# Patient Record
Sex: FEMALE | Race: WHITE | NOT HISPANIC OR LATINO | Employment: OTHER | URBAN - METROPOLITAN AREA
[De-identification: names, ages, dates, MRNs, and addresses within clinical notes are randomized per-mention and may not be internally consistent; named-entity substitution may affect disease eponyms.]

---

## 2021-11-19 LAB
ALBUMIN: 3.8 G/DL (ref 3.4–5)
ALP BLD-CCNC: 46 U/L (ref 33–136)
ALT SERPL-CCNC: 10 U/L (ref 7–45)
ANION GAP SERPL CALCULATED.3IONS-SCNC: 11 MMOL/L (ref 10–20)
AST SERPL-CCNC: 10 U/L (ref 9–39)
BICARBONATE: 30 MMOL/L (ref 21–32)
BILIRUB SERPL-MCNC: 0.5 MG/DL (ref 0–1.2)
CALCIUM SERPL-MCNC: 9 MG/DL (ref 8.6–10.3)
CHLORIDE BLD-SCNC: 103 MMOL/L (ref 98–107)
CREAT SERPL-MCNC: 0.76 MG/DL (ref 0.5–1)
ERYTHROCYTE [DISTWIDTH] IN BLOOD BY AUTOMATED COUNT: 15.5 % (ref 11.5–14)
GFR AFRICAN AMERICAN: >60 ML/MIN/1.73M2
GFR NON-AFRICAN AMERICAN: >60 ML/MIN/1.73M2
GLUCOSE: 113 MG/DL (ref 74–99)
HCT VFR BLD CALC: 40.4 % (ref 36–46)
HEMOGLOBIN: 12.8 G/DL (ref 12–16)
MCHC RBC AUTO-ENTMCNC: 31.7 G/DL (ref 32–36)
MCV RBC AUTO: 91 FL (ref 80–100)
NUCLEATED RBCS: 0 /100 WBC (ref 0–0)
PLATELET # BLD: 281 X10E9/L (ref 150–450)
POTASSIUM SERPL-SCNC: 3.8 MMOL/L (ref 3.5–5.3)
RBC # BLD: 4.45 X10E12/L (ref 4–5.2)
SODIUM BLD-SCNC: 140 MMOL/L (ref 136–145)
TOTAL PROTEIN: 6.1 G/DL (ref 6.4–8.2)
UREA NITROGEN: 9 MG/DL (ref 6–23)
WBC: 12.6 X10E9/L (ref 4.4–11.3)

## 2021-11-20 LAB
ABO: NORMAL
ANTIBODY SCREEN: NORMAL
RH TYPE: NORMAL

## 2021-11-29 LAB
SARS-COV-2, PCR: NOT DETECTED
SPECIMEN SOURCE: NORMAL

## 2021-11-30 LAB
GLUCOSE BLD-MCNC: 108 MG/DL (ref 74–99)
GLUCOSE BLD-MCNC: 120 MG/DL (ref 74–99)

## 2021-12-01 LAB
ANION GAP SERPL CALCULATED.3IONS-SCNC: 12 MMOL/L (ref 10–20)
BASOPHILS # BLD: 0.02 X10E9/L (ref 0–0.1)
BASOPHILS RELATIVE PERCENT: 0.2 % (ref 0–2)
BICARBONATE: 28 MMOL/L (ref 21–32)
CALCIUM SERPL-MCNC: 8.2 MG/DL (ref 8.6–10.3)
CHLORIDE BLD-SCNC: 106 MMOL/L (ref 98–107)
CREAT SERPL-MCNC: 0.7 MG/DL (ref 0.5–1)
EOSINOPHIL # BLD: 0.13 X10E9/L (ref 0–0.4)
EOSINOPHILS RELATIVE PERCENT: 1.4 % (ref 0–6)
ERYTHROCYTE [DISTWIDTH] IN BLOOD BY AUTOMATED COUNT: 14.6 % (ref 11.5–14)
GFR AFRICAN AMERICAN: >60 ML/MIN/1.73M2
GFR NON-AFRICAN AMERICAN: >60 ML/MIN/1.73M2
GLUCOSE: 97 MG/DL (ref 74–99)
HCT VFR BLD CALC: 38.2 % (ref 36–46)
HEMOGLOBIN: 11.8 G/DL (ref 12–16)
IMMATURE GRANULOCYTES %: 0.4 % (ref 0–0.9)
LYMPHOCYTES # BLD: 20 % (ref 13–44)
LYMPHOCYTES RELATIVE PERCENT: 1.91 X10E9/L (ref 0.8–3)
MCHC RBC AUTO-ENTMCNC: 30.9 G/DL (ref 32–36)
MCV RBC AUTO: 92 FL (ref 80–100)
MONOCYTES # BLD: 0.9 X10E9/L (ref 0.05–0.8)
MONOCYTES RELATIVE PERCENT: 9.4 % (ref 2–10)
NEUTROPHILS RELATIVE PERCENT: 68.6 % (ref 40–80)
NEUTROPHILS: 6.54 X10E9/L (ref 1.6–5.5)
NUCLEATED RBCS: 0 /100 WBC (ref 0–0)
PLATELET # BLD: 271 X10E9/L (ref 150–450)
POTASSIUM SERPL-SCNC: 3.8 MMOL/L (ref 3.5–5.3)
RBC # BLD: 4.17 X10E12/L (ref 4–5.2)
SODIUM BLD-SCNC: 142 MMOL/L (ref 136–145)
UREA NITROGEN: 9 MG/DL (ref 6–23)
WBC: 9.5 X10E9/L (ref 4.4–11.3)

## 2023-02-23 LAB — URINE CULTURE: ABNORMAL

## 2023-03-23 LAB
BACTERIA, URINE: ABNORMAL /HPF
RBC, URINE: 27 /HPF (ref 0–5)
SQUAMOUS EPITHELIAL CELLS, URINE: 2 /HPF
WBC, URINE: 277 /HPF (ref 0–5)

## 2023-03-26 LAB
URINE CULTURE: ABNORMAL
URINE CULTURE: ABNORMAL

## 2023-05-08 RX ORDER — DULOXETIN HYDROCHLORIDE 60 MG/1
1 CAPSULE, DELAYED RELEASE ORAL DAILY
COMMUNITY
Start: 2021-03-15

## 2023-05-08 RX ORDER — COLCHICINE 0.6 MG/1
TABLET ORAL
COMMUNITY
Start: 2021-01-04 | End: 2023-05-09 | Stop reason: ALTCHOICE

## 2023-05-08 RX ORDER — CALCIUM CARBONATE 600 MG
TABLET ORAL
COMMUNITY
Start: 2015-08-13 | End: 2023-05-09 | Stop reason: ALTCHOICE

## 2023-05-08 RX ORDER — CYCLOBENZAPRINE HCL 10 MG
TABLET ORAL
COMMUNITY
Start: 2020-12-03 | End: 2023-05-09 | Stop reason: ALTCHOICE

## 2023-05-08 RX ORDER — ASPIRIN 81 MG/1
81 TABLET ORAL DAILY
COMMUNITY
End: 2023-12-28 | Stop reason: ALTCHOICE

## 2023-05-08 RX ORDER — CLOTRIMAZOLE AND BETAMETHASONE DIPROPIONATE 10; .64 MG/G; MG/G
45 CREAM TOPICAL EVERY 12 HOURS
COMMUNITY
Start: 2020-07-30 | End: 2023-05-09 | Stop reason: ALTCHOICE

## 2023-05-08 RX ORDER — METFORMIN HYDROCHLORIDE 500 MG/1
TABLET, EXTENDED RELEASE ORAL
COMMUNITY
Start: 2021-06-07

## 2023-05-08 RX ORDER — BLOOD SUGAR DIAGNOSTIC
STRIP MISCELLANEOUS
COMMUNITY
Start: 2021-09-08 | End: 2023-11-02 | Stop reason: ALTCHOICE

## 2023-05-08 RX ORDER — ESTRADIOL 0.1 MG/G
CREAM VAGINAL
COMMUNITY
Start: 2022-02-21

## 2023-05-08 RX ORDER — CALCIUM CARBONATE/VITAMIN D3 500-10/5ML
LIQUID (ML) ORAL
COMMUNITY
Start: 2015-08-13 | End: 2023-05-09 | Stop reason: ALTCHOICE

## 2023-05-08 RX ORDER — MONTELUKAST SODIUM 10 MG/1
1 TABLET ORAL NIGHTLY
COMMUNITY
Start: 2015-08-13 | End: 2024-01-17

## 2023-05-08 RX ORDER — CARBAMAZEPINE 200 MG/1
TABLET ORAL 2 TIMES DAILY PRN
COMMUNITY
Start: 2021-08-05 | End: 2023-05-09 | Stop reason: ALTCHOICE

## 2023-05-08 RX ORDER — ESCITALOPRAM OXALATE 20 MG/1
TABLET ORAL
COMMUNITY
End: 2023-05-09 | Stop reason: ALTCHOICE

## 2023-05-08 RX ORDER — TERBINAFINE HYDROCHLORIDE 250 MG/1
TABLET ORAL
COMMUNITY
Start: 2021-04-26 | End: 2023-05-09 | Stop reason: ALTCHOICE

## 2023-05-08 RX ORDER — GENTAMICIN 40 MG/ML
INJECTION, SOLUTION INTRAMUSCULAR; INTRAVENOUS EVERY 12 HOURS
COMMUNITY
Start: 2015-10-05 | End: 2023-11-02 | Stop reason: ALTCHOICE

## 2023-05-08 RX ORDER — TRAZODONE HYDROCHLORIDE 50 MG/1
100 TABLET ORAL NIGHTLY
COMMUNITY
Start: 2021-03-15

## 2023-05-08 RX ORDER — FLUTICASONE PROPIONATE 50 MCG
1 SPRAY, SUSPENSION (ML) NASAL 2 TIMES DAILY
COMMUNITY
Start: 2022-12-22 | End: 2023-11-02 | Stop reason: ALTCHOICE

## 2023-05-08 RX ORDER — CODEINE PHOSPHATE AND GUAIFENESIN 10; 100 MG/5ML; MG/5ML
10 SOLUTION ORAL EVERY 6 HOURS
COMMUNITY
Start: 2021-09-17 | End: 2023-05-09 | Stop reason: ALTCHOICE

## 2023-05-08 RX ORDER — PREDNISONE 20 MG/1
60 TABLET ORAL
COMMUNITY
End: 2023-05-09 | Stop reason: ALTCHOICE

## 2023-05-08 RX ORDER — NORTRIPTYLINE HYDROCHLORIDE 25 MG/1
CAPSULE ORAL
COMMUNITY
Start: 2018-11-21 | End: 2023-05-09 | Stop reason: ALTCHOICE

## 2023-05-08 RX ORDER — ATORVASTATIN CALCIUM 10 MG/1
TABLET, FILM COATED ORAL
COMMUNITY
Start: 2015-08-13 | End: 2023-10-23

## 2023-05-08 RX ORDER — TRIMETHOPRIM 100 MG/1
TABLET ORAL
COMMUNITY
Start: 2015-09-17 | End: 2023-05-09 | Stop reason: ALTCHOICE

## 2023-05-08 RX ORDER — METOPROLOL TARTRATE 25 MG/1
TABLET, FILM COATED ORAL
COMMUNITY
Start: 2015-08-13 | End: 2023-05-09 | Stop reason: ALTCHOICE

## 2023-05-08 RX ORDER — NITROGLYCERIN 0.4 MG/1
TABLET SUBLINGUAL
COMMUNITY
End: 2023-11-02 | Stop reason: ALTCHOICE

## 2023-05-08 RX ORDER — BENZONATATE 100 MG/1
1 CAPSULE ORAL 3 TIMES DAILY PRN
COMMUNITY
Start: 2022-12-22 | End: 2023-05-09 | Stop reason: ALTCHOICE

## 2023-05-08 RX ORDER — CLOBETASOL PROPIONATE 0.5 MG/G
OINTMENT TOPICAL
COMMUNITY
Start: 2021-06-08 | End: 2023-11-02 | Stop reason: ALTCHOICE

## 2023-05-08 RX ORDER — LEVOTHYROXINE SODIUM 125 UG/1
125 TABLET ORAL
Qty: 30 TABLET | Refills: 11 | COMMUNITY
Start: 2022-09-21 | End: 2023-11-02 | Stop reason: ALTCHOICE

## 2023-05-08 RX ORDER — DULOXETIN HYDROCHLORIDE 60 MG/1
60 CAPSULE, DELAYED RELEASE ORAL
COMMUNITY
Start: 2020-09-21 | End: 2023-05-09 | Stop reason: SDUPTHER

## 2023-05-08 RX ORDER — SERTRALINE HYDROCHLORIDE 100 MG/1
TABLET, FILM COATED ORAL
COMMUNITY
Start: 2019-01-03 | End: 2023-05-09 | Stop reason: ALTCHOICE

## 2023-05-08 RX ORDER — KETOCONAZOLE 20 MG/G
CREAM TOPICAL
COMMUNITY
Start: 2021-04-26 | End: 2023-11-02 | Stop reason: ALTCHOICE

## 2023-05-08 RX ORDER — OMEPRAZOLE 40 MG/1
CAPSULE, DELAYED RELEASE ORAL
COMMUNITY
Start: 2015-08-13 | End: 2023-06-19

## 2023-05-08 RX ORDER — HYDROCHLOROTHIAZIDE 25 MG/1
TABLET ORAL
COMMUNITY
Start: 2015-08-13 | End: 2023-05-09 | Stop reason: ALTCHOICE

## 2023-05-08 RX ORDER — IBUPROFEN 200 MG
CAPSULE ORAL
COMMUNITY
Start: 2021-03-15 | End: 2023-05-09 | Stop reason: ALTCHOICE

## 2023-05-08 RX ORDER — MOMETASONE FUROATE AND FORMOTEROL FUMARATE DIHYDRATE 100; 5 UG/1; UG/1
2 AEROSOL RESPIRATORY (INHALATION) 2 TIMES DAILY
COMMUNITY
Start: 2023-02-08

## 2023-05-08 RX ORDER — FERROUS SULFATE, DRIED 160(50) MG
1 TABLET, EXTENDED RELEASE ORAL 3 TIMES DAILY
COMMUNITY
Start: 2021-04-14 | End: 2023-05-09 | Stop reason: ALTCHOICE

## 2023-05-08 RX ORDER — MIRABEGRON 50 MG/1
TABLET, EXTENDED RELEASE ORAL
COMMUNITY
Start: 2015-11-12 | End: 2023-05-09 | Stop reason: ALTCHOICE

## 2023-05-08 RX ORDER — DIPHENHYDRAMINE HCL 25 MG
TABLET ORAL
COMMUNITY
Start: 2015-08-21 | End: 2023-05-09 | Stop reason: ALTCHOICE

## 2023-05-08 RX ORDER — CHOLECALCIFEROL (VITAMIN D3) 50 MCG
1000 TABLET ORAL
COMMUNITY

## 2023-05-08 RX ORDER — POTASSIUM CHLORIDE 1500 MG/1
TABLET, EXTENDED RELEASE ORAL
COMMUNITY
Start: 2015-08-13 | End: 2023-05-09 | Stop reason: ALTCHOICE

## 2023-05-08 RX ORDER — TIOTROPIUM BROMIDE INHALATION SPRAY 3.12 UG/1
2 SPRAY, METERED RESPIRATORY (INHALATION) DAILY
COMMUNITY
Start: 2022-04-21

## 2023-05-09 ENCOUNTER — OFFICE VISIT (OUTPATIENT)
Dept: PRIMARY CARE | Facility: CLINIC | Age: 73
End: 2023-05-09
Payer: MEDICARE

## 2023-05-09 VITALS
WEIGHT: 143 LBS | OXYGEN SATURATION: 98 % | SYSTOLIC BLOOD PRESSURE: 124 MMHG | BODY MASS INDEX: 26.31 KG/M2 | DIASTOLIC BLOOD PRESSURE: 76 MMHG | HEART RATE: 62 BPM | HEIGHT: 62 IN | RESPIRATION RATE: 14 BRPM

## 2023-05-09 DIAGNOSIS — J44.9 CHRONIC OBSTRUCTIVE PULMONARY DISEASE, UNSPECIFIED COPD TYPE (MULTI): ICD-10-CM

## 2023-05-09 DIAGNOSIS — Z98.61 CAD S/P PERCUTANEOUS CORONARY ANGIOPLASTY: ICD-10-CM

## 2023-05-09 DIAGNOSIS — R26.2 DIFFICULTY WALKING: Primary | ICD-10-CM

## 2023-05-09 DIAGNOSIS — I25.10 CAD S/P PERCUTANEOUS CORONARY ANGIOPLASTY: ICD-10-CM

## 2023-05-09 DIAGNOSIS — J45.40 MODERATE PERSISTENT ASTHMA WITHOUT COMPLICATION (HHS-HCC): ICD-10-CM

## 2023-05-09 DIAGNOSIS — F33.9 RECURRENT MAJOR DEPRESSIVE DISORDER, REMISSION STATUS UNSPECIFIED (CMS-HCC): ICD-10-CM

## 2023-05-09 PROBLEM — F32.9 MAJOR DEPRESSION: Status: ACTIVE | Noted: 2023-05-09

## 2023-05-09 PROCEDURE — 1036F TOBACCO NON-USER: CPT | Performed by: INTERNAL MEDICINE

## 2023-05-09 PROCEDURE — 1159F MED LIST DOCD IN RCRD: CPT | Performed by: INTERNAL MEDICINE

## 2023-05-09 PROCEDURE — 99213 OFFICE O/P EST LOW 20 MIN: CPT | Performed by: INTERNAL MEDICINE

## 2023-05-09 ASSESSMENT — ENCOUNTER SYMPTOMS
COUGH: 0
DIAPHORESIS: 0
CONSTIPATION: 0
FEVER: 0
MYALGIAS: 0
JOINT SWELLING: 0
CHILLS: 0
NAUSEA: 0
SHORTNESS OF BREATH: 0
VOMITING: 0
DIARRHEA: 0

## 2023-05-09 ASSESSMENT — COLUMBIA-SUICIDE SEVERITY RATING SCALE - C-SSRS
2. HAVE YOU ACTUALLY HAD ANY THOUGHTS OF KILLING YOURSELF?: NO
1. IN THE PAST MONTH, HAVE YOU WISHED YOU WERE DEAD OR WISHED YOU COULD GO TO SLEEP AND NOT WAKE UP?: NO
6. HAVE YOU EVER DONE ANYTHING, STARTED TO DO ANYTHING, OR PREPARED TO DO ANYTHING TO END YOUR LIFE?: NO

## 2023-05-09 ASSESSMENT — PATIENT HEALTH QUESTIONNAIRE - PHQ9
SUM OF ALL RESPONSES TO PHQ9 QUESTIONS 1 AND 2: 2
2. FEELING DOWN, DEPRESSED OR HOPELESS: SEVERAL DAYS
1. LITTLE INTEREST OR PLEASURE IN DOING THINGS: SEVERAL DAYS

## 2023-05-09 NOTE — PATIENT INSTRUCTIONS
HANDICAPPED PLACARD SCRIPT PRINTED    2.  LABS FROM MARCH 2023 LOOK GOOD, NO NEED FOR REPEAT JUST YET    3.  WALK FOR EXERCISE AS ABLE    4.  FOLLOW UP IN 6 MONTHS OR AS NEEDED.

## 2023-05-09 NOTE — PROGRESS NOTES
"Subjective   Myrna Rodrigues is a 72 y.o. female who presents for FOLLOW UP     HPI   BEEN DOING PRETTY WELL    NO NEW ISSUES.    SEE. MARIA ALEJANDRA SALMERON, DR. AWAD, ENDOCRINOLOGIST FROM [facility] BECKIE MARIE, SEE DR. MORALES [facility]    NEED SCRIPT TO GET HANDICAPPED PLACARD    Review of Systems   Constitutional:  Negative for chills, diaphoresis and fever.   Respiratory:  Negative for cough and shortness of breath.    Cardiovascular:  Negative for chest pain and leg swelling.   Gastrointestinal:  Negative for constipation, diarrhea, nausea and vomiting.   Musculoskeletal:  Negative for joint swelling and myalgias.       Objective   /76   Pulse 62   Resp 14   Ht 1.575 m (5' 2\")   Wt 64.9 kg (143 lb)   SpO2 98%   BMI 26.16 kg/m²     Physical Exam  Vitals reviewed.   Constitutional:       General: She is not in acute distress.     Appearance: She is not ill-appearing.   Cardiovascular:      Rate and Rhythm: Normal rate and regular rhythm.      Pulses: Normal pulses.      Heart sounds:      No gallop.   Pulmonary:      Breath sounds: Normal breath sounds. No wheezing, rhonchi or rales.      Comments: PROLONGED EXPIRATORY PHASE  Abdominal:      General: Abdomen is flat. Bowel sounds are normal.      Palpations: Abdomen is soft.      Tenderness: There is no guarding or rebound.   Musculoskeletal:      Right lower leg: No edema.      Left lower leg: No edema.         Assessment/Plan   Problem List Items Addressed This Visit          Respiratory    Chronic obstructive pulmonary disease (CMS/HCC)    Moderate persistent asthma       Circulatory    CAD S/P percutaneous coronary angioplasty    Relevant Medications    nitroglycerin (Nitrostat) 0.4 mg SL tablet       Other    Major depression     Other Visit Diagnoses       Difficulty walking    -  Primary    Relevant Orders    Disability Placard          Patient Instructions    HANDICAPPED PLACARD SCRIPT PRINTED    2.  LABS FROM MARCH 2023 LOOK GOOD, NO NEED FOR REPEAT JUST " YET    3.  WALK FOR EXERCISE AS ABLE    4.  FOLLOW UP IN 6 MONTHS OR AS NEEDED.

## 2023-05-28 LAB
URINE CULTURE: ABNORMAL

## 2023-06-17 DIAGNOSIS — K21.9 GASTROESOPHAGEAL REFLUX DISEASE, UNSPECIFIED WHETHER ESOPHAGITIS PRESENT: ICD-10-CM

## 2023-06-19 RX ORDER — OMEPRAZOLE 40 MG/1
CAPSULE, DELAYED RELEASE ORAL
Qty: 180 CAPSULE | Refills: 3 | Status: SHIPPED | OUTPATIENT
Start: 2023-06-19 | End: 2024-05-06

## 2023-06-25 LAB — URINE CULTURE: ABNORMAL

## 2023-07-11 LAB — URINE CULTURE: NO GROWTH

## 2023-09-05 ENCOUNTER — TELEPHONE (OUTPATIENT)
Dept: PRIMARY CARE | Facility: CLINIC | Age: 73
End: 2023-09-05
Payer: MEDICARE

## 2023-09-05 DIAGNOSIS — I10 HTN (HYPERTENSION), BENIGN: ICD-10-CM

## 2023-09-05 RX ORDER — METOPROLOL TARTRATE 25 MG/1
25 TABLET, FILM COATED ORAL 2 TIMES DAILY
COMMUNITY
Start: 2015-08-13 | End: 2023-09-05 | Stop reason: SDUPTHER

## 2023-09-05 RX ORDER — METOPROLOL TARTRATE 25 MG/1
25 TABLET, FILM COATED ORAL 2 TIMES DAILY
Qty: 180 TABLET | Refills: 3 | Status: SHIPPED | OUTPATIENT
Start: 2023-09-05

## 2023-09-06 PROBLEM — Z78.9 NEVER SMOKED ANY SUBSTANCE: Status: ACTIVE | Noted: 2023-09-06

## 2023-09-06 PROBLEM — L03.90 CELLULITIS: Status: ACTIVE | Noted: 2023-09-06

## 2023-09-06 PROBLEM — Z95.820 S/P ANGIOPLASTY WITH STENT: Status: ACTIVE | Noted: 2021-06-07

## 2023-09-06 PROBLEM — G89.29 CHRONIC PAIN OF TOE OF RIGHT FOOT: Status: ACTIVE | Noted: 2023-09-06

## 2023-09-06 PROBLEM — E66.9 OBESITY: Status: ACTIVE | Noted: 2023-09-06

## 2023-09-06 PROBLEM — F39 MOOD DISORDER (CMS-HCC): Status: ACTIVE | Noted: 2018-02-13

## 2023-09-06 PROBLEM — D72.829 ELEVATED WHITE BLOOD CELL COUNT, UNSPECIFIED: Status: ACTIVE | Noted: 2018-02-13

## 2023-09-06 PROBLEM — Z86.79 H/O UNSTABLE ANGINA: Status: ACTIVE | Noted: 2023-09-06

## 2023-09-06 PROBLEM — K52.9 CHRONIC DIARRHEA: Status: ACTIVE | Noted: 2023-09-06

## 2023-09-06 PROBLEM — E66.811 OBESITY (BMI 30.0-34.9): Status: ACTIVE | Noted: 2021-04-07

## 2023-09-06 PROBLEM — M54.16 LEFT LUMBAR RADICULOPATHY: Status: ACTIVE | Noted: 2023-09-06

## 2023-09-06 PROBLEM — R91.8 ABNORMAL FINDINGS ON DIAGNOSTIC IMAGING OF LUNG: Status: ACTIVE | Noted: 2023-09-06

## 2023-09-06 PROBLEM — R91.8 GROUND GLASS OPACITY PRESENT ON IMAGING OF LUNG: Status: ACTIVE | Noted: 2023-09-06

## 2023-09-06 PROBLEM — I49.9 CARDIAC DYSRHYTHMIA: Status: ACTIVE | Noted: 2018-02-13

## 2023-09-06 PROBLEM — M79.675 CHRONIC PAIN OF TOE OF LEFT FOOT: Status: ACTIVE | Noted: 2023-09-06

## 2023-09-06 PROBLEM — C73 THYROID CANCER (MULTI): Status: ACTIVE | Noted: 2021-04-07

## 2023-09-06 PROBLEM — N39.46 MIXED STRESS AND URGE URINARY INCONTINENCE: Status: ACTIVE | Noted: 2023-09-06

## 2023-09-06 PROBLEM — F32.A DEPRESSION: Status: ACTIVE | Noted: 2021-04-07

## 2023-09-06 PROBLEM — N95.2 VAGINAL ATROPHY: Status: ACTIVE | Noted: 2023-09-06

## 2023-09-06 PROBLEM — K62.3 RECTAL PROLAPSE: Status: ACTIVE | Noted: 2023-09-06

## 2023-09-06 PROBLEM — S82.63XA LATERAL MALLEOLAR FRACTURE: Status: ACTIVE | Noted: 2023-09-06

## 2023-09-06 PROBLEM — T84.9XXA: Status: ACTIVE | Noted: 2017-10-05

## 2023-09-06 PROBLEM — T84.033A MECHANICAL LOOSENING OF INTERNAL LEFT KNEE PROSTHETIC JOINT (CMS-HCC): Status: ACTIVE | Noted: 2018-02-13

## 2023-09-06 PROBLEM — Z87.39 PERSONAL HISTORY OF CALCIUM PYROPHOSPHATE DEPOSITION DISEASE (CPPD): Status: ACTIVE | Noted: 2023-09-06

## 2023-09-06 PROBLEM — I20.9 ANGINA, CLASS III (CMS-HCC): Status: ACTIVE | Noted: 2023-09-06

## 2023-09-06 PROBLEM — Z79.82 LONG TERM (CURRENT) USE OF ASPIRIN: Status: ACTIVE | Noted: 2018-02-13

## 2023-09-06 PROBLEM — N39.41 URGE INCONTINENCE OF URINE: Status: ACTIVE | Noted: 2023-09-06

## 2023-09-06 PROBLEM — E89.0 POSTSURGICAL HYPOTHYROIDISM: Status: ACTIVE | Noted: 2021-06-07

## 2023-09-06 PROBLEM — E11.9 DIABETES MELLITUS (MULTI): Status: ACTIVE | Noted: 2023-09-06

## 2023-09-06 PROBLEM — Z96.652 PRESENCE OF LEFT ARTIFICIAL KNEE JOINT: Status: ACTIVE | Noted: 2018-08-23

## 2023-09-06 PROBLEM — C73 MALIGNANT NEOPLASM OF THYROID GLAND (MULTI): Status: ACTIVE | Noted: 2023-09-06

## 2023-09-06 PROBLEM — E78.2 MIXED HYPERLIPIDEMIA: Status: ACTIVE | Noted: 2017-12-05

## 2023-09-06 PROBLEM — R30.0 DYSURIA: Status: ACTIVE | Noted: 2023-09-06

## 2023-09-06 PROBLEM — I25.10 CORONARY ARTERY DISEASE INVOLVING NATIVE HEART WITHOUT ANGINA PECTORIS: Status: ACTIVE | Noted: 2021-04-07

## 2023-09-06 PROBLEM — K22.9 ESOPHAGEAL DISORDER: Status: ACTIVE | Noted: 2018-02-13

## 2023-09-06 PROBLEM — I25.10 CORONARY ATHEROSCLEROSIS: Status: ACTIVE | Noted: 2017-12-05

## 2023-09-06 PROBLEM — L82.1 OTHER SEBORRHEIC KERATOSIS: Status: ACTIVE | Noted: 2019-02-04

## 2023-09-06 PROBLEM — E55.9 VITAMIN D DEFICIENCY: Status: ACTIVE | Noted: 2022-03-20

## 2023-09-06 PROBLEM — J45.909 ASTHMA (HHS-HCC): Status: ACTIVE | Noted: 2020-12-07

## 2023-09-06 PROBLEM — E66.3 OVERWEIGHT WITH BODY MASS INDEX (BMI) 25.0-29.9: Status: ACTIVE | Noted: 2023-09-06

## 2023-09-06 PROBLEM — S82.899A FRACTURE OF ANKLE: Status: ACTIVE | Noted: 2023-09-06

## 2023-09-06 PROBLEM — G89.29 CHRONIC PAIN OF TOE OF LEFT FOOT: Status: ACTIVE | Noted: 2023-09-06

## 2023-09-06 PROBLEM — M31.6 TEMPORAL ARTERITIS (MULTI): Status: ACTIVE | Noted: 2023-09-06

## 2023-09-06 PROBLEM — K21.9 GASTROESOPHAGEAL REFLUX DISEASE: Status: ACTIVE | Noted: 2021-04-07

## 2023-09-06 PROBLEM — M48.061 SPINAL STENOSIS OF LUMBAR REGION WITHOUT NEUROGENIC CLAUDICATION: Status: ACTIVE | Noted: 2023-09-06

## 2023-09-06 PROBLEM — E66.9 OBESITY (BMI 30.0-34.9): Status: ACTIVE | Noted: 2021-04-07

## 2023-09-06 PROBLEM — N39.0 RECURRENT URINARY TRACT INFECTION: Status: ACTIVE | Noted: 2023-09-06

## 2023-09-06 PROBLEM — R32 URINARY INCONTINENCE: Status: ACTIVE | Noted: 2021-04-07

## 2023-09-06 PROBLEM — H90.3 SENSORINEURAL HEARING LOSS (SNHL) OF BOTH EARS: Status: ACTIVE | Noted: 2023-09-06

## 2023-09-06 PROBLEM — N28.9 RENAL INSUFFICIENCY: Status: ACTIVE | Noted: 2023-09-06

## 2023-09-06 PROBLEM — I25.10 ATHSCL HEART DISEASE OF NATIVE CORONARY ARTERY W/O ANG PCTRS: Status: ACTIVE | Noted: 2018-02-13

## 2023-09-06 PROBLEM — L40.0 PSORIASIS VULGARIS: Status: ACTIVE | Noted: 2021-06-08

## 2023-09-06 PROBLEM — I10 HYPERTENSION, ESSENTIAL, BENIGN: Status: ACTIVE | Noted: 2020-12-07

## 2023-09-06 PROBLEM — E55.9 MILD VITAMIN D DEFICIENCY: Status: ACTIVE | Noted: 2023-09-06

## 2023-09-06 PROBLEM — Z86.79 H/O HEART DISORDER: Status: ACTIVE | Noted: 2023-09-06

## 2023-09-06 PROBLEM — D18.01 HEMANGIOMA OF SKIN AND SUBCUTANEOUS TISSUE: Status: ACTIVE | Noted: 2021-04-26

## 2023-09-06 PROBLEM — E11.9 CONTROLLED TYPE 2 DIABETES MELLITUS WITHOUT COMPLICATION, WITHOUT LONG-TERM CURRENT USE OF INSULIN (MULTI): Status: ACTIVE | Noted: 2021-06-07

## 2023-09-06 PROBLEM — R60.9 EDEMA: Status: ACTIVE | Noted: 2023-09-06

## 2023-09-06 PROBLEM — D64.89 OTHER SPECIFIED ANEMIAS: Status: ACTIVE | Noted: 2018-02-19

## 2023-09-06 PROBLEM — I47.20 VENTRICULAR TACHYCARDIA (MULTI): Status: ACTIVE | Noted: 2018-02-13

## 2023-09-06 PROBLEM — Z86.79 HISTORY OF HYPERTENSION: Status: ACTIVE | Noted: 2023-09-06

## 2023-09-06 PROBLEM — I48.0 PAROXYSMAL ATRIAL FIBRILLATION (MULTI): Status: ACTIVE | Noted: 2023-09-06

## 2023-09-06 PROBLEM — K64.4 EXTERNAL HEMORRHOIDS: Status: ACTIVE | Noted: 2023-09-06

## 2023-09-06 PROBLEM — N18.9 CKD (CHRONIC KIDNEY DISEASE): Status: ACTIVE | Noted: 2018-02-13

## 2023-09-06 PROBLEM — Z86.79 HISTORY OF ATRIAL FIBRILLATION: Status: ACTIVE | Noted: 2021-04-07

## 2023-09-06 PROBLEM — Z86.69 PERSONAL HISTORY OF OTHER DISEASES OF THE NERVOUS SYSTEM AND SENSE ORGANS: Status: ACTIVE | Noted: 2023-09-06

## 2023-09-06 PROBLEM — B35.3 TINEA PEDIS OF BOTH FEET: Status: ACTIVE | Noted: 2021-04-26

## 2023-09-06 PROBLEM — B35.1 NAIL FUNGUS: Status: ACTIVE | Noted: 2023-09-06

## 2023-09-06 PROBLEM — Z78.0 ASYMPTOMATIC MENOPAUSE: Status: ACTIVE | Noted: 2023-09-06

## 2023-09-06 PROBLEM — M79.674 CHRONIC PAIN OF TOE OF RIGHT FOOT: Status: ACTIVE | Noted: 2023-09-06

## 2023-09-06 PROBLEM — R31.9 HEMATURIA: Status: ACTIVE | Noted: 2023-09-06

## 2023-09-06 PROBLEM — T84.039A: Status: ACTIVE | Noted: 2017-12-14

## 2023-09-06 PROBLEM — Z85.9 HISTORY OF MALIGNANT NEOPLASM: Status: ACTIVE | Noted: 2023-09-06

## 2023-09-06 PROBLEM — Z95.5 PRESENCE OF CORONARY ANGIOPLASTY IMPLANT AND GRAFT: Status: ACTIVE | Noted: 2018-02-13

## 2023-09-06 PROBLEM — E04.1 THYROID NODULE: Status: ACTIVE | Noted: 2021-03-03

## 2023-09-06 PROBLEM — F32.9 MAJOR DEPRESSIVE DISORDER: Status: ACTIVE | Noted: 2023-09-06

## 2023-09-06 PROBLEM — Z86.39 HISTORY OF ELEVATED LIPIDS: Status: ACTIVE | Noted: 2023-09-06

## 2023-09-06 PROBLEM — E66.3 OVERWEIGHT: Status: ACTIVE | Noted: 2023-09-06

## 2023-09-06 PROBLEM — N76.0 VAGINITIS: Status: ACTIVE | Noted: 2023-09-06

## 2023-09-06 PROBLEM — L81.4 OTHER MELANIN HYPERPIGMENTATION: Status: ACTIVE | Noted: 2019-02-04

## 2023-09-06 PROBLEM — M17.10 UNILATERAL PRIMARY OSTEOARTHRITIS, UNSPECIFIED KNEE: Status: ACTIVE | Noted: 2017-09-29

## 2023-09-06 PROBLEM — Z86.39 HISTORY OF DIABETES MELLITUS: Status: ACTIVE | Noted: 2023-09-06

## 2023-09-06 RX ORDER — MAGNESIUM HYDROXIDE 400 MG/5ML
SUSPENSION, ORAL (FINAL DOSE FORM) ORAL DAILY
COMMUNITY
Start: 2015-08-13 | End: 2023-11-02 | Stop reason: SDUPTHER

## 2023-09-06 RX ORDER — ACETAMINOPHEN 325 MG/1
650 TABLET ORAL EVERY 6 HOURS PRN
COMMUNITY
Start: 2021-11-30

## 2023-09-06 RX ORDER — DOXYCYCLINE HYCLATE 100 MG
1 TABLET ORAL 2 TIMES DAILY
COMMUNITY
Start: 2023-06-30 | End: 2023-11-02 | Stop reason: ALTCHOICE

## 2023-09-06 RX ORDER — HYDROCORTISONE 25 MG/G
30 CREAM TOPICAL 2 TIMES DAILY PRN
COMMUNITY
Start: 2020-03-06 | End: 2023-11-02 | Stop reason: ALTCHOICE

## 2023-09-06 RX ORDER — METFORMIN HYDROCHLORIDE 500 MG/1
2 TABLET ORAL 2 TIMES DAILY
COMMUNITY
Start: 2021-09-10 | End: 2023-11-02 | Stop reason: ALTCHOICE

## 2023-09-06 RX ORDER — PREDNISONE 20 MG/1
3 TABLET ORAL DAILY
COMMUNITY
Start: 2015-08-21 | End: 2023-11-02 | Stop reason: ALTCHOICE

## 2023-09-06 RX ORDER — TRIMETHOPRIM 100 MG/1
1 TABLET ORAL NIGHTLY
COMMUNITY
Start: 2023-05-22 | End: 2023-11-02 | Stop reason: ALTCHOICE

## 2023-09-06 RX ORDER — BENZONATATE 100 MG/1
1 CAPSULE ORAL DAILY PRN
COMMUNITY
Start: 2022-12-22 | End: 2023-11-02 | Stop reason: ALTCHOICE

## 2023-09-06 RX ORDER — TRIAMTERENE AND HYDROCHLOROTHIAZIDE 37.5; 25 MG/1; MG/1
CAPSULE ORAL
COMMUNITY
Start: 2015-08-13 | End: 2023-11-02 | Stop reason: ALTCHOICE

## 2023-09-06 RX ORDER — POTASSIUM CHLORIDE 20 MEQ/1
TABLET, EXTENDED RELEASE ORAL
COMMUNITY
Start: 2015-08-13 | End: 2023-11-02 | Stop reason: SDUPTHER

## 2023-09-06 RX ORDER — RANOLAZINE 500 MG/1
TABLET, EXTENDED RELEASE ORAL
COMMUNITY
Start: 2015-08-13 | End: 2023-11-02 | Stop reason: ALTCHOICE

## 2023-09-06 RX ORDER — ARIPIPRAZOLE 2 MG/1
1 TABLET ORAL DAILY
COMMUNITY
End: 2023-11-02 | Stop reason: ALTCHOICE

## 2023-09-06 RX ORDER — MULTIVIT-MIN/IRON/FOLIC ACID/K 18-600-40
CAPSULE ORAL DAILY
COMMUNITY
Start: 2021-07-28 | End: 2023-11-02 | Stop reason: ALTCHOICE

## 2023-09-06 RX ORDER — ALBUTEROL SULFATE 90 UG/1
AEROSOL, METERED RESPIRATORY (INHALATION) AS NEEDED
COMMUNITY
Start: 2015-03-23 | End: 2023-10-19 | Stop reason: SDUPTHER

## 2023-09-06 RX ORDER — MIRABEGRON 50 MG/1
1 TABLET, EXTENDED RELEASE ORAL DAILY
COMMUNITY
Start: 2015-11-12 | End: 2023-11-02 | Stop reason: ALTCHOICE

## 2023-09-06 RX ORDER — LEVOTHYROXINE SODIUM 112 UG/1
1 CAPSULE ORAL
COMMUNITY
Start: 2021-04-14

## 2023-09-06 RX ORDER — OXYBUTYNIN CHLORIDE 10 MG/1
1 TABLET, EXTENDED RELEASE ORAL DAILY
COMMUNITY
Start: 2015-08-13 | End: 2024-01-02

## 2023-09-06 RX ORDER — TRAMADOL HYDROCHLORIDE 50 MG/1
1 TABLET ORAL EVERY 6 HOURS PRN
COMMUNITY
Start: 2020-10-12 | End: 2023-11-02 | Stop reason: ALTCHOICE

## 2023-10-19 DIAGNOSIS — J44.9 CHRONIC OBSTRUCTIVE PULMONARY DISEASE, UNSPECIFIED COPD TYPE (MULTI): ICD-10-CM

## 2023-10-19 RX ORDER — ALBUTEROL SULFATE 90 UG/1
2 AEROSOL, METERED RESPIRATORY (INHALATION) EVERY 4 HOURS PRN
Qty: 54 G | Refills: 6 | Status: SHIPPED | OUTPATIENT
Start: 2023-10-19 | End: 2024-05-29

## 2023-10-22 DIAGNOSIS — Z86.39 HISTORY OF ELEVATED LIPIDS: ICD-10-CM

## 2023-10-23 RX ORDER — ATORVASTATIN CALCIUM 10 MG/1
10 TABLET, FILM COATED ORAL DAILY
Qty: 90 TABLET | Refills: 3 | Status: SHIPPED | OUTPATIENT
Start: 2023-10-23

## 2023-11-02 ENCOUNTER — OFFICE VISIT (OUTPATIENT)
Dept: CARDIOLOGY | Facility: CLINIC | Age: 73
End: 2023-11-02
Payer: MEDICARE

## 2023-11-02 VITALS
WEIGHT: 156.5 LBS | HEIGHT: 62 IN | BODY MASS INDEX: 28.8 KG/M2 | DIASTOLIC BLOOD PRESSURE: 66 MMHG | HEART RATE: 67 BPM | SYSTOLIC BLOOD PRESSURE: 134 MMHG

## 2023-11-02 DIAGNOSIS — I10 HYPERTENSION, ESSENTIAL, BENIGN: ICD-10-CM

## 2023-11-02 DIAGNOSIS — Z78.9 NEVER SMOKED ANY SUBSTANCE: ICD-10-CM

## 2023-11-02 DIAGNOSIS — J44.9 CHRONIC OBSTRUCTIVE PULMONARY DISEASE, UNSPECIFIED COPD TYPE (MULTI): ICD-10-CM

## 2023-11-02 DIAGNOSIS — I25.10 CAD S/P PERCUTANEOUS CORONARY ANGIOPLASTY: ICD-10-CM

## 2023-11-02 DIAGNOSIS — Z98.61 CAD S/P PERCUTANEOUS CORONARY ANGIOPLASTY: ICD-10-CM

## 2023-11-02 DIAGNOSIS — E78.2 MIXED HYPERLIPIDEMIA: ICD-10-CM

## 2023-11-02 DIAGNOSIS — J45.909 ASTHMA, UNSPECIFIED ASTHMA SEVERITY, UNSPECIFIED WHETHER COMPLICATED, UNSPECIFIED WHETHER PERSISTENT (HHS-HCC): ICD-10-CM

## 2023-11-02 DIAGNOSIS — I48.0 PAROXYSMAL ATRIAL FIBRILLATION (MULTI): ICD-10-CM

## 2023-11-02 DIAGNOSIS — I20.9 ANGINA, CLASS III (CMS-HCC): ICD-10-CM

## 2023-11-02 DIAGNOSIS — Z95.5 PRESENCE OF CORONARY ANGIOPLASTY IMPLANT AND GRAFT: ICD-10-CM

## 2023-11-02 PROCEDURE — 1036F TOBACCO NON-USER: CPT | Performed by: INTERNAL MEDICINE

## 2023-11-02 PROCEDURE — 99214 OFFICE O/P EST MOD 30 MIN: CPT | Performed by: INTERNAL MEDICINE

## 2023-11-02 PROCEDURE — 1125F AMNT PAIN NOTED PAIN PRSNT: CPT | Performed by: INTERNAL MEDICINE

## 2023-11-02 PROCEDURE — 3078F DIAST BP <80 MM HG: CPT | Performed by: INTERNAL MEDICINE

## 2023-11-02 PROCEDURE — 3008F BODY MASS INDEX DOCD: CPT | Performed by: INTERNAL MEDICINE

## 2023-11-02 PROCEDURE — 3075F SYST BP GE 130 - 139MM HG: CPT | Performed by: INTERNAL MEDICINE

## 2023-11-02 PROCEDURE — 1159F MED LIST DOCD IN RCRD: CPT | Performed by: INTERNAL MEDICINE

## 2023-11-02 RX ORDER — NITROGLYCERIN 0.4 MG/1
0.4 TABLET SUBLINGUAL EVERY 5 MIN PRN
Qty: 90 TABLET | Refills: 3 | Status: SHIPPED | OUTPATIENT
Start: 2023-11-02 | End: 2024-11-01

## 2023-11-02 RX ORDER — NITROGLYCERIN 0.4 MG/1
0.4 TABLET SUBLINGUAL EVERY 5 MIN PRN
COMMUNITY
End: 2023-11-02 | Stop reason: SDUPTHER

## 2023-11-02 NOTE — PROGRESS NOTES
CARDIOLOGY OFFICE VISIT      CHIEF COMPLAINT      HISTORY OF PRESENT ILLNESS  The patient states that basically she has been doing well.  She states that the other day when she was being in the garbage and from the house she felt her heart racing rapidly from bed to 15 minutes.  Her cardio mobile device demonstrated possible atrial fibrillation.  She has not had any other episodes of feeling her heart racing.  She did have 1 episode of uterotubal left anterior chest pain which is clearly not secondary myocardial ischemia.  She denies any symptoms suggest myocardial ischemia.  She denies any Setoprime with dyspnea.  She denies presyncope and syncope.  She denies any problems or medications.  I did go over results of her EKG with her.  This demonstrates normal sinus rhythm within normal limits.  I told her I like to get a 2-week Zio patch to see if there is any evidence of atrial fibrillation.  She understands and is agreeable.  IMPRESSION:   1. Coronary artery disease, no angina.  2. Remote PCI.  3. Mixed hyperlipidemia.  4. Essential hypertension.  5. Bronchial asthma.  6. Chronic obstructive pulmonary disease.  7. Overweight  8. 2 brief episodes of atrial fibrillation while hospitalized with pneumonia in the past        Please excuse any errors in grammar or translation related to this dictation. Voice recognition software was utilized to prepare this document.     Past Medical History  Past Medical History:   Diagnosis Date    Occipital neuralgia 06/10/2022    Occipital neuralgia of right side    Personal history of malignant neoplasm, unspecified 10/04/2021    History of malignant neoplasm    Personal history of other diseases of the circulatory system     History of hypertension    Personal history of other diseases of the circulatory system 10/04/2021    History of essential hypertension    Personal history of other diseases of the circulatory system     History of coronary artery disease    Personal history of  other diseases of the digestive system 03/11/2020    History of chronic constipation    Personal history of other diseases of the musculoskeletal system and connective tissue     History of arthritis    Personal history of other diseases of the nervous system and sense organs 10/04/2021    History of eye problem    Personal history of other diseases of the respiratory system     History of asthma    Personal history of other specified conditions 10/04/2021    History of chest pain       Social History  Social History     Tobacco Use    Smoking status: Never    Smokeless tobacco: Never   Substance Use Topics    Alcohol use: Yes     Comment: RARE    Drug use: Never       Family History     Family History   Problem Relation Name Age of Onset    Lung cancer Mother      Lymphoma Mother      Bone cancer Mother      Heart attack Father      Liver cancer Brother      Lung cancer Brother          Allergies:  Allergies   Allergen Reactions    Penicillins Anaphylaxis, Unknown and Shortness of breath    Sulfa (Sulfonamide Antibiotics) Swelling    Nitrofurantoin Rash    Nitrofurantoin Monohyd/M-Cryst Diarrhea and Rash        Outpatient Medications:  Current Outpatient Medications   Medication Instructions    acetaminophen (TYLENOL) 650 mg, oral, Every 6 hours PRN    aspirin 81 mg EC tablet Take 1 tablet (81 mg) by mouth once daily.    atorvastatin (LIPITOR) 10 mg, oral, Daily    cholecalciferol (VITAMIN D-3) 1,000 Units, oral, Daily RT    DULoxetine (Cymbalta) 60 mg DR capsule 1 capsule, oral, Daily    estradiol (Estrace) 0.01 % (0.1 mg/gram) vaginal cream vaginal    levothyroxine (Tirosint) 112 mcg capsule 1 capsule, oral, Daily before breakfast, On an empty stomach    metFORMIN XR (Glucophage-XR) 500 mg 24 hr tablet 1 tablet (500 mg) 2 times a day.    metoprolol tartrate (LOPRESSOR) 25 mg, oral, 2 times daily    mometasone-formoterol (Dulera) 100-5 mcg/actuation inhaler 2 puffs, inhalation, 2 times daily    montelukast  (Singulair) 10 mg tablet 1 tablet, oral, Nightly    nitroglycerin (NITROSTAT) 0.4 mg, sublingual, Every 5 min PRN    omeprazole (PriLOSEC) 40 mg DR capsule TAKE 1 CAPSULE BY MOUTH TWICE  DAILY    oxybutynin XL (Ditropan-XL) 10 mg 24 hr tablet 1 tablet, oral, Daily    POTASSIUM ORAL Daily, 99mg, one    tiotropium (Spiriva Respimat) 2.5 mcg/actuation inhaler 2 puffs, inhalation, Daily    traZODone (Desyrel) 50 mg tablet Take 1 tablet (50 mg) by mouth once daily at bedtime.    Ventolin HFA 90 mcg/actuation inhaler 2 puffs, inhalation, Every 4 hours PRN          REVIEW OF SYSTEMS  Review of Systems   All other systems reviewed and are negative.        VITALS  There were no vitals filed for this visit.    PHYSICAL EXAM  Constitutional:       Appearance: Healthy appearance. Not in distress.   Neck:      Vascular: No JVR. JVD normal.   Pulmonary:      Effort: Pulmonary effort is normal.      Breath sounds: Normal breath sounds. No wheezing. No rhonchi. No rales.   Chest:      Chest wall: Not tender to palpatation.   Cardiovascular:      PMI at left midclavicular line. Normal rate. Regular rhythm. Normal S1. Normal S2.       Murmurs: There is no murmur.      No gallop.  No click. No rub.   Pulses:     Intact distal pulses.   Edema:     Peripheral edema absent.   Abdominal:      General: Bowel sounds are normal.      Palpations: Abdomen is soft.      Tenderness: There is no abdominal tenderness.   Musculoskeletal: Normal range of motion.         General: No tenderness. Skin:     General: Skin is warm and dry.   Neurological:      General: No focal deficit present.      Mental Status: Alert and oriented to person, place and time.            ASSESSMENT AND PLAN      [unfilled]

## 2023-11-14 ENCOUNTER — OFFICE VISIT (OUTPATIENT)
Dept: PRIMARY CARE | Facility: CLINIC | Age: 73
End: 2023-11-14
Payer: MEDICARE

## 2023-11-14 VITALS
HEART RATE: 65 BPM | DIASTOLIC BLOOD PRESSURE: 80 MMHG | SYSTOLIC BLOOD PRESSURE: 130 MMHG | BODY MASS INDEX: 28.52 KG/M2 | HEIGHT: 62 IN | WEIGHT: 155 LBS | OXYGEN SATURATION: 99 % | RESPIRATION RATE: 14 BRPM

## 2023-11-14 DIAGNOSIS — Z00.00 PREVENTATIVE HEALTH CARE: ICD-10-CM

## 2023-11-14 DIAGNOSIS — Z00.00 MEDICARE ANNUAL WELLNESS VISIT, SUBSEQUENT: Primary | ICD-10-CM

## 2023-11-14 DIAGNOSIS — Z00.00 ROUTINE GENERAL MEDICAL EXAMINATION AT HEALTH CARE FACILITY: ICD-10-CM

## 2023-11-14 DIAGNOSIS — E11.69 TYPE 2 DIABETES MELLITUS WITH OTHER SPECIFIED COMPLICATION, WITHOUT LONG-TERM CURRENT USE OF INSULIN (MULTI): ICD-10-CM

## 2023-11-14 DIAGNOSIS — J44.9 CHRONIC OBSTRUCTIVE PULMONARY DISEASE, UNSPECIFIED COPD TYPE (MULTI): ICD-10-CM

## 2023-11-14 DIAGNOSIS — Z23 NEED FOR VACCINATION: ICD-10-CM

## 2023-11-14 PROCEDURE — G0008 ADMIN INFLUENZA VIRUS VAC: HCPCS | Performed by: INTERNAL MEDICINE

## 2023-11-14 PROCEDURE — 1125F AMNT PAIN NOTED PAIN PRSNT: CPT | Performed by: INTERNAL MEDICINE

## 2023-11-14 PROCEDURE — 3075F SYST BP GE 130 - 139MM HG: CPT | Performed by: INTERNAL MEDICINE

## 2023-11-14 PROCEDURE — 1036F TOBACCO NON-USER: CPT | Performed by: INTERNAL MEDICINE

## 2023-11-14 PROCEDURE — 3008F BODY MASS INDEX DOCD: CPT | Performed by: INTERNAL MEDICINE

## 2023-11-14 PROCEDURE — 1159F MED LIST DOCD IN RCRD: CPT | Performed by: INTERNAL MEDICINE

## 2023-11-14 PROCEDURE — 3079F DIAST BP 80-89 MM HG: CPT | Performed by: INTERNAL MEDICINE

## 2023-11-14 PROCEDURE — G0009 ADMIN PNEUMOCOCCAL VACCINE: HCPCS | Performed by: INTERNAL MEDICINE

## 2023-11-14 PROCEDURE — 90677 PCV20 VACCINE IM: CPT | Performed by: INTERNAL MEDICINE

## 2023-11-14 PROCEDURE — G0439 PPPS, SUBSEQ VISIT: HCPCS | Performed by: INTERNAL MEDICINE

## 2023-11-14 PROCEDURE — 90662 IIV NO PRSV INCREASED AG IM: CPT | Performed by: INTERNAL MEDICINE

## 2023-11-14 PROCEDURE — 1170F FXNL STATUS ASSESSED: CPT | Performed by: INTERNAL MEDICINE

## 2023-11-14 ASSESSMENT — ENCOUNTER SYMPTOMS
VOMITING: 0
CHILLS: 0
NAUSEA: 0
DIARRHEA: 0
MYALGIAS: 0
CONSTIPATION: 0
SHORTNESS OF BREATH: 0
COUGH: 0
DIAPHORESIS: 0
FEVER: 0
JOINT SWELLING: 0

## 2023-11-14 ASSESSMENT — ACTIVITIES OF DAILY LIVING (ADL)
DOING_HOUSEWORK: INDEPENDENT
GROCERY_SHOPPING: INDEPENDENT
DRESSING: INDEPENDENT
MANAGING_FINANCES: INDEPENDENT
TAKING_MEDICATION: INDEPENDENT

## 2023-11-14 ASSESSMENT — COLUMBIA-SUICIDE SEVERITY RATING SCALE - C-SSRS
1. IN THE PAST MONTH, HAVE YOU WISHED YOU WERE DEAD OR WISHED YOU COULD GO TO SLEEP AND NOT WAKE UP?: NO
6. HAVE YOU EVER DONE ANYTHING, STARTED TO DO ANYTHING, OR PREPARED TO DO ANYTHING TO END YOUR LIFE?: NO
2. HAVE YOU ACTUALLY HAD ANY THOUGHTS OF KILLING YOURSELF?: NO

## 2023-11-14 ASSESSMENT — PATIENT HEALTH QUESTIONNAIRE - PHQ9
SUM OF ALL RESPONSES TO PHQ9 QUESTIONS 1 AND 2: 2
1. LITTLE INTEREST OR PLEASURE IN DOING THINGS: SEVERAL DAYS
2. FEELING DOWN, DEPRESSED OR HOPELESS: SEVERAL DAYS

## 2023-11-14 NOTE — PATIENT INSTRUCTIONS
FLU AND PREVNAR-20 IMMUNIZATIONS ARE ADMINISTERED TO YOU TODAY    2.  DR. MARIE IS MONITORING MEDICATIONS  AND LABSAND YOU APPEAR TO BE UP TO DATE    3.  A HEP C SCREEN IS ORDERED FOR YOU, IT CAN BE DONE WITH DR. MARIE'S NEXT LABS    4.  YOU ARE OTHERWISE CAUGHT UP FROM A MEDICARE STANDPOINT ON HEALTH SCREENINGS    5.  FOLLOW UP 6 MONTHS OR AS NEEDED    6.  PLEASE CALL IF ANY REFILLS NEEDED.    7.  REGULAR EYE EXAMS AS YOU ARE DOING    8.  DIET/EXERCISE/WEIGHT MANAGEMENT ARE RECOMMENDED

## 2023-11-14 NOTE — PROGRESS NOTES
"Subjective   Reason for Visit: Myrna Rodrigues is an 72 y.o. female here for a Medicare Wellness visit.   WOULD LIKE FLU SHOT AND PNEUMO IF DUE TODAY     Past Medical, Surgical, and Family History reviewed and updated in chart.    Reviewed all medications by prescribing practitioner or clinical pharmacist (such as prescriptions, OTCs, herbal therapies and supplements) and documented in the medical record.    HPI  HAD A HEART INCIDENT, WAS TOLD POSSIBLE AFIB, GOING TO [facility] FOR TWO WEEKS    GETTING BOTTOM TEETH PULLED,  ABX SCRIPT ALREADY FOR PRE    GOT THE FIRST SHINGRIX ALREADY, GETTING THE SECOND SOON    LABS FROM ENDO ARE UP TO DATE    Patient Care Team:  Calvin Baker MD as PCP - General (Internal Medicine)  Calvin Baker MD as PCP - [employer] PCP     Review of Systems   Constitutional:  Negative for chills, diaphoresis and fever.   Respiratory:  Negative for cough and shortness of breath.    Cardiovascular:  Negative for chest pain and leg swelling.   Gastrointestinal:  Negative for constipation, diarrhea, nausea and vomiting.   Musculoskeletal:  Negative for joint swelling and myalgias.       Objective   Vitals:  /80   Pulse 65   Resp 14   Ht 1.575 m (5' 2\")   Wt 70.3 kg (155 lb)   SpO2 99%   BMI 28.35 kg/m²       Physical Exam  Vitals reviewed.   Constitutional:       General: She is not in acute distress.     Appearance: She is not ill-appearing.   Cardiovascular:      Rate and Rhythm: Normal rate and regular rhythm.      Pulses: Normal pulses.      Heart sounds:      No gallop.   Pulmonary:      Breath sounds: Normal breath sounds. No wheezing, rhonchi or rales.      Comments: PROLONGED EXPIRATORY PHASE  Abdominal:      General: Abdomen is flat. Bowel sounds are normal.      Palpations: Abdomen is soft.      Tenderness: There is no guarding or rebound.   Musculoskeletal:      Right lower leg: No edema.      Left lower leg: No edema.         Assessment/Plan "   Problem List Items Addressed This Visit       Chronic obstructive pulmonary disease (CMS/[facility])    Diabetes mellitus (CMS/[facility])    Medicare annual wellness visit, subsequent - Primary     Other Visit Diagnoses       Preventative health care        Relevant Orders    Flu vaccine, quadrivalent, high-dose, preservative free, age 65y+ (FLUZONE) (Completed)    Hepatitis C Antibody    Need for vaccination        Relevant Orders    Pneumococcal conjugate vaccine 20-valent IM (Completed)    Routine general medical examination at health care facility              Patient Instructions    FLU AND PREVNAR-20 IMMUNIZATIONS ARE ADMINISTERED TO YOU TODAY    2.  DR. MARIE IS MONITORING MEDICATIONS  AND LABSAND YOU APPEAR TO BE UP TO DATE    3.  A HEP C SCREEN IS ORDERED FOR YOU, IT CAN BE DONE WITH DR. MARIE'S NEXT LABS    4.  YOU ARE OTHERWISE CAUGHT UP FROM A MEDICARE STANDPOINT ON HEALTH SCREENINGS    5.  FOLLOW UP 6 MONTHS OR AS NEEDED    6.  PLEASE CALL IF ANY REFILLS NEEDED.    7.  REGULAR EYE EXAMS AS YOU ARE DOING    8.  DIET/EXERCISE/WEIGHT MANAGEMENT ARE RECOMMENDED

## 2023-11-20 ENCOUNTER — LAB (OUTPATIENT)
Dept: LAB | Facility: LAB | Age: 73
End: 2023-11-20
Payer: MEDICARE

## 2023-11-20 DIAGNOSIS — Z00.00 PREVENTATIVE HEALTH CARE: ICD-10-CM

## 2023-11-20 LAB — HCV AB SER QL: NONREACTIVE

## 2023-11-20 PROCEDURE — 36415 COLL VENOUS BLD VENIPUNCTURE: CPT

## 2023-11-20 PROCEDURE — 86803 HEPATITIS C AB TEST: CPT

## 2023-11-24 ENCOUNTER — HOSPITAL ENCOUNTER (EMERGENCY)
Facility: HOSPITAL | Age: 73
Discharge: HOME | End: 2023-11-24
Attending: EMERGENCY MEDICINE
Payer: MEDICARE

## 2023-11-24 ENCOUNTER — APPOINTMENT (OUTPATIENT)
Dept: RADIOLOGY | Facility: HOSPITAL | Age: 73
End: 2023-11-24
Payer: MEDICARE

## 2023-11-24 ENCOUNTER — APPOINTMENT (OUTPATIENT)
Dept: CARDIOLOGY | Facility: HOSPITAL | Age: 73
End: 2023-11-24
Payer: MEDICARE

## 2023-11-24 VITALS
TEMPERATURE: 96.8 F | WEIGHT: 150 LBS | SYSTOLIC BLOOD PRESSURE: 160 MMHG | HEIGHT: 62 IN | RESPIRATION RATE: 18 BRPM | DIASTOLIC BLOOD PRESSURE: 84 MMHG | BODY MASS INDEX: 27.6 KG/M2 | OXYGEN SATURATION: 96 % | HEART RATE: 79 BPM

## 2023-11-24 DIAGNOSIS — I48.91 ATRIAL FIBRILLATION, UNSPECIFIED TYPE (MULTI): ICD-10-CM

## 2023-11-24 DIAGNOSIS — J44.9 CHRONIC OBSTRUCTIVE PULMONARY DISEASE, UNSPECIFIED COPD TYPE (MULTI): ICD-10-CM

## 2023-11-24 DIAGNOSIS — R06.02 SHORTNESS OF BREATH: Primary | ICD-10-CM

## 2023-11-24 LAB
ALBUMIN SERPL BCP-MCNC: 3.8 G/DL (ref 3.4–5)
ALP SERPL-CCNC: 59 U/L (ref 33–136)
ALT SERPL W P-5'-P-CCNC: 8 U/L (ref 7–45)
ANION GAP SERPL CALC-SCNC: 14 MMOL/L (ref 10–20)
AST SERPL W P-5'-P-CCNC: 15 U/L (ref 9–39)
BASOPHILS # BLD AUTO: 0.03 X10*3/UL (ref 0–0.1)
BASOPHILS NFR BLD AUTO: 0.2 %
BILIRUB SERPL-MCNC: 0.5 MG/DL (ref 0–1.2)
BNP SERPL-MCNC: 387 PG/ML (ref 0–99)
BUN SERPL-MCNC: 14 MG/DL (ref 6–23)
CALCIUM SERPL-MCNC: 9 MG/DL (ref 8.6–10.3)
CARDIAC TROPONIN I PNL SERPL HS: 13 NG/L (ref 0–13)
CARDIAC TROPONIN I PNL SERPL HS: 14 NG/L (ref 0–13)
CHLORIDE SERPL-SCNC: 102 MMOL/L (ref 98–107)
CO2 SERPL-SCNC: 30 MMOL/L (ref 21–32)
CREAT SERPL-MCNC: 0.92 MG/DL (ref 0.5–1.05)
EOSINOPHIL # BLD AUTO: 0.1 X10*3/UL (ref 0–0.4)
EOSINOPHIL NFR BLD AUTO: 0.8 %
ERYTHROCYTE [DISTWIDTH] IN BLOOD BY AUTOMATED COUNT: 16.6 % (ref 11.5–14.5)
FLUAV RNA RESP QL NAA+PROBE: NOT DETECTED
FLUBV RNA RESP QL NAA+PROBE: NOT DETECTED
GFR SERPL CREATININE-BSD FRML MDRD: 65 ML/MIN/1.73M*2
GLUCOSE SERPL-MCNC: 112 MG/DL (ref 74–99)
HCT VFR BLD AUTO: 35.7 % (ref 36–46)
HGB BLD-MCNC: 11 G/DL (ref 12–16)
IMM GRANULOCYTES # BLD AUTO: 0.08 X10*3/UL (ref 0–0.5)
IMM GRANULOCYTES NFR BLD AUTO: 0.6 % (ref 0–0.9)
LACTATE SERPL-SCNC: 1.7 MMOL/L (ref 0.4–2)
LYMPHOCYTES # BLD AUTO: 3.09 X10*3/UL (ref 0.8–3)
LYMPHOCYTES NFR BLD AUTO: 24 %
MAGNESIUM SERPL-MCNC: 1.59 MG/DL (ref 1.6–2.4)
MCH RBC QN AUTO: 24 PG (ref 26–34)
MCHC RBC AUTO-ENTMCNC: 30.8 G/DL (ref 32–36)
MCV RBC AUTO: 78 FL (ref 80–100)
MONOCYTES # BLD AUTO: 1.03 X10*3/UL (ref 0.05–0.8)
MONOCYTES NFR BLD AUTO: 8 %
NEUTROPHILS # BLD AUTO: 8.54 X10*3/UL (ref 1.6–5.5)
NEUTROPHILS NFR BLD AUTO: 66.4 %
NRBC BLD-RTO: 0 /100 WBCS (ref 0–0)
PLATELET # BLD AUTO: 350 X10*3/UL (ref 150–450)
POTASSIUM SERPL-SCNC: 3.6 MMOL/L (ref 3.5–5.3)
PROT SERPL-MCNC: 7.1 G/DL (ref 6.4–8.2)
RBC # BLD AUTO: 4.59 X10*6/UL (ref 4–5.2)
SARS-COV-2 RNA RESP QL NAA+PROBE: NOT DETECTED
SODIUM SERPL-SCNC: 142 MMOL/L (ref 136–145)
WBC # BLD AUTO: 12.9 X10*3/UL (ref 4.4–11.3)

## 2023-11-24 PROCEDURE — 83880 ASSAY OF NATRIURETIC PEPTIDE: CPT | Performed by: EMERGENCY MEDICINE

## 2023-11-24 PROCEDURE — 83735 ASSAY OF MAGNESIUM: CPT | Performed by: EMERGENCY MEDICINE

## 2023-11-24 PROCEDURE — 93005 ELECTROCARDIOGRAM TRACING: CPT

## 2023-11-24 PROCEDURE — 99285 EMERGENCY DEPT VISIT HI MDM: CPT | Mod: 25 | Performed by: EMERGENCY MEDICINE

## 2023-11-24 PROCEDURE — 36415 COLL VENOUS BLD VENIPUNCTURE: CPT | Performed by: EMERGENCY MEDICINE

## 2023-11-24 PROCEDURE — 85025 COMPLETE CBC W/AUTO DIFF WBC: CPT | Performed by: EMERGENCY MEDICINE

## 2023-11-24 PROCEDURE — 84484 ASSAY OF TROPONIN QUANT: CPT | Performed by: EMERGENCY MEDICINE

## 2023-11-24 PROCEDURE — 94760 N-INVAS EAR/PLS OXIMETRY 1: CPT

## 2023-11-24 PROCEDURE — 71045 X-RAY EXAM CHEST 1 VIEW: CPT | Mod: FY

## 2023-11-24 PROCEDURE — 71045 X-RAY EXAM CHEST 1 VIEW: CPT | Performed by: RADIOLOGY

## 2023-11-24 PROCEDURE — 2500000002 HC RX 250 W HCPCS SELF ADMINISTERED DRUGS (ALT 637 FOR MEDICARE OP, ALT 636 FOR OP/ED): Performed by: EMERGENCY MEDICINE

## 2023-11-24 PROCEDURE — 2500000001 HC RX 250 WO HCPCS SELF ADMINISTERED DRUGS (ALT 637 FOR MEDICARE OP): Performed by: EMERGENCY MEDICINE

## 2023-11-24 PROCEDURE — 83605 ASSAY OF LACTIC ACID: CPT | Performed by: EMERGENCY MEDICINE

## 2023-11-24 PROCEDURE — 80053 COMPREHEN METABOLIC PANEL: CPT | Performed by: EMERGENCY MEDICINE

## 2023-11-24 PROCEDURE — 99283 EMERGENCY DEPT VISIT LOW MDM: CPT | Mod: 25

## 2023-11-24 PROCEDURE — 94640 AIRWAY INHALATION TREATMENT: CPT

## 2023-11-24 PROCEDURE — 87636 SARSCOV2 & INF A&B AMP PRB: CPT | Performed by: EMERGENCY MEDICINE

## 2023-11-24 RX ORDER — ASPIRIN 81 MG/1
81 TABLET ORAL DAILY
Status: DISCONTINUED | OUTPATIENT
Start: 2023-11-24 | End: 2023-11-24 | Stop reason: HOSPADM

## 2023-11-24 RX ORDER — ATORVASTATIN CALCIUM 20 MG/1
10 TABLET, FILM COATED ORAL DAILY
Status: DISCONTINUED | OUTPATIENT
Start: 2023-11-24 | End: 2023-11-24 | Stop reason: HOSPADM

## 2023-11-24 RX ORDER — METOPROLOL TARTRATE 1 MG/ML
5 INJECTION, SOLUTION INTRAVENOUS ONCE
Status: DISCONTINUED | OUTPATIENT
Start: 2023-11-24 | End: 2023-11-24 | Stop reason: HOSPADM

## 2023-11-24 RX ORDER — MONTELUKAST SODIUM 10 MG/1
10 TABLET ORAL NIGHTLY
Status: DISCONTINUED | OUTPATIENT
Start: 2023-11-24 | End: 2023-11-24 | Stop reason: HOSPADM

## 2023-11-24 RX ORDER — ALBUTEROL SULFATE 90 UG/1
2 AEROSOL, METERED RESPIRATORY (INHALATION) ONCE
Status: COMPLETED | OUTPATIENT
Start: 2023-11-24 | End: 2023-11-24

## 2023-11-24 RX ORDER — METOPROLOL TARTRATE 25 MG/1
25 TABLET, FILM COATED ORAL 2 TIMES DAILY
Status: DISCONTINUED | OUTPATIENT
Start: 2023-11-24 | End: 2023-11-24 | Stop reason: HOSPADM

## 2023-11-24 RX ADMIN — ATORVASTATIN CALCIUM 10 MG: 20 TABLET, FILM COATED ORAL at 10:54

## 2023-11-24 RX ADMIN — ASPIRIN 81 MG: 81 TABLET, COATED ORAL at 10:54

## 2023-11-24 RX ADMIN — METOPROLOL TARTRATE 25 MG: 25 TABLET, FILM COATED ORAL at 10:54

## 2023-11-24 RX ADMIN — ALBUTEROL SULFATE 2 PUFF: 90 AEROSOL, METERED RESPIRATORY (INHALATION) at 10:54

## 2023-11-24 ASSESSMENT — LIFESTYLE VARIABLES
HAVE YOU EVER FELT YOU SHOULD CUT DOWN ON YOUR DRINKING: NO
EVER HAD A DRINK FIRST THING IN THE MORNING TO STEADY YOUR NERVES TO GET RID OF A HANGOVER: NO
REASON UNABLE TO ASSESS: NO
EVER FELT BAD OR GUILTY ABOUT YOUR DRINKING: NO
HAVE PEOPLE ANNOYED YOU BY CRITICIZING YOUR DRINKING: NO

## 2023-11-24 ASSESSMENT — COLUMBIA-SUICIDE SEVERITY RATING SCALE - C-SSRS
2. HAVE YOU ACTUALLY HAD ANY THOUGHTS OF KILLING YOURSELF?: NO
6. HAVE YOU EVER DONE ANYTHING, STARTED TO DO ANYTHING, OR PREPARED TO DO ANYTHING TO END YOUR LIFE?: NO
1. IN THE PAST MONTH, HAVE YOU WISHED YOU WERE DEAD OR WISHED YOU COULD GO TO SLEEP AND NOT WAKE UP?: NO

## 2023-11-24 ASSESSMENT — PAIN - FUNCTIONAL ASSESSMENT: PAIN_FUNCTIONAL_ASSESSMENT: 0-10

## 2023-11-24 ASSESSMENT — PAIN SCALES - GENERAL: PAINLEVEL_OUTOF10: 0 - NO PAIN

## 2023-11-24 NOTE — ED PROVIDER NOTES
HPI   Chief Complaint   Patient presents with    Shortness of Breath     Patient states she went into afib this morning and is short of breath         History provided by:  Patient and relative  History of Present Illness:  73-year-old female presents with shortness of breath for the past 1 to 2 days.  Patient states she has a known history of asthma and COPD.  She thinks that these symptoms may be related to the fact that she forgot to take her regular medications due to recently having her teeth pulled and she was focused on taking her antibiotics for that.  No fever, chills or cough.  No chest pain.  No back or flank pain.  No abdominal pain.  No nausea or vomiting.  No increased leg swelling.  No sick contacts.  No trauma or travel.  Nothing seems to make her symptoms worse or better.      PMFSH:   As per HPI, otherwise nurses notes reviewed in EMR.    Past Medical History:   Active Ambulatory Problems     Diagnosis Date Noted    CAD S/P percutaneous coronary angioplasty 05/09/2023    Chronic obstructive pulmonary disease (CMS/[facility]) 02/13/2018    Major depression 05/09/2023    Moderate persistent asthma 05/09/2023    Abnormal findings on diagnostic imaging of lung 09/06/2023    Angina, class III (CMS/[facility]) 09/06/2023    Asthma 12/07/2020    Cellulitis 09/06/2023    Chronic diarrhea 09/06/2023    Chronic pain of toe of left foot 09/06/2023    Chronic pain of toe of right foot 09/06/2023    Controlled type 2 diabetes mellitus without complication, without long-term current use of insulin (CMS/[facility]) 06/07/2021    Coronary artery disease involving native heart without angina pectoris 04/07/2021    Diabetes mellitus (CMS/[facility]) 09/06/2023    Edema 09/06/2023    External hemorrhoids 09/06/2023    Fracture of ankle 09/06/2023    Gastroesophageal reflux disease 04/07/2021    Ground glass opacity present on imaging of lung 09/06/2023    H/O unstable angina 09/06/2023    Hallux valgus, acquired 09/27/2011    Hemangioma of skin  and subcutaneous tissue 04/26/2021    Hematuria 09/06/2023    History of atrial fibrillation 04/07/2021    History of diabetes mellitus 09/06/2023    History of elevated lipids 09/06/2023    History of hypertension 09/06/2023    History of malignant neoplasm 09/06/2023    Hypertension, essential, benign 12/07/2020    Hypertensive retinopathy 01/09/2015    Lateral malleolar fracture 09/06/2023    Mixed hyperlipidemia 12/05/2017    Nail fungus 09/06/2023    Tinea pedis of both feet 04/26/2021    Obesity (BMI 30.0-34.9) 04/07/2021    Obesity 09/06/2023    Other melanin hyperpigmentation 02/04/2019    Other seborrheic keratosis 02/04/2019    Overweight with body mass index (BMI) 25.0-29.9 09/06/2023    Overweight 09/06/2023    Paroxysmal atrial fibrillation (CMS/[facility]) 09/06/2023    Postsurgical hypothyroidism 06/07/2021    Psoriasis vulgaris 06/08/2021    PVD (posterior vitreous detachment) 01/29/2016    Rectal prolapse 09/06/2023    Renal insufficiency 09/06/2023    S/P angioplasty with stent 06/07/2021    Senile nuclear sclerosis 01/09/2015    Sensorineural hearing loss (SNHL) of both ears 09/06/2023    Sensorineural hearing loss, bilateral 11/19/2014    Left lumbar radiculopathy 09/06/2023    Spinal stenosis of lumbar region without neurogenic claudication 09/06/2023    Temporal arteritis (CMS/[facility]) 09/06/2023    Malignant neoplasm of thyroid gland (CMS/HCC) 09/06/2023    Thyroid cancer (CMS/[facility]) 04/07/2021    Thyroid nodule 03/03/2021    Mixed stress and urge urinary incontinence 09/06/2023    Urge incontinence of urine 09/06/2023    Dysuria 09/06/2023    Urinary incontinence 04/07/2021    Vaginal atrophy 09/06/2023    Vaginitis 09/06/2023    Vitamin D deficiency 03/20/2022    Vitreous degeneration 01/09/2015    Recurrent urinary tract infection 09/06/2023    Depression 04/07/2021    Major depressive disorder 09/06/2023    Athscl heart disease of native coronary artery w/o ang pctrs 02/13/2018    Ventricular  tachycardia (CMS/HCC) 02/13/2018    Cardiac dysrhythmia 02/13/2018    Mood disorder (CMS/HCC) 02/13/2018    Unspecified complication of internal orthopedic prosthetic device, implant and graft, initial encounter (CMS/HCC) 10/05/2017    Unilateral primary osteoarthritis, unspecified knee 09/29/2017    Presence of left artificial knee joint 08/23/2018    Presence of coronary angioplasty implant and graft 02/13/2018    Personal history of other diseases of the nervous system and sense organs 09/06/2023    Mechanical loosening of unspecified internal prosthetic joint, initial encounter (CMS/HCC) 12/14/2017    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    Long term (current) use of aspirin 02/13/2018    H/O heart disorder 09/06/2023    Other specified anemias 02/19/2018    Esophageal disorder 02/13/2018    Elevated white blood cell count, unspecified 02/13/2018    CKD (chronic kidney disease) 02/13/2018    Coronary atherosclerosis 12/05/2017    Asymptomatic menopause 09/06/2023    Adult BMI 28.0-28.9 kg/sq m 09/06/2023    Mild vitamin D deficiency 09/06/2023    Never smoked any substance 09/06/2023    Personal history of calcium pyrophosphate deposition disease (CPPD) 09/06/2023    Medicare annual wellness visit, subsequent 11/14/2023     Resolved Ambulatory Problems     Diagnosis Date Noted    No Resolved Ambulatory Problems     Past Medical History:   Diagnosis Date    Occipital neuralgia 06/10/2022    Personal history of malignant neoplasm, unspecified 10/04/2021    Personal history of other diseases of the circulatory system     Personal history of other diseases of the circulatory system 10/04/2021    Personal history of other diseases of the circulatory system     Personal history of other diseases of the digestive system 03/11/2020    Personal history of other diseases of the musculoskeletal system and connective tissue     Personal history of other diseases of the respiratory  system     Personal history of other specified conditions 10/04/2021      Past Surgical History:   Past Surgical History:   Procedure Laterality Date    APPENDECTOMY  08/13/2015    Appendectomy    BREAST LUMPECTOMY  08/13/2015    Breast Surgery Lumpectomy    CARPAL TUNNEL RELEASE  08/13/2015    Neuroplasty Decompression Median Nerve At Carpal Tunnel    GALLBLADDER SURGERY  08/13/2015    Gallbladder Surgery    HYSTERECTOMY  08/13/2015    Hysterectomy    KNEE ARTHROSCOPY W/ MENISCAL REPAIR  08/13/2015    Knee Arthroscopy With Medial Meniscus Repair    KNEE SURGERY  08/13/2015    Knee Surgery    MR HEAD ANGIO WO IV CONTRAST  9/5/2021    MR HEAD ANGIO WO IV CONTRAST 9/5/2021 [facility]    OTHER SURGICAL HISTORY  08/13/2015    Shoulder Surgery Left    OTHER SURGICAL HISTORY  08/13/2015    Repair Of Bladder Reconstruction    OTHER SURGICAL HISTORY  09/20/2021    Breast surgery    OTHER SURGICAL HISTORY  09/20/2021    Bladder surgery    OTHER SURGICAL HISTORY  09/20/2021    Complete colonoscopy    OTHER SURGICAL HISTORY  09/20/2021    Shoulder surgery    OTHER SURGICAL HISTORY  09/20/2021    Foot surgery    OTHER SURGICAL HISTORY  09/20/2021    Percutaneous transluminal coronary angioplasty    OTHER SURGICAL HISTORY  10/11/2021    Thyroidectomy total    TOTAL KNEE ARTHROPLASTY  08/13/2015    Knee Replacement      Family History:   Family History   Problem Relation Name Age of Onset    Lung cancer Mother      Lymphoma Mother      Bone cancer Mother      Heart attack Father      Liver cancer Brother      Lung cancer Brother        Social History:    Social History     Socioeconomic History    Marital status:      Spouse name: Not on file    Number of children: Not on file    Years of education: Not on file    Highest education level: Not on file   Occupational History    Not on file   Tobacco Use    Smoking status: Never    Smokeless tobacco: Never   Substance and Sexual Activity    Alcohol use: Yes      Comment: RARE    Drug use: Never    Sexual activity: Not on file   Other Topics Concern    Not on file   Social History Narrative    Not on file     Social Determinants of Health     Financial Resource Strain: Not on file   Food Insecurity: Not on file   Transportation Needs: Not on file   Physical Activity: Not on file   Stress: Not on file   Social Connections: Not on file   Intimate Partner Violence: Not on file   Housing Stability: Not on file                          No data recorded                Patient History   Past Medical History:   Diagnosis Date    Occipital neuralgia 06/10/2022    Occipital neuralgia of right side    Personal history of malignant neoplasm, unspecified 10/04/2021    History of malignant neoplasm    Personal history of other diseases of the circulatory system     History of hypertension    Personal history of other diseases of the circulatory system 10/04/2021    History of essential hypertension    Personal history of other diseases of the circulatory system     History of coronary artery disease    Personal history of other diseases of the digestive system 03/11/2020    History of chronic constipation    Personal history of other diseases of the musculoskeletal system and connective tissue     History of arthritis    Personal history of other diseases of the nervous system and sense organs 10/04/2021    History of eye problem    Personal history of other diseases of the respiratory system     History of asthma    Personal history of other specified conditions 10/04/2021    History of chest pain     Past Surgical History:   Procedure Laterality Date    APPENDECTOMY  08/13/2015    Appendectomy    BREAST LUMPECTOMY  08/13/2015    Breast Surgery Lumpectomy    CARPAL TUNNEL RELEASE  08/13/2015    Neuroplasty Decompression Median Nerve At Carpal Tunnel    GALLBLADDER SURGERY  08/13/2015    Gallbladder Surgery    HYSTERECTOMY  08/13/2015    Hysterectomy    KNEE ARTHROSCOPY W/ MENISCAL REPAIR   08/13/2015    Knee Arthroscopy With Medial Meniscus Repair    KNEE SURGERY  08/13/2015    Knee Surgery    MR HEAD ANGIO WO IV CONTRAST  9/5/2021    MR HEAD ANGIO WO IV CONTRAST 9/5/2021 [facility]    OTHER SURGICAL HISTORY  08/13/2015    Shoulder Surgery Left    OTHER SURGICAL HISTORY  08/13/2015    Repair Of Bladder Reconstruction    OTHER SURGICAL HISTORY  09/20/2021    Breast surgery    OTHER SURGICAL HISTORY  09/20/2021    Bladder surgery    OTHER SURGICAL HISTORY  09/20/2021    Complete colonoscopy    OTHER SURGICAL HISTORY  09/20/2021    Shoulder surgery    OTHER SURGICAL HISTORY  09/20/2021    Foot surgery    OTHER SURGICAL HISTORY  09/20/2021    Percutaneous transluminal coronary angioplasty    OTHER SURGICAL HISTORY  10/11/2021    Thyroidectomy total    TOTAL KNEE ARTHROPLASTY  08/13/2015    Knee Replacement     Family History   Problem Relation Name Age of Onset    Lung cancer Mother      Lymphoma Mother      Bone cancer Mother      Heart attack Father      Liver cancer Brother      Lung cancer Brother       Social History     Tobacco Use    Smoking status: Never    Smokeless tobacco: Never   Substance Use Topics    Alcohol use: Yes     Comment: RARE    Drug use: Never       Physical Exam   ED Triage Vitals   Temp Heart Rate Resp BP   11/24/23 0909 11/24/23 0909 11/24/23 0909 11/24/23 0912   36 °C (96.8 °F) (!) 118 20 (!) 188/105      SpO2 Temp Source Heart Rate Source Patient Position   11/24/23 0909 11/24/23 0909 11/24/23 0909 --   96 % Temporal Monitor       BP Location FiO2 (%)     -- --             Physical Exam  Physical Exam:    ED Triage Vitals   Temp Heart Rate Resp BP   11/24/23 0909 11/24/23 0909 11/24/23 0909 11/24/23 0912   36 °C (96.8 °F) (!) 118 20 (!) 188/105      SpO2 Temp Source Heart Rate Source Patient Position   11/24/23 0909 11/24/23 0909 11/24/23 0909 --   96 % Temporal Monitor       BP Location FiO2 (%)     -- --               Constitutional: Vital signs per nursing  notes.  Well developed, well nourished.  No acute distress.    Psychiatric: alert and oriented to person, place, and time; no abnormalities of mood or affect; memory intact  Eyes: PERRL; conjunctivae and lids normal; EOMI  ENT: otoscopic exam of external canal and TM´s normal; nasal mucosa, turbinates, and septum normal; mouth, tongue, and pharynx normal; pharynx without edema, exudate, or injection  Neck: neck supple, no meningismus; trachea midline without deviation; no lymphadenopathy; no thyromegaly; carotid pulses even bilaterally  Chest: no masses or tenderness, no discharge  Respiratory: normal respiratory effort and excursion; no rales, rhonchi, or wheezes; equal but diminished air entry  Cardiovascular: Tachycardic, rate and rhythm; no murmurs, rubs or gallops; symmetric pulses; no edema; normal capillary refill; distal pulses present  Neurological: normal speech; CN II-XII grossly intact; normal motor and sensory function; no nystagmus  GI: no masses, tenderness, rebound or guarding; no palpable, pulsatile mass; no organomegaly; no hernia; normal bowel sounds; (-) Lan´s sign; (-) McBurney´s sign; (-) CVA tenderness  Lymphatic: no adenopathy of neck  Musculoskeletal: normal gait and station; normal digits and nails; no gross tendon or ligament injury; normal to palpation; normal strength/tone; neurovascular status intact; (-) Leola´s sign  Skin: normal to inspection; normal to palpation; no rash  GCS: 15    ED Course & MDM   Diagnoses as of 11/24/23 1210   Shortness of breath   Atrial fibrillation, unspecified type (CMS/[facility])   Chronic obstructive pulmonary disease, unspecified COPD type (CMS/[facility])       Medical Decision Making  Medical Decision Making:    Differential Diagnoses Considered: COPD exacerbation, pneumonia, influenza, COVID, ACS, arrhythmia, electrolyte abnormality     EKG: My interpretation of EKG is atrial fibrillation at 99 bpm with nonspecific ST T changes, left anterior fascicular block  and occasional PVC            My interpretation of second EKG is atrial fibrillation at 90 bpm with left axis deviation and no acute ST-T changes.  There are no significant changes from the initial EKG.    Labs Reviewed   CBC WITH AUTO DIFFERENTIAL - Abnormal       Result Value    WBC 12.9 (*)     nRBC 0.0      RBC 4.59      Hemoglobin 11.0 (*)     Hematocrit 35.7 (*)     MCV 78 (*)     MCH 24.0 (*)     MCHC 30.8 (*)     RDW 16.6 (*)     Platelets 350      Neutrophils % 66.4      Immature Granulocytes %, Automated 0.6      Lymphocytes % 24.0      Monocytes % 8.0      Eosinophils % 0.8      Basophils % 0.2      Neutrophils Absolute 8.54 (*)     Immature Granulocytes Absolute, Automated 0.08      Lymphocytes Absolute 3.09 (*)     Monocytes Absolute 1.03 (*)     Eosinophils Absolute 0.10      Basophils Absolute 0.03     COMPREHENSIVE METABOLIC PANEL - Abnormal    Glucose 112 (*)     Sodium 142      Potassium 3.6      Chloride 102      Bicarbonate 30      Anion Gap 14      Urea Nitrogen 14      Creatinine 0.92      eGFR 65      Calcium 9.0      Albumin 3.8      Alkaline Phosphatase 59      Total Protein 7.1      AST 15      Bilirubin, Total 0.5      ALT 8     MAGNESIUM - Abnormal    Magnesium 1.59 (*)    B-TYPE NATRIURETIC PEPTIDE - Abnormal     (*)     Narrative:        <100 pg/mL - Heart failure unlikely  100-299 pg/mL - Intermediate probability of acute heart                  failure exacerbation. Correlate with clinical                  context and patient history.    >=300 pg/mL - Heart Failure likely. Correlate with clinical                  context and patient history.    BNP testing is performed using different testing methodology at [facility] than at other [facility]. Direct result comparisons should only be made within the same method.      SERIAL TROPONIN-INITIAL - Abnormal    Troponin I, High Sensitivity 14 (*)     Narrative:     Less than 99th percentile of normal range  cutoff-  Female and children under 18 years old <14 ng/L; Male <21 ng/L: Negative  Repeat testing should be performed if clinically indicated.     Female and children under 18 years old 14-50 ng/L; Male 21-50 ng/L:  Consistent with possible cardiac damage and possible increased clinical   risk. Serial measurements may help to assess extent of myocardial damage.     >50 ng/L: Consistent with cardiac damage, increased clinical risk and  myocardial infarction. Serial measurements may help assess extent of   myocardial damage.      NOTE: Children less than 1 year old may have higher baseline troponin   levels and results should be interpreted in conjunction with the overall   clinical context.     NOTE: Troponin I testing is performed using a different   testing methodology at [facility] than at other   [facility]. Direct result comparisons should only   be made within the same method.   SARS-COV-2 AND INFLUENZA A/B PCR - Normal    Flu A Result Not Detected      Flu B Result Not Detected      Coronavirus 2019, PCR Not Detected      Narrative:     This assay has received FDA Emergency Use Authorization (EUA) and  is only authorized for the duration of time that circumstances exist to justify the authorization of the emergency use of in vitro diagnostic tests for the detection of SARS-CoV-2 virus and/or diagnosis of COVID-19 infection under section 564(b)(1) of the Act, 21 U.S.C. 360bbb-3(b)(1). Testing for SARS-CoV-2 is only recommended for patients who meet current clinical and/or epidemiological criteria as defined by federal, state, or local public health directives. This assay is an in vitro diagnostic nucleic acid amplification test for the qualitative detection of SARS-CoV-2, Influenza A, and Influenza B from nasopharyngeal specimens and has been validated for use at [facility]. Negative results do not preclude COVID-19 infections or Influenza A/B infections, and should  not be used as the sole basis for diagnosis, treatment, or other management decisions. If Influenza A/B and RSV PCR results are negative, testing for Parainfluenza virus, Adenovirus and Metapneumovirus is routinely performed for [facility] [place] and intensive care inpatients, and is available on other patients by placing an add-on request.    SERIAL TROPONIN, 1 HOUR - Normal    Troponin I, High Sensitivity 13      Narrative:     Less than 99th percentile of normal range cutoff-  Female and children under 18 years old <14 ng/L; Male <21 ng/L: Negative  Repeat testing should be performed if clinically indicated.     Female and children under 18 years old 14-50 ng/L; Male 21-50 ng/L:  Consistent with possible cardiac damage and possible increased clinical   risk. Serial measurements may help to assess extent of myocardial damage.     >50 ng/L: Consistent with cardiac damage, increased clinical risk and  myocardial infarction. Serial measurements may help assess extent of   myocardial damage.      NOTE: Children less than 1 year old may have higher baseline troponin   levels and results should be interpreted in conjunction with the overall   clinical context.     NOTE: Troponin I testing is performed using a different   testing methodology at [facility] than at other   [facility]. Direct result comparisons should only   be made within the same method.   LACTATE - Normal    Lactate 1.7      Narrative:     Venipuncture immediately after or during the administration of Metamizole may lead to falsely low results. Testing should be performed immediately  prior to Metamizole dosing.   TROPONIN SERIES- (INITIAL, 1 HR)    Narrative:     The following orders were created for panel order Troponin I Series, High Sensitivity (0, 1 HR).  Procedure                               Abnormality         Status                     ---------                               -----------         ------                      Troponin I, High Sensiti...[[id number]]  Abnormal            Final result               Troponin, High Sensitivi...[[id number]]  Normal              Final result                 Please view results for these tests on the individual orders.       XR chest 1 view   Final Result   1.  No active cardiopulmonary disease.  There has not been   significant interval change from the prior exam.        Signed by: Mejia Rawls 11/24/2023 10:05 AM   Dictation workstation:   [id number]          Diagnostic testing considered:         Review of recent and relevant records:    ED Medication Administration:   ED Medication Administration from 11/24/2023 0900 to 11/24/2023 1210         Date/Time Order Dose Route Action Action by     11/24/2023 1054 EST albuterol 90 mcg/actuation inhaler 2 puff 2 puff inhalation Given [facility], T     11/24/2023 1054 EST aspirin EC tablet 81 mg 81 mg oral Given [facility], T     11/24/2023 1054 EST atorvastatin (Lipitor) tablet 10 mg 10 mg oral Given [facility], T     11/24/2023 1054 EST metoprolol tartrate (Lopressor) tablet 25 mg 25 mg oral Given [facility], T            Prescription Medication Consideration/Given:     Social Determinants of Health Significantly Affecting Care:      Summary:    /84 (11/24/23 1159)    Temp      Pulse 79 (11/24/23 1159)   Resp 18 (11/24/23 1159)    SpO2 96 % (11/24/23 1159)      Abnormal Labs Reviewed   CBC WITH AUTO DIFFERENTIAL - Abnormal; Notable for the following components:       Result Value    WBC 12.9 (*)     Hemoglobin 11.0 (*)     Hematocrit 35.7 (*)     MCV 78 (*)     MCH 24.0 (*)     MCHC 30.8 (*)     RDW 16.6 (*)     Neutrophils Absolute 8.54 (*)     Lymphocytes Absolute 3.09 (*)     Monocytes Absolute 1.03 (*)     All other components within normal limits   COMPREHENSIVE METABOLIC PANEL - Abnormal; Notable for the following components:    Glucose 112 (*)     All other components within normal limits   MAGNESIUM - Abnormal; Notable for the following  components:    Magnesium 1.59 (*)     All other components within normal limits   B-TYPE NATRIURETIC PEPTIDE - Abnormal; Notable for the following components:     (*)     All other components within normal limits    Narrative:        <100 pg/mL - Heart failure unlikely  100-299 pg/mL - Intermediate probability of acute heart                  failure exacerbation. Correlate with clinical                  context and patient history.    >=300 pg/mL - Heart Failure likely. Correlate with clinical                  context and patient history.    BNP testing is performed using different testing methodology at [facility] than at other [facility] hospitals. Direct result comparisons should only be made within the same method.      SERIAL TROPONIN-INITIAL - Abnormal; Notable for the following components:    Troponin I, High Sensitivity 14 (*)     All other components within normal limits    Narrative:     Less than 99th percentile of normal range cutoff-  Female and children under 18 years old <14 ng/L; Male <21 ng/L: Negative  Repeat testing should be performed if clinically indicated.     Female and children under 18 years old 14-50 ng/L; Male 21-50 ng/L:  Consistent with possible cardiac damage and possible increased clinical   risk. Serial measurements may help to assess extent of myocardial damage.     >50 ng/L: Consistent with cardiac damage, increased clinical risk and  myocardial infarction. Serial measurements may help assess extent of   myocardial damage.      NOTE: Children less than 1 year old may have higher baseline troponin   levels and results should be interpreted in conjunction with the overall   clinical context.     NOTE: Troponin I testing is performed using a different   testing methodology at [facility] than at other   [facility]. Direct result comparisons should only   be made within the same method.       Diagnoses as of 11/24/23 1210   Shortness of breath    Atrial fibrillation, unspecified type (CMS/HCC)   Chronic obstructive pulmonary disease, unspecified COPD type (CMS/[facility] [facility]     Diagnostic evaluation was completed.  Initial high-sensitivity troponin was slightly elevated at 14 but the 1 hour was 13.  Therefore I do not suspect acute coronary syndrome.  Lactate was in the normal range so I do not suspect ischemia or sepsis.  Influenza and COVID are negative.  BNP was elevated at 387.  I suspect this is more likely chronic in nature and does not represent acute congestive heart failure at this time.  Metabolic panel was unremarkable except slightly elevated glucose at 112.  Sodium potassium in the normal range.  Renal and liver function tests were in the normal range.  Magnesium is slightly low at 1.59.  CBC shows a slightly elevated white blood cell count of 12.9.  The exact etiology is unclear but I do not suspect acute infectious etiology.  There is mild anemia with a hemoglobin of 11.0 but I suspect this to be chronic in nature and does not represent acute blood loss.  Chest x-ray shows no active cardiopulmonary disease.  There is not been significant interval change from the prior exam.    The patient was given her home medications including her beta-blocker.  Her condition has improved.  Vital signs are stable.  The patient was able to ambulate to the bathroom without issue.    I did consider hospitalization but after discussion with the patient she would feel more comfortable going home.  Therefore she will be discharged home with short-term follow-up with her primary care provider.  I have recommended that she continue to take her home medications as prescribed.    I discussed the results and discharge plan with the patient and/or family/friend if present.  I emphasized the importance of follow up with the physician I referred them to in the timeframe recommended.  I explained reasons for the patient to return to the Emergency Department.  Questions were  addressed.  The patient and/or family/friend expressed understanding.        Procedure  Procedures     Cesario HARRISON MD  11/24/23 1217

## 2023-11-30 ENCOUNTER — ANCILLARY PROCEDURE (OUTPATIENT)
Dept: CARDIOLOGY | Facility: CLINIC | Age: 73
End: 2023-11-30
Payer: MEDICARE

## 2023-11-30 ENCOUNTER — APPOINTMENT (OUTPATIENT)
Dept: CARDIOLOGY | Facility: HOSPITAL | Age: 73
End: 2023-11-30
Payer: MEDICARE

## 2023-11-30 DIAGNOSIS — I25.10 CAD S/P PERCUTANEOUS CORONARY ANGIOPLASTY: ICD-10-CM

## 2023-11-30 DIAGNOSIS — Z98.61 CAD S/P PERCUTANEOUS CORONARY ANGIOPLASTY: ICD-10-CM

## 2023-11-30 DIAGNOSIS — I48.0 PAROXYSMAL ATRIAL FIBRILLATION (MULTI): Primary | ICD-10-CM

## 2023-11-30 DIAGNOSIS — I20.9 ANGINA, CLASS III (CMS-HCC): ICD-10-CM

## 2023-11-30 PROCEDURE — 93248 EXT ECG>7D<15D REV&INTERPJ: CPT | Performed by: INTERNAL MEDICINE

## 2023-12-27 ENCOUNTER — APPOINTMENT (OUTPATIENT)
Dept: CARDIOLOGY | Facility: CLINIC | Age: 73
End: 2023-12-27
Payer: MEDICARE

## 2023-12-28 RX ORDER — LANOLIN ALCOHOL/MO/W.PET/CERES
400 CREAM (GRAM) TOPICAL DAILY
Qty: 90 TABLET | Refills: 3
Start: 2023-12-28 | End: 2024-12-27

## 2023-12-28 NOTE — PROGRESS NOTES
12/28  Call placed to patient and advised of results of Ziopatch and recommendations.  Patient verbalized understanding, however, she prefers Dr. Degroot for the cardioversion.  Rx for Eliquis sent to [facility].  Orders placed.

## 2023-12-29 DIAGNOSIS — N39.41 URGE INCONTINENCE OF URINE: Primary | ICD-10-CM

## 2024-01-02 DIAGNOSIS — I48.92 ATRIAL FLUTTER (MULTI): Primary | ICD-10-CM

## 2024-01-02 DIAGNOSIS — I48.3 TYPICAL ATRIAL FLUTTER (MULTI): ICD-10-CM

## 2024-01-02 RX ORDER — OXYBUTYNIN CHLORIDE 10 MG/1
10 TABLET, EXTENDED RELEASE ORAL DAILY
Qty: 90 TABLET | Refills: 3 | Status: SHIPPED | OUTPATIENT
Start: 2024-01-02

## 2024-01-02 RX ORDER — SODIUM CHLORIDE 9 MG/ML
10 INJECTION, SOLUTION INTRAVENOUS CONTINUOUS
Status: CANCELLED | OUTPATIENT
Start: 2024-01-02

## 2024-01-03 ENCOUNTER — HOSPITAL ENCOUNTER (OUTPATIENT)
Dept: CARDIOLOGY | Facility: HOSPITAL | Age: 74
Discharge: HOME | End: 2024-01-03
Payer: MEDICARE

## 2024-01-03 ENCOUNTER — ANESTHESIA EVENT (OUTPATIENT)
Dept: CARDIOLOGY | Facility: HOSPITAL | Age: 74
End: 2024-01-03
Payer: MEDICARE

## 2024-01-03 ENCOUNTER — ANESTHESIA (OUTPATIENT)
Dept: CARDIOLOGY | Facility: HOSPITAL | Age: 74
End: 2024-01-03
Payer: MEDICARE

## 2024-01-03 ENCOUNTER — APPOINTMENT (OUTPATIENT)
Dept: DERMATOLOGY | Facility: CLINIC | Age: 74
End: 2024-01-03
Payer: MEDICARE

## 2024-01-03 VITALS
RESPIRATION RATE: 16 BRPM | HEART RATE: 84 BPM | BODY MASS INDEX: 27.83 KG/M2 | OXYGEN SATURATION: 99 % | DIASTOLIC BLOOD PRESSURE: 65 MMHG | HEIGHT: 62 IN | SYSTOLIC BLOOD PRESSURE: 123 MMHG | WEIGHT: 151.24 LBS | TEMPERATURE: 98.4 F

## 2024-01-03 VITALS
HEART RATE: 83 BPM | SYSTOLIC BLOOD PRESSURE: 174 MMHG | DIASTOLIC BLOOD PRESSURE: 99 MMHG | OXYGEN SATURATION: 97 % | RESPIRATION RATE: 20 BRPM

## 2024-01-03 DIAGNOSIS — I48.0 PAROXYSMAL ATRIAL FIBRILLATION (MULTI): ICD-10-CM

## 2024-01-03 DIAGNOSIS — I48.92 ATRIAL FLUTTER (MULTI): ICD-10-CM

## 2024-01-03 DIAGNOSIS — I48.91 ATRIAL FIBRILLATION (MULTI): Primary | ICD-10-CM

## 2024-01-03 DIAGNOSIS — I48.3 TYPICAL ATRIAL FLUTTER (MULTI): ICD-10-CM

## 2024-01-03 LAB
ANION GAP SERPL CALC-SCNC: 12 MMOL/L (ref 10–20)
APTT PPP: 36 SECONDS (ref 27–38)
BUN SERPL-MCNC: 9 MG/DL (ref 6–23)
CALCIUM SERPL-MCNC: 8.6 MG/DL (ref 8.6–10.3)
CHLORIDE SERPL-SCNC: 103 MMOL/L (ref 98–107)
CO2 SERPL-SCNC: 30 MMOL/L (ref 21–32)
CREAT SERPL-MCNC: 0.96 MG/DL (ref 0.5–1.05)
GFR SERPL CREATININE-BSD FRML MDRD: 62 ML/MIN/1.73M*2
GLUCOSE SERPL-MCNC: 99 MG/DL (ref 74–99)
INR PPP: 1.9 (ref 0.9–1.1)
POTASSIUM SERPL-SCNC: 4.1 MMOL/L (ref 3.5–5.3)
PROTHROMBIN TIME: 21.6 SECONDS (ref 9.8–12.8)
SODIUM SERPL-SCNC: 141 MMOL/L (ref 136–145)

## 2024-01-03 PROCEDURE — 93320 DOPPLER ECHO COMPLETE: CPT

## 2024-01-03 PROCEDURE — 93005 ELECTROCARDIOGRAM TRACING: CPT

## 2024-01-03 PROCEDURE — 93312 ECHO TRANSESOPHAGEAL: CPT | Performed by: INTERNAL MEDICINE

## 2024-01-03 PROCEDURE — 92960 CARDIOVERSION ELECTRIC EXT: CPT | Performed by: INTERNAL MEDICINE

## 2024-01-03 PROCEDURE — 99152 MOD SED SAME PHYS/QHP 5/>YRS: CPT

## 2024-01-03 PROCEDURE — 85610 PROTHROMBIN TIME: CPT | Performed by: NURSE PRACTITIONER

## 2024-01-03 PROCEDURE — 92960 CARDIOVERSION ELECTRIC EXT: CPT

## 2024-01-03 PROCEDURE — 99214 OFFICE O/P EST MOD 30 MIN: CPT | Performed by: INTERNAL MEDICINE

## 2024-01-03 PROCEDURE — 99152 MOD SED SAME PHYS/QHP 5/>YRS: CPT | Performed by: INTERNAL MEDICINE

## 2024-01-03 PROCEDURE — 2500000001 HC RX 250 WO HCPCS SELF ADMINISTERED DRUGS (ALT 637 FOR MEDICARE OP): Performed by: INTERNAL MEDICINE

## 2024-01-03 PROCEDURE — 99222 1ST HOSP IP/OBS MODERATE 55: CPT | Performed by: NURSE PRACTITIONER

## 2024-01-03 PROCEDURE — 82374 ASSAY BLOOD CARBON DIOXIDE: CPT | Performed by: NURSE PRACTITIONER

## 2024-01-03 PROCEDURE — 2500000004 HC RX 250 GENERAL PHARMACY W/ HCPCS (ALT 636 FOR OP/ED): Performed by: INTERNAL MEDICINE

## 2024-01-03 PROCEDURE — 36415 COLL VENOUS BLD VENIPUNCTURE: CPT | Performed by: NURSE PRACTITIONER

## 2024-01-03 RX ORDER — LIDOCAINE HYDROCHLORIDE 20 MG/ML
JELLY TOPICAL AS NEEDED
Status: DISCONTINUED | OUTPATIENT
Start: 2024-01-03 | End: 2024-01-04 | Stop reason: HOSPADM

## 2024-01-03 RX ORDER — FENTANYL CITRATE 50 UG/ML
INJECTION, SOLUTION INTRAMUSCULAR; INTRAVENOUS AS NEEDED
Status: DISCONTINUED | OUTPATIENT
Start: 2024-01-03 | End: 2024-01-04 | Stop reason: HOSPADM

## 2024-01-03 RX ORDER — SODIUM CHLORIDE 9 MG/ML
10 INJECTION, SOLUTION INTRAVENOUS CONTINUOUS
Status: DISCONTINUED | OUTPATIENT
Start: 2024-01-03 | End: 2024-01-04 | Stop reason: HOSPADM

## 2024-01-03 RX ORDER — MIDAZOLAM HYDROCHLORIDE 1 MG/ML
INJECTION, SOLUTION INTRAMUSCULAR; INTRAVENOUS AS NEEDED
Status: DISCONTINUED | OUTPATIENT
Start: 2024-01-03 | End: 2024-01-04 | Stop reason: HOSPADM

## 2024-01-03 RX ADMIN — BENZOCAINE, BUTAMBEN, AND TETRACAINE HYDROCHLORIDE 1 SPRAY: .028; .004; .004 AEROSOL, SPRAY TOPICAL at 12:31

## 2024-01-03 RX ADMIN — BENZOCAINE, BUTAMBEN, AND TETRACAINE HYDROCHLORIDE 1 SPRAY: .028; .004; .004 AEROSOL, SPRAY TOPICAL at 12:30

## 2024-01-03 RX ADMIN — FENTANYL CITRATE 50 MCG: 50 INJECTION, SOLUTION INTRAMUSCULAR; INTRAVENOUS at 12:55

## 2024-01-03 RX ADMIN — LIDOCAINE HYDROCHLORIDE 1 APPLICATION: 20 JELLY TOPICAL at 12:33

## 2024-01-03 RX ADMIN — MIDAZOLAM 2 MG: 1 INJECTION INTRAMUSCULAR; INTRAVENOUS at 12:55

## 2024-01-03 ASSESSMENT — PAIN SCALES - GENERAL
PAINLEVEL_OUTOF10: 0 - NO PAIN

## 2024-01-03 ASSESSMENT — ENCOUNTER SYMPTOMS
CHEST TIGHTNESS: 0
PSYCHIATRIC NEGATIVE: 1
LIGHT-HEADEDNESS: 0
NEUROLOGICAL NEGATIVE: 1
ALLERGIC/IMMUNOLOGIC NEGATIVE: 1
SHORTNESS OF BREATH: 1
DIZZINESS: 0
MUSCULOSKELETAL NEGATIVE: 1
PALPITATIONS: 1
EYES NEGATIVE: 1
HEMATOLOGIC/LYMPHATIC NEGATIVE: 1
GASTROINTESTINAL NEGATIVE: 1
WEAKNESS: 0
ENDOCRINE NEGATIVE: 1
FATIGUE: 1

## 2024-01-03 ASSESSMENT — PAIN - FUNCTIONAL ASSESSMENT: PAIN_FUNCTIONAL_ASSESSMENT: 0-10

## 2024-01-03 NOTE — POST-PROCEDURE NOTE
Physician Transition of Care Summary  Invasive Cardiovascular Lab    Procedure Date: 01/03/24     Pre Procedure Indications:   Persistent atrial fibrillation    Post Procedure Diagnosis:   Bilobed left atrial appendage with no obvious thrombus visualized.  Normal left ventricular systolic function    Procedure(s):   Transesophageal echocardiogram    Procedure Findings:   Bilobed left atrial appendage with no obvious thrombus visualized.  Normal left ventricular systolic function  Negative bubble study with no PFO/ASD.  Moderate mitral regurgitation  Please see SYNGO for full report    Description of the Procedure:   Transesophageal echocardiogram    Complications:   None    Estimated Blood Loss:   None    Anesthesia: Conscious sedation     Any Specimen(s) Removed: None      Electronically signed by: Jabire Duggan MD, 1/3/2024

## 2024-01-03 NOTE — H&P (VIEW-ONLY)
History Of Present Illness  Myrna Rodrigues is a 73 y.o. female with past medical history significant for CAD, remote PCI, HLD, HTN, COPD/asthma, overweight, previous history of brief episodes of atrial fibrillation who complains of her heart racing when she is doing minimal activity recently.  Patient wore a 2 week Zio patch which indicated she was in atrial fibrillation the entire duration of the Zio patch recording with average heart rate at 91 bpm, maximum heart rate of 177 bpm and minimum heart rate at 58 bpm, with one 4 beat episode of NSVT.  Patient was subsequently initiated on Eliquis 5 mg twice daily and scheduled for outpatient GAVIN/cardioversion.  She is at [facility] today for these procedures under the care of Dr. Duggan and Dr. Degroot.    Patient denies recent c/o illness, fevers, chills, sweats, or exposure to Covid-19.  Denies c/o light headedness, dizziness, headache, chest pain, shortness of breath or palpitations.  Denies complaints of nausea, vomiting, diarrhea or constipation.  Denies complaints of lower extremity weakness or edema.     Past Medical History  Past Medical History:   Diagnosis Date    Arrhythmia     Asthma     Cancer (CMS/HCC)     Coronary artery disease     Diabetes mellitus (CMS/HCC)     Disease of thyroid gland     Hyperlipidemia     Hypertension     Occipital neuralgia 06/10/2022    Occipital neuralgia of right side    Personal history of malignant neoplasm, unspecified 10/04/2021    History of malignant neoplasm    Personal history of other diseases of the circulatory system     History of hypertension    Personal history of other diseases of the circulatory system 10/04/2021    History of essential hypertension    Personal history of other diseases of the circulatory system     History of coronary artery disease    Personal history of other diseases of the digestive system 03/11/2020    History of chronic constipation    Personal history of other diseases of the  musculoskeletal system and connective tissue     History of arthritis    Personal history of other diseases of the nervous system and sense organs 10/04/2021    History of eye problem    Personal history of other diseases of the respiratory system     History of asthma    Personal history of other specified conditions 10/04/2021    History of chest pain     Surgical History  Past Surgical History:   Procedure Laterality Date    APPENDECTOMY  08/13/2015    Appendectomy    BREAST LUMPECTOMY  08/13/2015    Breast Surgery Lumpectomy    CARPAL TUNNEL RELEASE  08/13/2015    Neuroplasty Decompression Median Nerve At Carpal Tunnel    GALLBLADDER SURGERY  08/13/2015    Gallbladder Surgery    HYSTERECTOMY  08/13/2015    Hysterectomy    KNEE ARTHROSCOPY W/ MENISCAL REPAIR  08/13/2015    Knee Arthroscopy With Medial Meniscus Repair    KNEE SURGERY  08/13/2015    Knee Surgery    MR HEAD ANGIO WO IV CONTRAST  9/5/2021    MR HEAD ANGIO WO IV CONTRAST 9/5/2021 [facility]    OTHER SURGICAL HISTORY  08/13/2015    Shoulder Surgery Left    OTHER SURGICAL HISTORY  08/13/2015    Repair Of Bladder Reconstruction    OTHER SURGICAL HISTORY  09/20/2021    Breast surgery    OTHER SURGICAL HISTORY  09/20/2021    Bladder surgery    OTHER SURGICAL HISTORY  09/20/2021    Complete colonoscopy    OTHER SURGICAL HISTORY  09/20/2021    Shoulder surgery    OTHER SURGICAL HISTORY  09/20/2021    Foot surgery    OTHER SURGICAL HISTORY  09/20/2021    Percutaneous transluminal coronary angioplasty    OTHER SURGICAL HISTORY  10/11/2021    Thyroidectomy total    TOTAL KNEE ARTHROPLASTY  08/13/2015    Knee Replacement        Social History  Never smoker  Rare alcohol use  No illicit drug use    Family History  Family History   Problem Relation Name Age of Onset    Lung cancer Mother      Lymphoma Mother      Bone cancer Mother      Heart attack Father      Liver cancer Brother      Lung cancer Brother       Allergies  Penicillins, Sulfa (sulfonamide  antibiotics), Nitrofurantoin, and Nitrofurantoin monohyd/m-cryst    Review of Systems   Constitutional:  Positive for fatigue.   HENT: Negative.     Eyes: Negative.    Respiratory:  Positive for shortness of breath. Negative for chest tightness.    Cardiovascular:  Positive for palpitations. Negative for chest pain.   Gastrointestinal: Negative.    Endocrine: Negative.    Genitourinary: Negative.    Musculoskeletal: Negative.    Skin: Negative.    Allergic/Immunologic: Negative.    Neurological: Negative.  Negative for dizziness, weakness and light-headedness.   Hematological: Negative.    Psychiatric/Behavioral: Negative.       Physical Exam  Vitals reviewed.   Constitutional:       Appearance: Normal appearance.   HENT:      Head: Normocephalic and atraumatic.      Nose: Nose normal.      Mouth/Throat:      Mouth: Mucous membranes are moist.      Pharynx: Oropharynx is clear.   Eyes:      Pupils: Pupils are equal, round, and reactive to light.   Cardiovascular:      Rate and Rhythm: Normal rate. Rhythm irregular.      Pulses: Normal pulses.      Heart sounds: Normal heart sounds.   Pulmonary:      Effort: Pulmonary effort is normal.      Breath sounds: Normal breath sounds.   Abdominal:      General: Bowel sounds are normal.      Palpations: Abdomen is soft.   Musculoskeletal:         General: Normal range of motion.      Cervical back: Normal range of motion and neck supple.   Skin:     General: Skin is warm and dry.   Neurological:      General: No focal deficit present.      Mental Status: She is alert and oriented to person, place, and time.   Psychiatric:         Mood and Affect: Mood normal.         Behavior: Behavior normal.       Last Recorded Vitals  Vital signs reviewed and are stable.    Relevant Results  Results for orders placed or performed during the hospital encounter of 01/03/24 (from the past 96 hour(s))   ECG 12 Lead   Result Value Ref Range    Ventricular Rate 93 BPM    Atrial Rate 330 BPM     QRS Duration 82 ms    QT Interval 386 ms    QTC Calculation(Bazett) 479 ms    P Axis 114 degrees    R Axis 2 degrees    T Axis 75 degrees    QRS Count 15 beats    Q Onset 215 ms    T Offset 408 ms    QTC Fredericia 447 ms       Assessment/Plan   Persistent atrial fibrillation   -Maintained on metoprolol tartrate 25 mg twice daily and anticoagulated with   Eliquis 5 mg twice daily.  -GAVIN guided DCC today under the care of Dr. Duggan and Dr. Degroot.  2.    Coronary artery disease   -S/p remote PCI  3.    Benign essential hypertension   -Stable.  4.    Hyperlipidemia   -On statin therapy  5.    COPD/bronchial asthma  6.    Overweight   -BMI 27.44    I spent 35 minutes in the professional and overall care of this patient.      Clarice Stone, APRN-CNP

## 2024-01-03 NOTE — INTERVAL H&P NOTE
H&P reviewed. The patient was examined and there are no changes to the H&P.    She has AF of unknown duration.    Procedure, risks, benefits, and imponderables reviewed.  All questions answered before GAVIN.  Consented for cardioversion.    GAVIN reviewed with Dr. Duggan.  No thrombus present.  Normal LVEF.  Moderate MR    OK to proceed with cardioversion.

## 2024-01-03 NOTE — NURSING NOTE
Reviewed discharge instructions with patient. Verbalizes understanding of activity restrictions, medications and follow-up appointment.

## 2024-01-03 NOTE — H&P
History Of Present Illness  Myrna Rodrigues is a 73 y.o. female with past medical history significant for CAD, remote PCI, HLD, HTN, COPD/asthma, overweight, previous history of brief episodes of atrial fibrillation who complains of her heart racing when she is doing minimal activity recently.  Patient wore a 2 week Zio patch which indicated she was in atrial fibrillation the entire duration of the Zio patch recording with average heart rate at 91 bpm, maximum heart rate of 177 bpm and minimum heart rate at 58 bpm, with one 4 beat episode of NSVT.  Patient was subsequently initiated on Eliquis 5 mg twice daily and scheduled for outpatient GAVIN/cardioversion.  She is at [facility] today for these procedures under the care of Dr. Duggan and Dr. Degroot.    Patient denies recent c/o illness, fevers, chills, sweats, or exposure to Covid-19.  Denies c/o light headedness, dizziness, headache, chest pain, shortness of breath or palpitations.  Denies complaints of nausea, vomiting, diarrhea or constipation.  Denies complaints of lower extremity weakness or edema.     Past Medical History  Past Medical History:   Diagnosis Date    Arrhythmia     Asthma     Cancer (CMS/HCC)     Coronary artery disease     Diabetes mellitus (CMS/HCC)     Disease of thyroid gland     Hyperlipidemia     Hypertension     Occipital neuralgia 06/10/2022    Occipital neuralgia of right side    Personal history of malignant neoplasm, unspecified 10/04/2021    History of malignant neoplasm    Personal history of other diseases of the circulatory system     History of hypertension    Personal history of other diseases of the circulatory system 10/04/2021    History of essential hypertension    Personal history of other diseases of the circulatory system     History of coronary artery disease    Personal history of other diseases of the digestive system 03/11/2020    History of chronic constipation    Personal history of other diseases of the  musculoskeletal system and connective tissue     History of arthritis    Personal history of other diseases of the nervous system and sense organs 10/04/2021    History of eye problem    Personal history of other diseases of the respiratory system     History of asthma    Personal history of other specified conditions 10/04/2021    History of chest pain     Surgical History  Past Surgical History:   Procedure Laterality Date    APPENDECTOMY  08/13/2015    Appendectomy    BREAST LUMPECTOMY  08/13/2015    Breast Surgery Lumpectomy    CARPAL TUNNEL RELEASE  08/13/2015    Neuroplasty Decompression Median Nerve At Carpal Tunnel    GALLBLADDER SURGERY  08/13/2015    Gallbladder Surgery    HYSTERECTOMY  08/13/2015    Hysterectomy    KNEE ARTHROSCOPY W/ MENISCAL REPAIR  08/13/2015    Knee Arthroscopy With Medial Meniscus Repair    KNEE SURGERY  08/13/2015    Knee Surgery    MR HEAD ANGIO WO IV CONTRAST  9/5/2021    MR HEAD ANGIO WO IV CONTRAST 9/5/2021 [facility]    OTHER SURGICAL HISTORY  08/13/2015    Shoulder Surgery Left    OTHER SURGICAL HISTORY  08/13/2015    Repair Of Bladder Reconstruction    OTHER SURGICAL HISTORY  09/20/2021    Breast surgery    OTHER SURGICAL HISTORY  09/20/2021    Bladder surgery    OTHER SURGICAL HISTORY  09/20/2021    Complete colonoscopy    OTHER SURGICAL HISTORY  09/20/2021    Shoulder surgery    OTHER SURGICAL HISTORY  09/20/2021    Foot surgery    OTHER SURGICAL HISTORY  09/20/2021    Percutaneous transluminal coronary angioplasty    OTHER SURGICAL HISTORY  10/11/2021    Thyroidectomy total    TOTAL KNEE ARTHROPLASTY  08/13/2015    Knee Replacement        Social History  Never smoker  Rare alcohol use  No illicit drug use    Family History  Family History   Problem Relation Name Age of Onset    Lung cancer Mother      Lymphoma Mother      Bone cancer Mother      Heart attack Father      Liver cancer Brother      Lung cancer Brother       Allergies  Penicillins, Sulfa (sulfonamide  antibiotics), Nitrofurantoin, and Nitrofurantoin monohyd/m-cryst    Review of Systems   Constitutional:  Positive for fatigue.   HENT: Negative.     Eyes: Negative.    Respiratory:  Positive for shortness of breath. Negative for chest tightness.    Cardiovascular:  Positive for palpitations. Negative for chest pain.   Gastrointestinal: Negative.    Endocrine: Negative.    Genitourinary: Negative.    Musculoskeletal: Negative.    Skin: Negative.    Allergic/Immunologic: Negative.    Neurological: Negative.  Negative for dizziness, weakness and light-headedness.   Hematological: Negative.    Psychiatric/Behavioral: Negative.       Physical Exam  Vitals reviewed.   Constitutional:       Appearance: Normal appearance.   HENT:      Head: Normocephalic and atraumatic.      Nose: Nose normal.      Mouth/Throat:      Mouth: Mucous membranes are moist.      Pharynx: Oropharynx is clear.   Eyes:      Pupils: Pupils are equal, round, and reactive to light.   Cardiovascular:      Rate and Rhythm: Normal rate. Rhythm irregular.      Pulses: Normal pulses.      Heart sounds: Normal heart sounds.   Pulmonary:      Effort: Pulmonary effort is normal.      Breath sounds: Normal breath sounds.   Abdominal:      General: Bowel sounds are normal.      Palpations: Abdomen is soft.   Musculoskeletal:         General: Normal range of motion.      Cervical back: Normal range of motion and neck supple.   Skin:     General: Skin is warm and dry.   Neurological:      General: No focal deficit present.      Mental Status: She is alert and oriented to person, place, and time.   Psychiatric:         Mood and Affect: Mood normal.         Behavior: Behavior normal.       Last Recorded Vitals  Vital signs reviewed and are stable.    Relevant Results  Results for orders placed or performed during the hospital encounter of 01/03/24 (from the past 96 hour(s))   ECG 12 Lead   Result Value Ref Range    Ventricular Rate 93 BPM    Atrial Rate 330 BPM     QRS Duration 82 ms    QT Interval 386 ms    QTC Calculation(Bazett) 479 ms    P Axis 114 degrees    R Axis 2 degrees    T Axis 75 degrees    QRS Count 15 beats    Q Onset 215 ms    T Offset 408 ms    QTC Fredericia 447 ms       Assessment/Plan   Persistent atrial fibrillation   -Maintained on metoprolol tartrate 25 mg twice daily and anticoagulated with   Eliquis 5 mg twice daily.  -GAVIN guided DCC today under the care of Dr. Duggan and Dr. Degroot.  2.    Coronary artery disease   -S/p remote PCI  3.    Benign essential hypertension   -Stable.  4.    Hyperlipidemia   -On statin therapy  5.    COPD/bronchial asthma  6.    Overweight   -BMI 27.44    I spent 35 minutes in the professional and overall care of this patient.      Clarice Stone, APRN-CNP

## 2024-01-04 LAB — RIGHT VENTRICLE PEAK SYSTOLIC PRESSURE: 23.8

## 2024-01-05 LAB
ATRIAL RATE: 330 BPM
ATRIAL RATE: 75 BPM
P AXIS: 114 DEGREES
P AXIS: 86 DEGREES
P OFFSET: 162 MS
P ONSET: 114 MS
PR INTERVAL: 198 MS
Q ONSET: 213 MS
Q ONSET: 215 MS
QRS COUNT: 13 BEATS
QRS COUNT: 15 BEATS
QRS DURATION: 82 MS
QRS DURATION: 82 MS
QT INTERVAL: 386 MS
QT INTERVAL: 396 MS
QTC CALCULATION(BAZETT): 442 MS
QTC CALCULATION(BAZETT): 479 MS
QTC FREDERICIA: 426 MS
QTC FREDERICIA: 447 MS
R AXIS: 117 DEGREES
R AXIS: 2 DEGREES
T AXIS: 74 DEGREES
T AXIS: 75 DEGREES
T OFFSET: 408 MS
T OFFSET: 411 MS
VENTRICULAR RATE: 75 BPM
VENTRICULAR RATE: 93 BPM

## 2024-01-05 NOTE — ADDENDUM NOTE
Encounter addended by: Beatriz Sanchez RN on: 1/5/2024 8:13 AM   Actions taken: Charge Capture section accepted

## 2024-01-10 ENCOUNTER — OFFICE VISIT (OUTPATIENT)
Dept: CARDIOLOGY | Facility: CLINIC | Age: 74
End: 2024-01-10
Payer: MEDICARE

## 2024-01-10 VITALS
WEIGHT: 149.1 LBS | BODY MASS INDEX: 26.42 KG/M2 | HEART RATE: 76 BPM | DIASTOLIC BLOOD PRESSURE: 82 MMHG | SYSTOLIC BLOOD PRESSURE: 118 MMHG | HEIGHT: 63 IN

## 2024-01-10 DIAGNOSIS — Z78.9 NEVER SMOKED ANY SUBSTANCE: ICD-10-CM

## 2024-01-10 DIAGNOSIS — I10 HYPERTENSION, ESSENTIAL, BENIGN: ICD-10-CM

## 2024-01-10 DIAGNOSIS — J44.9 CHRONIC OBSTRUCTIVE PULMONARY DISEASE, UNSPECIFIED COPD TYPE (MULTI): ICD-10-CM

## 2024-01-10 DIAGNOSIS — I48.0 PAROXYSMAL ATRIAL FIBRILLATION (MULTI): ICD-10-CM

## 2024-01-10 DIAGNOSIS — Z98.61 CAD S/P PERCUTANEOUS CORONARY ANGIOPLASTY: ICD-10-CM

## 2024-01-10 DIAGNOSIS — J45.909 ASTHMA, UNSPECIFIED ASTHMA SEVERITY, UNSPECIFIED WHETHER COMPLICATED, UNSPECIFIED WHETHER PERSISTENT (HHS-HCC): ICD-10-CM

## 2024-01-10 DIAGNOSIS — I25.10 CAD S/P PERCUTANEOUS CORONARY ANGIOPLASTY: ICD-10-CM

## 2024-01-10 DIAGNOSIS — E78.2 MIXED HYPERLIPIDEMIA: ICD-10-CM

## 2024-01-10 PROCEDURE — 1125F AMNT PAIN NOTED PAIN PRSNT: CPT | Performed by: INTERNAL MEDICINE

## 2024-01-10 PROCEDURE — 3074F SYST BP LT 130 MM HG: CPT | Performed by: INTERNAL MEDICINE

## 2024-01-10 PROCEDURE — 3079F DIAST BP 80-89 MM HG: CPT | Performed by: INTERNAL MEDICINE

## 2024-01-10 PROCEDURE — 1159F MED LIST DOCD IN RCRD: CPT | Performed by: INTERNAL MEDICINE

## 2024-01-10 PROCEDURE — 1036F TOBACCO NON-USER: CPT | Performed by: INTERNAL MEDICINE

## 2024-01-10 PROCEDURE — 99214 OFFICE O/P EST MOD 30 MIN: CPT | Performed by: INTERNAL MEDICINE

## 2024-01-10 NOTE — PROGRESS NOTES
CARDIOLOGY OFFICE VISIT      CHIEF COMPLAINT    Follow-up after cardioversion for atrial fibrillation    HISTORY OF PRESENT ILLNESS  The patient states she is doing well.  The patient did undergo direct-current cardioversion on 1/3/2024.  This was performed by Dr. García.  She did not have any problems with the cardioversion.  She has done well since the cardioversion.  She has not had any bleeding problems from the apixaban.  She denies chest discomfort.  She denies dyspnea.  She denies palpitations at syncope.      IMPRESSION:   1. Coronary artery disease, no angina.  2. Remote PCI.  3. Mixed hyperlipidemia.  4. Essential hypertension.  5. Bronchial asthma.  6. Chronic obstructive pulmonary disease.  7. Overweight  8. 2 brief episodes of atrial fibrillation while hospitalized with pneumonia in the past        Please excuse any errors in grammar or translation related to this dictation. Voice recognition software was utilized to prepare this document.        Past Medical History  Past Medical History:   Diagnosis Date    Arrhythmia     Asthma     Cancer (CMS/HCC)     Coronary artery disease     Diabetes mellitus (CMS/[facility])     Disease of thyroid gland     Hyperlipidemia     Hypertension     Occipital neuralgia 06/10/2022    Occipital neuralgia of right side    Personal history of malignant neoplasm, unspecified 10/04/2021    History of malignant neoplasm    Personal history of other diseases of the circulatory system     History of hypertension    Personal history of other diseases of the circulatory system 10/04/2021    History of essential hypertension    Personal history of other diseases of the circulatory system     History of coronary artery disease    Personal history of other diseases of the digestive system 03/11/2020    History of chronic constipation    Personal history of other diseases of the musculoskeletal system and connective tissue     History of arthritis    Personal history of other diseases of the  nervous system and sense organs 10/04/2021    History of eye problem    Personal history of other diseases of the respiratory system     History of asthma    Personal history of other specified conditions 10/04/2021    History of chest pain       Social History  Social History     Tobacco Use    Smoking status: Never    Smokeless tobacco: Never   Vaping Use    Vaping Use: Never used   Substance Use Topics    Alcohol use: Not Currently    Drug use: Never       Family History     Family History   Problem Relation Name Age of Onset    Lung cancer Mother      Lymphoma Mother      Bone cancer Mother      Heart attack Father      Liver cancer Brother      Lung cancer Brother          Allergies:  Allergies   Allergen Reactions    Penicillins Anaphylaxis, Unknown and Shortness of breath    Sulfa (Sulfonamide Antibiotics) Swelling    Nitrofurantoin Rash    Nitrofurantoin Monohyd/M-Cryst Diarrhea and Rash        Outpatient Medications:  Current Outpatient Medications   Medication Instructions    acetaminophen (TYLENOL) 650 mg, oral, Every 6 hours PRN    apixaban (ELIQUIS) 5 mg, oral, 2 times daily    atorvastatin (LIPITOR) 10 mg, oral, Daily    cholecalciferol (VITAMIN D-3) 1,000 Units, oral, Daily RT    DULoxetine (Cymbalta) 60 mg DR capsule 1 capsule, oral, Daily    estradiol (Estrace) 0.01 % (0.1 mg/gram) vaginal cream vaginal    levothyroxine (Tirosint) 112 mcg capsule 1 capsule, oral, Daily before breakfast, On an empty stomach    magnesium oxide (MAG-OX) 400 mg, oral, Daily    metFORMIN XR (Glucophage-XR) 500 mg 24 hr tablet 1 tablet (500 mg) 2 times a day.    metoprolol tartrate (LOPRESSOR) 25 mg, oral, 2 times daily    mometasone-formoterol (Dulera) 100-5 mcg/actuation inhaler 2 puffs, inhalation, 2 times daily    montelukast (Singulair) 10 mg tablet 1 tablet, oral, Nightly    nitroglycerin (NITROSTAT) 0.4 mg, sublingual, Every 5 min PRN    omeprazole (PriLOSEC) 40 mg DR capsule TAKE 1 CAPSULE BY MOUTH TWICE  DAILY     oxybutynin XL (DITROPAN-XL) 10 mg, oral, Daily    POTASSIUM ORAL Daily, 99mg, one    tiotropium (Spiriva Respimat) 2.5 mcg/actuation inhaler 2 puffs, inhalation, Daily    traZODone (Desyrel) 50 mg tablet Take 1 tablet (50 mg) by mouth once daily at bedtime.    Ventolin HFA 90 mcg/actuation inhaler 2 puffs, inhalation, Every 4 hours PRN          REVIEW OF SYSTEMS  Review of Systems   All other systems reviewed and are negative.        VITALS  Vitals:    01/10/24 1307   BP: 118/82   Pulse: 76       PHYSICAL EXAM  Vitals reviewed.   Constitutional:       Appearance: Normal and healthy appearance. Well-developed and not in distress.   Eyes:      Conjunctiva/sclera: Conjunctivae normal.      Pupils: Pupils are equal, round, and reactive to light.   Neck:      Vascular: No JVR. JVD normal.   Pulmonary:      Effort: Pulmonary effort is normal.      Breath sounds: Normal breath sounds. No wheezing. No rhonchi. No rales.   Chest:      Chest wall: Not tender to palpatation.   Cardiovascular:      PMI at left midclavicular line. Normal rate. Regular rhythm. Normal S1. Normal S2.       Murmurs: There is a grade 1/6 systolic murmur. At the base      No gallop.  No click. No rub.   Pulses:     Intact distal pulses.   Edema:     Peripheral edema absent.   Abdominal:      Tenderness: There is no abdominal tenderness.   Musculoskeletal: Normal range of motion.         General: No tenderness.      Cervical back: Normal range of motion. Skin:     General: Skin is warm and dry.   Neurological:      General: No focal deficit present.      Mental Status: Alert and oriented to person, place and time.   Psychiatric:         Behavior: Behavior is cooperative.           ASSESSMENT AND PLAN  Diagnoses and all orders for this visit:  CAD S/P percutaneous coronary angioplasty  Mixed hyperlipidemia  Hypertension, essential, benign  Asthma, unspecified asthma severity, unspecified whether complicated, unspecified whether persistent  Chronic  obstructive pulmonary disease, unspecified COPD type (CMS/[facility])  BMI 26.0-26.9,adult  Never smoked any substance      [unfilled]

## 2024-01-10 NOTE — PATIENT INSTRUCTIONS
Follow up in 6 months  Continue same medications and treatments.   Patient educated on proper medication use.   Patient educated on risk factor modification.   Please bring any lab results from other providers / physicians to your next appointment.     Please bring all medicines, vitamins, and herbal supplements with you when you come to the office.     Prescriptions will not be filled unless you are compliant with your follow up appointments or have a follow up appointment scheduled as per instruction of your physician. Refills should be requested at the time of your visit.   Sheryl KENDRICK LPN, am scribing for and in the presence of Dr. Shashank Proctor

## 2024-01-15 ENCOUNTER — OFFICE VISIT (OUTPATIENT)
Dept: DERMATOLOGY | Facility: CLINIC | Age: 74
End: 2024-01-15
Payer: MEDICARE

## 2024-01-15 DIAGNOSIS — J45.909 MODERATE ASTHMA WITHOUT COMPLICATION, UNSPECIFIED WHETHER PERSISTENT (HHS-HCC): Primary | ICD-10-CM

## 2024-01-15 DIAGNOSIS — L82.1 SEBORRHEIC KERATOSIS: ICD-10-CM

## 2024-01-15 DIAGNOSIS — Z12.83 ENCOUNTER FOR SCREENING FOR MALIGNANT NEOPLASM OF SKIN: ICD-10-CM

## 2024-01-15 DIAGNOSIS — D18.01 HEMANGIOMA OF SKIN: ICD-10-CM

## 2024-01-15 DIAGNOSIS — L40.9 PSORIASIS: Primary | ICD-10-CM

## 2024-01-15 DIAGNOSIS — L81.4 LENTIGO: ICD-10-CM

## 2024-01-15 PROCEDURE — 1036F TOBACCO NON-USER: CPT | Performed by: DERMATOLOGY

## 2024-01-15 PROCEDURE — 1125F AMNT PAIN NOTED PAIN PRSNT: CPT | Performed by: DERMATOLOGY

## 2024-01-15 PROCEDURE — 99213 OFFICE O/P EST LOW 20 MIN: CPT | Performed by: DERMATOLOGY

## 2024-01-15 PROCEDURE — 1159F MED LIST DOCD IN RCRD: CPT | Performed by: DERMATOLOGY

## 2024-01-15 RX ORDER — CLOBETASOL PROPIONATE 0.5 MG/G
OINTMENT TOPICAL 2 TIMES DAILY
Qty: 60 G | Refills: 11 | Status: SHIPPED | OUTPATIENT
Start: 2024-01-15 | End: 2025-01-14

## 2024-01-15 NOTE — PROGRESS NOTES
Subjective   Myrna Rodrigues is a 73 y.o. female who presents for the following: Skin Check and Psoriasis.  Skin Cancer Screening  She has a history of heavy sun exposure. She is in the sun rarely. She uses sunscreen never. She reports no skin symptoms. Her moles are darkening.    Spots that concern her: none  Patient states that her Psoriasis of the feet are at their worse since she ran out of the medicine (Terbinafine, Ketoconazole, Clobetasol).        Objective   Well appearing patient in no apparent distress; mood and affect are within normal limits.      Assessment/Plan   Psoriasis  Left Foot - Posterior; Right Foot - Posterior    - Previously treated for tinea pedis with antifungals without improvement  - At last visit in 2021, suspected possible psoriasis but biopsy declined at that time. However, she has had improvement with clobetasol ointment but has been without medication for about a year.  - Prescription renewed for clobetasol 0.05% ointment BID prn    Related Medications  clobetasol (Temovate) 0.05 % ointment  Apply topically 2 times a day.    Hemangioma of skin    Benign, reassurance provided    Lentigo    The ABCDEs of melanoma and warning signs of non-melanoma skin cancer were discussed with patient and patient expressed understanding. Sun protection and use of at least SPF 30 discussed with patient. Pt instructed to reapply every 2 hours.     Seborrheic keratosis    Benign, reassurance provided      Follow up 2 years for FBSE or sooner if needed.    Mehreen Kam MD    I saw and evaluated the patient. I personally obtained the key and critical portions of the history and physical exam or was physically present for key and critical portions performed by the student/resident. I reviewed the student/resident's documentation and discussed the patient with the student/resident. I was present for the entirety of any procedure(s). I agree with the student/resident's medical decision making as documented in the  note.

## 2024-01-17 ENCOUNTER — OFFICE VISIT (OUTPATIENT)
Dept: CARDIOLOGY | Facility: CLINIC | Age: 74
End: 2024-01-17
Payer: MEDICARE

## 2024-01-17 VITALS
HEIGHT: 63 IN | DIASTOLIC BLOOD PRESSURE: 70 MMHG | WEIGHT: 149 LBS | HEART RATE: 72 BPM | BODY MASS INDEX: 26.4 KG/M2 | SYSTOLIC BLOOD PRESSURE: 126 MMHG

## 2024-01-17 DIAGNOSIS — I25.10 CAD S/P PERCUTANEOUS CORONARY ANGIOPLASTY: ICD-10-CM

## 2024-01-17 DIAGNOSIS — E78.2 MIXED HYPERLIPIDEMIA: ICD-10-CM

## 2024-01-17 DIAGNOSIS — I25.10 ATHEROSCLEROSIS OF NATIVE CORONARY ARTERY OF NATIVE HEART WITHOUT ANGINA PECTORIS: ICD-10-CM

## 2024-01-17 DIAGNOSIS — I10 HYPERTENSION, ESSENTIAL, BENIGN: ICD-10-CM

## 2024-01-17 DIAGNOSIS — I25.10 ATHSCL HEART DISEASE OF NATIVE CORONARY ARTERY W/O ANG PCTRS: ICD-10-CM

## 2024-01-17 DIAGNOSIS — Z95.5 PRESENCE OF CORONARY ANGIOPLASTY IMPLANT AND GRAFT: ICD-10-CM

## 2024-01-17 DIAGNOSIS — E11.69 TYPE 2 DIABETES MELLITUS WITH OTHER SPECIFIED COMPLICATION, WITHOUT LONG-TERM CURRENT USE OF INSULIN (MULTI): ICD-10-CM

## 2024-01-17 DIAGNOSIS — I47.20 VENTRICULAR TACHYCARDIA (MULTI): ICD-10-CM

## 2024-01-17 DIAGNOSIS — E66.3 OVERWEIGHT WITH BODY MASS INDEX (BMI) 25.0-29.9: ICD-10-CM

## 2024-01-17 DIAGNOSIS — I48.0 PAROXYSMAL ATRIAL FIBRILLATION (MULTI): Primary | ICD-10-CM

## 2024-01-17 DIAGNOSIS — Z95.820 S/P ANGIOPLASTY WITH STENT: ICD-10-CM

## 2024-01-17 DIAGNOSIS — I25.10 CORONARY ARTERY DISEASE INVOLVING NATIVE CORONARY ARTERY OF NATIVE HEART WITHOUT ANGINA PECTORIS: ICD-10-CM

## 2024-01-17 DIAGNOSIS — Z98.61 CAD S/P PERCUTANEOUS CORONARY ANGIOPLASTY: ICD-10-CM

## 2024-01-17 PROBLEM — N32.81 OVERACTIVE BLADDER: Status: ACTIVE | Noted: 2024-01-17

## 2024-01-17 PROBLEM — R26.2 DIFFICULTY WALKING: Status: ACTIVE | Noted: 2024-01-17

## 2024-01-17 PROBLEM — E66.811 OBESITY (BMI 30.0-34.9): Status: RESOLVED | Noted: 2021-04-07 | Resolved: 2024-01-17

## 2024-01-17 PROBLEM — R06.02 SHORTNESS OF BREATH: Status: ACTIVE | Noted: 2024-01-17

## 2024-01-17 PROBLEM — E66.9 OBESITY (BMI 30.0-34.9): Status: RESOLVED | Noted: 2021-04-07 | Resolved: 2024-01-17

## 2024-01-17 PROCEDURE — 1036F TOBACCO NON-USER: CPT | Performed by: NURSE PRACTITIONER

## 2024-01-17 PROCEDURE — 1159F MED LIST DOCD IN RCRD: CPT | Performed by: NURSE PRACTITIONER

## 2024-01-17 PROCEDURE — 1125F AMNT PAIN NOTED PAIN PRSNT: CPT | Performed by: NURSE PRACTITIONER

## 2024-01-17 PROCEDURE — 3078F DIAST BP <80 MM HG: CPT | Performed by: NURSE PRACTITIONER

## 2024-01-17 PROCEDURE — 99213 OFFICE O/P EST LOW 20 MIN: CPT | Performed by: NURSE PRACTITIONER

## 2024-01-17 PROCEDURE — 3074F SYST BP LT 130 MM HG: CPT | Performed by: NURSE PRACTITIONER

## 2024-01-17 RX ORDER — MONTELUKAST SODIUM 10 MG/1
10 TABLET ORAL NIGHTLY
Qty: 90 TABLET | Refills: 3 | Status: SHIPPED | OUTPATIENT
Start: 2024-01-17

## 2024-01-17 NOTE — PROGRESS NOTES
"CARDIOLOGY OFFICE VISIT      CHIEF COMPLAINT  Chief Complaint   Patient presents with    Atrial Fibrillation     Chief complaint: \"I am doing fine.\"  HISTORY OF PRESENT ILLNESS  HPI  History: The patient is a 73-year-old  female who is followed for coronary artery disease status post remote PTCA with drug-eluting stent to the LAD with left heart catheterization dated March 25, 2013 revealing patent LAD stents, 10 to 30% mid LAD and 10% proximal circumflex stenosis managed medically.  Additionally the patient is followed for mixed dyslipidemia, hypertension, and paroxysmal atrial fibrillation in the setting of pneumonia.  Review the medical records reveals patient underwent a Zio patch monitor on November 2, 2023 which revealed atrial fibrillation throughout the recording with a minimum heart rate of 58 bpm, average heart rate 91 bpm, maximum heart rate 177 bpm and 1 4 beat run of nonsustained VT at a rate of 124 bpm.  The patient was subsequently admitted to [facility] on November 24, 2023 with shortness of breath.  She had reported that she reverted to atrial fibrillation that morning.  Heart rates were 90 to 99 bpm.  She underwent an outpatient GAVIN guided cardioversion on January 3, 2024 and presents the office today for follow-up evaluation.  The GAVIN revealed a bilobed left atrial appendage with no evidence of thrombus and normal ejection fraction.  Bubble study was negative.  Moderate mitral regurgitation was also noted.  Patient states that she is breathing better since undergoing the cardioversion.  She denies shortness of breath, chest pain, palpitations, dizziness, lightheadedness, abdominal distention, or lower extremity edema.  Past Medical History  Past Medical History:   Diagnosis Date    Arrhythmia     Asthma     Cancer (CMS/Formerly [facility])     Coronary artery disease     Diabetes mellitus (CMS/[facility]ly [facility])     Disease of thyroid gland     Hyperlipidemia     Hypertension     Occipital neuralgia " 06/10/2022    Occipital neuralgia of right side    Personal history of malignant neoplasm, unspecified 10/04/2021    History of malignant neoplasm    Personal history of other diseases of the circulatory system     History of hypertension    Personal history of other diseases of the circulatory system 10/04/2021    History of essential hypertension    Personal history of other diseases of the circulatory system     History of coronary artery disease    Personal history of other diseases of the digestive system 03/11/2020    History of chronic constipation    Personal history of other diseases of the musculoskeletal system and connective tissue     History of arthritis    Personal history of other diseases of the nervous system and sense organs 10/04/2021    History of eye problem    Personal history of other diseases of the respiratory system     History of asthma    Personal history of other specified conditions 10/04/2021    History of chest pain       Social History  Social History     Tobacco Use    Smoking status: Never    Smokeless tobacco: Never   Vaping Use    Vaping Use: Never used   Substance Use Topics    Alcohol use: Not Currently    Drug use: Never       Family History     Family History   Problem Relation Name Age of Onset    Lung cancer Mother      Lymphoma Mother      Bone cancer Mother      Heart attack Father      Liver cancer Brother      Lung cancer Brother          Allergies:  Allergies   Allergen Reactions    Penicillins Anaphylaxis, Unknown and Shortness of breath    Sulfa (Sulfonamide Antibiotics) Swelling    Nitrofurantoin Rash    Nitrofurantoin Monohyd/M-Cryst Diarrhea and Rash        Outpatient Medications:  Current Outpatient Medications   Medication Instructions    acetaminophen (TYLENOL) 650 mg, oral, Every 6 hours PRN    apixaban (ELIQUIS) 5 mg, oral, 2 times daily    atorvastatin (LIPITOR) 10 mg, oral, Daily    cholecalciferol (VITAMIN D-3) 1,000 Units, oral, Daily RT    clobetasol  (Temovate) 0.05 % ointment Topical, 2 times daily    DULoxetine (Cymbalta) 60 mg DR capsule 1 capsule, oral, Daily    estradiol (Estrace) 0.01 % (0.1 mg/gram) vaginal cream vaginal    levothyroxine (Tirosint) 112 mcg capsule 1 capsule, oral, Daily before breakfast, On an empty stomach    magnesium oxide (MAG-OX) 400 mg, oral, Daily    metFORMIN XR (Glucophage-XR) 500 mg 24 hr tablet Take 2 tablets in the morning and 1 tablet in the evening    metoprolol tartrate (LOPRESSOR) 25 mg, oral, 2 times daily    mometasone-formoterol (Dulera) 100-5 mcg/actuation inhaler 2 puffs, inhalation, 2 times daily    montelukast (SINGULAIR) 10 mg, oral, Nightly    nitroglycerin (NITROSTAT) 0.4 mg, sublingual, Every 5 min PRN    omeprazole (PriLOSEC) 40 mg DR capsule TAKE 1 CAPSULE BY MOUTH TWICE  DAILY    oxybutynin XL (DITROPAN-XL) 10 mg, oral, Daily    POTASSIUM ORAL Daily, 99mg, one    tiotropium (Spiriva Respimat) 2.5 mcg/actuation inhaler 2 puffs, inhalation, Daily    traZODone (Desyrel) 50 mg tablet Take 1 tablet (50 mg) by mouth once daily at bedtime.    Ventolin HFA 90 mcg/actuation inhaler 2 puffs, inhalation, Every 4 hours PRN          REVIEW OF SYSTEMS  Review of Systems   All other systems reviewed and are negative.        VITALS  Vitals:    01/17/24 1158   BP: 126/70   Pulse: 72       PHYSICAL EXAM  Vitals and nursing note reviewed.   Constitutional:       Appearance: Normal appearance.   HENT:      Head: Normocephalic.   Neck:      Vascular: No JVD.   Cardiovascular:      Rate and Rhythm: Normal rate and regular rhythm.      Pulses: Normal pulses.      Heart sounds: Normal heart sounds.   Pulmonary:      Effort: Pulmonary effort is normal.      Breath sounds: Normal breath sounds.   Abdominal:      General: Bowel sounds are normal.      Palpations: Abdomen is soft.   Musculoskeletal:         General: Normal range of motion.      Cervical back: Normal range of motion.   Skin:     General: Skin is warm and dry.    Neurological:      General: No focal deficit present.      Mental Status: She is alert and oriented to person, place, and time.      Motor: Motor function is intact.   Psychiatric:         Attention and Perception: Attention and perception normal.         Mood and Affect: Mood and affect normal.         Speech: Speech normal.         Behavior: Behavior normal. Behavior is cooperative.         Thought Content: Thought content normal.         Cognition and Memory: Cognition and memory normal.        Labs and testing: Twelve-lead EKG reveals sinus rhythm without ectopics no acute ischemic changes or infarct patterns.  QRS durations 82 ms,  ms, QTc 435 ms.      ASSESSMENT AND PLAN    Clinical impressions:  1.  Coronary artery disease status post remote PTCA with drug-eluting stent to the LAD with left heart catheterization dated March 25, 2013 revealing patent LAD stents, 10 to 30% mid LAD and 10% proximal circumflex stenosis with recommendation for medical management.  2.  Paroxysmal atrial fibrillation controlled on beta-blockade and magnesium oxide and anticoagulated with Eliquis.  3.  Dyslipidemia on statin.  4.  Hypertension, controlled with a blood pressure today 126/70.  5.  Chronic obstructive pulmonary disease/bronchial asthma.  6.  Overweight with a BMI of 26.39.  7.  Normal left ventricular function per GAVIN dated January 3, 2024.  Bilobed left atrial appendage without evidence of thrombus and negative bubble study for PFO or ASD.  8.  Moderate mitral regurgitation per transesophageal echo dated January 3, 2024.    Recommendations:  1.  Continue current medications as prescribed.  2.  Discussed anatomy and physiology of the documented arrhythmia.  Treatment options reviewed in detail. Instructed to take all current medications as prescribed.  Limit caffeine and alcohol consumption to two beverages per day.  Increase water intake to 64 ounces per day.  Refrain from over the counter cold medications in  tablet form.  Treat symptoms:  nasal spray for nasal congestion; zinc lozenges for sore throat; plain Robitussin or Delsym for cough; increase vitamin C intake.  Exercise five or more days per week for 30 minutes per session per American Heart Association guidelines.  3.  Follow-up in office with Dr. Degroot in 6 months or sooner if needed.  4.  Obtain the 2D echocardiogram on January 18, 2024 at 1 PM as scheduled.  5.  Follow-up in office with Dr. Proctor on July 17, 2024 at 2:15 PM schedule or sooner if needed.    Evaluation and note by Michelle Breen, CNP  **Please excuse any errors in grammar or translation related to this dictation.  Voice recognition software was utilized to prepare this document.**

## 2024-01-18 ENCOUNTER — HOSPITAL ENCOUNTER (OUTPATIENT)
Dept: CARDIOLOGY | Facility: CLINIC | Age: 74
Discharge: HOME | End: 2024-01-18
Payer: MEDICARE

## 2024-01-18 VITALS
SYSTOLIC BLOOD PRESSURE: 140 MMHG | HEIGHT: 63 IN | WEIGHT: 149 LBS | BODY MASS INDEX: 26.4 KG/M2 | DIASTOLIC BLOOD PRESSURE: 70 MMHG

## 2024-01-18 DIAGNOSIS — I48.0 PAROXYSMAL ATRIAL FIBRILLATION (MULTI): ICD-10-CM

## 2024-01-18 DIAGNOSIS — I25.10 CAD S/P PERCUTANEOUS CORONARY ANGIOPLASTY: ICD-10-CM

## 2024-01-18 DIAGNOSIS — Z98.61 CAD S/P PERCUTANEOUS CORONARY ANGIOPLASTY: ICD-10-CM

## 2024-01-18 PROCEDURE — 93306 TTE W/DOPPLER COMPLETE: CPT

## 2024-01-18 PROCEDURE — 93306 TTE W/DOPPLER COMPLETE: CPT | Performed by: INTERNAL MEDICINE

## 2024-01-19 LAB
AORTIC VALVE MEAN GRADIENT: 6 MMHG
AORTIC VALVE PEAK VELOCITY: 1.6 M/S
AV PEAK GRADIENT: 10.2 MMHG
AVA (PEAK VEL): 1.81 CM2
AVA (VTI): 2 CM2
EJECTION FRACTION APICAL 4 CHAMBER: 65.6
EJECTION FRACTION: 68 %
LEFT VENTRICLE INTERNAL DIMENSION DIASTOLE: 4.6 CM (ref 3.5–6)
LEFT VENTRICULAR OUTFLOW TRACT DIAMETER: 1.9 CM
MITRAL VALVE E/A RATIO: 1.15
MITRAL VALVE E/E' RATIO: 14.4
RIGHT VENTRICLE PEAK SYSTOLIC PRESSURE: 34.8 MMHG

## 2024-01-28 ENCOUNTER — APPOINTMENT (OUTPATIENT)
Dept: CARDIOLOGY | Facility: HOSPITAL | Age: 74
End: 2024-01-28
Payer: MEDICARE

## 2024-01-28 ENCOUNTER — APPOINTMENT (OUTPATIENT)
Dept: RADIOLOGY | Facility: HOSPITAL | Age: 74
End: 2024-01-28
Payer: MEDICARE

## 2024-01-28 ENCOUNTER — HOSPITAL ENCOUNTER (OUTPATIENT)
Facility: HOSPITAL | Age: 74
Setting detail: OBSERVATION
Discharge: NURSING, REHAB OR LONG-TERM CARE | End: 2024-02-02
Attending: EMERGENCY MEDICINE | Admitting: INTERNAL MEDICINE
Payer: MEDICARE

## 2024-01-28 DIAGNOSIS — R55 SYNCOPE AND COLLAPSE: Primary | ICD-10-CM

## 2024-01-28 LAB
ABO GROUP (TYPE) IN BLOOD: NORMAL
ALBUMIN SERPL BCP-MCNC: 4 G/DL (ref 3.4–5)
ALP SERPL-CCNC: 69 U/L (ref 33–136)
ALT SERPL W P-5'-P-CCNC: 8 U/L (ref 7–45)
ANION GAP SERPL CALC-SCNC: 11 MMOL/L (ref 10–20)
ANTIBODY SCREEN: NORMAL
AST SERPL W P-5'-P-CCNC: 12 U/L (ref 9–39)
BASOPHILS # BLD AUTO: 0.03 X10*3/UL (ref 0–0.1)
BASOPHILS NFR BLD AUTO: 0.2 %
BILIRUB SERPL-MCNC: 0.5 MG/DL (ref 0–1.2)
BUN SERPL-MCNC: 12 MG/DL (ref 6–23)
CALCIUM SERPL-MCNC: 9.2 MG/DL (ref 8.6–10.3)
CARDIAC TROPONIN I PNL SERPL HS: 5 NG/L (ref 0–13)
CHLORIDE SERPL-SCNC: 100 MMOL/L (ref 98–107)
CO2 SERPL-SCNC: 30 MMOL/L (ref 21–32)
CREAT SERPL-MCNC: 0.94 MG/DL (ref 0.5–1.05)
EGFRCR SERPLBLD CKD-EPI 2021: 63 ML/MIN/1.73M*2
EOSINOPHIL # BLD AUTO: 0.27 X10*3/UL (ref 0–0.4)
EOSINOPHIL NFR BLD AUTO: 2 %
ERYTHROCYTE [DISTWIDTH] IN BLOOD BY AUTOMATED COUNT: 17.9 % (ref 11.5–14.5)
ETHANOL SERPL-MCNC: <10 MG/DL
FLUAV RNA RESP QL NAA+PROBE: NOT DETECTED
FLUBV RNA RESP QL NAA+PROBE: NOT DETECTED
GLUCOSE BLD MANUAL STRIP-MCNC: 101 MG/DL (ref 74–99)
GLUCOSE BLD MANUAL STRIP-MCNC: 137 MG/DL (ref 74–99)
GLUCOSE SERPL-MCNC: 134 MG/DL (ref 74–99)
HCT VFR BLD AUTO: 38.6 % (ref 36–46)
HGB BLD-MCNC: 11.5 G/DL (ref 12–16)
IMM GRANULOCYTES # BLD AUTO: 0.07 X10*3/UL (ref 0–0.5)
IMM GRANULOCYTES NFR BLD AUTO: 0.5 % (ref 0–0.9)
INR PPP: 1.3 (ref 0.9–1.1)
LACTATE SERPL-SCNC: 2.6 MMOL/L (ref 0.4–2)
LACTATE SERPL-SCNC: 2.9 MMOL/L (ref 0.4–2)
LYMPHOCYTES # BLD AUTO: 1.82 X10*3/UL (ref 0.8–3)
LYMPHOCYTES NFR BLD AUTO: 13.7 %
MCH RBC QN AUTO: 23.1 PG (ref 26–34)
MCHC RBC AUTO-ENTMCNC: 29.8 G/DL (ref 32–36)
MCV RBC AUTO: 78 FL (ref 80–100)
MONOCYTES # BLD AUTO: 0.89 X10*3/UL (ref 0.05–0.8)
MONOCYTES NFR BLD AUTO: 6.7 %
NEUTROPHILS # BLD AUTO: 10.25 X10*3/UL (ref 1.6–5.5)
NEUTROPHILS NFR BLD AUTO: 76.9 %
NRBC BLD-RTO: 0 /100 WBCS (ref 0–0)
PLATELET # BLD AUTO: 377 X10*3/UL (ref 150–450)
POTASSIUM SERPL-SCNC: 3.8 MMOL/L (ref 3.5–5.3)
PROT SERPL-MCNC: 7.3 G/DL (ref 6.4–8.2)
PROTHROMBIN TIME: 15.2 SECONDS (ref 9.8–12.8)
RBC # BLD AUTO: 4.98 X10*6/UL (ref 4–5.2)
RH FACTOR (ANTIGEN D): NORMAL
SARS-COV-2 RNA RESP QL NAA+PROBE: NOT DETECTED
SODIUM SERPL-SCNC: 137 MMOL/L (ref 136–145)
WBC # BLD AUTO: 13.3 X10*3/UL (ref 4.4–11.3)

## 2024-01-28 PROCEDURE — 2500000004 HC RX 250 GENERAL PHARMACY W/ HCPCS (ALT 636 FOR OP/ED): Performed by: INTERNAL MEDICINE

## 2024-01-28 PROCEDURE — G0378 HOSPITAL OBSERVATION PER HR: HCPCS

## 2024-01-28 PROCEDURE — 72131 CT LUMBAR SPINE W/O DYE: CPT

## 2024-01-28 PROCEDURE — 87636 SARSCOV2 & INF A&B AMP PRB: CPT | Performed by: EMERGENCY MEDICINE

## 2024-01-28 PROCEDURE — 74176 CT ABD & PELVIS W/O CONTRAST: CPT | Mod: FOREIGN READ | Performed by: RADIOLOGY

## 2024-01-28 PROCEDURE — 84439 ASSAY OF FREE THYROXINE: CPT | Performed by: INTERNAL MEDICINE

## 2024-01-28 PROCEDURE — 96361 HYDRATE IV INFUSION ADD-ON: CPT | Performed by: EMERGENCY MEDICINE

## 2024-01-28 PROCEDURE — 84484 ASSAY OF TROPONIN QUANT: CPT | Performed by: EMERGENCY MEDICINE

## 2024-01-28 PROCEDURE — 2500000002 HC RX 250 W HCPCS SELF ADMINISTERED DRUGS (ALT 637 FOR MEDICARE OP, ALT 636 FOR OP/ED): Performed by: INTERNAL MEDICINE

## 2024-01-28 PROCEDURE — 85610 PROTHROMBIN TIME: CPT | Performed by: EMERGENCY MEDICINE

## 2024-01-28 PROCEDURE — 71250 CT THORAX DX C-: CPT

## 2024-01-28 PROCEDURE — 96374 THER/PROPH/DIAG INJ IV PUSH: CPT | Performed by: EMERGENCY MEDICINE

## 2024-01-28 PROCEDURE — 73630 X-RAY EXAM OF FOOT: CPT | Mod: LT

## 2024-01-28 PROCEDURE — 73564 X-RAY EXAM KNEE 4 OR MORE: CPT | Mod: LT

## 2024-01-28 PROCEDURE — 86901 BLOOD TYPING SEROLOGIC RH(D): CPT | Performed by: EMERGENCY MEDICINE

## 2024-01-28 PROCEDURE — 82947 ASSAY GLUCOSE BLOOD QUANT: CPT | Mod: 59

## 2024-01-28 PROCEDURE — 72125 CT NECK SPINE W/O DYE: CPT

## 2024-01-28 PROCEDURE — 94640 AIRWAY INHALATION TREATMENT: CPT | Mod: MUE

## 2024-01-28 PROCEDURE — 73564 X-RAY EXAM KNEE 4 OR MORE: CPT | Mod: LEFT SIDE | Performed by: RADIOLOGY

## 2024-01-28 PROCEDURE — 99285 EMERGENCY DEPT VISIT HI MDM: CPT | Performed by: EMERGENCY MEDICINE

## 2024-01-28 PROCEDURE — 70450 CT HEAD/BRAIN W/O DYE: CPT | Performed by: RADIOLOGY

## 2024-01-28 PROCEDURE — 93005 ELECTROCARDIOGRAM TRACING: CPT

## 2024-01-28 PROCEDURE — 83605 ASSAY OF LACTIC ACID: CPT | Performed by: EMERGENCY MEDICINE

## 2024-01-28 PROCEDURE — 85025 COMPLETE CBC W/AUTO DIFF WBC: CPT | Performed by: EMERGENCY MEDICINE

## 2024-01-28 PROCEDURE — 82077 ASSAY SPEC XCP UR&BREATH IA: CPT | Performed by: EMERGENCY MEDICINE

## 2024-01-28 PROCEDURE — 72125 CT NECK SPINE W/O DYE: CPT | Performed by: RADIOLOGY

## 2024-01-28 PROCEDURE — 71250 CT THORAX DX C-: CPT | Mod: FOREIGN READ | Performed by: RADIOLOGY

## 2024-01-28 PROCEDURE — 70450 CT HEAD/BRAIN W/O DYE: CPT

## 2024-01-28 PROCEDURE — 93010 ELECTROCARDIOGRAM REPORT: CPT | Performed by: INTERNAL MEDICINE

## 2024-01-28 PROCEDURE — 2500000004 HC RX 250 GENERAL PHARMACY W/ HCPCS (ALT 636 FOR OP/ED): Performed by: EMERGENCY MEDICINE

## 2024-01-28 PROCEDURE — 80053 COMPREHEN METABOLIC PANEL: CPT | Performed by: EMERGENCY MEDICINE

## 2024-01-28 PROCEDURE — 2500000001 HC RX 250 WO HCPCS SELF ADMINISTERED DRUGS (ALT 637 FOR MEDICARE OP): Performed by: INTERNAL MEDICINE

## 2024-01-28 PROCEDURE — 99285 EMERGENCY DEPT VISIT HI MDM: CPT

## 2024-01-28 PROCEDURE — 73630 X-RAY EXAM OF FOOT: CPT | Mod: LEFT SIDE | Performed by: RADIOLOGY

## 2024-01-28 PROCEDURE — 96375 TX/PRO/DX INJ NEW DRUG ADDON: CPT | Performed by: EMERGENCY MEDICINE

## 2024-01-28 PROCEDURE — 99223 1ST HOSP IP/OBS HIGH 75: CPT | Performed by: INTERNAL MEDICINE

## 2024-01-28 PROCEDURE — 36415 COLL VENOUS BLD VENIPUNCTURE: CPT | Performed by: EMERGENCY MEDICINE

## 2024-01-28 PROCEDURE — 72131 CT LUMBAR SPINE W/O DYE: CPT | Mod: FOREIGN READ | Performed by: RADIOLOGY

## 2024-01-28 PROCEDURE — 84443 ASSAY THYROID STIM HORMONE: CPT | Performed by: INTERNAL MEDICINE

## 2024-01-28 PROCEDURE — 72128 CT CHEST SPINE W/O DYE: CPT | Mod: FR

## 2024-01-28 PROCEDURE — 72128 CT CHEST SPINE W/O DYE: CPT | Mod: FOREIGN READ | Performed by: RADIOLOGY

## 2024-01-28 RX ORDER — METOPROLOL TARTRATE 25 MG/1
25 TABLET, FILM COATED ORAL 2 TIMES DAILY
Status: DISCONTINUED | OUTPATIENT
Start: 2024-01-28 | End: 2024-02-02 | Stop reason: HOSPADM

## 2024-01-28 RX ORDER — MORPHINE SULFATE 4 MG/ML
4 INJECTION, SOLUTION INTRAMUSCULAR; INTRAVENOUS ONCE
Status: COMPLETED | OUTPATIENT
Start: 2024-01-28 | End: 2024-01-28

## 2024-01-28 RX ORDER — INSULIN LISPRO 100 [IU]/ML
0-10 INJECTION, SOLUTION INTRAVENOUS; SUBCUTANEOUS
Status: DISCONTINUED | OUTPATIENT
Start: 2024-01-28 | End: 2024-02-02 | Stop reason: HOSPADM

## 2024-01-28 RX ORDER — LEVOTHYROXINE SODIUM 112 UG/1
112 TABLET ORAL DAILY
Status: DISCONTINUED | OUTPATIENT
Start: 2024-01-28 | End: 2024-02-02 | Stop reason: HOSPADM

## 2024-01-28 RX ORDER — ACETAMINOPHEN 325 MG/1
650 TABLET ORAL EVERY 4 HOURS PRN
Status: DISCONTINUED | OUTPATIENT
Start: 2024-01-28 | End: 2024-02-02 | Stop reason: HOSPADM

## 2024-01-28 RX ORDER — SENNOSIDES 8.6 MG/1
2 TABLET ORAL 2 TIMES DAILY
Status: DISCONTINUED | OUTPATIENT
Start: 2024-01-28 | End: 2024-02-02 | Stop reason: HOSPADM

## 2024-01-28 RX ORDER — DEXTROSE 50 % IN WATER (D50W) INTRAVENOUS SYRINGE
25
Status: DISCONTINUED | OUTPATIENT
Start: 2024-01-28 | End: 2024-02-02 | Stop reason: HOSPADM

## 2024-01-28 RX ORDER — ONDANSETRON HYDROCHLORIDE 2 MG/ML
4 INJECTION, SOLUTION INTRAVENOUS ONCE
Status: COMPLETED | OUTPATIENT
Start: 2024-01-28 | End: 2024-01-28

## 2024-01-28 RX ORDER — ACETAMINOPHEN 160 MG/5ML
650 SOLUTION ORAL EVERY 4 HOURS PRN
Status: DISCONTINUED | OUTPATIENT
Start: 2024-01-28 | End: 2024-02-02 | Stop reason: HOSPADM

## 2024-01-28 RX ORDER — FLUTICASONE FUROATE AND VILANTEROL 100; 25 UG/1; UG/1
1 POWDER RESPIRATORY (INHALATION)
Status: DISCONTINUED | OUTPATIENT
Start: 2024-01-28 | End: 2024-02-02 | Stop reason: HOSPADM

## 2024-01-28 RX ORDER — ATORVASTATIN CALCIUM 10 MG/1
10 TABLET, FILM COATED ORAL DAILY
Status: DISCONTINUED | OUTPATIENT
Start: 2024-01-28 | End: 2024-02-02 | Stop reason: HOSPADM

## 2024-01-28 RX ORDER — SODIUM CHLORIDE, SODIUM LACTATE, POTASSIUM CHLORIDE, CALCIUM CHLORIDE 600; 310; 30; 20 MG/100ML; MG/100ML; MG/100ML; MG/100ML
50 INJECTION, SOLUTION INTRAVENOUS CONTINUOUS
Status: DISCONTINUED | OUTPATIENT
Start: 2024-01-28 | End: 2024-02-02 | Stop reason: HOSPADM

## 2024-01-28 RX ORDER — DEXTROSE MONOHYDRATE 100 MG/ML
0.3 INJECTION, SOLUTION INTRAVENOUS ONCE AS NEEDED
Status: DISCONTINUED | OUTPATIENT
Start: 2024-01-28 | End: 2024-02-02 | Stop reason: HOSPADM

## 2024-01-28 RX ORDER — ACETAMINOPHEN 650 MG/1
650 SUPPOSITORY RECTAL EVERY 4 HOURS PRN
Status: DISCONTINUED | OUTPATIENT
Start: 2024-01-28 | End: 2024-02-02 | Stop reason: HOSPADM

## 2024-01-28 RX ORDER — IPRATROPIUM BROMIDE AND ALBUTEROL SULFATE 2.5; .5 MG/3ML; MG/3ML
3 SOLUTION RESPIRATORY (INHALATION) EVERY 4 HOURS PRN
Status: DISCONTINUED | OUTPATIENT
Start: 2024-01-28 | End: 2024-02-02 | Stop reason: HOSPADM

## 2024-01-28 RX ADMIN — SODIUM CHLORIDE 500 ML: 9 INJECTION, SOLUTION INTRAVENOUS at 09:23

## 2024-01-28 RX ADMIN — APIXABAN 5 MG: 5 TABLET, FILM COATED ORAL at 13:02

## 2024-01-28 RX ADMIN — TIOTROPIUM BROMIDE INHALATION SPRAY 2 PUFF: 3.12 SPRAY, METERED RESPIRATORY (INHALATION) at 13:45

## 2024-01-28 RX ADMIN — SODIUM CHLORIDE, POTASSIUM CHLORIDE, SODIUM LACTATE AND CALCIUM CHLORIDE 50 ML/HR: 600; 310; 30; 20 INJECTION, SOLUTION INTRAVENOUS at 13:03

## 2024-01-28 RX ADMIN — METOPROLOL TARTRATE 25 MG: 25 TABLET, FILM COATED ORAL at 13:02

## 2024-01-28 RX ADMIN — APIXABAN 5 MG: 5 TABLET, FILM COATED ORAL at 21:06

## 2024-01-28 RX ADMIN — FLUTICASONE FUROATE AND VILANTEROL TRIFENATATE 1 PUFF: 100; 25 POWDER RESPIRATORY (INHALATION) at 13:45

## 2024-01-28 RX ADMIN — METOPROLOL TARTRATE 25 MG: 25 TABLET, FILM COATED ORAL at 21:05

## 2024-01-28 RX ADMIN — ONDANSETRON 4 MG: 2 INJECTION INTRAMUSCULAR; INTRAVENOUS at 09:17

## 2024-01-28 RX ADMIN — ACETAMINOPHEN 650 MG: 325 TABLET ORAL at 13:02

## 2024-01-28 RX ADMIN — MORPHINE SULFATE 4 MG: 4 INJECTION, SOLUTION INTRAMUSCULAR; INTRAVENOUS at 09:16

## 2024-01-28 RX ADMIN — ATORVASTATIN CALCIUM 10 MG: 10 TABLET, FILM COATED ORAL at 21:05

## 2024-01-28 SDOH — SOCIAL STABILITY: SOCIAL INSECURITY: DO YOU FEEL UNSAFE GOING BACK TO THE PLACE WHERE YOU ARE LIVING?: NO

## 2024-01-28 SDOH — SOCIAL STABILITY: SOCIAL INSECURITY: ARE THERE ANY APPARENT SIGNS OF INJURIES/BEHAVIORS THAT COULD BE RELATED TO ABUSE/NEGLECT?: NO

## 2024-01-28 SDOH — SOCIAL STABILITY: SOCIAL INSECURITY: DOES ANYONE TRY TO KEEP YOU FROM HAVING/CONTACTING OTHER FRIENDS OR DOING THINGS OUTSIDE YOUR HOME?: NO

## 2024-01-28 SDOH — SOCIAL STABILITY: SOCIAL INSECURITY: ABUSE: ADULT

## 2024-01-28 SDOH — SOCIAL STABILITY: SOCIAL INSECURITY: HAVE YOU HAD THOUGHTS OF HARMING ANYONE ELSE?: NO

## 2024-01-28 SDOH — SOCIAL STABILITY: SOCIAL INSECURITY: DO YOU FEEL ANYONE HAS EXPLOITED OR TAKEN ADVANTAGE OF YOU FINANCIALLY OR OF YOUR PERSONAL PROPERTY?: NO

## 2024-01-28 SDOH — SOCIAL STABILITY: SOCIAL INSECURITY: ARE YOU OR HAVE YOU BEEN THREATENED OR ABUSED PHYSICALLY, EMOTIONALLY, OR SEXUALLY BY ANYONE?: NO

## 2024-01-28 SDOH — SOCIAL STABILITY: SOCIAL INSECURITY: HAS ANYONE EVER THREATENED TO HURT YOUR FAMILY OR YOUR PETS?: NO

## 2024-01-28 SDOH — SOCIAL STABILITY: SOCIAL INSECURITY: WERE YOU ABLE TO COMPLETE ALL THE BEHAVIORAL HEALTH SCREENINGS?: YES

## 2024-01-28 ASSESSMENT — ACTIVITIES OF DAILY LIVING (ADL)
HEARING - RIGHT EAR: FUNCTIONAL
TOILETING: INDEPENDENT
PATIENT'S MEMORY ADEQUATE TO SAFELY COMPLETE DAILY ACTIVITIES?: YES
BATHING: INDEPENDENT
LACK_OF_TRANSPORTATION: NO
FEEDING YOURSELF: INDEPENDENT
JUDGMENT_ADEQUATE_SAFELY_COMPLETE_DAILY_ACTIVITIES: YES
ADEQUATE_TO_COMPLETE_ADL: YES
GROOMING: INDEPENDENT
WALKS IN HOME: INDEPENDENT
HEARING - LEFT EAR: FUNCTIONAL
DRESSING YOURSELF: INDEPENDENT

## 2024-01-28 ASSESSMENT — LIFESTYLE VARIABLES
HAVE YOU EVER FELT YOU SHOULD CUT DOWN ON YOUR DRINKING: NO
EVER FELT BAD OR GUILTY ABOUT YOUR DRINKING: NO
HOW OFTEN DO YOU HAVE A DRINK CONTAINING ALCOHOL: NEVER
HOW OFTEN DO YOU HAVE 6 OR MORE DRINKS ON ONE OCCASION: NEVER
EVER HAD A DRINK FIRST THING IN THE MORNING TO STEADY YOUR NERVES TO GET RID OF A HANGOVER: NO
REASON UNABLE TO ASSESS: NO
SKIP TO QUESTIONS 9-10: 1
HOW MANY STANDARD DRINKS CONTAINING ALCOHOL DO YOU HAVE ON A TYPICAL DAY: PATIENT DOES NOT DRINK
AUDIT-C TOTAL SCORE: 0
HAVE PEOPLE ANNOYED YOU BY CRITICIZING YOUR DRINKING: NO
AUDIT-C TOTAL SCORE: 0

## 2024-01-28 ASSESSMENT — COGNITIVE AND FUNCTIONAL STATUS - GENERAL
DAILY ACTIVITIY SCORE: 24
PATIENT BASELINE BEDBOUND: NO
MOBILITY SCORE: 24

## 2024-01-28 ASSESSMENT — PAIN - FUNCTIONAL ASSESSMENT
PAIN_FUNCTIONAL_ASSESSMENT: 0-10

## 2024-01-28 ASSESSMENT — PAIN DESCRIPTION - FREQUENCY: FREQUENCY: CONSTANT/CONTINUOUS

## 2024-01-28 ASSESSMENT — PAIN SCALES - GENERAL
PAINLEVEL_OUTOF10: 3
PAINLEVEL_OUTOF10: 0 - NO PAIN
PAINLEVEL_OUTOF10: 10 - WORST POSSIBLE PAIN
PAINLEVEL_OUTOF10: 0 - NO PAIN
PAINLEVEL_OUTOF10: 10 - WORST POSSIBLE PAIN

## 2024-01-28 ASSESSMENT — PATIENT HEALTH QUESTIONNAIRE - PHQ9
2. FEELING DOWN, DEPRESSED OR HOPELESS: NOT AT ALL
SUM OF ALL RESPONSES TO PHQ9 QUESTIONS 1 & 2: 0
1. LITTLE INTEREST OR PLEASURE IN DOING THINGS: NOT AT ALL

## 2024-01-28 ASSESSMENT — COLUMBIA-SUICIDE SEVERITY RATING SCALE - C-SSRS
6. HAVE YOU EVER DONE ANYTHING, STARTED TO DO ANYTHING, OR PREPARED TO DO ANYTHING TO END YOUR LIFE?: NO
1. IN THE PAST MONTH, HAVE YOU WISHED YOU WERE DEAD OR WISHED YOU COULD GO TO SLEEP AND NOT WAKE UP?: NO
2. HAVE YOU ACTUALLY HAD ANY THOUGHTS OF KILLING YOURSELF?: NO
1. IN THE PAST MONTH, HAVE YOU WISHED YOU WERE DEAD OR WISHED YOU COULD GO TO SLEEP AND NOT WAKE UP?: NO

## 2024-01-28 ASSESSMENT — PAIN DESCRIPTION - DESCRIPTORS: DESCRIPTORS: THROBBING

## 2024-01-28 ASSESSMENT — PAIN DESCRIPTION - LOCATION: LOCATION: KNEE

## 2024-01-28 ASSESSMENT — PAIN DESCRIPTION - PAIN TYPE: TYPE: ACUTE PAIN

## 2024-01-28 ASSESSMENT — PAIN DESCRIPTION - ORIENTATION: ORIENTATION: LEFT

## 2024-01-28 NOTE — H&P
Medical Group History and Physical    ASSESSMENT/PLAN:     Syncope  -unclear etiology; does report history of diarrhea and son reports poor PO intake; orthostasis a possibility  -also recent DCCV and afib; previous zio patch showed a short run of non-sustained VT  -last echo 1/18/24 with preserved EF, normal relaxation pattern, mild MR/TR/AR, no need to repeat now  -cont tele  -check orthostatics  -IVF    Paroxysmal atrial fibrillation  -s/p DCCV 1/3/24  -cont eliquis, metoprolol    Diarrhea  -chronic for past 5-6 months  -unclear etiology, doubt C. Diff at this point given chronicity  -will check some stool studies; consider imaging at some point    CAD s/p PCI  -cont home meds  -no ongoing chest pain    COPD, not in exacerbation  -cont home inhalers  -PRN bronchodilators    DM2  -correctional insulin scale    HTN  HLD  -resume home medications      VTE Prophylaxis: eliquis      HISTORY OF PRESENT ILLNESS:   Chief Complaint: syncope    History Of Present Illness:    Myrna Rodrigues is a 73 y.o. female with a significant past medical history of CAD status post PCI, paroxysmal atrial fibrillation on Eliquis, COPD, type 2 diabetes, hypertension, hyperlipidemia, who is presenting to the emergency department after passing out earlier this morning.  Says she was up around 3 AM to let her dog outside and just after she let him out she began to feel fuzzy and subsequently woke up on the floor.  She was not confused when she woke up.  She denies any incontinence.  Says she has passed out once before several years ago.  She had left knee pain after she fell and subsequently had another fall after her left leg gave out but on this occasion she did not pass out.  She denies any recent chest pain or dyspnea.  She is not had any palpitations.  Denies any dizziness or lightheadedness.  She reports that she has had chronic diarrhea for the past 5 to 6 months going between 1-4 times daily.  Denies any nausea or vomiting.  Thinks that  her intake has been normal.  Denies any new medications.  No bleeding.  No abdominal pain.  Denies any fevers or chills.  No chest pain or dyspnea.       Review of systems: 10 point review of systems is otherwise negative except as mentioned above.    PAST HISTORIES:       Past Medical History:  She has a past medical history of Arrhythmia, Asthma, Cancer (CMS/HCC), Coronary artery disease, Diabetes mellitus (CMS/HCC), Disease of thyroid gland, Hyperlipidemia, Hypertension, Occipital neuralgia (06/10/2022), Personal history of malignant neoplasm, unspecified (10/04/2021), Personal history of other diseases of the circulatory system, Personal history of other diseases of the circulatory system (10/04/2021), Personal history of other diseases of the circulatory system, Personal history of other diseases of the digestive system (03/11/2020), Personal history of other diseases of the musculoskeletal system and connective tissue, Personal history of other diseases of the nervous system and sense organs (10/04/2021), Personal history of other diseases of the respiratory system, and Personal history of other specified conditions (10/04/2021).    Past Surgical History:  She has a past surgical history that includes Hysterectomy (08/13/2015); Knee arthroscopy w/ meniscal repair (08/13/2015); Total knee arthroplasty (08/13/2015); Other surgical history (08/13/2015); Gallbladder surgery (08/13/2015); Knee surgery (08/13/2015); Appendectomy (08/13/2015); Other surgical history (08/13/2015); Carpal tunnel release (08/13/2015); Breast lumpectomy (08/13/2015); Other surgical history (09/20/2021); Other surgical history (09/20/2021); Other surgical history (09/20/2021); Other surgical history (09/20/2021); Other surgical history (09/20/2021); Other surgical history (09/20/2021); Other surgical history (10/11/2021); and MR angio head wo IV contrast (9/5/2021).      Social History:  She reports that she has never smoked. She has  never used smokeless tobacco. She reports that she does not currently use alcohol. She reports that she does not use drugs.    Family History:  Family History   Problem Relation Name Age of Onset    Lung cancer Mother      Lymphoma Mother      Bone cancer Mother      Heart attack Father      Liver cancer Brother      Lung cancer Brother          Allergies:  Penicillins, Sulfa (sulfonamide antibiotics), Nitrofurantoin, and Nitrofurantoin monohyd/m-cryst      OBJECTIVE:       Last Recorded Vitals:  Vitals:    01/28/24 1030 01/28/24 1100 01/28/24 1130 01/28/24 1225   BP: 127/55 131/54 132/54 130/63   BP Location:    Right arm   Patient Position:    Lying   Pulse: 78 74 72 71   Resp: 16 16 16 16   Temp:    37.3 °C (99.1 °F)   TempSrc:    Temporal   SpO2: 94% 96% 95% 96%   Weight:       Height:           Last I/O:  No intake/output data recorded.    Physical Exam  Constitutional:       Appearance: Normal appearance.   HENT:      Head: Normocephalic and atraumatic.      Mouth/Throat:      Mouth: Mucous membranes are moist.      Pharynx: Oropharynx is clear.   Eyes:      Extraocular Movements: Extraocular movements intact.      Pupils: Pupils are equal, round, and reactive to light.   Cardiovascular:      Rate and Rhythm: Normal rate and regular rhythm.      Heart sounds: Murmur heard.   Pulmonary:      Effort: Pulmonary effort is normal. No respiratory distress.      Breath sounds: Normal breath sounds. No wheezing.   Abdominal:      General: Abdomen is flat. Bowel sounds are normal.      Palpations: Abdomen is soft.   Musculoskeletal:         General: No swelling. Normal range of motion.      Cervical back: No rigidity or tenderness.   Skin:     General: Skin is warm and dry.   Neurological:      General: No focal deficit present.      Mental Status: She is alert and oriented to person, place, and time.      Cranial Nerves: No cranial nerve deficit.      Motor: No weakness.   Psychiatric:         Mood and Affect: Mood  normal.         Behavior: Behavior normal.           Inpatient Medications:  apixaban, 5 mg, oral, BID  atorvastatin, 10 mg, oral, Daily  fluticasone furoate-vilanteroL, 1 puff, inhalation, Daily  levothyroxine, 112 mcg, oral, Daily  metoprolol tartrate, 25 mg, oral, BID  sennosides, 2 tablet, oral, BID  tiotropium, 2 Inhalation, inhalation, Daily      PRN medications: acetaminophen **OR** acetaminophen **OR** acetaminophen    Outpatient Medications:  Prior to Admission medications    Medication Sig Start Date End Date Taking? Authorizing Provider   acetaminophen (Tylenol) 325 mg tablet Take 2 tablets (650 mg) by mouth every 6 hours if needed. 11/30/21   Historical Provider, MD   apixaban (Eliquis) 5 mg tablet Take 1 tablet (5 mg) by mouth 2 times a day. 1/10/24 1/9/25  Shashank Proctor MD   atorvastatin (Lipitor) 10 mg tablet TAKE 1 TABLET DAILY 10/23/23   Shashank Proctor MD   cholecalciferol (Vitamin D-3) 50 MCG (2000 UT) tablet Take 0.5 tablets (1,000 Units) by mouth once daily.    Historical Provider, MD   clobetasol (Temovate) 0.05 % ointment Apply topically 2 times a day. 1/15/24 1/14/25  Mehreen Kam MD   DULoxetine (Cymbalta) 60 mg DR capsule Take 1 capsule (60 mg) by mouth once daily. 3/15/21   Historical Provider, MD   estradiol (Estrace) 0.01 % (0.1 mg/gram) vaginal cream Insert into the vagina. 2/21/22   Historical Provider, MD   levothyroxine (Tirosint) 112 mcg capsule Take 1 capsule (112 mcg) by mouth once daily in the morning. Take before meals. On an empty stomach 4/14/21   Historical Provider, MD   magnesium oxide (Mag-Ox) 400 mg (241.3 mg magnesium) tablet Take 1 tablet (400 mg) by mouth once daily. 12/28/23 12/27/24  Shashank Proctor MD   metFORMIN XR (Glucophage-XR) 500 mg 24 hr tablet Take 2 tablets in the morning and 1 tablet in the evening 6/7/21   Historical Provider, MD   metoprolol tartrate (Lopressor) 25 mg tablet Take 1 tablet (25 mg) by mouth 2 times a day.  9/5/23   Calvin Baker MD   mometasone-formoterol (Dulera) 100-5 mcg/actuation inhaler Inhale 2 puffs 2 times a day. 2/8/23   Historical Provider, MD   montelukast (Singulair) 10 mg tablet TAKE 1 TABLET BY MOUTH AT  BEDTIME 1/17/24   Major Jurado MD   nitroglycerin (Nitrostat) 0.4 mg SL tablet Place 1 tablet (0.4 mg) under the tongue every 5 minutes if needed for chest pain. 11/2/23 11/1/24  Shashank Proctor MD   omeprazole (PriLOSEC) 40 mg DR capsule TAKE 1 CAPSULE BY MOUTH TWICE  DAILY 6/19/23   Calvin Baker MD   oxybutynin XL (Ditropan-XL) 10 mg 24 hr tablet TAKE 1 TABLET DAILY 1/2/24   Calvin Baker MD   POTASSIUM ORAL once daily. 99mg, one    Historical Provider, MD   tiotropium (Spiriva Respimat) 2.5 mcg/actuation inhaler Inhale 2 puffs once daily. 4/21/22   Historical Provider, MD   traZODone (Desyrel) 50 mg tablet Take 1 tablet (50 mg) by mouth once daily at bedtime. 3/15/21   Historical Provider, MD   Ventolin HFA 90 mcg/actuation inhaler USE 2 INHALATIONS BY MOUTH EVERY 4 HOURS AS NEEDED 10/19/23   Major Jurado MD       LABS AND IMAGING:     Last Labs:  CBC - 1/28/2024:  9:05 AM  13.3 11.5 377    38.6      CMP - 1/28/2024:  8:33 AM  9.2 7.3 12 --- 0.5   _ 4.0 8 69      PTT - 1/3/2024: 10:46 AM  1.3   15.2 36     Troponin I, High Sensitivity   Date/Time Value Ref Range Status   01/28/2024 08:33 AM 5 0 - 13 ng/L Final   11/24/2023 10:33 AM 13 0 - 13 ng/L Final   11/24/2023 09:29 AM 14 (H) 0 - 13 ng/L Final     BNP   Date/Time Value Ref Range Status   11/24/2023 09:29  (H) 0 - 99 pg/mL Final   05/16/2021 10:57 PM 68 0 - 99 pg/mL Final     Comment:     .  <100 pg/mL - Heart failure unlikely  100-299 pg/mL - Intermediate probability of acute heart  .               failure exacerbation. Correlate with clinical  .               context and patient history.    >=300 pg/mL - Heart Failure likely. Correlate with clinical  .               context and patient  history.  BNP testing is performed using different testing   methodology at [facility] than at other   [facility]. Direct result comparisons should   only be made within the same method.       Hemoglobin A1C   Date/Time Value Ref Range Status   01/17/2024 10:24 AM 6.2 (H) 4.3 - 5.6 % Final     Comment:     American Diabetes Association guidelines indicate that patients with HgbA1c in the range 5.7-6.4% are at increased risk for development of diabetes, and intervention by lifestyle modification may be beneficial. HgbA1c greater or equal to 6.5% is considered diagnostic of diabetes.   03/22/2023 02:41 PM 5.8 (H) 4.3 - 5.6 % Final     Comment:     American Diabetes Association guidelines indicate that patients with HgbA1c in the range 5.7-6.4% are at increased risk for development of diabetes, and intervention by lifestyle modification may be beneficial. HgbA1c greater or equal to 6.5% is considered diagnostic of diabetes.     VLDL   Date/Time Value Ref Range Status   01/03/2022 12:10 PM 27 0 - 40 mg/dL Final

## 2024-01-28 NOTE — ED PROVIDER NOTES
HPI   Chief Complaint   Patient presents with    Knee Injury     Patinet fell twice last night. First time patient believes hse may have passed out. 2nd time left knee gave out. Left knee replacement times 2. Patient is on blood thinners and denies hitting her head.       HPI: 73-year-old female presents with left knee pain.  She states that she fell twice last night.  She states that the first time she passed out.  She states that she went to let her dog out in the next thing she knew she was on the floor.  She states this was about 3:00 this morning.  She states that she was able to get up but then later her left leg gave out.  She states that she has had both of her knees replaced.  She states that they were done by Dr. Mccarthy.  She states that the level was actually done twice because there was some loosening of the hardware.  She states that that was done over 12 years ago.  She states that she does take Eliquis for A-fib and states that she has had a ablation by Dr. Degroot.  She states that she is not sure if she hit her head.  She denies any current headache.  She is denying any neck or back pain.  Her main complaint is left knee pain and left fourth toe pain.    Family HX: Denies any significant/pertinent family history.  Social Hx: Denies ETOH or drug use.  Review of systems:  Gen.: No weight loss, fatigue, anorexia, insomnia, fever.   Eyes: No vision loss, double vision, drainage, eye pain.   ENT: No pharyngitis, dry mouth.   Cardiac: No chest pain, palpitations, syncope, near syncope.   Pulmonary: No shortness of breath, cough, hemoptysis.   Heme/lymph: No swollen glands, fever, bleeding.   GI: No abdominal pain, change in bowel habits, melena, hematemesis, hematochezia, nausea, vomiting, diarrhea.   : No discharge, dysuria, frequency, urgency, hematuria.   Musculoskeletal: No limb pain, joint pain, joint swelling.   Skin: No rashes.   Psych: No depression, anxiety, suicidality, homicidality.   Review of  systems is otherwise negative unless stated above or in history of present illness.    Physical Exam:    Appearance: Alert, oriented , cooperative,  in no acute distress. Well nourished & well hydrated.    Skin: Intact,  dry skin, no lesions, rash, petechiae or purpura.     Eyes: PERRLA, EOMs intact,  Conjunctiva pink with no redness or exudates. Eyelids without lesions. No scleral icterus.     ENT: Hearing grossly intact. External auditory canals patent, tympanic membranes intact with visible landmarks. Nares patent, mucus membranes moist. Dentition without lesions. Pharynx clear, uvula midline.     Neck: Supple, without meningismus. Thyroid not palpable. Trachea at midline. No lymphadenopathy.    Pulmonary: Clear bilaterally with good chest wall excursion. No rales, rhonchi or wheezing. No accessory muscle use or stridor.    Cardiac: Normal S1, S2 without murmur, rub, gallop or extrasystole. No JVD, Carotids without bruits.    Abdomen: Soft, nontender, active bowel sounds.  No palpable organomegaly.  No rebound or guarding.  No CVA tenderness.    Genitourinary: Exam deferred.    Musculoskeletal: Full range of motion. no pain, edema, or deformity. Pulses full and equal. No cyanosis, clubbing, or edema.  Left knee without obvious deformity.  She has pain with trying to flex the knee.  Well-healed anterior surgical scars.  2+ distal pulses.  Normal capillary refill.  No obvious ecchymosis or deformity noted to the foot.    Neurological:  Cranial nerves II through XII are grossly intact, finger-nose touch is normal, normal sensation, no weakness, no focal findings identified.    Psychiatric: Appropriate mood and affect.     Medical Decision-Making:  Testing: EKG was obtained which is interpreted shows a sinus rhythm rate of 72 without evidence of obvious ST elevations or T wave inversions indicate acute ischemia or infarct.  Patient underwent a CT of the head, C-spine, chest abdomen pelvis.  Did not show any acute  traumatic injuries.  X-rays were obtained and show hardware without any obvious lucency.  X-ray of the foot did not show any obvious fractures.  I was concerned however about the patient's syncopal episode as she stated that she was standing in the next and she knew she was on the floor.  Given her history of paroxysmal A-fib and ablation I was concerned about this being a cardiac event.  At this time I did admit the patient to Dr. Bryan Blanco with medicine.  Treatment:   Reevaluation:   Plan: Homegoing. Discussed differential. Will follow-up with the primary physician in the next 2-3 days. Return if worse. They understand return precautions and discharge instructions. Patient and family/friend/caregiver are in agreement with this plan.   Impression:   1.  Syncope  2.  Knee injury    Labs Reviewed  CBC WITH AUTO DIFFERENTIAL - Abnormal     WBC                           13.3 (*)               nRBC                          0.0                    RBC                           4.98                   Hemoglobin                    11.5 (*)               Hematocrit                    38.6                   MCV                           78 (*)                 MCH                           23.1 (*)               MCHC                          29.8 (*)               RDW                           17.9 (*)               Platelets                     377                    Neutrophils %                 76.9                   Immature Granulocytes %, Automated   0.5                    Lymphocytes %                 13.7                   Monocytes %                   6.7                    Eosinophils %                 2.0                    Basophils %                   0.2                    Neutrophils Absolute          10.25 (*)               Immature Granulocytes Absolute, Au*   0.07                   Lymphocytes Absolute          1.82                   Monocytes Absolute            0.89 (*)               Eosinophils Absolute           0.27                   Basophils Absolute            0.03                COMPREHENSIVE METABOLIC PANEL - Abnormal     Glucose                       134 (*)                Sodium                        137                    Potassium                     3.8                    Chloride                      100                    Bicarbonate                   30                     Anion Gap                     11                     Urea Nitrogen                 12                     Creatinine                    0.94                   eGFR                          63                     Calcium                       9.2                    Albumin                       4.0                    Alkaline Phosphatase          69                     Total Protein                 7.3                    AST                           12                     Bilirubin, Total              0.5                    ALT                           8                   LACTATE - Abnormal     Lactate                       2.6 (*)                  Narrative: Venipuncture immediately after or during the administration of Metamizole may lead to falsely low results. Testing should be performed immediately                prior to Metamizole dosing.  PROTIME-INR - Abnormal     Protime                       15.2 (*)               INR                           1.3 (*)             LACTATE - Abnormal     Lactate                       2.9 (*)                  Narrative: Venipuncture immediately after or during the administration of Metamizole may lead to falsely low results. Testing should be performed immediately                prior to Metamizole dosing.  ALCOHOL - Normal     Alcohol                       <10                 TROPONIN I, HIGH SENSITIVITY - Normal     Troponin I, High Sensitivity   5                        Narrative: Less than 99th percentile of normal range cutoff-                Female and children under 18 years old <14  ng/L; Male <21 ng/L: Negative                Repeat testing should be performed if clinically indicated.                                 Female and children under 18 years old 14-50 ng/L; Male 21-50 ng/L:                Consistent with possible cardiac damage and possible increased clinical                 risk. Serial measurements may help to assess extent of myocardial damage.                                 >50 ng/L: Consistent with cardiac damage, increased clinical risk and                myocardial infarction. Serial measurements may help assess extent of                 myocardial damage.                                  NOTE: Children less than 1 year old may have higher baseline troponin                 levels and results should be interpreted in conjunction with the overall                 clinical context.                                 NOTE: Troponin I testing is performed using a different                 testing methodology at [facility] than at [facility]. Direct result comparisons should only                 be made within the same method.  INFLUENZA A AND B PCR - Normal     Flu A Result                                         Flu B Result                                           Narrative: This assay is an in vitro diagnostic multiplex nucleic acid amplification test for the detection and discrimination of Influenza A & B from nasopharyngeal specimens, and has been validated for use at [facility]. Negative results do not preclude Influenza A/B infections, and should not be used as the sole basis for diagnosis, treatment, or other management decisions. If Influenza A/B and RSV PCR results are negative, testing for Parainfluenza virus, Adenovirus and Metapneumovirus is routinely performed for [facility] pediatric oncology and intensive care inpatients, and is available on other patients by placing an add-on request.  SARS-COV-2 PCR,  SYMPTOMATIC - Normal     Coronavirus 2019, PCR                                  Narrative: This assay has received FDA Emergency Use Authorization (EUA) and is only authorized for the duration of time that circumstances exist to justify the authorization of the emergency use of in vitro diagnostic tests for the detection of SARS-CoV-2 virus and/or diagnosis of COVID-19 infection under section 564(b)(1) of the Act, 21 U.S.C. 360bbb-3(b)(1). This assay is an in vitro diagnostic nucleic acid amplification test for the qualitative detection of SARS-CoV-2 from nasopharyngeal specimens and has been validated for use at [facility]. Negative results do not preclude COVID-19 infections and should not be used as the sole basis for diagnosis, treatment, or other management decisions.                  STOOL PATHOGEN PANEL, PCR  TYPE AND SCREEN     ABO TYPE                      O                      Rh TYPE                       POS                    ANTIBODY SCREEN               NEG                 LACTOFERRIN, FECAL, QUANTITATIVE     XR knee left 4+ views   Final Result    Stable and intact  left knee arthroplasty.          MACRO:    None          Signed by: Gerard Temple 1/28/2024 9:09 AM    Dictation workstation:   [id number]     XR foot left 3+ views   Final Result    1. No acute osseous abnormality.    2. Underlying osteopenia.    3. Severe degenerative changes of the talonavicular joint.    Signed by Hussein Agudelo MD     CT head W O contrast trauma protocol   Final Result          No depressed skull fracture. No acute intracranial bleed or focal    mass effect.          No CT evidence of cervical spine fracture in this exam. DJD in the    cervical spine as described.          Retention cyst along the floor of the left maxillary sinus.          Mild chronic white matter ischemic disease in the deep    periventricular regions.          MACRO:    None          Signed by: Gerard Temple 1/28/2024  9:02 AM    Dictation workstation:   [id number]     CT cervical spine wo IV contrast   Final Result          No depressed skull fracture. No acute intracranial bleed or focal    mass effect.          No CT evidence of cervical spine fracture in this exam. DJD in the    cervical spine as described.          Retention cyst along the floor of the left maxillary sinus.          Mild chronic white matter ischemic disease in the deep    periventricular regions.          MACRO:    None          Signed by: Gerard Temple 1/28/2024 9:02 AM    Dictation workstation:   [id number]     CT thoracic spine wo IV contrast   Final Result    1. No CT evidence for traumatic injury.    2. Moderate to severe coronary artery calcifications.    3. Small hiatal hernia.    4. 1 to 2 mm nonobstructing right renal calculus without ureteral    calculus or hydronephrosis.    5. Colonic diverticulosis without findings of diverticulitis.    6. Multilevel degenerative change of the lumbar spine.    Signed by Hussein Agudelo MD     CT lumbar spine wo IV contrast   Final Result    1. No CT evidence for traumatic injury.    2. Moderate to severe coronary artery calcifications.    3. Small hiatal hernia.    4. 1 to 2 mm nonobstructing right renal calculus without ureteral    calculus or hydronephrosis.    5. Colonic diverticulosis without findings of diverticulitis.    6. Multilevel degenerative change of the lumbar spine.    Signed by Hussein Agudelo MD     CT chest abdomen pelvis wo IV contrast   Final Result    1. No CT evidence for traumatic injury.    2. Moderate to severe coronary artery calcifications.    3. Small hiatal hernia.    4. 1 to 2 mm nonobstructing right renal calculus without ureteral    calculus or hydronephrosis.    5. Colonic diverticulosis without findings of diverticulitis.    6. Multilevel degenerative change of the lumbar spine.    Signed by Hussein Agudelo MD                                Belinda Coma Scale Score: 15                   Patient History   Past Medical History:   Diagnosis Date    Arrhythmia     Asthma     Cancer (CMS/HCC)     Coronary artery disease     Diabetes mellitus (CMS/HCC)     Disease of thyroid gland     Hyperlipidemia     Hypertension     Occipital neuralgia 06/10/2022    Occipital neuralgia of right side    Personal history of malignant neoplasm, unspecified 10/04/2021    History of malignant neoplasm    Personal history of other diseases of the circulatory system     History of hypertension    Personal history of other diseases of the circulatory system 10/04/2021    History of essential hypertension    Personal history of other diseases of the circulatory system     History of coronary artery disease    Personal history of other diseases of the digestive system 03/11/2020    History of chronic constipation    Personal history of other diseases of the musculoskeletal system and connective tissue     History of arthritis    Personal history of other diseases of the nervous system and sense organs 10/04/2021    History of eye problem    Personal history of other diseases of the respiratory system     History of asthma    Personal history of other specified conditions 10/04/2021    History of chest pain     Past Surgical History:   Procedure Laterality Date    APPENDECTOMY  08/13/2015    Appendectomy    BREAST LUMPECTOMY  08/13/2015    Breast Surgery Lumpectomy    CARPAL TUNNEL RELEASE  08/13/2015    Neuroplasty Decompression Median Nerve At Carpal Tunnel    GALLBLADDER SURGERY  08/13/2015    Gallbladder Surgery    HYSTERECTOMY  08/13/2015    Hysterectomy    KNEE ARTHROSCOPY W/ MENISCAL REPAIR  08/13/2015    Knee Arthroscopy With Medial Meniscus Repair    KNEE SURGERY  08/13/2015    Knee Surgery    MR HEAD ANGIO WO IV CONTRAST  9/5/2021    MR HEAD ANGIO WO IV CONTRAST 9/5/2021 [facility]    OTHER SURGICAL HISTORY  08/13/2015    Shoulder Surgery Left    OTHER SURGICAL HISTORY  08/13/2015    Repair Of Bladder  Reconstruction    OTHER SURGICAL HISTORY  09/20/2021    Breast surgery    OTHER SURGICAL HISTORY  09/20/2021    Bladder surgery    OTHER SURGICAL HISTORY  09/20/2021    Complete colonoscopy    OTHER SURGICAL HISTORY  09/20/2021    Shoulder surgery    OTHER SURGICAL HISTORY  09/20/2021    Foot surgery    OTHER SURGICAL HISTORY  09/20/2021    Percutaneous transluminal coronary angioplasty    OTHER SURGICAL HISTORY  10/11/2021    Thyroidectomy total    TOTAL KNEE ARTHROPLASTY  08/13/2015    Knee Replacement     Family History   Problem Relation Name Age of Onset    Lung cancer Mother      Lymphoma Mother      Bone cancer Mother      Heart attack Father      Liver cancer Brother      Lung cancer Brother       Social History     Tobacco Use    Smoking status: Never    Smokeless tobacco: Never   Vaping Use    Vaping Use: Never used   Substance Use Topics    Alcohol use: Not Currently    Drug use: Never       Physical Exam   ED Triage Vitals [01/28/24 0758]   Temperature Heart Rate Respirations BP   37 °C (98.6 °F) 78 16 121/61      Pulse Ox Temp Source Heart Rate Source Patient Position   96 % Temporal Monitor Sitting      BP Location FiO2 (%)     Right arm --       Physical Exam    ED Course & MDM   Diagnoses as of 01/28/24 0910   Syncope and collapse       Medical Decision Making      Procedure  Procedures     Emmanuelle Spann MD  01/28/24 1721

## 2024-01-29 ENCOUNTER — APPOINTMENT (OUTPATIENT)
Dept: CARDIOLOGY | Facility: HOSPITAL | Age: 74
End: 2024-01-29
Payer: MEDICARE

## 2024-01-29 ENCOUNTER — APPOINTMENT (OUTPATIENT)
Dept: RADIOLOGY | Facility: HOSPITAL | Age: 74
End: 2024-01-29
Payer: MEDICARE

## 2024-01-29 LAB
ATRIAL RATE: 72 BPM
GLUCOSE BLD MANUAL STRIP-MCNC: 103 MG/DL (ref 74–99)
GLUCOSE BLD MANUAL STRIP-MCNC: 103 MG/DL (ref 74–99)
GLUCOSE BLD MANUAL STRIP-MCNC: 158 MG/DL (ref 74–99)
GLUCOSE BLD MANUAL STRIP-MCNC: 90 MG/DL (ref 74–99)
P AXIS: 84 DEGREES
P OFFSET: 158 MS
P ONSET: 121 MS
PR INTERVAL: 186 MS
Q ONSET: 214 MS
QRS COUNT: 11 BEATS
QRS DURATION: 80 MS
QT INTERVAL: 408 MS
QTC CALCULATION(BAZETT): 446 MS
QTC FREDERICIA: 433 MS
R AXIS: -31 DEGREES
T AXIS: 65 DEGREES
T OFFSET: 418 MS
T4 FREE SERPL-MCNC: 1.54 NG/DL (ref 0.61–1.12)
TSH SERPL-ACNC: 0.04 MIU/L (ref 0.44–3.98)
VENTRICULAR RATE: 72 BPM

## 2024-01-29 PROCEDURE — G0378 HOSPITAL OBSERVATION PER HR: HCPCS

## 2024-01-29 PROCEDURE — 2500000004 HC RX 250 GENERAL PHARMACY W/ HCPCS (ALT 636 FOR OP/ED): Performed by: INTERNAL MEDICINE

## 2024-01-29 PROCEDURE — 2500000001 HC RX 250 WO HCPCS SELF ADMINISTERED DRUGS (ALT 637 FOR MEDICARE OP): Performed by: INTERNAL MEDICINE

## 2024-01-29 PROCEDURE — 97165 OT EVAL LOW COMPLEX 30 MIN: CPT | Mod: GO

## 2024-01-29 PROCEDURE — 99232 SBSQ HOSP IP/OBS MODERATE 35: CPT | Performed by: INTERNAL MEDICINE

## 2024-01-29 PROCEDURE — 82947 ASSAY GLUCOSE BLOOD QUANT: CPT | Mod: 91

## 2024-01-29 PROCEDURE — 93005 ELECTROCARDIOGRAM TRACING: CPT

## 2024-01-29 RX ORDER — DULOXETIN HYDROCHLORIDE 30 MG/1
60 CAPSULE, DELAYED RELEASE ORAL DAILY
Status: DISCONTINUED | OUTPATIENT
Start: 2024-01-29 | End: 2024-02-02 | Stop reason: HOSPADM

## 2024-01-29 RX ADMIN — LEVOTHYROXINE SODIUM 112 MCG: 112 TABLET ORAL at 06:44

## 2024-01-29 RX ADMIN — APIXABAN 5 MG: 5 TABLET, FILM COATED ORAL at 08:17

## 2024-01-29 RX ADMIN — METOPROLOL TARTRATE 25 MG: 25 TABLET, FILM COATED ORAL at 22:03

## 2024-01-29 RX ADMIN — TIOTROPIUM BROMIDE INHALATION SPRAY 2 PUFF: 3.12 SPRAY, METERED RESPIRATORY (INHALATION) at 11:18

## 2024-01-29 RX ADMIN — APIXABAN 5 MG: 5 TABLET, FILM COATED ORAL at 22:03

## 2024-01-29 RX ADMIN — SODIUM CHLORIDE, POTASSIUM CHLORIDE, SODIUM LACTATE AND CALCIUM CHLORIDE 50 ML/HR: 600; 310; 30; 20 INJECTION, SOLUTION INTRAVENOUS at 08:24

## 2024-01-29 RX ADMIN — FLUTICASONE FUROATE AND VILANTEROL TRIFENATATE 1 PUFF: 100; 25 POWDER RESPIRATORY (INHALATION) at 21:00

## 2024-01-29 RX ADMIN — METOPROLOL TARTRATE 25 MG: 25 TABLET, FILM COATED ORAL at 08:17

## 2024-01-29 RX ADMIN — DULOXETINE HYDROCHLORIDE 60 MG: 30 CAPSULE, DELAYED RELEASE ORAL at 12:49

## 2024-01-29 RX ADMIN — SENNOSIDES 17.2 MG: 8.6 TABLET, FILM COATED ORAL at 22:03

## 2024-01-29 ASSESSMENT — COGNITIVE AND FUNCTIONAL STATUS - GENERAL
DAILY ACTIVITIY SCORE: 18
CLIMB 3 TO 5 STEPS WITH RAILING: A LOT
HELP NEEDED FOR BATHING: A LOT
HELP NEEDED FOR BATHING: A LOT
DRESSING REGULAR UPPER BODY CLOTHING: A LITTLE
DRESSING REGULAR LOWER BODY CLOTHING: A LOT
TOILETING: A LOT
MOBILITY SCORE: 18
STANDING UP FROM CHAIR USING ARMS: A LITTLE
DRESSING REGULAR LOWER BODY CLOTHING: A LOT
MOBILITY SCORE: 24
WALKING IN HOSPITAL ROOM: A LOT
PERSONAL GROOMING: A LOT
MOVING TO AND FROM BED TO CHAIR: A LITTLE
DRESSING REGULAR UPPER BODY CLOTHING: A LITTLE
DAILY ACTIVITIY SCORE: 24
TOILETING: A LITTLE
DAILY ACTIVITIY SCORE: 15

## 2024-01-29 ASSESSMENT — ENCOUNTER SYMPTOMS
FEVER: 0
SHORTNESS OF BREATH: 0
ARTHRALGIAS: 1
ALLERGIC/IMMUNOLOGIC NEGATIVE: 1
CONSTITUTIONAL NEGATIVE: 1
WEAKNESS: 1
ENDOCRINE NEGATIVE: 1
ACTIVITY CHANGE: 0
CHEST TIGHTNESS: 0
ABDOMINAL PAIN: 0
EYES NEGATIVE: 1
CARDIOVASCULAR NEGATIVE: 1
HEMATOLOGIC/LYMPHATIC NEGATIVE: 1
JOINT SWELLING: 1
GASTROINTESTINAL NEGATIVE: 1
PSYCHIATRIC NEGATIVE: 1

## 2024-01-29 ASSESSMENT — PAIN - FUNCTIONAL ASSESSMENT
PAIN_FUNCTIONAL_ASSESSMENT: 0-10

## 2024-01-29 ASSESSMENT — PAIN SCALES - GENERAL
PAINLEVEL_OUTOF10: 0 - NO PAIN
PAINLEVEL_OUTOF10: 10 - WORST POSSIBLE PAIN
PAINLEVEL_OUTOF10: 0 - NO PAIN

## 2024-01-29 ASSESSMENT — ACTIVITIES OF DAILY LIVING (ADL): BATHING_ASSISTANCE: MODERATE

## 2024-01-29 NOTE — PROGRESS NOTES
Myrna Rodrigues is a 73 y.o. female on day 0 of admission presenting with Syncope and collapse.      Subjective   No overnight events. C/o L knee and toe pain today. Made it difficult for her to stand yesterday. No dizziness/lightheadedness. No chest pain, dyspnea, palpitations.       Objective     Last Recorded Vitals  /68 (BP Location: Right arm, Patient Position: Lying)   Pulse 66   Temp 36.5 °C (97.7 °F) (Temporal)   Resp 16   Wt 68 kg (150 lb)   SpO2 91%   Intake/Output last 3 Shifts:    Intake/Output Summary (Last 24 hours) at 1/29/2024 1003  Last data filed at 1/29/2024 0824  Gross per 24 hour   Intake 911 ml   Output --   Net 911 ml       Admission Weight  Weight: 68 kg (150 lb) (01/28/24 0758)    Daily Weight  01/28/24 : 68 kg (150 lb)    Image Results  ECG 12 Lead  Normal sinus rhythm  Left axis deviation  Abnormal ECG  When compared with ECG of 28-JAN-2024 09:47, (unconfirmed)  QT has shortened      Constitutional:       Appearance: Normal appearance.   HENT:      Head: Normocephalic and atraumatic.      Mouth/Throat:      Mouth: Mucous membranes are moist.      Pharynx: Oropharynx is clear.   Eyes:      Extraocular Movements: Extraocular movements intact.      Pupils: Pupils are equal, round, and reactive to light.   Cardiovascular:      Rate and Rhythm: Normal rate and regular rhythm.      Heart sounds: Murmur heard.   Pulmonary:      Effort: Pulmonary effort is normal. No respiratory distress.      Breath sounds: Normal breath sounds. No wheezing.   Abdominal:      General: Abdomen is flat. Bowel sounds are normal.      Palpations: Abdomen is soft.   Musculoskeletal:         General: No swelling. Normal range of motion.      Cervical back: No rigidity or tenderness.   Skin:     General: Skin is warm and dry.   Neurological:      General: No focal deficit present.      Mental Status: She is alert and oriented to person, place, and time.      Cranial Nerves: No cranial nerve deficit.      Motor: No  weakness.   Psychiatric:         Mood and Affect: Mood normal.         Behavior: Behavior normal.     Relevant Results               Assessment/Plan      Principal Problem:    Syncope and collapse    Syncope  -unclear etiology; does report history of diarrhea and son reports poor PO intake; orthostasis a possibility  -also recent DCCV and afib; previous zio patch showed a short run of non-sustained VT  -last echo 1/18/24 with preserved EF, normal relaxation pattern, mild MR/TR/AR, no need to repeat now  -cont tele  -check orthostatics  -IVF     Paroxysmal atrial fibrillation  -s/p DCCV 1/3/24  -cont eliquis, metoprolol     Diarrhea  -chronic for past 5-6 months  -unclear etiology, doubt C. Diff at this point given chronicity  -will check some stool studies; consider imaging at some point     CAD s/p PCI  -cont home meds  -no ongoing chest pain     COPD, not in exacerbation  -cont home inhalers  -PRN bronchodilators     DM2  -correctional insulin scale     HTN  HLD  -resume home medications    1/29/24:  Stable today  Unable to get orthostatics as she could not stand due to knee pain  No fracture or other acute issue on knee XR; if pain not improving will consider CT  PT/OT - may need SNF  As for syncope, no recurrent symptoms. No events on telemetry. At this point suspect dehydration/orthostasis but will see how she does when getting up more        VTE Prophylaxis: rico Blackwood MD

## 2024-01-29 NOTE — PROGRESS NOTES
Physical Therapy                 Therapy Communication Note    Patient Name: Myrna Rodrigues  MRN: [id number]  Today's Date: 1/29/2024 1312    Discipline: Physical Therapy    Missed Visit Reason: Missed Visit Reason:  (Initiated PT evaluation. Patient having quite a bit of pain with ROM. (-) SLR, note questionable quad tendon deficit. Spoke with Dr. Blackwood with recommendation for ortho consult)

## 2024-01-29 NOTE — PROGRESS NOTES
Met w/ pt. Confirmed demo's isn and pcp. Pt lives alone. Is unable ot bear wt on left LE d/t knee pain. Pt to have an MRI today. Pt had 2 falls pta. Previously was independent in adls and iadls. Drove. Has hx of R&L TKA.

## 2024-01-29 NOTE — PROGRESS NOTES
Occupational Therapy    Evaluation    Patient Name: Myrna Rodrigues  MRN: [id number]  Today's Date: 1/29/2024  Time Calculation  Start Time: 1011  Stop Time: 1025  Time Calculation (min): 14 min        Assessment:  OT Assessment: Decreased strength, endurance, balance ; extremly limited by pain in LLE  Barriers to Discharge: Decreased caregiver support  End of Session Communication: Bedside nurse  End of Session Patient Position: Bed, 3 rail up, Alarm on  OT Assessment Results: Decreased ADL status, Decreased endurance, Decreased functional mobility, Decreased IADLs  Barriers to Discharge: Decreased caregiver support  Strengths: Ability to acquire knowledge, Attitude of self  Plan:  Treatment Interventions: ADL retraining, Endurance training, Functional transfer training  OT Frequency: 2 times per week  OT Discharge Recommendations: Moderate intensity level of continued care  OT Recommended Transfer Status: Maximum assist  OT - OK to Discharge: Yes (when medically stable.)    Subjective   Current Problem:  1. Syncope and collapse          General:  General  Reason for Referral: ADL impairment  Referred By: Jaison OT 1/29  Past Medical History Relevant to Rehab: B TKA, DM, A-fib, CAD, COPD, HTN, HLD  Family/Caregiver Present: No  Patient Position Received: Bed, 3 rail up, Alarm on  General Comment: Pt. is 72 y/o female to ED with c/o of L knee pain. Had two falls day prior to admit, pt. believes she may have passed out. Additional c/o of chornic diarrhea x5-6 months. XR L knee: stable hardware, (-) acute findings, XR L foot: severe degenerative changes, CT head/Cspine: white matter ischemic disease, CT chest: colonic dierticulosis  Precautions:  Medical Precautions: Fall precautions    Pain:  Pain Assessment  Pain Assessment: 0-10  Pain Score: 10 - Worst possible pain  Pain Type: Acute pain  Pain Location: Knee  Pain Orientation: Left  Pain Descriptors:  (when bending or moving L knee)    Objective    Cognition:  Overall Cognitive Status: Within Functional Limits     Home Living:  Home Living Comments: Pt. lives alone with her dog. Has 1 story house, 1 small step to enter. Tub shower, seat available, but doesn't use. No grab bars.  Prior Function:  Prior Function Comments: Independent with all ADLs/IADLs, No AD use at baseline, but owns cane and WW from previous knee replacements. No other falls in last three months other than 2 PTA. Drives.  IADL History:  IADL Comments: Independent  ADL:  Eating Assistance: Independent  Grooming Assistance: Maximal  Bathing Assistance: Moderate  UE Dressing Assistance:  (set up ; seated)  LE Dressing Assistance: Maximal  Toileting Assistance with Device: Maximal  ADL Comments: Extremely limited by pain  Activity Tolerance:  Endurance: Decreased tolerance for upright activites  Activity Tolerance Comments: 10/10 pain when bending or moving LLE  Bed Mobility/Transfers: Bed Mobility  Bed Mobility: Yes  Bed Mobility 1  Bed Mobility 1: Supine to sitting  Level of Assistance 1: Moderate assistance  Bed Mobility Comments 1: Assist to bring LLE to edge of bed and bring trunk to upright sitting  Bed Mobility 2  Bed Mobility  2: Sitting to supine  Level of Assistance 2: Moderate assistance  Bed Mobility Comments 2: x1 to bring LEs into bed. x2 to boost pt. to head of bed.    Transfers  Transfer: Yes  Transfer 1  Technique 1: Sit to stand, Stand to sit  Transfer Device 1:  (No AD; HHA)  Transfer Level of Assistance 1: Maximum assistance  Trials/Comments 1: x1 ; VCs for safe hand placement. Assist to lift, steady, balance; pt. unable to demonstrate upright posture and weight shifting over LEs due to pain in LLE. Pt. keeps LLE extended out in front      Ambulation/Gait Training:  Ambulation/Gait Training  Ambulation/Gait Training Performed: No (Pt. unable to take steps due to pain in LLE)  Sitting Balance:  Static Sitting Balance  Static Sitting-Level of Assistance: Close  supervision  Standing Balance:  Static Standing Balance  Static Standing-Level of Assistance: Maximum assistance  Dynamic Standing Balance  Dynamic Standing-Comments: unable     Vision:Vision - Basic Assessment  Current Vision: Wears glasses all the time    Strength:  Strength Comments: BUEs 5/5 MMT. RLE WNL MMT. DNT LLE    Coordination:  Coordination Comment: Functional   Hand Function:  Gross Grasp: Functional  Extremities:   RUE : Within Functional Limits  LUE: Within Functional Limits  RLE : Within Functional Limits  LLE :  (NT due to pain)    Outcome Measures:[facility] Daily Activity  Putting on and taking off regular lower body clothing: A lot  Bathing (including washing, rinsing, drying): A lot  Putting on and taking off regular upper body clothing: A little  Toileting, which includes using toilet, bedpan or urinal: A lot  Taking care of personal grooming such as brushing teeth: A lot  Eating Meals: None  Daily Activity - Total Score: 15      Education Documentation  ADL Training, taught by Lauren Isaacs, OT at 1/29/2024 10:45 AM.  Learner: Patient  Readiness: Eager  Method: Explanation  Response: Verbalizes Understanding, Needs Reinforcement      IP EDUCATION:  Education  Individual(s) Educated: Patient  Education Provided: POC discussed and agreed upon  Patient Response to Education: Patient/Caregiver Verbalized Understanding of Information    Goals:  Encounter Problems       Encounter Problems (Active)       OT Goals       CGA for all functional transfers  (Progressing)       Start:  01/29/24    Expected End:  02/12/24            CGA for functional mobillity household distances for ADL/IADL participation  (Progressing)       Start:  01/29/24    Expected End:  02/12/24            Fair + dyn standing balance during ADLs/transfers  (Progressing)       Start:  01/29/24    Expected End:  02/12/24            CGA for LB dressing; AE as needed  (Progressing)       Start:  01/29/24    Expected End:   02/12/24

## 2024-01-29 NOTE — CARE PLAN
The patient's goals for the shift include      The clinical goals for the shift include remain free from injury by end of shift.

## 2024-01-29 NOTE — CONSULTS
Inpatient consult to Orthopaedic Surgery  Consult performed by: Real Flynn, APRN-CNP  Consult ordered by: Eriberto Blackwood MD  Reason for consult: Left knee pain          Consult Note  Patient: Myrna Rodrigues  Unit/Bed: 907/907-A  YOB: 1950  MRN: [id number]  Acct: [id number]   Admitting Diagnosis: Syncope and collapse [R55]  Date:  1/28/2024  Hospital Day: 0    Complaint:  Left knee pain     History of Present Illness:  Myrna Rodrigues is a 73 year old female patient per chart review with history of arrhythmia, asthma, cancer, CAD, HLD, HTN, who presented to [facility] emergency room after she had syncopal episode at home causing her to fall. She reports falling onto her left knee which immediately caused significant pain. She attempted to get up however the left leg gave out and she fell as second time. Plain films of the left knee do not show any fracture however patient is unable to bear any weight secondary to pain in which orthopedics was consulted for further evaluation and treatment recommendations.         PMHx:  Past Medical History:   Diagnosis Date    Arrhythmia     Asthma     Cancer (CMS/HCC)     Coronary artery disease     Diabetes mellitus (CMS/HCC)     Disease of thyroid gland     Hyperlipidemia     Hypertension     Occipital neuralgia 06/10/2022    Occipital neuralgia of right side    Personal history of malignant neoplasm, unspecified 10/04/2021    History of malignant neoplasm    Personal history of other diseases of the circulatory system     History of hypertension    Personal history of other diseases of the circulatory system 10/04/2021    History of essential hypertension    Personal history of other diseases of the circulatory system     History of coronary artery disease    Personal history of other diseases of the digestive system 03/11/2020    History of chronic constipation    Personal history of other diseases of the musculoskeletal system and connective tissue     History of  arthritis    Personal history of other diseases of the nervous system and sense organs 10/04/2021    History of eye problem    Personal history of other diseases of the respiratory system     History of asthma    Personal history of other specified conditions 10/04/2021    History of chest pain       PSHx:  Past Surgical History:   Procedure Laterality Date    APPENDECTOMY  08/13/2015    Appendectomy    BREAST LUMPECTOMY  08/13/2015    Breast Surgery Lumpectomy    CARPAL TUNNEL RELEASE  08/13/2015    Neuroplasty Decompression Median Nerve At Carpal Tunnel    GALLBLADDER SURGERY  08/13/2015    Gallbladder Surgery    HYSTERECTOMY  08/13/2015    Hysterectomy    KNEE ARTHROSCOPY W/ MENISCAL REPAIR  08/13/2015    Knee Arthroscopy With Medial Meniscus Repair    KNEE SURGERY  08/13/2015    Knee Surgery    MR HEAD ANGIO WO IV CONTRAST  9/5/2021    MR HEAD ANGIO WO IV CONTRAST 9/5/2021 [facility]    OTHER SURGICAL HISTORY  08/13/2015    Shoulder Surgery Left    OTHER SURGICAL HISTORY  08/13/2015    Repair Of Bladder Reconstruction    OTHER SURGICAL HISTORY  09/20/2021    Breast surgery    OTHER SURGICAL HISTORY  09/20/2021    Bladder surgery    OTHER SURGICAL HISTORY  09/20/2021    Complete colonoscopy    OTHER SURGICAL HISTORY  09/20/2021    Shoulder surgery    OTHER SURGICAL HISTORY  09/20/2021    Foot surgery    OTHER SURGICAL HISTORY  09/20/2021    Percutaneous transluminal coronary angioplasty    OTHER SURGICAL HISTORY  10/11/2021    Thyroidectomy total    TOTAL KNEE ARTHROPLASTY  08/13/2015    Knee Replacement       Social Hx:  Social History     Socioeconomic History    Marital status:      Spouse name: None    Number of children: None    Years of education: None    Highest education level: None   Occupational History    None   Tobacco Use    Smoking status: Never    Smokeless tobacco: Never   Vaping Use    Vaping Use: Never used   Substance and Sexual Activity    Alcohol use: Not Currently    Drug  use: Never    Sexual activity: Defer   Other Topics Concern    None   Social History Narrative    None     Social Determinants of Health     Financial Resource Strain: Low Risk  (1/28/2024)    Overall Financial Resource Strain (CARDIA)     Difficulty of Paying Living Expenses: Not hard at all   Food Insecurity: Not on file   Transportation Needs: No Transportation Needs (1/28/2024)    PRAPARE - Transportation     Lack of Transportation (Medical): No     Lack of Transportation (Non-Medical): No   Physical Activity: Not on file   Stress: Not on file   Social Connections: Not on file   Intimate Partner Violence: Not on file   Housing Stability: Low Risk  (1/28/2024)    Housing Stability Vital Sign     Unable to Pay for Housing in the Last Year: No     Number of Places Lived in the Last Year: 1     Unstable Housing in the Last Year: No       Family Hx:  Family History   Problem Relation Name Age of Onset    Lung cancer Mother      Lymphoma Mother      Bone cancer Mother      Heart attack Father      Liver cancer Brother      Lung cancer Brother         Review of Systems:   Review of Systems   Constitutional: Negative.  Negative for activity change and fever.   HENT: Negative.     Eyes: Negative.    Respiratory:  Negative for chest tightness and shortness of breath.    Cardiovascular: Negative.  Negative for chest pain.   Gastrointestinal: Negative.  Negative for abdominal pain.   Endocrine: Negative.    Genitourinary: Negative.    Musculoskeletal:  Positive for arthralgias, gait problem and joint swelling.   Skin: Negative.    Allergic/Immunologic: Negative.    Neurological:  Positive for syncope and weakness.   Hematological: Negative.    Psychiatric/Behavioral: Negative.     All other systems reviewed and are negative.        Physical Examination:    Visit Vitals  /68 (BP Location: Right arm, Patient Position: Lying)   Pulse 66   Temp 36.5 °C (97.7 °F) (Temporal)   Resp 16      Physical Exam  Vitals and nursing  note reviewed.   Constitutional:       General: She is not in acute distress.     Appearance: Normal appearance.   HENT:      Head: Normocephalic.      Nose: Nose normal.      Mouth/Throat:      Mouth: Mucous membranes are moist.   Eyes:      Extraocular Movements: Extraocular movements intact.      Pupils: Pupils are equal, round, and reactive to light.   Cardiovascular:      Rate and Rhythm: Normal rate and regular rhythm.      Pulses: Normal pulses.      Heart sounds: Normal heart sounds.   Pulmonary:      Effort: Pulmonary effort is normal.      Breath sounds: Normal breath sounds.   Abdominal:      General: Bowel sounds are normal.      Palpations: Abdomen is soft.   Musculoskeletal:         General: Swelling and tenderness present.      Cervical back: Normal range of motion. No rigidity or tenderness.   Skin:     General: Skin is warm.      Capillary Refill: Capillary refill takes less than 2 seconds.   Neurological:      General: No focal deficit present.      Mental Status: She is alert and oriented to person, place, and time.   Psychiatric:         Mood and Affect: Mood normal.         LABS:  CBC:   Lab Results   Component Value Date    WBC 13.3 (H) 01/28/2024    RBC 4.98 01/28/2024    HGB 11.5 (L) 01/28/2024    HCT 38.6 01/28/2024    MCV 78 (L) 01/28/2024    MCH 23.1 (L) 01/28/2024    MCHC 29.8 (L) 01/28/2024    RDW 17.9 (H) 01/28/2024     01/28/2024     CBC with Differential:    Lab Results   Component Value Date    WBC 13.3 (H) 01/28/2024    RBC 4.98 01/28/2024    HGB 11.5 (L) 01/28/2024    HCT 38.6 01/28/2024     01/28/2024    MCV 78 (L) 01/28/2024    MCH 23.1 (L) 01/28/2024    MCHC 29.8 (L) 01/28/2024    RDW 17.9 (H) 01/28/2024    NRBC 0.0 01/28/2024    LYMPHOPCT 13.7 01/28/2024    MONOPCT 6.7 01/28/2024    EOSPCT 2.0 01/28/2024    BASOPCT 0.2 01/28/2024    MONOSABS 0.89 (H) 01/28/2024    LYMPHSABS 1.82 01/28/2024    EOSABS 0.27 01/28/2024    BASOSABS 0.03 01/28/2024     CMP:    Lab  "Results   Component Value Date     01/28/2024    K 3.8 01/28/2024     01/28/2024    CO2 30 01/28/2024    BUN 12 01/28/2024    CREATININE 0.94 01/28/2024    GLUCOSE 134 (H) 01/28/2024    PROT 7.3 01/28/2024    CALCIUM 9.2 01/28/2024    BILITOT 0.5 01/28/2024    ALKPHOS 69 01/28/2024    AST 12 01/28/2024    ALT 8 01/28/2024     BMP:    Lab Results   Component Value Date     01/28/2024    K 3.8 01/28/2024     01/28/2024    CO2 30 01/28/2024    BUN 12 01/28/2024    CREATININE 0.94 01/28/2024    CALCIUM 9.2 01/28/2024    GLUCOSE 134 (H) 01/28/2024     Magnesium:No results found for: \"MG\"  Troponin:  No results found for: \"TROPONINI\"        Assessment:    [unfilled]  73 year old female patient admitted to hospital for syncope and collapse with left knee pain from fall. She reports to have fallen onto her left knee when she had her syncopal episode upon attempting to get back up she fell again noting significant pain to left knee and buckling.     She endorses pain about the left knee any movement. She does not have much pain with palpation. There is no effusion appreciated. She is unable to perform SLR and log rolling of the left lower extremity causes pain in her knee. She does not have any hip or groin.     Plain films reviewed, she has a previous total knee arthroplasty done by Dr. Alfredo Mccarthy ten plus years ago. There is no acute fracture noted on imaging. Her exam is concerning for possible quad tendon tear with her inability to perform SLR. Will obtain MRI left knee to rule out occult fracture and possible quad tendon tear.     She should remain NWB to left lower extremity until imaging is completed and reviewed and be placed into a knee immobilizer.       Thank you for this consultation and allowing us to be a part of this patient care.       Plan:  MRI left knee for evaluation of possible quad tendon tear  NWB  and no ROM to left lower extremity until imaging completed and " reviewed  Knee immobilizer to left knee at all times with the exception of skin care and hygiene.   Pain control per primary team             Electronically signed by FRED Hong on 1/29/2024 at 1:37 PM

## 2024-01-30 ENCOUNTER — APPOINTMENT (OUTPATIENT)
Dept: RADIOLOGY | Facility: HOSPITAL | Age: 74
End: 2024-01-30
Payer: MEDICARE

## 2024-01-30 ENCOUNTER — DOCUMENTATION (OUTPATIENT)
Dept: PRIMARY CARE | Facility: CLINIC | Age: 74
End: 2024-01-30
Payer: MEDICARE

## 2024-01-30 LAB
ANION GAP SERPL CALC-SCNC: 8 MMOL/L (ref 10–20)
ATRIAL RATE: 72 BPM
BASOPHILS # BLD AUTO: 0.02 X10*3/UL (ref 0–0.1)
BASOPHILS NFR BLD AUTO: 0.2 %
BUN SERPL-MCNC: 10 MG/DL (ref 6–23)
CALCIUM SERPL-MCNC: 8.2 MG/DL (ref 8.6–10.3)
CHLORIDE SERPL-SCNC: 103 MMOL/L (ref 98–107)
CO2 SERPL-SCNC: 30 MMOL/L (ref 21–32)
CREAT SERPL-MCNC: 0.83 MG/DL (ref 0.5–1.05)
EGFRCR SERPLBLD CKD-EPI 2021: 74 ML/MIN/1.73M*2
EOSINOPHIL # BLD AUTO: 0.46 X10*3/UL (ref 0–0.4)
EOSINOPHIL NFR BLD AUTO: 5 %
ERYTHROCYTE [DISTWIDTH] IN BLOOD BY AUTOMATED COUNT: 17.4 % (ref 11.5–14.5)
GLUCOSE BLD MANUAL STRIP-MCNC: 109 MG/DL (ref 74–99)
GLUCOSE BLD MANUAL STRIP-MCNC: 112 MG/DL (ref 74–99)
GLUCOSE BLD MANUAL STRIP-MCNC: 115 MG/DL (ref 74–99)
GLUCOSE BLD MANUAL STRIP-MCNC: 97 MG/DL (ref 74–99)
GLUCOSE SERPL-MCNC: 109 MG/DL (ref 74–99)
HCT VFR BLD AUTO: 30.7 % (ref 36–46)
HGB BLD-MCNC: 9.4 G/DL (ref 12–16)
HOLD SPECIMEN: NORMAL
IMM GRANULOCYTES # BLD AUTO: 0.04 X10*3/UL (ref 0–0.5)
IMM GRANULOCYTES NFR BLD AUTO: 0.4 % (ref 0–0.9)
LYMPHOCYTES # BLD AUTO: 2.14 X10*3/UL (ref 0.8–3)
LYMPHOCYTES NFR BLD AUTO: 23.1 %
MAGNESIUM SERPL-MCNC: 1.74 MG/DL (ref 1.6–2.4)
MCH RBC QN AUTO: 23.3 PG (ref 26–34)
MCHC RBC AUTO-ENTMCNC: 30.6 G/DL (ref 32–36)
MCV RBC AUTO: 76 FL (ref 80–100)
MONOCYTES # BLD AUTO: 0.94 X10*3/UL (ref 0.05–0.8)
MONOCYTES NFR BLD AUTO: 10.1 %
NEUTROPHILS # BLD AUTO: 5.67 X10*3/UL (ref 1.6–5.5)
NEUTROPHILS NFR BLD AUTO: 61.2 %
NRBC BLD-RTO: 0 /100 WBCS (ref 0–0)
P OFFSET: 186 MS
P ONSET: 134 MS
PLATELET # BLD AUTO: 264 X10*3/UL (ref 150–450)
POTASSIUM SERPL-SCNC: 4 MMOL/L (ref 3.5–5.3)
PR INTERVAL: 148 MS
Q ONSET: 208 MS
QRS COUNT: 12 BEATS
QRS DURATION: 94 MS
QT INTERVAL: 472 MS
QTC CALCULATION(BAZETT): 516 MS
QTC FREDERICIA: 501 MS
R AXIS: 22 DEGREES
RBC # BLD AUTO: 4.03 X10*6/UL (ref 4–5.2)
SODIUM SERPL-SCNC: 137 MMOL/L (ref 136–145)
T AXIS: 38 DEGREES
T OFFSET: 444 MS
VENTRICULAR RATE: 72 BPM
WBC # BLD AUTO: 9.3 X10*3/UL (ref 4.4–11.3)

## 2024-01-30 PROCEDURE — 83735 ASSAY OF MAGNESIUM: CPT | Performed by: INTERNAL MEDICINE

## 2024-01-30 PROCEDURE — 82947 ASSAY GLUCOSE BLOOD QUANT: CPT | Mod: 91

## 2024-01-30 PROCEDURE — G0378 HOSPITAL OBSERVATION PER HR: HCPCS

## 2024-01-30 PROCEDURE — 36415 COLL VENOUS BLD VENIPUNCTURE: CPT | Performed by: INTERNAL MEDICINE

## 2024-01-30 PROCEDURE — 73721 MRI JNT OF LWR EXTRE W/O DYE: CPT | Mod: LEFT SIDE | Performed by: RADIOLOGY

## 2024-01-30 PROCEDURE — 82947 ASSAY GLUCOSE BLOOD QUANT: CPT | Mod: 59

## 2024-01-30 PROCEDURE — 73721 MRI JNT OF LWR EXTRE W/O DYE: CPT | Mod: LT

## 2024-01-30 PROCEDURE — 99232 SBSQ HOSP IP/OBS MODERATE 35: CPT | Performed by: STUDENT IN AN ORGANIZED HEALTH CARE EDUCATION/TRAINING PROGRAM

## 2024-01-30 PROCEDURE — 73700 CT LOWER EXTREMITY W/O DYE: CPT | Mod: LT

## 2024-01-30 PROCEDURE — 80048 BASIC METABOLIC PNL TOTAL CA: CPT | Performed by: INTERNAL MEDICINE

## 2024-01-30 PROCEDURE — 73700 CT LOWER EXTREMITY W/O DYE: CPT | Mod: LEFT SIDE | Performed by: RADIOLOGY

## 2024-01-30 PROCEDURE — 2500000001 HC RX 250 WO HCPCS SELF ADMINISTERED DRUGS (ALT 637 FOR MEDICARE OP): Performed by: NURSE PRACTITIONER

## 2024-01-30 PROCEDURE — 2500000001 HC RX 250 WO HCPCS SELF ADMINISTERED DRUGS (ALT 637 FOR MEDICARE OP): Performed by: INTERNAL MEDICINE

## 2024-01-30 PROCEDURE — 85025 COMPLETE CBC W/AUTO DIFF WBC: CPT | Performed by: INTERNAL MEDICINE

## 2024-01-30 RX ORDER — TRAZODONE HYDROCHLORIDE 50 MG/1
100 TABLET ORAL NIGHTLY PRN
Status: DISCONTINUED | OUTPATIENT
Start: 2024-01-30 | End: 2024-02-02 | Stop reason: HOSPADM

## 2024-01-30 RX ADMIN — SENNOSIDES 17.2 MG: 8.6 TABLET, FILM COATED ORAL at 20:30

## 2024-01-30 RX ADMIN — SENNOSIDES 17.2 MG: 8.6 TABLET, FILM COATED ORAL at 09:56

## 2024-01-30 RX ADMIN — DULOXETINE HYDROCHLORIDE 60 MG: 30 CAPSULE, DELAYED RELEASE ORAL at 09:56

## 2024-01-30 RX ADMIN — APIXABAN 5 MG: 5 TABLET, FILM COATED ORAL at 20:30

## 2024-01-30 RX ADMIN — APIXABAN 5 MG: 5 TABLET, FILM COATED ORAL at 09:56

## 2024-01-30 RX ADMIN — ATORVASTATIN CALCIUM 10 MG: 10 TABLET, FILM COATED ORAL at 09:56

## 2024-01-30 RX ADMIN — METOPROLOL TARTRATE 25 MG: 25 TABLET, FILM COATED ORAL at 09:56

## 2024-01-30 RX ADMIN — FLUTICASONE FUROATE AND VILANTEROL TRIFENATATE 1 PUFF: 100; 25 POWDER RESPIRATORY (INHALATION) at 07:00

## 2024-01-30 RX ADMIN — TIOTROPIUM BROMIDE INHALATION SPRAY 2 PUFF: 3.12 SPRAY, METERED RESPIRATORY (INHALATION) at 07:00

## 2024-01-30 RX ADMIN — TRAZODONE HYDROCHLORIDE 100 MG: 50 TABLET ORAL at 20:30

## 2024-01-30 RX ADMIN — METOPROLOL TARTRATE 25 MG: 25 TABLET, FILM COATED ORAL at 20:30

## 2024-01-30 RX ADMIN — LEVOTHYROXINE SODIUM 112 MCG: 112 TABLET ORAL at 05:51

## 2024-01-30 NOTE — PROGRESS NOTES
Physical Therapy                 Therapy Communication Note    Patient Name: Myrna Rodrigues  MRN: [id number]  Today's Date: 1/30/2024 1340    Discipline: Physical Therapy    Missed Visit Reason: Missed Visit Reason:  (Note order for CT LE. No results available at this time.)

## 2024-01-30 NOTE — PROGRESS NOTES
Patient pending stat ct sand of left knee to rule out fracture. If ct scan is negative MRI is not warranted.     Until results of imaging are complete she should remain NWB in knee immobilizer to left lower extremity.     Further recommendations and full note to follow results of imaging.       1419 Ct scan resulted showing no periprosthetic fracture. MRI no longer needed and has been discontinued.     She may weight bear as tolerated to left lower extremity. Knee immobilizer does not have to be worn but can wear PRN for comfort.

## 2024-01-30 NOTE — PROGRESS NOTES
Medical Group Progress Note  ASSESSMENT & PLAN:     Expand All Collapse All    Myrna Rodrigues is a 73 y.o. female on day 0 of admission presenting with Syncope and collapse.           Subjective   No overnight events. C/o L knee and toe pain today. Made it difficult for her to stand yesterday. No dizziness/lightheadedness. No chest pain, dyspnea, palpitations.              Objective   Last Recorded Vitals  /68 (BP Location: Right arm, Patient Position: Lying)   Pulse 66   Temp 36.5 °C (97.7 °F) (Temporal)   Resp 16   Wt 68 kg (150 lb)   SpO2 91%   Intake/Output last 3 Shifts:     Intake/Output Summary (Last 24 hours) at 1/29/2024 1003  Last data filed at 1/29/2024 0824      Gross per 24 hour   Intake 911 ml   Output --   Net 911 ml         Admission Weight  Weight: 68 kg (150 lb) (01/28/24 0758)     Daily Weight  01/28/24 : 68 kg (150 lb)     Image Results  ECG 12 Lead  Normal sinus rhythm  Left axis deviation  Abnormal ECG  When compared with ECG of 28-JAN-2024 09:47, (unconfirmed)  QT has shortened        Constitutional:       Appearance: Normal appearance.   HENT:      Head: Normocephalic and atraumatic.      Mouth/Throat:      Mouth: Mucous membranes are moist.      Pharynx: Oropharynx is clear.   Eyes:      Extraocular Movements: Extraocular movements intact.      Pupils: Pupils are equal, round, and reactive to light.   Cardiovascular:      Rate and Rhythm: Normal rate and regular rhythm.      Heart sounds: Murmur heard.   Pulmonary:      Effort: Pulmonary effort is normal. No respiratory distress.      Breath sounds: Normal breath sounds. No wheezing.   Abdominal:      General: Abdomen is flat. Bowel sounds are normal.      Palpations: Abdomen is soft.   Musculoskeletal:         General: No swelling. Normal range of motion.      Cervical back: No rigidity or tenderness.   Skin:     General: Skin is warm and dry.   Neurological:      General: No focal deficit present.      Mental Status: She is  alert and oriented to person, place, and time.      Cranial Nerves: No cranial nerve deficit.      Motor: No weakness.   Psychiatric:         Mood and Affect: Mood normal.         Behavior: Behavior normal.      Relevant Results                       Assessment/Plan   Principal Problem:    Syncope and collapse     Syncope  -unclear etiology; does report history of diarrhea and son reports poor PO intake; orthostasis a possibility  -also recent DCCV and afib; previous zio patch showed a short run of non-sustained VT  -last echo 1/18/24 with preserved EF, normal relaxation pattern, mild MR/TR/AR, no need to repeat now  -cont tele  -check orthostatics  -IVF     Paroxysmal atrial fibrillation  -s/p DCCV 1/3/24  -cont eliquis, metoprolol     Diarrhea  -chronic for past 5-6 months  -unclear etiology, doubt C. Diff at this point given chronicity  -will check some stool studies; consider imaging at some point     CAD s/p PCI  -cont home meds  -no ongoing chest pain     COPD, not in exacerbation  -cont home inhalers  -PRN bronchodilators     DM2  -correctional insulin scale     HTN  HLD  -resume home medications     1/29/24:  Stable today  Unable to get orthostatics as she could not stand due to knee pain  No fracture or other acute issue on knee XR; if pain not improving will consider CT  PT/OT - may need SNF  As for syncope, no recurrent symptoms. No events on telemetry. At this point suspect dehydration/orthostasis but will see how she does when getting up more     01/30/24  - orthopedics saw and evaluated patient and recommended that she CT scan of the left knee to rule out any fractures.  -  CT scan was negative.  Patient still unable to weight-bear on the leg.  Therefore knee immobilizer was recommended as needed to assist with comfort especially when trying to bear weight.  -  Patient with microcytic anemia.  Will plan on iron studies and possible iron infusion.  -  Awaiting PT OT evaluation  Outpatient for placement.   Rest of care as above       VTE Prophylaxis: eliquis          Total time >/= 35 minutes; > 50% spent counseling/coordinating care      Deng Cooper MD    SUBJECTIVE      patient seen and evaluated.  Patient in no acute distress.  Patient denies any fevers or chills nausea or vomiting.  Does endorse some pain when please put any weight on his leg    OBJECTIVE:     Last Recorded Vitals:  Vitals:    01/29/24 1954 01/30/24 0045 01/30/24 0841 01/30/24 1455   BP: 162/72 159/72 166/72 (!) 188/88   BP Location:       Patient Position:       Pulse: 72 63 72 69   Resp: 16 16     Temp: 36.9 °C (98.4 °F) 37.1 °C (98.8 °F) 36.5 °C (97.7 °F) 36.8 °C (98.2 °F)   TempSrc:       SpO2: 95% 93% 94% 93%   Weight:       Height:         Last I/O:  I/O last 3 completed shifts:  In: 1061 (15.6 mL/kg) [I.V.:1061 (15.6 mL/kg)]  Out: - (0 mL/kg)   Weight: 68 kg     Physical Exam  HENT:      Head: Normocephalic.   Eyes:      Extraocular Movements: Extraocular movements intact.      Pupils: Pupils are equal, round, and reactive to light.   Cardiovascular:      Rate and Rhythm: Normal rate. Rhythm irregular.      Pulses: Normal pulses.      Heart sounds: Murmur heard.   Pulmonary:      Effort: Pulmonary effort is normal.      Breath sounds: Normal breath sounds.   Abdominal:      General: Bowel sounds are normal.      Palpations: Abdomen is soft.   Skin:     General: Skin is warm and dry.      Capillary Refill: Capillary refill takes less than 2 seconds.   Neurological:      Mental Status: She is alert and oriented to person, place, and time.   Psychiatric:         Mood and Affect: Mood normal.         Inpatient Medications:  apixaban, 5 mg, oral, BID  atorvastatin, 10 mg, oral, Daily  DULoxetine, 60 mg, oral, Daily  fluticasone furoate-vilanteroL, 1 puff, inhalation, Daily  insulin lispro, 0-10 Units, subcutaneous, TID with meals  levothyroxine, 112 mcg, oral, Daily  metoprolol tartrate, 25 mg, oral, BID  sennosides, 2 tablet, oral,  BID  tiotropium, 2 Inhalation, inhalation, Daily    PRN Medications  PRN medications: acetaminophen **OR** acetaminophen **OR** acetaminophen, dextrose 10 % in water (D10W), dextrose, glucagon, ipratropium-albuteroL  Continuous Medications:  lactated Ringer's, 50 mL/hr, Last Rate: 50 mL/hr (01/29/24 1124)      LABS AND IMAGING:     Labs:  Results from last 7 days   Lab Units 01/30/24  0556 01/28/24  0905   WBC AUTO x10*3/uL 9.3 13.3*   RBC AUTO x10*6/uL 4.03 4.98   HEMOGLOBIN g/dL 9.4* 11.5*   HEMATOCRIT % 30.7* 38.6   MCV fL 76* 78*   MCH pg 23.3* 23.1*   MCHC g/dL 30.6* 29.8*   RDW % 17.4* 17.9*   PLATELETS AUTO x10*3/uL 264 377     Results from last 7 days   Lab Units 01/30/24  0555 01/28/24  0833   SODIUM mmol/L 137 137   POTASSIUM mmol/L 4.0 3.8   CHLORIDE mmol/L 103 100   CO2 mmol/L 30 30   BUN mg/dL 10 12   CREATININE mg/dL 0.83 0.94   GLUCOSE mg/dL 109* 134*   PROTEIN TOTAL g/dL  --  7.3   CALCIUM mg/dL 8.2* 9.2   BILIRUBIN TOTAL mg/dL  --  0.5   ALK PHOS U/L  --  69   AST U/L  --  12   ALT U/L  --  8     Results from last 7 days   Lab Units 01/30/24  0555   MAGNESIUM mg/dL 1.74     Results from last 7 days   Lab Units 01/28/24  0833   TROPHS ng/L 5     Imaging:  CT knee left wo IV contrast  Narrative: Interpreted By:  Suman Garcia,   STUDY:  CT KNEE LEFT WO IV CONTRAST; ;  1/30/2024 9:41 am      INDICATION:  Signs/Symptoms:left knee pain s/p fall inability to bear weight.      COMPARISON:  Plain film radiographs of the left knee performed on January 28, 2024      Previous plain films dating back to February 26, 2018      ACCESSION NUMBER(S):  [id number]      ORDERING CLINICIAN:  GRETCHEN CONNOR      TECHNIQUE:  Multiple thin-section axial images performed through the left knee  and reconstructed in the sagittal and coronal plane without contrast.      Artifact reduction technique utilized.      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      There is no  evidence of periprosthetic fracture.      The alignment is normal.      There is no significant osteolysis.      No soft tissue mass or pseudotumor noted.      Innumerable punctate foci of metallic density are scattered  throughout the joint but these are unchanged dating to previous  postoperative images from 2018 and are likely related to the prior  initial hardware.              No CT findings suggestive of tendinous or ligamentous abnormality.      There is a moderate-sized joint effusion, nonspecific.      No evidence of a soft tissue mass.      Impression: Moderate-sized left knee joint effusion, nonspecific      Revision left knee arthroplasty unchanged from numerous prior studies  without evidence of other complication or acute finding.          MACRO:  None      Signed by: Suman Garcia 1/30/2024 2:15 PM  Dictation workstation:   [id number]

## 2024-01-31 LAB
ANION GAP SERPL CALC-SCNC: 10 MMOL/L (ref 10–20)
BASOPHILS # BLD AUTO: 0.02 X10*3/UL (ref 0–0.1)
BASOPHILS NFR BLD AUTO: 0.3 %
BUN SERPL-MCNC: 7 MG/DL (ref 6–23)
CALCIUM SERPL-MCNC: 8.1 MG/DL (ref 8.6–10.3)
CHLORIDE SERPL-SCNC: 104 MMOL/L (ref 98–107)
CO2 SERPL-SCNC: 30 MMOL/L (ref 21–32)
CREAT SERPL-MCNC: 0.7 MG/DL (ref 0.5–1.05)
EGFRCR SERPLBLD CKD-EPI 2021: 90 ML/MIN/1.73M*2
EOSINOPHIL # BLD AUTO: 0.44 X10*3/UL (ref 0–0.4)
EOSINOPHIL NFR BLD AUTO: 5.5 %
ERYTHROCYTE [DISTWIDTH] IN BLOOD BY AUTOMATED COUNT: 17.8 % (ref 11.5–14.5)
GLUCOSE BLD MANUAL STRIP-MCNC: 101 MG/DL (ref 74–99)
GLUCOSE BLD MANUAL STRIP-MCNC: 107 MG/DL (ref 74–99)
GLUCOSE BLD MANUAL STRIP-MCNC: 113 MG/DL (ref 74–99)
GLUCOSE BLD MANUAL STRIP-MCNC: 170 MG/DL (ref 74–99)
GLUCOSE SERPL-MCNC: 102 MG/DL (ref 74–99)
HCT VFR BLD AUTO: 31.1 % (ref 36–46)
HGB BLD-MCNC: 9.6 G/DL (ref 12–16)
HOLD SPECIMEN: NORMAL
IMM GRANULOCYTES # BLD AUTO: 0.03 X10*3/UL (ref 0–0.5)
IMM GRANULOCYTES NFR BLD AUTO: 0.4 % (ref 0–0.9)
LYMPHOCYTES # BLD AUTO: 1.6 X10*3/UL (ref 0.8–3)
LYMPHOCYTES NFR BLD AUTO: 20.1 %
MAGNESIUM SERPL-MCNC: 1.77 MG/DL (ref 1.6–2.4)
MCH RBC QN AUTO: 23.2 PG (ref 26–34)
MCHC RBC AUTO-ENTMCNC: 30.9 G/DL (ref 32–36)
MCV RBC AUTO: 75 FL (ref 80–100)
MONOCYTES # BLD AUTO: 0.76 X10*3/UL (ref 0.05–0.8)
MONOCYTES NFR BLD AUTO: 9.6 %
NEUTROPHILS # BLD AUTO: 5.1 X10*3/UL (ref 1.6–5.5)
NEUTROPHILS NFR BLD AUTO: 64.1 %
NRBC BLD-RTO: 0 /100 WBCS (ref 0–0)
PLATELET # BLD AUTO: 211 X10*3/UL (ref 150–450)
POTASSIUM SERPL-SCNC: 3.5 MMOL/L (ref 3.5–5.3)
RBC # BLD AUTO: 4.13 X10*6/UL (ref 4–5.2)
SODIUM SERPL-SCNC: 140 MMOL/L (ref 136–145)
WBC # BLD AUTO: 8 X10*3/UL (ref 4.4–11.3)

## 2024-01-31 PROCEDURE — 2500000001 HC RX 250 WO HCPCS SELF ADMINISTERED DRUGS (ALT 637 FOR MEDICARE OP): Performed by: NURSE PRACTITIONER

## 2024-01-31 PROCEDURE — 2500000001 HC RX 250 WO HCPCS SELF ADMINISTERED DRUGS (ALT 637 FOR MEDICARE OP): Performed by: INTERNAL MEDICINE

## 2024-01-31 PROCEDURE — 97530 THERAPEUTIC ACTIVITIES: CPT | Mod: GO,CO

## 2024-01-31 PROCEDURE — 97535 SELF CARE MNGMENT TRAINING: CPT | Mod: GO,CO

## 2024-01-31 PROCEDURE — 82947 ASSAY GLUCOSE BLOOD QUANT: CPT | Mod: 59

## 2024-01-31 PROCEDURE — 97161 PT EVAL LOW COMPLEX 20 MIN: CPT | Mod: GP | Performed by: PHYSICAL THERAPIST

## 2024-01-31 PROCEDURE — 85025 COMPLETE CBC W/AUTO DIFF WBC: CPT | Performed by: INTERNAL MEDICINE

## 2024-01-31 PROCEDURE — 97116 GAIT TRAINING THERAPY: CPT | Mod: GP | Performed by: PHYSICAL THERAPIST

## 2024-01-31 PROCEDURE — 99232 SBSQ HOSP IP/OBS MODERATE 35: CPT | Performed by: STUDENT IN AN ORGANIZED HEALTH CARE EDUCATION/TRAINING PROGRAM

## 2024-01-31 PROCEDURE — 80048 BASIC METABOLIC PNL TOTAL CA: CPT | Performed by: INTERNAL MEDICINE

## 2024-01-31 PROCEDURE — 36415 COLL VENOUS BLD VENIPUNCTURE: CPT | Performed by: INTERNAL MEDICINE

## 2024-01-31 PROCEDURE — 83735 ASSAY OF MAGNESIUM: CPT | Performed by: INTERNAL MEDICINE

## 2024-01-31 PROCEDURE — G0378 HOSPITAL OBSERVATION PER HR: HCPCS

## 2024-01-31 PROCEDURE — 2500000004 HC RX 250 GENERAL PHARMACY W/ HCPCS (ALT 636 FOR OP/ED): Performed by: INTERNAL MEDICINE

## 2024-01-31 RX ORDER — CYCLOBENZAPRINE HCL 10 MG
5 TABLET ORAL 3 TIMES DAILY PRN
Status: DISCONTINUED | OUTPATIENT
Start: 2024-01-31 | End: 2024-02-02 | Stop reason: HOSPADM

## 2024-01-31 RX ADMIN — TRAZODONE HYDROCHLORIDE 100 MG: 50 TABLET ORAL at 20:30

## 2024-01-31 RX ADMIN — TIOTROPIUM BROMIDE INHALATION SPRAY 2 PUFF: 3.12 SPRAY, METERED RESPIRATORY (INHALATION) at 05:32

## 2024-01-31 RX ADMIN — ATORVASTATIN CALCIUM 10 MG: 10 TABLET, FILM COATED ORAL at 09:11

## 2024-01-31 RX ADMIN — CYCLOBENZAPRINE HYDROCHLORIDE 5 MG: 10 TABLET, FILM COATED ORAL at 20:29

## 2024-01-31 RX ADMIN — APIXABAN 5 MG: 5 TABLET, FILM COATED ORAL at 20:30

## 2024-01-31 RX ADMIN — SODIUM CHLORIDE, POTASSIUM CHLORIDE, SODIUM LACTATE AND CALCIUM CHLORIDE 50 ML/HR: 600; 310; 30; 20 INJECTION, SOLUTION INTRAVENOUS at 05:31

## 2024-01-31 RX ADMIN — APIXABAN 5 MG: 5 TABLET, FILM COATED ORAL at 09:11

## 2024-01-31 RX ADMIN — FLUTICASONE FUROATE AND VILANTEROL TRIFENATATE 1 PUFF: 100; 25 POWDER RESPIRATORY (INHALATION) at 05:32

## 2024-01-31 RX ADMIN — DULOXETINE HYDROCHLORIDE 60 MG: 30 CAPSULE, DELAYED RELEASE ORAL at 09:11

## 2024-01-31 RX ADMIN — LEVOTHYROXINE SODIUM 112 MCG: 112 TABLET ORAL at 05:31

## 2024-01-31 RX ADMIN — METOPROLOL TARTRATE 25 MG: 25 TABLET, FILM COATED ORAL at 09:11

## 2024-01-31 RX ADMIN — METOPROLOL TARTRATE 25 MG: 25 TABLET, FILM COATED ORAL at 20:30

## 2024-01-31 ASSESSMENT — COGNITIVE AND FUNCTIONAL STATUS - GENERAL
TURNING FROM BACK TO SIDE WHILE IN FLAT BAD: A LITTLE
PERSONAL GROOMING: A LITTLE
MOVING TO AND FROM BED TO CHAIR: A LITTLE
WALKING IN HOSPITAL ROOM: A LITTLE
EATING MEALS: A LITTLE
MOVING FROM LYING ON BACK TO SITTING ON SIDE OF FLAT BED WITH BEDRAILS: A LITTLE
TURNING FROM BACK TO SIDE WHILE IN FLAT BAD: A LITTLE
DRESSING REGULAR UPPER BODY CLOTHING: A LITTLE
MOVING TO AND FROM BED TO CHAIR: A LITTLE
DRESSING REGULAR LOWER BODY CLOTHING: A LOT
TURNING FROM BACK TO SIDE WHILE IN FLAT BAD: A LITTLE
WALKING IN HOSPITAL ROOM: A LOT
HELP NEEDED FOR BATHING: A LOT
DAILY ACTIVITIY SCORE: 16
TOILETING: A LITTLE
WALKING IN HOSPITAL ROOM: A LITTLE
PERSONAL GROOMING: A LITTLE
STANDING UP FROM CHAIR USING ARMS: A LOT
HELP NEEDED FOR BATHING: A LOT
DRESSING REGULAR UPPER BODY CLOTHING: A LITTLE
TOILETING: A LITTLE
CLIMB 3 TO 5 STEPS WITH RAILING: TOTAL
STANDING UP FROM CHAIR USING ARMS: A LOT
STANDING UP FROM CHAIR USING ARMS: A LITTLE
DRESSING REGULAR LOWER BODY CLOTHING: A LOT
MOVING TO AND FROM BED TO CHAIR: A LITTLE
DAILY ACTIVITIY SCORE: 17
HELP NEEDED FOR BATHING: A LOT
DRESSING REGULAR UPPER BODY CLOTHING: A LITTLE
MOBILITY SCORE: 16
PERSONAL GROOMING: A LITTLE
DRESSING REGULAR LOWER BODY CLOTHING: A LOT
MOBILITY SCORE: 15
DAILY ACTIVITIY SCORE: 17
TOILETING: A LITTLE
CLIMB 3 TO 5 STEPS WITH RAILING: TOTAL

## 2024-01-31 ASSESSMENT — PAIN SCALES - GENERAL
PAINLEVEL_OUTOF10: 0 - NO PAIN
PAINLEVEL_OUTOF10: 2

## 2024-01-31 ASSESSMENT — PAIN - FUNCTIONAL ASSESSMENT
PAIN_FUNCTIONAL_ASSESSMENT: 0-10

## 2024-01-31 ASSESSMENT — ACTIVITIES OF DAILY LIVING (ADL): HOME_MANAGEMENT_TIME_ENTRY: 15

## 2024-01-31 NOTE — PROGRESS NOTES
Occupational Therapy    OT Treatment    Patient Name: Myrna Rodrigues  MRN: [id number]  Today's Date: 1/31/2024  Time Calculation  Start Time: 1030  Stop Time: 1055  Time Calculation (min): 25 min       Assessment:  End of Session Communication: Bedside nurse, Care Coordinator  End of Session Patient Position: Up in chair, Alarm on     Plan:  Treatment Interventions: ADL retraining, Endurance training, Functional transfer training  OT Frequency: 2 times per week  Treatment Interventions: ADL retraining, Endurance training, Functional transfer training    Subjective   Previous Visit Info:  OT Last Visit  OT Received On: 01/31/24  General:  General  Patient Position Received: Bed, 3 rail up, Alarm on  Precautions:  LE Weight Bearing Status: Weight Bearing as Tolerated  Braces Applied: KI as needed for pain     Pain:  Pain Assessment  Pain Assessment: 0-10  Pain Score: 2 (with activity)  Pain Location: Knee  Pain Orientation: Left    Objective    Cognition:  Cognition  Overall Cognitive Status: Impaired  Orientation Level: Oriented X4  Following Commands: Follows multistep commands without difficulty  Insight: Within function limits  Impulsive: Within functional limits     Activities of Daily Living: LE Dressing  LE Dressing: Yes  Adult Briefs Level of Assistance: Minimum assistance (donned underwear with min a.  pants mgmt while in stance with min a)  LE Dressing Where Assessed: Bedsied commode    Toileting  Toileting Level of Assistance: Minimum assistance  Where Assessed: Bedside commode  Toileting Comments: with weight shifting  Functional Standing Tolerance:  Functional Standing Tolerance Comments: patient stood for a total of 5 mins this date with fair/fair+ balance with 2-0 ue support as needed during functional ambulation/transfers and ADL tasks  Bed Mobility/Transfers: Bed Mobility 1  Bed Mobility 1: Supine to sitting  Level of Assistance 1: Minimum assistance  Bed Mobility 2  Bed Mobility  2: Sitting to  supine  Level of Assistance 2: Minimum assistance    Transfers  Transfer: Yes  Transfer 1  Technique 1: Sit to stand  Transfer Device 1: Walker  Transfer Level of Assistance 1: Minimum assistance, +2  Trials/Comments 1: from EOB.  verbal cues for hand placement and safety  Transfers 2  Transfer Device 2: Walker  Transfer Level of Assistance 2: Minimum assistance  Trials/Comments 2: functional ambulation    Toilet Transfers  Toilet Transfer Type: To and from  Toilet Transfer to: Extra wide bedside commode  Toilet Transfer Technique: Ambulating  Toilet Transfers: Minimal assistance  Sitting Balance:  Dynamic Sitting Balance  Dynamic Sitting-Balance:  (fair+)  Standing Balance:  Dynamic Standing Balance  Dynamic Standing-Balance:  (fair/fair+ balance)    Outcome Measures:[facility] Daily Activity  Putting on and taking off regular lower body clothing: A lot  Bathing (including washing, rinsing, drying): A lot  Putting on and taking off regular upper body clothing: A little  Toileting, which includes using toilet, bedpan or urinal: A little  Taking care of personal grooming such as brushing teeth: A little  Eating Meals: A little  Daily Activity - Total Score: 16    Education Documentation  ADL Training, taught by LEROY Zepeda at 1/31/2024 12:18 PM.  Learner: Patient  Readiness: Acceptance  Method: Explanation  Response: Needs Reinforcement    Education Comments  No comments found.      OP EDUCATION:  Education  Individual(s) Educated: Patient  Education Provided: Symptom management, Ergonomics and postural realignment, Joint protection and energy conservation, Fall precautons, Risk and benefits of OT discussed with patient or other, POC discussed and agreed upon  Equipment:  (AE, EC tech, home DME, general safety)  Risk and Benefits Discussed with Patient/Caregiver/Other: yes  Patient/Caregiver Demonstrated Understanding: yes  Plan of Care Discussed and Agreed Upon: yes  Patient Response to Education:  Patient/Caregiver Verbalized Understanding of Information    Goals:  Encounter Problems       Encounter Problems (Active)       OT Goals       CGA for all functional transfers  (Progressing)       Start:  01/29/24    Expected End:  02/12/24            CGA for functional mobillity household distances for ADL/IADL participation  (Progressing)       Start:  01/29/24    Expected End:  02/12/24            Fair + dyn standing balance during ADLs/transfers  (Progressing)       Start:  01/29/24    Expected End:  02/12/24            CGA for LB dressing; AE as needed  (Progressing)       Start:  01/29/24    Expected End:  02/12/24

## 2024-01-31 NOTE — PROGRESS NOTES
"Pt is up in chair and states she is feeling much better. Reports she was able to work with therapy today and able to bare some weight to her left leg. Pending PT/OT notes for recommendations. Pt given SNF choice list to look over. Also discussed HHC as an option to which pt stated she has \"a dog from hell\" so she does not want people in her home. TCC did explain outpt therapy as well. Pt asked to wait for her son to come this afternoon and discuss with him before making a decision.  "

## 2024-01-31 NOTE — PROGRESS NOTES
Physical Therapy    Physical Therapy Evaluation & Treatment    Patient Name: Myrna Rodrigues  MRN: [id number]  Today's Date: 1/31/2024   Time Calculation  Start Time: 1028  Stop Time: 1110  Time Calculation (min): 42 min    Assessment/Plan   PT Assessment  PT Assessment Results: Decreased strength, Decreased range of motion, Impaired balance, Decreased mobility, Pain  Rehab Prognosis: Good  Barriers to Discharge: Pt. lives alone; limited assistance available  End of Session Communication: Bedside nurse  Assessment Comment: Pt. requires minAx1, ww and L knee immobilizer to mobilize at this time. Recommend continued PT.  End of Session Patient Position: Up in chair, Alarm on  IP OR SWING BED PT PLAN  Inpatient or Swing Bed: Inpatient  PT Plan  Treatment/Interventions: Bed mobility, Transfer training, Gait training, Stair training, Balance training, Strengthening, Range of motion, Therapeutic exercise, Therapeutic activity, Home exercise program  PT Plan: Skilled PT  PT Frequency: 4 times per week  PT Discharge Recommendations: Moderate intensity level of continued care  Equipment Recommended upon Discharge: Wheeled walker  PT Recommended Transfer Status: Assist x1, Assistive device (L knee immobilizer recommended for wt bearing at this time)  PT - OK to Discharge: Yes (to next level of care, when medically stable)    Current Problem:      General Visit Information:  General  Reason for Referral: Impaired mobility  Referred By: Jaison  Past Medical History Relevant to Rehab: B TKA, DM, A-fib, CAD, COPD, HTN, HLD  Patient Position Received: Bed, 3 rail up, Alarm on  General Comment: Pt. is 72 y/o female to ED with c/o of L knee pain. Had two falls day prior to admit, pt. believes she may have passed out. Additional c/o of chornic diarrhea x5-6 months. XR L knee: stable hardware, (-) acute findings, XR L foot: severe degenerative changes, CT head/Cspine: white matter ischemic disease, CT chest: colonic diverticulosis;  ORTHO consulted 1/29/24 and CT of L knee ordered: suggestive of tendinous or ligamentous abnormality; moderate sized joint effusion; Negative periprosthetic fx. MRI initially cancelled by ortho, however, pt. requested MRI of L knee- no sign of grecia-implant fracture or focal osteolysis.      Deformity of the quadriceps tendon with some longitudinal splitting is likely postsurgical. There is no full-thickness transverse tearing of  the tendon. Strain of the distal vastus medialis and lateralis muscle bellies. Small to moderate-sized joint effusion. Ill-defined fluid and edema is identified in the lateral subcutaneous  fat surrounding the lateral patellar retinaculum and iliotibial band.  There is some thinning and irregularity of the middle 3rd of the  lateral patellar retinaculum. There is no full-thickness tearing of  the structures.    Home Living:  Home Living  Home Living Comments: Pt. lives alone with her dog. Has 1 story house, 1 small step to enter. Tub shower, seat available, but doesn't use. No grab bars.    Prior Level of Function:  Prior Function Per Pt/Caregiver Report  Prior Function Comments: Independent with all ADLs/IADLs, No AD use at baseline, but owns cane and WW from previous knee replacements. No other falls in last three months other than 2 PTA. Drives.    Precautions:  Precautions  LE Weight Bearing Status: Weight Bearing as Tolerated (L knee immobilizer PRN, for comfort)  Medical Precautions: Fall precautions  Precautions Comment: Therapist recommending L knee immobilizer during ambulation at this time due to L quad weakness (-) SLR. Pt. also prefers using knee immobilizer for pain control.    Vital Signs:  Vital Signs  Heart Rate: 89 (with ambulation)  Objective     Pain:  Pain Assessment  Pain Assessment: 0-10  Pain Score:  (0/10 at rest; 2-3/10 with wt bearing (KI intact); 5-6/10 with attempted L knee flexion)  Pain Type: Acute pain  Pain Location: Knee  Pain Orientation: Left  Pain  Frequency: Intermittent  Pain Interventions: Cold pack (activity modification; RN notified)    Cognition:  Cognition  Overall Cognitive Status: Within Functional Limits    General Assessments:           Strength  Strength Comments: BUE and RLE WNL (4+ to 5/5); L hip flex and ankle DF/PF 3/5, L quad 3-/5 (increased pain with quad activation)                 Functional Assessments:     Bed Mobility  Bed Mobility:  (supine to/from sit performed with minAx1 for LLE lifting assistance; cues for technique)  Transfers  Transfer:  (Sit to/from stand with ww and minAx2 initially, progressing to minAx1 with cues for hand placement and technique; L knee immobilizer intact)  Ambulation/Gait Training  Ambulation/Gait Training Performed:  (65 ft. and 3 ftx2 (bed to/from [facility]) with ww, L knee immobilizer and minAx1; slow, antalgic, non-reciprocal gait pattern. Denies any dizziness/lightheadedness)          Extremity/Trunk Assessments:  RUE   RUE : Within Functional Limits  LUE   LUE: Within Functional Limits  RLE   RLE :  (ROM WFL)  LLE   LLE :  (L hip ROM WFL, L knee 5-75 degrees, L ankle DF/PF ~10 degrees each direction)    Treatments:  Education initiated for LLE HEP: APs, QS, heel slides (sitting EOB with pillow case under foot); attempted LLE LAQ, however, limited by increased pain and visible muscle spasm in L proximal thigh           Outcome Measures:  [facility] Basic Mobility  Turning from your back to your side while in a flat bed without using bedrails: A little  Moving from lying on your back to sitting on the side of a flat bed without using bedrails: A little  Moving to and from bed to chair (including a wheelchair): A little  Standing up from a chair using your arms (e.g. wheelchair or bedside chair): A lot  To walk in hospital room: A little  Climbing 3-5 steps with railing: Total  Basic Mobility - Total Score: 15                         Goals:  Encounter Problems       Encounter Problems (Active)       Impaired mobility         Perform all bed mobility with indep.  (Progressing)       Start:  01/31/24    Expected End:  02/14/24            Perform all transfers with ww and mod. indep.  (Progressing)       Start:  01/31/24    Expected End:  02/14/24            Patient will ambulate >/= 100 ft with ww and mod. indep.  (Progressing)       Start:  01/31/24    Expected End:  02/14/24            Patient will ascend/descend curb step with ww and mod. indep       Start:  01/31/24    Expected End:  02/14/24            Patient will perform LE HEP with indep.  (Progressing)       Start:  01/31/24    Expected End:  02/14/24            Patient will achieve 0-100 degrees L knee AROM        Start:  01/31/24    Expected End:  02/14/24               Pain - Adult            Education Documentation  Home Exercise Program, taught by Linda Hernandez PT at 1/31/2024 12:52 PM.  Learner: Patient  Readiness: Acceptance  Method: Explanation, Demonstration  Response: Verbalizes Understanding, Demonstrated Understanding, Needs Reinforcement  Comment: see note for additional gait training and ther-ex    Mobility Training, taught by Linda Hernandez PT at 1/31/2024 12:52 PM.  Learner: Patient  Readiness: Acceptance  Method: Explanation, Demonstration  Response: Verbalizes Understanding, Demonstrated Understanding, Needs Reinforcement  Comment: see note for additional gait training and ther-ex    Education Comments  No comments found.

## 2024-01-31 NOTE — DISCHARGE INSTRUCTIONS
Weightbearing as tolerated to left lower extremity with no range of motion restrictions.  Can wear knee immobilizer for comfort as needed.  Follow-up with Dr. Mccarthy in 1 to 2 weeks.

## 2024-01-31 NOTE — PROGRESS NOTES
CT scan left knee does not show any acute fracture.  MRI of the left knee is negative for acute fracture and does not show any quadricep tendon tear.  Patient may participate in physical therapy and be weightbearing as tolerated to left lower extremity with no range of motion restrictions.       Orthopedics will sign off.  Please call for any questions or concerns.  Patient should follow-up with Dr. Alfredo Mccarthy in 1 to 2 weeks.

## 2024-02-01 LAB
ANION GAP SERPL CALC-SCNC: 11 MMOL/L (ref 10–20)
BASOPHILS # BLD AUTO: 0.03 X10*3/UL (ref 0–0.1)
BASOPHILS NFR BLD AUTO: 0.4 %
BUN SERPL-MCNC: 9 MG/DL (ref 6–23)
CALCIUM SERPL-MCNC: 8.3 MG/DL (ref 8.6–10.3)
CHLORIDE SERPL-SCNC: 104 MMOL/L (ref 98–107)
CO2 SERPL-SCNC: 30 MMOL/L (ref 21–32)
CREAT SERPL-MCNC: 0.83 MG/DL (ref 0.5–1.05)
EGFRCR SERPLBLD CKD-EPI 2021: 74 ML/MIN/1.73M*2
EOSINOPHIL # BLD AUTO: 0.49 X10*3/UL (ref 0–0.4)
EOSINOPHIL NFR BLD AUTO: 6.3 %
ERYTHROCYTE [DISTWIDTH] IN BLOOD BY AUTOMATED COUNT: 17.9 % (ref 11.5–14.5)
GLUCOSE BLD MANUAL STRIP-MCNC: 122 MG/DL (ref 74–99)
GLUCOSE BLD MANUAL STRIP-MCNC: 126 MG/DL (ref 74–99)
GLUCOSE BLD MANUAL STRIP-MCNC: 145 MG/DL (ref 74–99)
GLUCOSE BLD MANUAL STRIP-MCNC: 99 MG/DL (ref 74–99)
GLUCOSE SERPL-MCNC: 101 MG/DL (ref 74–99)
HCT VFR BLD AUTO: 30.9 % (ref 36–46)
HGB BLD-MCNC: 9.4 G/DL (ref 12–16)
HOLD SPECIMEN: NORMAL
IMM GRANULOCYTES # BLD AUTO: 0.02 X10*3/UL (ref 0–0.5)
IMM GRANULOCYTES NFR BLD AUTO: 0.3 % (ref 0–0.9)
LYMPHOCYTES # BLD AUTO: 1.95 X10*3/UL (ref 0.8–3)
LYMPHOCYTES NFR BLD AUTO: 25.2 %
MCH RBC QN AUTO: 22.9 PG (ref 26–34)
MCHC RBC AUTO-ENTMCNC: 30.4 G/DL (ref 32–36)
MCV RBC AUTO: 75 FL (ref 80–100)
MONOCYTES # BLD AUTO: 0.78 X10*3/UL (ref 0.05–0.8)
MONOCYTES NFR BLD AUTO: 10.1 %
NEUTROPHILS # BLD AUTO: 4.47 X10*3/UL (ref 1.6–5.5)
NEUTROPHILS NFR BLD AUTO: 57.7 %
NRBC BLD-RTO: 0 /100 WBCS (ref 0–0)
PLATELET # BLD AUTO: 275 X10*3/UL (ref 150–450)
POTASSIUM SERPL-SCNC: 3.6 MMOL/L (ref 3.5–5.3)
RBC # BLD AUTO: 4.1 X10*6/UL (ref 4–5.2)
SODIUM SERPL-SCNC: 141 MMOL/L (ref 136–145)
WBC # BLD AUTO: 7.7 X10*3/UL (ref 4.4–11.3)

## 2024-02-01 PROCEDURE — 2500000001 HC RX 250 WO HCPCS SELF ADMINISTERED DRUGS (ALT 637 FOR MEDICARE OP): Performed by: NURSE PRACTITIONER

## 2024-02-01 PROCEDURE — 99232 SBSQ HOSP IP/OBS MODERATE 35: CPT | Performed by: STUDENT IN AN ORGANIZED HEALTH CARE EDUCATION/TRAINING PROGRAM

## 2024-02-01 PROCEDURE — 82947 ASSAY GLUCOSE BLOOD QUANT: CPT | Mod: 59

## 2024-02-01 PROCEDURE — 85025 COMPLETE CBC W/AUTO DIFF WBC: CPT | Performed by: INTERNAL MEDICINE

## 2024-02-01 PROCEDURE — 36415 COLL VENOUS BLD VENIPUNCTURE: CPT | Performed by: INTERNAL MEDICINE

## 2024-02-01 PROCEDURE — G0378 HOSPITAL OBSERVATION PER HR: HCPCS

## 2024-02-01 PROCEDURE — 80048 BASIC METABOLIC PNL TOTAL CA: CPT | Performed by: INTERNAL MEDICINE

## 2024-02-01 PROCEDURE — 2500000001 HC RX 250 WO HCPCS SELF ADMINISTERED DRUGS (ALT 637 FOR MEDICARE OP): Performed by: INTERNAL MEDICINE

## 2024-02-01 PROCEDURE — 82947 ASSAY GLUCOSE BLOOD QUANT: CPT | Mod: 91

## 2024-02-01 PROCEDURE — 97530 THERAPEUTIC ACTIVITIES: CPT | Mod: GP,CQ

## 2024-02-01 PROCEDURE — 2500000001 HC RX 250 WO HCPCS SELF ADMINISTERED DRUGS (ALT 637 FOR MEDICARE OP): Performed by: STUDENT IN AN ORGANIZED HEALTH CARE EDUCATION/TRAINING PROGRAM

## 2024-02-01 RX ORDER — ALUMINUM HYDROXIDE, MAGNESIUM HYDROXIDE, AND SIMETHICONE 1200; 120; 1200 MG/30ML; MG/30ML; MG/30ML
20 SUSPENSION ORAL 4 TIMES DAILY PRN
Status: DISCONTINUED | OUTPATIENT
Start: 2024-02-01 | End: 2024-02-02 | Stop reason: HOSPADM

## 2024-02-01 RX ORDER — FAMOTIDINE 10 MG/ML
20 INJECTION INTRAVENOUS DAILY
Status: DISCONTINUED | OUTPATIENT
Start: 2024-02-01 | End: 2024-02-01

## 2024-02-01 RX ORDER — FAMOTIDINE 20 MG/1
20 TABLET, FILM COATED ORAL 2 TIMES DAILY
Status: DISCONTINUED | OUTPATIENT
Start: 2024-02-01 | End: 2024-02-01

## 2024-02-01 RX ORDER — PANTOPRAZOLE SODIUM 40 MG/1
40 TABLET, DELAYED RELEASE ORAL
Status: DISCONTINUED | OUTPATIENT
Start: 2024-02-02 | End: 2024-02-02 | Stop reason: HOSPADM

## 2024-02-01 RX ORDER — FAMOTIDINE 20 MG/1
20 TABLET, FILM COATED ORAL DAILY
Status: DISCONTINUED | OUTPATIENT
Start: 2024-02-01 | End: 2024-02-01

## 2024-02-01 RX ORDER — FAMOTIDINE 10 MG/ML
20 INJECTION INTRAVENOUS 2 TIMES DAILY
Status: DISCONTINUED | OUTPATIENT
Start: 2024-02-01 | End: 2024-02-01

## 2024-02-01 RX ADMIN — METOPROLOL TARTRATE 25 MG: 25 TABLET, FILM COATED ORAL at 09:17

## 2024-02-01 RX ADMIN — APIXABAN 5 MG: 5 TABLET, FILM COATED ORAL at 09:17

## 2024-02-01 RX ADMIN — TRAZODONE HYDROCHLORIDE 100 MG: 50 TABLET ORAL at 21:09

## 2024-02-01 RX ADMIN — LEVOTHYROXINE SODIUM 112 MCG: 112 TABLET ORAL at 05:11

## 2024-02-01 RX ADMIN — FLUTICASONE FUROATE AND VILANTEROL TRIFENATATE 1 PUFF: 100; 25 POWDER RESPIRATORY (INHALATION) at 05:12

## 2024-02-01 RX ADMIN — TIOTROPIUM BROMIDE INHALATION SPRAY 2 PUFF: 3.12 SPRAY, METERED RESPIRATORY (INHALATION) at 05:12

## 2024-02-01 RX ADMIN — DULOXETINE HYDROCHLORIDE 60 MG: 30 CAPSULE, DELAYED RELEASE ORAL at 09:17

## 2024-02-01 RX ADMIN — APIXABAN 5 MG: 5 TABLET, FILM COATED ORAL at 21:09

## 2024-02-01 RX ADMIN — ATORVASTATIN CALCIUM 10 MG: 10 TABLET, FILM COATED ORAL at 09:17

## 2024-02-01 RX ADMIN — ALUMINUM HYDROXIDE, MAGNESIUM HYDROXIDE, AND SIMETHICONE 20 ML: 200; 200; 20 SUSPENSION ORAL at 15:37

## 2024-02-01 RX ADMIN — METOPROLOL TARTRATE 25 MG: 25 TABLET, FILM COATED ORAL at 21:09

## 2024-02-01 ASSESSMENT — COGNITIVE AND FUNCTIONAL STATUS - GENERAL
CLIMB 3 TO 5 STEPS WITH RAILING: A LOT
MOBILITY SCORE: 17
MOVING TO AND FROM BED TO CHAIR: A LITTLE
TURNING FROM BACK TO SIDE WHILE IN FLAT BAD: A LOT
TOILETING: A LITTLE
CLIMB 3 TO 5 STEPS WITH RAILING: A LOT
PERSONAL GROOMING: A LITTLE
WALKING IN HOSPITAL ROOM: A LITTLE
PERSONAL GROOMING: A LITTLE
DRESSING REGULAR LOWER BODY CLOTHING: A LITTLE
WALKING IN HOSPITAL ROOM: A LITTLE
TOILETING: A LITTLE
STANDING UP FROM CHAIR USING ARMS: A LITTLE
MOVING TO AND FROM BED TO CHAIR: A LITTLE
TURNING FROM BACK TO SIDE WHILE IN FLAT BAD: A LITTLE
DRESSING REGULAR UPPER BODY CLOTHING: A LITTLE
STANDING UP FROM CHAIR USING ARMS: A LITTLE
MOBILITY SCORE: 18
DAILY ACTIVITIY SCORE: 17
TURNING FROM BACK TO SIDE WHILE IN FLAT BAD: A LITTLE
MOVING TO AND FROM BED TO CHAIR: A LITTLE
STANDING UP FROM CHAIR USING ARMS: A LOT
DAILY ACTIVITIY SCORE: 18
WALKING IN HOSPITAL ROOM: A LITTLE
HELP NEEDED FOR BATHING: A LOT
DRESSING REGULAR UPPER BODY CLOTHING: A LITTLE
HELP NEEDED FOR BATHING: A LOT
MOBILITY SCORE: 16
CLIMB 3 TO 5 STEPS WITH RAILING: A LOT
MOVING FROM LYING ON BACK TO SITTING ON SIDE OF FLAT BED WITH BEDRAILS: A LITTLE
DRESSING REGULAR LOWER BODY CLOTHING: A LOT

## 2024-02-01 ASSESSMENT — PAIN - FUNCTIONAL ASSESSMENT
PAIN_FUNCTIONAL_ASSESSMENT: 0-10
PAIN_FUNCTIONAL_ASSESSMENT: 0-10

## 2024-02-01 ASSESSMENT — PAIN SCALES - GENERAL
PAINLEVEL_OUTOF10: 3
PAINLEVEL_OUTOF10: 0 - NO PAIN

## 2024-02-01 NOTE — PROGRESS NOTES
Medical Group Progress Note  ASSESSMENT & PLAN:     Principal Problem:    Syncope and collapse     Syncope  -unclear etiology; does report history of diarrhea and son reports poor PO intake; orthostasis a possibility  -also recent DCCV and afib; previous zio patch showed a short run of non-sustained VT  -last echo 1/18/24 with preserved EF, normal relaxation pattern, mild MR/TR/AR, no need to repeat now  -cont tele  -check orthostatics  -IVF     Paroxysmal atrial fibrillation  -s/p DCCV 1/3/24  -cont eliquis, metoprolol     Diarrhea  -chronic for past 5-6 months  -unclear etiology, doubt C. Diff at this point given chronicity  -will check some stool studies; consider imaging at some point     CAD s/p PCI  -cont home meds  -no ongoing chest pain     COPD, not in exacerbation  -cont home inhalers  -PRN bronchodilators     DM2  -correctional insulin scale     HTN  HLD  -resume home medications     1/29/24:  Stable today  Unable to get orthostatics as she could not stand due to knee pain  No fracture or other acute issue on knee XR; if pain not improving will consider CT  PT/OT - may need SNF  As for syncope, no recurrent symptoms. No events on telemetry. At this point suspect dehydration/orthostasis but will see how she does when getting up more     01/30/24  - orthopedics saw and evaluated patient and recommended that she CT scan of the left knee to rule out any fractures.  -  CT scan was negative.  Patient still unable to weight-bear on the leg.  Therefore knee immobilizer was recommended as needed to assist with comfort especially when trying to bear weight.  -  Patient with microcytic anemia.  Will plan on iron studies and possible iron infusion.  -  Awaiting PT OT evaluation  Outpatient for placement.  Rest of care as above     VTE Prophylaxis: eliquis         01/31/24  - Patient had MRI of LLE done which show changes in read below.   - evaluated by orthopedics and scussed patien.  They have decided on no  interventions at this point in time.    -PT OT working with patient after given weight bearing instructions by ortho.  Her mobility score is borderline  - will need to discuss whether SNF or home care is more appropriate at this point in time   -possible dc in the next 24 - 48hrs     Total time >/= 35 minutes; > 50% spent counseling/coordinating care      Deng Cooper MD    SUBJECTIVE     IVANON.  Still having significant pain in LLE but improved     OBJECTIVE:     Last Recorded Vitals:  Vitals:    01/31/24 0751 01/31/24 1028 01/31/24 1456 01/31/24 1916   BP: 150/67  124/58 120/58   Patient Position: Sitting  Sitting    Pulse: 76 89 68 77   Resp: 16  16 16   Temp: 37.5 °C (99.5 °F)  36.7 °C (98.1 °F) 36.6 °C (97.9 °F)   TempSrc:       SpO2: 92%  94% 94%   Weight:       Height:         Last I/O:  I/O last 3 completed shifts:  In: - (0 mL/kg)   Out: 801 (11.8 mL/kg) [Urine:800 (0.3 mL/kg/hr); Stool:1]  Weight: 68 kg     Physical Exam    HENT:      Head: Normocephalic.   Eyes:      Extraocular Movements: Extraocular movements intact.      Pupils: Pupils are equal, round, and reactive to light.   Cardiovascular:      Rate and Rhythm: Normal rate. Rhythm irregular.      Pulses: Normal pulses.      Heart sounds: Murmur heard.   Pulmonary:      Effort: Pulmonary effort is normal.      Breath sounds: Normal breath sounds.   Abdominal:      General: Bowel sounds are normal.      Palpations: Abdomen is soft.   Skin:     General: Skin is warm and dry.      Capillary Refill: Capillary refill takes less than 2 seconds.   Neurological:      Mental Status: She is alert and oriented to person, place, and time.   Psychiatric:         Mood and Affect: Mood normal.    Inpatient Medications:  apixaban, 5 mg, oral, BID  atorvastatin, 10 mg, oral, Daily  DULoxetine, 60 mg, oral, Daily  fluticasone furoate-vilanteroL, 1 puff, inhalation, Daily  insulin lispro, 0-10 Units, subcutaneous, TID with meals  levothyroxine, 112 mcg, oral,  Daily  metoprolol tartrate, 25 mg, oral, BID  sennosides, 2 tablet, oral, BID  tiotropium, 2 Inhalation, inhalation, Daily    PRN Medications  PRN medications: acetaminophen **OR** acetaminophen **OR** acetaminophen, cyclobenzaprine, dextrose 10 % in water (D10W), dextrose, glucagon, ipratropium-albuteroL, traZODone  Continuous Medications:  lactated Ringer's, 50 mL/hr, Last Rate: 50 mL/hr (01/31/24 0531)      LABS AND IMAGING:     Labs:  Results from last 7 days   Lab Units 01/31/24  0631 01/30/24  0556 01/28/24  0905   WBC AUTO x10*3/uL 8.0 9.3 13.3*   RBC AUTO x10*6/uL 4.13 4.03 4.98   HEMOGLOBIN g/dL 9.6* 9.4* 11.5*   HEMATOCRIT % 31.1* 30.7* 38.6   MCV fL 75* 76* 78*   MCH pg 23.2* 23.3* 23.1*   MCHC g/dL 30.9* 30.6* 29.8*   RDW % 17.8* 17.4* 17.9*   PLATELETS AUTO x10*3/uL 211 264 377     Results from last 7 days   Lab Units 01/31/24  0631 01/30/24  0555 01/28/24  0833   SODIUM mmol/L 140 137 137   POTASSIUM mmol/L 3.5 4.0 3.8   CHLORIDE mmol/L 104 103 100   CO2 mmol/L 30 30 30   BUN mg/dL 7 10 12   CREATININE mg/dL 0.70 0.83 0.94   GLUCOSE mg/dL 102* 109* 134*   PROTEIN TOTAL g/dL  --   --  7.3   CALCIUM mg/dL 8.1* 8.2* 9.2   BILIRUBIN TOTAL mg/dL  --   --  0.5   ALK PHOS U/L  --   --  69   AST U/L  --   --  12   ALT U/L  --   --  8     Results from last 7 days   Lab Units 01/31/24  0631 01/30/24  0555   MAGNESIUM mg/dL 1.77 1.74     Results from last 7 days   Lab Units 01/28/24  0833   TROPHS ng/L 5     Imaging:  MR knee left wo IV contrast  Narrative: Interpreted By:  Lalit Stephens,   STUDY:  MR KNEE LEFT WO IV CONTRAST;  1/30/2024 9:37 pm      INDICATION:  Signs/Symptoms:Knee pain inability to bear weight possible quad  tendon tear.      COMPARISON:  Plain film examination of 01/28/2024 and CT examination of 01/30/2024      ACCESSION NUMBER(S):  [id number]      ORDERING CLINICIAN:  GRETCHEN CONNOR      TECHNIQUE:  Multiplanar and multisequential MR images of the left knee are  performed. Metal  artifact reduction sequences are utilized.      FINDINGS:  The patient's is post total left knee arthroplasty with resurfacing  of the patella. There is extensive artifact emanating from the  implant which obscures surrounding bone soft tissues. There is also  some limitation secondary to motion degradation which is more  pronounced on T1 weighted coronal and T2 weighted axial images.      There is a small to moderate-sized joint effusion. There is  ill-defined fluid and edema in the lateral subcutaneous fat though no  organized subcutaneous fluid collection.      There is ill-defined fluid and edema in the distal vastus medialis  and lateralis muscles. Fluid and edema surround the lateral patellar  retinaculum. There is some thinning and irregularity of the middle  3rd of the retinaculum. There is some attenuation of the quadriceps  tendon at the patellar attachment at the right lateral margin.  Proximally 2 cm above the patellar attachment there is some  longitudinal splitting of the tendon which is presumably  postsurgical. There is no full-thickness transverse tearing of the  tendon.      The infrapatellar tendon is intact. There is no elevation of the  patella.      The iliotibial band is intact. There is ill-defined fluid and edema  surrounding the iliotibial band. The common attachment of the biceps  femoris and fibular collateral ligament remains intact. The medial  collateral ligament is partially visualized and grossly intact.      There is no MR evidence of grecia-implant fracture or focal osteolysis.      Impression: Status post total left knee arthroplasty with resurfacing of the  patella. There is extensive artifact which partially obscures  surrounding bone and soft tissues as well as some motion degradation.  There is no sign of grecia-implant fracture or focal osteolysis.      Deformity the quadriceps tendon with some longitudinal splitting is  likely postsurgical. There is no full-thickness transverse  tearing of  the tendon.      The infrapatellar tendon is intact.      Strain of the distal vastus medialis and lateralis muscle bellies.      Small to moderate-sized joint effusion.      Ill-defined fluid and edema is identified in the lateral subcutaneous  fat surrounding the lateral patellar retinaculum and iliotibial band.  There is some thinning and irregularity of the middle 3rd of the  lateral patellar retinaculum. There is no full-thickness tearing of  the structures.                  Signed by: Lalit Stephens 1/31/2024 10:02 AM  Dictation workstation:   [id number]

## 2024-02-01 NOTE — PROGRESS NOTES
"Daily progress note    Myrna Rodrigues is 73 y.o. female history of CAD status post PCI, paroxysmal A-fib on Eliquis, COPD, type 2 diabetes, hypertension, hyperlipidemia presenting with a syncopal episode.  Patient was admitted for syncope, echocardiogram on 1/18/2024 showed preserved EF, telemetry monitoring did not show significant arrhythmia    Patient had cardioversion for paroxysmal A-fib on 1/3/2024  Subjective  Patient reports left knee pain but no swelling  Able to move right knee if it has limited range of motion over the left knee  Denies any dizziness lightheadedness chest pain      Vital signs in last 24 hours:  Temp:  [36.6 °C (97.9 °F)-37.5 °C (99.5 °F)] 37.5 °C (99.5 °F)  Heart Rate:  [68-77] 74  Resp:  [16] 16  BP: (120-143)/(58-63) 133/63  /63   Pulse 74   Temp 37.5 °C (99.5 °F)   Resp 16   Ht 1.575 m (5' 2\")   Wt 68 kg (150 lb)   SpO2 92%   BMI 27.44 kg/m²    Intake/Output last 3 shifts:  I/O last 3 completed shifts:  In: - (0 mL/kg)   Out: 801 (11.8 mL/kg) [Urine:800 (0.3 mL/kg/hr); Stool:1]  Weight: 68 kg   Intake/Output this shift:  No intake/output data recorded.    Physical Exam  Constitutional:       General: She is not in acute distress.     Appearance: Normal appearance. She is not ill-appearing, toxic-appearing or diaphoretic.   HENT:      Head: Normocephalic and atraumatic.      Mouth/Throat:      Mouth: Mucous membranes are moist.      Pharynx: No oropharyngeal exudate.   Eyes:      Extraocular Movements: Extraocular movements intact.      Pupils: Pupils are equal, round, and reactive to light.   Cardiovascular:      Rate and Rhythm: Normal rate and regular rhythm.      Heart sounds: No murmur heard.  Pulmonary:      Effort: Pulmonary effort is normal. No respiratory distress.      Breath sounds: Normal breath sounds. No wheezing.   Abdominal:      General: Abdomen is flat. Bowel sounds are normal. There is no distension.      Tenderness: There is no abdominal tenderness. There " is no guarding or rebound.   Musculoskeletal:         General: No swelling.      Right lower leg: No edema.      Left lower leg: No edema.      Comments: Decreased range of motion over the left knee, bilateral old surgical scar over the knee from old surgeries   Skin:     Findings: No lesion or rash.   Neurological:      General: No focal deficit present.      Mental Status: She is alert and oriented to person, place, and time.      Cranial Nerves: No cranial nerve deficit.      Motor: No weakness.   Psychiatric:         Mood and Affect: Mood normal.         Behavior: Behavior normal.         Current medication   Current Facility-Administered Medications   Medication Dose Route Frequency Provider Last Rate Last Admin    acetaminophen (Tylenol) tablet 650 mg  650 mg oral q4h PRN Eriberto Blackwood MD   650 mg at 01/28/24 1302    Or    acetaminophen (Tylenol) oral liquid 650 mg  650 mg oral q4h PRN Eriberto Blackwood MD        Or    acetaminophen (Tylenol) suppository 650 mg  650 mg rectal q4h PRN Eriberto Blackwood MD        apixaban (Eliquis) tablet 5 mg  5 mg oral BID Eriberto Blackwood MD   5 mg at 02/01/24 0917    atorvastatin (Lipitor) tablet 10 mg  10 mg oral Daily Eriberto Blackwood MD   10 mg at 02/01/24 0917    cyclobenzaprine (Flexeril) tablet 5 mg  5 mg oral TID PRN ANA Hong-CNP   5 mg at 01/31/24 2029    dextrose 10 % in water (D10W) infusion  0.3 g/kg/hr intravenous Once PRN Eriberto Blackwood MD        dextrose 50 % injection 25 g  25 g intravenous q15 min PRN Eriberto Blackwood MD        DULoxetine (Cymbalta) DR capsule 60 mg  60 mg oral Daily Eriberto Blackwood MD   60 mg at 02/01/24 0917    fluticasone furoate-vilanteroL (Breo Ellipta) 100-25 mcg/dose inhaler 1 puff  1 puff inhalation Daily Eriberto Blackwood MD   1 puff at 02/01/24 0512    glucagon (Glucagen) injection 1 mg  1 mg intramuscular q15 min PRN Eriberto Blackwood MD        insulin lispro (HumaLOG) injection 0-10 Units  0-10 Units  subcutaneous TID with meals Eriberto Blackwood MD        ipratropium-albuteroL (Duo-Neb) 0.5-2.5 mg/3 mL nebulizer solution 3 mL  3 mL nebulization q4h PRN Eriberto Blackwood MD        lactated Ringer's infusion  50 mL/hr intravenous Continuous Eriberto Blackwood MD 50 mL/hr at 01/31/24 0531 50 mL/hr at 01/31/24 0531    levothyroxine (Synthroid, Levoxyl) tablet 112 mcg  112 mcg oral Daily Eriberto Blackwood MD   112 mcg at 02/01/24 0511    metoprolol tartrate (Lopressor) tablet 25 mg  25 mg oral BID Eriberto Blackwood MD   25 mg at 02/01/24 0917    sennosides (Senokot) tablet 17.2 mg  2 tablet oral BID Eriberto Blackwood MD   17.2 mg at 01/30/24 2030    tiotropium (Spiriva Respimat) 2.5 mcg/actuation inhaler 2 puff  2 Inhalation inhalation Daily Eriberto Blackwood MD   2 puff at 02/01/24 0512    traZODone (Desyrel) tablet 100 mg  100 mg oral Nightly PRN ANA Lea-CNP   100 mg at 01/31/24 2030        Labs  Lab Results   Component Value Date    WBC 7.7 02/01/2024    HGB 9.4 (L) 02/01/2024    HCT 30.9 (L) 02/01/2024    MCV 75 (L) 02/01/2024     02/01/2024     Lab Results   Component Value Date    HGBA1C 6.2 (H) 01/17/2024     Lab Results   Component Value Date    GLUCOSE 101 (H) 02/01/2024    CALCIUM 8.3 (L) 02/01/2024     02/01/2024    K 3.6 02/01/2024    CO2 30 02/01/2024     02/01/2024    BUN 9 02/01/2024    CREATININE 0.83 02/01/2024           Principal Problem:    Syncope and collapse      Assessment and Plan   #Episode of syncope, unclear etiology possible orthostasis    Echocardiogram was done on 1/18/2024 showing normal ejection fraction  Check orthostatic vital signs  DC IV hydration  Patient has A-fib and recently had cardioversion  Continue telemetry monitor  Patient does not have episode of dizziness or syncope after admission    #Left knee pain decreased range of motion  #Bilateral knee replacement  Orthopedics was consulted  CT scan was done did not show acute finding, MRI of the  left knee was negative for acute fracture or tendon tear  Orthopedics recommended weightbearing as tolerated  Outpatient follow-up with orthopedics  Patient needs moderate level of therapy on discharge  Pending pre-CERT     Paroxysmal atrial fibrillation  -s/p DCCV 1/3/24  -cont eliquis, metoprolol        CAD s/p PCI  -cont home meds  Stable     COPD, not in exacerbation  -cont home inhalers  -PRN bronchodilators     DM2  -correctional insulin scale  Diabetic diet    HTN  HLD  Continue home medications    VTE prophylaxis on Eliquis  Disposition SNF once pre-CERT is available   LOS: 0 days

## 2024-02-01 NOTE — PROGRESS NOTES
02/01/24 0847   Discharge Planning   Home or Post Acute Services Post acute facilities (Rehab/SNF/etc)   Type of Post Acute Facility Services Skilled nursing   Patient expects to be discharged to: [facility]   Does the patient need discharge transport arranged? Yes   RoundTrip coordination needed? Yes   Has discharge transport been arranged? No   Patient Choice   Provider Choice list and CMS website (https://medicare.gov/care-compare#search) for post-acute Quality and Resource Measure Data were provided and reviewed with: Patient   Patient / Family choosing to utilize agency / facility established prior to hospitalization No     Pt FOC SNF is [facility]. Referral sent in [facility], pending acceptance. Will need precert.    UPDATE 1424: [facility] has no open beds. Pt gave 2 more choices: [facility] and [facility]. Referrals sent in [facility], pending acceptance. Updated Sari KENNEDY.    UPDATE 1553: [facility] is able to accept and has a private room available. [facility] requested precert team to submit for auth today. Sari KENNEDY and pt updated.

## 2024-02-01 NOTE — PROGRESS NOTES
Physical Therapy    Physical Therapy Treatment    Patient Name: Myrna Rodrigues  MRN: [id number]  Today's Date: 2/1/2024  Time Calculation  Start Time: 1045  Stop Time: 1112  Time Calculation (min): 27 min       Assessment/Plan   PT Assessment  PT Assessment Results: Decreased strength, Decreased range of motion, Impaired balance, Decreased mobility, Pain  Rehab Prognosis: Good  Barriers to Discharge: Pt. lives alone; limited assistance available  Treatment Tolerance: Patient tolerated treatment well  Medical Staff Made Aware: Yes  End of Session Communication: Bedside nurse, PCT/NA/ALLEN  Assessment Comment: Patient showing progress with current goals. Patient will benefit from additional PT to address deficits and improve mobility/(L)knee ROM.  End of Session Patient Position: Up in chair, Alarm on (Reclined in chair with LEs elevated; Call light, phone, and tray table within reach.)  PT Plan  Inpatient/Swing Bed or Outpatient: Inpatient  Treatment/Interventions: Bed mobility, Transfer training, Gait training, Stair training, Balance training, Strengthening, Range of motion, Therapeutic exercise, Therapeutic activity, Home exercise program  PT Plan: Skilled PT  PT Frequency: 4 times per week  PT Discharge Recommendations: Moderate intensity level of continued care  Equipment Recommended upon Discharge: Wheeled walker   PT Recommended Transfer Status: Assist x1, Assistive device (L knee immobilizer recommended for wt bearing at this time)    General Visit Information:   PT  Visit  PT Received On: 02/01/24  General  Family/Caregiver Present: No  Prior to Session Communication: Bedside nurse, PCT/NA/ALLEN  Patient Position Received: Bed, 3 rail up, Alarm on  General Comment: Pleasant and cooperative.    General Observations:   General Observation: Instruction and (A) to don (L)knee immobilizer.    Subjective     Precautions:  Precautions  LE Weight Bearing Status: Weight Bearing as Tolerated ((L)LE)  Medical Precautions: Fall  "precautions  Precautions Comment: Therapist recommending L knee immobilizer during ambulation at this time due to L quad weakness (-) SLR. Pt. also prefers using knee immobilizer for pain control.    Objective     Pain:  Pain Assessment  Pain Assessment: 0-10  Pain Score: 3 ((L)LE with activities. Nursing aware.)    Cognition:  Cognition  Orientation Level: Oriented X4    Balance:   Static Sitting: F+  Dynamic Sitting: F  Static Standing: F with ww  Dynamic Standing: F/F- with ww    Treatments:           Bed Mobility  Bed Mobility: Yes  Bed Mobility 1  Bed Mobility 1: Supine to sitting  Level of Assistance 1: Minimum assistance  Bed Mobility Comments 1: HOB ~35°. Hand over hand (A) to reach across body to use the bed rail for support. (A) to lift and move (L)LE over the EOB. Slow with shifting hips to the EOB(increased time provided for patient to move).  Ambulation/Gait Training  Ambulation/Gait Training Performed: Yes  Ambulation/Gait Training 1  Surface 1: Level tile  Device 1: Rolling walker  Assistance 1: Minimum assistance  Comments/Distance (ft) 1: ~50ft x2. NBOS. Slow juan pablo. Slightly forward flexed posture. Step to gait pattern. Patient reports WB \"a little over 50%\" with (L)LE stance phase. Decreased step height/length B. v/c and (A) for safer maneuvering of ww with 90/180° turns. No LOB.  Transfers  Transfer: Yes  Transfer 1  Technique 1: Sit to stand, Stand to sit  Transfer Device 1:  (ww)  Transfer Level of Assistance 1: Minimum assistance, Contact guard  Trials/Comments 1: (2x). Min(A) sit to stand and CGA stand to sit. v/c for safe hand placement and technique. Slow transition of hands to/from ww. Benefits from elevated surfaces.          Outcome Measures:  [facility] Basic Mobility  Turning from your back to your side while in a flat bed without using bedrails: A little  Moving from lying on your back to sitting on the side of a flat bed without using bedrails: A lot  Moving to and from bed to chair " (including a wheelchair): A little  Standing up from a chair using your arms (e.g. wheelchair or bedside chair): A little  To walk in hospital room: A little  Climbing 3-5 steps with railing: A lot  Basic Mobility - Total Score: 16    Education Documentation  Mobility Training, taught by Brianna Collins PTA at 2/1/2024  3:12 PM.  Learner: Patient  Readiness: Acceptance  Method: Explanation  Response: Verbalizes Understanding, Needs Reinforcement  Comment: See therapy note.    EDUCATION:  Individual(s) Educated: Patient  Patient Response to Education: Patient/Caregiver Verbalized Understanding of Information, Patient/Caregiver Performed Return Demonstration of Exercises/Activities, Patient/Caregiver Asked Appropriate Questions    Encounter Problems       Encounter Problems (Active)       Impaired mobility        Perform all bed mobility with indep.  (Progressing)       Start:  01/31/24    Expected End:  02/14/24            Perform all transfers with ww and mod. indep.  (Progressing)       Start:  01/31/24    Expected End:  02/14/24            Patient will ambulate >/= 100 ft with ww and mod. indep.  (Progressing)       Start:  01/31/24    Expected End:  02/14/24            Patient will ascend/descend curb step with ww and mod. indep (Progressing)       Start:  01/31/24    Expected End:  02/14/24            Patient will perform LE HEP with indep.  (Progressing)       Start:  01/31/24    Expected End:  02/14/24            Patient will achieve 0-100 degrees L knee AROM  (Progressing)       Start:  01/31/24    Expected End:  02/14/24

## 2024-02-02 VITALS
DIASTOLIC BLOOD PRESSURE: 57 MMHG | HEART RATE: 82 BPM | WEIGHT: 150 LBS | RESPIRATION RATE: 16 BRPM | OXYGEN SATURATION: 94 % | TEMPERATURE: 97.9 F | SYSTOLIC BLOOD PRESSURE: 115 MMHG | BODY MASS INDEX: 27.6 KG/M2 | HEIGHT: 62 IN

## 2024-02-02 LAB
ANION GAP SERPL CALC-SCNC: 11 MMOL/L (ref 10–20)
BASOPHILS # BLD AUTO: 0.02 X10*3/UL (ref 0–0.1)
BASOPHILS NFR BLD AUTO: 0.3 %
BUN SERPL-MCNC: 13 MG/DL (ref 6–23)
CALCIUM SERPL-MCNC: 8.4 MG/DL (ref 8.6–10.3)
CHLORIDE SERPL-SCNC: 105 MMOL/L (ref 98–107)
CO2 SERPL-SCNC: 29 MMOL/L (ref 21–32)
CREAT SERPL-MCNC: 0.89 MG/DL (ref 0.5–1.05)
EGFRCR SERPLBLD CKD-EPI 2021: 68 ML/MIN/1.73M*2
EOSINOPHIL # BLD AUTO: 0.45 X10*3/UL (ref 0–0.4)
EOSINOPHIL NFR BLD AUTO: 5.7 %
ERYTHROCYTE [DISTWIDTH] IN BLOOD BY AUTOMATED COUNT: 18 % (ref 11.5–14.5)
GLUCOSE BLD MANUAL STRIP-MCNC: 105 MG/DL (ref 74–99)
GLUCOSE BLD MANUAL STRIP-MCNC: 95 MG/DL (ref 74–99)
GLUCOSE SERPL-MCNC: 98 MG/DL (ref 74–99)
HCT VFR BLD AUTO: 30.3 % (ref 36–46)
HGB BLD-MCNC: 9.3 G/DL (ref 12–16)
HOLD SPECIMEN: NORMAL
IMM GRANULOCYTES # BLD AUTO: 0.01 X10*3/UL (ref 0–0.5)
IMM GRANULOCYTES NFR BLD AUTO: 0.1 % (ref 0–0.9)
LYMPHOCYTES # BLD AUTO: 2.32 X10*3/UL (ref 0.8–3)
LYMPHOCYTES NFR BLD AUTO: 29.4 %
MCH RBC QN AUTO: 23.3 PG (ref 26–34)
MCHC RBC AUTO-ENTMCNC: 30.7 G/DL (ref 32–36)
MCV RBC AUTO: 76 FL (ref 80–100)
MONOCYTES # BLD AUTO: 0.82 X10*3/UL (ref 0.05–0.8)
MONOCYTES NFR BLD AUTO: 10.4 %
NEUTROPHILS # BLD AUTO: 4.26 X10*3/UL (ref 1.6–5.5)
NEUTROPHILS NFR BLD AUTO: 54.1 %
NRBC BLD-RTO: 0 /100 WBCS (ref 0–0)
PLATELET # BLD AUTO: 285 X10*3/UL (ref 150–450)
POTASSIUM SERPL-SCNC: 3.6 MMOL/L (ref 3.5–5.3)
RBC # BLD AUTO: 3.99 X10*6/UL (ref 4–5.2)
SODIUM SERPL-SCNC: 141 MMOL/L (ref 136–145)
WBC # BLD AUTO: 7.9 X10*3/UL (ref 4.4–11.3)

## 2024-02-02 PROCEDURE — 82947 ASSAY GLUCOSE BLOOD QUANT: CPT | Mod: 59

## 2024-02-02 PROCEDURE — 97530 THERAPEUTIC ACTIVITIES: CPT | Mod: GO,CO

## 2024-02-02 PROCEDURE — 2500000001 HC RX 250 WO HCPCS SELF ADMINISTERED DRUGS (ALT 637 FOR MEDICARE OP): Performed by: INTERNAL MEDICINE

## 2024-02-02 PROCEDURE — 99238 HOSP IP/OBS DSCHRG MGMT 30/<: CPT | Performed by: STUDENT IN AN ORGANIZED HEALTH CARE EDUCATION/TRAINING PROGRAM

## 2024-02-02 PROCEDURE — G0378 HOSPITAL OBSERVATION PER HR: HCPCS

## 2024-02-02 PROCEDURE — 85025 COMPLETE CBC W/AUTO DIFF WBC: CPT | Performed by: INTERNAL MEDICINE

## 2024-02-02 PROCEDURE — 36415 COLL VENOUS BLD VENIPUNCTURE: CPT | Performed by: INTERNAL MEDICINE

## 2024-02-02 PROCEDURE — 2500000004 HC RX 250 GENERAL PHARMACY W/ HCPCS (ALT 636 FOR OP/ED): Performed by: STUDENT IN AN ORGANIZED HEALTH CARE EDUCATION/TRAINING PROGRAM

## 2024-02-02 PROCEDURE — 80048 BASIC METABOLIC PNL TOTAL CA: CPT | Performed by: INTERNAL MEDICINE

## 2024-02-02 RX ADMIN — DULOXETINE HYDROCHLORIDE 60 MG: 30 CAPSULE, DELAYED RELEASE ORAL at 08:53

## 2024-02-02 RX ADMIN — LEVOTHYROXINE SODIUM 112 MCG: 112 TABLET ORAL at 06:26

## 2024-02-02 RX ADMIN — ATORVASTATIN CALCIUM 10 MG: 10 TABLET, FILM COATED ORAL at 08:53

## 2024-02-02 RX ADMIN — PANTOPRAZOLE SODIUM 40 MG: 40 TABLET, DELAYED RELEASE ORAL at 06:26

## 2024-02-02 RX ADMIN — METOPROLOL TARTRATE 25 MG: 25 TABLET, FILM COATED ORAL at 08:53

## 2024-02-02 RX ADMIN — FLUTICASONE FUROATE AND VILANTEROL TRIFENATATE 1 PUFF: 100; 25 POWDER RESPIRATORY (INHALATION) at 06:27

## 2024-02-02 RX ADMIN — APIXABAN 5 MG: 5 TABLET, FILM COATED ORAL at 08:54

## 2024-02-02 RX ADMIN — SENNOSIDES 17.2 MG: 8.6 TABLET, FILM COATED ORAL at 08:54

## 2024-02-02 RX ADMIN — TIOTROPIUM BROMIDE INHALATION SPRAY 2 PUFF: 3.12 SPRAY, METERED RESPIRATORY (INHALATION) at 06:27

## 2024-02-02 ASSESSMENT — COGNITIVE AND FUNCTIONAL STATUS - GENERAL
STANDING UP FROM CHAIR USING ARMS: A LITTLE
DAILY ACTIVITIY SCORE: 15
DRESSING REGULAR LOWER BODY CLOTHING: A LOT
TOILETING: A LITTLE
TURNING FROM BACK TO SIDE WHILE IN FLAT BAD: A LITTLE
TOILETING: A LITTLE
EATING MEALS: A LITTLE
CLIMB 3 TO 5 STEPS WITH RAILING: A LOT
DRESSING REGULAR UPPER BODY CLOTHING: A LITTLE
DRESSING REGULAR UPPER BODY CLOTHING: A LITTLE
DAILY ACTIVITIY SCORE: 17
MOBILITY SCORE: 18
PERSONAL GROOMING: A LOT
HELP NEEDED FOR BATHING: A LOT
PERSONAL GROOMING: A LITTLE
MOVING TO AND FROM BED TO CHAIR: A LITTLE
DRESSING REGULAR LOWER BODY CLOTHING: A LOT
HELP NEEDED FOR BATHING: A LOT
WALKING IN HOSPITAL ROOM: A LITTLE

## 2024-02-02 ASSESSMENT — PAIN - FUNCTIONAL ASSESSMENT: PAIN_FUNCTIONAL_ASSESSMENT: 0-10

## 2024-02-02 ASSESSMENT — PAIN SCALES - GENERAL: PAINLEVEL_OUTOF10: 3

## 2024-02-02 NOTE — PROGRESS NOTES
Occupational Therapy    OT Treatment    Patient Name: Myrna Rodrigues  MRN: [id number]  Today's Date: 2/2/2024  Time Calculation  Start Time: 1103  Stop Time: 1117  Time Calculation (min): 14 min       Assessment:  End of Session Communication: Bedside nurse  End of Session Patient Position: Up in chair, Alarm off, not on at start of session     Plan:  Treatment Interventions: ADL retraining, Endurance training, Functional transfer training  OT Frequency: 2 times per week  Treatment Interventions: ADL retraining, Endurance training, Functional transfer training    Subjective   Previous Visit Info:  OT Last Visit  OT Received On: 02/02/24  General:  General  Prior to Session Communication: Bedside nurse  Patient Position Received: Bed, 3 rail up, Alarm off, not on at start of session  Precautions:  LE Weight Bearing Status: Weight Bearing as Tolerated  Medical Precautions: Fall precautions  Braces Applied: KI as needed for pain     Pain:  Pain Assessment  Pain Score:  (3-4/10)  Pain Location: Knee  Pain Orientation: Left    Objective    Cognition:  Cognition  Orientation Level: Oriented X4     Activities of Daily Living: LE Dressing  Adult Briefs Level of Assistance:  (pants mgmt while in stance- min a)  Functional Standing Tolerance:  Functional Standing Tolerance Comments: patient stood for a total of 1 mins this date with fair/fair+ balance with 2-0 ue support as needed during functional ambulation/transfers and ADL tasks  Bed Mobility/Transfers: Bed Mobility 1  Bed Mobility 1: Supine to sitting  Level of Assistance 1:  (SBA)    Transfer 1  Technique 1: Sit to stand, Stand to sit  Transfer Device 1: Walker  Transfer Level of Assistance 1: Minimum assistance  Transfers 2  Transfer From 2: Bed to  Transfer to 2: Chair with arms  Transfer Device 2: Walker  Transfer Level of Assistance 2: Contact guard     Standing Balance:  Dynamic Standing Balance  Dynamic Standing-Balance:  (fair/fair+)  come Measures:[facility] Daily  Activity  Putting on and taking off regular lower body clothing: A lot  Bathing (including washing, rinsing, drying): A lot  Putting on and taking off regular upper body clothing: A little  Toileting, which includes using toilet, bedpan or urinal: A little  Taking care of personal grooming such as brushing teeth: A lot  Eating Meals: A little  Daily Activity - Total Score: 15    Education Documentation  ADL Training, taught by LEROY Zepeda at 2/2/2024 12:20 PM.  Learner: Patient  Readiness: Acceptance  Method: Explanation  Response: Needs Reinforcement    Education Comments  No comments found.      OP EDUCATION:  Education  Individual(s) Educated: Patient  Education Provided: Symptom management, Ergonomics and postural realignment, Joint protection and energy conservation, Fall precautons, Risk and benefits of OT discussed with patient or other, POC discussed and agreed upon  Patient/Caregiver Demonstrated Understanding: yes  Plan of Care Discussed and Agreed Upon: yes  Patient Response to Education: Patient/Caregiver Verbalized Understanding of Information    Goals:  Encounter Problems       Encounter Problems (Active)       OT Goals       CGA for all functional transfers  (Progressing)       Start:  01/29/24    Expected End:  02/12/24            CGA for functional mobillity household distances for ADL/IADL participation  (Progressing)       Start:  01/29/24    Expected End:  02/12/24            Fair + dyn standing balance during ADLs/transfers  (Progressing)       Start:  01/29/24    Expected End:  02/12/24            CGA for LB dressing; AE as needed  (Progressing)       Start:  01/29/24    Expected End:  02/12/24

## 2024-02-02 NOTE — PROGRESS NOTES
02/02/24 1414   Current Planned Discharge Disposition   Current Planned Discharge Disposition SNF  ([facility])     SW notified that insurance auth has been received and facility can accommodate pt today after 12pm. Care team notified. Pt and family updated, family planning to transport to facility via private vehicle. Facility and care team aware.

## 2024-02-02 NOTE — DISCHARGE SUMMARY
Discharge Diagnosis  Syncope and collapse  1. Syncope and collapse     2.  Left knee pain without fracture    Issues Requiring Follow-Up  Follow up with orthopedics on discharge     Discharge Meds     Your medication list        CONTINUE taking these medications        Instructions Last Dose Given Next Dose Due   acetaminophen 325 mg tablet  Commonly known as: Tylenol           apixaban 5 mg tablet  Commonly known as: Eliquis      Take 1 tablet (5 mg) by mouth 2 times a day.       atorvastatin 10 mg tablet  Commonly known as: Lipitor      TAKE 1 TABLET DAILY       cholecalciferol 50 MCG (2000 UT) tablet  Commonly known as: Vitamin D-3           clobetasol 0.05 % ointment  Commonly known as: Temovate      Apply topically 2 times a day.       Dulera 100-5 mcg/actuation inhaler  Generic drug: mometasone-formoterol           DULoxetine 60 mg DR capsule  Commonly known as: Cymbalta           estradiol 0.01 % (0.1 mg/gram) vaginal cream  Commonly known as: Estrace           levothyroxine 112 mcg capsule  Commonly known as: Tirosint           magnesium oxide 400 mg (241.3 mg magnesium) tablet  Commonly known as: Mag-Ox      Take 1 tablet (400 mg) by mouth once daily.       metFORMIN  mg 24 hr tablet  Commonly known as: Glucophage-XR           metoprolol tartrate 25 mg tablet  Commonly known as: Lopressor      Take 1 tablet (25 mg) by mouth 2 times a day.       montelukast 10 mg tablet  Commonly known as: Singulair      TAKE 1 TABLET BY MOUTH AT  BEDTIME       nitroglycerin 0.4 mg SL tablet  Commonly known as: Nitrostat      Place 1 tablet (0.4 mg) under the tongue every 5 minutes if needed for chest pain.       omeprazole 40 mg DR capsule  Commonly known as: PriLOSEC      TAKE 1 CAPSULE BY MOUTH TWICE  DAILY       oxybutynin XL 10 mg 24 hr tablet  Commonly known as: Ditropan-XL      TAKE 1 TABLET DAILY       POTASSIUM ORAL           Spiriva Respimat 2.5 mcg/actuation inhaler  Generic drug: tiotropium            traZODone 50 mg tablet  Commonly known as: Desyrel           Ventolin HFA 90 mcg/actuation inhaler  Generic drug: albuterol      USE 2 INHALATIONS BY MOUTH EVERY 4 HOURS AS NEEDED                Test Results Pending At Discharge  Pending Labs       No current pending labs.            Hospital Course  Myrna Rodrigues is 73 y.o. female history of CAD status post PCI, paroxysmal A-fib on Eliquis, COPD, type 2 diabetes, hypertension, hyperlipidemia presenting with a syncopal episode.  Patient was admitted for syncope, echocardiogram on 1/18/2024 showed preserved EF, telemetry monitoring did not show significant arrhythmia, possible orthostatics, received IV hydration, currently asymptomatic, patient reported left knee pain and decreased range of motion, has history of knee surgery bilaterally.  Orthopedics was consulted, CT scan of the left knee was done did not show acute finding, MRI of the left knee was also negative for fracture or tendon abnormalities.  Orthopedics recommended weightbearing as tolerated, patient was recommended for moderate intensity therapy and currently being discharged to SNF.  Patient is stable for discharge today with outpatient follow-up with orthopedics.  Discharge plan was discussed with the patient and patient in agreement.  Total discharge time spent 30 minutes.    Pertinent Physical Exam At Time of Discharge  Physical Exam  Constitutional:       General: She is not in acute distress.     Appearance: Normal appearance. She is not ill-appearing, toxic-appearing or diaphoretic.   HENT:      Head: Normocephalic and atraumatic.      Mouth/Throat:      Mouth: Mucous membranes are moist.      Pharynx: No oropharyngeal exudate.   Eyes:      Extraocular Movements: Extraocular movements intact.      Pupils: Pupils are equal, round, and reactive to light.   Cardiovascular:      Rate and Rhythm: Normal rate and regular rhythm.      Heart sounds: No murmur heard.  Pulmonary:      Effort: Pulmonary  effort is normal. No respiratory distress.      Breath sounds: Normal breath sounds. No wheezing.   Abdominal:      General: Abdomen is flat. Bowel sounds are normal. There is no distension.      Tenderness: There is no abdominal tenderness. There is no guarding or rebound.   Musculoskeletal:         General: No swelling.      Right lower leg: No edema.      Left lower leg: No edema.      Comments: Left knee immobilizer in place   Skin:     Findings: No lesion or rash.   Neurological:      General: No focal deficit present.      Mental Status: She is alert and oriented to person, place, and time.      Cranial Nerves: No cranial nerve deficit.      Motor: No weakness.   Psychiatric:         Mood and Affect: Mood normal.         Behavior: Behavior normal.         Outpatient Follow-Up  Future Appointments   Date Time Provider Department Center   2/20/2024 11:30 AM Major Jurado MD [id number] [place]   4/5/2024  7:00 AM Fabienne Duff MD [id number] [place]   5/14/2024  1:30 PM Calvin Baker MD [id number] [place]   7/17/2024  2:15 PM Shashank Proctor MD [id number] [place]   7/19/2024  1:40 PM Lesia Degroot MD [id number] [place]         Otilia Mcmahon MD

## 2024-02-15 ENCOUNTER — DOCUMENTATION (OUTPATIENT)
Dept: PRIMARY CARE | Facility: CLINIC | Age: 74
End: 2024-02-15
Payer: MEDICARE

## 2024-02-15 ENCOUNTER — APPOINTMENT (OUTPATIENT)
Dept: ORTHOPEDIC SURGERY | Facility: CLINIC | Age: 74
End: 2024-02-15
Payer: MEDICARE

## 2024-02-16 ENCOUNTER — PATIENT OUTREACH (OUTPATIENT)
Dept: CARE COORDINATION | Facility: CLINIC | Age: 74
End: 2024-02-16
Payer: MEDICARE

## 2024-02-16 NOTE — PROGRESS NOTES
Discharge Facility: [facility]  Discharge Diagnosis: Syncope and Collapse  Admission Date: 2/2/2024  Discharge Date: 2/15/2024    Pt was admitted to [facility] 1/29/2024 for observation.    PCP Appointment Date:  -2/19/2024 0950    Specialist Appointment Date:   -2/19/2024 1330 Ortho Dr. Mccarthy  -2/20/2024 1130 Pulmonology Dr. Jurado    [facility] Encounter and Summary: Linked     See discharge assessment below for further details    Engagement  Call Start Time: 0948 (2/16/2024 10:04 AM)    Medications  Does the patient have all medications ordered at discharge?: Not applicable (2/16/2024 10:04 AM)  Care Management Interventions: No intervention needed (2/16/2024 10:04 AM)  Is the patient taking all medications as directed (includes completed medication regime)?: Yes (2/16/2024 10:04 AM)    Appointments  Does the patient have a primary care provider?: Yes (2/16/2024 10:04 AM)  Care Management Interventions: Verified appointment date/time/provider (2/16/2024 10:04 AM)  Has the patient kept scheduled appointments due by today?: Yes (2/16/2024 10:04 AM)    Self Management  Has home health visited the patient within 72 hours of discharge?: Not applicable (2/16/2024 10:04 AM)    Patient Teaching  Does the patient have access to their discharge instructions?: Yes (2/16/2024 10:04 AM)  Care Management Interventions: Reviewed instructions with patient (2/16/2024 10:04 AM)  What is the patient's perception of their health status since discharge?: Improving (2/16/2024 10:04 AM)  Is the patient/caregiver able to teach back the hierarchy of who to call/visit for symptoms/problems? PCP, Specialist, Home Health nurse, Urgent Care, ED, 911: Yes (2/16/2024 10:04 AM)    Wrap Up  Wrap Up Additional Comments: Pt was admitted to [facility] 1/29/2024 for observation for syncope and collapse. Pt was discharged to [facility] 2/2-2/15/2024. Pt reports she is happy to be home and is  feeling better. Pt states she is able to get around the house well. Pt reports she was discharged with no new medications. Pt reports understanding of discharge instructions. Pt scheduled for PCP follow up 2/19/2024 0950. Pt has appt with ortho Dr. Mccarthy 2/19/2024 1330 and 2/20/2024 1130 with pulmonologist Dr. Jurado. Pt denies any questions, needs, or concerns at this time. She is encouraged to call if quesitons or needs arise. (2/16/2024 10:04 AM)  Call End Time: 0954 (2/16/2024 10:04 AM)

## 2024-02-19 ENCOUNTER — OFFICE VISIT (OUTPATIENT)
Dept: PRIMARY CARE | Facility: CLINIC | Age: 74
End: 2024-02-19
Payer: MEDICARE

## 2024-02-19 ENCOUNTER — LAB (OUTPATIENT)
Dept: LAB | Facility: LAB | Age: 74
End: 2024-02-19
Payer: MEDICARE

## 2024-02-19 ENCOUNTER — OFFICE VISIT (OUTPATIENT)
Dept: ORTHOPEDIC SURGERY | Facility: CLINIC | Age: 74
End: 2024-02-19
Payer: MEDICARE

## 2024-02-19 VITALS
OXYGEN SATURATION: 100 % | RESPIRATION RATE: 14 BRPM | HEART RATE: 78 BPM | DIASTOLIC BLOOD PRESSURE: 80 MMHG | WEIGHT: 147 LBS | BODY MASS INDEX: 27.05 KG/M2 | SYSTOLIC BLOOD PRESSURE: 126 MMHG | HEIGHT: 62 IN

## 2024-02-19 DIAGNOSIS — D50.9 MICROCYTIC ANEMIA: Primary | ICD-10-CM

## 2024-02-19 DIAGNOSIS — D64.9 ANEMIA, UNSPECIFIED TYPE: ICD-10-CM

## 2024-02-19 DIAGNOSIS — B37.0 THRUSH: ICD-10-CM

## 2024-02-19 DIAGNOSIS — D50.9 MICROCYTIC ANEMIA: ICD-10-CM

## 2024-02-19 DIAGNOSIS — I48.0 PAROXYSMAL ATRIAL FIBRILLATION (MULTI): ICD-10-CM

## 2024-02-19 DIAGNOSIS — Z96.652 HISTORY OF TOTAL KNEE REPLACEMENT, LEFT: Primary | ICD-10-CM

## 2024-02-19 LAB
ANION GAP SERPL CALC-SCNC: 10 MMOL/L (ref 10–20)
BASOPHILS # BLD AUTO: 0.03 X10*3/UL (ref 0–0.1)
BASOPHILS NFR BLD AUTO: 0.3 %
BUN SERPL-MCNC: 14 MG/DL (ref 6–23)
CALCIUM SERPL-MCNC: 8.9 MG/DL (ref 8.6–10.3)
CHLORIDE SERPL-SCNC: 102 MMOL/L (ref 98–107)
CO2 SERPL-SCNC: 31 MMOL/L (ref 21–32)
CREAT SERPL-MCNC: 0.96 MG/DL (ref 0.5–1.05)
EGFRCR SERPLBLD CKD-EPI 2021: 62 ML/MIN/1.73M*2
EOSINOPHIL # BLD AUTO: 0.33 X10*3/UL (ref 0–0.4)
EOSINOPHIL NFR BLD AUTO: 3.3 %
ERYTHROCYTE [DISTWIDTH] IN BLOOD BY AUTOMATED COUNT: 18.1 % (ref 11.5–14.5)
FERRITIN SERPL-MCNC: 15 NG/ML (ref 8–150)
FOLATE SERPL-MCNC: 5 NG/ML
GLUCOSE SERPL-MCNC: 81 MG/DL (ref 74–99)
HCT VFR BLD AUTO: 33.1 % (ref 36–46)
HGB BLD-MCNC: 9.7 G/DL (ref 12–16)
IMM GRANULOCYTES # BLD AUTO: 0.02 X10*3/UL (ref 0–0.5)
IMM GRANULOCYTES NFR BLD AUTO: 0.2 % (ref 0–0.9)
IRON SATN MFR SERPL: 6 % (ref 25–45)
IRON SERPL-MCNC: 22 UG/DL (ref 35–150)
LYMPHOCYTES # BLD AUTO: 2.61 X10*3/UL (ref 0.8–3)
LYMPHOCYTES NFR BLD AUTO: 26.4 %
MCH RBC QN AUTO: 22.7 PG (ref 26–34)
MCHC RBC AUTO-ENTMCNC: 29.3 G/DL (ref 32–36)
MCV RBC AUTO: 78 FL (ref 80–100)
MONOCYTES # BLD AUTO: 0.73 X10*3/UL (ref 0.05–0.8)
MONOCYTES NFR BLD AUTO: 7.4 %
NEUTROPHILS # BLD AUTO: 6.17 X10*3/UL (ref 1.6–5.5)
NEUTROPHILS NFR BLD AUTO: 62.4 %
NRBC BLD-RTO: 0 /100 WBCS (ref 0–0)
PLATELET # BLD AUTO: 370 X10*3/UL (ref 150–450)
POTASSIUM SERPL-SCNC: 4.8 MMOL/L (ref 3.5–5.3)
RBC # BLD AUTO: 4.27 X10*6/UL (ref 4–5.2)
SODIUM SERPL-SCNC: 138 MMOL/L (ref 136–145)
T4 FREE SERPL-MCNC: 1.55 NG/DL (ref 0.61–1.12)
TIBC SERPL-MCNC: 344 UG/DL (ref 240–445)
TSH SERPL-ACNC: 0.23 MIU/L (ref 0.44–3.98)
UIBC SERPL-MCNC: 322 UG/DL (ref 110–370)
VIT B12 SERPL-MCNC: 429 PG/ML (ref 211–911)
WBC # BLD AUTO: 9.9 X10*3/UL (ref 4.4–11.3)

## 2024-02-19 PROCEDURE — 84443 ASSAY THYROID STIM HORMONE: CPT

## 2024-02-19 PROCEDURE — 83540 ASSAY OF IRON: CPT

## 2024-02-19 PROCEDURE — 85025 COMPLETE CBC W/AUTO DIFF WBC: CPT

## 2024-02-19 PROCEDURE — 82728 ASSAY OF FERRITIN: CPT

## 2024-02-19 PROCEDURE — 36415 COLL VENOUS BLD VENIPUNCTURE: CPT

## 2024-02-19 PROCEDURE — 80048 BASIC METABOLIC PNL TOTAL CA: CPT

## 2024-02-19 PROCEDURE — 3074F SYST BP LT 130 MM HG: CPT | Performed by: INTERNAL MEDICINE

## 2024-02-19 PROCEDURE — 84439 ASSAY OF FREE THYROXINE: CPT

## 2024-02-19 PROCEDURE — 1159F MED LIST DOCD IN RCRD: CPT | Performed by: INTERNAL MEDICINE

## 2024-02-19 PROCEDURE — 1036F TOBACCO NON-USER: CPT | Performed by: ORTHOPAEDIC SURGERY

## 2024-02-19 PROCEDURE — 99214 OFFICE O/P EST MOD 30 MIN: CPT | Performed by: INTERNAL MEDICINE

## 2024-02-19 PROCEDURE — 99213 OFFICE O/P EST LOW 20 MIN: CPT | Performed by: ORTHOPAEDIC SURGERY

## 2024-02-19 PROCEDURE — 1160F RVW MEDS BY RX/DR IN RCRD: CPT | Performed by: ORTHOPAEDIC SURGERY

## 2024-02-19 PROCEDURE — 83550 IRON BINDING TEST: CPT

## 2024-02-19 PROCEDURE — 3079F DIAST BP 80-89 MM HG: CPT | Performed by: INTERNAL MEDICINE

## 2024-02-19 PROCEDURE — 1036F TOBACCO NON-USER: CPT | Performed by: INTERNAL MEDICINE

## 2024-02-19 PROCEDURE — 1125F AMNT PAIN NOTED PAIN PRSNT: CPT | Performed by: INTERNAL MEDICINE

## 2024-02-19 PROCEDURE — 1159F MED LIST DOCD IN RCRD: CPT | Performed by: ORTHOPAEDIC SURGERY

## 2024-02-19 PROCEDURE — 1160F RVW MEDS BY RX/DR IN RCRD: CPT | Performed by: INTERNAL MEDICINE

## 2024-02-19 PROCEDURE — 82607 VITAMIN B-12: CPT

## 2024-02-19 PROCEDURE — 1125F AMNT PAIN NOTED PAIN PRSNT: CPT | Performed by: ORTHOPAEDIC SURGERY

## 2024-02-19 PROCEDURE — 82746 ASSAY OF FOLIC ACID SERUM: CPT

## 2024-02-19 RX ORDER — NYSTATIN 100000 [USP'U]/ML
500000 SUSPENSION ORAL 4 TIMES DAILY
Qty: 280 ML | Refills: 1 | Status: SHIPPED | OUTPATIENT
Start: 2024-02-19 | End: 2024-03-18

## 2024-02-19 ASSESSMENT — ENCOUNTER SYMPTOMS
MYALGIAS: 0
NAUSEA: 0
DIAPHORESIS: 0
COUGH: 0
VOMITING: 0
JOINT SWELLING: 0
DIARRHEA: 0
FATIGUE: 1
CHILLS: 0
FEVER: 0
CONSTIPATION: 0
SHORTNESS OF BREATH: 0

## 2024-02-19 ASSESSMENT — PATIENT HEALTH QUESTIONNAIRE - PHQ9
2. FEELING DOWN, DEPRESSED OR HOPELESS: NOT AT ALL
1. LITTLE INTEREST OR PLEASURE IN DOING THINGS: NOT AT ALL
SUM OF ALL RESPONSES TO PHQ9 QUESTIONS 1 AND 2: 0

## 2024-02-19 NOTE — PROGRESS NOTES
"Subjective   Myrna Rodrigues is a 73 y.o. female who presents for follow up from SNF from fall pt thinks she passed out and fell when she can to she managed to get to counter and fell again.    HPI   Passed out at home, FELL TRYING TO GET UP, FELT DUE TO DEHYDRATION    KEPT IN HOSPITAL AND HAD ALL KIND OF TESTS, AFTER GIVEN FLUIDS, PRESUMPTIVELY GOT BETTER    WENT TO REHAB AND GOT MOVING, NO FALLS OR PASSING OUT AT REHAB, GOT HOME 4-5 DAYS AGO    STILL PAIN IN KNEE AND CAN'T BEND IT AS FAR AS BEFORE, SEEING ORTHO LATER TODAY    NO SURGERY IN THE PAST FEM MONTHS, LAST WAS FEW YEARS AGO.    HAS BEEN DEALING WITH AN AFIB SITUATION RECENTLY, HAD A CARDIOVERSION THE WEEK BEFORE CAME TO HOSPITAL.    NEVER HAD AFIB BEFORE RECENTLY.    JUST STARTED ELIQUIS PAST MONTH OR SO, STARTED BEFORE HAD CARDIOVERSION.    TAKE OMEPRAZOLE ROUTINELY FOR HEARTBURN AND HAS HAD UPPER AND LOWER ENDOSCOPY IN THE PAST    HAS HAD PARTIAL HYSTERECTOMY IN THE PAST    NO MELENA, BUT HAS HAD DROPS OF BLOOD ON TOILET PAPER, UNAWARE OF HEMORRHOIDS    Review of Systems   Constitutional:  Positive for fatigue. Negative for chills, diaphoresis and fever.   Respiratory:  Negative for cough and shortness of breath.    Cardiovascular:  Negative for chest pain and leg swelling.   Gastrointestinal:  Negative for constipation, diarrhea, nausea and vomiting.   Musculoskeletal:  Negative for joint swelling and myalgias.       Objective   /80   Pulse 78   Resp 14   Ht 1.575 m (5' 2\")   Wt 66.7 kg (147 lb)   SpO2 100%   BMI 26.89 kg/m²     Physical Exam  Vitals reviewed.   Constitutional:       General: She is not in acute distress.     Appearance: She is not ill-appearing.   Cardiovascular:      Rate and Rhythm: Normal rate and regular rhythm.      Pulses: Normal pulses.      Heart sounds:      No gallop.   Pulmonary:      Breath sounds: Normal breath sounds. No wheezing, rhonchi or rales.      Comments: PROLONGED EXPIRATORY PHASE  Abdominal:      " General: Abdomen is flat. Bowel sounds are normal.      Palpations: Abdomen is soft.      Tenderness: There is no guarding or rebound.   Musculoskeletal:      Right lower leg: No edema.      Left lower leg: No edema.   Skin:     Coloration: Skin is pale.         Assessment/Plan   Problem List Items Addressed This Visit    None  Visit Diagnoses       Microcytic anemia    -  Primary    Relevant Orders    Iron and TIBC (Completed)    Ferritin (Completed)    CBC and Auto Differential (Completed)    Vitamin B12 (Completed)    TSH with reflex to Free T4 if abnormal (Completed)    Folate (Completed)    Occult Blood, Stool    Occult Blood, Stool    Occult Blood, Stool    Thrush        Relevant Medications    nystatin (Mycostatin) 100,000 unit/mL suspension    Anemia, unspecified type        Relevant Orders    TSH with reflex to Free T4 if abnormal (Completed)          Patient Instructions    LOOKING AT LABS OVER PAST MONTHS, IT APPEARS THAT YOU MIGHT HAVE DEVELOPED AN IRON DEFICIENCY TYPE OF ANEMIA, WORSENED SINCE STARTING THE BLOOD THINNER ELIQUIS FOR ATRIAL FIBRILLATION    2. THE USUAL CAUSE OF THIS IS SLOW BLOOD LOSS OVER TIME, USUALLY FROM SOMEWHERE IN THE GASTROINTESTINAL TRACT    3.  WILL CHECK LABS AND A FEW STOOL SAMPLES TO VERIFY THIS SUSPICION, AND IF CONFIRMED, WILL START YOU ON AN IRON SUPPLEMENT, AND ASK YOU TO SEE DR. GUPTA AGAIN.  CONTINUE TAKING OMEPRAZOLE FOR NOW IN THE MEANTIME    4.  I SUSPECT THAT THE ANEMIA CONTRIBUTED TO YOUR PASSING OUT, AND CERTAINLY IS CONTRIBUTING TO YOUR FATIGUE AND SHORTNESS OF BREATH WHILE EXERCISING    5.  PLEASE LET YOUR ATRIAL FIBRILLATION DOCTOR KNOW THAT WE ARE TRYING TO COME TO A DETERMINATION AS TO THE CAUSE OF THE ANEMIA, BUT THAT IT IS SUSPECTED THAT IT HAS OCCURRED SINCE ELIQUIS    6.  FOLLOW UP 2 MONTHS OR AS NEEDED.    7.  [facility] SWISH AND SPIT SENT IN FOR YOU FOR THRUSH

## 2024-02-19 NOTE — PATIENT INSTRUCTIONS
LOOKING AT LABS OVER PAST MONTHS, IT APPEARS THAT YOU MIGHT HAVE DEVELOPED AN IRON DEFICIENCY TYPE OF ANEMIA, WORSENED SINCE STARTING THE BLOOD THINNER ELIQUIS FOR ATRIAL FIBRILLATION    2. THE USUAL CAUSE OF THIS IS SLOW BLOOD LOSS OVER TIME, USUALLY FROM SOMEWHERE IN THE GASTROINTESTINAL TRACT    3.  WILL CHECK LABS AND A FEW STOOL SAMPLES TO VERIFY THIS SUSPICION, AND IF CONFIRMED, WILL START YOU ON AN IRON SUPPLEMENT, AND ASK YOU TO SEE DR. GUPTA AGAIN.  CONTINUE TAKING OMEPRAZOLE FOR NOW IN THE MEANTIME    4.  I SUSPECT THAT THE ANEMIA CONTRIBUTED TO YOUR PASSING OUT, AND CERTAINLY IS CONTRIBUTING TO YOUR FATIGUE AND SHORTNESS OF BREATH WHILE EXERCISING    5.  PLEASE LET YOUR ATRIAL FIBRILLATION DOCTOR KNOW THAT WE ARE TRYING TO COME TO A DETERMINATION AS TO THE CAUSE OF THE ANEMIA, BUT THAT IT IS SUSPECTED THAT IT HAS OCCURRED SINCE ELIQUIS    6.  FOLLOW UP 2 MONTHS OR AS NEEDED.    7.  [facility] SWISH AND SP[facility] SENT IN FOR YOU FOR THRUSH

## 2024-02-19 NOTE — PROGRESS NOTES
History of present illness: Here follow-up left knee patient had a syncopal episode aggravated the left knee replacement could not raise it had swelling we were concerned about a quad tear she had x-rays CT scan and MRI she went to therapy and now is significantly improved    Physical exam:    General: No acute distress or breathing difficulty or discomfort, pleasant and cooperative with the examination.    Extremities: Extensor mechanism is intact she has a positive straight leg raise she can bend 0 to 135 degrees the knee is stable to stress testing in the collateral plane in the anterior posterior plane ecchymosis bruising swelling almost is completely resolved incisions healed she can plantarflex dorsiflex toes foot and ankle    Diagnostic studies: Imaging studies including CAT scan and MRI and x-ray are all negative for fracture dislocation or injury to the knee implant    Impression: Left knee syncopal episode with knee sprain strain she had an effusion and [place] straight leg raise but imaging studies ruled out any fracture or pathology    Plan: Continue to strengthen her leg quad strength and low impact gentle range of motion program walker or cane assist fall prevention I will see her back in 6 months to a year for repeat eval the left knee is improved significantly and she is comfortable and stable

## 2024-02-20 ENCOUNTER — OFFICE VISIT (OUTPATIENT)
Dept: PULMONOLOGY | Facility: CLINIC | Age: 74
End: 2024-02-20
Payer: MEDICARE

## 2024-02-20 VITALS
BODY MASS INDEX: 26.94 KG/M2 | SYSTOLIC BLOOD PRESSURE: 122 MMHG | OXYGEN SATURATION: 97 % | TEMPERATURE: 97.7 F | HEART RATE: 67 BPM | DIASTOLIC BLOOD PRESSURE: 78 MMHG | HEIGHT: 62 IN | WEIGHT: 146.4 LBS | RESPIRATION RATE: 18 BRPM

## 2024-02-20 DIAGNOSIS — D50.9 IRON DEFICIENCY ANEMIA, UNSPECIFIED IRON DEFICIENCY ANEMIA TYPE: Primary | ICD-10-CM

## 2024-02-20 DIAGNOSIS — J45.40 MODERATE PERSISTENT ASTHMA WITHOUT COMPLICATION (HHS-HCC): Primary | ICD-10-CM

## 2024-02-20 PROCEDURE — 1125F AMNT PAIN NOTED PAIN PRSNT: CPT | Performed by: INTERNAL MEDICINE

## 2024-02-20 PROCEDURE — 1036F TOBACCO NON-USER: CPT | Performed by: INTERNAL MEDICINE

## 2024-02-20 PROCEDURE — 99213 OFFICE O/P EST LOW 20 MIN: CPT | Performed by: INTERNAL MEDICINE

## 2024-02-20 PROCEDURE — 3074F SYST BP LT 130 MM HG: CPT | Performed by: INTERNAL MEDICINE

## 2024-02-20 PROCEDURE — 1159F MED LIST DOCD IN RCRD: CPT | Performed by: INTERNAL MEDICINE

## 2024-02-20 PROCEDURE — 3078F DIAST BP <80 MM HG: CPT | Performed by: INTERNAL MEDICINE

## 2024-02-20 PROCEDURE — 1160F RVW MEDS BY RX/DR IN RCRD: CPT | Performed by: INTERNAL MEDICINE

## 2024-02-20 ASSESSMENT — ENCOUNTER SYMPTOMS
DEPRESSION: 1
LOSS OF SENSATION IN FEET: 0
OCCASIONAL FEELINGS OF UNSTEADINESS: 1

## 2024-02-20 ASSESSMENT — PATIENT HEALTH QUESTIONNAIRE - PHQ9
1. LITTLE INTEREST OR PLEASURE IN DOING THINGS: NOT AT ALL
SUM OF ALL RESPONSES TO PHQ9 QUESTIONS 1 AND 2: 0
2. FEELING DOWN, DEPRESSED OR HOPELESS: NOT AT ALL

## 2024-02-21 ENCOUNTER — PATIENT OUTREACH (OUTPATIENT)
Dept: CARE COORDINATION | Facility: CLINIC | Age: 74
End: 2024-02-21
Payer: MEDICARE

## 2024-02-21 NOTE — PROGRESS NOTES
Call regarding appt. with PCP on 2/19/2024 after hospitalization.  At time of outreach call the patient feels as if their condition has improved since last visit. Pt reports that she is using her cane because she is not yet steady on her feet without it.  Reviewed the PCP appointment with the pt and addressed any questions or concerns.  Pt had follow up appts with ortho and pulmonology. Pt reports the only new medication she was prescribed was the nystatin. Pt denies any questions, needs, or concerns at this time. She states her doctors were very thorough.  Verified next PCP appt 4/18/2024 1120.  Pt encouraged to call if questions or needs arise.

## 2024-02-21 NOTE — PROGRESS NOTES
"  Department of Medicine  Division of Pulmonary, Critical Care, and Sleep Medicine  Follow-Up Visit  [facility] - [address], [address]    Physician HPI:  Mrs. Rodrigues is a pleasant 73-year-old female with past medical history of moderate persistent asthma, HTN, CAD and Afib who presented to the office today to establish care for asthma.     Myrna is a lifetime nonsmoker. She was diagnosed with asthma several years ago. Her current regimen include Dulera 200mcg and Montelukast. SHe denies acute respiratory symptoms today or nocturnal symptoms. She has not needed to use Albuterol frequently lately. She reports hospitalization in November 2019 for acute cough and fever. CT scan of chest at that time revealed patchy areas of ground glass opacities mainly in lower lobes. Her hospital admission was complicated with afib w/ RVR for which she was started on Cardizem gtt. Since then, she has been doing fairly well from the respiratory standpoint. she is independent in her daily activities.      Follow up 04/23/2020:  This visit was completed via telephone due to the restrictions of the COVID-19 pandemic. All issues as below were discussed and addressed but no physical exam was performed. If it was felt that the patient should be evaluated in clinic then they were directed there. The patient verbally consented to visit.\"  I spent total of 11 minutes with patient on the phone and more than 50% of the time was spent counseling and/or coordinating care.  Myrna has been doing well from the respiratory standpoint. She denies persistent asthma lately. She did not need to use albuterol frequently.  She has not reviewed her CT scan yet which is scheduled shortly. No recent fevers or chills. No acute chest pain.     Follow up 9/25/2020:  No acute respiratory symptoms today. Using Dulera and Montelukast.   Repeated CT of chest revealed resolution of previous seen GGO.      Follow up 2/21/2022:  Myrna reports more frequent wheezing and " productive cough over the past few weeks which required more frequent use of Ventolin. She has been using high-dose of Dulera twice per day. No asthma exacerbation within the past year that required hospitalization. She denies acute fevers or chills. No acute chest pain or hemoptysis.     Follow-up 4/21/2022:  Myrna reports having increased frequency of chest tightness, wheezing and cough lately that she needed to use albuterol about 3 times per day. Her symptoms would improve temporarily after albuterol use. She received samples of Breztri which provided some improvement in her symptoms though the inhaler was not covered by insurance thus she could not get it. She has been only using short acting bronchodilator lately. She denies acute fevers or purulent sputum production.     Follow-up 8/8/2022:  Myrna has a stable respiratory status since last visit.     2/8/2023:  Respiratory status has been stable since last visit. No acute respiratory symptoms today. No interval history of acute asthma exacerbation.     Follow up 02/20/2024:  Myrna reports stable asthma over the past year. She had mild exacerbations at home that were managed without systemic corticosteroids. Currently, she doesn't have persistent or nocturnal symptoms. Using Dulera and Spiriva every day. No GERD.        Immunization History   Administered Date(s) Administered    Flu vaccine (IIV4), preservative free *Check age/dose* 10/14/2013    Flu vaccine, quadrivalent, high-dose, preservative free, age 65y+ (FLUZONE) 01/01/2014, 09/10/2021, 09/21/2022, 11/14/2023    Influenza, High Dose Seasonal, Preservative Free 10/01/2013, 09/05/2017, 10/12/2018, 09/24/2019, 09/14/2020, 09/22/2022    Influenza, Unspecified 10/01/2014, 10/01/2015, 10/01/2016, 10/01/2017, 12/02/2019    Influenza, seasonal, injectable 10/04/2011, 11/14/2013, 11/20/2015, 09/05/2020, 09/22/2021, 10/03/2022    Influenza, seasonal, injectable, preservative free 02/05/2008, 12/20/2012, 11/03/2014     Novel influenza-H1N1-09, preservative-free 11/05/2009    Pfizer Gray Cap SARS-CoV-2 08/03/2022    Pfizer Purple Cap SARS-CoV-2 02/11/2021, 03/05/2021, 09/12/2021, 10/01/2021    Pneumococcal conjugate vaccine, 13-valent (PREVNAR 13) 02/21/2017, 10/01/2017    Pneumococcal conjugate vaccine, 20-valent (PREVNAR 20) 11/14/2023    Pneumococcal polysaccharide vaccine, 23-valent, age 2 years and older (PNEUMOVAX 23) 01/01/2012, 12/20/2012, 01/01/2014, 03/16/2017, 09/20/2017, 10/01/2018, 10/26/2018    Tdap vaccine, age 7 year and older (BOOSTRIX, ADACEL) 12/07/2015    Zoster, live 01/01/2013, 10/14/2013       Current Outpatient Medications   Medication Instructions    acetaminophen (TYLENOL) 650 mg, oral, Every 6 hours PRN    apixaban (ELIQUIS) 5 mg, oral, 2 times daily    atorvastatin (LIPITOR) 10 mg, oral, Daily    cholecalciferol (VITAMIN D-3) 1,000 Units, oral, Daily RT    clobetasol (Temovate) 0.05 % ointment Topical, 2 times daily    DULoxetine (Cymbalta) 60 mg DR capsule 1 capsule, oral, Daily    estradiol (Estrace) 0.01 % (0.1 mg/gram) vaginal cream vaginal    levothyroxine (Tirosint) 112 mcg capsule 1 capsule, oral, Daily before breakfast, On an empty stomach    magnesium oxide (MAG-OX) 400 mg, oral, Daily    metFORMIN XR (Glucophage-XR) 500 mg 24 hr tablet Take 2 tablets in the morning and 1 tablet in the evening    metoprolol tartrate (LOPRESSOR) 25 mg, oral, 2 times daily    mometasone-formoterol (Dulera) 100-5 mcg/actuation inhaler 2 puffs, inhalation, 2 times daily    montelukast (SINGULAIR) 10 mg, oral, Nightly    nitroglycerin (NITROSTAT) 0.4 mg, sublingual, Every 5 min PRN    nystatin (MYCOSTATIN) 500,000 Units, oral, 4 times daily, Swish in mouth and swallow.    omeprazole (PriLOSEC) 40 mg DR capsule TAKE 1 CAPSULE BY MOUTH TWICE  DAILY    oxybutynin XL (DITROPAN-XL) 10 mg, oral, Daily    POTASSIUM ORAL Daily, 99mg, one    tiotropium (Spiriva Respimat) 2.5 mcg/actuation inhaler 2 puffs, inhalation, Daily  "   traZODone (Desyrel) 50 mg tablet Take 2 tablets (100 mg) by mouth once daily at bedtime.    Ventolin HFA 90 mcg/actuation inhaler 2 puffs, inhalation, Every 4 hours PRN        Allergies:  Allergies   Allergen Reactions    Penicillins Anaphylaxis, Unknown and Shortness of breath    Sulfa (Sulfonamide Antibiotics) Swelling    Nitrofurantoin Rash    Nitrofurantoin Monohyd/M-Cryst Diarrhea and Rash          Visit Vitals  /78   Pulse 67   Temp 36.5 °C (97.7 °F) (Temporal)   Resp 18   Ht 1.575 m (5' 2\")   Wt 66.4 kg (146 lb 6.4 oz)   SpO2 97%   BMI 26.78 kg/m²   OB Status Hysterectomy   Smoking Status Never   BSA 1.7 m²        Physical Exam     Constitutional: Alert and in no acute distress. Well developed, well nourished.   Ears, Nose, Mouth, and Throat: External inspection of ears and nose: Normal.  Oropharynx: Normal.    Pulmonary: Chest: Normal to inspection.  Respiratory effort: No increased work of breathing or signs of respiratory distress.  Auscultation of lungs: Clear to auscultation bilaterally.    Cardiovascular: Heart rate and rhythm were normal, normal S1 and S2, no gallops, no murmurs and no pericardial rub. No peripheral edema.   Skin: Normal skin color and pigmentation, normal skin turgor, and no rash.   Psychiatric: Judgment and insight: Intact. Alert and oriented to person, place and time. Mood and affect: Normal.        Chest Radiograph     XR chest 1 view 11/24/2023    Narrative  Interpreted By:  Mejia Rawls,  STUDY:  XR CHEST 1 VIEW;  11/24/2023 9:50 am    INDICATION:  Signs/Symptoms:Chest Pain.    COMPARISON:  02/21/2022    ACCESSION NUMBER(S):  [id number]    ORDERING CLINICIAN:  CHASE LANDERS    FINDINGS:  CARDIOMEDIASTINAL SILHOUETTE AND VASCULATURE:    Cardiac size:  Within normal limits.    Aortic shadow:  Within normal limits.    Mediastinal contours: Within normal limits.    Pulmonary vasculature:  The central vasculature is unremarkable    LUNGS:  Lungs are clear.    ABDOMEN AND " OTHER FINDINGS:  No remarkable upper abdominal findings.    BONES:  No acute osseous changes.    Impression  1.  No active cardiopulmonary disease.  There has not been  significant interval change from the prior exam.    Signed by: Mejia Rawls 11/24/2023 10:05 AM  Dictation workstation:   [id number]      CHEST 2 VIEW 02/21/2022    Narrative  MRN: [id number]  Patient Name: LISSETTE ZAVALA    STUDY:   CHEST 2 VIEW PA AND LAT;  2/21/2022 11:18 am    INDICATION:  cough  R05.9: Cough.    COMPARISON:  08/05/2021    ACCESSION NUMBER(S):  [id number]    ORDERING CLINICIAN:  GRIS MARAVILLA    TECHNIQUE:    FINDINGS:  The heart is normal in size.    The lungs are hyperinflated. There is no consolidation or pleural  fluid.    There appear to be faint aortic calcifications.    There are mild degenerative changes of the spine. There are surgical  clips in the upper abdomen.    COMPARISON OF FINDING:  The chest is similar    Impression  No acute cardiopulmonary disease.      Echocardiogram     Transesophageal Echo (GAVIN) Complete 01/03/2024    Narrative  [place] [id number]  Tel [phone] Fax [phone]    TRANSESOPHAGEAL ECHOCARDIOGRAM REPORT      Patient Name:      LISSETTE ZAVALA           Reading Physician:    [id number] Felicity Duggan MD  Study Date:        1/3/2024             Ordering Provider:    [id number] GINNY CARVER  MRN/PID:           [id number]             Fellow:  Accession#:        [id number]         Nurse:                Eriberto Taylor RN  Date of Birth/Age: 1950 / 73      Sonographer:          Linda MCCALL  Gender:            F                    Additional Staff:  Height:            157.48 cm            Admit Date:           1/3/2024  Weight:            68.49 kg             Admission Status:     Outpatient  BSA:               1.70 m2              Department Location:   CathLab  Blood Pressure: 146 /80 mmHg    Study Type:     TRANSESOPHAGEAL ECHO (GAVIN)  Diagnosis/ICD: Unspecified atrial flutter-I48.92  Indication:    A-FLUTTER  CPT Codes:     GAVIN Complete-64882; Moderate Sedation Services initial 15 minutes  patient >5 years-[id number]  Study Detail: The following Echo studies were performed: 2D, M-Mode, Doppler and  color flow. Agitated saline used as a contrast agent for  intraseptal flow evaluation. The patient was sedated.      PHYSICIAN INTERPRETATION:  GAVIN Details: The GAVIN probe used was [id number]. Technically adequate omniplane transesophageal echocardiogram performed. Agitated saline contrast and color flow Doppler echo was performed to assess for the presence of a patent foramen ovale.  GAVIN Medication: The pharynx was anesthetized with Cetacaine spray and viscous lidocaine. The patient was sedated using moderate sedation. Total intraservice time for moderate sedation was 22 minutes. Midazolam and Fentanyl was used to sedate the patient for this exam.  GAVIN Procedure: The probe was passed without difficulty. The patient tolerated the procedure well without any apparent complications.  Left Ventricle: Left ventricular systolic function is normal, with an estimated ejection fraction of 60-65%. There are no regional wall motion abnormalities. The left ventricular cavity size is normal. Left ventricular diastolic filling was indeterminate.  Left Atrium: The left atrium is moderately dilated. There is no definite left atrial thrombus present. A bubble study using agitated saline was performed. Bubble study is negative. The left atrial appendage is enlarged and there is no thrombus visualized in the left atrial appendage. Bilobed left atrial appendage with prominent Coumadin ridge, but no obvious thrombus visualized.  Right Ventricle: The right ventricle is normal in size. There is normal right ventricular global systolic function.  Right Atrium: The right atrium is mildly dilated.  Aortic Valve: The aortic valve is trileaflet. There is mild  aortic valve cusp calcification. There is no evidence of aortic valve regurgitation.  Mitral Valve: The mitral valve is mildly thickened. There is mild mitral valve regurgitation.  Tricuspid Valve: The tricuspid valve is structurally normal. There is mild tricuspid regurgitation.  Pulmonic Valve: The pulmonic valve is not well visualized. There is no indication of pulmonic valve regurgitation.  Pericardium: There is no pericardial effusion noted.  Aorta: The aortic root is normal. There is plaque visualized in the descending aorta which is classified as a Grade 2 [mild (focal or diffuse) intimal thickening of 2-3 mm] atherosclerosis.  Pulmonary Veins: The pulmonary veins appear normal and return normally to the left atrium.      CONCLUSIONS:  1. Left ventricular systolic function is normal with a 60-65% estimated ejection fraction.  2. The left atrium is moderately dilated.  3. Bilobed left atrial appendage with prominent Coumadin ridge, but no obvious thrombus visualized.  4. No left atrial thrombus.  5. There is plaque visualized in the descending aorta.    QUANTITATIVE DATA SUMMARY:  TRICUSPID VALVE/RVSP:  Normal Ranges:  Peak TR Velocity: 2.28 m/s  RV Syst Pressure: 23.8 mmHg (< 30mmHg)      [id number] Felicity Duggan MD  Electronically signed on 1/4/2024 at 11:29:47 AM        ** Final **       Chest CT Scan     No results found for this or any previous visit from the past 1000 days.       Laboratory Studies     Lab Results   Component Value Date    WBC 9.9 02/19/2024    HGB 9.7 (L) 02/19/2024    HCT 33.1 (L) 02/19/2024    MCV 78 (L) 02/19/2024     02/19/2024      Lab Results   Component Value Date    GLUCOSE 81 02/19/2024    CALCIUM 8.9 02/19/2024     02/19/2024    K 4.8 02/19/2024    CO2 31 02/19/2024     02/19/2024    BUN 14 02/19/2024    CREATININE 0.96 02/19/2024      Lab Results   Component Value Date    ALT 8 01/28/2024    AST 12 01/28/2024    ALKPHOS 69 01/28/2024    BILITOT 0.5 01/28/2024         Protime   Date/Time Value Ref Range Status   01/28/2024 09:05 AM 15.2 (H) 9.8 - 12.8 seconds Final     INR   Date/Time Value Ref Range Status   01/28/2024 09:05 AM 1.3 (H) 0.9 - 1.1 Final       Immunocap IgE   Date/Time Value Ref Range Status   05/03/2022 01:33 PM 69.0 0.0 - 214.0 KU/L Final     Comment:      Note:  Omalizumab (Xolair, [employer] humanized    IgG1 antihuman IgE Fc) treatment does not    significantly interfere with the accuracy of    total IgE on the ImmunoCAP ([employer]) platform.    J Allergy Clin Immunol 2006;117:759-66).   Allergens, parasitic diseases, smoking, and   alcohol consumption have been reported to   increase levels of total IgE in serum.       Aspergillus Fumigatus IgE   Date/Time Value Ref Range Status   05/03/2022 01:33 PM <0.10 <0.35 KU/L Final     Comment:       SEE IMMUNOCAP [email or web address]        Assessment and Plan / Recommendations     1. Asthma:    Moderately persistent. Lungs are clear on auscultation today.  -- Continue on current inhaler regimen with ICS/LABA/LAMA  -- Continue on montelukast  -- PPI for GERD  -- She is up to date with immunizations     F/U in 12 months        Please excuse any misspellings or unintended errors related to the Dragon speech recognition software used to dictate this note.         Major Jurado MD  02/20/2024

## 2024-03-05 PROBLEM — T36.0X5A PENICILLIN ADVERSE REACTION: Status: ACTIVE | Noted: 2024-03-05

## 2024-03-05 RX ORDER — AMOXICILLIN 400 MG/5ML
POWDER, FOR SUSPENSION ORAL 2 TIMES DAILY
COMMUNITY
End: 2024-03-05 | Stop reason: SDUPTHER

## 2024-03-05 RX ORDER — AMOXICILLIN 400 MG/5ML
50 POWDER, FOR SUSPENSION ORAL 2 TIMES DAILY
Qty: 50 ML | Refills: 0 | Status: CANCELLED | OUTPATIENT
Start: 2024-03-05

## 2024-03-05 NOTE — PROGRESS NOTES
Subjective   Patient ID:   [id number]   Myrna Rodrigues is a 73 y.o. female who presents for Drug Challenge (Amoxicillin).    Chief Complaint   Patient presents with    Drug Challenge     Amoxicillin        Patient has a history of multiple allergies.  In her early 30s, she had an asthma flare after a penicillin injection.  She cannot recall why she used it.    Since last visit, 09-, patient has been well overall.  She had negative skin prick test to penicillin at last visit.     Patient presents for oral challenge to amoxicillin today.    Objective     /75   Pulse 69   SpO2 97%      Physical Exam  Constitutional:       Appearance: Normal appearance.   HENT:      Head: Normocephalic and atraumatic.      Right Ear: External ear normal. There is no impacted cerumen.      Left Ear: External ear normal. There is no impacted cerumen.      Nose: No congestion or rhinorrhea.   Eyes:      Extraocular Movements: Extraocular movements intact.      Conjunctiva/sclera: Conjunctivae normal.      Pupils: Pupils are equal, round, and reactive to light.   Cardiovascular:      Rate and Rhythm: Normal rate and regular rhythm.      Heart sounds: No murmur heard.     No friction rub. No gallop.   Pulmonary:      Effort: No respiratory distress.      Breath sounds: No wheezing, rhonchi or rales.   Skin:     General: Skin is warm and dry.   Neurological:      Mental Status: She is alert.   Psychiatric:         Mood and Affect: Mood normal.         Behavior: Behavior normal.         Assessment/Plan     Penicillin adverse reaction  Oral challenge to penicillin is positive. Her oxygen level decreased from 98 to 92%. She did not have any symptoms. Nonetheless, she is allergic to amoxicillin    Allergy to sulfa drugs  Skin prick test today is negative. She will require an oral challenge to sulfa.    Nitrofurantoin adverse reaction  SPT to nitrofurantoin at next visit. If negative, she will need an oral challenge

## 2024-03-05 NOTE — ASSESSMENT & PLAN NOTE
Oral challenge to penicillin is positive. Her oxygen level decreased from 98 to 92%. She did not have any symptoms. Nonetheless, she is allergic to amoxicillin

## 2024-03-06 ENCOUNTER — OFFICE VISIT (OUTPATIENT)
Dept: ALLERGY | Facility: CLINIC | Age: 74
End: 2024-03-06
Payer: MEDICARE

## 2024-03-06 VITALS — OXYGEN SATURATION: 97 % | SYSTOLIC BLOOD PRESSURE: 142 MMHG | HEART RATE: 69 BPM | DIASTOLIC BLOOD PRESSURE: 75 MMHG

## 2024-03-06 DIAGNOSIS — Z88.2 ALLERGY TO SULFA DRUGS: ICD-10-CM

## 2024-03-06 DIAGNOSIS — T37.8X5D: ICD-10-CM

## 2024-03-06 DIAGNOSIS — T36.0X5D ADVERSE EFFECT OF PENICILLIN, SUBSEQUENT ENCOUNTER: Primary | ICD-10-CM

## 2024-03-06 PROCEDURE — 1036F TOBACCO NON-USER: CPT | Performed by: ALLERGY & IMMUNOLOGY

## 2024-03-06 PROCEDURE — 3078F DIAST BP <80 MM HG: CPT | Performed by: ALLERGY & IMMUNOLOGY

## 2024-03-06 PROCEDURE — 1125F AMNT PAIN NOTED PAIN PRSNT: CPT | Performed by: ALLERGY & IMMUNOLOGY

## 2024-03-06 PROCEDURE — 95076 INGEST CHALLENGE INI 120 MIN: CPT | Performed by: ALLERGY & IMMUNOLOGY

## 2024-03-06 PROCEDURE — 3077F SYST BP >= 140 MM HG: CPT | Performed by: ALLERGY & IMMUNOLOGY

## 2024-03-06 PROCEDURE — 1159F MED LIST DOCD IN RCRD: CPT | Performed by: ALLERGY & IMMUNOLOGY

## 2024-03-06 PROCEDURE — 95018 ALL TSTG PERQ&IQ DRUGS/BIOL: CPT | Performed by: ALLERGY & IMMUNOLOGY

## 2024-03-06 PROCEDURE — 95079 INGEST CHALLENGE ADDL 60 MIN: CPT | Performed by: ALLERGY & IMMUNOLOGY

## 2024-03-06 PROCEDURE — 1160F RVW MEDS BY RX/DR IN RCRD: CPT | Performed by: ALLERGY & IMMUNOLOGY

## 2024-03-06 PROCEDURE — 95004 PERQ TESTS W/ALRGNC XTRCS: CPT | Performed by: ALLERGY & IMMUNOLOGY

## 2024-03-06 RX ORDER — SENNOSIDES 8.6 MG/1
2 TABLET ORAL EVERY 12 HOURS
COMMUNITY
Start: 2024-01-28

## 2024-03-06 RX ORDER — INSULIN LISPRO 100 [IU]/ML
INJECTION, SOLUTION INTRAVENOUS; SUBCUTANEOUS
COMMUNITY
Start: 2024-01-28

## 2024-03-06 RX ORDER — SODIUM CHLORIDE, SODIUM LACTATE, POTASSIUM CHLORIDE, CALCIUM CHLORIDE 600; 310; 30; 20 MG/100ML; MG/100ML; MG/100ML; MG/100ML
50 INJECTION, SOLUTION INTRAVENOUS
COMMUNITY
Start: 2024-01-28

## 2024-03-06 RX ORDER — IPRATROPIUM BROMIDE AND ALBUTEROL SULFATE 2.5; .5 MG/3ML; MG/3ML
3 SOLUTION RESPIRATORY (INHALATION) EVERY 4 HOURS PRN
COMMUNITY
Start: 2024-01-28

## 2024-03-06 RX ORDER — FLUTICASONE FUROATE AND VILANTEROL 100; 25 UG/1; UG/1
1 POWDER RESPIRATORY (INHALATION)
COMMUNITY
Start: 2024-01-28

## 2024-03-06 RX ORDER — DEXTROSE MONOHYDRATE 100 MG/ML
0.3 INJECTION, SOLUTION INTRAVENOUS
COMMUNITY
Start: 2024-01-28

## 2024-03-06 RX ORDER — ALUMINUM HYDROXIDE, MAGNESIUM HYDROXIDE, AND SIMETHICONE 1200; 120; 1200 MG/30ML; MG/30ML; MG/30ML
20 SUSPENSION ORAL 4 TIMES DAILY PRN
COMMUNITY
Start: 2024-02-01

## 2024-03-06 RX ORDER — PANTOPRAZOLE SODIUM 40 MG/1
40 TABLET, DELAYED RELEASE ORAL
COMMUNITY
Start: 2024-02-02

## 2024-03-06 NOTE — PATIENT INSTRUCTIONS
Unfortunately you are allergic to penicillin. Please continue to avoid all penicillin and amoxicillin products. I will update your chart.     I would like you to return for an oral challenge to sulfa medications and a skin test to macrobid

## 2024-03-12 PROBLEM — T37.8X5A NITROFURANTOIN ADVERSE REACTION: Status: ACTIVE | Noted: 2024-03-12

## 2024-03-12 PROBLEM — Z88.2 ALLERGY TO SULFA DRUGS: Status: ACTIVE | Noted: 2024-03-12

## 2024-03-19 ENCOUNTER — DOCUMENTATION (OUTPATIENT)
Dept: CARDIOLOGY | Facility: CLINIC | Age: 74
End: 2024-03-19
Payer: MEDICARE

## 2024-03-19 NOTE — PROGRESS NOTES
Received form from [facility] requesting patient holds Eliquis for 2 days prior to colonoscopy/EGD on 04/02/2024. Per Dr. Degroot, this is OK and patient is cleared from an electrophysiology standpoint. Form was faxed back to [facility] and will be scanned to chart.

## 2024-03-21 ENCOUNTER — PATIENT OUTREACH (OUTPATIENT)
Dept: CARE COORDINATION | Facility: CLINIC | Age: 74
End: 2024-03-21
Payer: MEDICARE

## 2024-03-21 NOTE — PROGRESS NOTES
Call placed regarding one month post discharge follow up call.  Pt reports that she has been dealing with diarrhea and weight loss. Pt has been following up with GI doctor as well as allergist. Pt states she is going for a CT scan this afternoon.  Questions or concerns regarding recovery period addressed at this time.   Reviewed any PCP or specialists progress notes/labs/radiology reports if applicable and addressed any questions or concerns.    -Verified next PCP appt 4/18/2024 1120.    Pt denies any current needs from PCP. Pt denies any questions, needs, or concerns at this time. Pt encouraged to call if questions or needs arise.

## 2024-04-05 ENCOUNTER — APPOINTMENT (OUTPATIENT)
Dept: ALLERGY | Facility: CLINIC | Age: 74
End: 2024-04-05
Payer: MEDICARE

## 2024-04-18 ENCOUNTER — OFFICE VISIT (OUTPATIENT)
Dept: PRIMARY CARE | Facility: CLINIC | Age: 74
End: 2024-04-18
Payer: MEDICARE

## 2024-04-18 VITALS
SYSTOLIC BLOOD PRESSURE: 130 MMHG | WEIGHT: 142 LBS | BODY MASS INDEX: 26.13 KG/M2 | HEART RATE: 75 BPM | HEIGHT: 62 IN | OXYGEN SATURATION: 97 % | DIASTOLIC BLOOD PRESSURE: 72 MMHG | RESPIRATION RATE: 14 BRPM

## 2024-04-18 DIAGNOSIS — E78.2 MIXED HYPERLIPIDEMIA: ICD-10-CM

## 2024-04-18 DIAGNOSIS — I10 HYPERTENSION, ESSENTIAL, BENIGN: ICD-10-CM

## 2024-04-18 DIAGNOSIS — I20.9 ANGINA, CLASS III (CMS-HCC): ICD-10-CM

## 2024-04-18 DIAGNOSIS — M31.6 TEMPORAL ARTERITIS (MULTI): ICD-10-CM

## 2024-04-18 DIAGNOSIS — D50.9 IRON DEFICIENCY ANEMIA, UNSPECIFIED IRON DEFICIENCY ANEMIA TYPE: Primary | ICD-10-CM

## 2024-04-18 DIAGNOSIS — I48.0 PAROXYSMAL ATRIAL FIBRILLATION (MULTI): ICD-10-CM

## 2024-04-18 DIAGNOSIS — F33.9 RECURRENT MAJOR DEPRESSIVE DISORDER, REMISSION STATUS UNSPECIFIED (CMS-HCC): ICD-10-CM

## 2024-04-18 DIAGNOSIS — E11.9 CONTROLLED TYPE 2 DIABETES MELLITUS WITHOUT COMPLICATION, WITHOUT LONG-TERM CURRENT USE OF INSULIN (MULTI): ICD-10-CM

## 2024-04-18 PROCEDURE — 3075F SYST BP GE 130 - 139MM HG: CPT | Performed by: INTERNAL MEDICINE

## 2024-04-18 PROCEDURE — 1159F MED LIST DOCD IN RCRD: CPT | Performed by: INTERNAL MEDICINE

## 2024-04-18 PROCEDURE — 1160F RVW MEDS BY RX/DR IN RCRD: CPT | Performed by: INTERNAL MEDICINE

## 2024-04-18 PROCEDURE — 1036F TOBACCO NON-USER: CPT | Performed by: INTERNAL MEDICINE

## 2024-04-18 PROCEDURE — 3078F DIAST BP <80 MM HG: CPT | Performed by: INTERNAL MEDICINE

## 2024-04-18 PROCEDURE — 99214 OFFICE O/P EST MOD 30 MIN: CPT | Performed by: INTERNAL MEDICINE

## 2024-04-18 ASSESSMENT — ENCOUNTER SYMPTOMS
JOINT SWELLING: 0
COUGH: 0
DIAPHORESIS: 0
VOMITING: 0
MYALGIAS: 0
DIARRHEA: 1
SHORTNESS OF BREATH: 0
NAUSEA: 0
FEVER: 0
CHILLS: 0
CONSTIPATION: 0

## 2024-04-18 NOTE — PATIENT INSTRUCTIONS
THE OVARIAN ISSUE SEEN ON CT SCAN IS ACTUALLY A SMALL CYST, ULTRASOUND WILL HELP BETTER CHARACTERIZE THE CYST SO THAT DR. KANG CAN DECIDE IF IT NEEDS TO BE REMOVED OR IF WE CAN OBSERVE IT.  OVARIAN CYSTS ARE COMMON    2.  FOR THE BOWEL IRREGULARITY, IT IS ACCEPTABLE - NOW THAT DR. MALIN HAS CHECKED YOUR GI TRACT - TO USE OVER THE COUNTER IMMODIUM AS NEEDED WHEN YOU LEAVE THE HOUSE TO GIVE SOME SLOWNESS OF INTESTINE TO LIMIT CHANCES OF EXPLOSIVE DIARRHEA, OR AT LEAST GIVE YOU MORE TIME TO GET TO A BATHROOM.  IF YOU TRY IMMODIUM OVER THE COUNTER FOR THIS AND IT DOESN'T HELP, THEN I CAN ORDER A SMALL SCRIPT OF LOMOTIL THAT CAN BE USED FOR THE SAME PURPOSE    3.  FOR NOW WILL START IRON SULFATE 325 MG ONCE DAILY TO TRY TO BUILD UP THE BLOOD COUNT.  IT'LL TAKE A FEW MONTHS BEFORE WE SHOULD REPEAT THE BLOOD TESTS    4.  IF ORAL IRON REPLACEMENT DOESN'T DO THE TRICK.  THEN WE CAN USE INTRAVENOUS IRON IF NEEDED.  IF THE IRON ISSUE CONTINUES TO BE A PROBLEM, THEN IN THE DISTANT FUTURE, WE MAY NEED TO INVESTIGATE THE UTILITY OF WATCHMAN DEVICE FOR ATRIAL FIBRILLATION TO ALLOW YOU TO COME OFF OF BLOOD THINNERS    5.  PLEASE CALL IF ANY REFILLS ARE NEEDED, I CAN FILL THEM FOR OTHER DOCTORS IF NEEDED.    6.  FOLLOW UP 4 MONTHS OR AS NEEDED.  I PUT IN A LAB DRAW FOR IN 2 MONTHS TO CHECK ON THE PROGRESS OF IRON AND BLOOD COUNTS.

## 2024-04-18 NOTE — PROGRESS NOTES
"Subjective   Myrna Rodrigues is a 73 y.o. female who presents for  FOLLOW UP    STARTED ELIQUIS FOR AFIB     HPI   HAVING SOME ISSUES.  DR. GUPTA DIDN'T FIND ANYTHING IN COLONOSCOPY/ENDOSCOPY    DID FIND SOMETHING ON AN OVARY, GOING TO SEE DR. KANG GYN NEXT WEEK ABOUT THAT.    HAS UNCONTROLLABLE AND UNPREDICTABLE EXPLOSIVE DIARRHEA BM'S.    DIFFICULT FOR HER TO GET AWAY FROM THE HOUSE WITHOUT WORRY     Review of Systems   Constitutional:  Negative for chills, diaphoresis and fever.   Respiratory:  Negative for cough and shortness of breath.    Cardiovascular:  Negative for chest pain and leg swelling.   Gastrointestinal:  Positive for diarrhea. Negative for constipation, nausea and vomiting.   Musculoskeletal:  Negative for joint swelling and myalgias.       Objective   /72   Pulse 75   Resp 14   Ht 1.575 m (5' 2\")   Wt 64.4 kg (142 lb)   SpO2 97%   BMI 25.97 kg/m²     Physical Exam  Vitals reviewed.   Constitutional:       General: She is not in acute distress.     Appearance: She is not ill-appearing.   Cardiovascular:      Rate and Rhythm: Normal rate and regular rhythm.      Pulses: Normal pulses.      Heart sounds:      No gallop.   Pulmonary:      Breath sounds: Normal breath sounds. No wheezing, rhonchi or rales.   Abdominal:      General: Abdomen is flat. Bowel sounds are normal.      Palpations: Abdomen is soft.      Tenderness: There is no guarding or rebound.   Musculoskeletal:      Right lower leg: No edema.      Left lower leg: No edema.         Assessment/Plan   Problem List Items Addressed This Visit       Major depression     FOLLOW WITH PSYCHIATRY         Angina, class III (CMS-HCC)     NOT CURRENTLY SYMPTOMATIC         Controlled type 2 diabetes mellitus without complication, without long-term current use of insulin (Multi)    Hypertension, essential, benign    Mixed hyperlipidemia    Paroxysmal atrial fibrillation (Multi)    Temporal arteritis (Multi)     CURRENTLY QUIESCENT AND " RESOLVED          Other Visit Diagnoses       Iron deficiency anemia, unspecified iron deficiency anemia type    -  Primary    Relevant Orders    CBC    Ferritin          Patient Instructions    THE OVARIAN ISSUE SEEN ON CT SCAN IS ACTUALLY A SMALL CYST, ULTRASOUND WILL HELP BETTER CHARACTERIZE THE CYST SO THAT DR. KANG CAN DECIDE IF IT NEEDS TO BE REMOVED OR IF WE CAN OBSERVE IT.  OVARIAN CYSTS ARE COMMON    2.  FOR THE BOWEL IRREGULARITY, IT IS ACCEPTABLE - NOW THAT DR. MALIN HAS CHECKED YOUR GI TRACT - TO USE OVER THE COUNTER IMMODIUM AS NEEDED WHEN YOU LEAVE THE HOUSE TO GIVE SOME SLOWNESS OF INTESTINE TO LIMIT CHANCES OF EXPLOSIVE DIARRHEA, OR AT LEAST GIVE YOU MORE TIME TO GET TO A BATHROOM.  IF YOU TRY IMMODIUM OVER THE COUNTER FOR THIS AND IT DOESN'T HELP, THEN I CAN ORDER A SMALL SCRIPT OF LOMOTIL THAT CAN BE USED FOR THE SAME PURPOSE    3.  FOR NOW WILL START IRON SULFATE 325 MG ONCE DAILY TO TRY TO BUILD UP THE BLOOD COUNT.  IT'LL TAKE A FEW MONTHS BEFORE WE SHOULD REPEAT THE BLOOD TESTS    4.  IF ORAL IRON REPLACEMENT DOESN'T DO THE TRICK.  THEN WE CAN USE INTRAVENOUS IRON IF NEEDED.  IF THE IRON ISSUE CONTINUES TO BE A PROBLEM, THEN IN THE DISTANT FUTURE, WE MAY NEED TO INVESTIGATE THE UTILITY OF WATCHMAN DEVICE FOR ATRIAL FIBRILLATION TO ALLOW YOU TO COME OFF OF BLOOD THINNERS    5.  PLEASE CALL IF ANY REFILLS ARE NEEDED, I CAN FILL THEM FOR OTHER DOCTORS IF NEEDED.    6.  FOLLOW UP 4 MONTHS OR AS NEEDED.  I PUT IN A LAB DRAW FOR IN 2 MONTHS TO CHECK ON THE PROGRESS OF IRON AND BLOOD COUNTS.

## 2024-04-19 ENCOUNTER — HOSPITAL ENCOUNTER (OUTPATIENT)
Dept: RADIOLOGY | Facility: CLINIC | Age: 74
Discharge: HOME | End: 2024-04-19
Payer: MEDICARE

## 2024-04-19 DIAGNOSIS — N83.209 CYST OF OVARY, UNSPECIFIED LATERALITY: ICD-10-CM

## 2024-04-19 DIAGNOSIS — N83.202 UNSPECIFIED OVARIAN CYST, LEFT SIDE: ICD-10-CM

## 2024-04-19 PROCEDURE — 76857 US EXAM PELVIC LIMITED: CPT | Performed by: RADIOLOGY

## 2024-04-19 PROCEDURE — 76856 US EXAM PELVIC COMPLETE: CPT

## 2024-04-19 PROCEDURE — 76830 TRANSVAGINAL US NON-OB: CPT | Performed by: RADIOLOGY

## 2024-04-22 NOTE — PROGRESS NOTES
Division of Minimally Invasive Gynecologic Surgery  [facility]    24 Gynecology Visit    CC:   Chief Complaint   Patient presents with    New Patient Visit     NPV- Ovarian Cyst      Chaperone Declined: MASOUD Junior         Myrna Rodrigues is a 73 y.o. referred to our practice by Dr. Thompson    Follows with Dr. Thompson with GI for diarrhea, mucousy and occasional bloody stool. As part of her workup a CT was ordered which showed sigmoid diverticulosis, as well as a 3.5 cm ovarian cyst and she was referred here. TVUS showed a 3.7x3.1x3.1 cm slightly complex septated left ovarian cyst.     Patient had a AGUILAR and RSO for AUB in her 40s. On review a simple 3.1 cm left ovarian cyst was noted on US in .  Patient reports no bothersome pelvic pain, unintentional weight loss, early satiety. Follows with GI as above.     GI symptoms: denies  Urinary symptoms: has interstim for urinary incontinence  Sexual activity: None    GYN Hx  Gynecologic history:  Last pap: normal   Her last mammogram was BI-RADS 1 in .   Colonoscopy: normal   Denies family history of breast, ovarian, uterine, colon or endometrial cancer.       OBHx:  x 2  Pertinent PMH: Asthma, T2DM A1C 6.2, HTN, CAD, Hypothyroid, afib on eliquis  Pertient PSH: hysterectomy, appendectomy, cholecystectomy    PMHx, PSHx, SHx, Allergies, and Medications updated in Epic.  Past Medical History:   Diagnosis Date    Arrhythmia     Asthma (HHS-HCC)     Cancer (Multi)     Coronary artery disease     Diabetes mellitus (Multi)     Disease of thyroid gland     Hyperlipidemia     Hypertension     Occipital neuralgia 06/10/2022    Occipital neuralgia of right side    Personal history of malignant neoplasm, unspecified 10/04/2021    History of malignant neoplasm    Personal history of other diseases of the circulatory system     History of hypertension    Personal history of other diseases of the circulatory system  10/04/2021    History of essential hypertension    Personal history of other diseases of the circulatory system     History of coronary artery disease    Personal history of other diseases of the digestive system 03/11/2020    History of chronic constipation    Personal history of other diseases of the musculoskeletal system and connective tissue     History of arthritis    Personal history of other diseases of the nervous system and sense organs 10/04/2021    History of eye problem    Personal history of other diseases of the respiratory system     History of asthma    Personal history of other specified conditions 10/04/2021    History of chest pain     Past Surgical History:   Procedure Laterality Date    APPENDECTOMY  08/13/2015    Appendectomy    BREAST LUMPECTOMY  08/13/2015    Breast Surgery Lumpectomy    CARPAL TUNNEL RELEASE  08/13/2015    Neuroplasty Decompression Median Nerve At Carpal Tunnel    GALLBLADDER SURGERY  08/13/2015    Gallbladder Surgery    HYSTERECTOMY  08/13/2015    Hysterectomy    KNEE ARTHROSCOPY W/ MENISCAL REPAIR  08/13/2015    Knee Arthroscopy With Medial Meniscus Repair    KNEE SURGERY  08/13/2015    Knee Surgery    MR HEAD ANGIO WO IV CONTRAST  9/5/2021    MR HEAD ANGIO WO IV CONTRAST 9/5/2021 [facility]    OTHER SURGICAL HISTORY  08/13/2015    Shoulder Surgery Left    OTHER SURGICAL HISTORY  08/13/2015    Repair Of Bladder Reconstruction    OTHER SURGICAL HISTORY  09/20/2021    Breast surgery    OTHER SURGICAL HISTORY  09/20/2021    Bladder surgery    OTHER SURGICAL HISTORY  09/20/2021    Complete colonoscopy    OTHER SURGICAL HISTORY  09/20/2021    Shoulder surgery    OTHER SURGICAL HISTORY  09/20/2021    Foot surgery    OTHER SURGICAL HISTORY  09/20/2021    Percutaneous transluminal coronary angioplasty    OTHER SURGICAL HISTORY  10/11/2021    Thyroidectomy total    TOTAL KNEE ARTHROPLASTY  08/13/2015    Knee Replacement     Allergies   Allergen Reactions    Penicillins  Anaphylaxis, Shortness of breath and Unknown     Positive oral challenge. Pulse OX dropped > 5% with complaints of pruritis    Sulfa (Sulfonamide Antibiotics) Swelling    Nitrofurantoin Rash    Nitrofurantoin Monohyd/M-Cryst Diarrhea and Rash       Current Outpatient Medications:     acetaminophen (Tylenol) 325 mg tablet, Take 2 tablets (650 mg) by mouth every 6 hours if needed., Disp: , Rfl:     alum-mag hydroxide-simeth (Mylanta) 200-200-20 mg/5 mL oral suspension, Take 20 mL by mouth 4 times a day as needed., Disp: , Rfl:     apixaban (Eliquis) 5 mg tablet, Take 1 tablet (5 mg) by mouth 2 times a day., Disp: 180 tablet, Rfl: 3    atorvastatin (Lipitor) 10 mg tablet, TAKE 1 TABLET DAILY, Disp: 90 tablet, Rfl: 3    cholecalciferol (Vitamin D-3) 50 MCG (2000 UT) tablet, Take 0.5 tablets (1,000 Units) by mouth once daily., Disp: , Rfl:     clobetasol (Temovate) 0.05 % ointment, Apply topically 2 times a day., Disp: 60 g, Rfl: 11    dextrose 10 % in water, D10W, 10 % infusion, Infuse 0.3 g/kg/hr into a venous catheter., Disp: , Rfl:     DULoxetine (Cymbalta) 60 mg DR capsule, Take 1 capsule (60 mg) by mouth once daily., Disp: , Rfl:     estradiol (Estrace) 0.01 % (0.1 mg/gram) vaginal cream, Insert into the vagina., Disp: , Rfl:     fluticasone furoate-vilanteroL (Breo Ellipta) 100-25 mcg/dose inhaler, Inhale 1 puff once daily., Disp: , Rfl:     glucagon 1 mg injection, Inject 1 mg into the muscle., Disp: , Rfl:     insulin lispro (HumaLOG) 100 unit/mL injection, Inject under the skin., Disp: , Rfl:     ipratropium-albuteroL (Duo-Neb) 0.5-2.5 mg/3 mL nebulizer solution, Inhale 3 mL every 4 hours if needed., Disp: , Rfl:     levothyroxine (Tirosint) 112 mcg capsule, Take 1 capsule (112 mcg) by mouth once daily in the morning. Take before meals. On an empty stomach, Disp: , Rfl:     magnesium oxide (Mag-Ox) 400 mg (241.3 mg magnesium) tablet, Take 1 tablet (400 mg) by mouth once daily., Disp: 90 tablet, Rfl: 3     metFORMIN XR (Glucophage-XR) 500 mg 24 hr tablet, Take 2 tablets in the morning and 1 tablet in the evening, Disp: , Rfl:     metoprolol tartrate (Lopressor) 25 mg tablet, Take 1 tablet (25 mg) by mouth 2 times a day., Disp: 180 tablet, Rfl: 3    mometasone-formoterol (Dulera) 100-5 mcg/actuation inhaler, Inhale 2 puffs 2 times a day., Disp: , Rfl:     montelukast (Singulair) 10 mg tablet, TAKE 1 TABLET BY MOUTH AT  BEDTIME, Disp: 90 tablet, Rfl: 3    nitroglycerin (Nitrostat) 0.4 mg SL tablet, Place 1 tablet (0.4 mg) under the tongue every 5 minutes if needed for chest pain., Disp: 90 tablet, Rfl: 3    omeprazole (PriLOSEC) 40 mg DR capsule, TAKE 1 CAPSULE BY MOUTH TWICE  DAILY, Disp: 180 capsule, Rfl: 3    oxybutynin XL (Ditropan-XL) 10 mg 24 hr tablet, TAKE 1 TABLET DAILY, Disp: 90 tablet, Rfl: 3    pantoprazole (ProtoNix) 40 mg EC tablet, Take 1 tablet (40 mg) by mouth., Disp: , Rfl:     POTASSIUM ORAL, once daily. 99mg, one, Disp: , Rfl:     Ringer's solution,lactated (lactated Ringer's) infusion, Infuse 50 mL into a venous catheter., Disp: , Rfl:     sennosides (Senokot) 8.6 mg tablet, Take 2 tablets (17.2 mg) by mouth every 12 hours., Disp: , Rfl:     tiotropium (Spiriva Respimat) 2.5 mcg/actuation inhaler, Inhale 2 puffs once daily., Disp: , Rfl:     traZODone (Desyrel) 50 mg tablet, Take 2 tablets (100 mg) by mouth once daily at bedtime., Disp: , Rfl:     Ventolin HFA 90 mcg/actuation inhaler, USE 2 INHALATIONS BY MOUTH EVERY 4 HOURS AS NEEDED, Disp: 54 g, Rfl: 6  Social History     Socioeconomic History    Marital status:      Spouse name: Not on file    Number of children: Not on file    Years of education: Not on file    Highest education level: Not on file   Occupational History    Not on file   Tobacco Use    Smoking status: Never    Smokeless tobacco: Never   Vaping Use    Vaping status: Never Used   Substance and Sexual Activity    Alcohol use: Not Currently    Drug use: Never    Sexual  "activity: Defer   Other Topics Concern    Not on file   Social History Narrative    Not on file     Social Determinants of Health     Financial Resource Strain: Low Risk  (1/28/2024)    Overall Financial Resource Strain (CARDIA)     Difficulty of Paying Living Expenses: Not hard at all   Food Insecurity: Not on file   Transportation Needs: No Transportation Needs (1/28/2024)    PRAPARE - Transportation     Lack of Transportation (Medical): No     Lack of Transportation (Non-Medical): No   Physical Activity: Not on file   Stress: Not on file   Social Connections: Not on file   Intimate Partner Violence: Not on file   Housing Stability: Low Risk  (1/28/2024)    Housing Stability Vital Sign     Unable to Pay for Housing in the Last Year: No     Number of Places Lived in the Last Year: 1     Unstable Housing in the Last Year: No     Family History   Problem Relation Name Age of Onset    Lung cancer Mother      Lymphoma Mother      Bone cancer Mother      Heart attack Father      Liver cancer Brother      Lung cancer Brother       OB History    No obstetric history on file.            ROS: reviewed and negative    PE:/60   Ht 1.575 m (5' 2\")   Wt 64 kg (141 lb)   BMI 25.79 kg/m²   Gen: NAD  Psych: AAOx3  Skin: no lesions  Pulm: nonlabored breathing, normal respiratory effort  Cards: normal peripheral perfusion  Lymph: no LAD in axilla or groin  GI: soft, non-tender, non-distended, no rebound/guarding, no masses  : deferred      A/P: Myrna Rodrigues is a 73 y.o. with a incidental 3 cm left ovarian cyst stable in size since 2022.     -TVUS and CT images reviewed. Largely simple appearing cyst with 1 internal septation. Overall reassuring appearance.  - Discussed with patient the natural course of functional ovarian cysts.  Reviewed that even in postmenopausal women, the majority of these cysts are benign. The majority of these cysts will resolve over time and do not require surgical intervention unless there are " concerning features, persistent pain or concern for rupture/torsion. Small cysts 3-5 cm, typically do not require follow up, while cysts>5 cm are managed with surveillance, and surgical intervention based on size alone is not required until the cyst is >10 cm  -Discussed options for management including expectant management with serial US vs surgical removal.  - Prior US reviewed: likely the same cyst that is stable in size since 2022. Will repeat additional US in 6 months. If cyst is stable in size, can discontinue serial imaging as the cyst is unchanged for 2 years    Will call with US results    Azalea Fish MD  Division of Minimally Invasive Gynecologic Surgery  [facility]

## 2024-04-23 ENCOUNTER — OFFICE VISIT (OUTPATIENT)
Dept: OBSTETRICS AND GYNECOLOGY | Facility: CLINIC | Age: 74
End: 2024-04-23
Payer: MEDICARE

## 2024-04-23 VITALS
DIASTOLIC BLOOD PRESSURE: 60 MMHG | HEIGHT: 62 IN | BODY MASS INDEX: 25.95 KG/M2 | SYSTOLIC BLOOD PRESSURE: 110 MMHG | WEIGHT: 141 LBS

## 2024-04-23 DIAGNOSIS — N83.202 CYST OF LEFT OVARY: Primary | ICD-10-CM

## 2024-04-23 PROCEDURE — 3074F SYST BP LT 130 MM HG: CPT | Performed by: STUDENT IN AN ORGANIZED HEALTH CARE EDUCATION/TRAINING PROGRAM

## 2024-04-23 PROCEDURE — 3078F DIAST BP <80 MM HG: CPT | Performed by: STUDENT IN AN ORGANIZED HEALTH CARE EDUCATION/TRAINING PROGRAM

## 2024-04-23 PROCEDURE — 99204 OFFICE O/P NEW MOD 45 MIN: CPT | Performed by: STUDENT IN AN ORGANIZED HEALTH CARE EDUCATION/TRAINING PROGRAM

## 2024-04-23 PROCEDURE — 1160F RVW MEDS BY RX/DR IN RCRD: CPT | Performed by: STUDENT IN AN ORGANIZED HEALTH CARE EDUCATION/TRAINING PROGRAM

## 2024-04-23 PROCEDURE — 1159F MED LIST DOCD IN RCRD: CPT | Performed by: STUDENT IN AN ORGANIZED HEALTH CARE EDUCATION/TRAINING PROGRAM

## 2024-04-23 PROCEDURE — 1126F AMNT PAIN NOTED NONE PRSNT: CPT | Performed by: STUDENT IN AN ORGANIZED HEALTH CARE EDUCATION/TRAINING PROGRAM

## 2024-04-23 PROCEDURE — 1036F TOBACCO NON-USER: CPT | Performed by: STUDENT IN AN ORGANIZED HEALTH CARE EDUCATION/TRAINING PROGRAM

## 2024-04-23 ASSESSMENT — PAIN SCALES - GENERAL: PAINLEVEL: 0-NO PAIN

## 2024-04-25 DIAGNOSIS — T37.8X5D: ICD-10-CM

## 2024-04-26 DIAGNOSIS — T37.8X5D: ICD-10-CM

## 2024-04-26 DIAGNOSIS — Z88.2 ALLERGY TO SULFA DRUGS: Primary | ICD-10-CM

## 2024-04-26 RX ORDER — NITROFURANTOIN (MACROCRYSTALS) 100 MG/1
CAPSULE ORAL
Qty: 3 CAPSULE | Refills: 0 | Status: SHIPPED | OUTPATIENT
Start: 2024-04-26

## 2024-04-26 RX ORDER — SULFAMETHOXAZOLE AND TRIMETHOPRIM 80; 16 MG/ML; MG/ML
INJECTION INTRAVENOUS
Qty: 5 ML | Refills: 0 | Status: SHIPPED | OUTPATIENT
Start: 2024-04-26

## 2024-04-26 RX ORDER — SULFAMETHOXAZOLE AND TRIMETHOPRIM 80; 16 MG/ML; MG/ML
INJECTION INTRAVENOUS
Qty: 5 ML | Refills: 0 | OUTPATIENT
Start: 2024-04-26

## 2024-04-26 RX ORDER — SULFAMETHOXAZOLE AND TRIMETHOPRIM 200; 40 MG/5ML; MG/5ML
80 SUSPENSION ORAL 2 TIMES DAILY
COMMUNITY
End: 2024-04-26

## 2024-04-26 RX ORDER — SULFAMETHOXAZOLE AND TRIMETHOPRIM 200; 40 MG/5ML; MG/5ML
80 SUSPENSION ORAL 2 TIMES DAILY
Qty: 50 ML | Refills: 0 | Status: SHIPPED | OUTPATIENT
Start: 2024-04-26

## 2024-04-30 ENCOUNTER — TELEPHONE (OUTPATIENT)
Dept: PRIMARY CARE | Facility: CLINIC | Age: 74
End: 2024-04-30
Payer: MEDICARE

## 2024-04-30 NOTE — PROGRESS NOTES
"Subjective   Patient ID:   [id number]   Myrna Rodrigues is a 73 y.o. female who presents for Skin Testing Drugs (Oral challenge to sulfa and skin test to Macrobid/).    Chief Complaint   Patient presents with    Skin Testing Drugs     Oral challenge to sulfa and skin test to Macrobid        Patient has H/O multiple drug reactions includin. Penicillin - Early 30s. She had an asthma flare after an injection. Unsure of why she used it.  2. Sulfa and Macrobid - She had all over \"petechiae\", aching bones and flu symptoms that lasted a few days, but did not itch. Unsure of dates but both were after the penicillin - maybe 50s or 60s. Both were early in the course. She was told to avoid them and did not seek Tx for either reaction.     On 3-6-2024, she had a negative SPT to sulfa and presents for oral challenge today.  She will also undergo SPT to nitrofurantoin and if negative, she will require an oral challenge.      Objective     /71   Pulse 62   SpO2 96%      Physical Exam  Constitutional:       Appearance: Normal appearance.   HENT:      Head: Normocephalic and atraumatic.      Right Ear: External ear normal. There is no impacted cerumen.      Left Ear: External ear normal. There is no impacted cerumen.      Nose: No congestion or rhinorrhea.   Eyes:      Extraocular Movements: Extraocular movements intact.      Conjunctiva/sclera: Conjunctivae normal.      Pupils: Pupils are equal, round, and reactive to light.   Cardiovascular:      Rate and Rhythm: Normal rate and regular rhythm.      Heart sounds: No murmur heard.     No friction rub. No gallop.   Pulmonary:      Effort: No respiratory distress.      Breath sounds: No wheezing, rhonchi or rales.   Skin:     General: Skin is warm and dry.   Neurological:      Mental Status: She is alert.   Psychiatric:         Mood and Affect: Mood normal.         Behavior: Behavior normal.     Drug/Vaccine allergy testing was performed on Myrna Rodrigues using standard " technique. There were no immediate complications.    Test Administration Information  Test Information  Consent: Yes  Testing Nurse:   Reviewing Physician: JOSLYN    Test Results  Controls  Negative Saline: 0  Positive Histamine: 5X5 (intradermal histamine 10X10)  Intradermal Saline: 0  Comments  Comments: Negative Skin test to macrobid. Return for oral challenge  ANTIGEN 1  Test Type: SPT  Drug/Vaccine Name: Macrobid  Concentration/Solution: 1:1000  Result: Negative  ANTIGEN 2  Test Type: SPT  Drug/Vaccine Name: Macrobid  Result: Negative  Dilution: 1:100  ANTIGEN 3  Drug/Vaccine Name: Macrobid  Result: Negative  Dilution: 1:10  ANTIGEN 4  Test Type: ID  Drug/Vaccine Name: Macrobid  Concentration/Solution: 1:1000  Result: Negative  ANTIGEN 5  Test Type: ID  Drug/Vaccine Name: Macrobid  Concentration/Solution: 1:100  Result: Negative  ANTIGEN 6  Test Type: ID  Drug/Vaccine Name: Macrobid  Concentration/Solution: 1:10  Result: Negative    Interpretation: Negative skin test to Macrobid    Challenge testing was performed on Myrna Rodrigues using standard technique. There were no immediate complications.    Test Results    During this challenge the patient complained of some mild itching on her top lip after her first dose.  There was no rash and I proceeded with a challenge.  Her symptoms did not change nor did they worsen upon completion of the challenge.    Challenge  Type of Challenge: Sulfamethazole 400/5ml  Goal Dose: 5 ml  Vital Signs Reviewed Prior to Challenge: Yes  1)  Time: 1330  Dose: 1.25 ml  Reaction / Comment: /84 O2 98% P 65  2)  Time: 1349  Dose: 5 ml  Reaction / Comment: bP147/80 O2 100% P 100  3)  Time: 1450  Reaction / Comment: none discharge to home  Result  Pass: Yes  Reacted: No  Other Challenge  Observation: Negative challenge. OK to take sulfa antibiotics.    Assessment/Plan     Allergy to sulfa drugs  Oral challenge to sulfa is negative.  It is okay for her to take sulfa based  antibiotics    Nitrofurantoin adverse reaction  Skin prick test to Macrobid is negative.  She can proceed with an oral challenge.    Return for oral challenge to Macrobid.   Be sure to have nothing to eat or drink 3 hours prior to testing, other than water or black coffee with no cream.  Bring in the item being tested.  Testing will take approximately 2-3 hours.       By signing my name below, I, Sonya Riley, attest that this documentation has been prepared under the direction and in the presence of Fabienne Duff MD.  All medical record entries made by the Nba[facility] were at my direction and personally dictated by me. I have reviewed the chart and agree that the record accurately reflects my personal performance of the history, physical exam, discussion and plan.

## 2024-04-30 NOTE — PATIENT INSTRUCTIONS
Oral challenge to sulfa is negative.  You can safely take sulfa based antibiotics.    Skin testing to Macrobid is negative.  I would like you to return for an oral challenge to Macrobid.

## 2024-04-30 NOTE — TELEPHONE ENCOUNTER
Patient had [facility] visit on 11/8/2022 and is receiving collection notices.  Would like a call back to figure out why

## 2024-04-30 NOTE — ASSESSMENT & PLAN NOTE
Skin prick test to Macrobid is negative.  She can proceed with an oral challenge.    Return for oral challenge to Macrobid.   Be sure to have nothing to eat or drink 3 hours prior to testing, other than water or black coffee with no cream.  Bring in the item being tested.  Testing will take approximately 2-3 hours.

## 2024-05-01 ENCOUNTER — OFFICE VISIT (OUTPATIENT)
Dept: ALLERGY | Facility: CLINIC | Age: 74
End: 2024-05-01
Payer: MEDICARE

## 2024-05-01 VITALS — DIASTOLIC BLOOD PRESSURE: 71 MMHG | SYSTOLIC BLOOD PRESSURE: 144 MMHG | OXYGEN SATURATION: 96 % | HEART RATE: 62 BPM

## 2024-05-01 DIAGNOSIS — T37.8X5D: ICD-10-CM

## 2024-05-01 DIAGNOSIS — T37.8X5A: ICD-10-CM

## 2024-05-01 DIAGNOSIS — Z88.2 ALLERGY TO SULFA DRUGS: Primary | ICD-10-CM

## 2024-05-01 PROCEDURE — 95076 INGEST CHALLENGE INI 120 MIN: CPT | Performed by: ALLERGY & IMMUNOLOGY

## 2024-05-01 PROCEDURE — 95004 PERQ TESTS W/ALRGNC XTRCS: CPT | Performed by: ALLERGY & IMMUNOLOGY

## 2024-05-01 PROCEDURE — 3078F DIAST BP <80 MM HG: CPT | Performed by: ALLERGY & IMMUNOLOGY

## 2024-05-01 PROCEDURE — 3077F SYST BP >= 140 MM HG: CPT | Performed by: ALLERGY & IMMUNOLOGY

## 2024-05-01 PROCEDURE — 1159F MED LIST DOCD IN RCRD: CPT | Performed by: ALLERGY & IMMUNOLOGY

## 2024-05-01 PROCEDURE — 1160F RVW MEDS BY RX/DR IN RCRD: CPT | Performed by: ALLERGY & IMMUNOLOGY

## 2024-05-01 PROCEDURE — 95018 ALL TSTG PERQ&IQ DRUGS/BIOL: CPT | Performed by: ALLERGY & IMMUNOLOGY

## 2024-05-03 ENCOUNTER — PATIENT OUTREACH (OUTPATIENT)
Dept: CARE COORDINATION | Facility: CLINIC | Age: 74
End: 2024-05-03
Payer: MEDICARE

## 2024-05-03 NOTE — PROGRESS NOTES
90 day post SNF discharge outreach call completed. Pt reports doing well since discharge. She states she has all of her medication and everything she needs at home.    -Verified next PCP appt 8/27/2024     Pt denies any current needs. Pt encouraged to call if questions or needs arise.

## 2024-05-05 DIAGNOSIS — K21.9 GASTROESOPHAGEAL REFLUX DISEASE, UNSPECIFIED WHETHER ESOPHAGITIS PRESENT: ICD-10-CM

## 2024-05-06 RX ORDER — OMEPRAZOLE 40 MG/1
CAPSULE, DELAYED RELEASE ORAL
Qty: 180 CAPSULE | Refills: 3 | Status: SHIPPED | OUTPATIENT
Start: 2024-05-06

## 2024-05-14 ENCOUNTER — APPOINTMENT (OUTPATIENT)
Dept: PRIMARY CARE | Facility: CLINIC | Age: 74
End: 2024-05-14
Payer: MEDICARE

## 2024-05-28 DIAGNOSIS — J44.9 CHRONIC OBSTRUCTIVE PULMONARY DISEASE, UNSPECIFIED COPD TYPE (MULTI): ICD-10-CM

## 2024-05-29 RX ORDER — ALBUTEROL SULFATE 90 UG/1
2 AEROSOL, METERED RESPIRATORY (INHALATION) EVERY 4 HOURS PRN
Qty: 108 G | Refills: 3 | Status: SHIPPED | OUTPATIENT
Start: 2024-05-29

## 2024-06-12 DIAGNOSIS — I10 HTN (HYPERTENSION), BENIGN: ICD-10-CM

## 2024-06-12 RX ORDER — METOPROLOL TARTRATE 25 MG/1
25 TABLET, FILM COATED ORAL 2 TIMES DAILY
Qty: 180 TABLET | Refills: 3 | Status: SHIPPED | OUTPATIENT
Start: 2024-06-12

## 2024-06-24 PROBLEM — T36.0X5A PENICILLIN ADVERSE REACTION: Status: RESOLVED | Noted: 2024-03-05 | Resolved: 2024-06-24

## 2024-06-25 ENCOUNTER — APPOINTMENT (OUTPATIENT)
Dept: ALLERGY | Facility: CLINIC | Age: 74
End: 2024-06-25
Payer: MEDICARE

## 2024-07-17 ENCOUNTER — LAB (OUTPATIENT)
Dept: LAB | Facility: LAB | Age: 74
End: 2024-07-17
Payer: MEDICARE

## 2024-07-17 ENCOUNTER — APPOINTMENT (OUTPATIENT)
Dept: CARDIOLOGY | Facility: CLINIC | Age: 74
End: 2024-07-17
Payer: MEDICARE

## 2024-07-17 VITALS
BODY MASS INDEX: 27.6 KG/M2 | HEIGHT: 62 IN | WEIGHT: 150 LBS | SYSTOLIC BLOOD PRESSURE: 126 MMHG | DIASTOLIC BLOOD PRESSURE: 80 MMHG | HEART RATE: 64 BPM

## 2024-07-17 DIAGNOSIS — E11.9 CONTROLLED TYPE 2 DIABETES MELLITUS WITHOUT COMPLICATION, WITHOUT LONG-TERM CURRENT USE OF INSULIN (MULTI): ICD-10-CM

## 2024-07-17 DIAGNOSIS — E78.2 MIXED HYPERLIPIDEMIA: ICD-10-CM

## 2024-07-17 DIAGNOSIS — I10 HYPERTENSION, ESSENTIAL, BENIGN: ICD-10-CM

## 2024-07-17 DIAGNOSIS — Z98.61 CAD S/P PERCUTANEOUS CORONARY ANGIOPLASTY: ICD-10-CM

## 2024-07-17 DIAGNOSIS — I25.10 CAD S/P PERCUTANEOUS CORONARY ANGIOPLASTY: ICD-10-CM

## 2024-07-17 DIAGNOSIS — Z78.9 NEVER SMOKED ANY SUBSTANCE: ICD-10-CM

## 2024-07-17 DIAGNOSIS — I48.0 PAROXYSMAL ATRIAL FIBRILLATION (MULTI): ICD-10-CM

## 2024-07-17 PROBLEM — Z95.820 S/P ANGIOPLASTY WITH STENT: Status: RESOLVED | Noted: 2021-06-07 | Resolved: 2024-07-17

## 2024-07-17 PROBLEM — Z86.79 HISTORY OF ATRIAL FIBRILLATION: Status: RESOLVED | Noted: 2021-04-07 | Resolved: 2024-07-17

## 2024-07-17 PROBLEM — Z86.79 HISTORY OF HYPERTENSION: Status: RESOLVED | Noted: 2023-09-06 | Resolved: 2024-07-17

## 2024-07-17 PROBLEM — Z86.79 H/O HEART DISORDER: Status: RESOLVED | Noted: 2023-09-06 | Resolved: 2024-07-17

## 2024-07-17 PROBLEM — Z95.5 PRESENCE OF CORONARY ANGIOPLASTY IMPLANT AND GRAFT: Status: RESOLVED | Noted: 2018-02-13 | Resolved: 2024-07-17

## 2024-07-17 PROBLEM — Z86.39 HISTORY OF ELEVATED LIPIDS: Status: RESOLVED | Noted: 2023-09-06 | Resolved: 2024-07-17

## 2024-07-17 LAB
CHOLEST SERPL-MCNC: 126 MG/DL (ref 0–199)
CHOLESTEROL/HDL RATIO: 1.7
HDLC SERPL-MCNC: 75.1 MG/DL
LDLC SERPL CALC-MCNC: 32 MG/DL
NON HDL CHOLESTEROL: 51 MG/DL (ref 0–149)
TRIGL SERPL-MCNC: 97 MG/DL (ref 0–149)
VLDL: 19 MG/DL (ref 0–40)

## 2024-07-17 PROCEDURE — 3079F DIAST BP 80-89 MM HG: CPT | Performed by: INTERNAL MEDICINE

## 2024-07-17 PROCEDURE — 80061 LIPID PANEL: CPT

## 2024-07-17 PROCEDURE — 36415 COLL VENOUS BLD VENIPUNCTURE: CPT

## 2024-07-17 PROCEDURE — 1036F TOBACCO NON-USER: CPT | Performed by: INTERNAL MEDICINE

## 2024-07-17 PROCEDURE — 3074F SYST BP LT 130 MM HG: CPT | Performed by: INTERNAL MEDICINE

## 2024-07-17 PROCEDURE — 1159F MED LIST DOCD IN RCRD: CPT | Performed by: INTERNAL MEDICINE

## 2024-07-17 PROCEDURE — 99214 OFFICE O/P EST MOD 30 MIN: CPT | Performed by: INTERNAL MEDICINE

## 2024-07-17 NOTE — PATIENT INSTRUCTIONS
Patient to follow up in 1 year with Dr. Shashank Zavala MD      Please repeat lab work in near future- will call with results.     No changes today.  Continue same medications and treatments.   Patient educated on proper medication use.   Patient educated on risk factor modification.   Please bring any lab results from other providers / physicians to your next appointment.     Please bring all medicines, vitamins, and herbal supplements with you when you come to the office.     Prescriptions will not be filled unless you are compliant with your follow up appointments or have a follow up appointment scheduled as per instruction of your physician. Refills should be requested at the time of your visit.    IKhai RN am scribing for and in the presence of Dr. Shashank Proctor MD

## 2024-07-17 NOTE — PROGRESS NOTES
CARDIOLOGY OFFICE VISIT      CHIEF COMPLAINT      HISTORY OF PRESENT ILLNESS    The patient states that she is doing well from a cardiac standpoint.  She denies chest discomfort or symptoms of myocardial ischemia.  She has not used nitroglycerin.  She does have some mild dyspnea secondary to COPD.  She denies any worsening of this.  She denies any prolonged palpitations but she feels like she has had some brief episodes of atrial fibrillation.  She denies any recent syncopal or near syncopal episodes.  She did have 1 single episode and January which was thought to be related to dehydration.  She has not had any further episodes.  She denies any problem with her medications.  She denies any problem with bleeding.    IMPRESSION:   1. Coronary artery disease, no angina.  2. Remote PCI.  3. Mixed hyperlipidemia.  4. Essential hypertension.  5. Bronchial asthma.  6. Chronic obstructive pulmonary disease.  7. Overweight  8. Paroxysmal Atrial Fibrillation      Please excuse any errors in grammar or translation related to this dictation. Voice recognition software was utilized to prepare this document.     Past Medical History  Past Medical History:   Diagnosis Date    Arrhythmia     Asthma ([facility])     Cancer (Multi)     Coronary artery disease     Diabetes mellitus (Multi)     Disease of thyroid gland     Hyperlipidemia     Hypertension     Occipital neuralgia 06/10/2022    Occipital neuralgia of right side    Personal history of malignant neoplasm, unspecified 10/04/2021    History of malignant neoplasm    Personal history of other diseases of the circulatory system     History of hypertension    Personal history of other diseases of the circulatory system 10/04/2021    History of essential hypertension    Personal history of other diseases of the circulatory system     History of coronary artery disease    Personal history of other diseases of the digestive system 03/11/2020    History of chronic constipation     Personal history of other diseases of the musculoskeletal system and connective tissue     History of arthritis    Personal history of other diseases of the nervous system and sense organs 10/04/2021    History of eye problem    Personal history of other diseases of the respiratory system     History of asthma    Personal history of other specified conditions 10/04/2021    History of chest pain       Social History  Social History     Tobacco Use    Smoking status: Never    Smokeless tobacco: Never   Vaping Use    Vaping status: Never Used   Substance Use Topics    Alcohol use: Not Currently    Drug use: Never       Family History     Family History   Problem Relation Name Age of Onset    Lung cancer Mother      Lymphoma Mother      Bone cancer Mother      Heart attack Father      Liver cancer Brother      Lung cancer Brother          Allergies:  Allergies   Allergen Reactions    Nitrofurantoin Rash        Outpatient Medications:  Current Outpatient Medications   Medication Instructions    acetaminophen (TYLENOL) 650 mg, oral, Every 6 hours PRN    apixaban (ELIQUIS) 5 mg, oral, 2 times daily    atorvastatin (LIPITOR) 10 mg, oral, Daily    cholecalciferol (VITAMIN D-3) 1,000 Units, oral, Daily, Full tab     clobetasol (Temovate) 0.05 % ointment Topical, 2 times daily    estradiol (Estrace) 0.01 % (0.1 mg/gram) vaginal cream vaginal    ipratropium-albuteroL (Duo-Neb) 0.5-2.5 mg/3 mL nebulizer solution 3 mL, inhalation, Every 4 hours PRN    levothyroxine (Tirosint) 112 mcg capsule 1 capsule, oral, Daily before breakfast, On an empty stomach    magnesium oxide (MAG-OX) 400 mg, oral, Daily    metFORMIN XR (Glucophage-XR) 500 mg 24 hr tablet Take 2 tablets in the morning and 1 tablet in the evening    metoprolol tartrate (LOPRESSOR) 25 mg, oral, 2 times daily    mometasone-formoterol (Dulera) 100-5 mcg/actuation inhaler 2 puffs, inhalation, 2 times daily    montelukast (SINGULAIR) 10 mg, oral, Nightly    nitrofurantoin  (Macrodantin) 100 mg capsule To be used under direct supervision of physician for oral challenge    nitroglycerin (NITROSTAT) 0.4 mg, sublingual, Every 5 min PRN    omeprazole (PriLOSEC) 40 mg DR capsule TAKE 1 CAPSULE BY MOUTH TWICE  DAILY    oxybutynin XL (DITROPAN-XL) 10 mg, oral, Daily    POTASSIUM ORAL Daily, 99mg, one    Ringer's solution,lactated (lactated Ringer's) infusion 50 mL, intravenous    sennosides (Senokot) 8.6 mg tablet 2 tablets, oral, Every 12 hours    sulfamethoxazole-trimethoprim (Bactrim) 200-40 mg/5 mL suspension 80 mg of trimethoprim, oral, 2 times daily, To be taken under providers supervision for an oral challenge.    sulfamethoxazole-trimethoprim (Bactrim) 400-80 mg/5 mL injection To be used for skin testing, under direct supervision of provider    tiotropium (Spiriva Respimat) 2.5 mcg/actuation inhaler 2 puffs, inhalation, Daily    traZODone (Desyrel) 50 mg tablet Take 2 tablets (100 mg) by mouth once daily at bedtime.    Ventolin HFA 90 mcg/actuation inhaler 2 puffs, inhalation, Every 4 hours PRN          REVIEW OF SYSTEMS  Review of Systems   All other systems reviewed and are negative.        VITALS  Vitals:    07/17/24 1419   BP: 126/80   Pulse: 64       PHYSICAL EXAM  Vitals reviewed.   Constitutional:       Appearance: Normal and healthy appearance. Well-developed and not in distress.   Eyes:      Conjunctiva/sclera: Conjunctivae normal.      Pupils: Pupils are equal, round, and reactive to light.   Neck:      Vascular: No JVR. JVD normal.   Pulmonary:      Effort: Pulmonary effort is normal.      Breath sounds: Normal breath sounds. No wheezing. No rhonchi. No rales.   Chest:      Chest wall: Not tender to palpatation.   Cardiovascular:      PMI at left midclavicular line. Normal rate. Regular rhythm. Normal S1. Normal S2.       Murmurs: There is no murmur.      No gallop.  No click. No rub.   Pulses:     Intact distal pulses.   Edema:     Peripheral edema absent.   Abdominal:       Tenderness: There is no abdominal tenderness.   Musculoskeletal: Normal range of motion.         General: No tenderness.      Cervical back: Normal range of motion. Skin:     General: Skin is warm and dry.   Neurological:      General: No focal deficit present.      Mental Status: Alert and oriented to person, place and time.   Psychiatric:         Behavior: Behavior is cooperative.           ASSESSMENT AND PLAN  Diagnoses and all orders for this visit:  CAD S/P percutaneous coronary angioplasty  Hypertension, essential, benign  Mixed hyperlipidemia  Paroxysmal atrial fibrillation (Multi)  BMI 27.0-27.9,adult  Controlled type 2 diabetes mellitus without complication, without long-term current use of insulin (Multi)  Never smoked any substance      [unfilled]

## 2024-07-18 ENCOUNTER — TELEPHONE (OUTPATIENT)
Dept: CARDIOLOGY | Facility: CLINIC | Age: 74
End: 2024-07-18
Payer: MEDICARE

## 2024-07-18 NOTE — TELEPHONE ENCOUNTER
----- Message from Nurse Bridget SHERIFF sent at 7/18/2024  9:11 AM EDT -----    ----- Message -----  From: Shashank Proctor MD  Sent: 7/18/2024   8:01 AM EDT  To: Bridget Whitney LPN    Lipid profile very good  ----- Message -----  From: Lab, Background User  Sent: 7/17/2024   5:56 PM EDT  To: Shashank Proctor MD

## 2024-07-19 ENCOUNTER — APPOINTMENT (OUTPATIENT)
Dept: CARDIOLOGY | Facility: CLINIC | Age: 74
End: 2024-07-19
Payer: MEDICARE

## 2024-07-19 VITALS
DIASTOLIC BLOOD PRESSURE: 70 MMHG | HEIGHT: 62 IN | HEART RATE: 63 BPM | WEIGHT: 150 LBS | SYSTOLIC BLOOD PRESSURE: 120 MMHG | BODY MASS INDEX: 27.6 KG/M2

## 2024-07-19 DIAGNOSIS — I10 HYPERTENSION, ESSENTIAL, BENIGN: ICD-10-CM

## 2024-07-19 DIAGNOSIS — I10 HTN (HYPERTENSION), BENIGN: ICD-10-CM

## 2024-07-19 DIAGNOSIS — E78.2 MIXED HYPERLIPIDEMIA: ICD-10-CM

## 2024-07-19 DIAGNOSIS — Z98.61 CAD S/P PERCUTANEOUS CORONARY ANGIOPLASTY: ICD-10-CM

## 2024-07-19 DIAGNOSIS — I25.10 CAD S/P PERCUTANEOUS CORONARY ANGIOPLASTY: ICD-10-CM

## 2024-07-19 DIAGNOSIS — I48.0 PAROXYSMAL ATRIAL FIBRILLATION (MULTI): Primary | ICD-10-CM

## 2024-07-19 DIAGNOSIS — Z78.9 NEVER SMOKED ANY SUBSTANCE: ICD-10-CM

## 2024-07-19 DIAGNOSIS — Z71.89 ENCOUNTER TO DISCUSS TREATMENT OPTIONS: ICD-10-CM

## 2024-07-19 DIAGNOSIS — I47.20 VENTRICULAR TACHYCARDIA (MULTI): ICD-10-CM

## 2024-07-19 DIAGNOSIS — E11.9 CONTROLLED TYPE 2 DIABETES MELLITUS WITHOUT COMPLICATION, WITHOUT LONG-TERM CURRENT USE OF INSULIN (MULTI): ICD-10-CM

## 2024-07-19 DIAGNOSIS — Z71.89 ENCOUNTER FOR MEDICATION REVIEW AND COUNSELING: ICD-10-CM

## 2024-07-19 PROCEDURE — 3078F DIAST BP <80 MM HG: CPT | Performed by: INTERNAL MEDICINE

## 2024-07-19 PROCEDURE — 1159F MED LIST DOCD IN RCRD: CPT | Performed by: INTERNAL MEDICINE

## 2024-07-19 PROCEDURE — 3074F SYST BP LT 130 MM HG: CPT | Performed by: INTERNAL MEDICINE

## 2024-07-19 PROCEDURE — 3048F LDL-C <100 MG/DL: CPT | Performed by: INTERNAL MEDICINE

## 2024-07-19 PROCEDURE — 99214 OFFICE O/P EST MOD 30 MIN: CPT | Performed by: INTERNAL MEDICINE

## 2024-07-19 PROCEDURE — 93000 ELECTROCARDIOGRAM COMPLETE: CPT | Performed by: INTERNAL MEDICINE

## 2024-07-19 PROCEDURE — 1036F TOBACCO NON-USER: CPT | Performed by: INTERNAL MEDICINE

## 2024-07-19 RX ORDER — METOPROLOL TARTRATE 50 MG/1
50 TABLET ORAL 2 TIMES DAILY
Qty: 180 TABLET | Refills: 3 | Status: SHIPPED | OUTPATIENT
Start: 2024-07-19

## 2024-07-19 ASSESSMENT — ENCOUNTER SYMPTOMS
DYSPNEA ON EXERTION: 0
PALPITATIONS: 1

## 2024-07-19 NOTE — PROGRESS NOTES
"Chief Complaint:   Follow-up (Pt is here today following up after 6 months )     History Of Present Illness:    Myrna Rodrigues is a 73 y.o. female presenting with followup after prior cardioversion.    She has episodes of palpitation.  She denies any lightheadedness, near syncope, or syncope.       Last Recorded Vitals:  Vitals:    07/19/24 1341   BP: 120/70   BP Location: Left arm   Patient Position: Sitting   Pulse: 63   Weight: 68 kg (150 lb)   Height: 1.575 m (5' 2\")       Past Medical History:  See List     Past Surgical History:  See List      Social History:  She reports that she has never smoked. She has never used smokeless tobacco. She reports that she does not currently use alcohol. She reports that she does not use drugs.    Family History:  Family History   Problem Relation Name Age of Onset    Lung cancer Mother      Lymphoma Mother      Bone cancer Mother      Heart attack Father      Liver cancer Brother      Lung cancer Brother          Allergies:  Nitrofurantoin    Outpatient Medications:  Current Outpatient Medications   Medication Instructions    acetaminophen (TYLENOL) 650 mg, oral, Every 6 hours PRN    apixaban (ELIQUIS) 5 mg, oral, 2 times daily    atorvastatin (LIPITOR) 10 mg, oral, Daily    cholecalciferol (VITAMIN D-3) 1,000 Units, oral, Daily, Full tab     clobetasol (Temovate) 0.05 % ointment Topical, 2 times daily    estradiol (Estrace) 0.01 % (0.1 mg/gram) vaginal cream vaginal    ipratropium-albuteroL (Duo-Neb) 0.5-2.5 mg/3 mL nebulizer solution 3 mL, inhalation, Every 4 hours PRN    levothyroxine (Tirosint) 112 mcg capsule 1 capsule, oral, Daily before breakfast, On an empty stomach    magnesium oxide (MAG-OX) 400 mg, oral, Daily    metFORMIN XR (Glucophage-XR) 500 mg 24 hr tablet Take 2 tablets in the morning and 1 tablet in the evening    metoprolol tartrate (LOPRESSOR) 25 mg, oral, 2 times daily    mometasone-formoterol (Dulera) 100-5 mcg/actuation inhaler 2 puffs, inhalation, 2 times " daily    montelukast (SINGULAIR) 10 mg, oral, Nightly    nitrofurantoin (Macrodantin) 100 mg capsule To be used under direct supervision of physician for oral challenge    nitroglycerin (NITROSTAT) 0.4 mg, sublingual, Every 5 min PRN    omeprazole (PriLOSEC) 40 mg DR capsule TAKE 1 CAPSULE BY MOUTH TWICE  DAILY    oxybutynin XL (DITROPAN-XL) 10 mg, oral, Daily    POTASSIUM ORAL Daily, 99mg, one    Ringer's solution,lactated (lactated Ringer's) infusion 50 mL, intravenous    sennosides (Senokot) 8.6 mg tablet 2 tablets, oral, Every 12 hours    sulfamethoxazole-trimethoprim (Bactrim) 200-40 mg/5 mL suspension 80 mg of trimethoprim, oral, 2 times daily, To be taken under providers supervision for an oral challenge.    sulfamethoxazole-trimethoprim (Bactrim) 400-80 mg/5 mL injection To be used for skin testing, under direct supervision of provider    tiotropium (Spiriva Respimat) 2.5 mcg/actuation inhaler 2 puffs, inhalation, Daily    traZODone (Desyrel) 50 mg tablet Take 2 tablets (100 mg) by mouth once daily at bedtime.    Ventolin HFA 90 mcg/actuation inhaler 2 puffs, inhalation, Every 4 hours PRN   Review of Systems   Cardiovascular:  Positive for palpitations. Negative for chest pain and dyspnea on exertion.   All other systems reviewed and are negative.        Physical Exam:  Constitutional:       General: Awake.      Appearance: Normal and healthy appearance. Well-developed and not in distress.   Neck:      Vascular: No JVR. JVD normal.   Pulmonary:      Effort: Pulmonary effort is normal.      Breath sounds: Normal breath sounds. No wheezing. No rhonchi. No rales.   Chest:      Chest wall: Not tender to palpatation.   Cardiovascular:      PMI at left midclavicular line. Normal rate. Regular rhythm. Normal S1. Normal S2.       Murmurs: There is no murmur.      No gallop.  No click. No rub.   Pulses:     Intact distal pulses.   Edema:     Peripheral edema absent.   Abdominal:      Tenderness: There is no abdominal  tenderness.   Musculoskeletal: Normal range of motion.         General: No tenderness. Skin:     General: Skin is warm and dry.   Neurological:      General: No focal deficit present.      Mental Status: Alert and oriented to person, place and time.            Last Labs:  CBC -  Lab Results   Component Value Date    WBC 9.9 02/19/2024    HGB 9.7 (L) 02/19/2024    HCT 33.1 (L) 02/19/2024    MCV 78 (L) 02/19/2024     02/19/2024       CMP -  Lab Results   Component Value Date    CALCIUM 8.9 02/19/2024    PROT 7.3 01/28/2024    ALBUMIN 4.0 01/28/2024    AST 12 01/28/2024    ALT 8 01/28/2024    ALKPHOS 69 01/28/2024    BILITOT 0.5 01/28/2024       LIPID PANEL -   Lab Results   Component Value Date    CHOL 126 07/17/2024    TRIG 97 07/17/2024    HDL 75.1 07/17/2024    CHHDL 1.7 07/17/2024    LDLF 45 01/03/2022    VLDL 19 07/17/2024    NHDL 51 07/17/2024       RENAL FUNCTION PANEL -   Lab Results   Component Value Date    GLUCOSE 81 02/19/2024     02/19/2024    K 4.8 02/19/2024     02/19/2024    CO2 31 02/19/2024    ANIONGAP 10 02/19/2024    BUN 14 02/19/2024    CREATININE 0.96 02/19/2024    CALCIUM 8.9 02/19/2024    ALBUMIN 4.0 01/28/2024        Lab Results   Component Value Date     (H) 11/24/2023    HGBA1C 6.4 (H) 07/15/2024       Last Cardiology Tests:  ECG:  ECG 12 Lead 01/29/2024    Today. NSR. LAD. Qtc 450 ms  Echo:  Transthoracic Echo (TTE) Complete 01/18/2024      Ejection Fractions:  EF   Date/Time Value Ref Range Status   01/18/2024 01:48 PM 68 %      Jan 2024. GAVIN  GAVIN reviewed with Dr. Duggan.  No thrombus present.  Normal LVEF.  Moderate MR    Lab review: I have personally reviewed the laboratory result(s) see above    Assessment/Plan   Diagnoses and all orders for this visit:  Paroxysmal atrial fibrillation (Multi)  CAD S/P percutaneous coronary angioplasty  Hypertension, essential, benign  Mixed hyperlipidemia  Ventricular tachycardia (Multi)  Controlled type 2 diabetes mellitus  without complication, without long-term current use of insulin (Multi)  Never smoked any substance  BMI 27.0-27.9,adult  Encounter for medication review and counseling  Encounter to discuss treatment options        Rhea Ramon RN    Paroxysmal atrial fibrillation. S/p GAVIN and CVN Jan 2024. Currently maintaining NSR. Clinical symptoms of palpitation. Anticoagulated. Reviwed meds. Increase metoprolol. Refill.  Son with PAF. With his HIPAA consent, reviewed data with pt. S/p cryoPVI and epicardial AF ablation and atriclip  Valvular heart disease with moderate MR by GAVIN Jan 2024  CAD, s/p remote PCI stent to LAD. Patent stent 2013 cath. Reviewed meds. Continue meds. Reills.  Other medical issues of hyperlipidemia, hypertension, and COPD. Stable.  Overwight  American Heart Association recommendations reviewed.    Counseling over 50% visit performed. The patient and I discussed normal conduction, atrial fibrillation, ECG, FMH of atrial fibrillation, how medications work, refills as noted, treatment options, risks, benefits and imponderables. All questions answered. Pt appreciative of care.

## 2024-07-19 NOTE — PATIENT INSTRUCTIONS
folloContinue same medications/treatment.  Patient educated on proper medication use.  Patient educated on risk factor modification.  Please bring any lab results from other providers/physicians to your next appointment.    Please bring all medicines, vitamins, and herbal supplements with you when you come to the office.    Prescriptions will not be filled unless you are compliant with your follow up appointments or have a follow up appointment scheduled as per instruction of your physician. Refills should be requested at the time of your visit.    INCREASE metoprolol tartrate to 50mg twice daily.  Follow up with Michelle in 6 months    RODGER KENDRICK RN, AM SCRIBING FOR AND IN THE PRESENCE OF DR. RENATE CORTÉS MD, FACC, FACP, RS

## 2024-08-27 ENCOUNTER — APPOINTMENT (OUTPATIENT)
Dept: PRIMARY CARE | Facility: CLINIC | Age: 74
End: 2024-08-27
Payer: MEDICARE

## 2024-08-27 VITALS
HEART RATE: 72 BPM | HEIGHT: 62 IN | DIASTOLIC BLOOD PRESSURE: 70 MMHG | RESPIRATION RATE: 14 BRPM | OXYGEN SATURATION: 98 % | SYSTOLIC BLOOD PRESSURE: 122 MMHG | WEIGHT: 149 LBS | BODY MASS INDEX: 27.42 KG/M2

## 2024-08-27 DIAGNOSIS — Z00.00 MEDICARE ANNUAL WELLNESS VISIT, SUBSEQUENT: Primary | ICD-10-CM

## 2024-08-27 DIAGNOSIS — Z12.31 ENCOUNTER FOR SCREENING MAMMOGRAM FOR BREAST CANCER: ICD-10-CM

## 2024-08-27 DIAGNOSIS — E55.9 MILD VITAMIN D DEFICIENCY: ICD-10-CM

## 2024-08-27 DIAGNOSIS — K52.9 CHRONIC DIARRHEA: ICD-10-CM

## 2024-08-27 DIAGNOSIS — D50.9 IRON DEFICIENCY ANEMIA, UNSPECIFIED IRON DEFICIENCY ANEMIA TYPE: ICD-10-CM

## 2024-08-27 DIAGNOSIS — E78.5 HYPERLIPIDEMIA, UNSPECIFIED HYPERLIPIDEMIA TYPE: ICD-10-CM

## 2024-08-27 DIAGNOSIS — R19.7 DIARRHEA OF PRESUMED INFECTIOUS ORIGIN: ICD-10-CM

## 2024-08-27 PROCEDURE — 1124F ACP DISCUSS-NO DSCNMKR DOCD: CPT | Performed by: INTERNAL MEDICINE

## 2024-08-27 PROCEDURE — 99212 OFFICE O/P EST SF 10 MIN: CPT | Performed by: INTERNAL MEDICINE

## 2024-08-27 PROCEDURE — 1159F MED LIST DOCD IN RCRD: CPT | Performed by: INTERNAL MEDICINE

## 2024-08-27 PROCEDURE — 3074F SYST BP LT 130 MM HG: CPT | Performed by: INTERNAL MEDICINE

## 2024-08-27 PROCEDURE — 3078F DIAST BP <80 MM HG: CPT | Performed by: INTERNAL MEDICINE

## 2024-08-27 PROCEDURE — 3048F LDL-C <100 MG/DL: CPT | Performed by: INTERNAL MEDICINE

## 2024-08-27 PROCEDURE — G0439 PPPS, SUBSEQ VISIT: HCPCS | Performed by: INTERNAL MEDICINE

## 2024-08-27 PROCEDURE — 1170F FXNL STATUS ASSESSED: CPT | Performed by: INTERNAL MEDICINE

## 2024-08-27 ASSESSMENT — ACTIVITIES OF DAILY LIVING (ADL)
GROCERY_SHOPPING: INDEPENDENT
DOING_HOUSEWORK: INDEPENDENT
BATHING: INDEPENDENT
TAKING_MEDICATION: INDEPENDENT
MANAGING_FINANCES: INDEPENDENT
DRESSING: INDEPENDENT

## 2024-08-27 ASSESSMENT — PATIENT HEALTH QUESTIONNAIRE - PHQ9
SUM OF ALL RESPONSES TO PHQ9 QUESTIONS 1 AND 2: 0
1. LITTLE INTEREST OR PLEASURE IN DOING THINGS: NOT AT ALL
SUM OF ALL RESPONSES TO PHQ9 QUESTIONS 1 AND 2: 0
1. LITTLE INTEREST OR PLEASURE IN DOING THINGS: NOT AT ALL
2. FEELING DOWN, DEPRESSED OR HOPELESS: NOT AT ALL
2. FEELING DOWN, DEPRESSED OR HOPELESS: NOT AT ALL

## 2024-08-27 NOTE — PATIENT INSTRUCTIONS
PLEASE CALL DR. BESS AND ASK FOR HER TO GET ON WAITING LIST TO BE SEEN SOONER THAN IN OCTOBER DUE TO UNCOMFORTABLE NIGHTTIME ENURESIS    2.  RECOMMEND START FIBERCON TABLET, TAKE 2 IN THE MORNING WITH A FULL GLASS OF WATER TO HELP REGULATE WHAT SOUNDS TO BE IRRITABLE BOWEL SYNDROME - DIARRHEA PREDOMINANT    3.  NON-FASTING LABS ARE ORDERED TO COMPLETE YOUR LABS FOR THE YEAR, WILL ALSO DO STOOL COLLECTION TO EXCLUDE UNLIKELY POSSIBILITY OF A BAD BACTERIA CAUSING THE BOWEL ISSUE    4.  MAMMOGRAM IS ORDERED FOR YOU    5.  BONE DENSITY CAN WAIT TO NEXT YEAR    6.  YOU ARE OTHERWISE UP TO DATE WITH MEDICARE FOR HEALTH SCREENING    7.  6 MONTHS OR AS NEEDED.  IF THE FIBER DOESN'T HELP, AND THE STOOL SAMPLES ARE NEGATIVE, THEN PERHAPS YOU SHOULD TALK TO DR. MARTELL AGAIN FOR HIS THOUGHTS ON WHAT TO DO NEXT.    8.  FOLLOW UP 6 MONTHS OR AS NEEDED.

## 2024-08-27 NOTE — PROGRESS NOTES
"Subjective   Reason for Visit: Myrna Rodrigues is an 73 y.o. female here for a Medicare Wellness visit.     Past Medical, Surgical, and Family History reviewed and updated in chart.         HPI  HAVING URINARY PROBLEMS AT NIGHT, CAN'T STAY DRY.  HAS A STIMULATOR FROM DR. BESS IMPLANTED, BUT HASN'T BEEN ABLE TO GET IT TO WORK FOR MORE THAN 6 MONTHS    HAVING DIARRHEA, HAD RECENT COLONOSCOPY, SEE GI DOCTOR FOR THIS.  DOES GET SOME PAINS BUT NOT REALLY CRAMPY, BEEN GOING ON FOR YEARS, PROGRESSIVELY GOTTEN WORSE    HAVE SKIN RASH AS WELL ON BACK    NO RECENT ANTIBIOTICS    GOT [facility] IMMUNIZATIONS AT [facility]      Patient Care Team:  Calvin Baker MD as PCP - General (Internal Medicine)  ANA Zuniga-CNP as Nurse Practitioner (Cardiology)  Shashank Proctor MD as Cardiologist (Cardiology)  Lesia Degroot MD as Cardiologist (Cardiology)     Review of Systems   Constitutional:  Negative for chills, diaphoresis and fever.   Respiratory:  Negative for cough and shortness of breath.    Cardiovascular:  Negative for chest pain and leg swelling.   Gastrointestinal:  Positive for diarrhea. Negative for constipation, nausea and vomiting.   Genitourinary:  Positive for enuresis.   Musculoskeletal:  Negative for joint swelling and myalgias.       Objective   Vitals:  /70   Pulse 72   Resp 14   Ht 1.575 m (5' 2\")   Wt 67.6 kg (149 lb)   SpO2 98%   BMI 27.25 kg/m²       Physical Exam  Vitals reviewed.   Constitutional:       General: She is not in acute distress.     Appearance: She is not ill-appearing.   Cardiovascular:      Rate and Rhythm: Normal rate and regular rhythm.      Pulses: Normal pulses.      Heart sounds:      No gallop.   Pulmonary:      Breath sounds: Normal breath sounds. No wheezing, rhonchi or rales.   Abdominal:      General: Abdomen is flat. Bowel sounds are normal.      Palpations: Abdomen is soft.      Tenderness: There is no guarding or rebound.   Musculoskeletal:      Right " lower leg: No edema.      Left lower leg: No edema.         Assessment/Plan   Problem List Items Addressed This Visit             ICD-10-CM    Chronic diarrhea K52.9    Mild vitamin D deficiency E55.9    Relevant Orders    Vitamin D 25-Hydroxy,Total (for eval of Vitamin D levels)    Medicare annual wellness visit, subsequent - Primary Z00.00     Other Visit Diagnoses         Codes    Hyperlipidemia, unspecified hyperlipidemia type     E78.5    Relevant Orders    Vitamin B12    Iron deficiency anemia, unspecified iron deficiency anemia type     D50.9    Relevant Orders    CBC    Ferritin    Iron and TIBC    Encounter for screening mammogram for breast cancer     Z12.31    Relevant Orders    BI mammo bilateral screening tomosynthesis    Diarrhea of presumed infectious origin     R19.7    Relevant Orders    Stool Pathogen Panel, PCR    C. difficile, PCR          Patient Instructions    PLEASE CALL DR. BESS AND ASK FOR HER TO GET ON WAITING LIST TO BE SEEN SOONER THAN IN OCTOBER DUE TO UNCOMFORTABLE NIGHTTIME ENURESIS    2.  RECOMMEND START FIBERCON TABLET, TAKE 2 IN THE MORNING WITH A FULL GLASS OF WATER TO HELP REGULATE WHAT SOUNDS TO BE IRRITABLE BOWEL SYNDROME - DIARRHEA PREDOMINANT    3.  NON-FASTING LABS ARE ORDERED TO COMPLETE YOUR LABS FOR THE YEAR, WILL ALSO DO STOOL COLLECTION TO EXCLUDE UNLIKELY POSSIBILITY OF A BAD BACTERIA CAUSING THE BOWEL ISSUE    4.  MAMMOGRAM IS ORDERED FOR YOU    5.  BONE DENSITY CAN WAIT TO NEXT YEAR    6.  YOU ARE OTHERWISE UP TO DATE WITH MEDICARE FOR HEALTH SCREENING    7.  6 MONTHS OR AS NEEDED.  IF THE FIBER DOESN'T HELP, AND THE STOOL SAMPLES ARE NEGATIVE, THEN PERHAPS YOU SHOULD TALK TO DR. MARTELL AGAIN FOR HIS THOUGHTS ON WHAT TO DO NEXT.    8.  FOLLOW UP 6 MONTHS OR AS NEEDED.

## 2024-08-29 ENCOUNTER — APPOINTMENT (OUTPATIENT)
Dept: RADIOLOGY | Facility: CLINIC | Age: 74
End: 2024-08-29
Payer: MEDICARE

## 2024-09-03 ASSESSMENT — ENCOUNTER SYMPTOMS
CHILLS: 0
DIARRHEA: 1
NAUSEA: 0
DIAPHORESIS: 0
JOINT SWELLING: 0
SHORTNESS OF BREATH: 0
VOMITING: 0
COUGH: 0
FEVER: 0
MYALGIAS: 0
CONSTIPATION: 0

## 2024-09-04 ENCOUNTER — LAB (OUTPATIENT)
Dept: LAB | Facility: LAB | Age: 74
End: 2024-09-04
Payer: MEDICARE

## 2024-09-04 DIAGNOSIS — E55.9 MILD VITAMIN D DEFICIENCY: ICD-10-CM

## 2024-09-04 DIAGNOSIS — E78.5 HYPERLIPIDEMIA, UNSPECIFIED HYPERLIPIDEMIA TYPE: ICD-10-CM

## 2024-09-04 DIAGNOSIS — D50.9 IRON DEFICIENCY ANEMIA, UNSPECIFIED IRON DEFICIENCY ANEMIA TYPE: ICD-10-CM

## 2024-09-04 LAB
ERYTHROCYTE [DISTWIDTH] IN BLOOD BY AUTOMATED COUNT: 18.9 % (ref 11.5–14.5)
FERRITIN SERPL-MCNC: 9 NG/ML (ref 8–150)
HCT VFR BLD AUTO: 31 % (ref 36–46)
HGB BLD-MCNC: 8.9 G/DL (ref 12–16)
IRON SATN MFR SERPL: 3 % (ref 25–45)
IRON SERPL-MCNC: 13 UG/DL (ref 35–150)
MCH RBC QN AUTO: 20.9 PG (ref 26–34)
MCHC RBC AUTO-ENTMCNC: 28.7 G/DL (ref 32–36)
MCV RBC AUTO: 73 FL (ref 80–100)
NRBC BLD-RTO: 0 /100 WBCS (ref 0–0)
PLATELET # BLD AUTO: 320 X10*3/UL (ref 150–450)
RBC # BLD AUTO: 4.26 X10*6/UL (ref 4–5.2)
TIBC SERPL-MCNC: 454 UG/DL (ref 240–445)
UIBC SERPL-MCNC: 441 UG/DL (ref 110–370)
WBC # BLD AUTO: 8.3 X10*3/UL (ref 4.4–11.3)

## 2024-09-04 PROCEDURE — 83550 IRON BINDING TEST: CPT

## 2024-09-04 PROCEDURE — 82728 ASSAY OF FERRITIN: CPT

## 2024-09-04 PROCEDURE — 83540 ASSAY OF IRON: CPT

## 2024-09-04 PROCEDURE — 85027 COMPLETE CBC AUTOMATED: CPT

## 2024-09-04 PROCEDURE — 82306 VITAMIN D 25 HYDROXY: CPT

## 2024-09-04 PROCEDURE — 36415 COLL VENOUS BLD VENIPUNCTURE: CPT

## 2024-09-04 PROCEDURE — 82607 VITAMIN B-12: CPT

## 2024-09-05 DIAGNOSIS — Z86.39 HISTORY OF ELEVATED LIPIDS: ICD-10-CM

## 2024-09-05 DIAGNOSIS — R19.7 DIARRHEA, UNSPECIFIED TYPE: ICD-10-CM

## 2024-09-05 DIAGNOSIS — K52.9 CHRONIC DIARRHEA: Primary | ICD-10-CM

## 2024-09-05 DIAGNOSIS — R19.7 DIARRHEA OF PRESUMED INFECTIOUS ORIGIN: ICD-10-CM

## 2024-09-05 LAB
25(OH)D3 SERPL-MCNC: 37 NG/ML (ref 30–100)
VIT B12 SERPL-MCNC: 479 PG/ML (ref 211–911)

## 2024-09-05 RX ORDER — ATORVASTATIN CALCIUM 10 MG/1
10 TABLET, FILM COATED ORAL DAILY
Qty: 90 TABLET | Refills: 3 | Status: SHIPPED | OUTPATIENT
Start: 2024-09-05

## 2024-09-05 NOTE — TELEPHONE ENCOUNTER
Received request for prescription refills for patient.   Patient follows with Dr. Shashank Proctor      Request is for Atorvastatin  Is patient currently on medication yes    Last OV 7/17/24  Next OV 7/16/25    Pended for signing and sent to provider

## 2024-09-10 ENCOUNTER — HOSPITAL ENCOUNTER (OUTPATIENT)
Dept: RADIOLOGY | Facility: HOSPITAL | Age: 74
Discharge: HOME | End: 2024-09-10
Payer: MEDICARE

## 2024-09-10 ENCOUNTER — LAB (OUTPATIENT)
Dept: LAB | Facility: LAB | Age: 74
End: 2024-09-10
Payer: MEDICARE

## 2024-09-10 VITALS — BODY MASS INDEX: 27.88 KG/M2 | HEIGHT: 60 IN | WEIGHT: 142 LBS

## 2024-09-10 DIAGNOSIS — R19.7 DIARRHEA OF PRESUMED INFECTIOUS ORIGIN: ICD-10-CM

## 2024-09-10 DIAGNOSIS — Z12.31 ENCOUNTER FOR SCREENING MAMMOGRAM FOR BREAST CANCER: ICD-10-CM

## 2024-09-10 LAB — C DIF TOX TCDA+TCDB STL QL NAA+PROBE: NOT DETECTED

## 2024-09-10 PROCEDURE — 77067 SCR MAMMO BI INCL CAD: CPT

## 2024-09-10 PROCEDURE — 87493 C DIFF AMPLIFIED PROBE: CPT

## 2024-09-10 PROCEDURE — 77067 SCR MAMMO BI INCL CAD: CPT | Performed by: RADIOLOGY

## 2024-09-10 PROCEDURE — 77063 BREAST TOMOSYNTHESIS BI: CPT | Performed by: RADIOLOGY

## 2024-09-10 PROCEDURE — 87506 IADNA-DNA/RNA PROBE TQ 6-11: CPT

## 2024-09-12 LAB

## 2024-09-23 DIAGNOSIS — R92.8 ABNORMALITY OF LEFT BREAST ON SCREENING MAMMOGRAM: Primary | ICD-10-CM

## 2024-09-24 ENCOUNTER — APPOINTMENT (OUTPATIENT)
Dept: PRIMARY CARE | Facility: CLINIC | Age: 74
End: 2024-09-24
Payer: MEDICARE

## 2024-09-24 VITALS
HEIGHT: 60 IN | SYSTOLIC BLOOD PRESSURE: 126 MMHG | OXYGEN SATURATION: 98 % | RESPIRATION RATE: 14 BRPM | BODY MASS INDEX: 29.06 KG/M2 | HEART RATE: 62 BPM | DIASTOLIC BLOOD PRESSURE: 80 MMHG | WEIGHT: 148 LBS

## 2024-09-24 DIAGNOSIS — E11.9 CONTROLLED TYPE 2 DIABETES MELLITUS WITHOUT COMPLICATION, WITHOUT LONG-TERM CURRENT USE OF INSULIN (MULTI): ICD-10-CM

## 2024-09-24 DIAGNOSIS — D50.9 IRON DEFICIENCY ANEMIA, UNSPECIFIED IRON DEFICIENCY ANEMIA TYPE: Primary | ICD-10-CM

## 2024-09-24 DIAGNOSIS — I10 HYPERTENSION, ESSENTIAL, BENIGN: ICD-10-CM

## 2024-09-24 PROCEDURE — 99214 OFFICE O/P EST MOD 30 MIN: CPT | Performed by: INTERNAL MEDICINE

## 2024-09-24 PROCEDURE — 1123F ACP DISCUSS/DSCN MKR DOCD: CPT | Performed by: INTERNAL MEDICINE

## 2024-09-24 PROCEDURE — 1159F MED LIST DOCD IN RCRD: CPT | Performed by: INTERNAL MEDICINE

## 2024-09-24 PROCEDURE — 3074F SYST BP LT 130 MM HG: CPT | Performed by: INTERNAL MEDICINE

## 2024-09-24 PROCEDURE — 3048F LDL-C <100 MG/DL: CPT | Performed by: INTERNAL MEDICINE

## 2024-09-24 PROCEDURE — 3079F DIAST BP 80-89 MM HG: CPT | Performed by: INTERNAL MEDICINE

## 2024-09-24 NOTE — PATIENT INSTRUCTIONS
THE LABS REVEALED AN IRON DEFICIENT ANEMIA, AND THIS MAY BE CONTRIBUTING TO THE BOWEL IRREGULARITY ISSUE.    2.  RECOMMEND START IRON SULFATE 325 MG ONE TABLET DAILY, AND CONTINUE THE FIBER CON TABLETS FOR NOW    3.  YOU MAY RECEIVE SOME BENEFIT FROM A COMMON SIDE EFFECT FROM ORAL IRON THERAPY WHICH IS SLOWING OF THE BOWEL    4.  AFTER 3 MONTHS, WILL ASK YOU TO REPEAT BLOOD TESTS AND FOLLOW UP HERE FOR FURTHER RECOMMENDATIONS    5.  WILL AWAIT RESULTS OF DR. BESS APPOINTMENT    6.  FYI THE IRON WILL MAKE STOOL GREY, BUT SHOULDN'T BE TARRY BLACK IN COLOR.

## 2024-09-24 NOTE — PROGRESS NOTES
Subjective   Myrna Rodrigues is a 73 y.o. female who presents for follow up discuss preventing recurrences of  iron deficient anemia    Has not started the iron sulfate yet   Still having loose stools    Would like flu shot today     HPI   Bowels are still loose.      S/P HYSTERECTOMY    HAD REPEAT UPPER AND LOWER ENDOSCOPY NEG    Review of Systems   Constitutional:  Negative for chills, diaphoresis and fever.   Respiratory:  Negative for cough and shortness of breath.    Cardiovascular:  Negative for chest pain and leg swelling.   Gastrointestinal:  Positive for diarrhea. Negative for constipation, nausea and vomiting.   Genitourinary:  Positive for enuresis.   Musculoskeletal:  Negative for joint swelling and myalgias.       Objective   /80   Pulse 62   Resp 14   Ht 1.524 m (5')   Wt 67.1 kg (148 lb)   SpO2 98%   BMI 28.90 kg/m²     Physical Exam  Vitals reviewed.   Constitutional:       General: She is not in acute distress.     Appearance: She is not ill-appearing.   Cardiovascular:      Rate and Rhythm: Normal rate and regular rhythm.      Pulses: Normal pulses.      Heart sounds:      No gallop.   Pulmonary:      Breath sounds: Normal breath sounds. No wheezing, rhonchi or rales.   Abdominal:      General: Abdomen is flat. Bowel sounds are normal.      Palpations: Abdomen is soft.      Tenderness: There is no guarding or rebound.   Musculoskeletal:      Right lower leg: No edema.      Left lower leg: No edema.         Assessment/Plan   Problem List Items Addressed This Visit       Controlled type 2 diabetes mellitus without complication, without long-term current use of insulin (Multi)    Hypertension, essential, benign     Other Visit Diagnoses       Iron deficiency anemia, unspecified iron deficiency anemia type    -  Primary    Relevant Orders    Ferritin    CBC    Iron and TIBC          Patient Instructions    THE LABS REVEALED AN IRON DEFICIENT ANEMIA, AND THIS MAY BE CONTRIBUTING TO THE BOWEL  IRREGULARITY ISSUE.    2.  RECOMMEND START IRON SULFATE 325 MG ONE TABLET DAILY, AND CONTINUE THE FIBER CON TABLETS FOR NOW    3.  YOU MAY RECEIVE SOME BENEFIT FROM A COMMON SIDE EFFECT FROM ORAL IRON THERAPY WHICH IS SLOWING OF THE BOWEL    4.  AFTER 3 MONTHS, WILL ASK YOU TO REPEAT BLOOD TESTS AND FOLLOW UP HERE FOR FURTHER RECOMMENDATIONS    5.  WILL AWAIT RESULTS OF DR. BESS APPOINTMENT    6.  FYI THE IRON WILL MAKE STOOL GREY, BUT SHOULDN'T BE TARRY BLACK IN COLOR.      Patient Instructions    THE LABS REVEALED AN IRON DEFICIENT ANEMIA, AND THIS MAY BE CONTRIBUTING TO THE BOWEL IRREGULARITY ISSUE.    2.  RECOMMEND START IRON SULFATE 325 MG ONE TABLET DAILY, AND CONTINUE THE FIBER CON TABLETS FOR NOW    3.  YOU MAY RECEIVE SOME BENEFIT FROM A COMMON SIDE EFFECT FROM ORAL IRON THERAPY WHICH IS SLOWING OF THE BOWEL    4.  AFTER 3 MONTHS, WILL ASK YOU TO REPEAT BLOOD TESTS AND FOLLOW UP HERE FOR FURTHER RECOMMENDATIONS    5.  WILL AWAIT RESULTS OF DR. BESS APPOINTMENT    6.  FYI THE IRON WILL MAKE STOOL GREY, BUT SHOULDN'T BE TARRY BLACK IN COLOR.

## 2024-09-25 PROBLEM — E11.51 TYPE 2 DIABETES MELLITUS WITH DIABETIC PERIPHERAL ANGIOPATHY WITHOUT GANGRENE, WITHOUT LONG-TERM CURRENT USE OF INSULIN (MULTI): Status: ACTIVE | Noted: 2024-09-25

## 2024-09-25 PROBLEM — E11.51 TYPE 2 DIABETES MELLITUS WITH DIABETIC PERIPHERAL ANGIOPATHY WITHOUT GANGRENE, WITHOUT LONG-TERM CURRENT USE OF INSULIN (MULTI): Status: RESOLVED | Noted: 2024-09-25 | Resolved: 2024-09-25

## 2024-09-25 ASSESSMENT — ENCOUNTER SYMPTOMS
JOINT SWELLING: 0
DIARRHEA: 1
MYALGIAS: 0
CHILLS: 0
VOMITING: 0
FEVER: 0
DIAPHORESIS: 0
COUGH: 0
NAUSEA: 0
CONSTIPATION: 0
SHORTNESS OF BREATH: 0

## 2024-10-02 NOTE — PROGRESS NOTES
HISTORY OF PRESENT ILLNESS:  This is a 73 y.o. female who presents for follow-up for urination issues.     Controlled DM type 2 without complication or long-term current use of insulin  Hypertension, essential, benign  Iron deficiency anemia            PHYSICAL EXAMINATION:  No LMP recorded. Patient is postmenopausal.  Body mass index is 28.94 kg/m².  Visit Vitals  /67   Pulse 80   Temp 37.1 °C (98.7 °F) (Temporal)   Wt 67.2 kg (148 lb 3.2 oz)   BMI 28.94 kg/m²   OB Status Postmenopausal   Smoking Status Never   BSA 1.69 m²     General Appearance: well appearing  Neuro: Alert and oriented     Pelvic:  Genitourinary: *** normal external genitalia, [place]th[place]'s glands negative, [place]'s glands negative  Urethra:  *** normal meatus, non-tender, no periurethral mass  Vagina: mucosa *** normal  Cervix: surgically absent*** normal  Uterus: surgically absent*** normal size, nontender  Adnexae: *** negative nontender, no masses  Atrophy {POSITIVE/NEGATIVE:63088}    CST {POSITIVE/NEGATIVE:44873}  Pelvic floor muscle contraction  ***/5    POP-Q (in supine position):       Aa: ***     Ba: ***     C: ***              Gh: ***     Pb: ***     tvl: ***              Ap: ***     Bp: ***     D: ***    Rectal: deferred    PVR (by Ultrasound): ***   Urine dip:   Recent Results (from the past 6 hour(s))   POCT UA Automated manually resulted    Collection Time: 10/03/24  3:25 PM   Result Value Ref Range    POC Color, Urine Yellow Straw, Yellow, Light-Yellow    POC Appearance, Urine Cloudy (A) Clear    POC Glucose, Urine NEGATIVE NEGATIVE mg/dl    POC Bilirubin, Urine NEGATIVE NEGATIVE    POC Ketones, Urine TRACE (A) NEGATIVE mg/dl    POC Specific Gravity, Urine 1.025 1.005 - 1.035    POC Blood, Urine TRACE-Intact (A) NEGATIVE    POC PH, Urine 6.0 No Reference Range Established PH    POC Protein, Urine 30 (1+) NEGATIVE, 30 (1+) mg/dl    POC Urobilinogen, Urine 0.2 0.2, 1.0 EU/DL    Poc Nitrite, Urine NEGATIVE NEGATIVE    POC  Leukocytes, Urine LARGE (3+) (A) NEGATIVE       IMPRESSION AND PLAN:  Myrna Rodrigues is a 73 y.o. who presents with urination issues.     Follow up in ***.    Scribe Attestation  By signing my name below, ICaridad Scribe   attest that this documentation has been prepared under the direction and in the presence of Evin Thurman MD.

## 2024-10-03 ENCOUNTER — APPOINTMENT (OUTPATIENT)
Dept: UROLOGY | Facility: CLINIC | Age: 74
End: 2024-10-03
Payer: MEDICARE

## 2024-10-03 ENCOUNTER — PREP FOR PROCEDURE (OUTPATIENT)
Dept: UROLOGY | Facility: HOSPITAL | Age: 74
End: 2024-10-03

## 2024-10-03 VITALS
WEIGHT: 148.2 LBS | SYSTOLIC BLOOD PRESSURE: 119 MMHG | TEMPERATURE: 98.7 F | BODY MASS INDEX: 28.94 KG/M2 | HEART RATE: 80 BPM | DIASTOLIC BLOOD PRESSURE: 67 MMHG

## 2024-10-03 DIAGNOSIS — N39.46 MIXED STRESS AND URGE URINARY INCONTINENCE: ICD-10-CM

## 2024-10-03 DIAGNOSIS — N39.41 URGE INCONTINENCE: Primary | ICD-10-CM

## 2024-10-03 LAB
POC APPEARANCE, URINE: ABNORMAL
POC BILIRUBIN, URINE: NEGATIVE
POC BLOOD, URINE: ABNORMAL
POC COLOR, URINE: YELLOW
POC GLUCOSE, URINE: NEGATIVE MG/DL
POC KETONES, URINE: ABNORMAL MG/DL
POC LEUKOCYTES, URINE: ABNORMAL
POC NITRITE,URINE: NEGATIVE
POC PH, URINE: 6 PH
POC PROTEIN, URINE: ABNORMAL MG/DL
POC SPECIFIC GRAVITY, URINE: 1.02
POC UROBILINOGEN, URINE: 0.2 EU/DL

## 2024-10-03 PROCEDURE — 1123F ACP DISCUSS/DSCN MKR DOCD: CPT | Performed by: UROLOGY

## 2024-10-03 PROCEDURE — 3048F LDL-C <100 MG/DL: CPT | Performed by: UROLOGY

## 2024-10-03 PROCEDURE — 1159F MED LIST DOCD IN RCRD: CPT | Performed by: UROLOGY

## 2024-10-03 PROCEDURE — 87086 URINE CULTURE/COLONY COUNT: CPT

## 2024-10-03 PROCEDURE — 81003 URINALYSIS AUTO W/O SCOPE: CPT | Performed by: UROLOGY

## 2024-10-03 PROCEDURE — G2211 COMPLEX E/M VISIT ADD ON: HCPCS | Performed by: UROLOGY

## 2024-10-03 PROCEDURE — 3074F SYST BP LT 130 MM HG: CPT | Performed by: UROLOGY

## 2024-10-03 PROCEDURE — 99214 OFFICE O/P EST MOD 30 MIN: CPT | Performed by: UROLOGY

## 2024-10-03 PROCEDURE — 3078F DIAST BP <80 MM HG: CPT | Performed by: UROLOGY

## 2024-10-03 RX ORDER — SODIUM CHLORIDE, SODIUM LACTATE, POTASSIUM CHLORIDE, CALCIUM CHLORIDE 600; 310; 30; 20 MG/100ML; MG/100ML; MG/100ML; MG/100ML
20 INJECTION, SOLUTION INTRAVENOUS CONTINUOUS
OUTPATIENT
Start: 2024-10-03

## 2024-10-03 NOTE — PROGRESS NOTES
HISTORY OF PRESENT ILLNESS:  This is a 73 y.o. female who presents for follow-up for urination issues.     Controlled DM type 2 without complication or long-term current use of insulin  Hypertension, essential, benign  Iron deficiency anemia    Ms. Rodrigues is a 73 year old female presenting for follow up on her overactive bladder. She had a continence control stimulator inserted in 2020. It was working fine for 2 years but then her symptoms recurred around 1 year ago. She has been having urinary incontinence and leakage during the day and night. She is changing around 2 pads a day and 3 pads at night. She is not complaining of urgency.   She is also having stress incontinence. She is having fecal incontinence and not being able to hold her stools which has been very bothersome for her.   Trying to connect to the stimulator today was not successful, might be a problem with the battery of the device.           PHYSICAL EXAMINATION:  No LMP recorded. Patient is postmenopausal.  Body mass index is 28.94 kg/m².  Visit Vitals  /67   Pulse 80   Temp 37.1 °C (98.7 °F) (Temporal)   Wt 67.2 kg (148 lb 3.2 oz)   BMI 28.94 kg/m²   OB Status Postmenopausal   Smoking Status Never   BSA 1.69 m²     Urine dip:   Recent Results (from the past 6 hour(s))   POCT UA Automated manually resulted    Collection Time: 10/03/24  3:25 PM   Result Value Ref Range    POC Color, Urine Yellow Straw, Yellow, Light-Yellow    POC Appearance, Urine Cloudy (A) Clear    POC Glucose, Urine NEGATIVE NEGATIVE mg/dl    POC Bilirubin, Urine NEGATIVE NEGATIVE    POC Ketones, Urine TRACE (A) NEGATIVE mg/dl    POC Specific Gravity, Urine 1.025 1.005 - 1.035    POC Blood, Urine TRACE-Intact (A) NEGATIVE    POC PH, Urine 6.0 No Reference Range Established PH    POC Protein, Urine 30 (1+) NEGATIVE, 30 (1+) mg/dl    POC Urobilinogen, Urine 0.2 0.2, 1.0 EU/DL    Poc Nitrite, Urine NEGATIVE NEGATIVE    POC Leukocytes, Urine LARGE (3+) (A) NEGATIVE        IMPRESSION AND PLAN:  Myrna Rodrigues is a 73 y.o. female s/p continence stimulator insertion in 2020 presenting for follow up on her overactive bladder after her symptoms recurred. Her interstim is likely at EOL as we could not connect with it.     Plan:  - Patient was educated about possible options and agreed to do a revision of her stimulator on October 14. We will revise the IPG and do impedance testing.  If impedances normal then we will stop at that.  If impedances is abnormal and the lead needs to be exchanged and we will do that at the same setting  - F/U with NP 3 weeks after revision.     I personally verified?and edited?the documentation of the medical student including the key elements of the history and confirm its accuracy. I personally performed the physical examination and medical decision making as documented in the medical student's note and confirm its accuracy. The medical decision making for this service was based on my clinical judgement.     Evin Thurman MD   [phone]

## 2024-10-04 ENCOUNTER — LAB (OUTPATIENT)
Dept: LAB | Facility: LAB | Age: 74
End: 2024-10-04
Payer: MEDICARE

## 2024-10-04 ENCOUNTER — TELEPHONE (OUTPATIENT)
Dept: CARDIOLOGY | Facility: CLINIC | Age: 74
End: 2024-10-04

## 2024-10-04 DIAGNOSIS — N39.41 URGE INCONTINENCE: ICD-10-CM

## 2024-10-04 LAB
ANION GAP SERPL CALC-SCNC: 11 MMOL/L (ref 10–20)
BACTERIA UR CULT: ABNORMAL
BUN SERPL-MCNC: 12 MG/DL (ref 6–23)
CALCIUM SERPL-MCNC: 8.9 MG/DL (ref 8.6–10.3)
CHLORIDE SERPL-SCNC: 102 MMOL/L (ref 98–107)
CO2 SERPL-SCNC: 30 MMOL/L (ref 21–32)
CREAT SERPL-MCNC: 0.99 MG/DL (ref 0.5–1.05)
EGFRCR SERPLBLD CKD-EPI 2021: 60 ML/MIN/1.73M*2
ERYTHROCYTE [DISTWIDTH] IN BLOOD BY AUTOMATED COUNT: 19.9 % (ref 11.5–14.5)
GLUCOSE SERPL-MCNC: 93 MG/DL (ref 74–99)
HCT VFR BLD AUTO: 31.5 % (ref 36–46)
HGB BLD-MCNC: 9 G/DL (ref 12–16)
MCH RBC QN AUTO: 20.5 PG (ref 26–34)
MCHC RBC AUTO-ENTMCNC: 28.6 G/DL (ref 32–36)
MCV RBC AUTO: 72 FL (ref 80–100)
NRBC BLD-RTO: 0 /100 WBCS (ref 0–0)
PLATELET # BLD AUTO: 346 X10*3/UL (ref 150–450)
POTASSIUM SERPL-SCNC: 4.7 MMOL/L (ref 3.5–5.3)
RBC # BLD AUTO: 4.38 X10*6/UL (ref 4–5.2)
SODIUM SERPL-SCNC: 138 MMOL/L (ref 136–145)
WBC # BLD AUTO: 9.5 X10*3/UL (ref 4.4–11.3)

## 2024-10-04 PROCEDURE — 80048 BASIC METABOLIC PNL TOTAL CA: CPT

## 2024-10-04 PROCEDURE — 36415 COLL VENOUS BLD VENIPUNCTURE: CPT

## 2024-10-04 PROCEDURE — 85027 COMPLETE CBC AUTOMATED: CPT

## 2024-10-04 NOTE — TELEPHONE ENCOUNTER
Patient called and stated she is getting a procedure on 10/14 and she wants to know if she needs to stop her blood thinners a certain time before her procedure.

## 2024-10-07 NOTE — TELEPHONE ENCOUNTER
Patient will be having revision of continence stimulator.    Per Dr. Shashank Proctor , patient may hold Eliquis 3 days prior and resume following.  Call returned to patient and advised.  Patient verbalized understanding.

## 2024-10-08 ENCOUNTER — HOSPITAL ENCOUNTER (OUTPATIENT)
Dept: RADIOLOGY | Facility: HOSPITAL | Age: 74
Discharge: HOME | End: 2024-10-08
Payer: MEDICARE

## 2024-10-08 VITALS — BODY MASS INDEX: 25.76 KG/M2 | WEIGHT: 140 LBS | HEIGHT: 62 IN

## 2024-10-08 DIAGNOSIS — R92.8 ABNORMALITY OF LEFT BREAST ON SCREENING MAMMOGRAM: ICD-10-CM

## 2024-10-08 PROCEDURE — 77061 BREAST TOMOSYNTHESIS UNI: CPT | Mod: LT

## 2024-10-08 PROCEDURE — G0279 TOMOSYNTHESIS, MAMMO: HCPCS | Mod: LEFT SIDE | Performed by: RADIOLOGY

## 2024-10-08 PROCEDURE — 77065 DX MAMMO INCL CAD UNI: CPT | Mod: LEFT SIDE | Performed by: RADIOLOGY

## 2024-10-09 ENCOUNTER — LAB (OUTPATIENT)
Dept: LAB | Facility: LAB | Age: 74
End: 2024-10-09
Payer: MEDICARE

## 2024-10-09 ENCOUNTER — PRE-ADMISSION TESTING (OUTPATIENT)
Dept: PREADMISSION TESTING | Facility: HOSPITAL | Age: 74
End: 2024-10-09
Payer: MEDICARE

## 2024-10-09 VITALS
DIASTOLIC BLOOD PRESSURE: 69 MMHG | WEIGHT: 147.49 LBS | TEMPERATURE: 97.9 F | HEART RATE: 60 BPM | RESPIRATION RATE: 16 BRPM | HEIGHT: 62 IN | BODY MASS INDEX: 27.14 KG/M2 | OXYGEN SATURATION: 99 % | SYSTOLIC BLOOD PRESSURE: 156 MMHG

## 2024-10-09 DIAGNOSIS — Z01.818 PREOP TESTING: Primary | ICD-10-CM

## 2024-10-09 DIAGNOSIS — I20.9 ANGINA, CLASS III (CMS-HCC): ICD-10-CM

## 2024-10-09 DIAGNOSIS — N39.41 URGE INCONTINENCE: ICD-10-CM

## 2024-10-09 PROCEDURE — 93005 ELECTROCARDIOGRAM TRACING: CPT

## 2024-10-09 PROCEDURE — 93010 ELECTROCARDIOGRAM REPORT: CPT | Performed by: INTERNAL MEDICINE

## 2024-10-09 PROCEDURE — 87086 URINE CULTURE/COLONY COUNT: CPT

## 2024-10-09 PROCEDURE — 87081 CULTURE SCREEN ONLY: CPT | Mod: STJLAB

## 2024-10-09 RX ORDER — FERROUS SULFATE 325(65) MG
325 TABLET ORAL DAILY
COMMUNITY

## 2024-10-09 RX ORDER — CHLORHEXIDINE GLUCONATE ORAL RINSE 1.2 MG/ML
SOLUTION DENTAL
Qty: 473 ML | Refills: 0 | Status: SHIPPED | OUTPATIENT
Start: 2024-10-09

## 2024-10-09 RX ORDER — LEVOTHYROXINE SODIUM 100 UG/1
50 TABLET ORAL 3 TIMES WEEKLY
COMMUNITY

## 2024-10-09 RX ORDER — FOLIC ACID 0.8 MG
0.8 TABLET ORAL DAILY
COMMUNITY

## 2024-10-09 RX ORDER — MAGNESIUM HYDROXIDE 400 MG/5ML
1 SUSPENSION, ORAL (FINAL DOSE FORM) ORAL DAILY
COMMUNITY

## 2024-10-09 ASSESSMENT — DUKE ACTIVITY SCORE INDEX (DASI)
CAN YOU WALK A BLOCK OR TWO ON LEVEL GROUND: YES
CAN YOU WALK INDOORS, SUCH AS AROUND YOUR HOUSE: YES
CAN YOU CLIMB A FLIGHT OF STAIRS OR WALK UP A HILL: YES
CAN YOU RUN A SHORT DISTANCE: NO
CAN YOU HAVE SEXUAL RELATIONS: NO
CAN YOU TAKE CARE OF YOURSELF (EAT, DRESS, BATHE, OR USE TOILET): YES
CAN YOU DO HEAVY WORK AROUND THE HOUSE LIKE SCRUBBING FLOORS OR LIFTING AND MOVING HEAVY FURNITURE: YES
CAN YOU PARTICIPATE IN STRENOUS SPORTS LIKE SWIMMING, SINGLES TENNIS, FOOTBALL, BASKETBALL, OR SKIING: NO
CAN YOU DO YARD WORK LIKE RAKING LEAVES, WEEDING OR PUSHING A MOWER: YES
CAN YOU PARTICIPATE IN MODERATE RECREATIONAL ACTIVITIES LIKE GOLF, BOWLING, DANCING, DOUBLES TENNIS OR THROWING A BASEBALL OR FOOTBALL: NO
TOTAL_SCORE: 31.45
CAN YOU DO LIGHT WORK AROUND THE HOUSE LIKE DUSTING OR WASHING DISHES: YES
CAN YOU DO MODERATE WORK AROUND THE HOUSE LIKE VACUUMING, SWEEPING FLOORS OR CARRYING GROCERIES: YES
DASI METS SCORE: 6.6

## 2024-10-09 ASSESSMENT — PAIN - FUNCTIONAL ASSESSMENT: PAIN_FUNCTIONAL_ASSESSMENT: 0-10

## 2024-10-09 ASSESSMENT — LIFESTYLE VARIABLES: SMOKING_STATUS: SMOKER

## 2024-10-09 ASSESSMENT — ACTIVITIES OF DAILY LIVING (ADL): ADL_SCORE: 0

## 2024-10-09 ASSESSMENT — PAIN SCALES - GENERAL: PAINLEVEL_OUTOF10: 0 - NO PAIN

## 2024-10-09 NOTE — PREPROCEDURE INSTRUCTIONS
Medication List            Accurate as of October 9, 2024  2:09 PM. Always use your most recent med list.                acetaminophen 325 mg tablet  Commonly known as: Tylenol  Medication Adjustments for Surgery: Take/Use as prescribed     apixaban 5 mg tablet  Commonly known as: Eliquis  Take 1 tablet (5 mg) by mouth 2 times a day.  Additional Medication Adjustments for Surgery: Other (Comment)  Notes to patient: Collaborate with physician     atorvastatin 10 mg tablet  Commonly known as: Lipitor  TAKE 1 TABLET BY MOUTH DAILY  Medication Adjustments for Surgery: Take/Use as prescribed     calcium polycarbophiL 625 mg tablet  Commonly known as: Fibercon  Medication Adjustments for Surgery: Do Not take on the morning of surgery     chlorhexidine 0.12 % solution  Commonly known as: Peridex  15 milliliter(s) orally once a day for 2 doses 15 ml  the night before surgery and 15 ml morning of surgery - swish for 30 seconds -DO NOT SWALLOW, SPIT OUT     cholecalciferol 25 MCG (1000 UT) tablet  Commonly known as: Vitamin D-3  Additional Medication Adjustments for Surgery: Take last dose 7 days before surgery     clobetasol 0.05 % ointment  Commonly known as: Temovate  Apply topically 2 times a day.  Medication Adjustments for Surgery: Take/Use as prescribed     ferrous sulfate (325 mg ferrous sulfate) tablet  Medication Adjustments for Surgery: Do Not take on the morning of surgery     folic acid 800 mcg tablet  Commonly known as: Folvite  Medication Adjustments for Surgery: Do Not take on the morning of surgery     * levothyroxine 100 mcg tablet  Commonly known as: Synthroid, Levoxyl  Medication Adjustments for Surgery: Take/Use as prescribed     * levothyroxine 100 mcg tablet  Commonly known as: Synthroid, Levoxyl  Medication Adjustments for Surgery: Take/Use as prescribed     magnesium oxide 400 mg (241.3 mg magnesium) tablet  Commonly known as: Mag-Ox  Take 1 tablet (400 mg) by mouth once daily.  Medication  Adjustments for Surgery: Do Not take on the morning of surgery     metFORMIN  mg 24 hr tablet  Commonly known as: Glucophage-XR  Medication Adjustments for Surgery: Take last dose 1 day (24 hours) before surgery     metoprolol tartrate 50 mg tablet  Commonly known as: Lopressor  Take 1 tablet (50 mg) by mouth 2 times a day.  Medication Adjustments for Surgery: Take/Use as prescribed     montelukast 10 mg tablet  Commonly known as: Singulair  TAKE 1 TABLET BY MOUTH AT  BEDTIME  Medication Adjustments for Surgery: Take/Use as prescribed     nitroglycerin 0.4 mg SL tablet  Commonly known as: Nitrostat  Place 1 tablet (0.4 mg) under the tongue every 5 minutes if needed for chest pain.  Medication Adjustments for Surgery: Take/Use as prescribed     omeprazole 40 mg DR capsule  Commonly known as: PriLOSEC  TAKE 1 CAPSULE BY MOUTH TWICE  DAILY  Medication Adjustments for Surgery: Take/Use as prescribed     oxybutynin XL 10 mg 24 hr tablet  Commonly known as: Ditropan-XL  TAKE 1 TABLET DAILY  Medication Adjustments for Surgery: Do Not take on the morning of surgery     potassium gluconate 595 mg (99 mg) tablet  Medication Adjustments for Surgery: Take/Use as prescribed     tiotropium 2.5 mcg/actuation inhaler  Commonly known as: Spiriva Respimat  Medication Adjustments for Surgery: Take/Use as prescribed     traZODone 50 mg tablet  Commonly known as: Desyrel  Medication Adjustments for Surgery: Take/Use as prescribed     Ventolin HFA 90 mcg/actuation inhaler  Generic drug: albuterol  USE 2 INHALATIONS BY MOUTH EVERY 4 HOURS AS NEEDED  Medication Adjustments for Surgery: Take/Use as prescribed           * This list has 2 medication(s) that are the same as other medications prescribed for you. Read the directions carefully, and ask your doctor or other care provider to review them with you.                                PRE-OPERATIVE INSTRUCTIONS    You will receive notification one business day prior to your procedure  to confirm your arrival time. It is important that you answer your phone and/or check your messages during this time. If you do not hear from the surgery center by 5 pm. the day before your procedure, please call [phone].     Please enter the building through the Outpatient entrance and take the elevator off the lobby to the 2nd floor then check in at the Outpatient Surgery desk on the 2nd floor.    INSTRUCTIONS:  Talk to your surgeon for instructions if you should stop your aspirin, blood thinner, or diabetes medicines.  DO NOT take any multivitamins or over the counter supplements for 7-10 days before surgery.  If not being admitted, you must have an adult immediately available to drive you home after surgery. We also highly recommend you have someone stay with you for the entire day and night of your surgery.  For children having surgery, a parent or legal guardian must accompany them to the surgery center. If this is not possible, please call [phone] to make additional arrangements.  For adults who are unable to consent or make medical decisions for themselves, a legal guardian or Power of  must accompany them to the surgery center. If this is not possible, please call [phone] to make additional arrangements.  Wear comfortable, loose fitting clothing.  All jewelry and piercings must be removed. If you are unable to remove an item or have a dermal piercing, please be sure to tell the nurse when you arrive for surgery.  Nail polish and make-up must be removed.  Avoid smoking or consuming alcohol for 24 hours before surgery.  To help prevent infection, please take a shower/bath and wash your hair the night before and/or morning of surgery (or follow other specific bathing instructions provided).    Preoperative Fasting Guidelines    Why must I stop eating and drinking near surgery time?  With sedation, food or liquid in your stomach can enter your lungs causing serious  complications  Increases nausea and vomiting    When do I need to stop eating and drinking before my surgery?  Do not eat any solid food after midnight the night before your surgery/procedure unless otherwise instructed by your surgeon.   You may have up to 13.5 ounces of clear liquid until TWO hours before your instructed arrival time to the hospital.  This includes water, black tea/coffee, (no milk or cream) apple juice, and electrolyte drinks (Gatorade).   You may chew gum until TWO hours before your surgery/procedure      If applicable, notify your surgeons office immediately of any new skin changes that occur to the surgical limb.      If you have any questions or concerns, please call Pre-Admission Testing at [phone].

## 2024-10-09 NOTE — CPM/PAT H&P
CPM/PAT Evaluation       Name: Myrna Rodrigues (Myrna Rodrigues)  /Age: 1950/73 y.o.     In-Person       Chief Complaint: Urinary incontinence    HPI73 year old female for insertion continence control stimulator- right 10/14/24. Patient states she has a current continence control stimulator that stopped working approximately 6 months ago. She has been having uncontrolled urinary incontinence. Denies hematuria or dysuria. Some urinary pressure. States has some peachy colored chunks in urine at times and she said Dr. Thurman is aware. Denies abdominal pain. Goes through 2-3 depends at night. Has also been incontinent of stool and is being worked up.     Past Medical History:   Diagnosis Date    Anxiety and depression     Arrhythmia     Asthma     Cancer (Multi)     Coronary artery disease     Diabetes mellitus (Multi)     Disease of thyroid gland     GERD (gastroesophageal reflux disease)     Heart disease     Hyperlipidemia     Hypertension     Irregular heart beat     Occipital neuralgia 06/10/2022    Occipital neuralgia of right side    Personal history of malignant neoplasm, unspecified 10/04/2021    History of malignant neoplasm    Personal history of other diseases of the circulatory system     History of hypertension    Personal history of other diseases of the circulatory system 10/04/2021    History of essential hypertension    Personal history of other diseases of the circulatory system     History of coronary artery disease    Personal history of other diseases of the digestive system 2020    History of chronic constipation    Personal history of other diseases of the musculoskeletal system and connective tissue     History of arthritis    Personal history of other diseases of the nervous system and sense organs 10/04/2021    History of eye problem    Personal history of other diseases of the respiratory system     History of asthma    Personal history of other specified conditions 10/04/2021    History  of chest pain    PONV (postoperative nausea and vomiting)     Stool incontinence     Thyroid cancer (Multi)     Urinary incontinence        Past Surgical History:   Procedure Laterality Date    APPENDECTOMY  08/13/2015    Appendectomy    BREAST LUMPECTOMY  08/13/2015    Breast Surgery Lumpectomy    CARPAL TUNNEL RELEASE  08/13/2015    Neuroplasty Decompression Median Nerve At Carpal Tunnel    GALLBLADDER SURGERY  08/13/2015    Gallbladder Surgery    HYSTERECTOMY  08/13/2015    Hysterectomy    KNEE ARTHROSCOPY W/ MENISCAL REPAIR  08/13/2015    Knee Arthroscopy With Medial Meniscus Repair    KNEE SURGERY  08/13/2015    Knee Surgery    MR HEAD ANGIO WO IV CONTRAST  09/05/2021    MR HEAD ANGIO WO IV CONTRAST 9/5/2021 [facility]    OTHER SURGICAL HISTORY  08/13/2015    Shoulder Surgery Left    OTHER SURGICAL HISTORY  08/13/2015    Repair Of Bladder Reconstruction    OTHER SURGICAL HISTORY  09/20/2021    Breast surgery    OTHER SURGICAL HISTORY  09/20/2021    Bladder surgery    OTHER SURGICAL HISTORY  09/20/2021    Complete colonoscopy    OTHER SURGICAL HISTORY  09/20/2021    Shoulder surgery    OTHER SURGICAL HISTORY  09/20/2021    Foot surgery    OTHER SURGICAL HISTORY  09/20/2021    Percutaneous transluminal coronary angioplasty    OTHER SURGICAL HISTORY  10/11/2021    Thyroidectomy total    THYROIDECTOMY      TOTAL KNEE ARTHROPLASTY  08/13/2015    Knee Replacement       Patient Sexual activity questions deferred to the physician.    Family History   Problem Relation Name Age of Onset    Lung cancer Mother      Lymphoma Mother      Bone cancer Mother      Heart attack Father      Liver cancer Brother      Lung cancer Brother         Allergies   Allergen Reactions    Nitrofurantoin Rash       Prior to Admission medications    Medication Sig Start Date End Date Taking? Authorizing Provider   acetaminophen (Tylenol) 325 mg tablet Take 2 tablets (650 mg) by mouth every 6 hours if needed for mild pain (1 - 3).     Historical Provider, MD   apixaban (Eliquis) 5 mg tablet Take 1 tablet (5 mg) by mouth 2 times a day. 1/10/24 1/9/25  Shashank Proctor MD   atorvastatin (Lipitor) 10 mg tablet TAKE 1 TABLET BY MOUTH DAILY 9/5/24   Shashank Proctor MD   calcium polycarbophiL (Fibercon) 625 mg tablet Take 2 tablets (1,250 mg) by mouth once daily.    Historical Provider, MD   cholecalciferol (Vitamin D-3) 25 MCG (1000 UT) tablet Take 1 tablet (1,000 Units) by mouth once daily.    Historical Provider, MD   clobetasol (Temovate) 0.05 % ointment Apply topically 2 times a day. 1/15/24 1/14/25  Mehreen Kam MD   ferrous sulfate, 325 mg ferrous sulfate, tablet Take 1 tablet (325 mg) by mouth once daily.    Historical Provider, MD   folic acid (Folvite) 800 mcg tablet Take 1 tablet (0.8 mg) by mouth once daily.    Historical Provider, MD   levothyroxine (Synthroid, Levoxyl) 100 mcg tablet Take 1 tablet (100 mcg) by mouth 4 times a week. [id number]-[id number]-Ucy-Sun    Historical Provider, MD   levothyroxine (Synthroid, Levoxyl) 100 mcg tablet Take 0.5 tablets (50 mcg) by mouth 3 (three) times a week. Tues-Thu-Sat    Historical Provider, MD   magnesium oxide (Mag-Ox) 400 mg (241.3 mg magnesium) tablet Take 1 tablet (400 mg) by mouth once daily. 12/28/23 12/27/24  Shashank Proctor MD   metFORMIN XR (Glucophage-XR) 500 mg 24 hr tablet Take 1 tablet (500 mg) by mouth 2 times a day.    Historical Provider, MD   metoprolol tartrate (Lopressor) 50 mg tablet Take 1 tablet (50 mg) by mouth 2 times a day. 7/19/24   Lesia Degroot MD   montelukast (Singulair) 10 mg tablet TAKE 1 TABLET BY MOUTH AT  BEDTIME 1/17/24   Major Jurado MD   nitroglycerin (Nitrostat) 0.4 mg SL tablet Place 1 tablet (0.4 mg) under the tongue every 5 minutes if needed for chest pain. 11/2/23 11/1/24  Shashank Proctor MD   omeprazole (PriLOSEC) 40 mg DR capsule TAKE 1 CAPSULE BY MOUTH TWICE  DAILY 5/6/24   Calvin Baker MD   oxybutynin XL  (Ditropan-XL) 10 mg 24 hr tablet TAKE 1 TABLET DAILY 1/2/24   Calvin Baker MD   potassium gluconate 595 mg (99 mg) tablet Take 1 tablet by mouth once daily.    Historical Provider, MD   tiotropium (Spiriva Respimat) 2.5 mcg/actuation inhaler Inhale 2 puffs once daily.    Historical Provider, MD   traZODone (Desyrel) 50 mg tablet Take 1-3 tablets ( mg) by mouth as needed at bedtime for sleep.    Historical Provider, MD   Ventolin HFA 90 mcg/actuation inhaler USE 2 INHALATIONS BY MOUTH EVERY 4 HOURS AS NEEDED 5/29/24   Major Jurado MD   estradiol (Estrace) 0.01 % (0.1 mg/gram) vaginal cream Insert into the vagina. 2/21/22 10/9/24  Historical Provider, MD   ipratropium-albuteroL (Duo-Neb) 0.5-2.5 mg/3 mL nebulizer solution Inhale 3 mL every 4 hours if needed. 1/28/24 10/9/24  Historical Provider, MD   mometasone-formoterol (Dulera) 100-5 mcg/actuation inhaler Inhale 2 puffs 2 times a day. 2/8/23 10/9/24  Historical Provider, MD   POTASSIUM ORAL once daily. 99mg, one  10/9/24  Historical Provider, MD      Refer to updated medication list on file      Constitutional: Negative for fever, chills, or sweats   ENMT: Negative for nasal discharge, congestion, ear pain, mouth pain, throat pain   Respiratory: Negative for cough, wheezing, Positive for Chronic shortness of breath with exertion   Cardiac: Negative for chest pain, palpitations   Gastrointestinal: Negative for nausea, vomiting, constipation, abdominal pain Positive for loose stools, incontinent of stool   Genitourinary: Negative for dysuria, flank pain, frequency, hematuria Positive for urinary incontinence and urinary pressure   Musculoskeletal: Negative for decreased ROM, pain, swelling, weakness   Neurological: Negative for dizziness, confusion, headache  Psychiatric: Negative for mood changes   Skin: Negative for itching, rash, ulcer    Hematologic/Lymph: Negative for bruising, easy bleeding  Allergic/Immunologic: Negative itching,  sneezing, swelling       Physical Exam  Constitutional:       Appearance: Normal appearance.   HENT:      Head: Normocephalic.   Eyes:      Extraocular Movements: Extraocular movements intact.      Comments: glasses   Cardiovascular:      Rate and Rhythm: Normal rate and regular rhythm.      Heart sounds: Normal heart sounds.   Pulmonary:      Effort: Pulmonary effort is normal.      Breath sounds: Normal breath sounds.   Abdominal:      General: Bowel sounds are normal.      Palpations: Abdomen is soft.   Musculoskeletal:         General: Normal range of motion.      Cervical back: Normal range of motion.   Skin:     General: Skin is warm and dry.   Neurological:      Mental Status: She is alert and oriented to person, place, and time.   Psychiatric:         Mood and Affect: Mood normal.        PAT AIRWAY:   Airway:     Neck ROM::  Full   upper dentures and lower dentures    Anesthesia:  Post op N/V     Visit Vitals  /69   Pulse 60   Temp 36.6 °C (97.9 °F) (Tympanic)   Resp 16       DASI Risk Score      Flowsheet Row Pre-Admission Testing from 10/9/2024 in [facility] [place]   Can you take care of yourself (eat, dress, bathe, or use toilet)?  2.75 filed at 10/09/2024 1352   Can you walk indoors, such as around your house? 1.75 filed at 10/09/2024 1352   Can you walk a block or two on level ground?  2.75 filed at 10/09/2024 1352   Can you climb a flight of stairs or walk up a hill? 5.5 filed at 10/09/2024 1352   Can you run a short distance? 0 filed at 10/09/2024 1352   Can you do light work around the house like dusting or washing dishes? 2.7 filed at 10/09/2024 1352   Can you do moderate work around the house like vacuuming, sweeping floors or carrying groceries? 3.5 filed at 10/09/2024 1352   Can you do heavy work around the house like scrubbing floors or lifting and moving heavy furniture?  8 filed at 10/09/2024 1352   Can you do yard work like raking leaves, weeding or pushing a mower? 4.5 filed  at 10/09/2024 1352   Can you have sexual relations? 0 filed at 10/09/2024 1352   Can you participate in moderate recreational activities like golf, bowling, dancing, doubles tennis or throwing a baseball or football? 0 filed at 10/09/2024 1352   Can you participate in strenous sports like swimming, singles tennis, football, basketball, or skiing? 0 filed at 10/09/2024 1352   DASI SCORE 31.45 filed at 10/09/2024 1352   METS Score (Will be calculated only when all the questions are answered) 6.6 filed at 10/09/2024 1352          Caprini DVT Assessment      Flowsheet Row Pre-Admission Testing from 10/9/2024 in [facility] ED to Hosp-Admission (Discharged) from 1/28/2024 in [facility] with Otilia Mcmahon MD and Emmanuelle Spann MD   DVT Score 1 filed at 10/09/2024 1351 5 filed at 01/28/2024 1106   Medical Factors Swollen legs filed at 10/09/2024 1351 --   BMI -- 30 or less filed at 01/28/2024 1106   RETIRED: History -- Acute myocardial infarction filed at 01/28/2024 1106   RETIRED: Age -- Over 75 years filed at 01/28/2024 1106          Modified Frailty Index      Flowsheet Row Pre-Admission Testing from 10/9/2024 in [facility]   Non-independent functional status (problems with dressing, bathing, personal grooming, or cooking) 0 filed at 10/09/2024 1353   History of diabetes mellitus  0.0909 filed at 10/09/2024 1353   History of COPD 0 filed at 10/09/2024 1353   History of CHF No filed at 10/09/2024 1353   History of MI 0 filed at 10/09/2024 1353   History of Percutaneous Coronary Intervention, Cardiac Surgery, or Angina No filed at 10/09/2024 1353   Hypertension requiring the use of medication  0.0909 filed at 10/09/2024 1353   Peripheral vascular disease 0 filed at 10/09/2024 1353   Impaired sensorium (cognitive impairement or loss, clouding, or delirium) 0 filed at 10/09/2024 1353   TIA or CVA withouy residual deficit 0 filed at 10/09/2024 1353   Cerebrovascular  accident with deficit 0 filed at 10/09/2024 1353   Modified Frailty Index Calculator .1818 filed at 10/09/2024 1353          CHADS2 Stroke Risk  Current as of 13 minutes ago        5.9% 3 to 100%: High Risk   2 to < 3%: Medium Risk   0 to < 2%: Low Risk     No Change          This score determines the patient's risk of having a stroke if the patient has atrial fibrillation.          Points Metrics   0 Has Congestive Heart Failure:  No     Patients with congestive heart failure get 1 point.    Current as of 13 minutes ago   1 Has Hypertension:  Yes     Patients with hypertension get 1 point.    Current as of 13 minutes ago   1 Age:  73     Patients who are 75 years of age or older get 1 point.    Current as of 13 minutes ago   1 Has Diabetes:  Yes     Patients with diabetes get 1 point.    Current as of 13 minutes ago   0 Had Stroke:  No  Had TIA:  No  Had Thromboembolism:  No     Patients who have had a stroke, TIA, or thromboembolism get 2 points.    Current as of 13 minutes ago             Revised Cardiac Risk Index      Flowsheet Row Pre-Admission Testing from 10/9/2024 in [facility] [place]   High-Risk Surgery (Intraperitoneal, Intrathoracic,Suprainguinal vascular) 0 filed at 10/09/2024 1353   History of ischemic heart disease (History of MI, History of positive exercuse test, Current chest paint considered due to myocardial ischemia, Use of nitrate therapy, ECG with pathological Q Waves) 1 filed at 10/09/2024 1353   History of congestive heart failure (pulmonary edemia, bilateral rales or S3 gallop, Paroxysmal nocturnal dyspnea, CXR showing pulmonary vascular redistribution) 0 filed at 10/09/2024 1353   History of cerebrovascular disease (Prior TIA or stroke) 0 filed at 10/09/2024 1353   Pre-operative insulin treatment 0 filed at 10/09/2024 1353   Pre-operative creatinine>2 mg/dl 0 filed at 10/09/2024 1353   Revised Cardiac Risk Calculator 1 filed at 10/09/2024 1353          Apfel Simplified Score       Flowsheet Row Pre-Admission Testing from 10/9/2024 in [facility]   Smoking status 0 filed at 10/09/2024 1353   History of motion sickness or PONV  1 filed at 10/09/2024 1353   Gender - Female 1=Yes filed at 10/09/2024 1353          Risk Analysis Index Results This Encounter         10/9/2024  1354             Do you live in a place other than your own home?: 0    When did you begin living in the place you are currently residing?: Greater than one year ago    Any kidney failure, kidney not working well, or seeing a kidney doctor (nephrologist)? If yes, was this for kidney stones or another problem?: 0 No    Any history of chronic (long-term) congestive heart failure (CHF)?: 0 No    Any shortness of breath when resting?: 0 No    In the past five years, have you been diagnosed with or treated for cancer?: Yes    During the last 3 months has it become difficult for you to remember things or organize your thoughts?: 0 No    Have you lost weight of 10 pounds or more in the past 3 months without trying?: 0 No    Do you have any loss of appetitie?: 4 Yes    Getting Around (Mobility): 0 Can get around without help    Eatin Can plan and prepare own meals    Toiletin Can use toilet without any help    Personal Hygiene (Bathing, Hand Washing, Changing Clothes): 0 Can shower or bathe without any help    CARVER Cancer History: Patient indicates history of cancer    Total Risk Analysis Index Score Without Cancer: 28    Total Risk Analysis Index Score: 39          Stop Bang Score      Flowsheet Row Pre-Admission Testing from 10/9/2024 in [facility]   Do you snore loudly? 0 filed at 10/09/2024 1351   Do you often feel tired or fatigued after your sleep? 1 filed at 10/09/2024 1351   Has anyone ever observed you stop breathing in your sleep? 0 filed at 10/09/2024 1351   Do you have or are you being treated for high blood pressure? 1 filed at 10/09/2024 1351   Recent BMI (Calculated) 27 filed at  10/09/2024 1351   Is BMI greater than 35 kg/m2? 0=No filed at 10/09/2024 1351   Age older than 50 years old? 1=Yes filed at 10/09/2024 1351   Is your neck circumference greater than 17 inches (Male) or 16 inches (Female)? 0 filed at 10/09/2024 1351   Gender - Male 0=No filed at 10/09/2024 1351   STOP-BANG Total Score 3 filed at 10/09/2024 1351          Lab Results   Component Value Date    GLUCOSE 93 10/04/2024    CALCIUM 8.9 10/04/2024     10/04/2024    K 4.7 10/04/2024    CO2 30 10/04/2024     10/04/2024    BUN 12 10/04/2024    CREATININE 0.99 10/04/2024      Lab Results   Component Value Date    WBC 9.5 10/04/2024    HGB 9.0 (L) 10/04/2024    HCT 31.5 (L) 10/04/2024    MCV 72 (L) 10/04/2024     10/04/2024      Lab Results   Component Value Date    HGBA1C 6.4 (H) 07/15/2024        Assessment and Plan:     Preop:   OR with Dr Thurman 10/14/24.   Lab work done 10/4/24. Repeat urine culture done today.  MRSA swab and oral mouth rinse ordered per protocol    EKG obtained and enclosed.      Cardiac:  HTN: on medication  Atrial Fib: on Eliquis (has instructions to stop 4 days prior to OR)  CAD with one stent. Follows with Dr. Proctor    Desai Activity Status Index (DASI)  DASI Score: 31.45   MET Score: 6.6   RCRI 1, 6% risk for postoperative MACE   CHADS 2 score 5.9% high risk    Pulmonary:   STOP-BANG score of 3. Intermediate risk of obstructive sleep apnea.     Asthma: on medication    Endocrine:  Hx thyroid cancer with thyroidectomy (2019), denies hx of chemo or radiation    Diabetes: on oral medication, last hgbAIC 6.4    GI:  Post op N/V  GERD: on medication    Psychiatric:   Anxiety/ Depression     Hematologic:   Caprini score 1, patient at low risk for perioperative DVT. Patient provided with VTE education/handout.     Anesthesia: Post op N/V     *See risk scores as previously documented

## 2024-10-09 NOTE — DISCHARGE INSTRUCTIONS
Call Dr. Thurman for any problems and/or concerns    *Okay to remove dressing in 2 days and shower  *Skin glue will start to come off in 7-10 days  *No soaking or tub baths for 4 weeks  *Manage pain with over the counter Tylenol/Motrin    Call Doctor right away for:    *Fever above 100.4/shaking chills  *Trouble urinating or burning with urination  *Severe pain or bloating in your abdomen  *Persistent nausea/vomiting  *Redness, swelling, or drainage at your incision sites  *Chest pain/shortness of breath-call 911.

## 2024-10-09 NOTE — PREPROCEDURE INSTRUCTIONS
Thank you for visiting [facility] at [facility]. If you have any changes to your health condition, please call the SURGEON's office to alert them and give them details of your symptoms.        Preoperative Brain Exercises    What are brain exercises?  A brain exercise is any activity that engages your thinking (cognitive) skills.    What types of activities are considered brain exercises?  Jigsaw puzzles, crossword puzzles, word jumble, memory games, word search, and many more.  Many can be found free online or on your phone via a mobile karen.    Why should I do brain exercises before my surgery?  More recent research has shown brain exercise before surgery can lower the risk of postoperative delirium (confusion) which can be especially important for older adults.  Patients who did brain exercises for 5 to 10 hours the days before surgery, cut their risk of postoperative delirium in half up to 1 week after surgery.      Preoperative Deep Breathing Exercises    Why it is important to do deep breathing exercises before my surgery?  Deep breathing exercises strengthen your breathing muscles.  This helps you to recover after your surgery and decreases the chance of breathing complications.    How are the deep breathing exercises done?  Sit straight with your back supported.  Breathe in deeply and slowly through your nose. Your lower rib cage should expand and your abdomen may move forward.  Hold that breath for 3 to 5 seconds.  Breathe out through pursed lips, slowly and completely.  Rest and repeat 10 times every hour while awake.  Rest longer if you become dizzy or lightheaded.      Patient and Family Education   Ways You Can Help Prevent Blood Clots     This handout explains some simple things you can do to help prevent blood clots.      Blood clots are blockages that can form in the body's veins. When a blood clot forms in your deep veins, it may be called a deep vein thrombosis, or DVT for  short. Blood clots can happen in any part of the body where blood flows, but they are most common in the arms and legs. If a piece of a blood clot breaks free and travels to the lungs, it is called a pulmonary embolus (PE). A PE can be a very serious problem.      Being in the hospital or having surgery can raise your chances of getting a blood clot because you may not be well enough to move around as much as you normally do.      Ways you can help prevent blood clots in the hospital         Wearing SCDs. SCDs stands for Sequential Compression Devices.   SCDs are special sleeves that wrap around your legs  They attach to a pump that fills them with air to gently squeeze your legs every few minutes.   This helps return the blood in your legs to your heart.   SCDs should only be taken off when walking or bathing.   SCDs may not be comfortable, but they can help save your life.               Wearing compression stockings - if your doctor orders them. These special snug fitting stockings gently squeeze your legs to help blood flow.       Walking. Walking helps move the blood in your legs.   If your doctor says it is ok, try walking the halls at least   5 times a day. Ask us to help you get up, so you don't fall.      Taking any blood thinning medicines your doctor orders.          ©[facility]; 3/23        Ways you can help prevent blood clots at home       Wearing compression stockings - if your doctor orders them. ? Walking - to help move the blood in your legs.       Taking any blood thinning medicines your doctor orders.      Signs of a blood clot or PE      Tell your doctor or nurse know right away if you have of the problems listed below.    If you are at home, seek medical care right away. Call 911 for chest pain or problems breathing.          Signs of a blood clot (DVT) - such as pain,  swelling, redness or warmth in your arm or leg      Signs of a pulmonary embolism (PE) - such as chest     pain or  feeling short of breath

## 2024-10-11 LAB
ATRIAL RATE: 54 BPM
BACTERIA UR CULT: ABNORMAL
P AXIS: 72 DEGREES
P OFFSET: 159 MS
P ONSET: 110 MS
PR INTERVAL: 222 MS
Q ONSET: 221 MS
QRS COUNT: 9 BEATS
QRS DURATION: 88 MS
QT INTERVAL: 470 MS
QTC CALCULATION(BAZETT): 445 MS
QTC FREDERICIA: 453 MS
R AXIS: 10 DEGREES
STAPHYLOCOCCUS SPEC CULT: NORMAL
T AXIS: 47 DEGREES
T OFFSET: 456 MS
VENTRICULAR RATE: 54 BPM

## 2024-10-13 NOTE — H&P
History Of Present Illness  Myrna Rodrigues is a 73 y.o. female presenting for revision of continence control stimulator in the setting of overactive bladder, stress incontinence, and fecal incontinence..    She has a continence control stimulator inserted in 2020      Past Medical History  Past Medical History:   Diagnosis Date    Anxiety and depression     Arrhythmia     Asthma     Cancer (Multi)     Coronary artery disease     Diabetes mellitus (Multi)     Disease of thyroid gland     GERD (gastroesophageal reflux disease)     Heart disease     Hyperlipidemia     Hypertension     Irregular heart beat     Occipital neuralgia 06/10/2022    Occipital neuralgia of right side    Personal history of malignant neoplasm, unspecified 10/04/2021    History of malignant neoplasm    Personal history of other diseases of the circulatory system     History of hypertension    Personal history of other diseases of the circulatory system 10/04/2021    History of essential hypertension    Personal history of other diseases of the circulatory system     History of coronary artery disease    Personal history of other diseases of the digestive system 03/11/2020    History of chronic constipation    Personal history of other diseases of the musculoskeletal system and connective tissue     History of arthritis    Personal history of other diseases of the nervous system and sense organs 10/04/2021    History of eye problem    Personal history of other diseases of the respiratory system     History of asthma    Personal history of other specified conditions 10/04/2021    History of chest pain    PONV (postoperative nausea and vomiting)     Stool incontinence     Thyroid cancer (Multi)     Urinary incontinence        Surgical History  Past Surgical History:   Procedure Laterality Date    APPENDECTOMY  08/13/2015    Appendectomy    BREAST LUMPECTOMY  08/13/2015    Breast Surgery Lumpectomy    CARPAL TUNNEL RELEASE  08/13/2015    Neuroplasty  Decompression Median Nerve At Carpal Tunnel    GALLBLADDER SURGERY  08/13/2015    Gallbladder Surgery    HYSTERECTOMY  08/13/2015    Hysterectomy    KNEE ARTHROSCOPY W/ MENISCAL REPAIR  08/13/2015    Knee Arthroscopy With Medial Meniscus Repair    KNEE SURGERY  08/13/2015    Knee Surgery    MR HEAD ANGIO WO IV CONTRAST  09/05/2021    MR HEAD ANGIO WO IV CONTRAST 9/5/2021 [facility]    OTHER SURGICAL HISTORY  08/13/2015    Shoulder Surgery Left    OTHER SURGICAL HISTORY  08/13/2015    Repair Of Bladder Reconstruction    OTHER SURGICAL HISTORY  09/20/2021    Breast surgery    OTHER SURGICAL HISTORY  09/20/2021    Bladder surgery    OTHER SURGICAL HISTORY  09/20/2021    Complete colonoscopy    OTHER SURGICAL HISTORY  09/20/2021    Shoulder surgery    OTHER SURGICAL HISTORY  09/20/2021    Foot surgery    OTHER SURGICAL HISTORY  09/20/2021    Percutaneous transluminal coronary angioplasty    OTHER SURGICAL HISTORY  10/11/2021    Thyroidectomy total    THYROIDECTOMY      TOTAL KNEE ARTHROPLASTY  08/13/2015    Knee Replacement        Social History  She reports that she has never smoked. She has never used smokeless tobacco. She reports that she does not currently use alcohol. She reports that she does not use drugs.    Family History  Family History   Problem Relation Name Age of Onset    Lung cancer Mother      Lymphoma Mother      Bone cancer Mother      Heart attack Father      Liver cancer Brother      Lung cancer Brother          Allergies  Nitrofurantoin      Review of Systems   All other systems reviewed and are negative.        Physical Exam  Constitutional:       Appearance: Normal appearance.   Cardiovascular:      Rate and Rhythm: Normal rate.   Pulmonary:      Effort: Pulmonary effort is normal.   Abdominal:      General: Abdomen is flat.      Palpations: Abdomen is soft.   Musculoskeletal:         General: Normal range of motion.      Cervical back: Normal range of motion.   Skin:     General: Skin  is warm and dry.   Neurological:      General: No focal deficit present.      Mental Status: She is alert.   Psychiatric:         Mood and Affect: Mood normal.         Thought Content: Thought content normal.         Judgment: Judgment normal.     Last Recorded Vitals  There were no vitals taken for this visit.    Relevant Results           Assessment/Plan   Assessment & Plan  Urge incontinence               Len Olivarez MD

## 2024-10-14 ENCOUNTER — APPOINTMENT (OUTPATIENT)
Dept: RADIOLOGY | Facility: HOSPITAL | Age: 74
End: 2024-10-14
Payer: MEDICARE

## 2024-10-14 ENCOUNTER — ANESTHESIA EVENT (OUTPATIENT)
Dept: OPERATING ROOM | Facility: HOSPITAL | Age: 74
End: 2024-10-14

## 2024-10-14 ENCOUNTER — ANESTHESIA (OUTPATIENT)
Dept: OPERATING ROOM | Facility: HOSPITAL | Age: 74
End: 2024-10-14

## 2024-10-14 ENCOUNTER — HOSPITAL ENCOUNTER (OUTPATIENT)
Facility: HOSPITAL | Age: 74
Setting detail: OUTPATIENT SURGERY
Discharge: HOME | End: 2024-10-14
Attending: UROLOGY | Admitting: UROLOGY

## 2024-10-14 ENCOUNTER — APPOINTMENT (OUTPATIENT)
Dept: RADIOLOGY | Facility: HOSPITAL | Age: 74
End: 2024-10-14

## 2024-10-14 VITALS
OXYGEN SATURATION: 96 % | TEMPERATURE: 97.5 F | SYSTOLIC BLOOD PRESSURE: 171 MMHG | DIASTOLIC BLOOD PRESSURE: 67 MMHG | BODY MASS INDEX: 27.05 KG/M2 | WEIGHT: 147 LBS | HEART RATE: 64 BPM | HEIGHT: 62 IN | RESPIRATION RATE: 16 BRPM

## 2024-10-14 DIAGNOSIS — G89.18 POST-OP PAIN: Primary | ICD-10-CM

## 2024-10-14 DIAGNOSIS — N39.41 URGE INCONTINENCE: ICD-10-CM

## 2024-10-14 LAB — GLUCOSE BLD MANUAL STRIP-MCNC: 107 MG/DL (ref 74–99)

## 2024-10-14 PROCEDURE — 7100000010 HC PHASE TWO TIME - EACH INCREMENTAL 1 MINUTE: Performed by: UROLOGY

## 2024-10-14 PROCEDURE — 64585 REV/RMV PERPH NSTIM ELTRD RA: CPT | Performed by: UROLOGY

## 2024-10-14 PROCEDURE — C1778 LEAD, NEUROSTIMULATOR: HCPCS | Performed by: UROLOGY

## 2024-10-14 PROCEDURE — C1889 IMPLANT/INSERT DEVICE, NOC: HCPCS | Performed by: UROLOGY

## 2024-10-14 PROCEDURE — 3600000009 HC OR TIME - EACH INCREMENTAL 1 MINUTE - PROCEDURE LEVEL FOUR: Performed by: UROLOGY

## 2024-10-14 PROCEDURE — 82947 ASSAY GLUCOSE BLOOD QUANT: CPT

## 2024-10-14 PROCEDURE — 64595 REV/RMV PRPH SAC/GSTR NPG/R: CPT | Performed by: UROLOGY

## 2024-10-14 PROCEDURE — 2500000004 HC RX 250 GENERAL PHARMACY W/ HCPCS (ALT 636 FOR OP/ED): Performed by: ANESTHESIOLOGIST ASSISTANT

## 2024-10-14 PROCEDURE — 52214 CYSTOSCOPY AND TREATMENT: CPT | Performed by: UROLOGY

## 2024-10-14 PROCEDURE — 2780000003 HC OR 278 NO HCPCS: Performed by: UROLOGY

## 2024-10-14 PROCEDURE — 3600000004 HC OR TIME - INITIAL BASE CHARGE - PROCEDURE LEVEL FOUR: Performed by: UROLOGY

## 2024-10-14 PROCEDURE — 7100000001 HC RECOVERY ROOM TIME - INITIAL BASE CHARGE: Performed by: UROLOGY

## 2024-10-14 PROCEDURE — 3700000002 HC GENERAL ANESTHESIA TIME - EACH INCREMENTAL 1 MINUTE: Performed by: UROLOGY

## 2024-10-14 PROCEDURE — 3700000001 HC GENERAL ANESTHESIA TIME - INITIAL BASE CHARGE: Performed by: UROLOGY

## 2024-10-14 PROCEDURE — 2500000001 HC RX 250 WO HCPCS SELF ADMINISTERED DRUGS (ALT 637 FOR MEDICARE OP): Performed by: ANESTHESIOLOGY

## 2024-10-14 PROCEDURE — 2500000004 HC RX 250 GENERAL PHARMACY W/ HCPCS (ALT 636 FOR OP/ED): Performed by: UROLOGY

## 2024-10-14 PROCEDURE — 7100000009 HC PHASE TWO TIME - INITIAL BASE CHARGE: Performed by: UROLOGY

## 2024-10-14 PROCEDURE — 2720000007 HC OR 272 NO HCPCS: Performed by: UROLOGY

## 2024-10-14 PROCEDURE — 7100000002 HC RECOVERY ROOM TIME - EACH INCREMENTAL 1 MINUTE: Performed by: UROLOGY

## 2024-10-14 PROCEDURE — C1787 PATIENT PROGR, NEUROSTIM: HCPCS | Performed by: UROLOGY

## 2024-10-14 DEVICE — LEAD 978B128 ISTM SSMRI 4.32MM EMAN GLBL
Type: IMPLANTABLE DEVICE | Site: BACK | Status: FUNCTIONAL
Brand: INTERSTIM™SURESCAN™

## 2024-10-14 RX ORDER — ALBUTEROL SULFATE 0.83 MG/ML
2.5 SOLUTION RESPIRATORY (INHALATION) ONCE AS NEEDED
Status: DISCONTINUED | OUTPATIENT
Start: 2024-10-14 | End: 2024-10-14 | Stop reason: HOSPADM

## 2024-10-14 RX ORDER — MIDAZOLAM HYDROCHLORIDE 1 MG/ML
INJECTION, SOLUTION INTRAMUSCULAR; INTRAVENOUS AS NEEDED
Status: DISCONTINUED | OUTPATIENT
Start: 2024-10-14 | End: 2024-10-14

## 2024-10-14 RX ORDER — HYDROMORPHONE HYDROCHLORIDE 0.2 MG/ML
0.2 INJECTION INTRAMUSCULAR; INTRAVENOUS; SUBCUTANEOUS EVERY 5 MIN PRN
Status: DISCONTINUED | OUTPATIENT
Start: 2024-10-14 | End: 2024-10-14 | Stop reason: HOSPADM

## 2024-10-14 RX ORDER — BUPIVACAINE HYDROCHLORIDE 5 MG/ML
INJECTION, SOLUTION EPIDURAL; INTRACAUDAL AS NEEDED
Status: DISCONTINUED | OUTPATIENT
Start: 2024-10-14 | End: 2024-10-14 | Stop reason: HOSPADM

## 2024-10-14 RX ORDER — CIPROFLOXACIN 2 MG/ML
INJECTION, SOLUTION INTRAVENOUS AS NEEDED
Status: DISCONTINUED | OUTPATIENT
Start: 2024-10-14 | End: 2024-10-14

## 2024-10-14 RX ORDER — OXYCODONE HYDROCHLORIDE 5 MG/1
5 TABLET ORAL EVERY 4 HOURS PRN
Status: DISCONTINUED | OUTPATIENT
Start: 2024-10-14 | End: 2024-10-14 | Stop reason: HOSPADM

## 2024-10-14 RX ORDER — LABETALOL HYDROCHLORIDE 5 MG/ML
5 INJECTION, SOLUTION INTRAVENOUS ONCE AS NEEDED
Status: DISCONTINUED | OUTPATIENT
Start: 2024-10-14 | End: 2024-10-14 | Stop reason: HOSPADM

## 2024-10-14 RX ORDER — OXYCODONE AND ACETAMINOPHEN 5; 325 MG/1; MG/1
1 TABLET ORAL EVERY 4 HOURS PRN
Status: DISCONTINUED | OUTPATIENT
Start: 2024-10-14 | End: 2024-10-14 | Stop reason: HOSPADM

## 2024-10-14 RX ORDER — ACETAMINOPHEN 500 MG
1000 TABLET ORAL EVERY 6 HOURS
Qty: 30 TABLET | Refills: 0 | Status: SHIPPED | OUTPATIENT
Start: 2024-10-14

## 2024-10-14 RX ORDER — ACETAMINOPHEN 325 MG/1
975 TABLET ORAL ONCE
Status: COMPLETED | OUTPATIENT
Start: 2024-10-14 | End: 2024-10-14

## 2024-10-14 RX ORDER — PHENYLEPHRINE HCL IN 0.9% NACL 1 MG/10 ML
SYRINGE (ML) INTRAVENOUS AS NEEDED
Status: DISCONTINUED | OUTPATIENT
Start: 2024-10-14 | End: 2024-10-14

## 2024-10-14 RX ORDER — ONDANSETRON HYDROCHLORIDE 2 MG/ML
4 INJECTION, SOLUTION INTRAVENOUS ONCE AS NEEDED
Status: DISCONTINUED | OUTPATIENT
Start: 2024-10-14 | End: 2024-10-14 | Stop reason: HOSPADM

## 2024-10-14 RX ORDER — PROPOFOL 10 MG/ML
INJECTION, EMULSION INTRAVENOUS CONTINUOUS PRN
Status: DISCONTINUED | OUTPATIENT
Start: 2024-10-14 | End: 2024-10-14

## 2024-10-14 RX ORDER — FENTANYL CITRATE 50 UG/ML
INJECTION, SOLUTION INTRAMUSCULAR; INTRAVENOUS AS NEEDED
Status: DISCONTINUED | OUTPATIENT
Start: 2024-10-14 | End: 2024-10-14

## 2024-10-14 RX ORDER — HYDRALAZINE HYDROCHLORIDE 20 MG/ML
5 INJECTION INTRAMUSCULAR; INTRAVENOUS EVERY 30 MIN PRN
Status: DISCONTINUED | OUTPATIENT
Start: 2024-10-14 | End: 2024-10-14 | Stop reason: HOSPADM

## 2024-10-14 RX ORDER — DIPHENHYDRAMINE HYDROCHLORIDE 50 MG/ML
12.5 INJECTION INTRAMUSCULAR; INTRAVENOUS ONCE AS NEEDED
Status: DISCONTINUED | OUTPATIENT
Start: 2024-10-14 | End: 2024-10-14 | Stop reason: HOSPADM

## 2024-10-14 RX ORDER — CEFAZOLIN SODIUM 2 G/100ML
2 INJECTION, SOLUTION INTRAVENOUS ONCE
Status: DISCONTINUED | OUTPATIENT
Start: 2024-10-14 | End: 2024-10-14 | Stop reason: HOSPADM

## 2024-10-14 RX ORDER — SODIUM CHLORIDE, SODIUM LACTATE, POTASSIUM CHLORIDE, CALCIUM CHLORIDE 600; 310; 30; 20 MG/100ML; MG/100ML; MG/100ML; MG/100ML
20 INJECTION, SOLUTION INTRAVENOUS CONTINUOUS
Status: DISCONTINUED | OUTPATIENT
Start: 2024-10-14 | End: 2024-10-14

## 2024-10-14 RX ORDER — SODIUM CHLORIDE, SODIUM LACTATE, POTASSIUM CHLORIDE, CALCIUM CHLORIDE 600; 310; 30; 20 MG/100ML; MG/100ML; MG/100ML; MG/100ML
100 INJECTION, SOLUTION INTRAVENOUS CONTINUOUS
Status: DISCONTINUED | OUTPATIENT
Start: 2024-10-14 | End: 2024-10-14 | Stop reason: HOSPADM

## 2024-10-14 RX ORDER — LIDOCAINE HYDROCHLORIDE 10 MG/ML
0.1 INJECTION, SOLUTION INFILTRATION; PERINEURAL ONCE
Status: DISCONTINUED | OUTPATIENT
Start: 2024-10-14 | End: 2024-10-14 | Stop reason: HOSPADM

## 2024-10-14 SDOH — HEALTH STABILITY: MENTAL HEALTH: CURRENT SMOKER: 0

## 2024-10-14 ASSESSMENT — PAIN - FUNCTIONAL ASSESSMENT
PAIN_FUNCTIONAL_ASSESSMENT: 0-10

## 2024-10-14 ASSESSMENT — PAIN SCALES - GENERAL
PAINLEVEL_OUTOF10: 5 - MODERATE PAIN
PAINLEVEL_OUTOF10: 0 - NO PAIN
PAINLEVEL_OUTOF10: 5 - MODERATE PAIN
PAINLEVEL_OUTOF10: 0 - NO PAIN
PAINLEVEL_OUTOF10: 0 - NO PAIN
PAINLEVEL_OUTOF10: 5 - MODERATE PAIN

## 2024-10-14 ASSESSMENT — PAIN DESCRIPTION - DESCRIPTORS
DESCRIPTORS: DISCOMFORT
DESCRIPTORS: DISCOMFORT
DESCRIPTORS: ACHING;SORE

## 2024-10-14 ASSESSMENT — PAIN DESCRIPTION - LOCATION: LOCATION: BACK

## 2024-10-14 NOTE — ANESTHESIA PREPROCEDURE EVALUATION
Patient: Myrna Rodrigues    Procedure Information       Date/Time: 10/14/24 1250    Procedures:       Revision of sacral neuromodulation device, possible full implant of sacral neuromodulation device (Right)      Cystoscopy    Location: [facility] OR 04 / Virtual [facility]    Surgeons: Evin Thurman MD            Relevant Problems   Cardiac   (+) Angina, class III (CMS-HCC)   (+) Cardiac dysrhythmia   (+) Hypertension, essential, benign   (+) Mixed hyperlipidemia   (+) Paroxysmal atrial fibrillation (Multi)   (+) Ventricular tachycardia (Multi)      Pulmonary   (+) Asthma   (+) Chronic obstructive pulmonary disease (Multi)   (+) Moderate persistent asthma      Neuro   (+) Depression   (+) Left lumbar radiculopathy   (+) Major depression   (+) Major depressive disorder      GI   (+) Chronic diarrhea   (+) Gastroesophageal reflux disease      /Renal   (+) Recurrent urinary tract infection      Endocrine   (+) Controlled type 2 diabetes mellitus without complication, without long-term current use of insulin (Multi)   (+) Malignant neoplasm of thyroid gland (Multi)   (+) Obesity   (+) Postsurgical hypothyroidism   (+) Thyroid cancer (Multi)      Hematology   (+) Other specified anemias      Musculoskeletal   (+) Spinal stenosis of lumbar region without neurogenic claudication   (+) Unilateral primary osteoarthritis, unspecified knee      HEENT   (+) Sensorineural hearing loss (SNHL) of both ears   (+) Sensorineural hearing loss, bilateral      ID   (+) Nail fungus   (+) Recurrent urinary tract infection   (+) Tinea pedis of both feet       Clinical information reviewed:   Tobacco  Allergies  Meds   Med Hx  Surg Hx  OB Status  Fam Hx  Soc   Hx        NPO Detail:  NPO/Void Status  Carbohydrate Drink Given Prior to Surgery? : N  Date of Last Liquid: 10/14/24  Time of Last Liquid: 0730 (water with meds)  Date of Last Solid: 10/13/24  Time of Last Solid: 1600  Last Intake Type: Clear fluids  Time of Last Void: 1200          Physical Exam    Airway  Mallampati: I  TM distance: >3 FB     Cardiovascular   Rhythm: regular  Rate: normal     Dental   (+) upper dentures, lower dentures     Pulmonary   Breath sounds clear to auscultation     Abdominal            Anesthesia Plan    History of general anesthesia?: yes  History of complications of general anesthesia?: no    ASA 2     general     The patient is not a current smoker.    intravenous induction   Postoperative administration of opioids is intended.  Anesthetic plan and risks discussed with patient.    Plan discussed with CRNA.

## 2024-10-14 NOTE — OP NOTE
removal of sacral neuromodulation device with full implant of sacral neuromodulation device (R), Cystoscopy with biopsy and fulguration Operative Note     Date: 10/14/2024  OR Location: [facility] OR    Name: Myrna Rodrigues, : 1950, Age: 73 y.o., MRN: [id number], Sex: female    Diagnosis  Pre-op Diagnosis      * Urge incontinence [N39.41] Post-op Diagnosis     * Urge incontinence [N39.41]     Procedures  Removal of sacral neuromodulation device  Full implant of sacral neuromodulation device with fluoroscopy  Cystoscopy with biopsy and fulguration    Surgeons      * Evin Thurman - Primary    Resident/Fellow/Other Assistant:  Surgeons and Role:     * Sole Meadows MD - Fellow    Procedure Summary  Anesthesia: General  ASA: II  Anesthesia Staff: Anesthesiologist: Pedro Simons MD  C-AA: RICHARD Avendaño; RICHARD Alejo  Estimated Blood Loss: 5 mL  Intra-op Medications: Administrations occurring from 1250 to 1410 on 10/14/24:  * No intraprocedure medications in log *           Anesthesia Record               Intraprocedure I/O Totals          Intake    NaCl 0.9 % bolus 250.00 mL    Total Intake 250 mL          Specimen:   ID Type Source Tests Collected by Time   1 : BLADDER BX Tissue BLADDER BIOPSY SURGICAL PATHOLOGY EXAM Evin Thurman MD 10/14/2024 1638        Staff:   Circulator: Renate Thomas Person: Eveline    Implants:  Implants       Type Name Action Serial No.      Implant LEAD KIT, INTERSTIM SURESCAN MRI 4.32MM SPACING, 28CM LENGTH - [id number] Implanted               Findings:   [employer] Micro SNM battery and lead removed intact  New lead placed in right S3 foramen, good placement confirmed with danielle reflex and fluoroscopy  Bladder with extensive silver, fluffy appearing metaplastic tissue and severe trabeculations  Unable to visualize ureteral orifices    Indications: Myrna Rodrigues is an 73 y.o. female who is having surgery for Urge incontinence [N39.41].     The patient was seen in the  preoperative area. The risks, benefits, complications, treatment options, non-operative alternatives, expected recovery and outcomes were discussed with the patient. The possibilities of reaction to medication, pulmonary aspiration, injury to surrounding structures, bleeding, recurrent infection, the need for additional procedures, failure to diagnose a condition, and creating a complication requiring transfusion or operation were discussed with the patient. The patient concurred with the proposed plan, giving informed consent.  The site of surgery was properly noted/marked if necessary per policy. The patient has been actively warmed in preoperative area. Preoperative antibiotics have been ordered and given within 1 hours of incision. Venous thrombosis prophylaxis have been ordered including bilateral sequential compression devices    Procedure Details:   The patient was taken to the operating room and placed under MAC anesthesia.  She was prepared and draped in normal sterile fashion and placed in the prone position.  Preoperative antibiotics were given.     The battery was palpated in the right upper buttock. The skin was infiltrated with lidocaine and incised with the scalpel. The battery was removed from the pocket. The lead was gentle pulled and the sacral site was identified. Local anesthesia was placed and an incision made over the lead site. The lead was grasped and gently removed intact.    We placed a needle into the S3 foramen. We tested for reflexes which were appropriate on the patient's right side with a good danielle reflex. We confirmed S3 placement with fluoroscopy.  At this time, we placed a stylet through the needle and removed the needle. We placed the dilating sheath over the stylet and dilated the tract down to the foramen. We removed the stylet and then placed the curved lead. The lead was placed with 3 electrodes outside of the foramen and one in the foramen. It was tested, and we had good  response on all of the electrodes. The dilating sheath was then retracted to allow the monse of lead to be exposed, and we confirmed good placement. We tunneled the lead to the pocket incision on the right. The IPG was connected to the permanent electrode and placed in the deep subcutaneous space of the right buttock. Impedances were checked and were noted to be within normal limits. Once the IPG was confirmed to be in satisfactory position, the incision site was closed by approximating the Oswaldo's fascia with a 3-0 Vicryl suture and the skin back together with a 4-0 Vicryl suture. Dermabond was then placed over the wound.    At this point, the patient was prepped and draped in a dorsal lithotomy position. Another time-out was performed.     A 30 degree rigid cystoscope was inserted into the bladder, the bladder was instilled with water, and the bladder walls were examined. Findings as above. Ureteral orifices were not identified. The bladder was copiously irrigated and drained. A biopsy of the right bladder wall was taken and that area was subsequently fulgurated. Once good hemostasis was noted, the bladder was drained.    The patient was then awakened from anesthesia, having tolerated procedure well, and was taken to the recovery room in stable condition. All sponge, needle, and instrument counts were correct at the end the case.    Complications:  None; patient tolerated the procedure well.    Disposition: PACU - hemodynamically stable.  Condition: stable       Dr. Thurman was present and scrubbed for the entire procedure.    Sole Meadows MD  Urogynecology and Reconstructive Pelvic Surgery Fellow  10/14/2024 5:10 PM

## 2024-10-14 NOTE — ANESTHESIA POSTPROCEDURE EVALUATION
Patient: Myrna Rodrigues    Procedure Summary       Date: 10/14/24 Room / Location: [facility] OR 04 / Virtual [facility] OR    Anesthesia Start: 1518 Anesthesia Stop: 1655    Procedures:       removal of sacral neuromodulation device with full implant of sacral neuromodulation device (Right)      Cystoscopy with biopsy and fulguration Diagnosis:       Urge incontinence      (Urge incontinence [N39.41])    Surgeons: Evin Thurman MD Responsible Provider: Pedro Simons MD    Anesthesia Type: general ASA Status: 2            Anesthesia Type: general    Vitals Value Taken Time   /72 10/14/24 1745   Temp 36.1 °C (97 °F) 10/14/24 1730   Pulse 58 10/14/24 1747   Resp 14 10/14/24 1747   SpO2 98 % 10/14/24 1747   Vitals shown include unfiled device data.    Anesthesia Post Evaluation    Patient location during evaluation: PACU  Patient participation: complete - patient participated  Level of consciousness: awake and alert  Pain management: satisfactory to patient  Airway patency: patent  Cardiovascular status: acceptable  Respiratory status: acceptable  Hydration status: euvolemic  Postoperative Nausea and Vomiting: none        No notable events documented.

## 2024-10-25 LAB
LABORATORY COMMENT REPORT: NORMAL
PATH REPORT.FINAL DX SPEC: NORMAL
PATH REPORT.GROSS SPEC: NORMAL
PATH REPORT.RELEVANT HX SPEC: NORMAL
PATH REPORT.TOTAL CANCER: NORMAL
RESIDENT REVIEW: NORMAL

## 2024-10-30 ENCOUNTER — APPOINTMENT (OUTPATIENT)
Dept: PRIMARY CARE | Facility: CLINIC | Age: 74
End: 2024-10-30
Payer: MEDICARE

## 2024-11-05 ENCOUNTER — APPOINTMENT (OUTPATIENT)
Dept: UROLOGY | Facility: CLINIC | Age: 74
End: 2024-11-05
Payer: MEDICARE

## 2024-11-05 VITALS
WEIGHT: 145 LBS | TEMPERATURE: 98.1 F | HEIGHT: 62 IN | DIASTOLIC BLOOD PRESSURE: 70 MMHG | BODY MASS INDEX: 26.68 KG/M2 | SYSTOLIC BLOOD PRESSURE: 148 MMHG | HEART RATE: 71 BPM

## 2024-11-05 DIAGNOSIS — N95.8 GENITOURINARY SYNDROME OF MENOPAUSE: ICD-10-CM

## 2024-11-05 DIAGNOSIS — R39.15 URGENCY OF URINATION: ICD-10-CM

## 2024-11-05 DIAGNOSIS — N39.41 URGE INCONTINENCE OF URINE: Primary | ICD-10-CM

## 2024-11-05 LAB
POC APPEARANCE, URINE: ABNORMAL
POC BILIRUBIN, URINE: ABNORMAL
POC BLOOD, URINE: ABNORMAL
POC COLOR, URINE: YELLOW
POC GLUCOSE, URINE: NEGATIVE MG/DL
POC KETONES, URINE: ABNORMAL MG/DL
POC LEUKOCYTES, URINE: ABNORMAL
POC NITRITE,URINE: NEGATIVE
POC PH, URINE: 6 PH
POC PROTEIN, URINE: ABNORMAL MG/DL
POC SPECIFIC GRAVITY, URINE: >=1.03
POC UROBILINOGEN, URINE: 0.2 EU/DL

## 2024-11-05 PROCEDURE — 87086 URINE CULTURE/COLONY COUNT: CPT

## 2024-11-05 PROCEDURE — 87186 SC STD MICRODIL/AGAR DIL: CPT

## 2024-11-05 PROCEDURE — 81003 URINALYSIS AUTO W/O SCOPE: CPT | Performed by: NURSE PRACTITIONER

## 2024-11-05 PROCEDURE — 1036F TOBACCO NON-USER: CPT | Performed by: NURSE PRACTITIONER

## 2024-11-05 PROCEDURE — 3078F DIAST BP <80 MM HG: CPT | Performed by: NURSE PRACTITIONER

## 2024-11-05 PROCEDURE — 3077F SYST BP >= 140 MM HG: CPT | Performed by: NURSE PRACTITIONER

## 2024-11-05 PROCEDURE — 1123F ACP DISCUSS/DSCN MKR DOCD: CPT | Performed by: NURSE PRACTITIONER

## 2024-11-05 PROCEDURE — 51798 US URINE CAPACITY MEASURE: CPT | Performed by: NURSE PRACTITIONER

## 2024-11-05 PROCEDURE — 1159F MED LIST DOCD IN RCRD: CPT | Performed by: NURSE PRACTITIONER

## 2024-11-05 PROCEDURE — 99024 POSTOP FOLLOW-UP VISIT: CPT | Performed by: NURSE PRACTITIONER

## 2024-11-05 PROCEDURE — 3048F LDL-C <100 MG/DL: CPT | Performed by: NURSE PRACTITIONER

## 2024-11-05 PROCEDURE — 87077 CULTURE AEROBIC IDENTIFY: CPT

## 2024-11-05 RX ORDER — ESTRADIOL 0.1 MG/G
CREAM VAGINAL
Qty: 42.5 G | Refills: 5 | Status: SHIPPED | OUTPATIENT
Start: 2024-11-05 | End: 2024-11-05

## 2024-11-05 RX ORDER — ESTRADIOL 0.1 MG/G
CREAM VAGINAL
Qty: 42.5 G | Refills: 5 | Status: SHIPPED | OUTPATIENT
Start: 2024-11-05 | End: 2025-11-05

## 2024-11-05 NOTE — PATIENT INSTRUCTIONS
Very happy w new implant  Will come off oxybutynin concerns long term cognitive issues, dry mouth difficult  Estrogen vaginal cream will resume  Urine culture sent, will talk in few days  Follow up 3 mos, sooner if needs another OAB medicine    Nurse line [phone]

## 2024-11-05 NOTE — PROGRESS NOTES
11/05/24   [id number]    Follow up Interstim removal and reimplant, cystoscopy biopsy bladder, fulguration;      Subjective      HPI Myrna Rodrigues is a 73 y.o. female who presents for post op removal sacral neuromodulation device and full implant of sacral neuromodulation device with fluoroscopy, cystoscopy, biopsy fulguration;     Cystoscopy on 10/14/2024 showed extensive silver fluffy-appearing metaplastic type changes and severe trabeculations. A biopsy from the right bladder wall was taken;     FINAL DIAGNOSIS 10/14/24   A. BLADDER, BIOPSY:  -- FRAGMENTS OF KERATINIZING SQUAMOUS MUCOSA, COMPATIBLE WITH KERATINIZING SQUAMOUS METAPLASIA.       Urine today large heme, large leuk PVR 0 ml  So happy w results, no longer urgency, depends was dry last night for first time; no UTI symptoms; urine looks cloudy; foul odor;     Discussed oxybutynin, will wean off as doing so well and concern for taking in long run with anticholinergics; yesterday was to have appt. [employer] and they didn't call her; hasn't changed settings;       Imported from Dr. Thurman last notes on 10/3/24  This is a 73 y.o. female who presents for follow-up for urination issues.      Controlled DM type 2 without complication or long-term current use of insulin  Hypertension, essential, benign  Iron deficiency anemia     Ms. Rodrigues is a 73 year old female presenting for follow up on her overactive bladder. She had a continence control stimulator inserted in 2020. It was working fine for 2 years but then her symptoms recurred around 1 year ago. She has been having urinary incontinence and leakage during the day and night. She is changing around 2 pads a day and 3 pads at night. She is not complaining of urgency.   She is also having stress incontinence. She is having fecal incontinence and not being able to hold her stools which has been very bothersome for her.   Trying to connect to the stimulator today was not successful, might be a problem with the  "battery of the device.         Objective     /70   Pulse 71   Temp 36.7 °C (98.1 °F)   Ht 1.575 m (5' 2\")   Wt 65.8 kg (145 lb)   BMI 26.52 kg/m²    Physical Exam  General: Appears comfortable and in no apparent distress, well nourished  Head: Normocephalic, atraumatic  Neck: trachea midline  Respiratory: respirations unlabored, no wheezes, and no use of accessory muscles  Cardiovascular: at rest no dyspnea, well perfused  Skin: no visible rashes or lesions  Neurologic: grossly intact, oriented to person, place, and time  Psychiatric: mood and affect appropriate  Musculoskeletal: in chair for appt. no difficulty w upper body movement    Assessment/Plan   Problem List Items Addressed This Visit          Genitourinary and Reproductive    Urge incontinence of urine - Primary    Relevant Orders    POCT UA Automated manually resulted (Completed)    Post-Void Residual (Completed)    Urine culture     Other Visit Diagnoses       Urgency of urination        Genitourinary syndrome of menopause        Relevant Medications    estradiol (Estrace) 0.01 % (0.1 mg/gram) vaginal cream          Orders Placed This Encounter   Procedures    Post-Void Residual    Urine culture     Standing Status:   Future     Number of Occurrences:   1     Standing Expiration Date:   11/5/2025     Order Specific Question:   Release result to [employer]     Answer:   Immediate [1]    POCT UA Automated manually resulted     Order Specific Question:   Release result to [facility]     Answer:   Immediate [1]      Very happy w new implant  Will come off oxybutynin concerns long term cognitive issues, dry mouth difficult  Estrogen vaginal cream will resume  Urine culture sent, will talk in few days  Follow up 3 mos, sooner if needs another OAB medicine    Nurse line [phone]           ANA Lizarraga-CNP  Lab Results   Component Value Date    GLUCOSE 93 10/04/2024    CALCIUM 8.9 10/04/2024     10/04/2024    K 4.7 10/04/2024    CO2 30 " 10/04/2024     10/04/2024    BUN 12 10/04/2024    CREATININE 0.99 10/04/2024

## 2024-11-08 DIAGNOSIS — N30.00 ACUTE CYSTITIS WITHOUT HEMATURIA: Primary | ICD-10-CM

## 2024-11-08 LAB
BACTERIA UR CULT: ABNORMAL
BACTERIA UR CULT: ABNORMAL

## 2024-11-08 RX ORDER — AMOXICILLIN AND CLAVULANATE POTASSIUM 875; 125 MG/1; MG/1
1 TABLET, FILM COATED ORAL 2 TIMES DAILY
Qty: 14 TABLET | Refills: 0 | Status: SHIPPED | OUTPATIENT
Start: 2024-11-08 | End: 2024-11-15

## 2024-11-14 ENCOUNTER — TELEPHONE (OUTPATIENT)
Dept: UROLOGY | Facility: CLINIC | Age: 74
End: 2024-11-14
Payer: MEDICARE

## 2024-11-14 NOTE — TELEPHONE ENCOUNTER
"Spoke with pt just to follow up and make sure she got Lizette's message last week and picked up the Augmentin. Pt states she has one day of the abx left but is still experiencing some burning and she states she is passing a lot of the \"stuff\" that was in her bladder out in her urine and wants to know if she should be concerned. She states Dr. Thurman did notice there was a lot of that \"stuff\" in her bladder. Please advise, thanks.  "

## 2024-11-20 DIAGNOSIS — I48.0 PAROXYSMAL ATRIAL FIBRILLATION (MULTI): ICD-10-CM

## 2024-11-21 RX ORDER — APIXABAN 5 MG/1
5 TABLET, FILM COATED ORAL 2 TIMES DAILY
Qty: 180 TABLET | Refills: 3 | Status: SHIPPED | OUTPATIENT
Start: 2024-11-21

## 2024-11-21 NOTE — TELEPHONE ENCOUNTER
Received request for prescription refills for patient.   Patient follows with Dr. Shashank Proctor      Request is for Eliquis  Is patient currently on medication yes    Last OV 7/17/24  Next OV 7/16/25    Pended for signing and sent to provider

## 2024-11-28 DIAGNOSIS — J45.909 MODERATE ASTHMA WITHOUT COMPLICATION, UNSPECIFIED WHETHER PERSISTENT (HHS-HCC): ICD-10-CM

## 2024-12-02 RX ORDER — MONTELUKAST SODIUM 10 MG/1
10 TABLET ORAL NIGHTLY
Qty: 90 TABLET | Refills: 3 | Status: SHIPPED | OUTPATIENT
Start: 2024-12-02 | End: 2024-12-03

## 2024-12-03 DIAGNOSIS — J45.909 MODERATE ASTHMA WITHOUT COMPLICATION, UNSPECIFIED WHETHER PERSISTENT (HHS-HCC): ICD-10-CM

## 2024-12-03 RX ORDER — MONTELUKAST SODIUM 10 MG/1
10 TABLET ORAL NIGHTLY
Qty: 90 TABLET | Refills: 3 | Status: SHIPPED | OUTPATIENT
Start: 2024-12-03

## 2024-12-04 ENCOUNTER — TELEPHONE (OUTPATIENT)
Dept: CARDIOLOGY | Facility: CLINIC | Age: 74
End: 2024-12-04
Payer: MEDICARE

## 2024-12-04 NOTE — TELEPHONE ENCOUNTER
Called and spoke to patient. I informed the patient I was calling regarding the for 01/07 with Michelle and that we had to cancel it due to her resigning. I informed her we would reschedule the appointment to be with  and would call with a new date and time for the appointment.

## 2024-12-08 RX ORDER — NITROFURANTOIN (MACROCRYSTALS) 100 MG/1
CAPSULE ORAL
Qty: 3 CAPSULE | Refills: 0 | Status: SHIPPED | OUTPATIENT
Start: 2024-12-08 | End: 2024-12-16 | Stop reason: ALTCHOICE

## 2024-12-13 ENCOUNTER — APPOINTMENT (OUTPATIENT)
Dept: ALLERGY | Facility: CLINIC | Age: 74
End: 2024-12-13
Payer: MEDICARE

## 2024-12-13 VITALS
OXYGEN SATURATION: 98 % | WEIGHT: 145 LBS | SYSTOLIC BLOOD PRESSURE: 150 MMHG | DIASTOLIC BLOOD PRESSURE: 70 MMHG | BODY MASS INDEX: 26.52 KG/M2 | HEART RATE: 70 BPM

## 2024-12-13 DIAGNOSIS — Z88.1: ICD-10-CM

## 2024-12-13 DIAGNOSIS — Z88.9 DRUG ALLERGY: Primary | ICD-10-CM

## 2024-12-13 DIAGNOSIS — T37.8X5D: ICD-10-CM

## 2024-12-13 DIAGNOSIS — T50.6X5D: ICD-10-CM

## 2024-12-13 PROBLEM — T36.0X5A PENICILLIN ADVERSE REACTION: Status: RESOLVED | Noted: 2024-03-05 | Resolved: 2024-12-13

## 2024-12-13 PROBLEM — Z88.0 PENICILLIN ALLERGY: Status: ACTIVE | Noted: 2024-03-05

## 2024-12-13 PROCEDURE — 3077F SYST BP >= 140 MM HG: CPT | Performed by: ALLERGY & IMMUNOLOGY

## 2024-12-13 PROCEDURE — 1160F RVW MEDS BY RX/DR IN RCRD: CPT | Performed by: ALLERGY & IMMUNOLOGY

## 2024-12-13 PROCEDURE — 95076 INGEST CHALLENGE INI 120 MIN: CPT | Performed by: ALLERGY & IMMUNOLOGY

## 2024-12-13 PROCEDURE — 1159F MED LIST DOCD IN RCRD: CPT | Performed by: ALLERGY & IMMUNOLOGY

## 2024-12-13 PROCEDURE — 3078F DIAST BP <80 MM HG: CPT | Performed by: ALLERGY & IMMUNOLOGY

## 2024-12-13 PROCEDURE — 1123F ACP DISCUSS/DSCN MKR DOCD: CPT | Performed by: ALLERGY & IMMUNOLOGY

## 2024-12-13 NOTE — PATIENT INSTRUCTIONS
Oral challenge to Macrobid is negative. Let your pharmacist know that you may safely take macrobid (nitrofurantoin)    Follow up as needed.

## 2024-12-13 NOTE — PROGRESS NOTES
"  Subjective   Patient ID:   [id number]   Myrna Rodrigues is a 74 y.o. female who presents for Drug Challenge (OC macrobid).    Chief Complaint   Patient presents with    Drug Challenge     OC macrobid      Patient presents for oral challenge to Macrobid.    Patient has H/O multiple drug reactions includin. Penicillin - Early 30s. She had an asthma flare after an injection. Unsure of why she used it.  2. Sulfa and Macrobid - She had all over \"petechiae\", aching bones and flu symptoms that lasted a few days, but did not itch. Unsure of dates but both were after the penicillin - maybe 50s or 60s. Both were early in the course. She was told to avoid them and did not seek Tx for either reaction.    At last visit, 24, skin testing to Macrobid was negative.  She presents today for oral challenge to Macrobid.        Objective   /70   Pulse 70   Wt 65.8 kg (145 lb)   SpO2 98%   BMI 26.52 kg/m²      Physical Exam  Constitutional:       Appearance: Normal appearance.   HENT:      Head: Normocephalic and atraumatic.      Right Ear: External ear normal. There is no impacted cerumen.      Left Ear: External ear normal. There is no impacted cerumen.      Nose: No congestion or rhinorrhea.   Eyes:      Extraocular Movements: Extraocular movements intact.      Conjunctiva/sclera: Conjunctivae normal.      Pupils: Pupils are equal, round, and reactive to light.   Cardiovascular:      Rate and Rhythm: Normal rate and regular rhythm.      Heart sounds: No murmur heard.     No friction rub. No gallop.   Pulmonary:      Effort: No respiratory distress.      Breath sounds: No wheezing, rhonchi or rales.   Skin:     General: Skin is warm and dry.   Neurological:      Mental Status: She is alert.   Psychiatric:         Mood and Affect: Mood normal.         Behavior: Behavior normal.     Challenge testing was performed on Myrna Rodrigues using standard technique. There were no immediate complications.    Test " Results  Challenge  Type of Challenge: Macrobid  Goal Dose: 100 mg  Vital Signs Reviewed Prior to Challenge: Yes  1)  Time: 0717  Dose: 100 mg  2)  Time: 0827  Reaction / Comment: Discharged stable  Result  Pass: Yes  Reacted: No    Oral challenge to Macrobid is negative.    Assessment/Plan     Nitrofurantoin adverse reaction  Oral challenge to Macrobid is negative. She may safely take macrobid    By signing my name below, I, Sonya Riley, attest that this documentation has been prepared under the direction and in the presence of Fabienne Duff MD.  All medical record entries made by the [facility] were at my direction and personally dictated by me. I have reviewed the chart and agree that the record accurately reflects my personal performance of the history, physical exam, discussion and plan.

## 2025-01-02 ENCOUNTER — LAB (OUTPATIENT)
Dept: LAB | Facility: LAB | Age: 75
End: 2025-01-02
Payer: MEDICARE

## 2025-01-02 DIAGNOSIS — D50.9 IRON DEFICIENCY ANEMIA, UNSPECIFIED IRON DEFICIENCY ANEMIA TYPE: ICD-10-CM

## 2025-01-02 LAB
ERYTHROCYTE [DISTWIDTH] IN BLOOD BY AUTOMATED COUNT: 19.6 % (ref 11.5–14.5)
FERRITIN SERPL-MCNC: 18 NG/ML (ref 8–150)
HCT VFR BLD AUTO: 31.2 % (ref 36–46)
HGB BLD-MCNC: 9.2 G/DL (ref 12–16)
IRON SATN MFR SERPL: 4 % (ref 25–45)
IRON SERPL-MCNC: 16 UG/DL (ref 35–150)
MCH RBC QN AUTO: 21.6 PG (ref 26–34)
MCHC RBC AUTO-ENTMCNC: 29.5 G/DL (ref 32–36)
MCV RBC AUTO: 73 FL (ref 80–100)
NRBC BLD-RTO: 0 /100 WBCS (ref 0–0)
PLATELET # BLD AUTO: 282 X10*3/UL (ref 150–450)
RBC # BLD AUTO: 4.25 X10*6/UL (ref 4–5.2)
TIBC SERPL-MCNC: 414 UG/DL (ref 240–445)
UIBC SERPL-MCNC: 398 UG/DL (ref 110–370)
WBC # BLD AUTO: 9.4 X10*3/UL (ref 4.4–11.3)

## 2025-01-02 PROCEDURE — 83540 ASSAY OF IRON: CPT

## 2025-01-02 PROCEDURE — 85027 COMPLETE CBC AUTOMATED: CPT

## 2025-01-02 PROCEDURE — 83550 IRON BINDING TEST: CPT

## 2025-01-02 PROCEDURE — 82728 ASSAY OF FERRITIN: CPT

## 2025-01-06 ENCOUNTER — TELEPHONE (OUTPATIENT)
Dept: PRIMARY CARE | Facility: CLINIC | Age: 75
End: 2025-01-06

## 2025-01-06 ENCOUNTER — APPOINTMENT (OUTPATIENT)
Dept: PRIMARY CARE | Facility: CLINIC | Age: 75
End: 2025-01-06
Payer: MEDICARE

## 2025-01-06 VITALS
BODY MASS INDEX: 27.07 KG/M2 | RESPIRATION RATE: 14 BRPM | SYSTOLIC BLOOD PRESSURE: 124 MMHG | DIASTOLIC BLOOD PRESSURE: 80 MMHG | OXYGEN SATURATION: 98 % | HEART RATE: 64 BPM | WEIGHT: 148 LBS

## 2025-01-06 DIAGNOSIS — J44.89 OTHER SPECIFIED CHRONIC OBSTRUCTIVE PULMONARY DISEASE: ICD-10-CM

## 2025-01-06 DIAGNOSIS — N83.202 CYST OF LEFT OVARY: ICD-10-CM

## 2025-01-06 DIAGNOSIS — R19.7 DIARRHEA IN ADULT PATIENT: ICD-10-CM

## 2025-01-06 DIAGNOSIS — E11.69 TYPE 2 DIABETES MELLITUS WITH OTHER SPECIFIED COMPLICATION, WITHOUT LONG-TERM CURRENT USE OF INSULIN: ICD-10-CM

## 2025-01-06 DIAGNOSIS — I48.92 ATRIAL FLUTTER, UNSPECIFIED TYPE (MULTI): ICD-10-CM

## 2025-01-06 DIAGNOSIS — E11.9 CONTROLLED TYPE 2 DIABETES MELLITUS WITHOUT COMPLICATION, WITHOUT LONG-TERM CURRENT USE OF INSULIN (MULTI): ICD-10-CM

## 2025-01-06 DIAGNOSIS — K52.9 CHRONIC DIARRHEA: ICD-10-CM

## 2025-01-06 DIAGNOSIS — D50.0 IRON DEFICIENCY ANEMIA DUE TO CHRONIC BLOOD LOSS: Primary | ICD-10-CM

## 2025-01-06 PROBLEM — M31.6 TEMPORAL ARTERITIS (MULTI): Status: RESOLVED | Noted: 2023-09-06 | Resolved: 2025-01-06

## 2025-01-06 PROBLEM — C73 THYROID CANCER (MULTI): Status: RESOLVED | Noted: 2021-04-07 | Resolved: 2025-01-06

## 2025-01-06 PROBLEM — C73 MALIGNANT NEOPLASM OF THYROID GLAND (MULTI): Status: RESOLVED | Noted: 2023-09-06 | Resolved: 2025-01-06

## 2025-01-06 PROCEDURE — 1123F ACP DISCUSS/DSCN MKR DOCD: CPT | Performed by: INTERNAL MEDICINE

## 2025-01-06 PROCEDURE — 3074F SYST BP LT 130 MM HG: CPT | Performed by: INTERNAL MEDICINE

## 2025-01-06 PROCEDURE — 3079F DIAST BP 80-89 MM HG: CPT | Performed by: INTERNAL MEDICINE

## 2025-01-06 PROCEDURE — 1159F MED LIST DOCD IN RCRD: CPT | Performed by: INTERNAL MEDICINE

## 2025-01-06 PROCEDURE — 99215 OFFICE O/P EST HI 40 MIN: CPT | Performed by: INTERNAL MEDICINE

## 2025-01-06 RX ORDER — DIPHENOXYLATE HYDROCHLORIDE AND ATROPINE SULFATE 2.5; .025 MG/1; MG/1
1 TABLET ORAL 4 TIMES DAILY PRN
Qty: 24 TABLET | Refills: 0 | Status: SHIPPED | OUTPATIENT
Start: 2025-01-06 | End: 2025-01-12

## 2025-01-06 ASSESSMENT — ENCOUNTER SYMPTOMS
MYALGIAS: 0
NAUSEA: 0
COUGH: 0
FEVER: 0
VOMITING: 0
SHORTNESS OF BREATH: 0
DIAPHORESIS: 0
JOINT SWELLING: 0
CONSTIPATION: 0
CHILLS: 0
DIARRHEA: 1

## 2025-01-06 ASSESSMENT — PATIENT HEALTH QUESTIONNAIRE - PHQ9
1. LITTLE INTEREST OR PLEASURE IN DOING THINGS: NOT AT ALL
2. FEELING DOWN, DEPRESSED OR HOPELESS: NOT AT ALL
SUM OF ALL RESPONSES TO PHQ9 QUESTIONS 1 AND 2: 0

## 2025-01-06 NOTE — PATIENT INSTRUCTIONS
Recommend proceed with t he pelvic ultrasound follow up ordered by dr. Fish - it should be in the system.  If this check of the ovary cyst is stable, then we can stop doing ultrasounds    2.  THE BLOOD TESTS SHOW ONGOING IRON DEFICIENCY, AND YOU CAN'T TAKE ORAL IRON DUE TO THE SIDE EFFECTS.  I'LL ARRANGE FOR IRON INFUSION THERAPY TO FILL UP THE IRON TANK IN YOUR BONE MARROW, AND THEN AFTERWARD YOU CAN TAKE WOMEN'S ONE A DAY MULTIVIT WITH IRON TO KEEP THE IRON LEVEL UP.    3.  I SUSPECT THE ROOT CAUSE OF THE IRON DEFICIENCY IS SLOW BLOOD LOSS POSSIBLY FROM A SMALL INTESTINE ARTERIAL-VENOUS MALFORMATION THAT OOZES BLOOD BECAUSE OF THE BLOOD THINNER APIXABAN    4.  LOMOTIL IS A MORE POWERFUL ANTI-DIARRHEA MED THAT CAN BE USED ONLY IF NEEDED TO BE ABLE TO GOT OUT OF THE HOUSE.  I CAN ONLY WRITE FOR 24.  I SUSPECT YOU ARE HAVING DIARRHEA-PREDOMINANT IRRITABLE BOWEL SYNDROME    5.  FOLLOW UP DR. LOW TO RE-ADJUST THE THYROID MEDICATION - I DON;T THINK THE THYROID ISSUE IS CAUSING THE BOWEL IRRITABILITY.    6.  FOLLOW UP 3-4 MONTHS OR AS NEEDED.

## 2025-01-06 NOTE — PROGRESS NOTES
Subjective   Myrna Rodrigues is a 74 y.o. female who presents for  FOLLOW UP   HAS BEEN HAVING ISSUES WITH LOOSE STOOLS   PAIN RIGHT LOWER GROIN NOT SURE IF ITS THE SIDE SHE HAS THE OVARIAN CYST.   JUST HAD LABS WITH ENDO [facility]  A1C 5.9    LAST EYE EXAM DR. DAILEY 1/24 WILL BE SCHEDULING THIS YEARS       HPI   STOOL ISSUES ARE ONGOING. FOR A LONG TIME.  GASTRO DOCTOR DR. MALIN    PAIN RIGHT LOWER QUADRANT REGION.  DON'T THINK ITS ASSOCIATED WITH LOOSE STOOLS.    BLADDER STIMULATOR REQUIRED REPLACEMENT BY DR. BESS, NOW WORKING WELL.    STOPPED IRON PILLS, BUT HAD TO STOP DUE TO CONSTIPATION.     HAS TROUBLE LEAVING THE HOME DUE TO LOSE STOOLS     Review of Systems   Constitutional:  Negative for chills, diaphoresis and fever.   Respiratory:  Negative for cough and shortness of breath.    Cardiovascular:  Negative for chest pain and leg swelling.   Gastrointestinal:  Positive for diarrhea. Negative for constipation, nausea and vomiting.   Genitourinary:  Negative for enuresis.   Musculoskeletal:  Negative for joint swelling and myalgias.       Objective   /80   Pulse 64   Resp 14   Wt 67.1 kg (148 lb)   SpO2 98%   BMI 27.07 kg/m²     Physical Exam  Vitals reviewed.   Constitutional:       General: She is not in acute distress.     Appearance: She is not ill-appearing.   Cardiovascular:      Rate and Rhythm: Normal rate and regular rhythm.      Pulses: Normal pulses.      Heart sounds:      No gallop.   Pulmonary:      Breath sounds: Normal breath sounds. No wheezing, rhonchi or rales.   Abdominal:      General: Abdomen is flat. Bowel sounds are normal.      Palpations: Abdomen is soft.      Tenderness: There is no guarding or rebound.   Musculoskeletal:      Right lower leg: No edema.      Left lower leg: No edema.         Assessment/Plan   Problem List Items Addressed This Visit       Chronic obstructive pulmonary disease (Multi)     STABLE ON CURRENT REGIMEN         Chronic diarrhea    Controlled type 2  diabetes mellitus without complication, without long-term current use of insulin (Multi)    Paroxysmal atrial fibrillation (Multi)     FOLLOW WITH CARDIOLOGY         Cyst of left ovary    Relevant Orders    US pelvis    US PELVIS TRANSABDOMINAL WITH TRANSVAGINAL    Iron deficiency anemia due to chronic blood loss - Primary    Type 2 diabetes mellitus with other specified complication, without long-term current use of insulin     MONITOR WITH SERIAL LABS          Other Visit Diagnoses       Diarrhea in adult patient        Relevant Medications    diphenoxylate-atropine (Lomotil) 2.5-0.025 mg tablet          Patient Instructions    Recommend proceed with t he pelvic ultrasound follow up ordered by dr. Fish - it should be in the system.  If this check of the ovary cyst is stable, then we can stop doing ultrasounds    2.  THE BLOOD TESTS SHOW ONGOING IRON DEFICIENCY, AND YOU CAN'T TAKE ORAL IRON DUE TO THE SIDE EFFECTS.  I'LL ARRANGE FOR IRON INFUSION THERAPY TO FILL UP THE IRON TANK IN YOUR BONE MARROW, AND THEN AFTERWARD YOU CAN TAKE WOMEN'S ONE A DAY MULTIVIT WITH IRON TO KEEP THE IRON LEVEL UP.    3.  I SUSPECT THE ROOT CAUSE OF THE IRON DEFICIENCY IS SLOW BLOOD LOSS POSSIBLY FROM A SMALL INTESTINE ARTERIAL-VENOUS MALFORMATION THAT OOZES BLOOD BECAUSE OF THE BLOOD THINNER APIXABAN    4.  LOMOTIL IS A MORE POWERFUL ANTI-DIARRHEA MED THAT CAN BE USED ONLY IF NEEDED TO BE ABLE TO GOT OUT OF THE HOUSE.  I CAN ONLY WRITE FOR 24.  I SUSPECT YOU ARE HAVING DIARRHEA-PREDOMINANT IRRITABLE BOWEL SYNDROME    5.  FOLLOW UP DR. LOW TO RE-ADJUST THE THYROID MEDICATION - I DON;T THINK THE THYROID ISSUE IS CAUSING THE BOWEL IRRITABILITY.    6.  FOLLOW UP 3-4 MONTHS OR AS NEEDED.

## 2025-01-07 ENCOUNTER — APPOINTMENT (OUTPATIENT)
Dept: RADIOLOGY | Facility: CLINIC | Age: 75
End: 2025-01-07
Payer: MEDICARE

## 2025-01-07 ENCOUNTER — APPOINTMENT (OUTPATIENT)
Dept: CARDIOLOGY | Facility: CLINIC | Age: 75
End: 2025-01-07
Payer: MEDICARE

## 2025-01-09 ENCOUNTER — APPOINTMENT (OUTPATIENT)
Dept: RADIOLOGY | Facility: HOSPITAL | Age: 75
End: 2025-01-09
Payer: MEDICARE

## 2025-01-09 ENCOUNTER — HOSPITAL ENCOUNTER (EMERGENCY)
Facility: HOSPITAL | Age: 75
Discharge: HOME | End: 2025-01-09
Attending: EMERGENCY MEDICINE
Payer: MEDICARE

## 2025-01-09 VITALS
SYSTOLIC BLOOD PRESSURE: 157 MMHG | RESPIRATION RATE: 16 BRPM | DIASTOLIC BLOOD PRESSURE: 70 MMHG | HEIGHT: 62 IN | BODY MASS INDEX: 26.68 KG/M2 | HEART RATE: 63 BPM | OXYGEN SATURATION: 97 % | TEMPERATURE: 98.1 F | WEIGHT: 145 LBS

## 2025-01-09 DIAGNOSIS — N30.90 CYSTITIS: Primary | ICD-10-CM

## 2025-01-09 LAB
ALBUMIN SERPL BCP-MCNC: 4.1 G/DL (ref 3.4–5)
ALP SERPL-CCNC: 68 U/L (ref 33–136)
ALT SERPL W P-5'-P-CCNC: 6 U/L (ref 7–45)
ANION GAP SERPL CALC-SCNC: 16 MMOL/L (ref 10–20)
APPEARANCE UR: ABNORMAL
AST SERPL W P-5'-P-CCNC: 11 U/L (ref 9–39)
BACTERIA #/AREA URNS AUTO: ABNORMAL /HPF
BASOPHILS # BLD AUTO: 0.03 X10*3/UL (ref 0–0.1)
BASOPHILS NFR BLD AUTO: 0.2 %
BILIRUB SERPL-MCNC: 0.3 MG/DL (ref 0–1.2)
BILIRUB UR STRIP.AUTO-MCNC: NEGATIVE MG/DL
BUN SERPL-MCNC: 12 MG/DL (ref 6–23)
CALCIUM SERPL-MCNC: 9.2 MG/DL (ref 8.6–10.3)
CHLORIDE SERPL-SCNC: 99 MMOL/L (ref 98–107)
CO2 SERPL-SCNC: 25 MMOL/L (ref 21–32)
COLOR UR: ABNORMAL
CREAT SERPL-MCNC: 0.96 MG/DL (ref 0.5–1.05)
EGFRCR SERPLBLD CKD-EPI 2021: 61 ML/MIN/1.73M*2
EOSINOPHIL # BLD AUTO: 0.33 X10*3/UL (ref 0–0.4)
EOSINOPHIL NFR BLD AUTO: 2.7 %
ERYTHROCYTE [DISTWIDTH] IN BLOOD BY AUTOMATED COUNT: 18.9 % (ref 11.5–14.5)
GLUCOSE SERPL-MCNC: 103 MG/DL (ref 74–99)
GLUCOSE UR STRIP.AUTO-MCNC: NORMAL MG/DL
HCT VFR BLD AUTO: 35.4 % (ref 36–46)
HGB BLD-MCNC: 10.2 G/DL (ref 12–16)
IMM GRANULOCYTES # BLD AUTO: 0.04 X10*3/UL (ref 0–0.5)
IMM GRANULOCYTES NFR BLD AUTO: 0.3 % (ref 0–0.9)
KETONES UR STRIP.AUTO-MCNC: NEGATIVE MG/DL
LEUKOCYTE ESTERASE UR QL STRIP.AUTO: ABNORMAL
LYMPHOCYTES # BLD AUTO: 2.78 X10*3/UL (ref 0.8–3)
LYMPHOCYTES NFR BLD AUTO: 23 %
MCH RBC QN AUTO: 21 PG (ref 26–34)
MCHC RBC AUTO-ENTMCNC: 28.8 G/DL (ref 32–36)
MCV RBC AUTO: 73 FL (ref 80–100)
MONOCYTES # BLD AUTO: 0.79 X10*3/UL (ref 0.05–0.8)
MONOCYTES NFR BLD AUTO: 6.5 %
MUCOUS THREADS #/AREA URNS AUTO: ABNORMAL /LPF
NEUTROPHILS # BLD AUTO: 8.14 X10*3/UL (ref 1.6–5.5)
NEUTROPHILS NFR BLD AUTO: 67.3 %
NITRITE UR QL STRIP.AUTO: ABNORMAL
NRBC BLD-RTO: 0 /100 WBCS (ref 0–0)
PH UR STRIP.AUTO: 5.5 [PH]
PLATELET # BLD AUTO: 276 X10*3/UL (ref 150–450)
POTASSIUM SERPL-SCNC: 4 MMOL/L (ref 3.5–5.3)
PROT SERPL-MCNC: 7.4 G/DL (ref 6.4–8.2)
PROT UR STRIP.AUTO-MCNC: ABNORMAL MG/DL
RBC # BLD AUTO: 4.85 X10*6/UL (ref 4–5.2)
RBC # UR STRIP.AUTO: ABNORMAL /UL
RBC #/AREA URNS AUTO: >20 /HPF
SODIUM SERPL-SCNC: 136 MMOL/L (ref 136–145)
SP GR UR STRIP.AUTO: 1.01
SQUAMOUS #/AREA URNS AUTO: ABNORMAL /HPF
UROBILINOGEN UR STRIP.AUTO-MCNC: NORMAL MG/DL
WBC # BLD AUTO: 12.1 X10*3/UL (ref 4.4–11.3)
WBC #/AREA URNS AUTO: >50 /HPF
WBC CLUMPS #/AREA URNS AUTO: ABNORMAL /HPF

## 2025-01-09 PROCEDURE — 2550000001 HC RX 255 CONTRASTS: Performed by: EMERGENCY MEDICINE

## 2025-01-09 PROCEDURE — 2500000004 HC RX 250 GENERAL PHARMACY W/ HCPCS (ALT 636 FOR OP/ED)

## 2025-01-09 PROCEDURE — 36415 COLL VENOUS BLD VENIPUNCTURE: CPT | Performed by: EMERGENCY MEDICINE

## 2025-01-09 PROCEDURE — 2500000001 HC RX 250 WO HCPCS SELF ADMINISTERED DRUGS (ALT 637 FOR MEDICARE OP)

## 2025-01-09 PROCEDURE — 99285 EMERGENCY DEPT VISIT HI MDM: CPT | Performed by: EMERGENCY MEDICINE

## 2025-01-09 PROCEDURE — 81001 URINALYSIS AUTO W/SCOPE: CPT | Performed by: EMERGENCY MEDICINE

## 2025-01-09 PROCEDURE — 87086 URINE CULTURE/COLONY COUNT: CPT | Mod: STJLAB | Performed by: EMERGENCY MEDICINE

## 2025-01-09 PROCEDURE — 99285 EMERGENCY DEPT VISIT HI MDM: CPT | Mod: 25 | Performed by: EMERGENCY MEDICINE

## 2025-01-09 PROCEDURE — 85025 COMPLETE CBC W/AUTO DIFF WBC: CPT | Performed by: EMERGENCY MEDICINE

## 2025-01-09 PROCEDURE — 96375 TX/PRO/DX INJ NEW DRUG ADDON: CPT

## 2025-01-09 PROCEDURE — 76856 US EXAM PELVIC COMPLETE: CPT

## 2025-01-09 PROCEDURE — 96365 THER/PROPH/DIAG IV INF INIT: CPT

## 2025-01-09 PROCEDURE — 74177 CT ABD & PELVIS W/CONTRAST: CPT

## 2025-01-09 PROCEDURE — 74177 CT ABD & PELVIS W/CONTRAST: CPT | Performed by: RADIOLOGY

## 2025-01-09 PROCEDURE — 84075 ASSAY ALKALINE PHOSPHATASE: CPT | Performed by: EMERGENCY MEDICINE

## 2025-01-09 RX ORDER — CEPHALEXIN 500 MG/1
500 CAPSULE ORAL 2 TIMES DAILY
Qty: 14 CAPSULE | Refills: 0 | Status: SHIPPED | OUTPATIENT
Start: 2025-01-09 | End: 2025-01-16

## 2025-01-09 RX ORDER — PHENAZOPYRIDINE HYDROCHLORIDE 200 MG/1
200 TABLET, FILM COATED ORAL 3 TIMES DAILY
Qty: 6 TABLET | Refills: 0 | Status: SHIPPED | OUTPATIENT
Start: 2025-01-09 | End: 2025-01-11

## 2025-01-09 RX ORDER — CEFTRIAXONE 1 G/50ML
1 INJECTION, SOLUTION INTRAVENOUS ONCE
Status: COMPLETED | OUTPATIENT
Start: 2025-01-09 | End: 2025-01-09

## 2025-01-09 RX ORDER — ONDANSETRON HYDROCHLORIDE 2 MG/ML
4 INJECTION, SOLUTION INTRAVENOUS ONCE
Status: COMPLETED | OUTPATIENT
Start: 2025-01-09 | End: 2025-01-09

## 2025-01-09 RX ORDER — MORPHINE SULFATE 4 MG/ML
4 INJECTION, SOLUTION INTRAMUSCULAR; INTRAVENOUS ONCE
Status: COMPLETED | OUTPATIENT
Start: 2025-01-09 | End: 2025-01-09

## 2025-01-09 RX ORDER — PHENAZOPYRIDINE HYDROCHLORIDE 100 MG/1
95 TABLET, FILM COATED ORAL ONCE
Status: COMPLETED | OUTPATIENT
Start: 2025-01-09 | End: 2025-01-09

## 2025-01-09 RX ORDER — NAPROXEN 500 MG/1
500 TABLET ORAL
Qty: 10 TABLET | Refills: 0 | Status: SHIPPED | OUTPATIENT
Start: 2025-01-09 | End: 2025-01-14

## 2025-01-09 RX ORDER — KETOROLAC TROMETHAMINE 15 MG/ML
15 INJECTION, SOLUTION INTRAMUSCULAR; INTRAVENOUS ONCE
Status: COMPLETED | OUTPATIENT
Start: 2025-01-09 | End: 2025-01-09

## 2025-01-09 RX ADMIN — CEFTRIAXONE SODIUM 1 G: 1 INJECTION, SOLUTION INTRAVENOUS at 19:25

## 2025-01-09 RX ADMIN — KETOROLAC TROMETHAMINE 15 MG: 15 INJECTION, SOLUTION INTRAMUSCULAR; INTRAVENOUS at 22:07

## 2025-01-09 RX ADMIN — ONDANSETRON 4 MG: 2 INJECTION INTRAMUSCULAR; INTRAVENOUS at 18:35

## 2025-01-09 RX ADMIN — IOHEXOL 75 ML: 350 INJECTION, SOLUTION INTRAVENOUS at 18:51

## 2025-01-09 RX ADMIN — MORPHINE SULFATE 4 MG: 4 INJECTION, SOLUTION INTRAMUSCULAR; INTRAVENOUS at 18:35

## 2025-01-09 RX ADMIN — PHENAZOPYRIDINE 100 MG: 100 TABLET ORAL at 22:07

## 2025-01-09 ASSESSMENT — PAIN SCALES - GENERAL
PAINLEVEL_OUTOF10: 4
PAINLEVEL_OUTOF10: 7
PAINLEVEL_OUTOF10: 6
PAINLEVEL_OUTOF10: 2

## 2025-01-09 ASSESSMENT — PAIN DESCRIPTION - PAIN TYPE: TYPE: ACUTE PAIN

## 2025-01-09 ASSESSMENT — LIFESTYLE VARIABLES
HAVE PEOPLE ANNOYED YOU BY CRITICIZING YOUR DRINKING: NO
EVER FELT BAD OR GUILTY ABOUT YOUR DRINKING: NO
TOTAL SCORE: 0
EVER HAD A DRINK FIRST THING IN THE MORNING TO STEADY YOUR NERVES TO GET RID OF A HANGOVER: NO
HAVE YOU EVER FELT YOU SHOULD CUT DOWN ON YOUR DRINKING: NO

## 2025-01-09 ASSESSMENT — PAIN DESCRIPTION - LOCATION: LOCATION: ABDOMEN

## 2025-01-09 ASSESSMENT — PAIN - FUNCTIONAL ASSESSMENT: PAIN_FUNCTIONAL_ASSESSMENT: 0-10

## 2025-01-09 ASSESSMENT — PAIN DESCRIPTION - ORIENTATION: ORIENTATION: RIGHT;LOWER

## 2025-01-09 NOTE — ED PROVIDER NOTES
EMERGENCY DEPARTMENT ENCOUNTER      Pt Name: Myrna Rodrigues  MRN: [id number]  Birthdate 1950  Date of evaluation: 1/9/2025    HISTORY OF PRESENT ILLNESS    Myrna Rodrigues is an 74 y.o. female with history including CAD, type 2 diabetes, GERD, hyperlipidemia, hypertension presenting to the emergency department for right lower quadrant pain. Patient has had right lower quadrant pain intermittently for the last 2 weeks.  She called her PCP and set up an appointment for follow-up.  On Monday this week she had an appointment but the pain had worsened and become more constant.  They had ordered an ultrasound to be completed tomorrow.  Patient describes the pain as sharp stabbing in the right lower quadrant.  She denies any fevers or chills.  Patient denies any dysuria but has noted a slight increase in urination.  She has had diarrhea on and off for the last few months secondary to her medications.  But denies any bowel movements for 2 days.  She denies any nausea or vomiting.  She thought the pain was secondary to an ovarian cyst that is known on her right ovary.  She is concerned it may be larger causing the severe pain.      PAST MEDICAL HISTORY     Past Medical History:   Diagnosis Date    Anxiety and depression     Arrhythmia     Asthma     Cancer (Multi)     Coronary artery disease     Diabetes mellitus (Multi)     Disease of thyroid gland     GERD (gastroesophageal reflux disease)     Heart disease     Hyperlipidemia     Hypertension     Irregular heart beat     Occipital neuralgia 06/10/2022    Occipital neuralgia of right side    Personal history of malignant neoplasm, unspecified 10/04/2021    History of malignant neoplasm    Personal history of other diseases of the circulatory system     History of hypertension    Personal history of other diseases of the circulatory system 10/04/2021    History of essential hypertension    Personal history of other diseases of the circulatory system     History of coronary artery  disease    Personal history of other diseases of the digestive system 03/11/2020    History of chronic constipation    Personal history of other diseases of the musculoskeletal system and connective tissue     History of arthritis    Personal history of other diseases of the nervous system and sense organs 10/04/2021    History of eye problem    Personal history of other diseases of the respiratory system     History of asthma    Personal history of other specified conditions 10/04/2021    History of chest pain    PONV (postoperative nausea and vomiting)     Stool incontinence     Thyroid cancer (Multi)     Urinary incontinence        SURGICAL HISTORY       Past Surgical History:   Procedure Laterality Date    APPENDECTOMY  08/13/2015    Appendectomy    BREAST LUMPECTOMY  08/13/2015    Breast Surgery Lumpectomy    CARPAL TUNNEL RELEASE  08/13/2015    Neuroplasty Decompression Median Nerve At Carpal Tunnel    GALLBLADDER SURGERY  08/13/2015    Gallbladder Surgery    HYSTERECTOMY  08/13/2015    Hysterectomy    KNEE ARTHROSCOPY W/ MENISCAL REPAIR  08/13/2015    Knee Arthroscopy With Medial Meniscus Repair    KNEE SURGERY  08/13/2015    Knee Surgery    MR HEAD ANGIO WO IV CONTRAST  09/05/2021    MR HEAD ANGIO WO IV CONTRAST 9/5/2021 [facility]    OTHER SURGICAL HISTORY  08/13/2015    Shoulder Surgery Left    OTHER SURGICAL HISTORY  08/13/2015    Repair Of Bladder Reconstruction    OTHER SURGICAL HISTORY  09/20/2021    Breast surgery    OTHER SURGICAL HISTORY  09/20/2021    Bladder surgery    OTHER SURGICAL HISTORY  09/20/2021    Complete colonoscopy    OTHER SURGICAL HISTORY  09/20/2021    Shoulder surgery    OTHER SURGICAL HISTORY  09/20/2021    Foot surgery    OTHER SURGICAL HISTORY  09/20/2021    Percutaneous transluminal coronary angioplasty    OTHER SURGICAL HISTORY  10/11/2021    Thyroidectomy total    THYROIDECTOMY      TOTAL KNEE ARTHROPLASTY  08/13/2015    Knee Replacement       CURRENT MEDICATIONS        Previous Medications    ACETAMINOPHEN (TYLENOL) 325 MG TABLET    Take 2 tablets (650 mg) by mouth every 6 hours if needed for mild pain (1 - 3).    ACETAMINOPHEN (TYLENOL) 500 MG TABLET    Take 2 tablets (1,000 mg) by mouth every 6 hours.    ATORVASTATIN (LIPITOR) 10 MG TABLET    TAKE 1 TABLET BY MOUTH DAILY    CALCIUM POLYCARBOPHIL (FIBERCON) 625 MG TABLET    Take 2 tablets (1,250 mg) by mouth once daily.    CHOLECALCIFEROL (VITAMIN D-3) 25 MCG (1000 UT) TABLET    Take 1 tablet (1,000 Units) by mouth once daily.    CLOBETASOL (TEMOVATE) 0.05 % OINTMENT    Apply topically 2 times a day.    DIPHENOXYLATE-ATROPINE (LOMOTIL) 2.5-0.025 MG TABLET    Take 1 tablet by mouth 4 times a day as needed for diarrhea for up to 6 days.    ELIQUIS 5 MG TABLET    TAKE 1 TABLET BY MOUTH TWICE  DAILY    ESTRADIOL (ESTRACE) 0.01 % (0.1 MG/GRAM) VAGINAL CREAM    Apply pea size amount 0.5 gram to vaginal opening with finger daily for 2 weeks, then 2-3 times per week.    FOLIC ACID (FOLVITE) 800 MCG TABLET    Take 1 tablet (0.8 mg) by mouth once daily.    LEVOTHYROXINE (SYNTHROID, LEVOXYL) 100 MCG TABLET    Take 1 tablet (100 mcg) by mouth 4 times a week. Gut-Vcv-Pzz-Sun    LEVOTHYROXINE (SYNTHROID, LEVOXYL) 100 MCG TABLET    Take 0.5 tablets (50 mcg) by mouth 3 (three) times a week. Tues-Thu-Sat    METFORMIN XR (GLUCOPHAGE-XR) 500 MG 24 HR TABLET    Take 1 tablet (500 mg) by mouth 2 times a day.    METOPROLOL TARTRATE (LOPRESSOR) 50 MG TABLET    Take 1 tablet (50 mg) by mouth 2 times a day.    MONTELUKAST (SINGULAIR) 10 MG TABLET    TAKE 1 TABLET BY MOUTH AT  BEDTIME    NITROGLYCERIN (NITROSTAT) 0.4 MG SL TABLET    Place 1 tablet (0.4 mg) under the tongue every 5 minutes if needed for chest pain.    OMEPRAZOLE (PRILOSEC) 40 MG DR CAPSULE    TAKE 1 CAPSULE BY MOUTH TWICE  DAILY    POTASSIUM GLUCONATE 595 MG (99 MG) TABLET    Take 1 tablet by mouth once daily.    TIOTROPIUM (SPIRIVA RESPIMAT) 2.5 MCG/ACTUATION INHALER    Inhale 2 puffs  once daily.    TRAZODONE (DESYREL) 50 MG TABLET    Take 1-3 tablets ( mg) by mouth as needed at bedtime for sleep.    VENTOLIN HFA 90 MCG/ACTUATION INHALER    USE 2 INHALATIONS BY MOUTH EVERY 4 HOURS AS NEEDED       ALLERGIES     Patient has no known allergies.    FAMILY HISTORY       Family History   Problem Relation Name Age of Onset    Lung cancer Mother      Lymphoma Mother      Bone cancer Mother      Heart attack Father      Liver cancer Brother      Lung cancer Brother          SOCIAL HISTORY       Social History     Socioeconomic History    Marital status:    Tobacco Use    Smoking status: Never    Smokeless tobacco: Never   Vaping Use    Vaping status: Never Used   Substance and Sexual Activity    Alcohol use: Not Currently    Drug use: Never    Sexual activity: Defer     Social Drivers of Health     Financial Resource Strain: Low Risk  (1/28/2024)    Overall Financial Resource Strain (CARDIA)     Difficulty of Paying Living Expenses: Not hard at all   Transportation Needs: No Transportation Needs (1/28/2024)    PRAPARE - Transportation     Lack of Transportation (Medical): No     Lack of Transportation (Non-Medical): No   Housing Stability: Low Risk  (1/28/2024)    Housing Stability Vital Sign     Unable to Pay for Housing in the Last Year: No     Number of Places Lived in the Last Year: 1     Unstable Housing in the Last Year: No       PHYSICAL EXAM       ED Triage Vitals [01/09/25 1539]   Temperature Heart Rate Respirations BP   36.7 °C (98.1 °F) 95 18 (!) 183/82      Pulse Ox Temp Source Heart Rate Source Patient Position   97 % Temporal Monitor Sitting      BP Location FiO2 (%)     Right arm --       Physical Exam     DIAGNOSTIC RESULTS     LABS:  Labs Reviewed   CBC WITH AUTO DIFFERENTIAL - Abnormal       Result Value    WBC 12.1 (*)     nRBC 0.0      RBC 4.85      Hemoglobin 10.2 (*)     Hematocrit 35.4 (*)     MCV 73 (*)     MCH 21.0 (*)     MCHC 28.8 (*)     RDW 18.9 (*)     Platelets  276      Neutrophils % 67.3      Immature Granulocytes %, Automated 0.3      Lymphocytes % 23.0      Monocytes % 6.5      Eosinophils % 2.7      Basophils % 0.2      Neutrophils Absolute 8.14 (*)     Immature Granulocytes Absolute, Automated 0.04      Lymphocytes Absolute 2.78      Monocytes Absolute 0.79      Eosinophils Absolute 0.33      Basophils Absolute 0.03     COMPREHENSIVE METABOLIC PANEL - Abnormal    Glucose 103 (*)     Sodium 136      Potassium 4.0      Chloride 99      Bicarbonate 25      Anion Gap 16      Urea Nitrogen 12      Creatinine 0.96      eGFR 61      Calcium 9.2      Albumin 4.1      Alkaline Phosphatase 68      Total Protein 7.4      AST 11      Bilirubin, Total 0.3      ALT 6 (*)    URINALYSIS WITH REFLEX CULTURE AND MICROSCOPIC - Abnormal    Color, Urine Light-Yellow      Appearance, Urine Turbid (*)     Specific Gravity, Urine 1.015      pH, Urine 5.5      Protein, Urine 10 (TRACE)      Glucose, Urine Normal      Blood, Urine 0.5 (2+) (*)     Ketones, Urine NEGATIVE      Bilirubin, Urine NEGATIVE      Urobilinogen, Urine Normal      Nitrite, Urine 2+ (*)     Leukocyte Esterase, Urine 500 Uday/uL (*)    MICROSCOPIC ONLY, URINE - Abnormal    WBC, Urine >50 (*)     WBC Clumps, Urine OCCASIONAL      RBC, Urine >20 (*)     Squamous Epithelial Cells, Urine 1-9 (SPARSE)      Bacteria, Urine 1+ (*)     Mucus, Urine FEW     URINE CULTURE   URINALYSIS WITH REFLEX CULTURE AND MICROSCOPIC    Narrative:     The following orders were created for panel order Urinalysis with Reflex Culture and Microscopic.  Procedure                               Abnormality         Status                     ---------                               -----------         ------                     Urinalysis with Reflex C...[[id number]]  Abnormal            Final result               Extra Urine Gray Tube[[id number]]                            In process                   Please view results for these tests on the individual  orders.   EXTRA URINE GRAY TUBE       All other labs were within normal range or not returned as of this dictation.    Imaging  No orders to display        Procedures  Procedures     EMERGENCY DEPARTMENT COURSE/MDM:   Medical Decision Making  Myrna Rodrigues is an 74 y.o. female with history including CAD, type 2 diabetes, GERD, hyperlipidemia, hypertension presenting to the emergency department for right lower quadrant pain.  On examination patient has right lower quadrant with rebound tenderness.  Patient has a history of an appendectomy hysterectomy and cholecystectomy.      Differential includes appendicitis of remanent appendix, ovarian cyst rupture, ovarian mass, pyelonephritis, kidney stone  CT imaging ordered and patient initially given a dose of morphine and Zofran for pain and nausea.    Lab workup reviewed interpreted independently. Renal function at baseline.  No major electrolyte abnormalities.  She does have a slightly elevated white count of 12.1 which is consistent with her urinalysis which shows 1+ bacteria 2+ nitrate and leukocyte Estrace.  Patient given a dose of Rocephin 1 g for her UTI.    CT imaging and ultrasound reviewed.  Ultrasound shows a cyst to the right ovary with thin septations no major changes from previous imaging.  CT also shows evidence of right ovarian cyst with no changes from previous imaging no other mass effect.  Patient does have thickening to her bladder wall worse on the right compared to left consistent with her urinalysis.  Patient most likely suffering from cystitis.    On reevaluation patient was still in severe pain.  Patient given a dose of Toradol and Cardene.  On reevaluation approximately 40 minutes after the medications were received patient is having good improvement of her pain.  Discharged with antibiotics and care instructions.  Recommend follow-up with PCP.  Given return precautions.        ED Course as of 01/10/25 1202   Thu Jan 09, 2025   8334 Patient signed  out to me pending reassessment at 2230.  Patient reported improvement in symptoms.  Discharged home as per the signout plan. [ME]   Fri Jian 10, 2025   1201 RBC: 4.85 [SK]      ED Course User Index  [ME] Cedrick Bhatt DO  [SK] Cinthia TOBAR DO Jose         Diagnoses as of 01/10/25 1202   Cystitis        External records reviewed: recent inpatient, clinic, and prior ED notes  Labs and Diagnostic imaging independently reviewed/interpreted by me.    Patient plan, care, lab results and imaging were all discussed with attending.    ED Medications administered this visit:  Medications - No data to display  New Prescriptions from this visit:    New Prescriptions    No medications on file       (Please note that portions of this note were completed with a voice recognition program.  Efforts were made to edit the dictations but occasionally words are mis-transcribed.)    =================Attending note===============    The patient was seen by the resident/fellow.  I have personally performed a substantive portion of the encounter.  I have seen and examined the patient; agree with the workup, evaluation, MDM,   management and diagnosis.  The care plan has been discussed with the resident; I have reviewed the resident’s note and agree with the documented findings.      This is a 74 y.o. female who presents to ER with right lower quadrant abdominal pain.  Has been having abdominal pain intermittently for couple weeks and then today became more constant.  This became more intense.  States pain is always there, but is been waxing and waning in intensity.  She having diarrhea for months now she not had diarrhea today or yesterday.  She had a history of an ovarian cyst and she is actually due to get an ultrasound of her tomorrow.  There is no fevers.  No nausea or vomiting.  She very had an appendectomy and cholecystectomy and hysterectomy.  She states she does have a issue with chronic urinary incontinence and has a stimulator in  place.    Heart is regular.  Lungs are clear.  Abdomen is soft.  She is tender in the right lower abdomen just above the pelvic rim on the left.  No palpable hernias.  No palpable inguinal hernia.  No CVA tenderness.  She has a period moderate discomfort.    Chemistry unremarkable with normal renal function.  Urine is concerning for urinary tract infection.  There is some mild leukocytosis.  There is some mild anemia.  She is started on antibiotics.            ==========================================       Cinthia Garcia DO  Resident  01/10/25 9929

## 2025-01-10 ENCOUNTER — APPOINTMENT (OUTPATIENT)
Dept: RADIOLOGY | Facility: CLINIC | Age: 75
End: 2025-01-10
Payer: MEDICARE

## 2025-01-10 LAB — HOLD SPECIMEN: NORMAL

## 2025-01-10 NOTE — DISCHARGE INSTRUCTIONS
Seek immediate medical attention if you develop:  worsening abdominal pain, new or worsening nausea, new or worsening vomiting, new or worsening diarrhea, chest pain, shortness of breath, pain with urination, problems urinating, fever, chills, weakness, or any new or worsening symptoms.    Follow up your lab and CT findings with your doctor.  You have been given a copy of your CT report.

## 2025-01-12 ENCOUNTER — APPOINTMENT (OUTPATIENT)
Dept: RADIOLOGY | Facility: HOSPITAL | Age: 75
DRG: 309 | End: 2025-01-12
Payer: MEDICARE

## 2025-01-12 ENCOUNTER — HOSPITAL ENCOUNTER (OUTPATIENT)
Facility: HOSPITAL | Age: 75
Setting detail: OBSERVATION
DRG: 309 | End: 2025-01-12
Attending: EMERGENCY MEDICINE | Admitting: NURSE PRACTITIONER
Payer: MEDICARE

## 2025-01-12 ENCOUNTER — APPOINTMENT (OUTPATIENT)
Dept: CARDIOLOGY | Facility: HOSPITAL | Age: 75
DRG: 309 | End: 2025-01-12
Payer: MEDICARE

## 2025-01-12 VITALS
BODY MASS INDEX: 25.4 KG/M2 | DIASTOLIC BLOOD PRESSURE: 72 MMHG | HEART RATE: 95 BPM | WEIGHT: 138 LBS | SYSTOLIC BLOOD PRESSURE: 155 MMHG | TEMPERATURE: 99 F | RESPIRATION RATE: 18 BRPM | HEIGHT: 62 IN | OXYGEN SATURATION: 97 %

## 2025-01-12 DIAGNOSIS — E03.9 HYPOTHYROIDISM, UNSPECIFIED TYPE: ICD-10-CM

## 2025-01-12 DIAGNOSIS — R00.0 TACHYCARDIA: Primary | ICD-10-CM

## 2025-01-12 DIAGNOSIS — T50.905A MEDICATION INDUCED COAGULOPATHY (MULTI): ICD-10-CM

## 2025-01-12 DIAGNOSIS — R79.89 ELEVATED TROPONIN: ICD-10-CM

## 2025-01-12 DIAGNOSIS — N30.90 CYSTITIS: ICD-10-CM

## 2025-01-12 DIAGNOSIS — D64.9 ANEMIA, UNSPECIFIED TYPE: ICD-10-CM

## 2025-01-12 DIAGNOSIS — R19.7 DIARRHEA IN ADULT PATIENT: ICD-10-CM

## 2025-01-12 DIAGNOSIS — D68.9 MEDICATION INDUCED COAGULOPATHY (MULTI): ICD-10-CM

## 2025-01-12 PROBLEM — I47.10 SVT (SUPRAVENTRICULAR TACHYCARDIA) (CMS-HCC): Status: ACTIVE | Noted: 2025-01-12

## 2025-01-12 LAB
ALBUMIN SERPL BCP-MCNC: 4.4 G/DL (ref 3.4–5)
ALP SERPL-CCNC: 70 U/L (ref 33–136)
ALT SERPL W P-5'-P-CCNC: 8 U/L (ref 7–45)
ANION GAP SERPL CALC-SCNC: 14 MMOL/L (ref 10–20)
APPEARANCE UR: ABNORMAL
AST SERPL W P-5'-P-CCNC: 15 U/L (ref 9–39)
ATRIAL RATE: 81 BPM
BACTERIA UR CULT: ABNORMAL
BACTERIA UR CULT: ABNORMAL
BASOPHILS # BLD AUTO: 0.03 X10*3/UL (ref 0–0.1)
BASOPHILS NFR BLD AUTO: 0.3 %
BILIRUB SERPL-MCNC: 0.3 MG/DL (ref 0–1.2)
BILIRUB UR STRIP.AUTO-MCNC: NEGATIVE MG/DL
BNP SERPL-MCNC: 95 PG/ML (ref 0–99)
BUN SERPL-MCNC: 12 MG/DL (ref 6–23)
CALCIUM SERPL-MCNC: 9.2 MG/DL (ref 8.6–10.3)
CARDIAC TROPONIN I PNL SERPL HS: 16 NG/L (ref 0–13)
CARDIAC TROPONIN I PNL SERPL HS: 21 NG/L (ref 0–13)
CHLORIDE SERPL-SCNC: 100 MMOL/L (ref 98–107)
CO2 SERPL-SCNC: 26 MMOL/L (ref 21–32)
COLOR UR: ABNORMAL
CREAT SERPL-MCNC: 1.03 MG/DL (ref 0.5–1.05)
EGFRCR SERPLBLD CKD-EPI 2021: 56 ML/MIN/1.73M*2
EOSINOPHIL # BLD AUTO: 0.25 X10*3/UL (ref 0–0.4)
EOSINOPHIL NFR BLD AUTO: 2.7 %
ERYTHROCYTE [DISTWIDTH] IN BLOOD BY AUTOMATED COUNT: 18.6 % (ref 11.5–14.5)
GLUCOSE SERPL-MCNC: 134 MG/DL (ref 74–99)
GLUCOSE UR STRIP.AUTO-MCNC: NORMAL MG/DL
HCT VFR BLD AUTO: 35.1 % (ref 36–46)
HGB BLD-MCNC: 10.5 G/DL (ref 12–16)
IMM GRANULOCYTES # BLD AUTO: 0.03 X10*3/UL (ref 0–0.5)
IMM GRANULOCYTES NFR BLD AUTO: 0.3 % (ref 0–0.9)
INR PPP: 1.5 (ref 0.9–1.1)
KETONES UR STRIP.AUTO-MCNC: NEGATIVE MG/DL
LACTATE SERPL-SCNC: 1.7 MMOL/L (ref 0.4–2)
LACTATE SERPL-SCNC: 2.8 MMOL/L (ref 0.4–2)
LEUKOCYTE ESTERASE UR QL STRIP.AUTO: NEGATIVE
LYMPHOCYTES # BLD AUTO: 2.64 X10*3/UL (ref 0.8–3)
LYMPHOCYTES NFR BLD AUTO: 28.9 %
MAGNESIUM SERPL-MCNC: 1.96 MG/DL (ref 1.6–2.4)
MCH RBC QN AUTO: 21.6 PG (ref 26–34)
MCHC RBC AUTO-ENTMCNC: 29.9 G/DL (ref 32–36)
MCV RBC AUTO: 72 FL (ref 80–100)
MONOCYTES # BLD AUTO: 0.67 X10*3/UL (ref 0.05–0.8)
MONOCYTES NFR BLD AUTO: 7.3 %
NEUTROPHILS # BLD AUTO: 5.53 X10*3/UL (ref 1.6–5.5)
NEUTROPHILS NFR BLD AUTO: 60.5 %
NITRITE UR QL STRIP.AUTO: ABNORMAL
NRBC BLD-RTO: 0 /100 WBCS (ref 0–0)
PH UR STRIP.AUTO: 6 [PH]
PLATELET # BLD AUTO: 280 X10*3/UL (ref 150–450)
POTASSIUM SERPL-SCNC: 3.6 MMOL/L (ref 3.5–5.3)
PROT SERPL-MCNC: 7.7 G/DL (ref 6.4–8.2)
PROT UR STRIP.AUTO-MCNC: NEGATIVE MG/DL
PROTHROMBIN TIME: 17.3 SECONDS (ref 9.8–12.8)
Q ONSET: 215 MS
QRS COUNT: 21 BEATS
QRS DURATION: 82 MS
QT INTERVAL: 342 MS
QTC CALCULATION(BAZETT): 506 MS
QTC FREDERICIA: 445 MS
R AXIS: -84 DEGREES
RBC # BLD AUTO: 4.85 X10*6/UL (ref 4–5.2)
RBC # UR STRIP.AUTO: ABNORMAL /UL
RBC #/AREA URNS AUTO: NORMAL /HPF
SODIUM SERPL-SCNC: 136 MMOL/L (ref 136–145)
SP GR UR STRIP.AUTO: 1.01
SQUAMOUS #/AREA URNS AUTO: NORMAL /HPF
T AXIS: 66 DEGREES
T OFFSET: 386 MS
T4 FREE SERPL-MCNC: 0.96 NG/DL (ref 0.61–1.12)
TSH SERPL-ACNC: 18.84 MIU/L (ref 0.44–3.98)
UROBILINOGEN UR STRIP.AUTO-MCNC: NORMAL MG/DL
VENTRICULAR RATE: 132 BPM
WBC # BLD AUTO: 9.2 X10*3/UL (ref 4.4–11.3)
WBC #/AREA URNS AUTO: NORMAL /HPF

## 2025-01-12 PROCEDURE — 87075 CULTR BACTERIA EXCEPT BLOOD: CPT | Mod: 59 | Performed by: EMERGENCY MEDICINE

## 2025-01-12 PROCEDURE — G0378 HOSPITAL OBSERVATION PER HR: HCPCS

## 2025-01-12 PROCEDURE — 81001 URINALYSIS AUTO W/SCOPE: CPT | Performed by: EMERGENCY MEDICINE

## 2025-01-12 PROCEDURE — 36415 COLL VENOUS BLD VENIPUNCTURE: CPT | Performed by: EMERGENCY MEDICINE

## 2025-01-12 PROCEDURE — 83605 ASSAY OF LACTIC ACID: CPT | Performed by: EMERGENCY MEDICINE

## 2025-01-12 PROCEDURE — 93005 ELECTROCARDIOGRAM TRACING: CPT

## 2025-01-12 PROCEDURE — 71045 X-RAY EXAM CHEST 1 VIEW: CPT | Performed by: RADIOLOGY

## 2025-01-12 PROCEDURE — 2500000001 HC RX 250 WO HCPCS SELF ADMINISTERED DRUGS (ALT 637 FOR MEDICARE OP): Performed by: EMERGENCY MEDICINE

## 2025-01-12 PROCEDURE — 87040 BLOOD CULTURE FOR BACTERIA: CPT | Mod: ELYLAB | Performed by: EMERGENCY MEDICINE

## 2025-01-12 PROCEDURE — 84484 ASSAY OF TROPONIN QUANT: CPT | Performed by: EMERGENCY MEDICINE

## 2025-01-12 PROCEDURE — 85025 COMPLETE CBC W/AUTO DIFF WBC: CPT | Performed by: EMERGENCY MEDICINE

## 2025-01-12 PROCEDURE — 96365 THER/PROPH/DIAG IV INF INIT: CPT

## 2025-01-12 PROCEDURE — 99222 1ST HOSP IP/OBS MODERATE 55: CPT | Performed by: NURSE PRACTITIONER

## 2025-01-12 PROCEDURE — 83880 ASSAY OF NATRIURETIC PEPTIDE: CPT | Performed by: EMERGENCY MEDICINE

## 2025-01-12 PROCEDURE — 96366 THER/PROPH/DIAG IV INF ADDON: CPT

## 2025-01-12 PROCEDURE — 96368 THER/DIAG CONCURRENT INF: CPT

## 2025-01-12 PROCEDURE — 80053 COMPREHEN METABOLIC PANEL: CPT | Performed by: EMERGENCY MEDICINE

## 2025-01-12 PROCEDURE — 84443 ASSAY THYROID STIM HORMONE: CPT | Performed by: EMERGENCY MEDICINE

## 2025-01-12 PROCEDURE — 87086 URINE CULTURE/COLONY COUNT: CPT | Mod: ELYLAB | Performed by: EMERGENCY MEDICINE

## 2025-01-12 PROCEDURE — 71045 X-RAY EXAM CHEST 1 VIEW: CPT

## 2025-01-12 PROCEDURE — 2500000004 HC RX 250 GENERAL PHARMACY W/ HCPCS (ALT 636 FOR OP/ED): Performed by: EMERGENCY MEDICINE

## 2025-01-12 PROCEDURE — 85610 PROTHROMBIN TIME: CPT | Performed by: EMERGENCY MEDICINE

## 2025-01-12 PROCEDURE — 84439 ASSAY OF FREE THYROXINE: CPT | Performed by: EMERGENCY MEDICINE

## 2025-01-12 PROCEDURE — 99285 EMERGENCY DEPT VISIT HI MDM: CPT | Performed by: EMERGENCY MEDICINE

## 2025-01-12 PROCEDURE — 83735 ASSAY OF MAGNESIUM: CPT | Performed by: EMERGENCY MEDICINE

## 2025-01-12 RX ORDER — POLYETHYLENE GLYCOL 3350 17 G/17G
17 POWDER, FOR SOLUTION ORAL DAILY PRN
OUTPATIENT
Start: 2025-01-12

## 2025-01-12 RX ORDER — POTASSIUM CHLORIDE 20 MEQ/1
40 TABLET, EXTENDED RELEASE ORAL ONCE
Status: ACTIVE | OUTPATIENT
Start: 2025-01-12

## 2025-01-12 RX ORDER — ACETAMINOPHEN 325 MG/1
650 TABLET ORAL EVERY 4 HOURS PRN
OUTPATIENT
Start: 2025-01-12

## 2025-01-12 RX ORDER — ACETAMINOPHEN 160 MG/5ML
650 SOLUTION ORAL EVERY 4 HOURS PRN
OUTPATIENT
Start: 2025-01-12

## 2025-01-12 RX ORDER — ACETAMINOPHEN 500 MG
10 TABLET ORAL NIGHTLY PRN
OUTPATIENT
Start: 2025-01-12

## 2025-01-12 RX ORDER — CEFTRIAXONE 1 G/50ML
1 INJECTION, SOLUTION INTRAVENOUS ONCE
Status: COMPLETED | OUTPATIENT
Start: 2025-01-12 | End: 2025-01-12

## 2025-01-12 RX ORDER — PANTOPRAZOLE SODIUM 40 MG/1
40 TABLET, DELAYED RELEASE ORAL
OUTPATIENT
Start: 2025-01-13

## 2025-01-12 RX ORDER — METOPROLOL TARTRATE 25 MG/1
50 TABLET, FILM COATED ORAL 2 TIMES DAILY
OUTPATIENT
Start: 2025-01-13

## 2025-01-12 RX ORDER — MAGNESIUM SULFATE HEPTAHYDRATE 40 MG/ML
2 INJECTION, SOLUTION INTRAVENOUS ONCE
Status: COMPLETED | OUTPATIENT
Start: 2025-01-12 | End: 2025-01-12

## 2025-01-12 RX ORDER — NITROFURANTOIN 25; 75 MG/1; MG/1
100 CAPSULE ORAL 2 TIMES DAILY
Status: ACTIVE | OUTPATIENT
Start: 2025-01-12 | End: 2025-01-17

## 2025-01-12 RX ORDER — METOPROLOL TARTRATE 25 MG/1
50 TABLET, FILM COATED ORAL ONCE
Status: COMPLETED | OUTPATIENT
Start: 2025-01-12 | End: 2025-01-12

## 2025-01-12 RX ORDER — TRAZODONE HYDROCHLORIDE 50 MG/1
100 TABLET ORAL NIGHTLY PRN
OUTPATIENT
Start: 2025-01-12

## 2025-01-12 RX ORDER — LEVOTHYROXINE SODIUM 100 UG/1
100 TABLET ORAL DAILY
OUTPATIENT
Start: 2025-01-13

## 2025-01-12 RX ORDER — ACETAMINOPHEN 325 MG/1
975 TABLET ORAL ONCE
Status: COMPLETED | OUTPATIENT
Start: 2025-01-12 | End: 2025-01-12

## 2025-01-12 RX ORDER — ACETAMINOPHEN 650 MG/1
650 SUPPOSITORY RECTAL EVERY 4 HOURS PRN
OUTPATIENT
Start: 2025-01-12

## 2025-01-12 RX ADMIN — ACETAMINOPHEN 975 MG: 325 TABLET ORAL at 21:18

## 2025-01-12 RX ADMIN — SODIUM CHLORIDE 500 ML: 9 INJECTION, SOLUTION INTRAVENOUS at 20:09

## 2025-01-12 RX ADMIN — METOPROLOL TARTRATE 50 MG: 25 TABLET, FILM COATED ORAL at 21:17

## 2025-01-12 RX ADMIN — MAGNESIUM SULFATE HEPTAHYDRATE 2 G: 40 INJECTION, SOLUTION INTRAVENOUS at 20:09

## 2025-01-12 RX ADMIN — CEFTRIAXONE SODIUM 1 G: 1 INJECTION, SOLUTION INTRAVENOUS at 21:47

## 2025-01-12 RX ADMIN — SODIUM CHLORIDE 1000 ML: 9 INJECTION, SOLUTION INTRAVENOUS at 21:18

## 2025-01-12 ASSESSMENT — LIFESTYLE VARIABLES
HAVE YOU EVER FELT YOU SHOULD CUT DOWN ON YOUR DRINKING: NO
EVER HAD A DRINK FIRST THING IN THE MORNING TO STEADY YOUR NERVES TO GET RID OF A HANGOVER: NO
EVER FELT BAD OR GUILTY ABOUT YOUR DRINKING: NO
HAVE PEOPLE ANNOYED YOU BY CRITICIZING YOUR DRINKING: NO
TOTAL SCORE: 0

## 2025-01-12 ASSESSMENT — ENCOUNTER SYMPTOMS
VOMITING: 0
SHORTNESS OF BREATH: 0
CONSTIPATION: 0
PALPITATIONS: 1
FEVER: 0
ABDOMINAL PAIN: 0
CHILLS: 0
DIARRHEA: 0
DYSURIA: 0
HEMATURIA: 0
NAUSEA: 0
FLANK PAIN: 0
FREQUENCY: 0

## 2025-01-12 ASSESSMENT — PAIN - FUNCTIONAL ASSESSMENT
PAIN_FUNCTIONAL_ASSESSMENT: 0-10
PAIN_FUNCTIONAL_ASSESSMENT: 0-10

## 2025-01-12 ASSESSMENT — PAIN SCALES - GENERAL
PAINLEVEL_OUTOF10: 0 - NO PAIN
PAINLEVEL_OUTOF10: 0 - NO PAIN

## 2025-01-13 ENCOUNTER — APPOINTMENT (OUTPATIENT)
Dept: CARDIOLOGY | Facility: HOSPITAL | Age: 75
DRG: 309 | End: 2025-01-13
Payer: MEDICARE

## 2025-01-13 LAB
ANION GAP SERPL CALC-SCNC: 9 MMOL/L (ref 10–20)
ATRIAL RATE: 67 BPM
BACTERIA UR CULT: ABNORMAL
BACTERIA UR CULT: ABNORMAL
BUN SERPL-MCNC: 9 MG/DL (ref 6–23)
CALCIUM SERPL-MCNC: 8.1 MG/DL (ref 8.6–10.3)
CARDIAC TROPONIN I PNL SERPL HS: 43 NG/L (ref 0–13)
CHLORIDE SERPL-SCNC: 106 MMOL/L (ref 98–107)
CO2 SERPL-SCNC: 28 MMOL/L (ref 21–32)
CREAT SERPL-MCNC: 0.9 MG/DL (ref 0.5–1.05)
EGFRCR SERPLBLD CKD-EPI 2021: 66 ML/MIN/1.73M*2
ERYTHROCYTE [DISTWIDTH] IN BLOOD BY AUTOMATED COUNT: 18.6 % (ref 11.5–14.5)
GLUCOSE BLD MANUAL STRIP-MCNC: 103 MG/DL (ref 74–99)
GLUCOSE BLD MANUAL STRIP-MCNC: 118 MG/DL (ref 74–99)
GLUCOSE SERPL-MCNC: 95 MG/DL (ref 74–99)
HCT VFR BLD AUTO: 29.5 % (ref 36–46)
HGB BLD-MCNC: 8.7 G/DL (ref 12–16)
HOLD SPECIMEN: NORMAL
MCH RBC QN AUTO: 21.6 PG (ref 26–34)
MCHC RBC AUTO-ENTMCNC: 29.5 G/DL (ref 32–36)
MCV RBC AUTO: 73 FL (ref 80–100)
NRBC BLD-RTO: 0 /100 WBCS (ref 0–0)
P AXIS: 84 DEGREES
P OFFSET: 173 MS
P ONSET: 110 MS
PLATELET # BLD AUTO: 260 X10*3/UL (ref 150–450)
POTASSIUM SERPL-SCNC: 3.9 MMOL/L (ref 3.5–5.3)
PR INTERVAL: 204 MS
Q ONSET: 212 MS
QRS COUNT: 11 BEATS
QRS DURATION: 86 MS
QT INTERVAL: 446 MS
QTC CALCULATION(BAZETT): 471 MS
QTC FREDERICIA: 463 MS
R AXIS: -46 DEGREES
RBC # BLD AUTO: 4.03 X10*6/UL (ref 4–5.2)
SODIUM SERPL-SCNC: 139 MMOL/L (ref 136–145)
T AXIS: 44 DEGREES
T OFFSET: 435 MS
VENTRICULAR RATE: 67 BPM
WBC # BLD AUTO: 8.3 X10*3/UL (ref 4.4–11.3)

## 2025-01-13 PROCEDURE — 2500000004 HC RX 250 GENERAL PHARMACY W/ HCPCS (ALT 636 FOR OP/ED)

## 2025-01-13 PROCEDURE — G0378 HOSPITAL OBSERVATION PER HR: HCPCS

## 2025-01-13 PROCEDURE — 2500000001 HC RX 250 WO HCPCS SELF ADMINISTERED DRUGS (ALT 637 FOR MEDICARE OP): Performed by: STUDENT IN AN ORGANIZED HEALTH CARE EDUCATION/TRAINING PROGRAM

## 2025-01-13 PROCEDURE — 80048 BASIC METABOLIC PNL TOTAL CA: CPT | Performed by: NURSE PRACTITIONER

## 2025-01-13 PROCEDURE — 2500000001 HC RX 250 WO HCPCS SELF ADMINISTERED DRUGS (ALT 637 FOR MEDICARE OP): Performed by: NURSE PRACTITIONER

## 2025-01-13 PROCEDURE — 93005 ELECTROCARDIOGRAM TRACING: CPT

## 2025-01-13 PROCEDURE — 96375 TX/PRO/DX INJ NEW DRUG ADDON: CPT

## 2025-01-13 PROCEDURE — 2500000001 HC RX 250 WO HCPCS SELF ADMINISTERED DRUGS (ALT 637 FOR MEDICARE OP)

## 2025-01-13 PROCEDURE — 2500000002 HC RX 250 W HCPCS SELF ADMINISTERED DRUGS (ALT 637 FOR MEDICARE OP, ALT 636 FOR OP/ED)

## 2025-01-13 PROCEDURE — 2500000002 HC RX 250 W HCPCS SELF ADMINISTERED DRUGS (ALT 637 FOR MEDICARE OP, ALT 636 FOR OP/ED): Performed by: NURSE PRACTITIONER

## 2025-01-13 PROCEDURE — 36415 COLL VENOUS BLD VENIPUNCTURE: CPT | Performed by: NURSE PRACTITIONER

## 2025-01-13 PROCEDURE — 99232 SBSQ HOSP IP/OBS MODERATE 35: CPT

## 2025-01-13 PROCEDURE — 82947 ASSAY GLUCOSE BLOOD QUANT: CPT

## 2025-01-13 PROCEDURE — 85027 COMPLETE CBC AUTOMATED: CPT | Performed by: NURSE PRACTITIONER

## 2025-01-13 PROCEDURE — 84484 ASSAY OF TROPONIN QUANT: CPT

## 2025-01-13 RX ORDER — OXYCODONE HYDROCHLORIDE 5 MG/1
5 TABLET ORAL EVERY 6 HOURS PRN
Status: DISCONTINUED | OUTPATIENT
Start: 2025-01-13 | End: 2025-01-14 | Stop reason: HOSPADM

## 2025-01-13 RX ORDER — DIPHENOXYLATE HYDROCHLORIDE AND ATROPINE SULFATE 2.5; .025 MG/1; MG/1
1 TABLET ORAL 4 TIMES DAILY PRN
Status: DISCONTINUED | OUTPATIENT
Start: 2025-01-13 | End: 2025-01-14 | Stop reason: HOSPADM

## 2025-01-13 RX ORDER — HYDRALAZINE HYDROCHLORIDE 10 MG/1
10 TABLET, FILM COATED ORAL 3 TIMES DAILY
Status: DISCONTINUED | OUTPATIENT
Start: 2025-01-13 | End: 2025-01-14 | Stop reason: HOSPADM

## 2025-01-13 RX ORDER — MONTELUKAST SODIUM 10 MG/1
10 TABLET ORAL NIGHTLY
Status: DISCONTINUED | OUTPATIENT
Start: 2025-01-13 | End: 2025-01-14 | Stop reason: HOSPADM

## 2025-01-13 RX ORDER — KETOROLAC TROMETHAMINE 30 MG/ML
15 INJECTION, SOLUTION INTRAMUSCULAR; INTRAVENOUS EVERY 6 HOURS PRN
Status: DISCONTINUED | OUTPATIENT
Start: 2025-01-13 | End: 2025-01-14 | Stop reason: HOSPADM

## 2025-01-13 RX ORDER — DICYCLOMINE HYDROCHLORIDE 10 MG/1
20 CAPSULE ORAL 4 TIMES DAILY PRN
Status: DISCONTINUED | OUTPATIENT
Start: 2025-01-13 | End: 2025-01-14 | Stop reason: HOSPADM

## 2025-01-13 RX ORDER — ALBUTEROL SULFATE 90 UG/1
2 INHALANT RESPIRATORY (INHALATION) EVERY 4 HOURS PRN
Status: DISCONTINUED | OUTPATIENT
Start: 2025-01-13 | End: 2025-01-14 | Stop reason: HOSPADM

## 2025-01-13 RX ORDER — ALUMINUM HYDROXIDE, MAGNESIUM HYDROXIDE, AND SIMETHICONE 1200; 120; 1200 MG/30ML; MG/30ML; MG/30ML
10 SUSPENSION ORAL 4 TIMES DAILY PRN
Status: DISCONTINUED | OUTPATIENT
Start: 2025-01-13 | End: 2025-01-14 | Stop reason: HOSPADM

## 2025-01-13 RX ORDER — LOSARTAN POTASSIUM 50 MG/1
50 TABLET ORAL DAILY
Status: DISCONTINUED | OUTPATIENT
Start: 2025-01-13 | End: 2025-01-14 | Stop reason: HOSPADM

## 2025-01-13 RX ORDER — DOCUSATE SODIUM 100 MG/1
100 CAPSULE, LIQUID FILLED ORAL 2 TIMES DAILY
Status: DISCONTINUED | OUTPATIENT
Start: 2025-01-13 | End: 2025-01-13

## 2025-01-13 RX ORDER — NAPROXEN SODIUM 220 MG/1
81 TABLET, FILM COATED ORAL DAILY
Status: DISCONTINUED | OUTPATIENT
Start: 2025-01-13 | End: 2025-01-14 | Stop reason: HOSPADM

## 2025-01-13 RX ORDER — ATORVASTATIN CALCIUM 10 MG/1
10 TABLET, FILM COATED ORAL DAILY
Status: DISCONTINUED | OUTPATIENT
Start: 2025-01-13 | End: 2025-01-14 | Stop reason: HOSPADM

## 2025-01-13 RX ADMIN — NITROFURANTOIN (MONOHYDRATE/MACROCRYSTALS) 100 MG: 75; 25 CAPSULE ORAL at 08:20

## 2025-01-13 RX ADMIN — Medication 10 MG: at 20:42

## 2025-01-13 RX ADMIN — ASPIRIN 81 MG: 81 TABLET, CHEWABLE ORAL at 08:20

## 2025-01-13 RX ADMIN — POTASSIUM CHLORIDE 40 MEQ: 1500 TABLET, EXTENDED RELEASE ORAL at 00:53

## 2025-01-13 RX ADMIN — HYDRALAZINE HYDROCHLORIDE 10 MG: 10 TABLET, FILM COATED ORAL at 15:44

## 2025-01-13 RX ADMIN — NITROFURANTOIN (MONOHYDRATE/MACROCRYSTALS) 100 MG: 75; 25 CAPSULE ORAL at 21:50

## 2025-01-13 RX ADMIN — HYDRALAZINE HYDROCHLORIDE 10 MG: 10 TABLET, FILM COATED ORAL at 10:14

## 2025-01-13 RX ADMIN — PANTOPRAZOLE SODIUM 40 MG: 40 TABLET, DELAYED RELEASE ORAL at 15:44

## 2025-01-13 RX ADMIN — KETOROLAC TROMETHAMINE 15 MG: 30 INJECTION, SOLUTION INTRAMUSCULAR at 16:06

## 2025-01-13 RX ADMIN — LOSARTAN POTASSIUM 50 MG: 50 TABLET, FILM COATED ORAL at 10:14

## 2025-01-13 RX ADMIN — PANTOPRAZOLE SODIUM 40 MG: 40 TABLET, DELAYED RELEASE ORAL at 05:55

## 2025-01-13 RX ADMIN — ACETAMINOPHEN 650 MG: 325 TABLET ORAL at 14:18

## 2025-01-13 RX ADMIN — METOPROLOL TARTRATE 50 MG: 50 TABLET, FILM COATED ORAL at 20:42

## 2025-01-13 RX ADMIN — TRAZODONE HYDROCHLORIDE 100 MG: 50 TABLET ORAL at 20:42

## 2025-01-13 RX ADMIN — NITROFURANTOIN (MONOHYDRATE/MACROCRYSTALS) 100 MG: 75; 25 CAPSULE ORAL at 01:09

## 2025-01-13 RX ADMIN — APIXABAN 5 MG: 5 TABLET, FILM COATED ORAL at 08:20

## 2025-01-13 RX ADMIN — APIXABAN 5 MG: 5 TABLET, FILM COATED ORAL at 20:42

## 2025-01-13 RX ADMIN — HYDRALAZINE HYDROCHLORIDE 10 MG: 10 TABLET, FILM COATED ORAL at 20:42

## 2025-01-13 RX ADMIN — TIOTROPIUM BROMIDE INHALATION SPRAY 2 PUFF: 3.12 SPRAY, METERED RESPIRATORY (INHALATION) at 08:19

## 2025-01-13 RX ADMIN — MONTELUKAST SODIUM 10 MG: 10 TABLET, FILM COATED ORAL at 20:42

## 2025-01-13 RX ADMIN — METOPROLOL TARTRATE 50 MG: 50 TABLET, FILM COATED ORAL at 08:20

## 2025-01-13 RX ADMIN — DIPHENOXYLATE HYDROCHLORIDE AND ATROPINE SULFATE 1 TABLET: 2.5; .025 TABLET ORAL at 16:08

## 2025-01-13 RX ADMIN — DICYCLOMINE HYDROCHLORIDE 20 MG: 10 CAPSULE ORAL at 20:42

## 2025-01-13 RX ADMIN — LEVOTHYROXINE SODIUM 100 MCG: 0.1 TABLET ORAL at 05:56

## 2025-01-13 RX ADMIN — APIXABAN 5 MG: 5 TABLET, FILM COATED ORAL at 00:53

## 2025-01-13 RX ADMIN — ATORVASTATIN CALCIUM 10 MG: 10 TABLET ORAL at 08:20

## 2025-01-13 SDOH — ECONOMIC STABILITY: HOUSING INSECURITY: IN THE PAST 12 MONTHS, HOW MANY TIMES HAVE YOU MOVED WHERE YOU WERE LIVING?: 0

## 2025-01-13 SDOH — SOCIAL STABILITY: SOCIAL INSECURITY: DOES ANYONE TRY TO KEEP YOU FROM HAVING/CONTACTING OTHER FRIENDS OR DOING THINGS OUTSIDE YOUR HOME?: NO

## 2025-01-13 SDOH — SOCIAL STABILITY: SOCIAL INSECURITY: WITHIN THE LAST YEAR, HAVE YOU BEEN HUMILIATED OR EMOTIONALLY ABUSED IN OTHER WAYS BY YOUR PARTNER OR EX-PARTNER?: NO

## 2025-01-13 SDOH — SOCIAL STABILITY: SOCIAL INSECURITY: DO YOU FEEL ANYONE HAS EXPLOITED OR TAKEN ADVANTAGE OF YOU FINANCIALLY OR OF YOUR PERSONAL PROPERTY?: NO

## 2025-01-13 SDOH — SOCIAL STABILITY: SOCIAL INSECURITY: ARE THERE ANY APPARENT SIGNS OF INJURIES/BEHAVIORS THAT COULD BE RELATED TO ABUSE/NEGLECT?: NO

## 2025-01-13 SDOH — HEALTH STABILITY: MENTAL HEALTH: HOW OFTEN DO YOU HAVE SIX OR MORE DRINKS ON ONE OCCASION?: NEVER

## 2025-01-13 SDOH — ECONOMIC STABILITY: HOUSING INSECURITY: IN THE LAST 12 MONTHS, WAS THERE A TIME WHEN YOU WERE NOT ABLE TO PAY THE MORTGAGE OR RENT ON TIME?: NO

## 2025-01-13 SDOH — SOCIAL STABILITY: SOCIAL INSECURITY
WITHIN THE LAST YEAR, HAVE YOU BEEN KICKED, HIT, SLAPPED, OR OTHERWISE PHYSICALLY HURT BY YOUR PARTNER OR EX-PARTNER?: NO

## 2025-01-13 SDOH — ECONOMIC STABILITY: FOOD INSECURITY: HOW HARD IS IT FOR YOU TO PAY FOR THE VERY BASICS LIKE FOOD, HOUSING, MEDICAL CARE, AND HEATING?: NOT VERY HARD

## 2025-01-13 SDOH — HEALTH STABILITY: MENTAL HEALTH: HOW OFTEN DO YOU HAVE A DRINK CONTAINING ALCOHOL?: NEVER

## 2025-01-13 SDOH — ECONOMIC STABILITY: INCOME INSECURITY: IN THE PAST 12 MONTHS HAS THE ELECTRIC, GAS, OIL, OR WATER COMPANY THREATENED TO SHUT OFF SERVICES IN YOUR HOME?: NO

## 2025-01-13 SDOH — SOCIAL STABILITY: SOCIAL INSECURITY: WITHIN THE LAST YEAR, HAVE YOU BEEN AFRAID OF YOUR PARTNER OR EX-PARTNER?: NO

## 2025-01-13 SDOH — HEALTH STABILITY: MENTAL HEALTH: HOW MANY DRINKS CONTAINING ALCOHOL DO YOU HAVE ON A TYPICAL DAY WHEN YOU ARE DRINKING?: PATIENT DOES NOT DRINK

## 2025-01-13 SDOH — ECONOMIC STABILITY: FOOD INSECURITY: WITHIN THE PAST 12 MONTHS, YOU WORRIED THAT YOUR FOOD WOULD RUN OUT BEFORE YOU GOT THE MONEY TO BUY MORE.: NEVER TRUE

## 2025-01-13 SDOH — ECONOMIC STABILITY: HOUSING INSECURITY: AT ANY TIME IN THE PAST 12 MONTHS, WERE YOU HOMELESS OR LIVING IN A SHELTER (INCLUDING NOW)?: NO

## 2025-01-13 SDOH — SOCIAL STABILITY: SOCIAL INSECURITY
WITHIN THE LAST YEAR, HAVE YOU BEEN RAPED OR FORCED TO HAVE ANY KIND OF SEXUAL ACTIVITY BY YOUR PARTNER OR EX-PARTNER?: NO

## 2025-01-13 SDOH — SOCIAL STABILITY: SOCIAL INSECURITY: HAVE YOU HAD THOUGHTS OF HARMING ANYONE ELSE?: NO

## 2025-01-13 SDOH — SOCIAL STABILITY: SOCIAL INSECURITY: DO YOU FEEL UNSAFE GOING BACK TO THE PLACE WHERE YOU ARE LIVING?: NO

## 2025-01-13 SDOH — ECONOMIC STABILITY: FOOD INSECURITY: WITHIN THE PAST 12 MONTHS, THE FOOD YOU BOUGHT JUST DIDN'T LAST AND YOU DIDN'T HAVE MONEY TO GET MORE.: NEVER TRUE

## 2025-01-13 SDOH — SOCIAL STABILITY: SOCIAL INSECURITY: WERE YOU ABLE TO COMPLETE ALL THE BEHAVIORAL HEALTH SCREENINGS?: YES

## 2025-01-13 SDOH — ECONOMIC STABILITY: TRANSPORTATION INSECURITY: IN THE PAST 12 MONTHS, HAS LACK OF TRANSPORTATION KEPT YOU FROM MEDICAL APPOINTMENTS OR FROM GETTING MEDICATIONS?: NO

## 2025-01-13 SDOH — SOCIAL STABILITY: SOCIAL INSECURITY: ABUSE: ADULT

## 2025-01-13 SDOH — SOCIAL STABILITY: SOCIAL INSECURITY: HAS ANYONE EVER THREATENED TO HURT YOUR FAMILY OR YOUR PETS?: NO

## 2025-01-13 SDOH — SOCIAL STABILITY: SOCIAL INSECURITY: ARE YOU OR HAVE YOU BEEN THREATENED OR ABUSED PHYSICALLY, EMOTIONALLY, OR SEXUALLY BY ANYONE?: NO

## 2025-01-13 ASSESSMENT — COGNITIVE AND FUNCTIONAL STATUS - GENERAL
MOBILITY SCORE: 24
DAILY ACTIVITIY SCORE: 24
DAILY ACTIVITIY SCORE: 24
MOBILITY SCORE: 24
PATIENT BASELINE BEDBOUND: NO

## 2025-01-13 ASSESSMENT — ACTIVITIES OF DAILY LIVING (ADL)
ADEQUATE_TO_COMPLETE_ADL: YES
DRESSING YOURSELF: INDEPENDENT
TOILETING: INDEPENDENT
WALKS IN HOME: INDEPENDENT
GROOMING: INDEPENDENT
ASSISTIVE_DEVICE: EYEGLASSES;DENTURES LOWER;DENTURES UPPER
LACK_OF_TRANSPORTATION: NO
FEEDING YOURSELF: INDEPENDENT
BATHING: INDEPENDENT
LACK_OF_TRANSPORTATION: NO
HEARING - LEFT EAR: FUNCTIONAL
HEARING - RIGHT EAR: FUNCTIONAL
PATIENT'S MEMORY ADEQUATE TO SAFELY COMPLETE DAILY ACTIVITIES?: YES
JUDGMENT_ADEQUATE_SAFELY_COMPLETE_DAILY_ACTIVITIES: YES

## 2025-01-13 ASSESSMENT — PATIENT HEALTH QUESTIONNAIRE - PHQ9
1. LITTLE INTEREST OR PLEASURE IN DOING THINGS: NOT AT ALL
SUM OF ALL RESPONSES TO PHQ9 QUESTIONS 1 & 2: 0
2. FEELING DOWN, DEPRESSED OR HOPELESS: NOT AT ALL

## 2025-01-13 ASSESSMENT — PAIN SCALES - GENERAL
PAINLEVEL_OUTOF10: 0 - NO PAIN
PAINLEVEL_OUTOF10: 5 - MODERATE PAIN
PAINLEVEL_OUTOF10: 0 - NO PAIN
PAINLEVEL_OUTOF10: 4
PAINLEVEL_OUTOF10: 0 - NO PAIN
PAINLEVEL_OUTOF10: 8

## 2025-01-13 ASSESSMENT — PAIN - FUNCTIONAL ASSESSMENT
PAIN_FUNCTIONAL_ASSESSMENT: 0-10

## 2025-01-13 ASSESSMENT — PAIN DESCRIPTION - ORIENTATION: ORIENTATION: RIGHT

## 2025-01-13 ASSESSMENT — LIFESTYLE VARIABLES
AUDIT-C TOTAL SCORE: 0
SKIP TO QUESTIONS 9-10: 1

## 2025-01-13 ASSESSMENT — PAIN DESCRIPTION - LOCATION: LOCATION: ABDOMEN

## 2025-01-13 NOTE — ED PROVIDER NOTES
HPI   Chief Complaint   Patient presents with    Rapid Heart Rate     Pt states she was sitting when she felt her heart start racing. About 40mins prior to arrival. Pt denies pain or shortness of breath.          History provided by:  Patient and relative    Chief Complaint   Patient presents with    Rapid Heart Rate     Pt states she was sitting when she felt her heart start racing. About 40mins prior to arrival. Pt denies pain or shortness of breath.        History of Present Illness:  Myrna Rodrigues is a 74 y.o. female presents with heart racing that started approximately 30 minutes prior to arrival.  The patient is currently being treated for a urinary tract infection over the past few days.  She states today while she was at home she started feeling like her heart was racing.  She checked her heart rate on the heart monitor she has and it said she was in the 120s.  She denies chest pain or shortness of breath.  No fever, chills or cough.  No abdominal pain.  No back or flank pain.  No leg swelling.  Nothing seems to make her symptoms worse or better.  No treatment prior to arrival.      PMFSH:   As per HPI, otherwise nurses notes reviewed in EMR.    Past Medical History:   Past Medical History:   Diagnosis Date    Anxiety and depression     Arrhythmia     Asthma     Cancer (Multi)     Coronary artery disease     Diabetes mellitus (Multi)     Disease of thyroid gland     GERD (gastroesophageal reflux disease)     Heart disease     Hyperlipidemia     Hypertension     Irregular heart beat     Occipital neuralgia 06/10/2022    Occipital neuralgia of right side    Personal history of malignant neoplasm, unspecified 10/04/2021    History of malignant neoplasm    Personal history of other diseases of the circulatory system     History of hypertension    Personal history of other diseases of the circulatory system 10/04/2021    History of essential hypertension    Personal history of other diseases of the circulatory  system     History of coronary artery disease    Personal history of other diseases of the digestive system 03/11/2020    History of chronic constipation    Personal history of other diseases of the musculoskeletal system and connective tissue     History of arthritis    Personal history of other diseases of the nervous system and sense organs 10/04/2021    History of eye problem    Personal history of other diseases of the respiratory system     History of asthma    Personal history of other specified conditions 10/04/2021    History of chest pain    PONV (postoperative nausea and vomiting)     Stool incontinence     Thyroid cancer (Multi)     Urinary incontinence       Past Surgical History:   Past Surgical History:   Procedure Laterality Date    APPENDECTOMY  08/13/2015    Appendectomy    BREAST LUMPECTOMY  08/13/2015    Breast Surgery Lumpectomy    CARPAL TUNNEL RELEASE  08/13/2015    Neuroplasty Decompression Median Nerve At Carpal Tunnel    GALLBLADDER SURGERY  08/13/2015    Gallbladder Surgery    HYSTERECTOMY  08/13/2015    Hysterectomy    KNEE ARTHROSCOPY W/ MENISCAL REPAIR  08/13/2015    Knee Arthroscopy With Medial Meniscus Repair    KNEE SURGERY  08/13/2015    Knee Surgery    MR HEAD ANGIO WO IV CONTRAST  09/05/2021    MR HEAD ANGIO WO IV CONTRAST 9/5/2021 [facility]    OTHER SURGICAL HISTORY  08/13/2015    Shoulder Surgery Left    OTHER SURGICAL HISTORY  08/13/2015    Repair Of Bladder Reconstruction    OTHER SURGICAL HISTORY  09/20/2021    Breast surgery    OTHER SURGICAL HISTORY  09/20/2021    Bladder surgery    OTHER SURGICAL HISTORY  09/20/2021    Complete colonoscopy    OTHER SURGICAL HISTORY  09/20/2021    Shoulder surgery    OTHER SURGICAL HISTORY  09/20/2021    Foot surgery    OTHER SURGICAL HISTORY  09/20/2021    Percutaneous transluminal coronary angioplasty    OTHER SURGICAL HISTORY  10/11/2021    Thyroidectomy total    THYROIDECTOMY      TOTAL KNEE ARTHROPLASTY  08/13/2015    Knee  Replacement      Family History:   Family History   Problem Relation Name Age of Onset    Lung cancer Mother      Lymphoma Mother      Bone cancer Mother      Heart attack Father      Liver cancer Brother      Lung cancer Brother        Social History:    Social History     Tobacco Use    Smoking status: Never    Smokeless tobacco: Never   Vaping Use    Vaping status: Never Used   Substance Use Topics    Alcohol use: Not Currently    Drug use: Never     Allergies: No Known Allergies  Current Outpatient Medications   Medication Instructions    acetaminophen (TYLENOL) 650 mg, Every 6 hours PRN    acetaminophen (TYLENOL) 1,000 mg, oral, Every 6 hours    atorvastatin (LIPITOR) 10 mg, oral, Daily    calcium polycarbophiL (FIBERCON) 1,250 mg, Daily    cephalexin (KEFLEX) 500 mg, oral, 2 times daily    cholecalciferol (VITAMIN D-3) 1,000 Units, Daily    clobetasol (Temovate) 0.05 % ointment Topical, 2 times daily    diphenoxylate-atropine (Lomotil) 2.5-0.025 mg tablet 1 tablet, oral, 4 times daily PRN    Eliquis 5 mg, oral, 2 times daily    estradiol (Estrace) 0.01 % (0.1 mg/gram) vaginal cream Apply pea size amount 0.5 gram to vaginal opening with finger daily for 2 weeks, then 2-3 times per week.    folic acid (FOLVITE) 0.8 mg, Daily    levothyroxine (SYNTHROID, LEVOXYL) 100 mcg, 4 times weekly    levothyroxine (SYNTHROID, LEVOXYL) 50 mcg, 3 times weekly    metFORMIN XR (Glucophage-XR) 500 mg 24 hr tablet Take 1 tablet (500 mg) by mouth 2 times a day.    metoprolol tartrate (LOPRESSOR) 50 mg, oral, 2 times daily    montelukast (SINGULAIR) 10 mg, oral, Nightly    naproxen (EC NAPROSYN) 500 mg, oral, 2 times daily (morning and late afternoon), Do not crush, chew, or split.    nitroglycerin (NITROSTAT) 0.4 mg, sublingual, Every 5 min PRN    omeprazole (PriLOSEC) 40 mg DR capsule TAKE 1 CAPSULE BY MOUTH TWICE  DAILY    potassium gluconate 595 mg (99 mg) tablet 1 tablet, Daily    tiotropium (Spiriva Respimat) 2.5  mcg/actuation inhaler 2 puffs, Daily    traZODone (Desyrel) 50 mg tablet Take 1-3 tablets ( mg) by mouth as needed at bedtime for sleep.    Ventolin HFA 90 mcg/actuation inhaler 2 puffs, inhalation, Every 4 hours PRN              Patient History   Past Medical History:   Diagnosis Date    Anxiety and depression     Arrhythmia     Asthma     Cancer (Multi)     Coronary artery disease     Diabetes mellitus (Multi)     Disease of thyroid gland     GERD (gastroesophageal reflux disease)     Heart disease     Hyperlipidemia     Hypertension     Irregular heart beat     Occipital neuralgia 06/10/2022    Occipital neuralgia of right side    Personal history of malignant neoplasm, unspecified 10/04/2021    History of malignant neoplasm    Personal history of other diseases of the circulatory system     History of hypertension    Personal history of other diseases of the circulatory system 10/04/2021    History of essential hypertension    Personal history of other diseases of the circulatory system     History of coronary artery disease    Personal history of other diseases of the digestive system 03/11/2020    History of chronic constipation    Personal history of other diseases of the musculoskeletal system and connective tissue     History of arthritis    Personal history of other diseases of the nervous system and sense organs 10/04/2021    History of eye problem    Personal history of other diseases of the respiratory system     History of asthma    Personal history of other specified conditions 10/04/2021    History of chest pain    PONV (postoperative nausea and vomiting)     Stool incontinence     Thyroid cancer (Multi)     Urinary incontinence      Past Surgical History:   Procedure Laterality Date    APPENDECTOMY  08/13/2015    Appendectomy    BREAST LUMPECTOMY  08/13/2015    Breast Surgery Lumpectomy    CARPAL TUNNEL RELEASE  08/13/2015    Neuroplasty Decompression Median Nerve At Carpal Tunnel     GALLBLADDER SURGERY  08/13/2015    Gallbladder Surgery    HYSTERECTOMY  08/13/2015    Hysterectomy    KNEE ARTHROSCOPY W/ MENISCAL REPAIR  08/13/2015    Knee Arthroscopy With Medial Meniscus Repair    KNEE SURGERY  08/13/2015    Knee Surgery    MR HEAD ANGIO WO IV CONTRAST  09/05/2021    MR HEAD ANGIO WO IV CONTRAST 9/5/2021 [facility]    OTHER SURGICAL HISTORY  08/13/2015    Shoulder Surgery Left    OTHER SURGICAL HISTORY  08/13/2015    Repair Of Bladder Reconstruction    OTHER SURGICAL HISTORY  09/20/2021    Breast surgery    OTHER SURGICAL HISTORY  09/20/2021    Bladder surgery    OTHER SURGICAL HISTORY  09/20/2021    Complete colonoscopy    OTHER SURGICAL HISTORY  09/20/2021    Shoulder surgery    OTHER SURGICAL HISTORY  09/20/2021    Foot surgery    OTHER SURGICAL HISTORY  09/20/2021    Percutaneous transluminal coronary angioplasty    OTHER SURGICAL HISTORY  10/11/2021    Thyroidectomy total    THYROIDECTOMY      TOTAL KNEE ARTHROPLASTY  08/13/2015    Knee Replacement     Family History   Problem Relation Name Age of Onset    Lung cancer Mother      Lymphoma Mother      Bone cancer Mother      Heart attack Father      Liver cancer Brother      Lung cancer Brother       Social History     Tobacco Use    Smoking status: Never    Smokeless tobacco: Never   Vaping Use    Vaping status: Never Used   Substance Use Topics    Alcohol use: Not Currently    Drug use: Never       Physical Exam   ED Triage Vitals [01/12/25 1945]   Temperature Heart Rate Respirations BP   37.2 °C (99 °F) (!) 129 18 177/90      Pulse Ox Temp Source Heart Rate Source Patient Position   97 % Temporal Monitor Sitting      BP Location FiO2 (%)     Right arm --       Physical Exam  Physical Exam:    ED Triage Vitals [01/12/25 1945]   Temperature Heart Rate Respirations BP   37.2 °C (99 °F) (!) 129 18 177/90      Pulse Ox Temp Source Heart Rate Source Patient Position   97 % Temporal Monitor Sitting      BP Location FiO2 (%)     Right  "arm --         Patient Vitals for the past 24 hrs:   BP Temp Temp src Pulse Resp SpO2 Height Weight   01/12/25 1945 177/90 37.2 °C (99 °F) Temporal (!) 129 18 97 % 1.575 m (5' 2\") 62.6 kg (138 lb)       Constitutional: Vital signs per nursing notes.  Well developed, well nourished.  No acute distress.    Psychiatric: alert and oriented to person, place, and time; no abnormalities of mood or affect; memory intact  Eyes: PERRL; conjunctivae and lids normal; EOMI  ENT: otoscopic exam of external canal and TM´s normal; nasal mucosa, turbinates, and septum normal; mouth, tongue, and pharynx normal; pharynx without edema, exudate, or injection  Respiratory: normal respiratory effort and excursion; no rales, rhonchi, or wheezes; equal air entry  Cardiovascular: Tachycardic, regular rate and rhythm; no murmurs, rubs or gallops; symmetric pulses; no edema; normal capillary refill; distal pulses present  Neurological: normal speech; CN II-XII grossly intact; normal motor and sensory function; no nystagmus; no pronator drift; normal finger to nose and heel to shin tests  GI: no masses, tenderness, rebound or guarding; no palpable, pulsatile mass; no organomegaly; no hernia; normal bowel sounds; (-) Lan´s sign; (-) McBurney´s sign; (-) CVA tenderness  Lymphatic: no adenopathy of neck  Musculoskeletal: normal gait and station; normal digits and nails; no gross tendon or ligament injury; normal to palpation; normal strength/tone; neurovascular status intact; (-) Leola´s sign  Skin: normal to inspection; normal to palpation; no rash  GCS: 15      ED Course & MDM   Diagnoses as of 01/12/25 2106   Tachycardia   Cystitis   Elevated troponin   Hypothyroidism, unspecified type   Medication induced coagulopathy (Multi)   Anemia, unspecified type                 No data recorded     Belinda Coma Scale Score: 15 (01/12/25 1946 : Hadley Mayfield RN)                           Medical Decision Making  Medical Decision Making:    EKG: " My interpretation of EKG is sinus tachycardia 132 bpm with nonspecific ST-T changes and prolonged corrected QT interval of 506 ms    Labs:   Labs Reviewed   CBC WITH AUTO DIFFERENTIAL - Abnormal       Result Value    WBC 9.2      nRBC 0.0      RBC 4.85      Hemoglobin 10.5 (*)     Hematocrit 35.1 (*)     MCV 72 (*)     MCH 21.6 (*)     MCHC 29.9 (*)     RDW 18.6 (*)     Platelets 280      Neutrophils % 60.5      Immature Granulocytes %, Automated 0.3      Lymphocytes % 28.9      Monocytes % 7.3      Eosinophils % 2.7      Basophils % 0.3      Neutrophils Absolute 5.53 (*)     Immature Granulocytes Absolute, Automated 0.03      Lymphocytes Absolute 2.64      Monocytes Absolute 0.67      Eosinophils Absolute 0.25      Basophils Absolute 0.03     COMPREHENSIVE METABOLIC PANEL - Abnormal    Glucose 134 (*)     Sodium 136      Potassium 3.6      Chloride 100      Bicarbonate 26      Anion Gap 14      Urea Nitrogen 12      Creatinine 1.03      eGFR 56 (*)     Calcium 9.2      Albumin 4.4      Alkaline Phosphatase 70      Total Protein 7.7      AST 15      Bilirubin, Total 0.3      ALT 8     SERIAL TROPONIN-INITIAL - Abnormal    Troponin I, High Sensitivity 16 (*)     Narrative:     Less than 99th percentile of normal range cutoff-  Female and children under 18 years old <14 ng/L; Male <21 ng/L: Negative  Repeat testing should be performed if clinically indicated.     Female and children under 18 years old 14-50 ng/L; Male 21-50 ng/L:  Consistent with possible cardiac damage and possible increased clinical   risk. Serial measurements may help to assess extent of myocardial damage.     >50 ng/L: Consistent with cardiac damage, increased clinical risk and  myocardial infarction. Serial measurements may help assess extent of   myocardial damage.      NOTE: Children less than 1 year old may have higher baseline troponin   levels and results should be interpreted in conjunction with the overall   clinical context.     NOTE:  Troponin I testing is performed using a different   testing methodology at [facility] than at other   [facility]. Direct result comparisons should only   be made within the same method.   PROTIME-INR - Abnormal    Protime 17.3 (*)     INR 1.5 (*)    TSH WITH REFLEX TO FREE T4 IF ABNORMAL - Abnormal    Thyroid Stimulating Hormone 18.84 (*)     Narrative:     TSH testing is performed using different testing methodology at [facility] than at other [facility]. Direct result comparisons should only be made within the same method.     LACTATE - Abnormal    Lactate 2.8 (*)     Narrative:     Venipuncture immediately after or during the administration of Metamizole may lead to falsely low results. Testing should be performed immediately prior to Metamizole dosing.   URINALYSIS WITH REFLEX CULTURE AND MICROSCOPIC - Abnormal    Color, Urine Dark-Yellow      Appearance, Urine Turbid (*)     Specific Gravity, Urine 1.006      pH, Urine 6.0      Protein, Urine NEGATIVE      Glucose, Urine Normal      Blood, Urine 0.06 (1+) (*)     Ketones, Urine NEGATIVE      Bilirubin, Urine NEGATIVE      Urobilinogen, Urine Normal      Nitrite, Urine 1+ (*)     Leukocyte Esterase, Urine NEGATIVE     MAGNESIUM - Normal    Magnesium 1.96     B-TYPE NATRIURETIC PEPTIDE - Normal    BNP 95      Narrative:        <100 pg/mL - Heart failure unlikely  100-299 pg/mL - Intermediate probability of acute heart                  failure exacerbation. Correlate with clinical                  context and patient history.    >=300 pg/mL - Heart Failure likely. Correlate with clinical                  context and patient history.    BNP testing is performed using different testing methodology at [facility] than at other [facility]. Direct result comparisons should only be made within the same method.      BLOOD CULTURE   BLOOD CULTURE   URINE CULTURE   MICROSCOPIC ONLY, URINE    WBC, Urine 1-5       RBC, Urine 3-5      Squamous Epithelial Cells, Urine 1-9 (SPARSE)     TROPONIN SERIES- (INITIAL, 1 HR)    Narrative:     The following orders were created for panel order Troponin I Series, High Sensitivity (0, 1 HR).  Procedure                               Abnormality         Status                     ---------                               -----------         ------                     Troponin I, High Sensiti...[[id number]]  Abnormal            Final result               Troponin, High Sensitivi...[[id number]]                                                   Please view results for these tests on the individual orders.   URINALYSIS WITH REFLEX CULTURE AND MICROSCOPIC    Narrative:     The following orders were created for panel order Urinalysis with Reflex Culture and Microscopic.  Procedure                               Abnormality         Status                     ---------                               -----------         ------                     Urinalysis with Reflex C...[[id number]]  Abnormal            Final result               Extra Urine Gray Tube[[id number]]                            In process                   Please view results for these tests on the individual orders.   EXTRA URINE GRAY TUBE   SERIAL TROPONIN, 1 HOUR   LACTATE   THYROXINE, FREE       Diagnostic Imaging:   XR chest 1 view   Final Result   No acute cardiopulmonary process.        MACRO:   None        Signed by: Wendy Lee 1/12/2025 8:20 PM   Dictation workstation:   [id number]          ED Medication Administration:   Medications   magnesium sulfate 2 g in sterile water for injection 50 mL (2 g intravenous New Bag 1/12/25 2009)   cefTRIAXone (Rocephin) 1 g in dextrose (iso) IV 50 mL (has no administration in time range)   sodium chloride 0.9 % bolus 1,000 mL (has no administration in time range)   acetaminophen (Tylenol) tablet 975 mg (has no administration in time range)   metoprolol tartrate (Lopressor) tablet 50 mg (has no  administration in time range)   sodium chloride 0.9 % bolus 500 mL (0 mL intravenous Stopped 1/12/25 2055)       ED Course:     Myrna Rodrigues is a 74 y.o. female presents with palpitations.    Differential Diagnoses Considered:  ACS, arrhythmia, electrolyte abnormality, sepsis      Diagnoses as of 01/12/25 2106   Tachycardia   Cystitis   Elevated troponin   Hypothyroidism, unspecified type   Medication induced coagulopathy (Multi)   Anemia, unspecified type       Abnormal Labs Reviewed   CBC WITH AUTO DIFFERENTIAL - Abnormal; Notable for the following components:       Result Value    Hemoglobin 10.5 (*)     Hematocrit 35.1 (*)     MCV 72 (*)     MCH 21.6 (*)     MCHC 29.9 (*)     RDW 18.6 (*)     Neutrophils Absolute 5.53 (*)     All other components within normal limits   COMPREHENSIVE METABOLIC PANEL - Abnormal; Notable for the following components:    Glucose 134 (*)     eGFR 56 (*)     All other components within normal limits   SERIAL TROPONIN-INITIAL - Abnormal; Notable for the following components:    Troponin I, High Sensitivity 16 (*)     All other components within normal limits    Narrative:     Less than 99th percentile of normal range cutoff-  Female and children under 18 years old <14 ng/L; Male <21 ng/L: Negative  Repeat testing should be performed if clinically indicated.     Female and children under 18 years old 14-50 ng/L; Male 21-50 ng/L:  Consistent with possible cardiac damage and possible increased clinical   risk. Serial measurements may help to assess extent of myocardial damage.     >50 ng/L: Consistent with cardiac damage, increased clinical risk and  myocardial infarction. Serial measurements may help assess extent of   myocardial damage.      NOTE: Children less than 1 year old may have higher baseline troponin   levels and results should be interpreted in conjunction with the overall   clinical context.     NOTE: Troponin I testing is performed using a different   testing methodology at  [facility] than at other   [facility]. Direct result comparisons should only   be made within the same method.   PROTIME-INR - Abnormal; Notable for the following components:    Protime 17.3 (*)     INR 1.5 (*)     All other components within normal limits   TSH WITH REFLEX TO FREE T4 IF ABNORMAL - Abnormal; Notable for the following components:    Thyroid Stimulating Hormone 18.84 (*)     All other components within normal limits    Narrative:     TSH testing is performed using different testing methodology at [facility] than at other [facility]. Direct result comparisons should only be made within the same method.     LACTATE - Abnormal; Notable for the following components:    Lactate 2.8 (*)     All other components within normal limits    Narrative:     Venipuncture immediately after or during the administration of Metamizole may lead to falsely low results. Testing should be performed immediately prior to Metamizole dosing.   URINALYSIS WITH REFLEX CULTURE AND MICROSCOPIC - Abnormal; Notable for the following components:    Appearance, Urine Turbid (*)     Blood, Urine 0.06 (1+) (*)     Nitrite, Urine 1+ (*)     All other components within normal limits       BP      Temp      Pulse     Resp      SpO2        Diagnostic evaluation was completed.  TSH was elevated at 18.84.  Initial troponin is slightly elevated at 16.  BNP is in normal range at 95 so do not suspect CHF.  Metabolic panel shows a slightly elevated glucose at 134.  Sodium potassium in the normal range.  Renal function is in the normal range.  Liver function is in the normal range.  Lactate is slightly elevated at 2.8.  INR is 1.5.  CBC shows normal white blood cell count mild anemia with a hemoglobin of 10.5.  Platelets are in the normal range.  Chest x-ray shows no acute cardiopulmonary process.  Urinalysis shows 1+ blood, 1+ nitrite with 25 white blood cells and 3-5 red blood cells.    Patient was treated  with IV fluids and IV antibiotics in the emergency department.  I suspect the patient's tachycardia is likely secondary to dehydration and UTI.  She will be hospitalized for further workup and evaluation.  The patient will also be treated with a dose of oral Lopressor.    I discussed the results and plan for hospitalization with the patient and/or family/friend if present.  Questions were addressed.  Patient and/or family/friend expressed understanding.    The patient's condition requires ongoing treatment and evaluation necessitating hospital admission.  I have reviewed the patient's history, physical exam, and test information with the admitting physician,   MARISSA Allen/Dr. Hope                , who agrees to hospitalize the patient.       Diagnosis:   1. Tachycardia    2. Cystitis    3. Elevated troponin    4. Hypothyroidism, unspecified type    5. Medication induced coagulopathy (Multi)    6. Anemia, unspecified type                    Procedure  Procedures     Cesario HARRISON MD  01/12/25 2955

## 2025-01-13 NOTE — H&P
History Of Present Illness  Myrna Rodrigues is a 74 y.o. female with a past medical history of afib s/p ablation on Eliquis, HTN, CAD, HLD, GERD, DM, and hypothyroidism who presented to the ED with palpitations. Notably, she was recently started on Keflex for a UTI. She also recently had her Synthroid changed to daily as her TSH was found to be high. On exam, she denies any fever, chills, chest pain, or shortness of breath. She reports not having urinary symptoms, however notes this is typical for her even with UTIs. She does have an implanted stim for incontinence.      Past Medical History  Past Medical History:   Diagnosis Date    Anxiety and depression     Arrhythmia     Asthma     Cancer (Multi)     Coronary artery disease     Diabetes mellitus (Multi)     Disease of thyroid gland     GERD (gastroesophageal reflux disease)     Heart disease     Hyperlipidemia     Hypertension     Irregular heart beat     Occipital neuralgia 06/10/2022    Occipital neuralgia of right side    Personal history of malignant neoplasm, unspecified 10/04/2021    History of malignant neoplasm    Personal history of other diseases of the circulatory system     History of hypertension    Personal history of other diseases of the circulatory system 10/04/2021    History of essential hypertension    Personal history of other diseases of the circulatory system     History of coronary artery disease    Personal history of other diseases of the digestive system 03/11/2020    History of chronic constipation    Personal history of other diseases of the musculoskeletal system and connective tissue     History of arthritis    Personal history of other diseases of the nervous system and sense organs 10/04/2021    History of eye problem    Personal history of other diseases of the respiratory system     History of asthma    Personal history of other specified conditions 10/04/2021    History of chest pain    PONV (postoperative nausea and vomiting)      Stool incontinence     Thyroid cancer (Multi)     Urinary incontinence        Surgical History  Past Surgical History:   Procedure Laterality Date    APPENDECTOMY  08/13/2015    Appendectomy    BREAST LUMPECTOMY  08/13/2015    Breast Surgery Lumpectomy    CARPAL TUNNEL RELEASE  08/13/2015    Neuroplasty Decompression Median Nerve At Carpal Tunnel    GALLBLADDER SURGERY  08/13/2015    Gallbladder Surgery    HYSTERECTOMY  08/13/2015    Hysterectomy    KNEE ARTHROSCOPY W/ MENISCAL REPAIR  08/13/2015    Knee Arthroscopy With Medial Meniscus Repair    KNEE SURGERY  08/13/2015    Knee Surgery    MR HEAD ANGIO WO IV CONTRAST  09/05/2021    MR HEAD ANGIO WO IV CONTRAST 9/5/2021 [facility]    OTHER SURGICAL HISTORY  08/13/2015    Shoulder Surgery Left    OTHER SURGICAL HISTORY  08/13/2015    Repair Of Bladder Reconstruction    OTHER SURGICAL HISTORY  09/20/2021    Breast surgery    OTHER SURGICAL HISTORY  09/20/2021    Bladder surgery    OTHER SURGICAL HISTORY  09/20/2021    Complete colonoscopy    OTHER SURGICAL HISTORY  09/20/2021    Shoulder surgery    OTHER SURGICAL HISTORY  09/20/2021    Foot surgery    OTHER SURGICAL HISTORY  09/20/2021    Percutaneous transluminal coronary angioplasty    OTHER SURGICAL HISTORY  10/11/2021    Thyroidectomy total    THYROIDECTOMY      TOTAL KNEE ARTHROPLASTY  08/13/2015    Knee Replacement        Social History  She reports that she has never smoked. She has never used smokeless tobacco. She reports that she does not currently use alcohol. She reports that she does not use drugs.    Family History  Family History   Problem Relation Name Age of Onset    Lung cancer Mother      Lymphoma Mother      Bone cancer Mother      Heart attack Father      Liver cancer Brother      Lung cancer Brother          Allergies  Patient has no known allergies.    Review of Systems   Constitutional:  Negative for chills and fever.   Respiratory:  Negative for shortness of breath.   "  Cardiovascular:  Positive for palpitations. Negative for chest pain.   Gastrointestinal:  Negative for abdominal pain, constipation, diarrhea, nausea and vomiting.   Genitourinary:  Negative for dysuria, flank pain, frequency, hematuria and urgency.   All other systems reviewed and are negative.       Physical Exam  Vitals reviewed.   HENT:      Head: Normocephalic and atraumatic.   Cardiovascular:      Rate and Rhythm: Regular rhythm. Tachycardia present.      Heart sounds: Normal heart sounds.   Pulmonary:      Effort: Pulmonary effort is normal.      Breath sounds: Normal air entry.   Abdominal:      General: Bowel sounds are normal.      Palpations: Abdomen is soft.      Tenderness: There is no abdominal tenderness.   Musculoskeletal:         General: No deformity.   Skin:     General: Skin is warm and dry.   Neurological:      General: No focal deficit present.      Mental Status: She is alert and oriented to person, place, and time.   Psychiatric:         Mood and Affect: Mood normal.         Behavior: Behavior normal.          Last Recorded Vitals  Blood pressure (!) 191/90, pulse (!) 122, temperature 37.2 °C (99 °F), temperature source Temporal, resp. rate 18, height 1.575 m (5' 2\"), weight 62.6 kg (138 lb), SpO2 98%.    Relevant Results      Lab Results   Component Value Date    WBC 9.2 01/12/2025    HGB 10.5 (L) 01/12/2025    HCT 35.1 (L) 01/12/2025    MCV 72 (L) 01/12/2025     01/12/2025     Lab Results   Component Value Date    GLUCOSE 134 (H) 01/12/2025    CALCIUM 9.2 01/12/2025     01/12/2025    K 3.6 01/12/2025    CO2 26 01/12/2025     01/12/2025    BUN 12 01/12/2025    CREATININE 1.03 01/12/2025     XR chest 1 view  Narrative: Interpreted By:  Wendy Lee,   STUDY:  XR CHEST 1 VIEW;  1/12/2025 8:05 pm      INDICATION:  Signs/Symptoms:Chest Pain.      COMPARISON:  11/24/2023      ACCESSION NUMBER(S):  [id number]      ORDERING CLINICIAN:  CHASE LANDERS      FINDINGS:        "   CARDIOMEDIASTINAL SILHOUETTE:  Cardiomediastinal silhouette is normal in size and configuration.      LUNGS:  No pulmonary consolidation, pleural effusion or pneumothorax.      ABDOMEN:  No remarkable upper abdominal findings.      BONES:  No acute osseous abnormality.      Impression: No acute cardiopulmonary process.      MACRO:  None      Signed by: Wendy Lee 1/12/2025 8:20 PM  Dictation workstation:   [id number]  ECG 12 lead  Supraventricular tachycardia  Left anterior fascicular block  Inferior infarct , age undetermined  Anterolateral infarct , age undetermined  Abnormal ECG  When compared with ECG of 09-OCT-2024 13:41,  Significant changes have occurred    ED Medication Administration from 01/12/2025 1936 to 01/12/2025 2135         Date/Time Order Dose Route Action Action by     01/12/2025 2009 EST magnesium sulfate 2 g in sterile water for injection 50 mL 2 g intravenous New Bag Tran,      01/12/2025 2009 EST sodium chloride 0.9 % bolus 500 mL 500 mL intravenous New Bag Tran,      01/12/2025 2055 EST sodium chloride 0.9 % bolus 500 mL 0 mL intravenous Stopped Tran,      01/12/2025 2117 EST metoprolol tartrate (Lopressor) tablet 50 mg 50 mg oral Given [facility],      01/12/2025 2118 EST acetaminophen (Tylenol) tablet 975 mg 975 mg oral Given [facility],      01/12/2025 2118 EST sodium chloride 0.9 % bolus 1,000 mL 1,000 mL intravenous New Bag Tran,                Assessment/Plan   Assessment & Plan  SVT (supraventricular tachycardia) (CMS-HCC)      #SVT  #HTN urgency  #Afib s/p ablation  -Troponin mildly elevated, suspect demand mismatch  -EKG SVT, rates 120s-130s  -Home lopressor resumed in ED, continue  -HR responded while in ED, okay for obs  -Will give K+ to keep K above 4  -Mag given in ED    #Elevated lactate  -Given NS in ED, repeat pending  -No hypotension  -Follow    #UTI (diagnosed as outpatient)  -Afebrile, no leukocytosis  -Was on Keflex, Rocephin started in  ED  -Will start on macrobid   -Follow cultures           Javier Garcia, APRN-CNP

## 2025-01-13 NOTE — PROGRESS NOTES
"Daily Progress Note    Myrna Rodrigues is a 74 y.o. female on day 0 of admission presenting with SVT (supraventricular tachycardia) (CMS-HCC).    Subjective   Patient seen and examined, labs and vitals reviewed. Patient endorses RLQ and suprapubic abd pain, similar to her episode last week. Admits to having diarrhea for many months. Denies CP, SOB, n/v, headache, dizziness, palpitations. Had finding concerning for mass at UVJ mass, urology consulted.       Objective   Physical Exam  Constitutional:       Appearance: Normal appearance. She is overweight.   HENT:      Head: Normocephalic.      Mouth/Throat:      Mouth: Mucous membranes are moist.   Eyes:      Pupils: Pupils are equal, round, and reactive to light.   Cardiovascular:      Rate and Rhythm: Normal rate and regular rhythm.      Heart sounds: Normal heart sounds, S1 normal and S2 normal.   Pulmonary:      Effort: Pulmonary effort is normal.      Breath sounds: Normal breath sounds.   Abdominal:      General: Bowel sounds are normal.      Palpations: Abdomen is soft.      Tenderness: There is abdominal tenderness in the right lower quadrant and suprapubic area.   Musculoskeletal:         General: Normal range of motion.      Cervical back: Neck supple.      Right lower leg: No edema.      Left lower leg: No edema.   Skin:     General: Skin is warm.   Neurological:      Mental Status: She is alert and oriented to person, place, and time.   Psychiatric:         Mood and Affect: Mood normal.         Behavior: Behavior normal.         Last Recorded Vitals  Blood pressure 177/77, pulse 65, temperature 36.4 °C (97.5 °F), resp. rate 17, height 1.575 m (5' 2\"), weight 62.6 kg (138 lb), SpO2 94%.  Intake/Output last 3 Shifts:  I/O last 3 completed shifts:  In: 1700 (27.2 mL/kg) [P.O.:100; I.V.:50 (0.8 mL/kg); IV Piggyback:1550]  Out: - (0 mL/kg)   Weight: 62.6 kg     Medications  Scheduled medications  apixaban, 5 mg, oral, BID  aspirin, 81 mg, oral, Daily  atorvastatin, " 10 mg, oral, Daily  docusate sodium, 100 mg, oral, BID  hydrALAZINE, 10 mg, oral, TID  levothyroxine, 100 mcg, oral, Daily  losartan, 50 mg, oral, Daily  metoprolol tartrate, 50 mg, oral, BID  montelukast, 10 mg, oral, Nightly  nitrofurantoin (macrocrystal-monohydrate), 100 mg, oral, BID  pantoprazole, 40 mg, oral, BID AC  tiotropium, 2 puff, inhalation, Daily      Continuous medications     PRN medications  PRN medications: acetaminophen **OR** acetaminophen **OR** acetaminophen, albuterol, melatonin, polyethylene glycol, traZODone    Labs  CBC:   Results from last 7 days   Lab Units 01/13/25 0516 01/12/25 2008 01/09/25  1543   WBC AUTO x10*3/uL 8.3 9.2 12.1*   RBC AUTO x10*6/uL 4.03 4.85 4.85   HEMOGLOBIN g/dL 8.7* 10.5* 10.2*   HEMATOCRIT % 29.5* 35.1* 35.4*   MCV fL 73* 72* 73*   MCH pg 21.6* 21.6* 21.0*   MCHC g/dL 29.5* 29.9* 28.8*   RDW % 18.6* 18.6* 18.9*   PLATELETS AUTO x10*3/uL 260 280 276     CMP:    Results from last 7 days   Lab Units 01/13/25 0516 01/12/25 2008 01/09/25  1543   SODIUM mmol/L 139 136 136   POTASSIUM mmol/L 3.9 3.6 4.0   CHLORIDE mmol/L 106 100 99   CO2 mmol/L 28 26 25   BUN mg/dL 9 12 12   CREATININE mg/dL 0.90 1.03 0.96   GLUCOSE mg/dL 95 134* 103*   PROTEIN TOTAL g/dL  --  7.7 7.4   CALCIUM mg/dL 8.1* 9.2 9.2   BILIRUBIN TOTAL mg/dL  --  0.3 0.3   ALK PHOS U/L  --  70 68   AST U/L  --  15 11   ALT U/L  --  8 6*     BMP:    Results from last 7 days   Lab Units 01/13/25 0516 01/12/25 2008 01/09/25  1543   SODIUM mmol/L 139 136 136   POTASSIUM mmol/L 3.9 3.6 4.0   CHLORIDE mmol/L 106 100 99   CO2 mmol/L 28 26 25   BUN mg/dL 9 12 12   CREATININE mg/dL 0.90 1.03 0.96   CALCIUM mg/dL 8.1* 9.2 9.2   GLUCOSE mg/dL 95 134* 103*     Magnesium:  Results from last 7 days   Lab Units 01/12/25 2008   MAGNESIUM mg/dL 1.96     Troponin:    Results from last 7 days   Lab Units 01/13/25  0516 01/12/25  2107 01/12/25 2008   TROPHS ng/L 43* 21* 16*     BNP:   Results from last 7 days   Lab Units  01/12/25 2008   BNP pg/mL 95     Lipid Panel:  Results from last 7 days   Lab Units 01/12/25 2008   INR  1.5*   PROTIME seconds 17.3*        Nutrition             Relevant Results  Results from last 7 days   Lab Units 01/13/25  1208 01/13/25  0516 01/12/25 2008 01/09/25  1543   POCT GLUCOSE mg/dL 118*  --   --   --    GLUCOSE mg/dL  --  95 134* 103*     Lab Results   Component Value Date    HGBA1C 5.9 (H) 01/02/2025        Assessment/Plan    SVT  HTN urgency  Afib s/p ablation  -CXR unremarkable  -troponin 16/21/43  -TSH 18.84  -EKG today in NSR, HR in the 60s  -continue eliquis and lopressor  -follows with Dr. Proctor and Dr. Degroot  -keep K above 4 and Mg above 2  -tele monitoring    UTI  -afebrile, no leukocytosis  -UA with nitrites  -completed course of Keflex  -continue Macrobid  -follow urine culture, previous sensitive to Macrobid  -follow blood cultures    RLQ/suprapubic abd pain  S/p hysterectomy  -patient has similar episode last week, went to [facility], diagnosed with UTI  -CT a/p with asymmetric thickening of the right posterolateral bladder wall extending into the UVJ, mass not excluded  -pelvic/TVUS shows enlarged right pelvic lymph nodes, stable cyst on left ovary, no free fluid  -Consult urology     Elevated lactate: resolved with NS, 2.8/1.7  HTN/HLD: continue home statin, losartan, hydralazine  Hypothyroidism: home levothyroxine, TSH 18.84  Hx asthma: home Spiriva, Singulair, albuterol      DVTp: eliquis  PLAN: anticipate home    Anahi Inman PA-C    Plan of care was discussed extensively with patient.  Patient verbalized understanding through teach back method.  All question and concerns addressed upon examination.    Of note, this documentation is completed using the Dragon Dictation system (voice recognition software). There may be spelling and/or grammatical errors that were not corrected prior to final submission.

## 2025-01-14 VITALS
DIASTOLIC BLOOD PRESSURE: 72 MMHG | TEMPERATURE: 99.7 F | HEIGHT: 62 IN | OXYGEN SATURATION: 97 % | HEART RATE: 63 BPM | RESPIRATION RATE: 18 BRPM | BODY MASS INDEX: 25.4 KG/M2 | WEIGHT: 138 LBS | SYSTOLIC BLOOD PRESSURE: 162 MMHG

## 2025-01-14 LAB
ANION GAP SERPL CALC-SCNC: 9 MMOL/L (ref 10–20)
BACTERIA UR CULT: NO GROWTH
BUN SERPL-MCNC: 8 MG/DL (ref 6–23)
CALCIUM SERPL-MCNC: 8 MG/DL (ref 8.6–10.3)
CHLORIDE SERPL-SCNC: 106 MMOL/L (ref 98–107)
CO2 SERPL-SCNC: 28 MMOL/L (ref 21–32)
CREAT SERPL-MCNC: 1.01 MG/DL (ref 0.5–1.05)
EGFRCR SERPLBLD CKD-EPI 2021: 58 ML/MIN/1.73M*2
ERYTHROCYTE [DISTWIDTH] IN BLOOD BY AUTOMATED COUNT: 18.5 % (ref 11.5–14.5)
GLUCOSE SERPL-MCNC: 86 MG/DL (ref 74–99)
HCT VFR BLD AUTO: 28.6 % (ref 36–46)
HCT VFR BLD AUTO: 30.8 % (ref 36–46)
HGB BLD-MCNC: 8.5 G/DL (ref 12–16)
HGB BLD-MCNC: 9.1 G/DL (ref 12–16)
MAGNESIUM SERPL-MCNC: 2.02 MG/DL (ref 1.6–2.4)
MCH RBC QN AUTO: 21.6 PG (ref 26–34)
MCHC RBC AUTO-ENTMCNC: 29.7 G/DL (ref 32–36)
MCV RBC AUTO: 73 FL (ref 80–100)
NRBC BLD-RTO: 0 /100 WBCS (ref 0–0)
PLATELET # BLD AUTO: 241 X10*3/UL (ref 150–450)
POTASSIUM SERPL-SCNC: 4 MMOL/L (ref 3.5–5.3)
RBC # BLD AUTO: 3.93 X10*6/UL (ref 4–5.2)
SODIUM SERPL-SCNC: 139 MMOL/L (ref 136–145)
WBC # BLD AUTO: 6.1 X10*3/UL (ref 4.4–11.3)

## 2025-01-14 PROCEDURE — 2500000001 HC RX 250 WO HCPCS SELF ADMINISTERED DRUGS (ALT 637 FOR MEDICARE OP): Performed by: NURSE PRACTITIONER

## 2025-01-14 PROCEDURE — 85018 HEMOGLOBIN: CPT | Mod: 59

## 2025-01-14 PROCEDURE — 2500000001 HC RX 250 WO HCPCS SELF ADMINISTERED DRUGS (ALT 637 FOR MEDICARE OP)

## 2025-01-14 PROCEDURE — 99239 HOSP IP/OBS DSCHRG MGMT >30: CPT

## 2025-01-14 PROCEDURE — 80048 BASIC METABOLIC PNL TOTAL CA: CPT

## 2025-01-14 PROCEDURE — 36415 COLL VENOUS BLD VENIPUNCTURE: CPT

## 2025-01-14 PROCEDURE — 85027 COMPLETE CBC AUTOMATED: CPT

## 2025-01-14 PROCEDURE — 83735 ASSAY OF MAGNESIUM: CPT

## 2025-01-14 PROCEDURE — 2500000002 HC RX 250 W HCPCS SELF ADMINISTERED DRUGS (ALT 637 FOR MEDICARE OP, ALT 636 FOR OP/ED): Performed by: NURSE PRACTITIONER

## 2025-01-14 PROCEDURE — 2500000001 HC RX 250 WO HCPCS SELF ADMINISTERED DRUGS (ALT 637 FOR MEDICARE OP): Performed by: STUDENT IN AN ORGANIZED HEALTH CARE EDUCATION/TRAINING PROGRAM

## 2025-01-14 PROCEDURE — G0378 HOSPITAL OBSERVATION PER HR: HCPCS

## 2025-01-14 PROCEDURE — 2500000002 HC RX 250 W HCPCS SELF ADMINISTERED DRUGS (ALT 637 FOR MEDICARE OP, ALT 636 FOR OP/ED)

## 2025-01-14 RX ORDER — DICYCLOMINE HYDROCHLORIDE 10 MG/1
20 CAPSULE ORAL 4 TIMES DAILY PRN
Qty: 20 CAPSULE | Refills: 0 | Status: ON HOLD | OUTPATIENT
Start: 2025-01-14 | End: 2025-01-24

## 2025-01-14 RX ORDER — LOSARTAN POTASSIUM 50 MG/1
50 TABLET ORAL DAILY
Qty: 30 TABLET | Refills: 0 | Status: ON HOLD | OUTPATIENT
Start: 2025-01-15 | End: 2025-02-14

## 2025-01-14 RX ORDER — NITROFURANTOIN 25; 75 MG/1; MG/1
100 CAPSULE ORAL 2 TIMES DAILY
Qty: 6 CAPSULE | Refills: 0 | Status: ON HOLD | OUTPATIENT
Start: 2025-01-14 | End: 2025-01-17

## 2025-01-14 RX ORDER — DIPHENOXYLATE HYDROCHLORIDE AND ATROPINE SULFATE 2.5; .025 MG/1; MG/1
1 TABLET ORAL 4 TIMES DAILY PRN
Qty: 24 TABLET | Refills: 0 | Status: ON HOLD | OUTPATIENT
Start: 2025-01-14

## 2025-01-14 RX ADMIN — LOSARTAN POTASSIUM 50 MG: 50 TABLET, FILM COATED ORAL at 08:23

## 2025-01-14 RX ADMIN — PANTOPRAZOLE SODIUM 40 MG: 40 TABLET, DELAYED RELEASE ORAL at 06:07

## 2025-01-14 RX ADMIN — NITROFURANTOIN (MONOHYDRATE/MACROCRYSTALS) 100 MG: 75; 25 CAPSULE ORAL at 08:23

## 2025-01-14 RX ADMIN — ATORVASTATIN CALCIUM 10 MG: 10 TABLET ORAL at 08:23

## 2025-01-14 RX ADMIN — APIXABAN 5 MG: 5 TABLET, FILM COATED ORAL at 08:23

## 2025-01-14 RX ADMIN — ASPIRIN 81 MG: 81 TABLET, CHEWABLE ORAL at 08:23

## 2025-01-14 RX ADMIN — PANTOPRAZOLE SODIUM 40 MG: 40 TABLET, DELAYED RELEASE ORAL at 14:28

## 2025-01-14 RX ADMIN — LEVOTHYROXINE SODIUM 100 MCG: 0.1 TABLET ORAL at 06:07

## 2025-01-14 RX ADMIN — HYDRALAZINE HYDROCHLORIDE 10 MG: 10 TABLET, FILM COATED ORAL at 14:28

## 2025-01-14 RX ADMIN — HYDRALAZINE HYDROCHLORIDE 10 MG: 10 TABLET, FILM COATED ORAL at 08:23

## 2025-01-14 RX ADMIN — TIOTROPIUM BROMIDE INHALATION SPRAY 2 PUFF: 3.12 SPRAY, METERED RESPIRATORY (INHALATION) at 08:24

## 2025-01-14 RX ADMIN — METOPROLOL TARTRATE 50 MG: 50 TABLET, FILM COATED ORAL at 08:23

## 2025-01-14 ASSESSMENT — COGNITIVE AND FUNCTIONAL STATUS - GENERAL
MOBILITY SCORE: 24
DAILY ACTIVITIY SCORE: 24

## 2025-01-14 ASSESSMENT — PAIN - FUNCTIONAL ASSESSMENT
PAIN_FUNCTIONAL_ASSESSMENT: 0-10
PAIN_FUNCTIONAL_ASSESSMENT: 0-10

## 2025-01-14 ASSESSMENT — PAIN SCALES - GENERAL
PAINLEVEL_OUTOF10: 0 - NO PAIN

## 2025-01-14 NOTE — CARE PLAN
The patient's goals for the shift include      The clinical goals for the shift include Patient will maintain NSR throughout shift.    Barriers to progression include hx afib. Recommendations to address these barriers include eliquis and lopressor per order.

## 2025-01-14 NOTE — PROGRESS NOTES
01/14/25 1254   Discharge Planning   Living Arrangements Alone   Support Systems Friends/neighbors   Assistance Needed PTA - reports none   Type of Residence Private residence   Number of Stairs to Enter Residence 1   Number of Stairs Within Residence 0   Do you have animals or pets at home? Yes   Home or Post Acute Services None   Expected Discharge Disposition Home   Does the patient need discharge transport arranged? No     Prefers HOME, declines any needs.

## 2025-01-14 NOTE — CARE PLAN
The patient's goals for the shift include      The clinical goals for the shift include pt to remain free from falls until end of shift    Over the shift, the patients safety was maintained and she is free from injury . Pain is still an issue but pain came down from an 8 to a 4 . Pt is now dozing.

## 2025-01-14 NOTE — DISCHARGE SUMMARY
Discharge Diagnosis  SVT (supraventricular tachycardia) (CMS-[facility])  Cystitis  Diarrhea    Issues Requiring Follow-Up  Follow up with urology  Follow up with cardiology  Follow up with PCP    Discharge Meds     Medication List      START taking these medications     dicyclomine 10 mg capsule; Commonly known as: Bentyl; Take 2 capsules   (20 mg) by mouth 4 times a day as needed (abdominal discomfort or   cramping) for up to 5 days.   losartan 50 mg tablet; Commonly known as: Cozaar; Take 1 tablet (50 mg)   by mouth once daily.; Start taking on: January 15, 2025   nitrofurantoin (macrocrystal-monohydrate) 100 mg capsule; Commonly known   as: Macrobid; Take 1 capsule (100 mg) by mouth 2 times a day for 6 doses.     CONTINUE taking these medications     * acetaminophen 325 mg tablet; Commonly known as: Tylenol   * acetaminophen 500 mg tablet; Commonly known as: Tylenol; Take 2   tablets (1,000 mg) by mouth every 6 hours.   atorvastatin 10 mg tablet; Commonly known as: Lipitor; TAKE 1 TABLET BY   MOUTH DAILY   cholecalciferol 25 MCG (1000 UT) tablet; Commonly known as: Vitamin D-3   clobetasol 0.05 % ointment; Commonly known as: Temovate; Apply topically   2 times a day.   diphenoxylate-atropine 2.5-0.025 mg tablet; Commonly known as: Lomotil;   Take 1 tablet by mouth 4 times a day as needed for diarrhea.   Eliquis 5 mg tablet; Generic drug: apixaban; TAKE 1 TABLET BY MOUTH   TWICE  DAILY   estradiol 0.01 % (0.1 mg/gram) vaginal cream; Commonly known as:   Estrace; Apply pea size amount 0.5 gram to vaginal opening with finger   daily for 2 weeks, then 2-3 times per week.   levothyroxine 100 mcg tablet; Commonly known as: Synthroid, Levoxyl   metFORMIN  mg 24 hr tablet; Commonly known as: Glucophage-XR   metoprolol tartrate 50 mg tablet; Commonly known as: Lopressor; Take 1   tablet (50 mg) by mouth 2 times a day.   montelukast 10 mg tablet; Commonly known as: Singulair; TAKE 1 TABLET BY   MOUTH AT  BEDTIME   naproxen  500 mg EC tablet; Commonly known as: EC Naprosyn; Take 1 tablet   (500 mg) by mouth 2 times daily (morning and late afternoon) for 5 days.   Do not crush, chew, or split.   omeprazole 40 mg DR capsule; Commonly known as: PriLOSEC; TAKE 1 CAPSULE   BY MOUTH TWICE  DAILY   potassium gluconate 595 mg (99 mg) tablet   tiotropium 2.5 mcg/actuation inhaler; Commonly known as: Spiriva   Respimat   traZODone 50 mg tablet; Commonly known as: Desyrel   Ventolin HFA 90 mcg/actuation inhaler; Generic drug: albuterol; USE 2   INHALATIONS BY MOUTH EVERY 4 HOURS AS NEEDED  * This list has 2 medication(s) that are the same as other medications   prescribed for you. Read the directions carefully, and ask your doctor or   other care provider to review them with you.     STOP taking these medications     cephalexin 500 mg capsule; Commonly known as: Keflex   nitroglycerin 0.4 mg SL tablet; Commonly known as: Nitrostat   phenazopyridine 200 mg tablet; Commonly known as: Pyridium       Test Results Pending At Discharge  Pending Labs       Order Current Status    Blood Culture Preliminary result    Blood Culture Preliminary result            Hospital Course   This is a 74-year-old female with past medical history including afib s/p ablation on Eliquis, HTN, CAD, HLD, GERD, DM, and hypothyroidism who presented on 1/13 for palpitations secondary to SVT/A-fib.  Troponin mildly elevated, suspect due to demand mismatch.  Patient was treated with Lopressor and losartan which relieved her symptoms and brought her heart rate back into the 60s and stabilized her blood pressure.  During admission, patient was treated for UTI that was diagnosed as an outpatient.  Remained afebrile and without leukocytosis.  Was given a dose of Rocephin and started on Macrobid based on previous culture sensitivities.  Current urine culture without significant growth.  Blood cultures pending.  Plan to discharge with Macrobid x 3 days.  During hospitalization, patient  developed diarrhea and abdominal pain.  Does have a history of chronic diarrhea.  Hgb was downtrending, no evidence of bleeding, remained stable on repeats labs today. Will discharge with Lomotil and Bentyl as needed as they helped the patient's symptoms.  On imaging, there was asymmetric thickening of the right posterior lateral bladder wall that extended into the UVJ.  Urology consulted but unable to see the patient.  Patient states that she would prefer to follow-up with her primary urologist.  She will be discharged with a new prescription for losartan.  Patient should resume all other home meds.  The patient will need to follow-up with her PCP, cardiologist, and urologist after discharge.  Patient will be discharged home today with a healthy at home referral.  She is hemodynamically stable at time of discharge.    Plan of care was discussed extensively with patient.  Patient verbalized understanding through teach back method.  All question and concerns addressed upon examination.     Of note, this documentation is completed using the Dragon Dictation system (voice recognition software). There may be spelling and/or grammatical errors that were not corrected prior to final submission.      Pertinent Physical Exam At Time of Discharge  Physical Exam  Constitutional:       Appearance: Normal appearance.   HENT:      Head: Normocephalic.      Mouth/Throat:      Mouth: Mucous membranes are moist.   Eyes:      Extraocular Movements: Extraocular movements intact.      Pupils: Pupils are equal, round, and reactive to light.   Cardiovascular:      Rate and Rhythm: Normal rate and regular rhythm.      Heart sounds: Normal heart sounds, S1 normal and S2 normal.   Pulmonary:      Effort: Pulmonary effort is normal.      Breath sounds: Normal breath sounds.   Abdominal:      General: Bowel sounds are normal.      Palpations: Abdomen is soft.      Tenderness: There is no abdominal tenderness.   Musculoskeletal:          General: Normal range of motion.      Cervical back: Neck supple.      Right lower leg: No edema.      Left lower leg: No edema.   Skin:     General: Skin is warm.   Neurological:      Mental Status: She is alert and oriented to person, place, and time.   Psychiatric:         Mood and Affect: Mood normal.         Behavior: Behavior normal.         Outpatient Follow-Up  Future Appointments   Date Time Provider Department Center   2/5/2025  2:40 PM ANA Lizarraga-CNP [id number] [place]   4/9/2025 11:20 AM Major Jurado MD [id number] None   4/30/2025 11:10 AM Calvin Baker MD [id number] [place]   7/16/2025  2:15 PM Shashank Proctor MD [id number] [place]   8/28/2025 11:10 AM Calvin Baker MD [id number] [place]   10/16/2025 11:00 AM Evin Thurman MD [id number] [place]         Anahi Inman PA-C  I spent 35 minutes on discharge. Time calculated includes bedside evaluation, counseling, and outpatient care coordination.

## 2025-01-14 NOTE — CONSULTS
"Nutrition Initial Assessment:   Nutrition Assessment    Reason for Assessment: Admission nursing screening    Patient is a 74 y.o. female presenting with SVT  past medical history of afib s/p ablation on Eliquis, HTN, CAD, HLD, GERD, DM, and hypothyroidism who presented to the ED with palpitations.       Nutrition History:  Energy Intake: Poor < 50 % (42% average x 3 documented meals)  Food and Nutrient History: RDN consult for MST score of 2. Met with pt who reports 10 lb wt loss in the past week due to severe diarrhea. Pt reports fair appetite during this time. Pt states -140 lbs. Pt states she has not had an episode of diarrhea yet today. Pt denies chewing or swallowing difficulty. Pt denies following diet restrictions at home but reports she watches what she eats due to hx diabetes. Discussed adding Glucerna BID due to wt loss, decreased appetite - pt agreeable. Will continue to monitor.  Vitamin/Herbal Supplement Use: D3, potassium gluconate per home med list  Food Allergies/Intolerances:  None  GI Symptoms: Diarrhea  Oral Problems: None       Anthropometrics:  Height: 157.5 cm (5' 2\")   Weight: 62.6 kg (138 lb)   BMI (Calculated): 25.23  IBW/kg (Dietitian Calculated): 50 kg  Percent of IBW: 125 %       Weight History:   Wt Readings from Last 10 Encounters:   01/12/25 62.6 kg (138 lb)   01/09/25 65.8 kg (145 lb)   01/06/25 67.1 kg (148 lb)   12/13/24 65.8 kg (145 lb)   11/05/24 65.8 kg (145 lb)   10/14/24 66.7 kg (147 lb)   10/09/24 66.9 kg (147 lb 7.8 oz)   10/08/24 63.5 kg (140 lb)   10/03/24 67.2 kg (148 lb 3.2 oz)   09/24/24 67.1 kg (148 lb)      Weight Change %:  Weight History / % Weight Change: 10 lb, 6.8% wt loss in 1 week  Significant Weight Loss: Yes  Interpretation of Weight Loss: >2% in 1 week    Nutrition Focused Physical Exam Findings:    Subcutaneous Fat Loss:   Orbital Fat Pads: Well nourished (slightly bulging fat pads)  Buccal Fat Pads: Well nourished (full, rounded cheeks)  Triceps: " Well nourished (ample fat tissue)  Muscle Wasting:  Temporalis: Mild-Moderate (slight depression)  Pectoralis (Clavicular Region): Well nourished (clavicle not visible)  Deltoid/Trapezius: Well nourished (rounded appearance at arm, shoulder, neck)  Edema:  Edema: none  Physical Findings:  Skin: Negative    Nutrition Significant Labs:  BMP Trend:   Results from last 7 days   Lab Units 01/14/25  0534 01/13/25  0516 01/12/25 2008 01/09/25  1543   GLUCOSE mg/dL 86 95 134* 103*   CALCIUM mg/dL 8.0* 8.1* 9.2 9.2   SODIUM mmol/L 139 139 136 136   POTASSIUM mmol/L 4.0 3.9 3.6 4.0   CO2 mmol/L 28 28 26 25   CHLORIDE mmol/L 106 106 100 99   BUN mg/dL 8 9 12 12   CREATININE mg/dL 1.01 0.90 1.03 0.96    , A1C:  Lab Results   Component Value Date    HGBA1C 5.9 (H) 01/02/2025   , BG POCT trend:   Results from last 7 days   Lab Units 01/13/25  1613 01/13/25  1208   POCT GLUCOSE mg/dL 103* 118*        Nutrition Specific Medications:  Scheduled medications  aspirin, 81 mg, oral, Daily  atorvastatin, 10 mg, oral, Daily  levothyroxine, 100 mcg, oral, Daily  pantoprazole, 40 mg, oral, BID AC    I/O:   Last BM Date: 01/13/25;      Dietary Orders (From admission, onward)       Start     Ordered    01/13/25 0102  May Participate in Room Service  ( ROOM SERVICE MAY PARTICIPATE)  Once        Question:  .  Answer:  Yes    01/13/25 0101    01/13/25 0026  Adult diet Cardiac; 70 gm fat; 2 - 3 grams Sodium  Diet effective now        Question Answer Comment   Diet type Cardiac    Fat restriction: 70 gm fat    Sodium restriction: 2 - 3 grams Sodium        01/13/25 0025                     Estimated Needs:   Total Energy Estimated Needs (kCal): 1565 kCal  Method for Estimating Needs: 25 kcal/kg ABW  Total Protein Estimated Needs (g): 75 g  Method for Estimating Needs: 1.2 g/kg ABW  Total Fluid Estimated Needs (mL): 1565 mL  Method for Estimating Needs: 1 ml/kcal or per MD        Nutrition Diagnosis   Malnutrition Diagnosis  Patient has  Malnutrition Diagnosis: Yes  Diagnosis Status: New  Malnutrition Diagnosis: Moderate malnutrition related to acute disease or injury  As Evidenced by: <75% of estimated energy requirements for > 7 days, >2% wt loss x 1 week            Nutrition Interventions/Recommendations         Nutrition Prescription:  Individualized Nutrition Prescription Provided for : 60 g Consistent Carb diet with ONS        Nutrition Interventions:   Interventions: Meals and snacks, Medical food supplement  Meals and Snacks: General healthful diet, Carbohydrate-modified diet  Goal: Consumes 3 meals per day  Medical Food Supplement: Commercial beverage  Goal: Glucerna BID (provides 220 kcal, 10 g protein per serving).         Nutrition Education:   No needs at this time       Nutrition Monitoring and Evaluation   Food/Nutrient Related History Monitoring  Monitoring and Evaluation Plan: Energy intake, Amount of food, Fluid intake  Energy Intake: Estimated energy intake  Criteria: Pt meets >75% of estimated energy needs  Fluid Intake: Estimated fluid intake  Criteria: Meets >75% of estimated fluid needs  Amount of Food: Estimated amout of food, Medical food intake  Criteria: Pt consumes >75% of meals and supplements    Body Composition/Growth/Weight History  Monitoring and Evaluation Plan: Weight  Weight: Measured weight  Criteria: Maintains stable weight    Biochemical Data, Medical Tests and Procedures  Monitoring and Evaluation Plan: Glucose/endocrine profile, Electrolyte/renal panel  Electrolyte and Renal Panel: Sodium, Potassium, Phosphorus  Criteria: Electrolytes WNL  Glucose/Endocrine Profile: Glucose, casual  Criteria: BG within desirable range              Time Spent (min): 40 minutes

## 2025-01-15 ENCOUNTER — HOSPITAL ENCOUNTER (INPATIENT)
Facility: HOSPITAL | Age: 75
LOS: 2 days | Discharge: HOME | DRG: 309 | End: 2025-01-17
Attending: EMERGENCY MEDICINE | Admitting: STUDENT IN AN ORGANIZED HEALTH CARE EDUCATION/TRAINING PROGRAM
Payer: MEDICARE

## 2025-01-15 ENCOUNTER — APPOINTMENT (OUTPATIENT)
Dept: RADIOLOGY | Facility: HOSPITAL | Age: 75
DRG: 309 | End: 2025-01-15
Payer: MEDICARE

## 2025-01-15 ENCOUNTER — APPOINTMENT (OUTPATIENT)
Dept: CARDIOLOGY | Facility: HOSPITAL | Age: 75
DRG: 309 | End: 2025-01-15
Payer: MEDICARE

## 2025-01-15 ENCOUNTER — TELEPHONE (OUTPATIENT)
Dept: PRIMARY CARE | Facility: CLINIC | Age: 75
End: 2025-01-15
Payer: MEDICARE

## 2025-01-15 DIAGNOSIS — I47.10 SVT (SUPRAVENTRICULAR TACHYCARDIA) (CMS-HCC): ICD-10-CM

## 2025-01-15 DIAGNOSIS — R94.31 ABNORMAL ELECTROCARDIOGRAM (ECG) (EKG): ICD-10-CM

## 2025-01-15 DIAGNOSIS — R79.89 ELEVATED TROPONIN: ICD-10-CM

## 2025-01-15 DIAGNOSIS — R00.2 PALPITATIONS: Primary | ICD-10-CM

## 2025-01-15 LAB
ALBUMIN SERPL BCP-MCNC: 4 G/DL (ref 3.4–5)
ALP SERPL-CCNC: 110 U/L (ref 33–136)
ALT SERPL W P-5'-P-CCNC: 31 U/L (ref 7–45)
ANION GAP SERPL CALC-SCNC: 10 MMOL/L (ref 10–20)
APPEARANCE UR: CLEAR
AST SERPL W P-5'-P-CCNC: 63 U/L (ref 9–39)
ATRIAL RATE: 68 BPM
ATRIAL RATE: 70 BPM
BASOPHILS # BLD AUTO: 0.03 X10*3/UL (ref 0–0.1)
BASOPHILS NFR BLD AUTO: 0.3 %
BILIRUB SERPL-MCNC: 0.4 MG/DL (ref 0–1.2)
BILIRUB UR STRIP.AUTO-MCNC: NEGATIVE MG/DL
BNP SERPL-MCNC: 143 PG/ML (ref 0–99)
BUN SERPL-MCNC: 8 MG/DL (ref 6–23)
CALCIUM SERPL-MCNC: 8.8 MG/DL (ref 8.6–10.3)
CARDIAC TROPONIN I PNL SERPL HS: 113 NG/L (ref 0–13)
CARDIAC TROPONIN I PNL SERPL HS: 120 NG/L (ref 0–13)
CHLORIDE SERPL-SCNC: 103 MMOL/L (ref 98–107)
CHOLEST SERPL-MCNC: 120 MG/DL (ref 0–199)
CHOLESTEROL/HDL RATIO: 1.8
CO2 SERPL-SCNC: 29 MMOL/L (ref 21–32)
COLOR UR: NORMAL
CREAT SERPL-MCNC: 0.96 MG/DL (ref 0.5–1.05)
EGFRCR SERPLBLD CKD-EPI 2021: 61 ML/MIN/1.73M*2
EOSINOPHIL # BLD AUTO: 0.34 X10*3/UL (ref 0–0.4)
EOSINOPHIL NFR BLD AUTO: 3.7 %
ERYTHROCYTE [DISTWIDTH] IN BLOOD BY AUTOMATED COUNT: 19.2 % (ref 11.5–14.5)
FLUAV RNA RESP QL NAA+PROBE: NOT DETECTED
FLUBV RNA RESP QL NAA+PROBE: NOT DETECTED
GLUCOSE BLD MANUAL STRIP-MCNC: 149 MG/DL (ref 74–99)
GLUCOSE BLD MANUAL STRIP-MCNC: 90 MG/DL (ref 74–99)
GLUCOSE SERPL-MCNC: 109 MG/DL (ref 74–99)
GLUCOSE UR STRIP.AUTO-MCNC: NORMAL MG/DL
HCT VFR BLD AUTO: 34 % (ref 36–46)
HDLC SERPL-MCNC: 66.4 MG/DL
HGB BLD-MCNC: 10 G/DL (ref 12–16)
HOLD SPECIMEN: NORMAL
IMM GRANULOCYTES # BLD AUTO: 0.03 X10*3/UL (ref 0–0.5)
IMM GRANULOCYTES NFR BLD AUTO: 0.3 % (ref 0–0.9)
INR PPP: 1.8 (ref 0.9–1.1)
KETONES UR STRIP.AUTO-MCNC: NEGATIVE MG/DL
LACTATE SERPL-SCNC: 1.8 MMOL/L (ref 0.4–2)
LACTATE SERPL-SCNC: 2.1 MMOL/L (ref 0.4–2)
LDLC SERPL CALC-MCNC: 33 MG/DL
LEUKOCYTE ESTERASE UR QL STRIP.AUTO: NEGATIVE
LYMPHOCYTES # BLD AUTO: 1.47 X10*3/UL (ref 0.8–3)
LYMPHOCYTES NFR BLD AUTO: 16.1 %
MAGNESIUM SERPL-MCNC: 2.12 MG/DL (ref 1.6–2.4)
MCH RBC QN AUTO: 21.4 PG (ref 26–34)
MCHC RBC AUTO-ENTMCNC: 29.4 G/DL (ref 32–36)
MCV RBC AUTO: 73 FL (ref 80–100)
MONOCYTES # BLD AUTO: 0.66 X10*3/UL (ref 0.05–0.8)
MONOCYTES NFR BLD AUTO: 7.2 %
NEUTROPHILS # BLD AUTO: 6.6 X10*3/UL (ref 1.6–5.5)
NEUTROPHILS NFR BLD AUTO: 72.4 %
NITRITE UR QL STRIP.AUTO: NEGATIVE
NON HDL CHOLESTEROL: 54 MG/DL (ref 0–149)
NRBC BLD-RTO: 0 /100 WBCS (ref 0–0)
P AXIS: 71 DEGREES
P AXIS: 89 DEGREES
P OFFSET: 172 MS
P OFFSET: 179 MS
P ONSET: 120 MS
P ONSET: 133 MS
PH UR STRIP.AUTO: 7 [PH]
PLATELET # BLD AUTO: 276 X10*3/UL (ref 150–450)
POTASSIUM SERPL-SCNC: 4 MMOL/L (ref 3.5–5.3)
PR INTERVAL: 158 MS
PR INTERVAL: 190 MS
PROT SERPL-MCNC: 7.1 G/DL (ref 6.4–8.2)
PROT UR STRIP.AUTO-MCNC: NEGATIVE MG/DL
PROTHROMBIN TIME: 20 SECONDS (ref 9.8–12.8)
Q ONSET: 212 MS
Q ONSET: 215 MS
QRS COUNT: 11 BEATS
QRS COUNT: 11 BEATS
QRS DURATION: 84 MS
QRS DURATION: 86 MS
QT INTERVAL: 412 MS
QT INTERVAL: 416 MS
QTC CALCULATION(BAZETT): 442 MS
QTC CALCULATION(BAZETT): 444 MS
QTC FREDERICIA: 433 MS
QTC FREDERICIA: 434 MS
R AXIS: -56 DEGREES
R AXIS: -64 DEGREES
RBC # BLD AUTO: 4.68 X10*6/UL (ref 4–5.2)
RBC # UR STRIP.AUTO: NEGATIVE /UL
SARS-COV-2 RNA RESP QL NAA+PROBE: NOT DETECTED
SODIUM SERPL-SCNC: 138 MMOL/L (ref 136–145)
SP GR UR STRIP.AUTO: 1
T AXIS: 42 DEGREES
T AXIS: 43 DEGREES
T OFFSET: 420 MS
T OFFSET: 421 MS
T4 FREE SERPL-MCNC: 1.21 NG/DL (ref 0.61–1.12)
TRIGL SERPL-MCNC: 101 MG/DL (ref 0–149)
TSH SERPL-ACNC: 10.2 MIU/L (ref 0.44–3.98)
UROBILINOGEN UR STRIP.AUTO-MCNC: NORMAL MG/DL
VENTRICULAR RATE: 68 BPM
VENTRICULAR RATE: 70 BPM
VLDL: 20 MG/DL (ref 0–40)
WBC # BLD AUTO: 9.1 X10*3/UL (ref 4.4–11.3)

## 2025-01-15 PROCEDURE — 83880 ASSAY OF NATRIURETIC PEPTIDE: CPT | Performed by: PHYSICIAN ASSISTANT

## 2025-01-15 PROCEDURE — 83735 ASSAY OF MAGNESIUM: CPT | Performed by: PHYSICIAN ASSISTANT

## 2025-01-15 PROCEDURE — 2500000001 HC RX 250 WO HCPCS SELF ADMINISTERED DRUGS (ALT 637 FOR MEDICARE OP): Performed by: NURSE PRACTITIONER

## 2025-01-15 PROCEDURE — 1200000002 HC GENERAL ROOM WITH TELEMETRY DAILY

## 2025-01-15 PROCEDURE — 99285 EMERGENCY DEPT VISIT HI MDM: CPT | Performed by: EMERGENCY MEDICINE

## 2025-01-15 PROCEDURE — 84443 ASSAY THYROID STIM HORMONE: CPT | Performed by: NURSE PRACTITIONER

## 2025-01-15 PROCEDURE — 82947 ASSAY GLUCOSE BLOOD QUANT: CPT

## 2025-01-15 PROCEDURE — 36415 COLL VENOUS BLD VENIPUNCTURE: CPT | Performed by: PHYSICIAN ASSISTANT

## 2025-01-15 PROCEDURE — 2500000004 HC RX 250 GENERAL PHARMACY W/ HCPCS (ALT 636 FOR OP/ED): Performed by: NURSE PRACTITIONER

## 2025-01-15 PROCEDURE — 84439 ASSAY OF FREE THYROXINE: CPT | Performed by: NURSE PRACTITIONER

## 2025-01-15 PROCEDURE — 2500000001 HC RX 250 WO HCPCS SELF ADMINISTERED DRUGS (ALT 637 FOR MEDICARE OP): Performed by: REGISTERED NURSE

## 2025-01-15 PROCEDURE — 84484 ASSAY OF TROPONIN QUANT: CPT | Performed by: PHYSICIAN ASSISTANT

## 2025-01-15 PROCEDURE — 85025 COMPLETE CBC W/AUTO DIFF WBC: CPT | Performed by: PHYSICIAN ASSISTANT

## 2025-01-15 PROCEDURE — 85610 PROTHROMBIN TIME: CPT | Performed by: PHYSICIAN ASSISTANT

## 2025-01-15 PROCEDURE — 80053 COMPREHEN METABOLIC PANEL: CPT | Performed by: PHYSICIAN ASSISTANT

## 2025-01-15 PROCEDURE — 83605 ASSAY OF LACTIC ACID: CPT | Performed by: PHYSICIAN ASSISTANT

## 2025-01-15 PROCEDURE — 93005 ELECTROCARDIOGRAM TRACING: CPT

## 2025-01-15 PROCEDURE — 87636 SARSCOV2 & INF A&B AMP PRB: CPT | Performed by: PHYSICIAN ASSISTANT

## 2025-01-15 PROCEDURE — 71045 X-RAY EXAM CHEST 1 VIEW: CPT | Performed by: RADIOLOGY

## 2025-01-15 PROCEDURE — 99222 1ST HOSP IP/OBS MODERATE 55: CPT | Performed by: REGISTERED NURSE

## 2025-01-15 PROCEDURE — 80061 LIPID PANEL: CPT | Performed by: REGISTERED NURSE

## 2025-01-15 PROCEDURE — 81003 URINALYSIS AUTO W/O SCOPE: CPT | Performed by: PHYSICIAN ASSISTANT

## 2025-01-15 PROCEDURE — 71045 X-RAY EXAM CHEST 1 VIEW: CPT

## 2025-01-15 RX ORDER — ACETAMINOPHEN 325 MG/1
650 TABLET ORAL EVERY 4 HOURS PRN
Status: DISCONTINUED | OUTPATIENT
Start: 2025-01-15 | End: 2025-01-17 | Stop reason: HOSPADM

## 2025-01-15 RX ORDER — PANTOPRAZOLE SODIUM 40 MG/10ML
40 INJECTION, POWDER, LYOPHILIZED, FOR SOLUTION INTRAVENOUS
Status: DISCONTINUED | OUTPATIENT
Start: 2025-01-16 | End: 2025-01-16

## 2025-01-15 RX ORDER — ACETAMINOPHEN 650 MG/1
650 SUPPOSITORY RECTAL EVERY 4 HOURS PRN
Status: DISCONTINUED | OUTPATIENT
Start: 2025-01-15 | End: 2025-01-17 | Stop reason: HOSPADM

## 2025-01-15 RX ORDER — ONDANSETRON HYDROCHLORIDE 2 MG/ML
4 INJECTION, SOLUTION INTRAVENOUS EVERY 8 HOURS PRN
Status: DISCONTINUED | OUTPATIENT
Start: 2025-01-15 | End: 2025-01-17 | Stop reason: HOSPADM

## 2025-01-15 RX ORDER — ONDANSETRON 4 MG/1
4 TABLET, FILM COATED ORAL EVERY 8 HOURS PRN
Status: DISCONTINUED | OUTPATIENT
Start: 2025-01-15 | End: 2025-01-17 | Stop reason: HOSPADM

## 2025-01-15 RX ORDER — ACETAMINOPHEN 160 MG/5ML
650 SOLUTION ORAL EVERY 4 HOURS PRN
Status: DISCONTINUED | OUTPATIENT
Start: 2025-01-15 | End: 2025-01-17 | Stop reason: HOSPADM

## 2025-01-15 RX ORDER — PANTOPRAZOLE SODIUM 40 MG/1
40 TABLET, DELAYED RELEASE ORAL
Status: DISCONTINUED | OUTPATIENT
Start: 2025-01-16 | End: 2025-01-16

## 2025-01-15 RX ORDER — INSULIN LISPRO 100 [IU]/ML
0-5 INJECTION, SOLUTION INTRAVENOUS; SUBCUTANEOUS
Status: DISCONTINUED | OUTPATIENT
Start: 2025-01-15 | End: 2025-01-17 | Stop reason: HOSPADM

## 2025-01-15 RX ORDER — LEVOTHYROXINE SODIUM 100 UG/1
100 TABLET ORAL DAILY
Status: DISCONTINUED | OUTPATIENT
Start: 2025-01-16 | End: 2025-01-17 | Stop reason: HOSPADM

## 2025-01-15 RX ORDER — ATORVASTATIN CALCIUM 20 MG/1
10 TABLET, FILM COATED ORAL DAILY
Status: DISCONTINUED | OUTPATIENT
Start: 2025-01-16 | End: 2025-01-17 | Stop reason: HOSPADM

## 2025-01-15 RX ORDER — ALBUTEROL SULFATE 90 UG/1
2 INHALANT RESPIRATORY (INHALATION) EVERY 4 HOURS PRN
Status: DISCONTINUED | OUTPATIENT
Start: 2025-01-15 | End: 2025-01-17 | Stop reason: HOSPADM

## 2025-01-15 RX ORDER — MAGNESIUM SULFATE HEPTAHYDRATE 40 MG/ML
2 INJECTION, SOLUTION INTRAVENOUS ONCE
Status: COMPLETED | OUTPATIENT
Start: 2025-01-15 | End: 2025-01-15

## 2025-01-15 RX ORDER — PANTOPRAZOLE SODIUM 40 MG/1
40 TABLET, DELAYED RELEASE ORAL DAILY
Status: DISCONTINUED | OUTPATIENT
Start: 2025-01-15 | End: 2025-01-17 | Stop reason: HOSPADM

## 2025-01-15 RX ORDER — TRAZODONE HYDROCHLORIDE 150 MG/1
150 TABLET ORAL NIGHTLY PRN
Status: DISCONTINUED | OUTPATIENT
Start: 2025-01-15 | End: 2025-01-17 | Stop reason: HOSPADM

## 2025-01-15 RX ORDER — DOCUSATE SODIUM 100 MG/1
100 CAPSULE, LIQUID FILLED ORAL 2 TIMES DAILY
Status: DISCONTINUED | OUTPATIENT
Start: 2025-01-15 | End: 2025-01-17 | Stop reason: HOSPADM

## 2025-01-15 RX ORDER — LOSARTAN POTASSIUM 25 MG/1
50 TABLET ORAL DAILY
Status: DISCONTINUED | OUTPATIENT
Start: 2025-01-16 | End: 2025-01-17 | Stop reason: HOSPADM

## 2025-01-15 RX ADMIN — METOPROLOL TARTRATE 75 MG: 50 TABLET, FILM COATED ORAL at 20:28

## 2025-01-15 RX ADMIN — PANTOPRAZOLE SODIUM 40 MG: 40 TABLET, DELAYED RELEASE ORAL at 17:52

## 2025-01-15 RX ADMIN — APIXABAN 5 MG: 5 TABLET, FILM COATED ORAL at 20:28

## 2025-01-15 RX ADMIN — DOCUSATE SODIUM 100 MG: 100 CAPSULE, LIQUID FILLED ORAL at 20:28

## 2025-01-15 RX ADMIN — MAGNESIUM SULFATE HEPTAHYDRATE 2 G: 40 INJECTION, SOLUTION INTRAVENOUS at 15:38

## 2025-01-15 RX ADMIN — METOPROLOL TARTRATE 75 MG: 50 TABLET, FILM COATED ORAL at 15:36

## 2025-01-15 RX ADMIN — TRAZODONE HYDROCHLORIDE 150 MG: 150 TABLET ORAL at 23:36

## 2025-01-15 SDOH — SOCIAL STABILITY: SOCIAL INSECURITY: DO YOU FEEL UNSAFE GOING BACK TO THE PLACE WHERE YOU ARE LIVING?: NO

## 2025-01-15 SDOH — SOCIAL STABILITY: SOCIAL INSECURITY: HAVE YOU HAD ANY THOUGHTS OF HARMING ANYONE ELSE?: NO

## 2025-01-15 SDOH — SOCIAL STABILITY: SOCIAL INSECURITY: DO YOU FEEL ANYONE HAS EXPLOITED OR TAKEN ADVANTAGE OF YOU FINANCIALLY OR OF YOUR PERSONAL PROPERTY?: NO

## 2025-01-15 SDOH — SOCIAL STABILITY: SOCIAL INSECURITY: ARE YOU OR HAVE YOU BEEN THREATENED OR ABUSED PHYSICALLY, EMOTIONALLY, OR SEXUALLY BY ANYONE?: NO

## 2025-01-15 SDOH — SOCIAL STABILITY: SOCIAL INSECURITY: DOES ANYONE TRY TO KEEP YOU FROM HAVING/CONTACTING OTHER FRIENDS OR DOING THINGS OUTSIDE YOUR HOME?: NO

## 2025-01-15 SDOH — SOCIAL STABILITY: SOCIAL INSECURITY: HAS ANYONE EVER THREATENED TO HURT YOUR FAMILY OR YOUR PETS?: NO

## 2025-01-15 SDOH — SOCIAL STABILITY: SOCIAL INSECURITY: ARE THERE ANY APPARENT SIGNS OF INJURIES/BEHAVIORS THAT COULD BE RELATED TO ABUSE/NEGLECT?: NO

## 2025-01-15 ASSESSMENT — COGNITIVE AND FUNCTIONAL STATUS - GENERAL
DAILY ACTIVITIY SCORE: 24
PATIENT BASELINE BEDBOUND: NO
MOBILITY SCORE: 24
MOBILITY SCORE: 24
DAILY ACTIVITIY SCORE: 24

## 2025-01-15 ASSESSMENT — PAIN SCALES - GENERAL
PAINLEVEL_OUTOF10: 0 - NO PAIN
PAINLEVEL_OUTOF10: 0 - NO PAIN

## 2025-01-15 ASSESSMENT — COLUMBIA-SUICIDE SEVERITY RATING SCALE - C-SSRS
6. HAVE YOU EVER DONE ANYTHING, STARTED TO DO ANYTHING, OR PREPARED TO DO ANYTHING TO END YOUR LIFE?: NO
1. IN THE PAST MONTH, HAVE YOU WISHED YOU WERE DEAD OR WISHED YOU COULD GO TO SLEEP AND NOT WAKE UP?: NO
6. HAVE YOU EVER DONE ANYTHING, STARTED TO DO ANYTHING, OR PREPARED TO DO ANYTHING TO END YOUR LIFE?: NO
2. HAVE YOU ACTUALLY HAD ANY THOUGHTS OF KILLING YOURSELF?: NO
1. IN THE PAST MONTH, HAVE YOU WISHED YOU WERE DEAD OR WISHED YOU COULD GO TO SLEEP AND NOT WAKE UP?: NO
2. HAVE YOU ACTUALLY HAD ANY THOUGHTS OF KILLING YOURSELF?: NO

## 2025-01-15 ASSESSMENT — ACTIVITIES OF DAILY LIVING (ADL)
DRESSING YOURSELF: INDEPENDENT
WALKS IN HOME: INDEPENDENT
BATHING: INDEPENDENT
HEARING - RIGHT EAR: FUNCTIONAL
PATIENT'S MEMORY ADEQUATE TO SAFELY COMPLETE DAILY ACTIVITIES?: YES
TOILETING: INDEPENDENT
ADEQUATE_TO_COMPLETE_ADL: YES
HEARING - LEFT EAR: FUNCTIONAL
JUDGMENT_ADEQUATE_SAFELY_COMPLETE_DAILY_ACTIVITIES: YES
GROOMING: INDEPENDENT
FEEDING YOURSELF: INDEPENDENT

## 2025-01-15 ASSESSMENT — LIFESTYLE VARIABLES
HAVE YOU EVER FELT YOU SHOULD CUT DOWN ON YOUR DRINKING: NO
EVER HAD A DRINK FIRST THING IN THE MORNING TO STEADY YOUR NERVES TO GET RID OF A HANGOVER: NO
TOTAL SCORE: 0
EVER FELT BAD OR GUILTY ABOUT YOUR DRINKING: NO
HAVE PEOPLE ANNOYED YOU BY CRITICIZING YOUR DRINKING: NO

## 2025-01-15 ASSESSMENT — PAIN - FUNCTIONAL ASSESSMENT: PAIN_FUNCTIONAL_ASSESSMENT: 0-10

## 2025-01-15 NOTE — H&P
History Of Present Illness  Myrna Rodrigues is a 74 y.o. female presenting with increased heart rate and left axilla pain that started at home while she was resting and watching TV. She was recently discharged last night  for SVT, cystitis and diarrhea. She reports similar symptoms as prior with her heart racing. She is not having any axilla pain at this time. No shortness of breath. No other complaints.     Admitted to hospital medicine.      ED course: Patient admitted for influenza, COVID-19, , urinalysis negative, troponins are elevated at 113 and 128.  No ischemic findings on EKG 8.  Lactate 2.1 with repeat 1.8.  Hemoglobin steady at 10 with hematocrit 34, TSH elevated at 10.2.  Chest x-ray negative for acute processes.    Patient examined and seen. Alert and oriented x3, resting comfortably.  Patient denies chest pain, shortness of breath, palpitations, abdominal pain, fever or chills.  No other complaints at this time.  No left axillary pain.  No chest pain and no shortness of breath this time.  Independent with all ADLs at home.    Review of systems: 10 system were reviewed and were negative except what was mentioned in history of present illness       Past Medical History  Anxiety, depression, arrhythmia, asthma, diabetes, GERD, hyperlipidemia, hypertension, A-fib status post ablation on Eliquis, hypertension, CAD, PE urinary incontinence with bladder stimulator      Surgical History  She has a past surgical history that includes Hysterectomy (08/13/2015); Knee arthroscopy w/ meniscal repair (08/13/2015); Total knee arthroplasty (08/13/2015); Other surgical history (08/13/2015); Gallbladder surgery (08/13/2015); Knee surgery (08/13/2015); Appendectomy (08/13/2015); Other surgical history (08/13/2015); Carpal tunnel release (08/13/2015); Breast lumpectomy (08/13/2015); Other surgical history (09/20/2021); Other surgical history (09/20/2021); Other surgical history (09/20/2021); Other surgical history  (09/20/2021); Other surgical history (09/20/2021); Other surgical history (09/20/2021); Other surgical history (10/11/2021); MR angio head wo IV contrast (09/05/2021); and Thyroidectomy.     Social History  She reports that she has never smoked. She has never used smokeless tobacco. She reports that she does not currently use alcohol. She reports that she does not use drugs.    Family History  Family History   Problem Relation Name Age of Onset    Lung cancer Mother      Lymphoma Mother      Bone cancer Mother      Heart attack Father      Liver cancer Brother      Lung cancer Brother          Allergies  Patient has no known allergies.         Physical Exam   Constitutional: Well developed, awake/alert/oriented x3, , cooperative  Eyes: PERRL, EOMI, clear sclera  ENMT: mucous membranes moist, no apparent injury, no lesions seen  Head/Neck: Neck supple, no apparent injury, thyroid without mass or tenderness  Respiratory/Thorax: Patent airways,  normal breath sounds with good chest expansion, thorax symmetric  Cardiovascular: Regular, rate and rhythm, no murmurs, 2+ equal pulses of the extremities, normal S 1and S 2  Gastrointestinal: Nondistended, soft, non-tender,  +BS x 4 quadrants  Genitourinary: voiding freely without CVA tenderness or suprabupic tenderness   Musculoskeletal: ROM intact, no joint swelling,   Extremities: normal extremities,  no contusions or wounds seen   Skin: warm, dry, intact  Neurological: alert/oriented x 3, speech clear, cranial nerves II through XII  grossly intact  Psychiatric: appropriate mood and behavior    Last Recorded Vitals  BP (!) 198/90   Pulse 67   Temp 37.4 °C (99.3 °F) (Temporal)   Resp 18   Wt 62.6 kg (138 lb)   SpO2 97%     Relevant Results  Scheduled medications  docusate sodium, 100 mg, oral, BID  magnesium sulfate, 2 g, intravenous, Once  metoprolol tartrate, 75 mg, oral, BID  [START ON 1/16/2025] pantoprazole, 40 mg, oral, Daily before breakfast   Or  [START ON  1/16/2025] pantoprazole, 40 mg, intravenous, Daily before breakfast      Continuous medications     PRN medications  PRN medications: acetaminophen **OR** acetaminophen **OR** acetaminophen, acetaminophen **OR** acetaminophen **OR** acetaminophen, ondansetron **OR** ondansetron    Results for orders placed or performed during the hospital encounter of 01/15/25 (from the past 24 hours)   ECG 12 lead   Result Value Ref Range    Ventricular Rate 70 BPM    Atrial Rate 70 BPM    MN Interval 190 ms    QRS Duration 86 ms    QT Interval 412 ms    QTC Calculation(Bazett) 444 ms    P Axis 89 degrees    R Axis -56 degrees    T Axis 42 degrees    QRS Count 11 beats    Q Onset 215 ms    P Onset 120 ms    P Offset 172 ms    T Offset 421 ms    QTC Fredericia 433 ms   Urinalysis with Reflex Culture and Microscopic   Result Value Ref Range    Color, Urine Light-Yellow Light-Yellow, Yellow, Dark-Yellow    Appearance, Urine Clear Clear    Specific Gravity, Urine 1.005 1.005 - 1.035    pH, Urine 7.0 5.0, 5.5, 6.0, 6.5, 7.0, 7.5, 8.0    Protein, Urine NEGATIVE NEGATIVE, 10 (TRACE), 20 (TRACE) mg/dL    Glucose, Urine Normal Normal mg/dL    Blood, Urine NEGATIVE NEGATIVE    Ketones, Urine NEGATIVE NEGATIVE mg/dL    Bilirubin, Urine NEGATIVE NEGATIVE    Urobilinogen, Urine Normal Normal mg/dL    Nitrite, Urine NEGATIVE NEGATIVE    Leukocyte Esterase, Urine NEGATIVE NEGATIVE   CBC and Auto Differential   Result Value Ref Range    WBC 9.1 4.4 - 11.3 x10*3/uL    nRBC 0.0 0.0 - 0.0 /100 WBCs    RBC 4.68 4.00 - 5.20 x10*6/uL    Hemoglobin 10.0 (L) 12.0 - 16.0 g/dL    Hematocrit 34.0 (L) 36.0 - 46.0 %    MCV 73 (L) 80 - 100 fL    MCH 21.4 (L) 26.0 - 34.0 pg    MCHC 29.4 (L) 32.0 - 36.0 g/dL    RDW 19.2 (H) 11.5 - 14.5 %    Platelets 276 150 - 450 x10*3/uL    Neutrophils % 72.4 40.0 - 80.0 %    Immature Granulocytes %, Automated 0.3 0.0 - 0.9 %    Lymphocytes % 16.1 13.0 - 44.0 %    Monocytes % 7.2 2.0 - 10.0 %    Eosinophils % 3.7 0.0 - 6.0 %     Basophils % 0.3 0.0 - 2.0 %    Neutrophils Absolute 6.60 (H) 1.60 - 5.50 x10*3/uL    Immature Granulocytes Absolute, Automated 0.03 0.00 - 0.50 x10*3/uL    Lymphocytes Absolute 1.47 0.80 - 3.00 x10*3/uL    Monocytes Absolute 0.66 0.05 - 0.80 x10*3/uL    Eosinophils Absolute 0.34 0.00 - 0.40 x10*3/uL    Basophils Absolute 0.03 0.00 - 0.10 x10*3/uL   Comprehensive Metabolic Panel   Result Value Ref Range    Glucose 109 (H) 74 - 99 mg/dL    Sodium 138 136 - 145 mmol/L    Potassium 4.0 3.5 - 5.3 mmol/L    Chloride 103 98 - 107 mmol/L    Bicarbonate 29 21 - 32 mmol/L    Anion Gap 10 10 - 20 mmol/L    Urea Nitrogen 8 6 - 23 mg/dL    Creatinine 0.96 0.50 - 1.05 mg/dL    eGFR 61 >60 mL/min/1.73m*2    Calcium 8.8 8.6 - 10.3 mg/dL    Albumin 4.0 3.4 - 5.0 g/dL    Alkaline Phosphatase 110 33 - 136 U/L    Total Protein 7.1 6.4 - 8.2 g/dL    AST 63 (H) 9 - 39 U/L    Bilirubin, Total 0.4 0.0 - 1.2 mg/dL    ALT 31 7 - 45 U/L   Magnesium   Result Value Ref Range    Magnesium 2.12 1.60 - 2.40 mg/dL   Lactate   Result Value Ref Range    Lactate 2.1 (H) 0.4 - 2.0 mmol/L   Protime-INR   Result Value Ref Range    Protime 20.0 (H) 9.8 - 12.8 seconds    INR 1.8 (H) 0.9 - 1.1   B-Type Natriuretic Peptide   Result Value Ref Range     (H) 0 - 99 pg/mL   Troponin I, High Sensitivity, Initial   Result Value Ref Range    Troponin I, High Sensitivity 113 (HH) 0 - 13 ng/L   Sars-CoV-2 and Influenza A/B PCR   Result Value Ref Range    Flu A Result Not Detected Not Detected    Flu B Result Not Detected Not Detected    Coronavirus 2019, PCR Not Detected Not Detected   Troponin, High Sensitivity, 1 Hour   Result Value Ref Range    Troponin I, High Sensitivity 120 (HH) 0 - 13 ng/L   TSH with reflex to Free T4 if abnormal   Result Value Ref Range    Thyroid Stimulating Hormone 10.20 (H) 0.44 - 3.98 mIU/L   ECG 12 lead   Result Value Ref Range    Ventricular Rate 68 BPM    Atrial Rate 68 BPM    OR Interval 158 ms    QRS Duration 84 ms    QT  Interval 416 ms    QTC Calculation(Bazett) 442 ms    P Axis 71 degrees    R Axis -64 degrees    T Axis 43 degrees    QRS Count 11 beats    Q Onset 212 ms    P Onset 133 ms    P Offset 179 ms    T Offset 420 ms    QTC Fredericia 434 ms   Lactate   Result Value Ref Range    Lactate 1.8 0.4 - 2.0 mmol/L     *Note: Due to a large number of results and/or encounters for the requested time period, some results have not been displayed. A complete set of results can be found in Results Review.              Assessment/Plan     # SVT   # Elevated Troponins  # Elevated Lactate   # Abnormal TSH level   # Previous UTI - no growth on last admission / no symptoms   # Diabetes type 2     Admit to Hospitalist team  Hx of s/p ablation for Afib on Eliquis   Repeat Troponins  in AM with ECG   PRN ecg with chest pain   CARDIOLOGY CONSULTED - Lopressor increased, continue Eliquis   Telemetry monitoring   TSH 10 - last note indicates that patient medication was changed to daily   Will continue with home dose daily and follow up with PCP in 4 weeks for redraw  Keep K above 4, Mag above 2   Replace electrolyes as needed  Elevated Lactate resolved  Previous UTI - no growth - stop antibiotics   Diabetic Diet , A1C 5.9 (1/2/25), Low SSI  Hold Metformin   Repeat Lipid Panel     Home meds are not reconciled at this time        FULL CoDE  Dvtp: Continue eliquis ok with cardiology  Diet: Diabetic   Disposition: Home 48 hours    Time spent  56  minutes obtaining labs, imaging, recommendations, interview, assessment, examination, medication review/ordering, and EMR review.    Plan of care was discussed extensively with patient, RN, Dr. lomas. Patient verbalized understanding through teach back method. All questions and concerns addressed upon examination.     Of note, this documentation is completed using the Dragon Dictation system (voice recognition software). There may be spelling and/or grammatical errors that were not corrected prior to final  submission.          Hoa Weber, APRN-CNP

## 2025-01-15 NOTE — ED PROVIDER NOTES
HPI   Chief Complaint   Patient presents with    Palpitations       A 74-year-old female patient comes into the emergency department today with complaints of a racing heart that started about 45 minutes ago.  States she was sitting watching TV when also and she felt her heart start to race.  States had a little pain in her left axillary region but that is gone away.  She otherwise denies any symptoms at this present time.  States she was just released from the hospital last night for something similar.  She otherwise has no chest pain at this time.  No shortness of breath.  No recent illness or fevers.  She otherwise has no other complaints this present time for this purpose comes into the emergency department today further evaluation.              Patient History   Past Medical History:   Diagnosis Date    Anxiety and depression     Arrhythmia     Asthma     Cancer (Multi)     Coronary artery disease     Diabetes mellitus (Multi)     Disease of thyroid gland     GERD (gastroesophageal reflux disease)     Heart disease     Hyperlipidemia     Hypertension     Irregular heart beat     Occipital neuralgia 06/10/2022    Occipital neuralgia of right side    Personal history of malignant neoplasm, unspecified 10/04/2021    History of malignant neoplasm    Personal history of other diseases of the circulatory system     History of hypertension    Personal history of other diseases of the circulatory system 10/04/2021    History of essential hypertension    Personal history of other diseases of the circulatory system     History of coronary artery disease    Personal history of other diseases of the digestive system 03/11/2020    History of chronic constipation    Personal history of other diseases of the musculoskeletal system and connective tissue     History of arthritis    Personal history of other diseases of the nervous system and sense organs 10/04/2021    History of eye problem    Personal history of other diseases  of the respiratory system     History of asthma    Personal history of other specified conditions 10/04/2021    History of chest pain    PONV (postoperative nausea and vomiting)     Stool incontinence     Thyroid cancer (Multi)     Urinary incontinence      Past Surgical History:   Procedure Laterality Date    APPENDECTOMY  08/13/2015    Appendectomy    BREAST LUMPECTOMY  08/13/2015    Breast Surgery Lumpectomy    CARPAL TUNNEL RELEASE  08/13/2015    Neuroplasty Decompression Median Nerve At Carpal Tunnel    GALLBLADDER SURGERY  08/13/2015    Gallbladder Surgery    HYSTERECTOMY  08/13/2015    Hysterectomy    KNEE ARTHROSCOPY W/ MENISCAL REPAIR  08/13/2015    Knee Arthroscopy With Medial Meniscus Repair    KNEE SURGERY  08/13/2015    Knee Surgery    MR HEAD ANGIO WO IV CONTRAST  09/05/2021    MR HEAD ANGIO WO IV CONTRAST 9/5/2021 [facility]    OTHER SURGICAL HISTORY  08/13/2015    Shoulder Surgery Left    OTHER SURGICAL HISTORY  08/13/2015    Repair Of Bladder Reconstruction    OTHER SURGICAL HISTORY  09/20/2021    Breast surgery    OTHER SURGICAL HISTORY  09/20/2021    Bladder surgery    OTHER SURGICAL HISTORY  09/20/2021    Complete colonoscopy    OTHER SURGICAL HISTORY  09/20/2021    Shoulder surgery    OTHER SURGICAL HISTORY  09/20/2021    Foot surgery    OTHER SURGICAL HISTORY  09/20/2021    Percutaneous transluminal coronary angioplasty    OTHER SURGICAL HISTORY  10/11/2021    Thyroidectomy total    THYROIDECTOMY      TOTAL KNEE ARTHROPLASTY  08/13/2015    Knee Replacement     Family History   Problem Relation Name Age of Onset    Lung cancer Mother      Lymphoma Mother      Bone cancer Mother      Heart attack Father      Liver cancer Brother      Lung cancer Brother       Social History     Tobacco Use    Smoking status: Never    Smokeless tobacco: Never   Vaping Use    Vaping status: Never Used   Substance Use Topics    Alcohol use: Not Currently    Drug use: Never       Physical Exam   ED Triage  Vitals [01/15/25 1055]   Temperature Heart Rate Respirations BP   37.4 °C (99.3 °F) (!) 131 17 (!) 183/100      Pulse Ox Temp Source Heart Rate Source Patient Position   97 % Temporal Monitor Lying      BP Location FiO2 (%)     Right arm --       Physical Exam  Constitutional:       General: She is not in acute distress.     Appearance: Normal appearance. She is not ill-appearing or diaphoretic.   HENT:      Head: Normocephalic and atraumatic.      Nose: Nose normal.   Eyes:      Extraocular Movements: Extraocular movements intact.      Conjunctiva/sclera: Conjunctivae normal.      Pupils: Pupils are equal, round, and reactive to light.   Cardiovascular:      Rate and Rhythm: Regular rhythm. Tachycardia present.   Pulmonary:      Effort: Pulmonary effort is normal. No respiratory distress.      Breath sounds: Normal breath sounds. No stridor. No wheezing.   Musculoskeletal:         General: Normal range of motion.      Cervical back: Normal range of motion.   Skin:     General: Skin is warm and dry.   Neurological:      General: No focal deficit present.      Mental Status: She is alert and oriented to person, place, and time. Mental status is at baseline.   Psychiatric:         Mood and Affect: Mood normal.           ED Course & MDM   Diagnoses as of 01/15/25 1426   Palpitations   Elevated troponin                 No data recorded     [place] Coma Scale Score: 15 (01/15/25 1059 : Odalys Carolina RN)                           Medical Decision Making  A 74-year-old female patient comes into the emergency department today with complaints of a racing heart that started about 45 minutes ago.  States she was sitting watching TV when also and she felt her heart start to race.  States had a little pain in her left axillary region but that is gone away.  She otherwise denies any symptoms at this present time.  States she was just released from the hospital last night for something similar.  She otherwise has no chest pain at  this time.  No shortness of breath.  No recent illness or fevers.  She otherwise has no other complaints this present time for this purpose comes into the emergency department today further evaluation.    EKG, chest x-ray, laboratory studies ordered to rule ACS, arrhythmia, electrolyte abnormality, leukocytosis, acute kidney injury, pneumonia, pneumothorax, pulmonary congestion and pleural effusions.     EKG: My EKG interpretation, 60 bpm, sinus rhythm, PVC, no ST elevations, T wave abnormalities, no STEMI    Patient negative for influenza COVID-19, , metabolic panel shows no acute renal injury or electrolyte abnormality, magnesium within normal limits lactate 2.1 no leukocytosis.  Patient's hemoglobin at 10 which baseline for the patient.  Patient's urinalysis is negative.  Patient's troponin are 113 and 120.  No ischemic finding on EKGs.    Will admit patient to the hospital for further evaluation.  Patient is already anticoagulated on Eliquis.  I discussed plan for admission with patient as well as patient's son they both agree with this plan.    Historians patient    Diagnosis: Palpitations, elevated troponin    I discussed the case with Hoa the hospitalist GENNY agrees with the patient under Dr. Pool      Labs Reviewed   CBC WITH AUTO DIFFERENTIAL - Abnormal       Result Value    WBC 9.1      nRBC 0.0      RBC 4.68      Hemoglobin 10.0 (*)     Hematocrit 34.0 (*)     MCV 73 (*)     MCH 21.4 (*)     MCHC 29.4 (*)     RDW 19.2 (*)     Platelets 276      Neutrophils % 72.4      Immature Granulocytes %, Automated 0.3      Lymphocytes % 16.1      Monocytes % 7.2      Eosinophils % 3.7      Basophils % 0.3      Neutrophils Absolute 6.60 (*)     Immature Granulocytes Absolute, Automated 0.03      Lymphocytes Absolute 1.47      Monocytes Absolute 0.66      Eosinophils Absolute 0.34      Basophils Absolute 0.03     COMPREHENSIVE METABOLIC PANEL - Abnormal    Glucose 109 (*)     Sodium 138      Potassium 4.0       Chloride 103      Bicarbonate 29      Anion Gap 10      Urea Nitrogen 8      Creatinine 0.96      eGFR 61      Calcium 8.8      Albumin 4.0      Alkaline Phosphatase 110      Total Protein 7.1      AST 63 (*)     Bilirubin, Total 0.4      ALT 31     LACTATE - Abnormal    Lactate 2.1 (*)     Narrative:     Venipuncture immediately after or during the administration of Metamizole may lead to falsely low results. Testing should be performed immediately prior to Metamizole dosing.   PROTIME-INR - Abnormal    Protime 20.0 (*)     INR 1.8 (*)    B-TYPE NATRIURETIC PEPTIDE - Abnormal     (*)     Narrative:        <100 pg/mL - Heart failure unlikely  100-299 pg/mL - Intermediate probability of acute heart                  failure exacerbation. Correlate with clinical                  context and patient history.    >=300 pg/mL - Heart Failure likely. Correlate with clinical                  context and patient history.    BNP testing is performed using different testing methodology at [facility] than at other [facility]. Direct result comparisons should only be made within the same method.      SERIAL TROPONIN-INITIAL - Abnormal    Troponin I, High Sensitivity 113 (*)     Narrative:     Less than 99th percentile of normal range cutoff-  Female and children under 18 years old <14 ng/L; Male <21 ng/L: Negative  Repeat testing should be performed if clinically indicated.     Female and children under 18 years old 14-50 ng/L; Male 21-50 ng/L:  Consistent with possible cardiac damage and possible increased clinical   risk. Serial measurements may help to assess extent of myocardial damage.     >50 ng/L: Consistent with cardiac damage, increased clinical risk and  myocardial infarction. Serial measurements may help assess extent of   myocardial damage.      NOTE: Children less than 1 year old may have higher baseline troponin   levels and results should be interpreted in conjunction with the overall    clinical context.     NOTE: Troponin I testing is performed using a different   testing methodology at [facility] than at other   [facility]. Direct result comparisons should only   be made within the same method.   SERIAL TROPONIN, 1 HOUR - Abnormal    Troponin I, High Sensitivity 120 (*)     Narrative:     Less than 99th percentile of normal range cutoff-  Female and children under 18 years old <14 ng/L; Male <21 ng/L: Negative  Repeat testing should be performed if clinically indicated.     Female and children under 18 years old 14-50 ng/L; Male 21-50 ng/L:  Consistent with possible cardiac damage and possible increased clinical   risk. Serial measurements may help to assess extent of myocardial damage.     >50 ng/L: Consistent with cardiac damage, increased clinical risk and  myocardial infarction. Serial measurements may help assess extent of   myocardial damage.      NOTE: Children less than 1 year old may have higher baseline troponin   levels and results should be interpreted in conjunction with the overall   clinical context.     NOTE: Troponin I testing is performed using a different   testing methodology at [facility] than at other   [facility]. Direct result comparisons should only   be made within the same method.   MAGNESIUM - Normal    Magnesium 2.12     SARS-COV-2 AND INFLUENZA A/B PCR - Normal    Flu A Result Not Detected      Flu B Result Not Detected      Coronavirus 2019, PCR Not Detected      Narrative:     This assay has received FDA Emergency Use Authorization (EUA) and  is only authorized for the duration of time that circumstances exist to justify the authorization of the emergency use of in vitro diagnostic tests for the detection of SARS-CoV-2 virus and/or diagnosis of COVID-19 infection under section 564(b)(1) of the Act, 21 U.S.C. 360bbb-3(b)(1). Testing for SARS-CoV-2 is only recommended for patients who meet current clinical and/or  epidemiological criteria as defined by federal, state, or local public health directives. This assay is an in vitro diagnostic nucleic acid amplification test for the qualitative detection of SARS-CoV-2, Influenza A, and Influenza B from nasopharyngeal specimens and has been validated for use at [facility]. Negative results do not preclude COVID-19 infections or Influenza A/B infections, and should not be used as the sole basis for diagnosis, treatment, or other management decisions. If Influenza A/B and RSV PCR results are negative, testing for Parainfluenza virus, Adenovirus and Metapneumovirus is routinely performed for [facility] pediatric oncology and intensive care inpatients, and is available on other patients by placing an add-on request.    URINALYSIS WITH REFLEX CULTURE AND MICROSCOPIC - Normal    Color, Urine Light-Yellow      Appearance, Urine Clear      Specific Gravity, Urine 1.005      pH, Urine 7.0      Protein, Urine NEGATIVE      Glucose, Urine Normal      Blood, Urine NEGATIVE      Ketones, Urine NEGATIVE      Bilirubin, Urine NEGATIVE      Urobilinogen, Urine Normal      Nitrite, Urine NEGATIVE      Leukocyte Esterase, Urine NEGATIVE     TROPONIN SERIES- (INITIAL, 1 HR)    Narrative:     The following orders were created for panel order Troponin I Series, High Sensitivity (0, 1 HR).  Procedure                               Abnormality         Status                     ---------                               -----------         ------                     Troponin I, High Sensiti...[[id number]]  Abnormal            Final result               Troponin, High Sensitivi...[[id number]]  Abnormal            Final result                 Please view results for these tests on the individual orders.   URINALYSIS WITH REFLEX CULTURE AND MICROSCOPIC    Narrative:     The following orders were created for panel order Urinalysis with Reflex Culture and Microscopic.  Procedure                                Abnormality         Status                     ---------                               -----------         ------                     Urinalysis with Reflex C...[[id number]]  Normal              Final result               Extra Urine Gray Tube[[id number]]                            In process                   Please view results for these tests on the individual orders.   EXTRA URINE GRAY TUBE   LACTATE        XR chest 1 view   Final Result   No acute cardiopulmonary process.        MACRO:   None        Signed by: Monique Caballero 1/15/2025 11:21 AM   Dictation workstation:   [id number]              Shared GENNY Attestation:    I personally saw the patient and made/approved the management plan and take responsibility for the patient management.     History: 74-year-old female presents with palpitations.    Exam: Tachycardic, regular rhythm cardiac exam with clear breath sounds bilaterally.  Abdomen is soft and nontender.  Neurological exam is grossly intact.  Negative Homans' sign bilaterally.    MDM: ACS, arrhythmia, electrolyte disorder    Labs Reviewed   CBC WITH AUTO DIFFERENTIAL - Abnormal       Result Value    WBC 9.1      nRBC 0.0      RBC 4.68      Hemoglobin 10.0 (*)     Hematocrit 34.0 (*)     MCV 73 (*)     MCH 21.4 (*)     MCHC 29.4 (*)     RDW 19.2 (*)     Platelets 276      Neutrophils % 72.4      Immature Granulocytes %, Automated 0.3      Lymphocytes % 16.1      Monocytes % 7.2      Eosinophils % 3.7      Basophils % 0.3      Neutrophils Absolute 6.60 (*)     Immature Granulocytes Absolute, Automated 0.03      Lymphocytes Absolute 1.47      Monocytes Absolute 0.66      Eosinophils Absolute 0.34      Basophils Absolute 0.03     COMPREHENSIVE METABOLIC PANEL - Abnormal    Glucose 109 (*)     Sodium 138      Potassium 4.0      Chloride 103      Bicarbonate 29      Anion Gap 10      Urea Nitrogen 8      Creatinine 0.96      eGFR 61      Calcium 8.8      Albumin 4.0      Alkaline Phosphatase 110       Total Protein 7.1      AST 63 (*)     Bilirubin, Total 0.4      ALT 31     LACTATE - Abnormal    Lactate 2.1 (*)     Narrative:     Venipuncture immediately after or during the administration of Metamizole may lead to falsely low results. Testing should be performed immediately prior to Metamizole dosing.   PROTIME-INR - Abnormal    Protime 20.0 (*)     INR 1.8 (*)    B-TYPE NATRIURETIC PEPTIDE - Abnormal     (*)     Narrative:        <100 pg/mL - Heart failure unlikely  100-299 pg/mL - Intermediate probability of acute heart                  failure exacerbation. Correlate with clinical                  context and patient history.    >=300 pg/mL - Heart Failure likely. Correlate with clinical                  context and patient history.    BNP testing is performed using different testing methodology at [facility] than at other [facility]. Direct result comparisons should only be made within the same method.      SERIAL TROPONIN-INITIAL - Abnormal    Troponin I, High Sensitivity 113 (*)     Narrative:     Less than 99th percentile of normal range cutoff-  Female and children under 18 years old <14 ng/L; Male <21 ng/L: Negative  Repeat testing should be performed if clinically indicated.     Female and children under 18 years old 14-50 ng/L; Male 21-50 ng/L:  Consistent with possible cardiac damage and possible increased clinical   risk. Serial measurements may help to assess extent of myocardial damage.     >50 ng/L: Consistent with cardiac damage, increased clinical risk and  myocardial infarction. Serial measurements may help assess extent of   myocardial damage.      NOTE: Children less than 1 year old may have higher baseline troponin   levels and results should be interpreted in conjunction with the overall   clinical context.     NOTE: Troponin I testing is performed using a different   testing methodology at [facility] than at other   [facility]. Direct  result comparisons should only   be made within the same method.   SERIAL TROPONIN, 1 HOUR - Abnormal    Troponin I, High Sensitivity 120 (*)     Narrative:     Less than 99th percentile of normal range cutoff-  Female and children under 18 years old <14 ng/L; Male <21 ng/L: Negative  Repeat testing should be performed if clinically indicated.     Female and children under 18 years old 14-50 ng/L; Male 21-50 ng/L:  Consistent with possible cardiac damage and possible increased clinical   risk. Serial measurements may help to assess extent of myocardial damage.     >50 ng/L: Consistent with cardiac damage, increased clinical risk and  myocardial infarction. Serial measurements may help assess extent of   myocardial damage.      NOTE: Children less than 1 year old may have higher baseline troponin   levels and results should be interpreted in conjunction with the overall   clinical context.     NOTE: Troponin I testing is performed using a different   testing methodology at [facility] than at other   [facility]. Direct result comparisons should only   be made within the same method.   MAGNESIUM - Normal    Magnesium 2.12     SARS-COV-2 AND INFLUENZA A/B PCR - Normal    Flu A Result Not Detected      Flu B Result Not Detected      Coronavirus 2019, PCR Not Detected      Narrative:     This assay has received FDA Emergency Use Authorization (EUA) and  is only authorized for the duration of time that circumstances exist to justify the authorization of the emergency use of in vitro diagnostic tests for the detection of SARS-CoV-2 virus and/or diagnosis of COVID-19 infection under section 564(b)(1) of the Act, 21 U.S.C. 360bbb-3(b)(1). Testing for SARS-CoV-2 is only recommended for patients who meet current clinical and/or epidemiological criteria as defined by federal, state, or local public health directives. This assay is an in vitro diagnostic nucleic acid amplification test for the qualitative  detection of SARS-CoV-2, Influenza A, and Influenza B from nasopharyngeal specimens and has been validated for use at [facility]. Negative results do not preclude COVID-19 infections or Influenza A/B infections, and should not be used as the sole basis for diagnosis, treatment, or other management decisions. If Influenza A/B and RSV PCR results are negative, testing for Parainfluenza virus, Adenovirus and Metapneumovirus is routinely performed for [facility] pediatric oncology and intensive care inpatients, and is available on other patients by placing an add-on request.    URINALYSIS WITH REFLEX CULTURE AND MICROSCOPIC - Normal    Color, Urine Light-Yellow      Appearance, Urine Clear      Specific Gravity, Urine 1.005      pH, Urine 7.0      Protein, Urine NEGATIVE      Glucose, Urine Normal      Blood, Urine NEGATIVE      Ketones, Urine NEGATIVE      Bilirubin, Urine NEGATIVE      Urobilinogen, Urine Normal      Nitrite, Urine NEGATIVE      Leukocyte Esterase, Urine NEGATIVE     TROPONIN SERIES- (INITIAL, 1 HR)    Narrative:     The following orders were created for panel order Troponin I Series, High Sensitivity (0, 1 HR).  Procedure                               Abnormality         Status                     ---------                               -----------         ------                     Troponin I, High Sensiti...[[id number]]  Abnormal            Final result               Troponin, High Sensitivi...[[id number]]  Abnormal            Final result                 Please view results for these tests on the individual orders.   URINALYSIS WITH REFLEX CULTURE AND MICROSCOPIC    Narrative:     The following orders were created for panel order Urinalysis with Reflex Culture and Microscopic.  Procedure                               Abnormality         Status                     ---------                               -----------         ------                     Urinalysis with Reflex  C...[[id number]]  Normal              Final result               Extra Urine Gray Tube[[id number]]                            In process                   Please view results for these tests on the individual orders.   EXTRA URINE GRAY TUBE   LACTATE       XR chest 1 view   Final Result   No acute cardiopulmonary process.        MACRO:   None        Signed by: Monique Caballero 1/15/2025 11:21 AM   Dictation workstation:   [id number]          My interpretation of EKG is normal sinus rhythm at 70 bpm with left axis deviation and nonspecific ST-T changes.    Cesario Slater MD        Procedure  ECG 12 lead    Performed by: Wayne Denise PA-C  Authorized by: Wayne Denise PA-C    Interpretation:     Details:  My EKG interpretation  Rate:     ECG rate:  70    ECG rate assessment: normal    Rhythm:     Rhythm: sinus rhythm    QRS:     QRS axis:  Left  ST segments:     ST segments:  Normal  T waves:     T waves: normal         Wayne Denise PA-C  01/15/25 1426

## 2025-01-15 NOTE — PROGRESS NOTES
Reached out to patient for a TCM call. She stated to me that she felt she was back in tachycardia and she was calling her son to take her back to the ED. Will make another call tomorrow 1/16/2025 if not admitted.

## 2025-01-16 ENCOUNTER — APPOINTMENT (OUTPATIENT)
Dept: CARDIOLOGY | Facility: HOSPITAL | Age: 75
DRG: 309 | End: 2025-01-16
Payer: MEDICARE

## 2025-01-16 ENCOUNTER — TELEPHONE (OUTPATIENT)
Dept: CARDIOLOGY | Facility: CLINIC | Age: 75
End: 2025-01-16
Payer: MEDICARE

## 2025-01-16 LAB
ALBUMIN SERPL BCP-MCNC: 3.4 G/DL (ref 3.4–5)
ALP SERPL-CCNC: 111 U/L (ref 33–136)
ALT SERPL W P-5'-P-CCNC: 29 U/L (ref 7–45)
ANION GAP SERPL CALC-SCNC: 12 MMOL/L (ref 10–20)
AST SERPL W P-5'-P-CCNC: 45 U/L (ref 9–39)
BILIRUB SERPL-MCNC: 0.4 MG/DL (ref 0–1.2)
BUN SERPL-MCNC: 8 MG/DL (ref 6–23)
CALCIUM SERPL-MCNC: 8.3 MG/DL (ref 8.6–10.3)
CARDIAC TROPONIN I PNL SERPL HS: 95 NG/L (ref 0–13)
CHLORIDE SERPL-SCNC: 103 MMOL/L (ref 98–107)
CO2 SERPL-SCNC: 25 MMOL/L (ref 21–32)
CREAT SERPL-MCNC: 0.86 MG/DL (ref 0.5–1.05)
EGFRCR SERPLBLD CKD-EPI 2021: 70 ML/MIN/1.73M*2
GLUCOSE BLD MANUAL STRIP-MCNC: 132 MG/DL (ref 74–99)
GLUCOSE BLD MANUAL STRIP-MCNC: 146 MG/DL (ref 74–99)
GLUCOSE BLD MANUAL STRIP-MCNC: 94 MG/DL (ref 74–99)
GLUCOSE BLD MANUAL STRIP-MCNC: 99 MG/DL (ref 74–99)
GLUCOSE SERPL-MCNC: 95 MG/DL (ref 74–99)
HOLD SPECIMEN: NORMAL
HOLD SPECIMEN: NORMAL
MAGNESIUM SERPL-MCNC: 2.18 MG/DL (ref 1.6–2.4)
POTASSIUM SERPL-SCNC: 4.1 MMOL/L (ref 3.5–5.3)
PROT SERPL-MCNC: 6 G/DL (ref 6.4–8.2)
SODIUM SERPL-SCNC: 136 MMOL/L (ref 136–145)

## 2025-01-16 PROCEDURE — RXMED WILLOW AMBULATORY MEDICATION CHARGE

## 2025-01-16 PROCEDURE — 82947 ASSAY GLUCOSE BLOOD QUANT: CPT

## 2025-01-16 PROCEDURE — 36415 COLL VENOUS BLD VENIPUNCTURE: CPT | Performed by: REGISTERED NURSE

## 2025-01-16 PROCEDURE — 80053 COMPREHEN METABOLIC PANEL: CPT | Performed by: REGISTERED NURSE

## 2025-01-16 PROCEDURE — 2500000001 HC RX 250 WO HCPCS SELF ADMINISTERED DRUGS (ALT 637 FOR MEDICARE OP): Performed by: NURSE PRACTITIONER

## 2025-01-16 PROCEDURE — 1200000002 HC GENERAL ROOM WITH TELEMETRY DAILY

## 2025-01-16 PROCEDURE — 99223 1ST HOSP IP/OBS HIGH 75: CPT | Performed by: INTERNAL MEDICINE

## 2025-01-16 PROCEDURE — 2500000001 HC RX 250 WO HCPCS SELF ADMINISTERED DRUGS (ALT 637 FOR MEDICARE OP): Performed by: REGISTERED NURSE

## 2025-01-16 PROCEDURE — 99233 SBSQ HOSP IP/OBS HIGH 50: CPT | Performed by: STUDENT IN AN ORGANIZED HEALTH CARE EDUCATION/TRAINING PROGRAM

## 2025-01-16 PROCEDURE — 84484 ASSAY OF TROPONIN QUANT: CPT | Performed by: NURSE PRACTITIONER

## 2025-01-16 PROCEDURE — 2500000004 HC RX 250 GENERAL PHARMACY W/ HCPCS (ALT 636 FOR OP/ED): Performed by: NURSE PRACTITIONER

## 2025-01-16 PROCEDURE — 2500000002 HC RX 250 W HCPCS SELF ADMINISTERED DRUGS (ALT 637 FOR MEDICARE OP, ALT 636 FOR OP/ED): Performed by: REGISTERED NURSE

## 2025-01-16 PROCEDURE — 83735 ASSAY OF MAGNESIUM: CPT | Performed by: REGISTERED NURSE

## 2025-01-16 PROCEDURE — 93005 ELECTROCARDIOGRAM TRACING: CPT

## 2025-01-16 RX ORDER — REGADENOSON 0.08 MG/ML
0.4 INJECTION, SOLUTION INTRAVENOUS
Status: CANCELLED | OUTPATIENT
Start: 2025-01-16

## 2025-01-16 RX ORDER — AMINOPHYLLINE 25 MG/ML
125 INJECTION, SOLUTION INTRAVENOUS ONCE AS NEEDED
Status: CANCELLED | OUTPATIENT
Start: 2025-01-16

## 2025-01-16 RX ORDER — METOPROLOL TARTRATE 75 MG/1
75 TABLET ORAL 2 TIMES DAILY
Qty: 60 TABLET | Refills: 11 | Status: ON HOLD | OUTPATIENT
Start: 2025-01-16 | End: 2026-01-16

## 2025-01-16 RX ORDER — LANOLIN ALCOHOL/MO/W.PET/CERES
400 CREAM (GRAM) TOPICAL DAILY
Qty: 30 TABLET | Refills: 3 | Status: ON HOLD | OUTPATIENT
Start: 2025-01-17 | End: 2025-05-17

## 2025-01-16 RX ORDER — LANOLIN ALCOHOL/MO/W.PET/CERES
400 CREAM (GRAM) TOPICAL DAILY
Status: DISCONTINUED | OUTPATIENT
Start: 2025-01-16 | End: 2025-01-17 | Stop reason: HOSPADM

## 2025-01-16 RX ADMIN — ATORVASTATIN CALCIUM 10 MG: 20 TABLET, FILM COATED ORAL at 08:57

## 2025-01-16 RX ADMIN — APIXABAN 5 MG: 5 TABLET, FILM COATED ORAL at 20:48

## 2025-01-16 RX ADMIN — LEVOTHYROXINE SODIUM 100 MCG: 0.1 TABLET ORAL at 05:20

## 2025-01-16 RX ADMIN — DOCUSATE SODIUM 100 MG: 100 CAPSULE, LIQUID FILLED ORAL at 20:48

## 2025-01-16 RX ADMIN — TIOTROPIUM BROMIDE INHALATION SPRAY 2 PUFF: 3.12 SPRAY, METERED RESPIRATORY (INHALATION) at 08:56

## 2025-01-16 RX ADMIN — LOSARTAN POTASSIUM 50 MG: 25 TABLET, FILM COATED ORAL at 08:57

## 2025-01-16 RX ADMIN — DOCUSATE SODIUM 100 MG: 100 CAPSULE, LIQUID FILLED ORAL at 08:57

## 2025-01-16 RX ADMIN — PANTOPRAZOLE SODIUM 40 MG: 40 TABLET, DELAYED RELEASE ORAL at 05:39

## 2025-01-16 RX ADMIN — METOPROLOL TARTRATE 75 MG: 50 TABLET, FILM COATED ORAL at 20:47

## 2025-01-16 RX ADMIN — MAGNESIUM OXIDE 400 MG (241.3 MG MAGNESIUM) TABLET 400 MG: TABLET at 08:57

## 2025-01-16 RX ADMIN — METOPROLOL TARTRATE 75 MG: 50 TABLET, FILM COATED ORAL at 08:57

## 2025-01-16 RX ADMIN — APIXABAN 5 MG: 5 TABLET, FILM COATED ORAL at 08:57

## 2025-01-16 RX ADMIN — TRAZODONE HYDROCHLORIDE 150 MG: 150 TABLET ORAL at 20:48

## 2025-01-16 ASSESSMENT — COGNITIVE AND FUNCTIONAL STATUS - GENERAL
MOBILITY SCORE: 24
DAILY ACTIVITIY SCORE: 24
MOBILITY SCORE: 24
DAILY ACTIVITIY SCORE: 24

## 2025-01-16 ASSESSMENT — PAIN SCALES - GENERAL
PAINLEVEL_OUTOF10: 0 - NO PAIN
PAINLEVEL_OUTOF10: 0 - NO PAIN

## 2025-01-16 ASSESSMENT — PAIN - FUNCTIONAL ASSESSMENT: PAIN_FUNCTIONAL_ASSESSMENT: 0-10

## 2025-01-16 NOTE — TELEPHONE ENCOUNTER
14 day Zio patch [id number] no auth required per [facility] Portal Decision Id# [id number].

## 2025-01-16 NOTE — PROGRESS NOTES
Medical Group Progress Note  ASSESSMENT & PLAN:     Paroxysmal atrial fibrillation  History of SVT  Palpitations  - No overnight events on telemetry  - Metoprolol increased again this morning to 75 mg twice daily by cardiology  - Cardiology following  - Continuing apixaban  - Monitor on telemetry overnight with plan for discharge in the morning    Elevated troponin  CAD with previous stents  - Cardiology following as per above  - Elevated troponins likely in setting of tachycardia  - No further workup    Hypothyroidism  - TSH of 10 recently this last week  - Synthroid dose increased to 100 mcg daily per her primary endocrinologist approximately 1 week ago  - Outpatient repeat labs per primary endocrinologist    Type II DM  Hyperlipidemia  Essential hypertension  COPD, not in exacerbation  - Continue home medications as reconciled    Moderate protein calorie malnutrition      Malnutrition Diagnosis Status: New  Malnutrition Diagnosis: Moderate malnutrition related to acute disease or injury  As Evidenced by: <75% of estimated energy requirements for > 7 days, >2% wt loss x 1 week  I agree with the dietitian's malnutrition diagnosis.    VTE Prophylaxis: Apixaban    Disposition/Daily update: Patient states recent changes to her Synthroid dosing now daily approximately 1 week ago per her endocrinologist therefore not making any changes this admission.  Spoke with cardiology, plan to increase metoprolol and observe overnight for discharge tomorrow.      ---Of note, this documentation is completed using the Dragon Dictation system (voice recognition software). There may be spelling and/or grammatical errors that were not corrected prior to final submission.---    Nicolas Hamilton MD    SUBJECTIVE     Patient was seen and examined bedside this morning.  Had no acute events overnight.  This morning was feeling well had no complaints for me.  No further episodes of palpitations.    OBJECTIVE:     Last Recorded  Vitals:  Vitals:    01/15/25 1923 01/15/25 2348 01/16/25 0748 01/16/25 1416   BP: 145/67 149/66 137/63 104/59   BP Location: Left arm Left arm Left arm Left arm   Patient Position: Lying Lying Lying Sitting   Pulse: 62 60 67 67   Resp: 16 16 16 16   Temp: 36.5 °C (97.7 °F) 37 °C (98.6 °F) 36.2 °C (97.2 °F) 37 °C (98.6 °F)   TempSrc: Temporal Temporal Temporal Temporal   SpO2: 97% 96% 97% 93%   Weight:       Height:         Last I/O:  No intake/output data recorded.    Physical Exam  Vitals reviewed.   Constitutional:       General: She is not in acute distress.     Appearance: Normal appearance. She is not toxic-appearing.   HENT:      Head: Normocephalic and atraumatic.   Eyes:      Extraocular Movements: Extraocular movements intact.      Conjunctiva/sclera: Conjunctivae normal.   Cardiovascular:      Rate and Rhythm: Normal rate and regular rhythm.      Pulses: Normal pulses.      Heart sounds: Normal heart sounds.   Pulmonary:      Effort: Pulmonary effort is normal. No respiratory distress.      Breath sounds: Normal breath sounds. No wheezing.   Abdominal:      General: Bowel sounds are normal. There is no distension.      Palpations: Abdomen is soft.      Tenderness: There is no abdominal tenderness. There is no guarding.   Musculoskeletal:         General: Normal range of motion.      Cervical back: Normal range of motion and neck supple.   Neurological:      General: No focal deficit present.      Mental Status: She is alert and oriented to person, place, and time. Mental status is at baseline.   Psychiatric:         Mood and Affect: Mood normal.         Behavior: Behavior normal.         Thought Content: Thought content normal.     Inpatient Medications:  apixaban, 5 mg, oral, BID  atorvastatin, 10 mg, oral, Daily  docusate sodium, 100 mg, oral, BID  insulin lispro, 0-5 Units, subcutaneous, TID AC  levothyroxine, 100 mcg, oral, Daily  losartan, 50 mg, oral, Daily  magnesium oxide, 400 mg, oral,  Daily  metoprolol tartrate, 75 mg, oral, BID  pantoprazole, 40 mg, oral, Daily before breakfast   Or  pantoprazole, 40 mg, intravenous, Daily before breakfast  pantoprazole, 40 mg, oral, Daily  tiotropium, 2 puff, inhalation, Daily    PRN Medications  PRN medications: acetaminophen **OR** acetaminophen **OR** acetaminophen, acetaminophen **OR** acetaminophen **OR** acetaminophen, albuterol, ondansetron **OR** ondansetron, traZODone  Continuous Medications:     LABS AND IMAGING:     Labs:  Results from last 7 days   Lab Units 01/15/25  1158 01/14/25  1403 01/14/25  0534 01/13/25  0516   WBC AUTO x10*3/uL 9.1  --  6.1 8.3   RBC AUTO x10*6/uL 4.68  --  3.93* 4.03   HEMOGLOBIN g/dL 10.0* 9.1* 8.5* 8.7*   HEMATOCRIT % 34.0* 30.8* 28.6* 29.5*   MCV fL 73*  --  73* 73*   MCH pg 21.4*  --  21.6* 21.6*   MCHC g/dL 29.4*  --  29.7* 29.5*   RDW % 19.2*  --  18.5* 18.6*   PLATELETS AUTO x10*3/uL 276  --  241 260     Results from last 7 days   Lab Units 01/16/25  0431 01/15/25  1158 01/14/25  0534 01/13/25  0516 01/12/25 2008   SODIUM mmol/L 136 138 139   < > 136   POTASSIUM mmol/L 4.1 4.0 4.0   < > 3.6   CHLORIDE mmol/L 103 103 106   < > 100   CO2 mmol/L 25 29 28   < > 26   BUN mg/dL 8 8 8   < > 12   CREATININE mg/dL 0.86 0.96 1.01   < > 1.03   GLUCOSE mg/dL 95 109* 86   < > 134*   PROTEIN TOTAL g/dL 6.0* 7.1  --   --  7.7   CALCIUM mg/dL 8.3* 8.8 8.0*   < > 9.2   BILIRUBIN TOTAL mg/dL 0.4 0.4  --   --  0.3   ALK PHOS U/L 111 110  --   --  70   AST U/L 45* 63*  --   --  15   ALT U/L 29 31  --   --  8    < > = values in this interval not displayed.     Results from last 7 days   Lab Units 01/16/25  0431 01/15/25  1158 01/14/25  0534   MAGNESIUM mg/dL 2.18 2.12 2.02     Results from last 7 days   Lab Units 01/16/25  0431 01/15/25  1319 01/15/25  1158   TROPHS ng/L 95* 120* 113*     Imaging:  ECG 12 lead  Normal sinus rhythm  Left axis deviation  Nonspecific T wave abnormality  Abnormal ECG  When compared with ECG of 15-ANTONIO-2025  13:47, (unconfirmed)  Premature ventricular complexes are no longer Present  Nonspecific T wave abnormality, worse in Anterior leads

## 2025-01-16 NOTE — CONSULTS
Inpatient consult to Cardiology  Consult performed by: Lalit Paredes DO  Consult ordered by: ANA Conte-CNP  Reason for consult: elevated heart rate                                                          Date:   1/16/2025  Patient name:  Myrna Rodrigues  Date of admission:  1/15/2025 10:57 AM  MRN:   [id number]  YOB: 1950  Time of Consult:  9:56 AM    Consulting Cardiologist/GENNY: ANA Messer, CNP  Primary Cardiologist:  Dr. Shashank Proctor    Referring Provider: Dr. Hamilton      Admission Diagnosis:     Palpitations      History of Present Illness:      74-year-old female patient with past medical history of paroxysmal atrial fibrillation status post ablation on anticoagulation, hypertension, CAD, hyperlipidemia, GERD, diabetes mellitus type 2 and hypothyroidism who presented to [facility] emergency department with complaints of palpitations along with her heart racing.  She reports that she was sitting at home watching TV and her heart started racing.  She felt overall malaised and had a sharp tingling sensation to her left axilla.  She currently denies any chest pain or chest pressure.  She has had ongoing episodes of tachycardia related symptoms over the past few weeks.  She denies any increased shortness of breath.  She normally follows with Dr. Proctor for her cardiology care.  Significant lab work showed a glucose of 109, troponin 113, second one was 120 and third one was 91.  BNP was 143.  Lactate was 2.1.  TSH is 10.20 with a free T4 of 1.21.  Flu negative.  COVID-19 negative.  CXR unremarkable.  EKG showed sinus rhythm with sinus arrhythmia with occasional PVCs.  Nonspecific ST wave abnormality.  No STEMI criteria.  The patient was admitted to the medicine service and general cardiology was consulted due to elevated heart rates.  Of note she was recently admitted to [facility] on January 12 and found  to have a UTI along with elevated TSH.  Her thyroid medications were changed and she was treated.  She was discharged home in stable condition.    Previous Cardiac Testing:    Echocardiogram:   Recent Labs     01/18/24  1348 01/03/24  1322   EF 68  --    LVIDD 4.60  --    RV 34.8 23.8   Transthoracic Echo (TTE) Complete 01/18/2024    [address], [address], [place] [address]  Tel [phone] Fax [phone]    TRANSTHORACIC ECHOCARDIOGRAM REPORT      Patient Name:      LISSETTE ZAVALA           [place] Physician:    [id number] Lalit Paredes DO  Study Date:        1/18/2024            Ordering Provider:    [id number] SO DOSS  MRN/PID:           [id number]             Fellow:  Accession#:        [id number]         Nurse:  Date of Birth/Age: 1950 / 73      Sonographer:          Lalit sutton  Gender:            F                    Additional Staff:  Height:            160.02 cm            Admit Date:           1/18/2024  Weight:            67.59 kg             Admission Status:     Outpatient  BSA:               1.71 m2              Department Location:  [facility]  Blood Pressure: 140 /70 mmHg    Study Type:    TRANSTHORACIC ECHO (TTE) COMPLETE  Diagnosis/ICD: Paroxysmal atrial fibrillation-I48.0; Atherosclerotic heart  disease of native coronary artery without angina pectoris-I25.10;  Coronary angioplasty status (PTCA)-Z98.61  Indication:    AF, CAD, PTCA  CPT Codes:     Echo Complete w Full Doppler-[id number]    Patient History:  Pertinent History: A-Fib, CAD, COPD, HTN, Hyperlipidemia and Edema, PTCA,  Dysrhythmia.    Study Detail: The following Echo studies were performed: 2D, M-Mode, Doppler and  color flow. The patient was awake.      PHYSICIAN INTERPRETATION:  Left Ventricle: Left ventricular systolic function is normal, with an estimated ejection fraction of 60%. There are no regional wall motion abnormalities. The left  ventricular cavity size is normal. Spectral Doppler shows a normal pattern of left ventricular diastolic filling.  LV Wall Scoring:  All segments are normal.    Left Atrium: The left atrium is mildly dilated.  Right Ventricle: The right ventricle is normal in size. There is normal right ventricular global systolic function.  Right Atrium: The right atrium is normal in size.  Aortic Valve: The aortic valve appears structurally normal. The aortic valve appears tricuspid. There is no evidence of aortic valve stenosis.  There is mild aortic valve regurgitation. The peak instantaneous gradient of the aortic valve is 10.2 mmHg. The mean gradient of the aortic valve is 6.0 mmHg.  Mitral Valve: The mitral valve is normal in structure. There is no evidence of mitral valve stenosis. The doppler estimated mean and peak diastolic pressure gradients are 4.0 mmHg and 7.7 mmHg respectively. There is normal mitral valve leaflet mobility. There is mild mitral valve regurgitation.  Tricuspid Valve: The tricuspid valve is structurally normal. There is normal tricuspid valve leaflet mobility. There is mild tricuspid regurgitation.  Pulmonic Valve: The pulmonic valve is structurally normal. There is mild pulmonic valve regurgitation.  Pericardium: There is no pericardial effusion noted.  Aorta: The aortic root is normal.  Pulmonary Artery: The tricuspid regurgitant velocity is 2.82 m/s, and with an estimated right atrial pressure of 3 mmHg, the estimated pulmonary artery pressure is mild to moderately elevated with the RVSP at 34.8 mmHg.  Systemic Veins: The inferior vena cava appears to be of normal size.  In comparison to the previous echocardiogram(s): The left ventricular function is unchanged and normal.      CONCLUSIONS:  1. Left ventricular systolic function is normal with a 60% estimated ejection fraction.  2. There is no evidence of mitral valve stenosis.  3. Mild mitral valve regurgitation.  4. Mild tricuspid regurgitation is  visualized.  5. Aortic valve stenosis is not present.  6. Mild aortic valve regurgitation.  7. Mild to moderately elevated pulmonary artery pressure.    QUANTITATIVE DATA SUMMARY:  2D MEASUREMENTS:  Normal Ranges:  Ao Root d:     3.10 cm   (2.0-3.7cm)  LAs:           4.10 cm   (2.7-4.0cm)  RVIDd:         2.60 cm   (0.9-3.6cm)  IVSd:          1.00 cm   (0.6-1.1cm)  LVPWd:         1.00 cm   (0.6-1.1cm)  LVIDd:         4.60 cm   (3.9-5.9cm)  LVIDs:         3.00 cm  LV Mass Index: 93.1 g/m2  LV % FS        34.8 %    LA VOLUME:  Normal Ranges:  LA Area A4C:     25.1 cm2  LA Area A2C:     25.5 cm2  LA Volume Index: 45.6 ml/m2  LA Vol A4C:      76.0 ml  LA Vol A2C:      79.0 ml  LA Vol BP:       78.0 ml    AORTA MEASUREMENTS:  Normal Ranges:  Asc Ao, d: 2.90 cm (2.1-3.4cm)    LV SYSTOLIC FUNCTION BY 2D PLANIMETRY (MOD):  Normal Ranges:  EF-A4C View: 65.6 % (>=55%)  EF-A2C View: 68.4 %  EF-Biplane:  68.2 %    LV DIASTOLIC FUNCTION:  Normal Ranges:  MV Peak E:        1.29 m/s    (0.7-1.2 m/s)  MV Peak A:        1.12 m/s    (0.42-0.7 m/s)  E/A Ratio:        1.15        (1.0-2.2)  MV lateral e'     0.09 m/s  MV medial e'      0.07 m/s  MV A Dur:         127.00 msec  E/e' Ratio:       14.40       (<8.0)  PulmV Sys Elieser:    74.50 cm/s  PulmV Flores Elieser:   63.70 cm/s  PulmV S/D Elieser:    1.20  PulmV A Revs Elieser: 34.10 cm/s  PulmV A Revs Dur: 162.00 msec    MITRAL VALVE:  Normal Ranges:  MV Vmax:    1.39 m/s (<=1.3m/s)  MV peak P.7 mmHg (<5mmHg)  MV mean P.0 mmHg (<48mmHg)  MV DT:      296 msec (150-240msec)    AORTIC VALVE:  Normal Ranges:  AoV Vmax:                1.60 m/s  (<=1.7m/s)  AoV Peak PG:             10.2 mmHg (<20mmHg)  AoV Mean P.0 mmHg  (1.7-11.5mmHg)  LVOT Max Elieser:            1.02 m/s  (<=1.1m/s)  AoV VTI:                 38.20 cm  (18-25cm)  LVOT VTI:                27.00 cm  LVOT Diameter:           1.90 cm   (1.8-2.4cm)  AoV Area, VTI:           2.00 cm2  (2.5-5.5cm2)  AoV Area,Vmax:            1.81 cm2  (2.5-4.5cm2)  AoV Dimensionless Index: 0.71    TRICUSPID VALVE/RVSP:  Normal Ranges:  Peak TR Velocity: 2.82 m/s  Est. RA Pressure: 3 mmHg  RV Syst Pressure: 34.8 mmHg (< 30mmHg)    PULMONIC VALVE:  Normal Ranges:  PV Accel Time: 127 msec (>120ms)  PV Max Elieser:    1.0 m/s  (0.6-0.9m/s)  PV Max PG:     3.7 mmHg    Pulmonary Veins:  PulmV A Revs Dur: 162.00 msec  PulmV A Revs Elieser: 34.10 cm/s  PulmV Flores Elieser:   63.70 cm/s  PulmV S/D Elieser:    1.20  PulmV Sys Elieser:    74.50 cm/s      [id number] Lalit Lena MURO  Electronically signed on 1/19/2024 at 6:13:14 PM      Wall Scoring        ** Final **    Coronary Angiography: No results found for this or any previous visit from the past 1800 days.    Nuclear:No results found for this or any previous visit from the past 1800 days.          Allergies:     No Known Allergies      Past Medical History:     Past Medical History:   Diagnosis Date    Anxiety and depression     Arrhythmia     Asthma     Cancer (Multi)     Coronary artery disease     Diabetes mellitus (Multi)     Disease of thyroid gland     GERD (gastroesophageal reflux disease)     Heart disease     Hyperlipidemia     Hypertension     Irregular heart beat     Occipital neuralgia 06/10/2022    Occipital neuralgia of right side    Personal history of malignant neoplasm, unspecified 10/04/2021    History of malignant neoplasm    Personal history of other diseases of the circulatory system     History of hypertension    Personal history of other diseases of the circulatory system 10/04/2021    History of essential hypertension    Personal history of other diseases of the circulatory system     History of coronary artery disease    Personal history of other diseases of the digestive system 03/11/2020    History of chronic constipation    Personal history of other diseases of the musculoskeletal system and connective tissue     History of arthritis    Personal history of other diseases of the nervous system and sense  organs 10/04/2021    History of eye problem    Personal history of other diseases of the respiratory system     History of asthma    Personal history of other specified conditions 10/04/2021    History of chest pain    PONV (postoperative nausea and vomiting)     Stool incontinence     Thyroid cancer (Multi)     Urinary incontinence        Past Surgical History:     Past Surgical History:   Procedure Laterality Date    APPENDECTOMY  08/13/2015    Appendectomy    BREAST LUMPECTOMY  08/13/2015    Breast Surgery Lumpectomy    CARPAL TUNNEL RELEASE  08/13/2015    Neuroplasty Decompression Median Nerve At Carpal Tunnel    GALLBLADDER SURGERY  08/13/2015    Gallbladder Surgery    HYSTERECTOMY  08/13/2015    Hysterectomy    KNEE ARTHROSCOPY W/ MENISCAL REPAIR  08/13/2015    Knee Arthroscopy With Medial Meniscus Repair    KNEE SURGERY  08/13/2015    Knee Surgery    MR HEAD ANGIO WO IV CONTRAST  09/05/2021    MR HEAD ANGIO WO IV CONTRAST 9/5/2021 [facility]    OTHER SURGICAL HISTORY  08/13/2015    Shoulder Surgery Left    OTHER SURGICAL HISTORY  08/13/2015    Repair Of Bladder Reconstruction    OTHER SURGICAL HISTORY  09/20/2021    Breast surgery    OTHER SURGICAL HISTORY  09/20/2021    Bladder surgery    OTHER SURGICAL HISTORY  09/20/2021    Complete colonoscopy    OTHER SURGICAL HISTORY  09/20/2021    Shoulder surgery    OTHER SURGICAL HISTORY  09/20/2021    Foot surgery    OTHER SURGICAL HISTORY  09/20/2021    Percutaneous transluminal coronary angioplasty    OTHER SURGICAL HISTORY  10/11/2021    Thyroidectomy total    THYROIDECTOMY      TOTAL KNEE ARTHROPLASTY  08/13/2015    Knee Replacement       Family History:     Family History   Problem Relation Name Age of Onset    Lung cancer Mother      Lymphoma Mother      Bone cancer Mother      Heart attack Father      Liver cancer Brother      Lung cancer Brother         Social History:     Social History     Tobacco Use    Smoking status: Never    Smokeless  tobacco: Never   Vaping Use    Vaping status: Never Used   Substance Use Topics    Alcohol use: Not Currently    Drug use: Never       CURRENT INPATIENT MEDICATIONS    apixaban, 5 mg, oral, BID  atorvastatin, 10 mg, oral, Daily  docusate sodium, 100 mg, oral, BID  insulin lispro, 0-5 Units, subcutaneous, TID AC  levothyroxine, 100 mcg, oral, Daily  losartan, 50 mg, oral, Daily  magnesium oxide, 400 mg, oral, Daily  metoprolol tartrate, 75 mg, oral, BID  pantoprazole, 40 mg, oral, Daily before breakfast   Or  pantoprazole, 40 mg, intravenous, Daily before breakfast  pantoprazole, 40 mg, oral, Daily  tiotropium, 2 puff, inhalation, Daily         Current Outpatient Medications   Medication Instructions    acetaminophen (TYLENOL) 1,000 mg, oral, Every 6 hours    atorvastatin (LIPITOR) 10 mg, oral, Daily    cholecalciferol (VITAMIN D-3) 1,000 Units, oral, Daily    dicyclomine (BENTYL) 20 mg, oral, 4 times daily PRN    diphenoxylate-atropine (Lomotil) 2.5-0.025 mg tablet 1 tablet, oral, 4 times daily PRN    Eliquis 5 mg, oral, 2 times daily    estradiol (Estrace) 0.01 % (0.1 mg/gram) vaginal cream Apply pea size amount 0.5 gram to vaginal opening with finger daily for 2 weeks, then 2-3 times per week.    levothyroxine (SYNTHROID, LEVOXYL) 100 mcg, oral, Daily    losartan (COZAAR) 50 mg, oral, Daily    metFORMIN XR (Glucophage-XR) 500 mg 24 hr tablet Take 1 tablet (500 mg) by mouth 2 times a day.    metoprolol tartrate (LOPRESSOR) 50 mg, oral, 2 times daily    montelukast (SINGULAIR) 10 mg, oral, Nightly    nitrofurantoin, macrocrystal-monohydrate, (Macrobid) 100 mg capsule 100 mg, oral, 2 times daily    omeprazole (PriLOSEC) 40 mg DR capsule TAKE 1 CAPSULE BY MOUTH TWICE  DAILY    potassium gluconate 595 mg (99 mg) tablet 1 tablet, oral, Daily    tiotropium (Spiriva Respimat) 2.5 mcg/actuation inhaler 2 puffs, inhalation, Daily    traZODone (Desyrel) 50 mg tablet Take 1-3 tablets ( mg) by mouth as needed at bedtime  for sleep.    Ventolin HFA 90 mcg/actuation inhaler 2 puffs, inhalation, Every 4 hours PRN        Review of Systems:      12 point review of systems was obtained in detail and is negative other than that detailed above.    Vital Signs:     Vitals:    01/15/25 1656 01/15/25 1923 01/15/25 2348 01/16/25 0748   BP: 179/78 145/67 149/66 137/63   BP Location: Left arm Left arm Left arm Left arm   Patient Position: Sitting Lying Lying Lying   Pulse: 60 62 60 67   Resp: 20 16 16 16   Temp: 36.6 °C (97.9 °F) 36.5 °C (97.7 °F) 37 °C (98.6 °F) 36.2 °C (97.2 °F)   TempSrc: Temporal Temporal Temporal Temporal   SpO2: 97% 97% 96% 97%   Weight:       Height:           Intake/Output Summary (Last 24 hours) at 1/16/2025 0956  Last data filed at 1/16/2025 0748  Gross per 24 hour   Intake 200 ml   Output --   Net 200 ml       Wt Readings from Last 4 Encounters:   01/15/25 62.6 kg (138 lb)   01/12/25 62.6 kg (138 lb)   01/09/25 65.8 kg (145 lb)   01/06/25 67.1 kg (148 lb)       Physical Examination:     Physical Exam  Vitals and nursing note reviewed.   HENT:      Head: Normocephalic.      Mouth/Throat:      Mouth: Mucous membranes are moist.   Eyes:      Pupils: Pupils are equal, round, and reactive to light.   Cardiovascular:      Rate and Rhythm: Normal rate and regular rhythm.      Pulses: Normal pulses.   Pulmonary:      Effort: Pulmonary effort is normal.   Abdominal:      Palpations: Abdomen is soft.   Musculoskeletal:         General: Normal range of motion.   Skin:     General: Skin is warm and dry.      Capillary Refill: Capillary refill takes less than 2 seconds.   Neurological:      General: No focal deficit present.      Mental Status: She is alert and oriented to person, place, and time. Mental status is at baseline.   Psychiatric:         Mood and Affect: Mood normal.           Lab:     CBC:   Results from last 7 days   Lab Units 01/15/25  1158 01/14/25  1403 01/14/25  0534 01/13/25  0516   WBC AUTO x10*3/uL 9.1  --  6.1  8.3   RBC AUTO x10*6/uL 4.68  --  3.93* 4.03   HEMOGLOBIN g/dL 10.0* 9.1* 8.5* 8.7*   HEMATOCRIT % 34.0* 30.8* 28.6* 29.5*   MCV fL 73*  --  73* 73*   MCH pg 21.4*  --  21.6* 21.6*   MCHC g/dL 29.4*  --  29.7* 29.5*   RDW % 19.2*  --  18.5* 18.6*   PLATELETS AUTO x10*3/uL 276  --  241 260     CMP:    Results from last 7 days   Lab Units 01/16/25  0431 01/15/25  1158 01/14/25  0534 01/13/25  0516 01/12/25 2008   SODIUM mmol/L 136 138 139   < > 136   POTASSIUM mmol/L 4.1 4.0 4.0   < > 3.6   CHLORIDE mmol/L 103 103 106   < > 100   CO2 mmol/L 25 29 28   < > 26   BUN mg/dL 8 8 8   < > 12   CREATININE mg/dL 0.86 0.96 1.01   < > 1.03   GLUCOSE mg/dL 95 109* 86   < > 134*   PROTEIN TOTAL g/dL 6.0* 7.1  --   --  7.7   CALCIUM mg/dL 8.3* 8.8 8.0*   < > 9.2   BILIRUBIN TOTAL mg/dL 0.4 0.4  --   --  0.3   ALK PHOS U/L 111 110  --   --  70   AST U/L 45* 63*  --   --  15   ALT U/L 29 31  --   --  8    < > = values in this interval not displayed.     BMP:    Results from last 7 days   Lab Units 01/16/25  0431 01/15/25  1158 01/14/25  0534   SODIUM mmol/L 136 138 139   POTASSIUM mmol/L 4.1 4.0 4.0   CHLORIDE mmol/L 103 103 106   CO2 mmol/L 25 29 28   BUN mg/dL 8 8 8   CREATININE mg/dL 0.86 0.96 1.01   CALCIUM mg/dL 8.3* 8.8 8.0*   GLUCOSE mg/dL 95 109* 86     Magnesium:  Results from last 7 days   Lab Units 01/16/25  0431 01/15/25  1158 01/14/25  0534   MAGNESIUM mg/dL 2.18 2.12 2.02     Troponin:    Results from last 7 days   Lab Units 01/16/25  0431 01/15/25  1319 01/15/25  1158   TROPHS ng/L 95* 120* 113*     BNP:   Results from last 7 days   Lab Units 01/15/25  1158 01/12/25  2008   BNP pg/mL 143* 95     Lipid Panel:  Results from last 7 days   Lab Units 01/15/25  1319   HDL mg/dL 66.4   CHOLESTEROL/HDL RATIO  1.8   VLDL mg/dL 20   TRIGLYCERIDES mg/dL 101   NON HDL CHOL. mg/dL 54        Diagnostic Studies:   @No results found for this or any previous visit.    Results for orders placed during the hospital encounter of  01/18/24    Transthoracic Echo (TTE) Complete    Narrative  [facility]  [address], [address], [place] [address]  Tel [phone] Fax [phone]    TRANSTHORACIC ECHOCARDIOGRAM REPORT      Patient Name:      LISSETTE ZAVALA           [place] Physician:    [id number] Lalit Paredes DO  Study Date:        1/18/2024            Ordering Provider:    [id number] SO DSOS  MRN/PID:           [id number]             Fellow:  Accession#:        [id number]         Nurse:  Date of Birth/Age: 1950 / 73      Sonographer:          Lalit sutton  Gender:            F                    Additional Staff:  Height:            160.02 cm            Admit Date:           1/18/2024  Weight:            67.59 kg             Admission Status:     Outpatient  BSA:               1.71 m2              Department Location:  [facility]  Blood Pressure: 140 /70 mmHg    Study Type:    TRANSTHORACIC ECHO (TTE) COMPLETE  Diagnosis/ICD: Paroxysmal atrial fibrillation-I48.0; Atherosclerotic heart  disease of native coronary artery without angina pectoris-I25.10;  Coronary angioplasty status (PTCA)-Z98.61  Indication:    AF, CAD, PTCA  CPT Codes:     Echo Complete w Full Doppler-[id number]    Patient History:  Pertinent History: A-Fib, CAD, COPD, HTN, Hyperlipidemia and Edema, PTCA,  Dysrhythmia.    Study Detail: The following Echo studies were performed: 2D, M-Mode, Doppler and  color flow. The patient was awake.      PHYSICIAN INTERPRETATION:  Left Ventricle: Left ventricular systolic function is normal, with an estimated ejection fraction of 60%. There are no regional wall motion abnormalities. The left ventricular cavity size is normal. Spectral Doppler shows a normal pattern of left ventricular diastolic filling.  LV Wall Scoring:  All segments are normal.    Left Atrium: The left atrium is mildly dilated.  Right Ventricle: The right ventricle is normal in size. There is normal  right ventricular global systolic function.  Right Atrium: The right atrium is normal in size.  Aortic Valve: The aortic valve appears structurally normal. The aortic valve appears tricuspid. There is no evidence of aortic valve stenosis.  There is mild aortic valve regurgitation. The peak instantaneous gradient of the aortic valve is 10.2 mmHg. The mean gradient of the aortic valve is 6.0 mmHg.  Mitral Valve: The mitral valve is normal in structure. There is no evidence of mitral valve stenosis. The doppler estimated mean and peak diastolic pressure gradients are 4.0 mmHg and 7.7 mmHg respectively. There is normal mitral valve leaflet mobility. There is mild mitral valve regurgitation.  Tricuspid Valve: The tricuspid valve is structurally normal. There is normal tricuspid valve leaflet mobility. There is mild tricuspid regurgitation.  Pulmonic Valve: The pulmonic valve is structurally normal. There is mild pulmonic valve regurgitation.  Pericardium: There is no pericardial effusion noted.  Aorta: The aortic root is normal.  Pulmonary Artery: The tricuspid regurgitant velocity is 2.82 m/s, and with an estimated right atrial pressure of 3 mmHg, the estimated pulmonary artery pressure is mild to moderately elevated with the RVSP at 34.8 mmHg.  Systemic Veins: The inferior vena cava appears to be of normal size.  In comparison to the previous echocardiogram(s): The left ventricular function is unchanged and normal.      CONCLUSIONS:  1. Left ventricular systolic function is normal with a 60% estimated ejection fraction.  2. There is no evidence of mitral valve stenosis.  3. Mild mitral valve regurgitation.  4. Mild tricuspid regurgitation is visualized.  5. Aortic valve stenosis is not present.  6. Mild aortic valve regurgitation.  7. Mild to moderately elevated pulmonary artery pressure.    QUANTITATIVE DATA SUMMARY:  2D MEASUREMENTS:  Normal Ranges:  Ao Root d:     3.10 cm   (2.0-3.7cm)  LAs:           4.10 cm    (2.7-4.0cm)  RVIDd:         2.60 cm   (0.9-3.6cm)  IVSd:          1.00 cm   (0.6-1.1cm)  LVPWd:         1.00 cm   (0.6-1.1cm)  LVIDd:         4.60 cm   (3.9-5.9cm)  LVIDs:         3.00 cm  LV Mass Index: 93.1 g/m2  LV % FS        34.8 %    LA VOLUME:  Normal Ranges:  LA Area A4C:     25.1 cm2  LA Area A2C:     25.5 cm2  LA Volume Index: 45.6 ml/m2  LA Vol A4C:      76.0 ml  LA Vol A2C:      79.0 ml  LA Vol BP:       78.0 ml    AORTA MEASUREMENTS:  Normal Ranges:  Asc Ao, d: 2.90 cm (2.1-3.4cm)    LV SYSTOLIC FUNCTION BY 2D PLANIMETRY (MOD):  Normal Ranges:  EF-A4C View: 65.6 % (>=55%)  EF-A2C View: 68.4 %  EF-Biplane:  68.2 %    LV DIASTOLIC FUNCTION:  Normal Ranges:  MV Peak E:        1.29 m/s    (0.7-1.2 m/s)  MV Peak A:        1.12 m/s    (0.42-0.7 m/s)  E/A Ratio:        1.15        (1.0-2.2)  MV lateral e'     0.09 m/s  MV medial e'      0.07 m/s  MV A Dur:         127.00 msec  E/e' Ratio:       14.40       (<8.0)  PulmV Sys Elieser:    74.50 cm/s  PulmV Flores Elieser:   63.70 cm/s  PulmV S/D Elieser:    1.20  PulmV A Revs Elieser: 34.10 cm/s  PulmV A Revs Dur: 162.00 msec    MITRAL VALVE:  Normal Ranges:  MV Vmax:    1.39 m/s (<=1.3m/s)  MV peak P.7 mmHg (<5mmHg)  MV mean P.0 mmHg (<48mmHg)  MV DT:      296 msec (150-240msec)    AORTIC VALVE:  Normal Ranges:  AoV Vmax:                1.60 m/s  (<=1.7m/s)  AoV Peak PG:             10.2 mmHg (<20mmHg)  AoV Mean P.0 mmHg  (1.7-11.5mmHg)  LVOT Max Elieser:            1.02 m/s  (<=1.1m/s)  AoV VTI:                 38.20 cm  (18-25cm)  LVOT VTI:                27.00 cm  LVOT Diameter:           1.90 cm   (1.8-2.4cm)  AoV Area, VTI:           2.00 cm2  (2.5-5.5cm2)  AoV Area,Vmax:           1.81 cm2  (2.5-4.5cm2)  AoV Dimensionless Index: 0.71    TRICUSPID VALVE/RVSP:  Normal Ranges:  Peak TR Velocity: 2.82 m/s  Est. RA Pressure: 3 mmHg  RV Syst Pressure: 34.8 mmHg (< 30mmHg)    PULMONIC VALVE:  Normal Ranges:  PV Accel Time: 127 msec (>120ms)  PV Max Elieser:    1.0  m/s  (0.6-0.9m/s)  PV Max PG:     3.7 mmHg    Pulmonary Veins:  PulmV A Revs Dur: 162.00 msec  PulmV A Revs Elieser: 34.10 cm/s  PulmV Flores Elieser:   63.70 cm/s  PulmV S/D Elieser:    1.20  PulmV Sys Elieser:    74.50 cm/s      [id number] Lalit Lena MURO  Electronically signed on 1/19/2024 at 6:13:14 PM      Wall Scoring        ** Final **          Radiology:     XR chest 1 view   Final Result   No acute cardiopulmonary process.        MACRO:   None        Signed by: Monique Caballero 1/15/2025 11:21 AM   Dictation workstation:   [id number]          Problem List:     Patient Active Problem List   Diagnosis    CAD S/P percutaneous coronary angioplasty    Chronic obstructive pulmonary disease (Multi)    Major depression    Moderate persistent asthma    Abnormal findings on diagnostic imaging of lung    Angina, class III (CMS-HCC)    Asthma    Cellulitis    Chronic diarrhea    Chronic pain of toe of left foot    Chronic pain of toe of right foot    Controlled type 2 diabetes mellitus without complication, without long-term current use of insulin (Multi)    Diabetes mellitus (Multi)    Edema    External hemorrhoids    Fracture of ankle    Gastroesophageal reflux disease    Ground glass opacity present on imaging of lung    H/O unstable angina    Hallux valgus, acquired    Hemangioma of skin and subcutaneous tissue    Hematuria    History of diabetes mellitus    History of malignant neoplasm    Hypertension, essential, benign    Hypertensive retinopathy    Lateral malleolar fracture    Mixed hyperlipidemia    Nail fungus    Tinea pedis of both feet    Obesity    Other melanin hyperpigmentation    Other seborrheic keratosis    Overweight with body mass index (BMI) 25.0-29.9    Overweight    Paroxysmal atrial fibrillation (Multi)    Postsurgical hypothyroidism    Psoriasis vulgaris    PVD (posterior vitreous detachment)    Rectal prolapse    Renal insufficiency    Senile nuclear sclerosis    Sensorineural hearing loss (SNHL) of both ears     Sensorineural hearing loss, bilateral    Left lumbar radiculopathy    Spinal stenosis of lumbar region without neurogenic claudication    Thyroid nodule    Mixed stress and urge urinary incontinence    Urge incontinence of urine    Dysuria    Urinary incontinence    Vaginal atrophy    Vaginitis    Vitamin D deficiency    Vitreous degeneration    Recurrent urinary tract infection    Depression    Major depressive disorder    Ventricular tachycardia (Multi)    Cardiac dysrhythmia    Mood disorder (CMS-HCC)    Unspecified complication of internal orthopedic prosthetic device, implant and graft, initial encounter (CMS-HCC)    Unilateral primary osteoarthritis, unspecified knee    Presence of left artificial knee joint    Personal history of other diseases of the nervous system and sense organs    Mechanical loosening of unspecified internal prosthetic joint, initial encounter (CMS-HCC)    Mechanical loosening of internal left knee prosthetic joint    Long term (current) use of aspirin    Other specified anemias    Esophageal disorder    Elevated white blood cell count, unspecified    CKD (chronic kidney disease)    Asymptomatic menopause    BMI 27.0-27.9,adult    Mild vitamin D deficiency    Never smoked any substance    Personal history of calcium pyrophosphate deposition disease (CPPD)    Medicare annual wellness visit, subsequent    Shortness of breath    Overactive bladder    Difficulty walking    Syncope and collapse    Penicillin allergy    Allergy to sulfa drugs    Nitrofurantoin adverse reaction    Cyst of left ovary    Encounter for medication review and counseling    Encounter to discuss treatment options    Urge incontinence    Iron deficiency anemia due to chronic blood loss    Type 2 diabetes mellitus with other specified complication, without long-term current use of insulin    SVT (supraventricular tachycardia) (CMS-HCC)    Palpitations       Assessment:   74-year-old female seen evaluated bedside in  telemetry and in conjunction with Gus Gandhi RN, CNP.    Bedside examination evaluation performed by me.    Chart review details cussed the patient and staff.    Impression:  Palpitations and tachycardia symptomatic minimally  Paroxysmal atrial fibrillation on Eliquis  CAD with history of PCI  Hypertension  Hyperlipidemia  Hypothyroidism  Diabetes mellitus type 2      Plan:   Recommendation:  Admit to medicine.  Remains on telemetry.  Telemetry shows normal sinus rhythm.  Reviewed telemetry over the past 12 hours with no arrhythmias identified.  EKG today shows normal sinus rhythm with nonspecific T wave abnormality.  No STEMI criteria.  Supplemental O2  Monitor electrolytes, keep potassium greater than 4 and magnesium greater than 2  Increase beta-blocker to 75 mg twice daily  Continue with magnesium supplementation  Will need endocrinology follow-up for elevated TSH  Continue Eliquis, monitor for signs of bleeding  Plan for Holter monitor outpatient  Elevated troponins in the setting of tachyarrhythmia.  Now downtrending.  Doubt plaque rupture.  Patient denies any chest pain or chest pressure.  She does report history of CAD with prior PCI and stent in the past.  Due to her symptoms and tachyarrhythmias along with elevated troponin we will pursue Lexiscan nuclear stress test outpatient as patient is agreeable.  She has difficulty ambulating at times and will benefit from Lexiscan versus treadmill test.  Echocardiogram outpatient  Will plan for monitoring for the next 12 to 24 hours with discharge in the morning    Discussion:  This is a 74-year-old female who noticed her heart racing at home.  She had some type of monitoring device which said her heart rates were in the 140s.  She got concerned and came to the emergency room.  She stated that her heart was going fast when she was in the squad but we do not have any recordings and I can obtain to review.  She has had 4 EKGs here all showing sinus rhythm and  sinus bradycardia.  She does not fact have a history of paroxysmal atrial fibrillation and prior ablation.  It is certainly possible she had some breakthrough with arrhythmias or some other type of arrhythmia but they have not been identified yet on monitoring.  She has not had coronary disease in the past with prior stent but has not had typical angina.  She has not had any recent cardiac studies.  Under the circumstances would observe 24 more hours.  If stable without any additional arrhythmias with then discharge and do outpatient testing including echocardiogram nuclear scan and then follow-up with Dr. Richardson and Dr. García.  For now we have increased her beta-blocker and would add magnesium.  She can ambulate freely and observe for any particular arrhythmia issues or other problems.  Will notify Dr. García should we see anything for her evaluation here in the hospital if not she will see her as an outpatient.      Lalit Paredes DO,[facility]          [facility]  Adult Gerontology Acute Care Nurse Practitioner  [facility] [place]   [facility]  [phone]      Of note, this documentation is completed using the Dragon Dictation system (voice recognition software). There may be spelling and/or grammatical errors that were not corrected prior to final submission.      Electronically signed by Lalit Paredes DO, on 1/16/2025 at 9:56 AM

## 2025-01-16 NOTE — CARE PLAN
The patient's goals for the shift include  rest    The clinical goals for the shift include Patient will remain in NSR on telemetry throughout shift

## 2025-01-16 NOTE — CONSULTS
"Nutrition Initial Assessment:   Nutrition Assessment    Reason for Assessment: Admission nursing screening    Patient is a 74 y.o. female presenting with palpitations  past medical history of afib s/p ablation on Eliquis, HTN, CAD, HLD, GERD, DM, and hypothyroidism who presented to the ED with palpitations.     Nutrition History:  Energy Intake:  (no meal intakes recorded at this time)  Food and Nutrient History: RDN consult for MST score of 2. Pt seen by this RDN earlier this week. Pt reports 10 lb wt loss in the past week due to severe diarrhea. Pt reports fair appetite during this time. Pt states -140 lbs.  Pt denies chewing or swallowing difficulty. Pt denies following diet restrictions at home but reports she watches what she eats due to hx diabetes. Discussed adding Glucerna BID due to wt loss, decreased appetite - pt agreeable. Will continue to monitor.  Vitamin/Herbal Supplement Use: D3, potassium gluconate per home med list  Food Allergies/Intolerances:  None  GI Symptoms: Diarrhea  Oral Problems: None       Anthropometrics:  Height: 157.5 cm (5' 2\")   Weight: 62.6 kg (138 lb)   BMI (Calculated): 25.23  IBW/kg (Dietitian Calculated): 50 kg  Percent of IBW: 125 %       Weight History:   Wt Readings from Last 10 Encounters:   01/15/25 62.6 kg (138 lb)   01/12/25 62.6 kg (138 lb)   01/09/25 65.8 kg (145 lb)   01/06/25 67.1 kg (148 lb)   12/13/24 65.8 kg (145 lb)   11/05/24 65.8 kg (145 lb)   10/14/24 66.7 kg (147 lb)   10/09/24 66.9 kg (147 lb 7.8 oz)   10/08/24 63.5 kg (140 lb)   10/03/24 67.2 kg (148 lb 3.2 oz)      Weight Change %:  Weight History / % Weight Change: 10 lb, 6.8% wt loss in 1 week  Significant Weight Loss: Yes  Interpretation of Weight Loss: >2% in 1 week    Nutrition Focused Physical Exam Findings:    Subcutaneous Fat Loss:   Orbital Fat Pads: Well nourished (slightly bulging fat pads)  Buccal Fat Pads: Well nourished (full, rounded cheeks)  Triceps: Well nourished (ample fat " tissue)  Muscle Wasting:  Temporalis: Mild-Moderate (slight depression)  Pectoralis (Clavicular Region): Well nourished (clavicle not visible)  Deltoid/Trapezius: Well nourished (rounded appearance at arm, shoulder, neck)  Edema:  Edema: none  Physical Findings:  Skin: Negative    Nutrition Significant Labs:  BMP Trend:   Results from last 7 days   Lab Units 01/16/25  0431 01/15/25  1158 01/14/25  0534 01/13/25  0516   GLUCOSE mg/dL 95 109* 86 95   CALCIUM mg/dL 8.3* 8.8 8.0* 8.1*   SODIUM mmol/L 136 138 139 139   POTASSIUM mmol/L 4.1 4.0 4.0 3.9   CO2 mmol/L 25 29 28 28   CHLORIDE mmol/L 103 103 106 106   BUN mg/dL 8 8 8 9   CREATININE mg/dL 0.86 0.96 1.01 0.90    , A1C:  Lab Results   Component Value Date    HGBA1C 5.9 (H) 01/02/2025   , BG POCT trend:   Results from last 7 days   Lab Units 01/16/25  1113 01/16/25  0641 01/15/25  1940 01/15/25  1746 01/13/25  1613   POCT GLUCOSE mg/dL 99 94 149* 90 103*        Nutrition Specific Medications:  Scheduled medications  atorvastatin, 10 mg, oral, Daily  docusate sodium, 100 mg, oral, BID  insulin lispro, 0-5 Units, subcutaneous, TID AC  levothyroxine, 100 mcg, oral, Daily  magnesium oxide, 400 mg, oral, Daily  metoprolol tartrate, 75 mg, oral, BID  pantoprazole, 40 mg, oral, Daily before breakfast   Or  pantoprazole, 40 mg, intravenous, Daily before breakfast  pantoprazole, 40 mg, oral, Daily    I/O:   Last BM Date: 01/14/25;      Dietary Orders (From admission, onward)       Start     Ordered    01/16/25 1423  Oral nutritional supplements  Until discontinued        Question Answer Comment   Deliver with Lunch    Deliver with Dinner    Select supplement: Glucerna Shake        01/16/25 1422    01/15/25 1732  May Participate in Room Service  ( ROOM SERVICE MAY PARTICIPATE)  Once        Question:  .  Answer:  Yes    01/15/25 1731    01/15/25 1656  Adult diet Cardiac, Consistent Carb; CCD 60 gm/meal; 70 gm fat; 2 - 3 grams Sodium  Diet effective now        Question  Answer Comment   Diet type Cardiac    Diet type Consistent Carb    Carb diet selection: CCD 60 gm/meal    Fat restriction: 70 gm fat    Sodium restriction: 2 - 3 grams Sodium        01/15/25 1655                     Estimated Needs:   Total Energy Estimated Needs (kCal): 1565 kCal  Method for Estimating Needs: 25 kcal/kg ABW  Total Protein Estimated Needs (g): 75 g  Method for Estimating Needs: 1.2 g/kg ABW  Total Fluid Estimated Needs (mL): 1565 mL  Method for Estimating Needs: 1 ml/kcal or per MD        Nutrition Diagnosis   Malnutrition Diagnosis  Patient has Malnutrition Diagnosis: Yes  Diagnosis Status: New  Malnutrition Diagnosis: Moderate malnutrition related to acute disease or injury  As Evidenced by: <75% of estimated energy requirements for > 7 days, >2% wt loss x 1 week            Nutrition Interventions/Recommendations         Nutrition Prescription:  Individualized Nutrition Prescription Provided for : Cardiac, 60 g Consistent Carb diet with ONS        Nutrition Interventions:   Interventions: Meals and snacks, Medical food supplement  Meals and Snacks: General healthful diet, Carbohydrate-modified diet, Fat-modified diet, Mineral-modified diet  Goal: Consumes 3 meals per day  Medical Food Supplement: Commercial beverage  Goal: Glucerna BID (provides 220 kcal, 10 g protein per serving).         Nutrition Education:   No needs at this time       Nutrition Monitoring and Evaluation   Food/Nutrient Related History Monitoring  Monitoring and Evaluation Plan: Energy intake, Amount of food, Fluid intake  Energy Intake: Estimated energy intake  Criteria: Pt meets >75% of estimated energy needs  Fluid Intake: Estimated fluid intake  Criteria: Meets >75% of estimated fluid needs  Amount of Food: Estimated amout of food, Medical food intake  Criteria: Pt consumes >75% of meals and supplements    Body Composition/Growth/Weight History  Monitoring and Evaluation Plan: Weight  Weight: Measured weight  Criteria:  Maintains stable weight    Biochemical Data, Medical Tests and Procedures  Monitoring and Evaluation Plan: Glucose/endocrine profile, Electrolyte/renal panel  Electrolyte and Renal Panel: Sodium, Potassium, Phosphorus  Criteria: Electrolytes WNL  Glucose/Endocrine Profile: Glucose, casual  Criteria: BG within desirable range              Time Spent (min): 30 minutes

## 2025-01-17 ENCOUNTER — PHARMACY VISIT (OUTPATIENT)
Dept: PHARMACY | Facility: CLINIC | Age: 75
End: 2025-01-17
Payer: COMMERCIAL

## 2025-01-17 ENCOUNTER — APPOINTMENT (OUTPATIENT)
Dept: CARDIOLOGY | Facility: HOSPITAL | Age: 75
DRG: 309 | End: 2025-01-17
Payer: MEDICARE

## 2025-01-17 VITALS
HEIGHT: 62 IN | RESPIRATION RATE: 18 BRPM | WEIGHT: 144.18 LBS | OXYGEN SATURATION: 95 % | TEMPERATURE: 98.4 F | SYSTOLIC BLOOD PRESSURE: 146 MMHG | DIASTOLIC BLOOD PRESSURE: 66 MMHG | BODY MASS INDEX: 26.53 KG/M2 | HEART RATE: 64 BPM

## 2025-01-17 LAB
ATRIAL RATE: 63 BPM
ATRIAL RATE: 63 BPM
BACTERIA BLD CULT: NORMAL
BACTERIA BLD CULT: NORMAL
GLUCOSE BLD MANUAL STRIP-MCNC: 111 MG/DL (ref 74–99)
GLUCOSE BLD MANUAL STRIP-MCNC: 126 MG/DL (ref 74–99)
P AXIS: 52 DEGREES
P AXIS: 71 DEGREES
P OFFSET: 163 MS
P OFFSET: 169 MS
P ONSET: 105 MS
P ONSET: 124 MS
PR INTERVAL: 204 MS
PR INTERVAL: 214 MS
Q ONSET: 212 MS
Q ONSET: 226 MS
QRS COUNT: 10 BEATS
QRS COUNT: 11 BEATS
QRS DURATION: 84 MS
QRS DURATION: 86 MS
QT INTERVAL: 438 MS
QT INTERVAL: 450 MS
QTC CALCULATION(BAZETT): 448 MS
QTC CALCULATION(BAZETT): 460 MS
QTC FREDERICIA: 445 MS
QTC FREDERICIA: 457 MS
R AXIS: -46 DEGREES
R AXIS: -62 DEGREES
T AXIS: 19 DEGREES
T AXIS: 46 DEGREES
T OFFSET: 437 MS
T OFFSET: 445 MS
VENTRICULAR RATE: 63 BPM
VENTRICULAR RATE: 63 BPM

## 2025-01-17 PROCEDURE — 99233 SBSQ HOSP IP/OBS HIGH 50: CPT | Performed by: INTERNAL MEDICINE

## 2025-01-17 PROCEDURE — 93005 ELECTROCARDIOGRAM TRACING: CPT

## 2025-01-17 PROCEDURE — 99239 HOSP IP/OBS DSCHRG MGMT >30: CPT | Performed by: STUDENT IN AN ORGANIZED HEALTH CARE EDUCATION/TRAINING PROGRAM

## 2025-01-17 PROCEDURE — 2500000004 HC RX 250 GENERAL PHARMACY W/ HCPCS (ALT 636 FOR OP/ED): Performed by: NURSE PRACTITIONER

## 2025-01-17 PROCEDURE — 2500000002 HC RX 250 W HCPCS SELF ADMINISTERED DRUGS (ALT 637 FOR MEDICARE OP, ALT 636 FOR OP/ED): Performed by: REGISTERED NURSE

## 2025-01-17 PROCEDURE — 82947 ASSAY GLUCOSE BLOOD QUANT: CPT

## 2025-01-17 PROCEDURE — 2500000001 HC RX 250 WO HCPCS SELF ADMINISTERED DRUGS (ALT 637 FOR MEDICARE OP): Performed by: NURSE PRACTITIONER

## 2025-01-17 PROCEDURE — 2500000001 HC RX 250 WO HCPCS SELF ADMINISTERED DRUGS (ALT 637 FOR MEDICARE OP): Performed by: REGISTERED NURSE

## 2025-01-17 RX ADMIN — LEVOTHYROXINE SODIUM 100 MCG: 0.1 TABLET ORAL at 05:24

## 2025-01-17 RX ADMIN — TIOTROPIUM BROMIDE INHALATION SPRAY 2 PUFF: 3.12 SPRAY, METERED RESPIRATORY (INHALATION) at 08:49

## 2025-01-17 RX ADMIN — PANTOPRAZOLE SODIUM 40 MG: 40 TABLET, DELAYED RELEASE ORAL at 08:48

## 2025-01-17 RX ADMIN — ATORVASTATIN CALCIUM 10 MG: 20 TABLET, FILM COATED ORAL at 08:48

## 2025-01-17 RX ADMIN — APIXABAN 5 MG: 5 TABLET, FILM COATED ORAL at 08:48

## 2025-01-17 RX ADMIN — MAGNESIUM OXIDE 400 MG (241.3 MG MAGNESIUM) TABLET 400 MG: TABLET at 08:48

## 2025-01-17 RX ADMIN — METOPROLOL TARTRATE 75 MG: 50 TABLET, FILM COATED ORAL at 08:48

## 2025-01-17 RX ADMIN — LOSARTAN POTASSIUM 50 MG: 25 TABLET, FILM COATED ORAL at 08:48

## 2025-01-17 ASSESSMENT — COGNITIVE AND FUNCTIONAL STATUS - GENERAL
DAILY ACTIVITIY SCORE: 24
MOBILITY SCORE: 24

## 2025-01-17 ASSESSMENT — PAIN SCALES - GENERAL: PAINLEVEL_OUTOF10: 0 - NO PAIN

## 2025-01-17 ASSESSMENT — PAIN - FUNCTIONAL ASSESSMENT: PAIN_FUNCTIONAL_ASSESSMENT: 0-10

## 2025-01-17 NOTE — CARE PLAN
The patient's goals for the shift include Labs WNL    The clinical goals for the shift include Labs WNL    Problem: Pain - Adult  Goal: Verbalizes/displays adequate comfort level or baseline comfort level  Outcome: Progressing     Problem: Safety - Adult  Goal: Free from fall injury  Outcome: Progressing     Problem: Discharge Planning  Goal: Discharge to home or other facility with appropriate resources  Outcome: Progressing     Problem: Chronic Conditions and Co-morbidities  Goal: Patient's chronic conditions and co-morbidity symptoms are monitored and maintained or improved  Outcome: Progressing     Problem: Fall/Injury  Goal: Not fall by end of shift  Outcome: Progressing  Goal: Be free from injury by end of the shift  Outcome: Progressing  Goal: Verbalize understanding of personal risk factors for fall in the hospital  Outcome: Progressing  Goal: Verbalize understanding of risk factor reduction measures to prevent injury from fall in the home  Outcome: Progressing  Goal: Use assistive devices by end of the shift  Outcome: Progressing  Goal: Pace activities to prevent fatigue by end of the shift  Outcome: Progressing     Problem: Diabetes  Goal: Achieve decreasing blood glucose levels by end of shift  Outcome: Progressing  Goal: Increase stability of blood glucose readings by end of shift  Outcome: Progressing  Goal: Decrease in ketones present in urine by end of shift  Outcome: Progressing  Goal: Maintain electrolyte levels within acceptable range throughout shift  Outcome: Progressing  Goal: Maintain glucose levels >70mg/dl to <250mg/dl throughout shift  Outcome: Progressing  Goal: No changes in neurological exam by end of shift  Outcome: Progressing  Goal: Learn about and adhere to nutrition recommendations by end of shift  Outcome: Progressing  Goal: Vital signs within normal range for age by end of shift  Outcome: Progressing  Goal: Increase self care and/or family involovement by end of shift  Outcome:  Progressing  Goal: Receive DSME education by end of shift  Outcome: Progressing     Problem: Nutrition  Goal: Oral intake greater 75%  Outcome: Progressing  Goal: Consume prescribed supplement  Outcome: Progressing  Goal: Adequate PO fluid intake  Outcome: Progressing  Goal: BG  mg/dL  Outcome: Progressing  Goal: Lab values WNL  Outcome: Progressing  Goal: Electrolytes WNL  Outcome: Progressing  Goal: Maintain stable weight  Outcome: Progressing     Problem: Skin  Goal: Participates in plan/prevention/treatment measures  1/17/2025 0544 by Carmen Reese RN  Outcome: Progressing  1/16/2025 2051 by Carmen Reese RN  Flowsheets (Taken 1/16/2025 2051)  Participates in plan/prevention/treatment measures:   Increase activity/out of bed for meals   Discuss with provider PT/OT consult   Elevate heels  Goal: Prevent/manage excess moisture  1/17/2025 0544 by Carmen Reese RN  Outcome: Progressing  1/16/2025 2051 by Carmen Reese RN  Flowsheets (Taken 1/16/2025 2051)  Prevent/manage excess moisture:   Use wicking fabric (obtain order)   Moisturize dry skin   Cleanse incontinence/protect with barrier cream   Monitor for/manage infection if present   Follow provider orders for dressing changes  Goal: Prevent/minimize sheer/friction injuries  1/17/2025 0544 by Carmen Reese RN  Outcome: Progressing  1/16/2025 2051 by Carmen Reese RN  Flowsheets (Taken 1/16/2025 2051)  Prevent/minimize sheer/friction injuries:   Utilize specialty bed per algorithm   Use pull sheet   Turn/reposition every 2 hours/use positioning/transfer devices   Increase activity/out of bed for meals   HOB 30 degrees or less   Complete micro-shifts as needed if patient unable. Adjust patient position to relieve pressure points, not a full turn  Goal: Promote/optimize nutrition  1/17/2025 0544 by Carmen Reese RN  Outcome: Progressing  1/16/2025 2051 by Carmen Reese RN  Flowsheets (Taken 1/16/2025  2051)  Promote/optimize nutrition:   Offer water/supplements/favorite foods   Discuss with provider if NPO > 2 days   Assist with feeding   Reassess MST if dietician not consulted   Monitor/record intake including meals   Consume > 50% meals/supplements  Goal: Promote skin healing  1/17/2025 0544 by Carmen Reese RN  Outcome: Progressing  1/16/2025 2051 by Carmen Reese RN  Flowsheets (Taken 1/16/2025 2051)  Promote skin healing:   Turn/reposition every 2 hours/use positioning/transfer devices   Rotate device position/do not position patient on device   Protective dressings over bony prominences   Ensure correct size (line/device) and apply per  instructions   Assess skin/pad under line(s)/device(s)     Problem: Pain  Goal: Takes deep breaths with improved pain control throughout the shift  Outcome: Progressing  Goal: Turns in bed with improved pain control throughout the shift  Outcome: Progressing  Goal: Walks with improved pain control throughout the shift  Outcome: Progressing  Goal: Performs ADL's with improved pain control throughout shift  Outcome: Progressing  Goal: Participates in PT with improved pain control throughout the shift  Outcome: Progressing  Goal: Free from opioid side effects throughout the shift  Outcome: Progressing  Goal: Free from acute confusion related to pain meds throughout the shift  Outcome: Progressing

## 2025-01-17 NOTE — PROGRESS NOTES
Rounding GENNY/Cardiologist:  Lalit Paredes DO, Dr. Lalit Paredes  Primary Cardiologist: Dr. Shashank Proctor    Date:  1/17/2025  Patient:  Myrna Rodrigues  YOB: 1950  MRN:  [id number]   Admit Date:  1/15/2025      SUBJECTIVE:    Myrna Rodrigues was seen and examined today at bedside.     She denies any chest pain or shortness of breath.     Had 1 episode of A-fib with RVR lasting only 30 seconds.        VITALS:     Vitals:    01/16/25 1959 01/16/25 2333 01/17/25 0541 01/17/25 0735   BP: 139/67 138/61  146/66   BP Location: Left arm      Patient Position: Lying      Pulse: 95 63  64   Resp: 17 18     Temp: 37 °C (98.6 °F) 37.2 °C (99 °F)  36.9 °C (98.4 °F)   TempSrc: Temporal      SpO2: 95% 95%  95%   Weight:   65.4 kg (144 lb 2.9 oz)    Height:           Intake/Output Summary (Last 24 hours) at 1/17/2025 1043  Last data filed at 1/17/2025 0400  Gross per 24 hour   Intake 290 ml   Output 200 ml   Net 90 ml       Wt Readings from Last 4 Encounters:   01/17/25 65.4 kg (144 lb 2.9 oz)   01/12/25 62.6 kg (138 lb)   01/09/25 65.8 kg (145 lb)   01/06/25 67.1 kg (148 lb)       CURRENT HOSPITAL MEDICATIONS:   apixaban, 5 mg, oral, BID  atorvastatin, 10 mg, oral, Daily  docusate sodium, 100 mg, oral, BID  insulin lispro, 0-5 Units, subcutaneous, TID AC  levothyroxine, 100 mcg, oral, Daily  losartan, 50 mg, oral, Daily  magnesium oxide, 400 mg, oral, Daily  metoprolol tartrate, 75 mg, oral, BID  pantoprazole, 40 mg, oral, Daily  tiotropium, 2 puff, inhalation, Daily         Current Outpatient Medications   Medication Instructions    acetaminophen (TYLENOL) 1,000 mg, oral, Every 6 hours    atorvastatin (LIPITOR) 10 mg, oral, Daily    cholecalciferol (VITAMIN D-3) 1,000 Units, oral, Daily    dicyclomine (BENTYL) 20 mg, oral, 4 times daily PRN    diphenoxylate-atropine (Lomotil) 2.5-0.025 mg tablet 1 tablet, oral, 4 times daily PRN    Eliquis 5 mg, oral, 2 times daily    estradiol  (Estrace) 0.01 % (0.1 mg/gram) vaginal cream Apply pea size amount 0.5 gram to vaginal opening with finger daily for 2 weeks, then 2-3 times per week.    levothyroxine (SYNTHROID, LEVOXYL) 100 mcg, oral, Daily    losartan (COZAAR) 50 mg, oral, Daily    magnesium oxide (MAG-OX) 400 mg, oral, Daily    metFORMIN XR (Glucophage-XR) 500 mg 24 hr tablet Take 1 tablet (500 mg) by mouth 2 times a day.    metoprolol tartrate (LOPRESSOR) 50 mg, oral, 2 times daily    metoprolol tartrate (LOPRESSOR) 75 mg, oral, 2 times daily    montelukast (SINGULAIR) 10 mg, oral, Nightly    nitrofurantoin, macrocrystal-monohydrate, (Macrobid) 100 mg capsule 100 mg, oral, 2 times daily    omeprazole (PriLOSEC) 40 mg DR capsule TAKE 1 CAPSULE BY MOUTH TWICE  DAILY    potassium gluconate 595 mg (99 mg) tablet 1 tablet, oral, Daily    tiotropium (Spiriva Respimat) 2.5 mcg/actuation inhaler 2 puffs, inhalation, Daily    traZODone (Desyrel) 50 mg tablet Take 1-3 tablets ( mg) by mouth as needed at bedtime for sleep.    Ventolin HFA 90 mcg/actuation inhaler 2 puffs, inhalation, Every 4 hours PRN        PHYSICAL EXAMINATION:     Physical Exam  HENT:      Head: Normocephalic.      Mouth/Throat:      Mouth: Mucous membranes are moist.   Eyes:      Pupils: Pupils are equal, round, and reactive to light.   Cardiovascular:      Rate and Rhythm: Normal rate and regular rhythm.   Pulmonary:      Effort: Pulmonary effort is normal.   Abdominal:      General: Bowel sounds are normal.      Palpations: Abdomen is soft.   Musculoskeletal:         General: Normal range of motion.   Skin:     General: Skin is warm and dry.      Capillary Refill: Capillary refill takes less than 2 seconds.   Neurological:      Mental Status: She is alert and oriented to person, place, and time.   Psychiatric:         Mood and Affect: Mood normal.         LAB DATA:     CBC:   Results from last 7 days   Lab Units 01/15/25  1158 01/14/25  1403 01/14/25  0534 01/13/25  0516    WBC AUTO x10*3/uL 9.1  --  6.1 8.3   RBC AUTO x10*6/uL 4.68  --  3.93* 4.03   HEMOGLOBIN g/dL 10.0* 9.1* 8.5* 8.7*   HEMATOCRIT % 34.0* 30.8* 28.6* 29.5*   MCV fL 73*  --  73* 73*   MCH pg 21.4*  --  21.6* 21.6*   MCHC g/dL 29.4*  --  29.7* 29.5*   RDW % 19.2*  --  18.5* 18.6*   PLATELETS AUTO x10*3/uL 276  --  241 260     CMP:    Results from last 7 days   Lab Units 01/16/25  0431 01/15/25  1158 01/14/25  0534 01/13/25  0516 01/12/25  2008   SODIUM mmol/L 136 138 139   < > 136   POTASSIUM mmol/L 4.1 4.0 4.0   < > 3.6   CHLORIDE mmol/L 103 103 106   < > 100   CO2 mmol/L 25 29 28   < > 26   BUN mg/dL 8 8 8   < > 12   CREATININE mg/dL 0.86 0.96 1.01   < > 1.03   GLUCOSE mg/dL 95 109* 86   < > 134*   PROTEIN TOTAL g/dL 6.0* 7.1  --   --  7.7   CALCIUM mg/dL 8.3* 8.8 8.0*   < > 9.2   BILIRUBIN TOTAL mg/dL 0.4 0.4  --   --  0.3   ALK PHOS U/L 111 110  --   --  70   AST U/L 45* 63*  --   --  15   ALT U/L 29 31  --   --  8    < > = values in this interval not displayed.     BMP:    Results from last 7 days   Lab Units 01/16/25  0431 01/15/25  1158 01/14/25  0534   SODIUM mmol/L 136 138 139   POTASSIUM mmol/L 4.1 4.0 4.0   CHLORIDE mmol/L 103 103 106   CO2 mmol/L 25 29 28   BUN mg/dL 8 8 8   CREATININE mg/dL 0.86 0.96 1.01   CALCIUM mg/dL 8.3* 8.8 8.0*   GLUCOSE mg/dL 95 109* 86     Magnesium:  Results from last 7 days   Lab Units 01/16/25 0431 01/15/25  1158 01/14/25  0534   MAGNESIUM mg/dL 2.18 2.12 2.02     Troponin:    Results from last 7 days   Lab Units 01/16/25  0431 01/15/25  1319 01/15/25  1158   TROPHS ng/L 95* 120* 113*     BNP:   Results from last 7 days   Lab Units 01/15/25  1158 01/12/25  2008   BNP pg/mL 143* 95     Lipid Panel:  Results from last 7 days   Lab Units 01/15/25  1319   HDL mg/dL 66.4   CHOLESTEROL/HDL RATIO  1.8   VLDL mg/dL 20   TRIGLYCERIDES mg/dL 101   NON HDL CHOL. mg/dL 54        DIAGNOSTIC TESTING:   @No results found for this or any previous visit.    Echocardiogram: Results for orders  placed during the hospital encounter of 01/18/24    Transthoracic Echo (TTE) Complete    Narrative  [facility]  [address], [address], [place] [address]  Tel [phone] Fax [phone]    TRANSTHORACIC ECHOCARDIOGRAM REPORT      Patient Name:      LISSETTE ZAVALA           [place] Physician:    [id number] Lalit Paredes DO  Study Date:        1/18/2024            Ordering Provider:    [id number] SO DOSS  MRN/PID:           [id number]             Fellow:  Accession#:        [id number]         Nurse:  Date of Birth/Age: 1950 / 73      Sonographer:          Lalit sutton  Gender:            F                    Additional Staff:  Height:            160.02 cm            Admit Date:           1/18/2024  Weight:            67.59 kg             Admission Status:     Outpatient  BSA:               1.71 m2              Department Location:  [facility]  Blood Pressure: 140 /70 mmHg    Study Type:    TRANSTHORACIC ECHO (TTE) COMPLETE  Diagnosis/ICD: Paroxysmal atrial fibrillation-I48.0; Atherosclerotic heart  disease of native coronary artery without angina pectoris-I25.10;  Coronary angioplasty status (PTCA)-Z98.61  Indication:    AF, CAD, PTCA  CPT Codes:     Echo Complete w Full Doppler-[id number]    Patient History:  Pertinent History: A-Fib, CAD, COPD, HTN, Hyperlipidemia and Edema, PTCA,  Dysrhythmia.    Study Detail: The following Echo studies were performed: 2D, M-Mode, Doppler and  color flow. The patient was awake.      PHYSICIAN INTERPRETATION:  Left Ventricle: Left ventricular systolic function is normal, with an estimated ejection fraction of 60%. There are no regional wall motion abnormalities. The left ventricular cavity size is normal. Spectral Doppler shows a normal pattern of left ventricular diastolic filling.  LV Wall Scoring:  All segments are normal.    Left Atrium: The left atrium is mildly dilated.  Right Ventricle: The right ventricle  is normal in size. There is normal right ventricular global systolic function.  Right Atrium: The right atrium is normal in size.  Aortic Valve: The aortic valve appears structurally normal. The aortic valve appears tricuspid. There is no evidence of aortic valve stenosis.  There is mild aortic valve regurgitation. The peak instantaneous gradient of the aortic valve is 10.2 mmHg. The mean gradient of the aortic valve is 6.0 mmHg.  Mitral Valve: The mitral valve is normal in structure. There is no evidence of mitral valve stenosis. The doppler estimated mean and peak diastolic pressure gradients are 4.0 mmHg and 7.7 mmHg respectively. There is normal mitral valve leaflet mobility. There is mild mitral valve regurgitation.  Tricuspid Valve: The tricuspid valve is structurally normal. There is normal tricuspid valve leaflet mobility. There is mild tricuspid regurgitation.  Pulmonic Valve: The pulmonic valve is structurally normal. There is mild pulmonic valve regurgitation.  Pericardium: There is no pericardial effusion noted.  Aorta: The aortic root is normal.  Pulmonary Artery: The tricuspid regurgitant velocity is 2.82 m/s, and with an estimated right atrial pressure of 3 mmHg, the estimated pulmonary artery pressure is mild to moderately elevated with the RVSP at 34.8 mmHg.  Systemic Veins: The inferior vena cava appears to be of normal size.  In comparison to the previous echocardiogram(s): The left ventricular function is unchanged and normal.      CONCLUSIONS:  1. Left ventricular systolic function is normal with a 60% estimated ejection fraction.  2. There is no evidence of mitral valve stenosis.  3. Mild mitral valve regurgitation.  4. Mild tricuspid regurgitation is visualized.  5. Aortic valve stenosis is not present.  6. Mild aortic valve regurgitation.  7. Mild to moderately elevated pulmonary artery pressure.    QUANTITATIVE DATA SUMMARY:  2D MEASUREMENTS:  Normal Ranges:  Ao Root d:     3.10 cm    (2.0-3.7cm)  LAs:           4.10 cm   (2.7-4.0cm)  RVIDd:         2.60 cm   (0.9-3.6cm)  IVSd:          1.00 cm   (0.6-1.1cm)  LVPWd:         1.00 cm   (0.6-1.1cm)  LVIDd:         4.60 cm   (3.9-5.9cm)  LVIDs:         3.00 cm  LV Mass Index: 93.1 g/m2  LV % FS        34.8 %    LA VOLUME:  Normal Ranges:  LA Area A4C:     25.1 cm2  LA Area A2C:     25.5 cm2  LA Volume Index: 45.6 ml/m2  LA Vol A4C:      76.0 ml  LA Vol A2C:      79.0 ml  LA Vol BP:       78.0 ml    AORTA MEASUREMENTS:  Normal Ranges:  Asc Ao, d: 2.90 cm (2.1-3.4cm)    LV SYSTOLIC FUNCTION BY 2D PLANIMETRY (MOD):  Normal Ranges:  EF-A4C View: 65.6 % (>=55%)  EF-A2C View: 68.4 %  EF-Biplane:  68.2 %    LV DIASTOLIC FUNCTION:  Normal Ranges:  MV Peak E:        1.29 m/s    (0.7-1.2 m/s)  MV Peak A:        1.12 m/s    (0.42-0.7 m/s)  E/A Ratio:        1.15        (1.0-2.2)  MV lateral e'     0.09 m/s  MV medial e'      0.07 m/s  MV A Dur:         127.00 msec  E/e' Ratio:       14.40       (<8.0)  PulmV Sys Elieser:    74.50 cm/s  PulmV Flores Elieser:   63.70 cm/s  PulmV S/D Elieser:    1.20  PulmV A Revs Elieser: 34.10 cm/s  PulmV A Revs Dur: 162.00 msec    MITRAL VALVE:  Normal Ranges:  MV Vmax:    1.39 m/s (<=1.3m/s)  MV peak P.7 mmHg (<5mmHg)  MV mean P.0 mmHg (<48mmHg)  MV DT:      296 msec (150-240msec)    AORTIC VALVE:  Normal Ranges:  AoV Vmax:                1.60 m/s  (<=1.7m/s)  AoV Peak PG:             10.2 mmHg (<20mmHg)  AoV Mean P.0 mmHg  (1.7-11.5mmHg)  LVOT Max Elieser:            1.02 m/s  (<=1.1m/s)  AoV VTI:                 38.20 cm  (18-25cm)  LVOT VTI:                27.00 cm  LVOT Diameter:           1.90 cm   (1.8-2.4cm)  AoV Area, VTI:           2.00 cm2  (2.5-5.5cm2)  AoV Area,Vmax:           1.81 cm2  (2.5-4.5cm2)  AoV Dimensionless Index: 0.71    TRICUSPID VALVE/RVSP:  Normal Ranges:  Peak TR Velocity: 2.82 m/s  Est. RA Pressure: 3 mmHg  RV Syst Pressure: 34.8 mmHg (< 30mmHg)    PULMONIC VALVE:  Normal Ranges:  PV Accel Time:  127 msec (>120ms)  PV Max Elieser:    1.0 m/s  (0.6-0.9m/s)  PV Max PG:     3.7 mmHg    Pulmonary Veins:  PulmV A Revs Dur: 162.00 msec  PulmV A Revs Elieser: 34.10 cm/s  PulmV Flores Elieser:   63.70 cm/s  PulmV S/D Elieser:    1.20  PulmV Sys Elieser:    74.50 cm/s      [id number] Lalit Paredes DO  Electronically signed on 1/19/2024 at 6:13:14 PM      Wall Scoring        ** Final **      Coronary Angiography: No results found for this or any previous visit from the past 1800 days.            RADIOLOGY:     XR chest 1 view   Final Result   No acute cardiopulmonary process.        MACRO:   None        Signed by: Monique Caballero 1/15/2025 11:21 AM   Dictation workstation:   [id number]      Holter or Event Cardiac Monitor    (Results Pending)   Nuclear Stress Test    (Results Pending)   Transthoracic echo (TTE) complete    (Results Pending)       PROBLEM LIST     Patient Active Problem List   Diagnosis    CAD S/P percutaneous coronary angioplasty    Chronic obstructive pulmonary disease (Multi)    Major depression    Moderate persistent asthma    Abnormal findings on diagnostic imaging of lung    Angina, class III (CMS-HCC)    Asthma    Cellulitis    Chronic diarrhea    Chronic pain of toe of left foot    Chronic pain of toe of right foot    Controlled type 2 diabetes mellitus without complication, without long-term current use of insulin (Multi)    Diabetes mellitus (Multi)    Edema    External hemorrhoids    Fracture of ankle    Gastroesophageal reflux disease    Ground glass opacity present on imaging of lung    H/O unstable angina    Hallux valgus, acquired    Hemangioma of skin and subcutaneous tissue    Hematuria    History of diabetes mellitus    History of malignant neoplasm    Hypertension, essential, benign    Hypertensive retinopathy    Lateral malleolar fracture    Mixed hyperlipidemia    Nail fungus    Tinea pedis of both feet    Obesity    Other melanin hyperpigmentation    Other seborrheic keratosis    Overweight with body  mass index (BMI) 25.0-29.9    Overweight    Paroxysmal atrial fibrillation (Multi)    Postsurgical hypothyroidism    Psoriasis vulgaris    PVD (posterior vitreous detachment)    Rectal prolapse    Renal insufficiency    Senile nuclear sclerosis    Sensorineural hearing loss (SNHL) of both ears    Sensorineural hearing loss, bilateral    Left lumbar radiculopathy    Spinal stenosis of lumbar region without neurogenic claudication    Thyroid nodule    Mixed stress and urge urinary incontinence    Urge incontinence of urine    Dysuria    Urinary incontinence    Vaginal atrophy    Vaginitis    Vitamin D deficiency    Vitreous degeneration    Recurrent urinary tract infection    Depression    Major depressive disorder    Ventricular tachycardia (Multi)    Cardiac dysrhythmia    Mood disorder (CMS-[facility])    Unspecified complication of internal orthopedic prosthetic device, implant and graft, initial encounter (CMS-HCC)    Unilateral primary osteoarthritis, unspecified knee    Presence of left artificial knee joint    Personal history of other diseases of the nervous system and sense organs    Mechanical loosening of unspecified internal prosthetic joint, initial encounter (CMS-HCC)    Mechanical loosening of internal left knee prosthetic joint    Long term (current) use of aspirin    Other specified anemias    Esophageal disorder    Elevated white blood cell count, unspecified    CKD (chronic kidney disease)    Asymptomatic menopause    BMI 27.0-27.9,adult    Mild vitamin D deficiency    Never smoked any substance    Personal history of calcium pyrophosphate deposition disease (CPPD)    Medicare annual wellness visit, subsequent    Shortness of breath    Overactive bladder    Difficulty walking    Syncope and collapse    Penicillin allergy    Allergy to sulfa drugs    Nitrofurantoin adverse reaction    Cyst of left ovary    Encounter for medication review and counseling    Encounter to discuss treatment options    Urge  incontinence    Iron deficiency anemia due to chronic blood loss    Type 2 diabetes mellitus with other specified complication, without long-term current use of insulin    SVT (supraventricular tachycardia) (CMS-HCC)    Palpitations       ASSESSMENT:   74-year-old female seen evaluate the bedside in telemetry and in conjunction with Gus Gandhi RN, CNP.    Bedside examination evaluation performed by me.    Chart review details cussed the patient at length.    Impression:  Palpitations and tachycardia symptomatic minimally  Paroxysmal atrial fibrillation on Eliquis  CAD with history of PCI  Hypertension  Hyperlipidemia  Hypothyroidism  Diabetes mellitus type 2        PLAN:   Admit to medicine.  Remains on telemetry.  Telemetry shows normal sinus rhythm.  Reviewed telemetry over the past 12 hours with 1 episode of A-fib with RVR that self terminated.    Supplemental O2  Monitor electrolytes, keep potassium greater than 4 and magnesium greater than 2  Continue with beta-blocker  Continue with magnesium supplementation  Will need endocrinology follow-up for elevated TSH  Continue Eliquis, monitor for signs of bleeding  Plan for Holter monitor outpatient  Elevated troponins in the setting of tachyarrhythmia.  Now downtrending.  Doubt plaque rupture.  Patient denies any chest pain or chest pressure.  She does report history of CAD with prior PCI and stent in the past.  Due to her symptoms and tachyarrhythmias along with elevated troponin we will pursue Lexiscan nuclear stress test outpatient as patient is agreeable.  She has difficulty ambulating at times and will benefit from Lexiscan versus treadmill test.  Echocardiogram outpatient  Okay to discharge from general cardiology  Message sent to schedulers to follow-up with Dr. Proctor after testing is completed      Lalit Paredes DO,FACC          [facility]  Adult Gerontology Acute Care Nurse Practitioner  [facility]  [facility]  [phone]          Of note, this documentation is completed using the Dragon Dictation system (voice recognition software). There may be spelling and/or grammatical errors that were not corrected prior to final submission.    Please do not hesitate to call with questions.  Electronically signed by Lalit Paredes DO, on 1/17/2025 at 10:43 AM

## 2025-01-17 NOTE — DISCHARGE SUMMARY
Medical Group Discharge Summary  DISCHARGE DIAGNOSIS     Paroxysmal atrial fibrillation  History of SVT  Long-term anticoagulation use  Hypothyroidism    HOSPITAL COURSE AND DETAILS     This is a 74-year-old female with a significant past medical history of atrial fibrillation, SVT, long-term anticoagulation with apixaban, hypothyroidism presents from home with chief complaints of palpitations while watching TV.  Patient was recently hospitalized and discharged home to complete course of oral antibiotics for acute cystitis.    Seen by cardiology and her metoprolol was increased to 75 mg twice daily.  As an outpatient, patient had recent Synthroid dose changed by her primary cardiologist and is now currently taking 100 mcg daily.    Patient is being discharged home this morning.  She will for follow-up with cardiology in the next 1 to 2 weeks to discuss outpatient Holter monitor, echo and stress test.  Needs to follow-up with her primary endocrinologist as well to discuss TSH retest.    Total time spent on discharge: >30min      ---Of note, this documentation is completed using the Dragon Dictation system (voice recognition software). There may be spelling and/or grammatical errors that were not corrected prior to final submission.---    Nicolas Hamilton MD    DISCHARGE PHYSICAL EXAM     Last Recorded Vitals:  Vitals:    01/16/25 1959 01/16/25 2333 01/17/25 0541 01/17/25 0735   BP: 139/67 138/61  146/66   BP Location: Left arm      Patient Position: Lying      Pulse: 95 63  64   Resp: 17 18     Temp: 37 °C (98.6 °F) 37.2 °C (99 °F)  36.9 °C (98.4 °F)   TempSrc: Temporal      SpO2: 95% 95%  95%   Weight:   65.4 kg (144 lb 2.9 oz)    Height:         Physical Exam  Vitals reviewed.   Constitutional:       General: She is not in acute distress.     Appearance: Normal appearance. She is not toxic-appearing.   HENT:      Head: Normocephalic and atraumatic.   Eyes:      Extraocular Movements: Extraocular movements  intact.      Conjunctiva/sclera: Conjunctivae normal.   Cardiovascular:      Rate and Rhythm: Normal rate and regular rhythm.      Pulses: Normal pulses.      Heart sounds: Normal heart sounds.   Pulmonary:      Effort: Pulmonary effort is normal. No respiratory distress.      Breath sounds: Normal breath sounds. No wheezing.   Abdominal:      General: Bowel sounds are normal. There is no distension.      Palpations: Abdomen is soft.      Tenderness: There is no abdominal tenderness. There is no guarding.   Musculoskeletal:         General: Normal range of motion.      Cervical back: Normal range of motion and neck supple.   Neurological:      General: No focal deficit present.      Mental Status: She is alert and oriented to person, place, and time. Mental status is at baseline.   Psychiatric:         Mood and Affect: Mood normal.         Behavior: Behavior normal.         Thought Content: Thought content normal.       DISCHARGE MEDICATIONS        Your medication list        START taking these medications        Instructions Last Dose Given Next Dose Due   magnesium oxide 400 mg (241.3 mg magnesium) tablet  Commonly known as: Mag-Ox      Take 1 tablet (400 mg) by mouth once daily.              CHANGE how you take these medications        Instructions Last Dose Given Next Dose Due   metoprolol tartrate 75 mg tablet  Commonly known as: Lopressor  What changed:   medication strength  how much to take      Take 1 tablet (75 mg) by mouth 2 times a day.              CONTINUE taking these medications        Instructions Last Dose Given Next Dose Due   acetaminophen 500 mg tablet  Commonly known as: Tylenol      Take 2 tablets (1,000 mg) by mouth every 6 hours.       atorvastatin 10 mg tablet  Commonly known as: Lipitor      TAKE 1 TABLET BY MOUTH DAILY       cholecalciferol 25 MCG (1000 UT) tablet  Commonly known as: Vitamin D-3           dicyclomine 10 mg capsule  Commonly known as: Bentyl      Take 2 capsules (20 mg) by  mouth 4 times a day as needed (abdominal discomfort or cramping) for up to 5 days.       diphenoxylate-atropine 2.5-0.025 mg tablet  Commonly known as: Lomotil      Take 1 tablet by mouth 4 times a day as needed for diarrhea.       Eliquis 5 mg tablet  Generic drug: apixaban      TAKE 1 TABLET BY MOUTH TWICE  DAILY       estradiol 0.01 % (0.1 mg/gram) vaginal cream  Commonly known as: Estrace      Apply pea size amount 0.5 gram to vaginal opening with finger daily for 2 weeks, then 2-3 times per week.       levothyroxine 100 mcg tablet  Commonly known as: Synthroid, Levoxyl           losartan 50 mg tablet  Commonly known as: Cozaar      Take 1 tablet (50 mg) by mouth once daily.       metFORMIN  mg 24 hr tablet  Commonly known as: Glucophage-XR           montelukast 10 mg tablet  Commonly known as: Singulair      TAKE 1 TABLET BY MOUTH AT  BEDTIME       nitrofurantoin (macrocrystal-monohydrate) 100 mg capsule  Commonly known as: Macrobid      Take 1 capsule (100 mg) by mouth 2 times a day for 6 doses.       omeprazole 40 mg DR capsule  Commonly known as: PriLOSEC      TAKE 1 CAPSULE BY MOUTH TWICE  DAILY       potassium gluconate 595 mg (99 mg) tablet           tiotropium 2.5 mcg/actuation inhaler  Commonly known as: Spiriva Respimat           traZODone 50 mg tablet  Commonly known as: Desyrel           Ventolin HFA 90 mcg/actuation inhaler  Generic drug: albuterol      USE 2 INHALATIONS BY MOUTH EVERY 4 HOURS AS NEEDED              STOP taking these medications      clobetasol 0.05 % ointment  Commonly known as: Temovate        naproxen 500 mg EC tablet  Commonly known as: EC Naprosyn                  Where to Get Your Medications        These medications were sent to [facility]  [address] [place] [address] [id number]      Hours: 9 AM to 5:30 PM Mon-Fri, 9 AM to 1 PM Saturday Phone: [phone]   magnesium oxide 400 mg (241.3 mg magnesium) tablet  metoprolol tartrate 75 mg tablet        OUTPATIENT FOLLOW-UP     Future Appointments   Date Time Provider Department Center   1/28/2025  7:30 AM ELY KNOTT NM ADMIN ROOM 1 [id number] [place]   1/28/2025  8:00 AM ELY K[facility]T NM 1 [id number] [place]   1/28/2025  8:30 AM ELY [id number] STRESS ROOM 1 [id number] [place]   1/28/2025  9:00 AM ELY [facility] NM ADMIN ROOM 1 [id number] [place]   1/28/2025  9:15 AM ELY K[facility] NM 1 [id number] [place]   1/28/2025 10:30 AM ELY [id number] ECG/HOLTER [id number] [place]   2/5/2025  2:40 PM Lizette Temple APRN-CNP [id number] [place]   2/27/2025  1:15 PM Shashank Proctor MD [id number] [place]   3/25/2025  2:00 PM Lesia Degroot MD [id number] [place]   4/9/2025 11:20 AM Major Jurado MD [id number] [facility]   4/30/2025 11:10 AM Calvin Baker MD [id number] [place]   7/16/2025  2:15 PM Shashank Proctor MD [id number] [place]   8/28/2025 11:10 AM Calvin Baker MD [id number] [place]   10/16/2025 11:00 AM Evin Thurman MD [id number] [place]

## 2025-01-17 NOTE — CARE PLAN
The patient's goals for the shift include Labs WNL    The clinical goals for the shift include Labs WNL    Problem: Pain - Adult  Goal: Verbalizes/displays adequate comfort level or baseline comfort level  Outcome: Progressing     Problem: Safety - Adult  Goal: Free from fall injury  Outcome: Progressing     Problem: Discharge Planning  Goal: Discharge to home or other facility with appropriate resources  Outcome: Progressing     Problem: Chronic Conditions and Co-morbidities  Goal: Patient's chronic conditions and co-morbidity symptoms are monitored and maintained or improved  Outcome: Progressing     Problem: Fall/Injury  Goal: Not fall by end of shift  Outcome: Progressing  Goal: Be free from injury by end of the shift  Outcome: Progressing  Goal: Verbalize understanding of personal risk factors for fall in the hospital  Outcome: Progressing  Goal: Verbalize understanding of risk factor reduction measures to prevent injury from fall in the home  Outcome: Progressing  Goal: Use assistive devices by end of the shift  Outcome: Progressing  Goal: Pace activities to prevent fatigue by end of the shift  Outcome: Progressing     Problem: Diabetes  Goal: Achieve decreasing blood glucose levels by end of shift  Outcome: Progressing  Goal: Increase stability of blood glucose readings by end of shift  Outcome: Progressing  Goal: Decrease in ketones present in urine by end of shift  Outcome: Progressing  Goal: Maintain electrolyte levels within acceptable range throughout shift  Outcome: Progressing  Goal: Maintain glucose levels >70mg/dl to <250mg/dl throughout shift  Outcome: Progressing  Goal: No changes in neurological exam by end of shift  Outcome: Progressing  Goal: Learn about and adhere to nutrition recommendations by end of shift  Outcome: Progressing  Goal: Vital signs within normal range for age by end of shift  Outcome: Progressing  Goal: Increase self care and/or family involovement by end of shift  Outcome:  Progressing  Goal: Receive DSME education by end of shift  Outcome: Progressing     Problem: Nutrition  Goal: Oral intake greater 75%  Outcome: Progressing  Goal: Consume prescribed supplement  Outcome: Progressing  Goal: Adequate PO fluid intake  Outcome: Progressing  Goal: BG  mg/dL  Outcome: Progressing  Goal: Lab values WNL  Outcome: Progressing  Goal: Electrolytes WNL  Outcome: Progressing  Goal: Maintain stable weight  Outcome: Progressing     Problem: Skin  Goal: Participates in plan/prevention/treatment measures  Outcome: Progressing  Goal: Prevent/manage excess moisture  Outcome: Progressing  Goal: Prevent/minimize sheer/friction injuries  Outcome: Progressing  Goal: Promote/optimize nutrition  Outcome: Progressing  Goal: Promote skin healing  Outcome: Progressing     Problem: Pain  Goal: Takes deep breaths with improved pain control throughout the shift  Outcome: Progressing  Goal: Turns in bed with improved pain control throughout the shift  Outcome: Progressing  Goal: Walks with improved pain control throughout the shift  Outcome: Progressing  Goal: Performs ADL's with improved pain control throughout shift  Outcome: Progressing  Goal: Participates in PT with improved pain control throughout the shift  Outcome: Progressing  Goal: Free from opioid side effects throughout the shift  Outcome: Progressing  Goal: Free from acute confusion related to pain meds throughout the shift  Outcome: Progressing

## 2025-01-17 NOTE — NURSING NOTE
AVS printed and reviewed with patient. All belonging sent with patient. New medication discussed with patient. IV removed with cath intact 0 redness noted. Tele off

## 2025-01-20 ENCOUNTER — PATIENT OUTREACH (OUTPATIENT)
Dept: PRIMARY CARE | Facility: CLINIC | Age: 75
End: 2025-01-20
Payer: MEDICARE

## 2025-01-20 NOTE — PROGRESS NOTES
Discharge Facility: [facility]  Discharge Diagnosis: Palpitations  Admission Date: 1/15/2025  Discharge Date: 1/17/2025    PCP Appointment Date:  -Unable to schedule d/t no available appt; task sent to office clerical pool to assist with scheduling.    Specialist Appointment Date:   -1/28/2025 0730 nuclear medicine injection  -1/28/2025 0800 nuclear medicine myocardial  -1/28/2025 0830  Stress- Nuclear Med  -1/288/2025 0900 Nuclear medicine injection stress 15  -1/28/2025 0915 nuclear medicine myocardial spect 2nd scan day 1  -1/28/2025 1030 holter  -2/5/2025 1440 urology  -2/27/2025 1315 cardiology Dr. Caro  -3/25/2025 1400 cardiology Dr[address] Degroot    Hospital Encounter and Summary Linked: Yes    See discharge assessment below for further details    Engagement  Call Start Time: 0959 (1/20/2025  9:59 AM)    Medications  Medications reviewed with patient/caregiver?: Yes (prescriptions reviewed; magnesium oxide and metoprolol tartrate) (1/20/2025  9:59 AM)  Is the patient having any side effects they believe may be caused by any medication additions or changes?: No (1/20/2025  9:59 AM)  Does the patient have all medications ordered at discharge?: Yes (1/20/2025  9:59 AM)  Care Management Interventions: No intervention needed (1/20/2025  9:59 AM)  Is the patient taking all medications as directed (includes completed medication regime)?: Yes (1/20/2025  9:59 AM)    Appointments  Does the patient have a primary care provider?: Yes (1/20/2025  9:59 AM)  Has the patient kept scheduled appointments due by today?: Yes (1/20/2025  9:59 AM)    Self Management  Has home health visited the patient within 72 hours of discharge?: Not applicable (1/20/2025  9:59 AM)    Patient Teaching  Does the patient have access to their discharge instructions?: Yes (1/20/2025  9:59 AM)  Care Management Interventions: Reviewed instructions with patient (1/20/2025  9:59 AM)  What is the patient's perception of their health  status since discharge?: Improving (1/20/2025  9:59 AM)  Is the patient/caregiver able to teach back the hierarchy of who to call/visit for symptoms/problems? PCP, Specialist, Home Health nurse, Urgent Care, ED, 911: Yes (1/20/2025  9:59 AM)    Wrap Up  Wrap Up Additional Comments: Pt was admitted to [facility] 1/15-1/17/2025 for palpitations. Pt reports feeling slightly better since discharge but states she is weak and having a hard time bouncing back. Pt discharged with prescriptions for magnesium oxide and metoprolol tartrate; pt denies questions or issues with medication. Pt reports understanding of discharge instructions. Verified pt testing and holter monitor appts scheduled for 1/28/2025. Unable to schedule PCP appt d/t no available appt; task sent to office clerical pool to assist with scheduling. Pt denies any questions, needs, or concerns at this time. Pt encouraged to call if questions or needs arise. (1/20/2025  9:59 AM)  Call End Time: 1003 (1/20/2025  9:59 AM)

## 2025-01-21 ENCOUNTER — TELEPHONE (OUTPATIENT)
Dept: PRIMARY CARE | Facility: CLINIC | Age: 75
End: 2025-01-21
Payer: MEDICARE

## 2025-01-21 NOTE — TELEPHONE ENCOUNTER
Patients son, Eriberto called to advisee that there are a lot of health issues going on right now.  He is very concerned and would like to speak to someone.    Eriberto can be reached at [phone]

## 2025-01-22 DIAGNOSIS — J45.909 ASTHMA, UNSPECIFIED ASTHMA SEVERITY, UNSPECIFIED WHETHER COMPLICATED, UNSPECIFIED WHETHER PERSISTENT (HHS-HCC): Primary | ICD-10-CM

## 2025-01-22 DIAGNOSIS — J44.9 CHRONIC OBSTRUCTIVE PULMONARY DISEASE, UNSPECIFIED COPD TYPE (MULTI): ICD-10-CM

## 2025-01-23 ENCOUNTER — APPOINTMENT (OUTPATIENT)
Dept: CARDIOLOGY | Facility: HOSPITAL | Age: 75
DRG: 689 | End: 2025-01-23
Payer: MEDICARE

## 2025-01-23 ENCOUNTER — HOSPITAL ENCOUNTER (INPATIENT)
Facility: HOSPITAL | Age: 75
DRG: 689 | End: 2025-01-23
Attending: EMERGENCY MEDICINE | Admitting: STUDENT IN AN ORGANIZED HEALTH CARE EDUCATION/TRAINING PROGRAM
Payer: MEDICARE

## 2025-01-23 ENCOUNTER — OFFICE VISIT (OUTPATIENT)
Dept: PRIMARY CARE | Facility: CLINIC | Age: 75
End: 2025-01-23
Payer: MEDICARE

## 2025-01-23 VITALS
SYSTOLIC BLOOD PRESSURE: 130 MMHG | BODY MASS INDEX: 25.97 KG/M2 | DIASTOLIC BLOOD PRESSURE: 72 MMHG | WEIGHT: 142 LBS | HEART RATE: 114 BPM | RESPIRATION RATE: 14 BRPM | OXYGEN SATURATION: 98 %

## 2025-01-23 DIAGNOSIS — R39.89 BLADDER PAIN: ICD-10-CM

## 2025-01-23 DIAGNOSIS — R06.02 SHORTNESS OF BREATH: ICD-10-CM

## 2025-01-23 DIAGNOSIS — N30.00 ACUTE CYSTITIS WITHOUT HEMATURIA: ICD-10-CM

## 2025-01-23 DIAGNOSIS — I48.91 ATRIAL FIBRILLATION, UNSPECIFIED TYPE (MULTI): ICD-10-CM

## 2025-01-23 DIAGNOSIS — K58.2 IRRITABLE BOWEL SYNDROME WITH BOTH CONSTIPATION AND DIARRHEA: ICD-10-CM

## 2025-01-23 DIAGNOSIS — N32.89 BLADDER WALL THICKENING: ICD-10-CM

## 2025-01-23 DIAGNOSIS — N30.90 CYSTITIS: Primary | ICD-10-CM

## 2025-01-23 DIAGNOSIS — R00.2 HEART PALPITATIONS: ICD-10-CM

## 2025-01-23 DIAGNOSIS — I21.29: ICD-10-CM

## 2025-01-23 DIAGNOSIS — I48.0 PAROXYSMAL ATRIAL FIBRILLATION (MULTI): ICD-10-CM

## 2025-01-23 DIAGNOSIS — I63.10 CEREBROVASCULAR ACCIDENT (CVA) DUE TO EMBOLISM OF PRECEREBRAL ARTERY (MULTI): ICD-10-CM

## 2025-01-23 DIAGNOSIS — I47.10 SVT (SUPRAVENTRICULAR TACHYCARDIA) (CMS-HCC): Primary | ICD-10-CM

## 2025-01-23 DIAGNOSIS — R06.00 DYSPNEA, UNSPECIFIED: ICD-10-CM

## 2025-01-23 DIAGNOSIS — R93.89 ABNORMAL FINDINGS ON DIAGNOSTIC IMAGING OF OTHER SPECIFIED BODY STRUCTURES: ICD-10-CM

## 2025-01-23 DIAGNOSIS — D50.0 IRON DEFICIENCY ANEMIA DUE TO CHRONIC BLOOD LOSS: ICD-10-CM

## 2025-01-23 DIAGNOSIS — I63.9 ACUTE ISCHEMIC STROKE (MULTI): ICD-10-CM

## 2025-01-23 DIAGNOSIS — R59.0 PELVIC LYMPHADENOPATHY: ICD-10-CM

## 2025-01-23 LAB
ALBUMIN SERPL BCP-MCNC: 3.6 G/DL (ref 3.4–5)
ALP SERPL-CCNC: 66 U/L (ref 33–136)
ALT SERPL W P-5'-P-CCNC: 8 U/L (ref 7–45)
ANION GAP SERPL CALC-SCNC: 15 MMOL/L (ref 10–20)
AST SERPL W P-5'-P-CCNC: 10 U/L (ref 9–39)
BASOPHILS # BLD AUTO: 0.04 X10*3/UL (ref 0–0.1)
BASOPHILS NFR BLD AUTO: 0.3 %
BILIRUB SERPL-MCNC: 0.4 MG/DL (ref 0–1.2)
BNP SERPL-MCNC: 164 PG/ML (ref 0–99)
BUN SERPL-MCNC: 11 MG/DL (ref 6–23)
CALCIUM SERPL-MCNC: 9.2 MG/DL (ref 8.6–10.3)
CARDIAC TROPONIN I PNL SERPL HS: 151 NG/L (ref 0–13)
CARDIAC TROPONIN I PNL SERPL HS: 156 NG/L (ref 0–13)
CHLORIDE SERPL-SCNC: 99 MMOL/L (ref 98–107)
CO2 SERPL-SCNC: 24 MMOL/L (ref 21–32)
CREAT SERPL-MCNC: 0.91 MG/DL (ref 0.5–1.05)
EGFRCR SERPLBLD CKD-EPI 2021: 66 ML/MIN/1.73M*2
EOSINOPHIL # BLD AUTO: 0.29 X10*3/UL (ref 0–0.4)
EOSINOPHIL NFR BLD AUTO: 2.4 %
ERYTHROCYTE [DISTWIDTH] IN BLOOD BY AUTOMATED COUNT: 18.5 % (ref 11.5–14.5)
GLUCOSE SERPL-MCNC: 192 MG/DL (ref 74–99)
HCT VFR BLD AUTO: 31.7 % (ref 36–46)
HGB BLD-MCNC: 9.4 G/DL (ref 12–16)
IMM GRANULOCYTES # BLD AUTO: 0.04 X10*3/UL (ref 0–0.5)
IMM GRANULOCYTES NFR BLD AUTO: 0.3 % (ref 0–0.9)
INR PPP: 1.4 (ref 0.9–1.1)
LIPASE SERPL-CCNC: 10 U/L (ref 9–82)
LYMPHOCYTES # BLD AUTO: 1.73 X10*3/UL (ref 0.8–3)
LYMPHOCYTES NFR BLD AUTO: 14.4 %
MAGNESIUM SERPL-MCNC: 1.74 MG/DL (ref 1.6–2.4)
MCH RBC QN AUTO: 21.4 PG (ref 26–34)
MCHC RBC AUTO-ENTMCNC: 29.7 G/DL (ref 32–36)
MCV RBC AUTO: 72 FL (ref 80–100)
MONOCYTES # BLD AUTO: 0.94 X10*3/UL (ref 0.05–0.8)
MONOCYTES NFR BLD AUTO: 7.8 %
NEUTROPHILS # BLD AUTO: 8.94 X10*3/UL (ref 1.6–5.5)
NEUTROPHILS NFR BLD AUTO: 74.8 %
NRBC BLD-RTO: 0 /100 WBCS (ref 0–0)
PLATELET # BLD AUTO: 262 X10*3/UL (ref 150–450)
POC APPEARANCE, URINE: ABNORMAL
POC BILIRUBIN, URINE: ABNORMAL
POC BLOOD, URINE: ABNORMAL
POC COLOR, URINE: ABNORMAL
POC GLUCOSE, URINE: NEGATIVE MG/DL
POC KETONES, URINE: ABNORMAL MG/DL
POC LEUKOCYTES, URINE: ABNORMAL
POC NITRITE,URINE: POSITIVE
POC PH, URINE: 5.5 PH
POC PROTEIN, URINE: ABNORMAL MG/DL
POC SPECIFIC GRAVITY, URINE: 1.01
POC UROBILINOGEN, URINE: 0.2 EU/DL
POTASSIUM SERPL-SCNC: 3.9 MMOL/L (ref 3.5–5.3)
PROT SERPL-MCNC: 6.9 G/DL (ref 6.4–8.2)
PROTHROMBIN TIME: 15.8 SECONDS (ref 9.8–12.8)
RBC # BLD AUTO: 4.39 X10*6/UL (ref 4–5.2)
SODIUM SERPL-SCNC: 134 MMOL/L (ref 136–145)
WBC # BLD AUTO: 12 X10*3/UL (ref 4.4–11.3)

## 2025-01-23 PROCEDURE — 99285 EMERGENCY DEPT VISIT HI MDM: CPT | Mod: 25 | Performed by: EMERGENCY MEDICINE

## 2025-01-23 PROCEDURE — 83880 ASSAY OF NATRIURETIC PEPTIDE: CPT | Performed by: EMERGENCY MEDICINE

## 2025-01-23 PROCEDURE — 85610 PROTHROMBIN TIME: CPT | Performed by: EMERGENCY MEDICINE

## 2025-01-23 PROCEDURE — 96374 THER/PROPH/DIAG INJ IV PUSH: CPT

## 2025-01-23 PROCEDURE — 80053 COMPREHEN METABOLIC PANEL: CPT | Performed by: EMERGENCY MEDICINE

## 2025-01-23 PROCEDURE — 87086 URINE CULTURE/COLONY COUNT: CPT

## 2025-01-23 PROCEDURE — 3078F DIAST BP <80 MM HG: CPT | Performed by: INTERNAL MEDICINE

## 2025-01-23 PROCEDURE — 83690 ASSAY OF LIPASE: CPT

## 2025-01-23 PROCEDURE — 93005 ELECTROCARDIOGRAM TRACING: CPT

## 2025-01-23 PROCEDURE — 93010 ELECTROCARDIOGRAM REPORT: CPT | Performed by: EMERGENCY MEDICINE

## 2025-01-23 PROCEDURE — 84443 ASSAY THYROID STIM HORMONE: CPT

## 2025-01-23 PROCEDURE — 2500000004 HC RX 250 GENERAL PHARMACY W/ HCPCS (ALT 636 FOR OP/ED)

## 2025-01-23 PROCEDURE — 84484 ASSAY OF TROPONIN QUANT: CPT | Performed by: EMERGENCY MEDICINE

## 2025-01-23 PROCEDURE — 83735 ASSAY OF MAGNESIUM: CPT

## 2025-01-23 PROCEDURE — 36415 COLL VENOUS BLD VENIPUNCTURE: CPT | Performed by: EMERGENCY MEDICINE

## 2025-01-23 PROCEDURE — 85025 COMPLETE CBC W/AUTO DIFF WBC: CPT | Performed by: EMERGENCY MEDICINE

## 2025-01-23 PROCEDURE — 3075F SYST BP GE 130 - 139MM HG: CPT | Performed by: INTERNAL MEDICINE

## 2025-01-23 PROCEDURE — 1123F ACP DISCUSS/DSCN MKR DOCD: CPT | Performed by: INTERNAL MEDICINE

## 2025-01-23 PROCEDURE — 99285 EMERGENCY DEPT VISIT HI MDM: CPT | Performed by: EMERGENCY MEDICINE

## 2025-01-23 PROCEDURE — 87186 SC STD MICRODIL/AGAR DIL: CPT

## 2025-01-23 PROCEDURE — 99291 CRITICAL CARE FIRST HOUR: CPT | Mod: 25 | Performed by: EMERGENCY MEDICINE

## 2025-01-23 PROCEDURE — 1111F DSCHRG MED/CURRENT MED MERGE: CPT | Performed by: INTERNAL MEDICINE

## 2025-01-23 PROCEDURE — 96375 TX/PRO/DX INJ NEW DRUG ADDON: CPT

## 2025-01-23 PROCEDURE — 84439 ASSAY OF FREE THYROXINE: CPT

## 2025-01-23 PROCEDURE — 99215 OFFICE O/P EST HI 40 MIN: CPT | Performed by: INTERNAL MEDICINE

## 2025-01-23 RX ORDER — MORPHINE SULFATE 4 MG/ML
4 INJECTION, SOLUTION INTRAMUSCULAR; INTRAVENOUS ONCE
Status: COMPLETED | OUTPATIENT
Start: 2025-01-23 | End: 2025-01-23

## 2025-01-23 RX ORDER — ADENOSINE 3 MG/ML
6 INJECTION, SOLUTION INTRAVENOUS ONCE
Status: COMPLETED | OUTPATIENT
Start: 2025-01-23 | End: 2025-01-23

## 2025-01-23 RX ORDER — AMOXICILLIN AND CLAVULANATE POTASSIUM 875; 125 MG/1; MG/1
875 TABLET, FILM COATED ORAL 2 TIMES DAILY
Qty: 14 TABLET | Refills: 0 | Status: ON HOLD | OUTPATIENT
Start: 2025-01-23 | End: 2025-01-30

## 2025-01-23 RX ADMIN — MORPHINE SULFATE 4 MG: 4 INJECTION, SOLUTION INTRAMUSCULAR; INTRAVENOUS at 22:29

## 2025-01-23 RX ADMIN — ADENOSINE 6 MG: 3 INJECTION, SOLUTION INTRAVENOUS at 22:00

## 2025-01-23 ASSESSMENT — LIFESTYLE VARIABLES
HAVE PEOPLE ANNOYED YOU BY CRITICIZING YOUR DRINKING: NO
TOTAL SCORE: 0
EVER HAD A DRINK FIRST THING IN THE MORNING TO STEADY YOUR NERVES TO GET RID OF A HANGOVER: NO
EVER FELT BAD OR GUILTY ABOUT YOUR DRINKING: NO
HAVE YOU EVER FELT YOU SHOULD CUT DOWN ON YOUR DRINKING: NO

## 2025-01-23 ASSESSMENT — ENCOUNTER SYMPTOMS
NAUSEA: 0
CONSTIPATION: 0
DIARRHEA: 1
FREQUENCY: 1
VOMITING: 0
FATIGUE: 1
CHILLS: 0
FEVER: 0
DIAPHORESIS: 0
SHORTNESS OF BREATH: 0
JOINT SWELLING: 0
COUGH: 0
MYALGIAS: 0

## 2025-01-23 ASSESSMENT — PAIN DESCRIPTION - LOCATION
LOCATION: ABDOMEN
LOCATION: ABDOMEN

## 2025-01-23 ASSESSMENT — PAIN DESCRIPTION - PAIN TYPE: TYPE: ACUTE PAIN

## 2025-01-23 ASSESSMENT — PAIN SCALES - GENERAL
PAINLEVEL_OUTOF10: 9
PAINLEVEL_OUTOF10: 9

## 2025-01-23 ASSESSMENT — PAIN DESCRIPTION - ORIENTATION
ORIENTATION: RIGHT;LOWER
ORIENTATION: RIGHT;LOWER

## 2025-01-23 ASSESSMENT — PAIN - FUNCTIONAL ASSESSMENT: PAIN_FUNCTIONAL_ASSESSMENT: 0-10

## 2025-01-23 NOTE — PATIENT INSTRUCTIONS
REVIEWING RADIOGRAPHY, LABS, AND URINE CULTURES FROM THE PAST 3 HOSPITAL STAYS, IT APPEARS AS IF YOU HAVE HAD A POLYMICROBIAL URINARY TRACT INFECTION - WHICH IS A COMPLICATED INFECTION.  THE CT SHOWS THICKENING OF THE BLADDER WALL WITH BLADDER STONES AND SEDIMENT WHICH ARE NEW, AS WELL AS THICKENING OF THE RIGHT BLADDER WALL WITH ASSOCIATED LYMPH NODE SWELLING ON THE RIGHT SIDE SUGGESTING A MORE CHRONIC INFLAMMATORY CONDITION INVOLVING THE RIGHT SIDE OF THE BLADDER.  THIS MAY BE THE SOURCE OF YOUR ONGOING RIGHT GROIN PAIN.    2.  IF YOU HAD A CHRONIC INFECTION/INFLAMMATION IN THE BLADDER, THIS COULD BE CAUSATIVE OF INSTABILITY OF ATRIAL FIBRILLATION, INSTABILITY OF IRRITABLE BOWEL, AND DEVELOPMENT OF IRON DEFICIENT ANEMIA.    3.  WILL COLLECT URINE FOR CULTURE TODAY, AND START AUGMENTIN ANTIBIOTIC FOR A WEEK TO HELP ERADICATE ANY REMAINING INFECTION, BUT I FEEL STRONGLY THAT DR. BESS WILL WANT TO KNOW ABOUT ALL OF THIS URGENTLY, IN THE EVENT MORE INVESTIGATION OF YOUR BLADDER IS WARRANTED.      4.  IT IS IMPORTANT FOR YOU TO REMAIN WELL HYDRATED, AND SO DILUTED HALF STRENGTH WITH WATER SPORT DRINKS WILL HELP YOU STAY HYDRATED AND KEEP URINE FLOWING TO HELP FLUSH OUT THE BLADDER.  LIGHT YELLOW URINE IS WHAT WE ARE AIMING FOR.      5.  YOU MAY KEEP APPOINTMENT WITH CARDIOLOGY FOR TESTING, BUT AS ABOVE I SUSPECT THAT THE INSTABILITY IN PREVIOUSLY CONTROLLED ATRIAL FIBRILLATION IS RESULTANT FROM A CHRONIC INFLAMMATORY PROCESS IN THE BLADDER    6.  FOLLOW UP AS SCHEDULED OR AS NEEDED AFTER SEEING DR. BESS

## 2025-01-23 NOTE — PROGRESS NOTES
Subjective   Myrna Rodrigues is a 74 y.o. female who presents for hospital follow up   Pt has been to er 3 times afib fatigued   Pt does not have appetite not eating or taking in fluids  Having accidents losing bowels      HPI   Has been in hospital 3 times in past 10 days    PRIMARILY FOR HEART.  AFIB IS NOT STRAIGHTENED OUT YET.    NO CARDIOVERSION OR ABLATION SCHEDULED.    STILL GETTING REAL BAD PAIN IN THE RIGHT GROIN AREA    METOPRLOL HAS BEEN INCREASED    DR. MARIE HAS BEEN ADJUSTING THYROID MED.      BOWELS ARE STILL AN ISSUE, ALTERNATING CONSTIPATION AND DIARRHEA.  TOOK LOMOTIL DAY BEFORE YESTERDAY      Review of Systems   Constitutional:  Positive for fatigue. Negative for chills, diaphoresis and fever.   Respiratory:  Negative for cough and shortness of breath.    Cardiovascular:  Negative for chest pain and leg swelling.   Gastrointestinal:  Positive for diarrhea. Negative for constipation, nausea and vomiting.   Genitourinary:  Positive for frequency and pelvic pain. Negative for enuresis.   Musculoskeletal:  Negative for joint swelling and myalgias.       Objective   /72   Pulse (!) 114   Resp 14   Wt 64.4 kg (142 lb)   SpO2 98%   BMI 25.97 kg/m²     Physical Exam  Vitals reviewed.   Constitutional:       General: She is not in acute distress.     Appearance: She is ill-appearing.   Cardiovascular:      Rate and Rhythm: Normal rate and regular rhythm.      Pulses: Normal pulses.      Heart sounds:      No gallop.   Pulmonary:      Breath sounds: Normal breath sounds. No wheezing, rhonchi or rales.   Abdominal:      General: Abdomen is flat. Bowel sounds are normal.      Palpations: Abdomen is soft.      Tenderness: There is no guarding or rebound.      Comments: VAGUE PAIN TO DEEP PALPATION RIGHT LOWER PELVIS.   Musculoskeletal:      Right lower leg: No edema.      Left lower leg: No edema.   Skin:     Comments: RIGHT BUTTOCK/POSTERIOR ILIAC CREST IS THE IMPLANTED SMALL 2 CM DISCOID INTERSTIM  DEVICE THAT IS MOBILE AND NOT TENDER WITHOUT OVERLYING WARMTH OR DISCOLORATION         Assessment/Plan   Problem List Items Addressed This Visit       Paroxysmal atrial fibrillation (Multi)    Iron deficiency anemia due to chronic blood loss    Bladder wall thickening    Pelvic lymphadenopathy    Irritable bowel syndrome with both constipation and diarrhea     Other Visit Diagnoses       Cystitis    -  Primary    Relevant Medications    amoxicillin-pot clavulanate (Augmentin) 875-125 mg tablet    Bladder pain        Relevant Orders    Referral to Urology    POCT UA (nonautomated) manually resulted    Urine Culture          Patient Instructions   REVIEWING RADIOGRAPHY, LABS, AND URINE CULTURES FROM THE PAST 3 HOSPITAL STAYS, IT APPEARS AS IF YOU HAVE HAD A POLYMICROBIAL URINARY TRACT INFECTION - WHICH IS A COMPLICATED INFECTION.  THE CT SHOWS THICKENING OF THE BLADDER WALL WITH BLADDER STONES AND SEDIMENT WHICH ARE NEW, AS WELL AS THICKENING OF THE RIGHT BLADDER WALL WITH ASSOCIATED LYMPH NODE SWELLING ON THE RIGHT SIDE SUGGESTING A MORE CHRONIC INFLAMMATORY CONDITION INVOLVING THE RIGHT SIDE OF THE BLADDER.  THIS MAY BE THE SOURCE OF YOUR ONGOING RIGHT GROIN PAIN.    2.  IF YOU HAD A CHRONIC INFECTION/INFLAMMATION IN THE BLADDER, THIS COULD BE CAUSATIVE OF INSTABILITY OF ATRIAL FIBRILLATION, INSTABILITY OF IRRITABLE BOWEL, AND DEVELOPMENT OF IRON DEFICIENT ANEMIA.    3.  WILL COLLECT URINE FOR CULTURE TODAY, AND START AUGMENTIN ANTIBIOTIC FOR A WEEK TO HELP ERADICATE ANY REMAINING INFECTION, BUT I FEEL STRONGLY THAT DR. BESS WILL WANT TO KNOW ABOUT ALL OF THIS URGENTLY, IN THE EVENT MORE INVESTIGATION OF YOUR BLADDER IS WARRANTED.      4.  IT IS IMPORTANT FOR YOU TO REMAIN WELL HYDRATED, AND SO DILUTED HALF STRENGTH WITH WATER SPORT DRINKS WILL HELP YOU STAY HYDRATED AND KEEP URINE FLOWING TO HELP FLUSH OUT THE BLADDER.  LIGHT YELLOW URINE IS WHAT WE ARE AIMING FOR.      5.  YOU MAY KEEP APPOINTMENT WITH CARDIOLOGY  FOR TESTING, BUT AS ABOVE I SUSPECT THAT THE INSTABILITY IN PREVIOUSLY CONTROLLED ATRIAL FIBRILLATION IS RESULTANT FROM A CHRONIC INFLAMMATORY PROCESS IN THE BLADDER    6.  FOLLOW UP AS SCHEDULED OR AS NEEDED AFTER SEEING DR. BESS

## 2025-01-24 ENCOUNTER — APPOINTMENT (OUTPATIENT)
Dept: RADIOLOGY | Facility: HOSPITAL | Age: 75
DRG: 689 | End: 2025-01-24
Payer: MEDICARE

## 2025-01-24 ENCOUNTER — APPOINTMENT (OUTPATIENT)
Dept: CARDIOLOGY | Facility: HOSPITAL | Age: 75
DRG: 689 | End: 2025-01-24
Payer: MEDICARE

## 2025-01-24 LAB
ALBUMIN SERPL BCP-MCNC: 3.4 G/DL (ref 3.4–5)
ANION GAP SERPL CALC-SCNC: 13 MMOL/L (ref 10–20)
AORTIC VALVE PEAK VELOCITY: 1.42 M/S
APPEARANCE UR: CLEAR
AV PEAK GRADIENT: 8 MMHG
AVA (PEAK VEL): 1.88 CM2
BASOPHILS # BLD AUTO: 0.04 X10*3/UL (ref 0–0.1)
BASOPHILS NFR BLD AUTO: 0.3 %
BILIRUB UR STRIP.AUTO-MCNC: NEGATIVE MG/DL
BUN SERPL-MCNC: 10 MG/DL (ref 6–23)
CALCIUM SERPL-MCNC: 8.7 MG/DL (ref 8.6–10.3)
CHLORIDE SERPL-SCNC: 103 MMOL/L (ref 98–107)
CO2 SERPL-SCNC: 25 MMOL/L (ref 21–32)
COLOR UR: ABNORMAL
CREAT SERPL-MCNC: 0.81 MG/DL (ref 0.5–1.05)
CRP SERPL-MCNC: 4.4 MG/DL
EGFRCR SERPLBLD CKD-EPI 2021: 75 ML/MIN/1.73M*2
EJECTION FRACTION APICAL 4 CHAMBER: 69.4
EJECTION FRACTION: 71 %
EOSINOPHIL # BLD AUTO: 0.49 X10*3/UL (ref 0–0.4)
EOSINOPHIL NFR BLD AUTO: 4.3 %
ERYTHROCYTE [DISTWIDTH] IN BLOOD BY AUTOMATED COUNT: 18.6 % (ref 11.5–14.5)
ERYTHROCYTE [SEDIMENTATION RATE] IN BLOOD BY WESTERGREN METHOD: 29 MM/H (ref 0–30)
GLUCOSE BLD MANUAL STRIP-MCNC: 110 MG/DL (ref 74–99)
GLUCOSE BLD MANUAL STRIP-MCNC: 110 MG/DL (ref 74–99)
GLUCOSE BLD MANUAL STRIP-MCNC: 111 MG/DL (ref 74–99)
GLUCOSE BLD MANUAL STRIP-MCNC: 87 MG/DL (ref 74–99)
GLUCOSE BLD MANUAL STRIP-MCNC: 98 MG/DL (ref 74–99)
GLUCOSE SERPL-MCNC: 90 MG/DL (ref 74–99)
GLUCOSE UR STRIP.AUTO-MCNC: NORMAL MG/DL
HCT VFR BLD AUTO: 28.2 % (ref 36–46)
HGB BLD-MCNC: 8.1 G/DL (ref 12–16)
HOLD SPECIMEN: NORMAL
IMM GRANULOCYTES # BLD AUTO: 0.04 X10*3/UL (ref 0–0.5)
IMM GRANULOCYTES NFR BLD AUTO: 0.3 % (ref 0–0.9)
KETONES UR STRIP.AUTO-MCNC: NEGATIVE MG/DL
LEFT ATRIUM VOLUME AREA LENGTH INDEX BSA: 32.8 ML/M2
LEFT VENTRICLE INTERNAL DIMENSION DIASTOLE: 5.63 CM (ref 3.5–6)
LEFT VENTRICULAR OUTFLOW TRACT DIAMETER: 1.8 CM
LEUKOCYTE ESTERASE UR QL STRIP.AUTO: ABNORMAL
LV EJECTION FRACTION BIPLANE: 71 %
LYMPHOCYTES # BLD AUTO: 1.69 X10*3/UL (ref 0.8–3)
LYMPHOCYTES NFR BLD AUTO: 14.7 %
MAGNESIUM SERPL-MCNC: 2.72 MG/DL (ref 1.6–2.4)
MCH RBC QN AUTO: 20.7 PG (ref 26–34)
MCHC RBC AUTO-ENTMCNC: 28.7 G/DL (ref 32–36)
MCV RBC AUTO: 72 FL (ref 80–100)
MITRAL VALVE E/A RATIO: 1
MITRAL VALVE E/E' RATIO: 13
MONOCYTES # BLD AUTO: 1.09 X10*3/UL (ref 0.05–0.8)
MONOCYTES NFR BLD AUTO: 9.5 %
MUCOUS THREADS #/AREA URNS AUTO: ABNORMAL /LPF
NEUTROPHILS # BLD AUTO: 8.12 X10*3/UL (ref 1.6–5.5)
NEUTROPHILS NFR BLD AUTO: 70.9 %
NITRITE UR QL STRIP.AUTO: NEGATIVE
NRBC BLD-RTO: 0 /100 WBCS (ref 0–0)
PH UR STRIP.AUTO: 6.5 [PH]
PHOSPHATE SERPL-MCNC: 3.7 MG/DL (ref 2.5–4.9)
PLATELET # BLD AUTO: 259 X10*3/UL (ref 150–450)
POTASSIUM SERPL-SCNC: 4.5 MMOL/L (ref 3.5–5.3)
PROT UR STRIP.AUTO-MCNC: NEGATIVE MG/DL
RBC # BLD AUTO: 3.92 X10*6/UL (ref 4–5.2)
RBC # UR STRIP.AUTO: NEGATIVE /UL
RBC #/AREA URNS AUTO: ABNORMAL /HPF
RIGHT VENTRICLE FREE WALL PEAK S': 14 CM/S
SODIUM SERPL-SCNC: 136 MMOL/L (ref 136–145)
SP GR UR STRIP.AUTO: 1.01
SQUAMOUS #/AREA URNS AUTO: ABNORMAL /HPF
T4 FREE SERPL-MCNC: 1.38 NG/DL (ref 0.61–1.12)
TRICUSPID ANNULAR PLANE SYSTOLIC EXCURSION: 2.2 CM
TSH SERPL-ACNC: 5.25 MIU/L (ref 0.44–3.98)
UROBILINOGEN UR STRIP.AUTO-MCNC: NORMAL MG/DL
WBC # BLD AUTO: 11.5 X10*3/UL (ref 4.4–11.3)
WBC #/AREA URNS AUTO: >50 /HPF

## 2025-01-24 PROCEDURE — 97535 SELF CARE MNGMENT TRAINING: CPT | Mod: GO

## 2025-01-24 PROCEDURE — 97530 THERAPEUTIC ACTIVITIES: CPT | Mod: GP

## 2025-01-24 PROCEDURE — 87086 URINE CULTURE/COLONY COUNT: CPT | Mod: STJLAB

## 2025-01-24 PROCEDURE — 85025 COMPLETE CBC W/AUTO DIFF WBC: CPT

## 2025-01-24 PROCEDURE — 83735 ASSAY OF MAGNESIUM: CPT

## 2025-01-24 PROCEDURE — 2500000004 HC RX 250 GENERAL PHARMACY W/ HCPCS (ALT 636 FOR OP/ED): Mod: JZ | Performed by: STUDENT IN AN ORGANIZED HEALTH CARE EDUCATION/TRAINING PROGRAM

## 2025-01-24 PROCEDURE — 2500000002 HC RX 250 W HCPCS SELF ADMINISTERED DRUGS (ALT 637 FOR MEDICARE OP, ALT 636 FOR OP/ED)

## 2025-01-24 PROCEDURE — 96375 TX/PRO/DX INJ NEW DRUG ADDON: CPT

## 2025-01-24 PROCEDURE — 36415 COLL VENOUS BLD VENIPUNCTURE: CPT

## 2025-01-24 PROCEDURE — 99223 1ST HOSP IP/OBS HIGH 75: CPT | Performed by: STUDENT IN AN ORGANIZED HEALTH CARE EDUCATION/TRAINING PROGRAM

## 2025-01-24 PROCEDURE — 2060000001 HC INTERMEDIATE ICU ROOM DAILY

## 2025-01-24 PROCEDURE — 94640 AIRWAY INHALATION TREATMENT: CPT

## 2025-01-24 PROCEDURE — 99222 1ST HOSP IP/OBS MODERATE 55: CPT | Performed by: UROLOGY

## 2025-01-24 PROCEDURE — 2500000004 HC RX 250 GENERAL PHARMACY W/ HCPCS (ALT 636 FOR OP/ED)

## 2025-01-24 PROCEDURE — 93306 TTE W/DOPPLER COMPLETE: CPT

## 2025-01-24 PROCEDURE — 81001 URINALYSIS AUTO W/SCOPE: CPT

## 2025-01-24 PROCEDURE — 86140 C-REACTIVE PROTEIN: CPT | Performed by: STUDENT IN AN ORGANIZED HEALTH CARE EDUCATION/TRAINING PROGRAM

## 2025-01-24 PROCEDURE — 80069 RENAL FUNCTION PANEL: CPT

## 2025-01-24 PROCEDURE — 85652 RBC SED RATE AUTOMATED: CPT | Performed by: STUDENT IN AN ORGANIZED HEALTH CARE EDUCATION/TRAINING PROGRAM

## 2025-01-24 PROCEDURE — 93005 ELECTROCARDIOGRAM TRACING: CPT

## 2025-01-24 PROCEDURE — 74176 CT ABD & PELVIS W/O CONTRAST: CPT

## 2025-01-24 PROCEDURE — 97165 OT EVAL LOW COMPLEX 30 MIN: CPT | Mod: GO

## 2025-01-24 PROCEDURE — 97161 PT EVAL LOW COMPLEX 20 MIN: CPT | Mod: GP

## 2025-01-24 PROCEDURE — 2500000001 HC RX 250 WO HCPCS SELF ADMINISTERED DRUGS (ALT 637 FOR MEDICARE OP)

## 2025-01-24 PROCEDURE — 82947 ASSAY GLUCOSE BLOOD QUANT: CPT

## 2025-01-24 PROCEDURE — 96376 TX/PRO/DX INJ SAME DRUG ADON: CPT

## 2025-01-24 PROCEDURE — 74176 CT ABD & PELVIS W/O CONTRAST: CPT | Performed by: STUDENT IN AN ORGANIZED HEALTH CARE EDUCATION/TRAINING PROGRAM

## 2025-01-24 RX ORDER — ATORVASTATIN CALCIUM 10 MG/1
10 TABLET, FILM COATED ORAL DAILY
Status: DISCONTINUED | OUTPATIENT
Start: 2025-01-24 | End: 2025-01-29

## 2025-01-24 RX ORDER — IPRATROPIUM BROMIDE 0.5 MG/2.5ML
0.5 SOLUTION RESPIRATORY (INHALATION)
Status: DISCONTINUED | OUTPATIENT
Start: 2025-01-24 | End: 2025-01-24

## 2025-01-24 RX ORDER — REGADENOSON 0.08 MG/ML
0.4 INJECTION, SOLUTION INTRAVENOUS ONCE
Status: DISCONTINUED | OUTPATIENT
Start: 2025-01-24 | End: 2025-01-24

## 2025-01-24 RX ORDER — MORPHINE SULFATE 2 MG/ML
2 INJECTION, SOLUTION INTRAMUSCULAR; INTRAVENOUS EVERY 4 HOURS PRN
Status: DISCONTINUED | OUTPATIENT
Start: 2025-01-24 | End: 2025-01-24

## 2025-01-24 RX ORDER — HYDROMORPHONE HYDROCHLORIDE 0.2 MG/ML
0.2 INJECTION INTRAMUSCULAR; INTRAVENOUS; SUBCUTANEOUS
Status: DISCONTINUED | OUTPATIENT
Start: 2025-01-24 | End: 2025-01-26

## 2025-01-24 RX ORDER — METOPROLOL TARTRATE 1 MG/ML
5 INJECTION, SOLUTION INTRAVENOUS ONCE
Status: COMPLETED | OUTPATIENT
Start: 2025-01-24 | End: 2025-01-24

## 2025-01-24 RX ORDER — MAGNESIUM SULFATE HEPTAHYDRATE 40 MG/ML
2 INJECTION, SOLUTION INTRAVENOUS ONCE
Status: COMPLETED | OUTPATIENT
Start: 2025-01-24 | End: 2025-01-24

## 2025-01-24 RX ORDER — MORPHINE SULFATE 2 MG/ML
2 INJECTION, SOLUTION INTRAMUSCULAR; INTRAVENOUS ONCE
Status: COMPLETED | OUTPATIENT
Start: 2025-01-24 | End: 2025-01-24

## 2025-01-24 RX ORDER — LOSARTAN POTASSIUM 50 MG/1
50 TABLET ORAL DAILY
Status: DISCONTINUED | OUTPATIENT
Start: 2025-01-24 | End: 2025-02-02 | Stop reason: HOSPADM

## 2025-01-24 RX ORDER — TRAZODONE HYDROCHLORIDE 50 MG/1
50 TABLET ORAL NIGHTLY PRN
Status: DISCONTINUED | OUTPATIENT
Start: 2025-01-24 | End: 2025-02-02 | Stop reason: HOSPADM

## 2025-01-24 RX ORDER — DEXTROSE 50 % IN WATER (D50W) INTRAVENOUS SYRINGE
25
Status: DISCONTINUED | OUTPATIENT
Start: 2025-01-24 | End: 2025-02-02 | Stop reason: HOSPADM

## 2025-01-24 RX ORDER — REGADENOSON 0.08 MG/ML
0.4 INJECTION, SOLUTION INTRAVENOUS
Status: CANCELLED | OUTPATIENT
Start: 2025-01-24

## 2025-01-24 RX ORDER — ACETAMINOPHEN 325 MG/1
650 TABLET ORAL EVERY 6 HOURS PRN
Status: DISCONTINUED | OUTPATIENT
Start: 2025-01-24 | End: 2025-02-02 | Stop reason: HOSPADM

## 2025-01-24 RX ORDER — INSULIN LISPRO 100 [IU]/ML
0-10 INJECTION, SOLUTION INTRAVENOUS; SUBCUTANEOUS
Status: DISCONTINUED | OUTPATIENT
Start: 2025-01-24 | End: 2025-02-02 | Stop reason: HOSPADM

## 2025-01-24 RX ORDER — MORPHINE SULFATE 2 MG/ML
1 INJECTION, SOLUTION INTRAMUSCULAR; INTRAVENOUS EVERY 4 HOURS PRN
Status: DISCONTINUED | OUTPATIENT
Start: 2025-01-24 | End: 2025-01-24

## 2025-01-24 RX ORDER — KETOROLAC TROMETHAMINE 15 MG/ML
15 INJECTION, SOLUTION INTRAMUSCULAR; INTRAVENOUS EVERY 8 HOURS PRN
Status: DISCONTINUED | OUTPATIENT
Start: 2025-01-24 | End: 2025-01-24

## 2025-01-24 RX ORDER — MONTELUKAST SODIUM 10 MG/1
10 TABLET ORAL NIGHTLY
Status: DISCONTINUED | OUTPATIENT
Start: 2025-01-24 | End: 2025-02-02 | Stop reason: HOSPADM

## 2025-01-24 RX ORDER — DEXTROSE 50 % IN WATER (D50W) INTRAVENOUS SYRINGE
12.5
Status: DISCONTINUED | OUTPATIENT
Start: 2025-01-24 | End: 2025-02-02 | Stop reason: HOSPADM

## 2025-01-24 RX ORDER — IPRATROPIUM BROMIDE 0.5 MG/2.5ML
0.5 SOLUTION RESPIRATORY (INHALATION)
Status: DISCONTINUED | OUTPATIENT
Start: 2025-01-25 | End: 2025-02-02 | Stop reason: HOSPADM

## 2025-01-24 RX ORDER — LEVOTHYROXINE SODIUM 100 UG/1
100 TABLET ORAL DAILY
Status: DISCONTINUED | OUTPATIENT
Start: 2025-01-24 | End: 2025-02-02 | Stop reason: HOSPADM

## 2025-01-24 RX ORDER — AMINOPHYLLINE 25 MG/ML
125 INJECTION, SOLUTION INTRAVENOUS AS NEEDED
Status: CANCELLED | OUTPATIENT
Start: 2025-01-24

## 2025-01-24 RX ORDER — ALBUTEROL SULFATE 0.83 MG/ML
2.5 SOLUTION RESPIRATORY (INHALATION) EVERY 4 HOURS PRN
Status: DISCONTINUED | OUTPATIENT
Start: 2025-01-24 | End: 2025-02-02 | Stop reason: HOSPADM

## 2025-01-24 RX ORDER — POLYETHYLENE GLYCOL 3350 17 G/17G
17 POWDER, FOR SOLUTION ORAL DAILY
Status: DISCONTINUED | OUTPATIENT
Start: 2025-01-24 | End: 2025-01-25

## 2025-01-24 RX ORDER — PANTOPRAZOLE SODIUM 40 MG/1
40 TABLET, DELAYED RELEASE ORAL
Status: DISCONTINUED | OUTPATIENT
Start: 2025-01-24 | End: 2025-02-02 | Stop reason: HOSPADM

## 2025-01-24 RX ORDER — ALBUTEROL SULFATE 90 UG/1
2 INHALANT RESPIRATORY (INHALATION) EVERY 4 HOURS PRN
Status: DISCONTINUED | OUTPATIENT
Start: 2025-01-24 | End: 2025-01-24

## 2025-01-24 RX ADMIN — APIXABAN 5 MG: 5 TABLET, FILM COATED ORAL at 15:53

## 2025-01-24 RX ADMIN — MORPHINE SULFATE 2 MG: 2 INJECTION, SOLUTION INTRAMUSCULAR; INTRAVENOUS at 03:01

## 2025-01-24 RX ADMIN — IPRATROPIUM BROMIDE 0.5 MG: 0.5 SOLUTION RESPIRATORY (INHALATION) at 19:53

## 2025-01-24 RX ADMIN — LOSARTAN POTASSIUM 50 MG: 50 TABLET, FILM COATED ORAL at 15:53

## 2025-01-24 RX ADMIN — PANTOPRAZOLE SODIUM 40 MG: 40 TABLET, DELAYED RELEASE ORAL at 15:58

## 2025-01-24 RX ADMIN — METOPROLOL TARTRATE 75 MG: 50 TABLET, FILM COATED ORAL at 15:53

## 2025-01-24 RX ADMIN — IPRATROPIUM BROMIDE 0.5 MG: 0.5 SOLUTION RESPIRATORY (INHALATION) at 14:09

## 2025-01-24 RX ADMIN — LEVOTHYROXINE SODIUM 100 MCG: 0.1 TABLET ORAL at 15:53

## 2025-01-24 RX ADMIN — PIPERACILLIN SODIUM AND TAZOBACTAM SODIUM 3.38 G: 3; .375 INJECTION, SOLUTION INTRAVENOUS at 12:37

## 2025-01-24 RX ADMIN — MAGNESIUM SULFATE HEPTAHYDRATE 2 G: 40 INJECTION, SOLUTION INTRAVENOUS at 03:01

## 2025-01-24 RX ADMIN — ATORVASTATIN CALCIUM 10 MG: 10 TABLET, FILM COATED ORAL at 15:53

## 2025-01-24 RX ADMIN — MORPHINE SULFATE 2 MG: 2 INJECTION, SOLUTION INTRAMUSCULAR; INTRAVENOUS at 12:37

## 2025-01-24 RX ADMIN — HYDROMORPHONE HYDROCHLORIDE 0.2 MG: 0.2 INJECTION, SOLUTION INTRAMUSCULAR; INTRAVENOUS; SUBCUTANEOUS at 18:24

## 2025-01-24 RX ADMIN — METOPROLOL TARTRATE 5 MG: 5 INJECTION INTRAVENOUS at 05:59

## 2025-01-24 RX ADMIN — PIPERACILLIN SODIUM AND TAZOBACTAM SODIUM 3.38 G: 3; .375 INJECTION, SOLUTION INTRAVENOUS at 04:58

## 2025-01-24 RX ADMIN — MORPHINE SULFATE 2 MG: 2 INJECTION, SOLUTION INTRAMUSCULAR; INTRAVENOUS at 07:22

## 2025-01-24 RX ADMIN — MONTELUKAST 10 MG: 10 TABLET, FILM COATED ORAL at 20:15

## 2025-01-24 RX ADMIN — PIPERACILLIN SODIUM AND TAZOBACTAM SODIUM 3.38 G: 3; .375 INJECTION, SOLUTION INTRAVENOUS at 20:16

## 2025-01-24 SDOH — ECONOMIC STABILITY: INCOME INSECURITY: IN THE PAST 12 MONTHS HAS THE ELECTRIC, GAS, OIL, OR WATER COMPANY THREATENED TO SHUT OFF SERVICES IN YOUR HOME?: NO

## 2025-01-24 SDOH — SOCIAL STABILITY: SOCIAL INSECURITY: ARE YOU OR HAVE YOU BEEN THREATENED OR ABUSED PHYSICALLY, EMOTIONALLY, OR SEXUALLY BY ANYONE?: NO

## 2025-01-24 SDOH — SOCIAL STABILITY: SOCIAL INSECURITY: ARE THERE ANY APPARENT SIGNS OF INJURIES/BEHAVIORS THAT COULD BE RELATED TO ABUSE/NEGLECT?: NO

## 2025-01-24 SDOH — SOCIAL STABILITY: SOCIAL INSECURITY: WERE YOU ABLE TO COMPLETE ALL THE BEHAVIORAL HEALTH SCREENINGS?: YES

## 2025-01-24 SDOH — ECONOMIC STABILITY: HOUSING INSECURITY: IN THE LAST 12 MONTHS, WAS THERE A TIME WHEN YOU WERE NOT ABLE TO PAY THE MORTGAGE OR RENT ON TIME?: NO

## 2025-01-24 SDOH — SOCIAL STABILITY: SOCIAL INSECURITY: HAVE YOU HAD THOUGHTS OF HARMING ANYONE ELSE?: NO

## 2025-01-24 SDOH — ECONOMIC STABILITY: TRANSPORTATION INSECURITY: IN THE PAST 12 MONTHS, HAS LACK OF TRANSPORTATION KEPT YOU FROM MEDICAL APPOINTMENTS OR FROM GETTING MEDICATIONS?: NO

## 2025-01-24 SDOH — ECONOMIC STABILITY: FOOD INSECURITY: HOW HARD IS IT FOR YOU TO PAY FOR THE VERY BASICS LIKE FOOD, HOUSING, MEDICAL CARE, AND HEATING?: NOT VERY HARD

## 2025-01-24 SDOH — ECONOMIC STABILITY: FOOD INSECURITY: WITHIN THE PAST 12 MONTHS, THE FOOD YOU BOUGHT JUST DIDN'T LAST AND YOU DIDN'T HAVE MONEY TO GET MORE.: NEVER TRUE

## 2025-01-24 SDOH — SOCIAL STABILITY: SOCIAL INSECURITY: DO YOU FEEL UNSAFE GOING BACK TO THE PLACE WHERE YOU ARE LIVING?: NO

## 2025-01-24 SDOH — ECONOMIC STABILITY: FOOD INSECURITY: WITHIN THE PAST 12 MONTHS, YOU WORRIED THAT YOUR FOOD WOULD RUN OUT BEFORE YOU GOT THE MONEY TO BUY MORE.: NEVER TRUE

## 2025-01-24 SDOH — ECONOMIC STABILITY: HOUSING INSECURITY: AT ANY TIME IN THE PAST 12 MONTHS, WERE YOU HOMELESS OR LIVING IN A SHELTER (INCLUDING NOW)?: NO

## 2025-01-24 SDOH — SOCIAL STABILITY: SOCIAL INSECURITY: HAS ANYONE EVER THREATENED TO HURT YOUR FAMILY OR YOUR PETS?: NO

## 2025-01-24 SDOH — SOCIAL STABILITY: SOCIAL INSECURITY: WITHIN THE LAST YEAR, HAVE YOU BEEN AFRAID OF YOUR PARTNER OR EX-PARTNER?: NO

## 2025-01-24 SDOH — SOCIAL STABILITY: SOCIAL INSECURITY: DO YOU FEEL ANYONE HAS EXPLOITED OR TAKEN ADVANTAGE OF YOU FINANCIALLY OR OF YOUR PERSONAL PROPERTY?: NO

## 2025-01-24 SDOH — SOCIAL STABILITY: SOCIAL INSECURITY: WITHIN THE LAST YEAR, HAVE YOU BEEN HUMILIATED OR EMOTIONALLY ABUSED IN OTHER WAYS BY YOUR PARTNER OR EX-PARTNER?: NO

## 2025-01-24 SDOH — ECONOMIC STABILITY: HOUSING INSECURITY: IN THE PAST 12 MONTHS, HOW MANY TIMES HAVE YOU MOVED WHERE YOU WERE LIVING?: 0

## 2025-01-24 SDOH — SOCIAL STABILITY: SOCIAL INSECURITY: DOES ANYONE TRY TO KEEP YOU FROM HAVING/CONTACTING OTHER FRIENDS OR DOING THINGS OUTSIDE YOUR HOME?: NO

## 2025-01-24 SDOH — SOCIAL STABILITY: SOCIAL INSECURITY: ABUSE: ADULT

## 2025-01-24 ASSESSMENT — ENCOUNTER SYMPTOMS
ALLERGIC/IMMUNOLOGIC NEGATIVE: 1
PSYCHIATRIC NEGATIVE: 1
CHILLS: 0
DYSURIA: 0
DIARRHEA: 1
DIFFICULTY URINATING: 0
VOMITING: 0
ACTIVITY CHANGE: 1
EYES NEGATIVE: 1
NAUSEA: 0
ABDOMINAL PAIN: 1
FEVER: 0
APPETITE CHANGE: 1
FLANK PAIN: 1
ENDOCRINE NEGATIVE: 1
WEAKNESS: 1
CONSTIPATION: 1

## 2025-01-24 ASSESSMENT — COGNITIVE AND FUNCTIONAL STATUS - GENERAL
MOBILITY SCORE: 24
DAILY ACTIVITIY SCORE: 24
PATIENT BASELINE BEDBOUND: NO
MOBILITY SCORE: 20
DAILY ACTIVITIY SCORE: 23
MOVING TO AND FROM BED TO CHAIR: A LITTLE
MOBILITY SCORE: 24
STANDING UP FROM CHAIR USING ARMS: A LITTLE
DAILY ACTIVITIY SCORE: 24
WALKING IN HOSPITAL ROOM: A LITTLE
CLIMB 3 TO 5 STEPS WITH RAILING: A LITTLE
HELP NEEDED FOR BATHING: A LITTLE

## 2025-01-24 ASSESSMENT — ACTIVITIES OF DAILY LIVING (ADL)
DRESSING YOURSELF: INDEPENDENT
BATHING_ASSISTANCE: STAND BY
GROOMING: INDEPENDENT
ASSISTIVE_DEVICE: EYEGLASSES;DENTURES LOWER;DENTURES UPPER
LACK_OF_TRANSPORTATION: NO
LACK_OF_TRANSPORTATION: NO
BATHING: INDEPENDENT
HOME_MANAGEMENT_TIME_ENTRY: 13
ADEQUATE_TO_COMPLETE_ADL: YES
TOILETING: INDEPENDENT
WALKS IN HOME: NEEDS ASSISTANCE
HEARING - LEFT EAR: FUNCTIONAL
LACK_OF_TRANSPORTATION: NO
JUDGMENT_ADEQUATE_SAFELY_COMPLETE_DAILY_ACTIVITIES: YES
FEEDING YOURSELF: INDEPENDENT
HEARING - RIGHT EAR: FUNCTIONAL
PATIENT'S MEMORY ADEQUATE TO SAFELY COMPLETE DAILY ACTIVITIES?: YES

## 2025-01-24 ASSESSMENT — LIFESTYLE VARIABLES
AUDIT-C TOTAL SCORE: 0
AUDIT-C TOTAL SCORE: 0
HOW OFTEN DO YOU HAVE A DRINK CONTAINING ALCOHOL: NEVER
HOW MANY STANDARD DRINKS CONTAINING ALCOHOL DO YOU HAVE ON A TYPICAL DAY: PATIENT DOES NOT DRINK
HOW OFTEN DO YOU HAVE 6 OR MORE DRINKS ON ONE OCCASION: NEVER
SKIP TO QUESTIONS 9-10: 1

## 2025-01-24 ASSESSMENT — PAIN SCALES - GENERAL
PAINLEVEL_OUTOF10: 7
PAINLEVEL_OUTOF10: 6
PAINLEVEL_OUTOF10: 3
PAINLEVEL_OUTOF10: 3
PAINLEVEL_OUTOF10: 8
PAINLEVEL_OUTOF10: 6
PAINLEVEL_OUTOF10: 2
PAINLEVEL_OUTOF10: 0 - NO PAIN
PAINLEVEL_OUTOF10: 4
PAINLEVEL_OUTOF10: 8

## 2025-01-24 ASSESSMENT — PAIN - FUNCTIONAL ASSESSMENT
PAIN_FUNCTIONAL_ASSESSMENT: 0-10

## 2025-01-24 ASSESSMENT — PAIN DESCRIPTION - ORIENTATION: ORIENTATION: RIGHT

## 2025-01-24 NOTE — PROGRESS NOTES
Physical Therapy    Physical Therapy Evaluation    Patient Name: Myrna Rodrigues  MRN: [id number]  Today's Date: 1/24/2025   Time Calculation  Start Time: 1155  Stop Time: 1212  Time Calculation (min): 17 min  2100/2100-A    Assessment/Plan   PT Assessment: Pt demonstrates impairments listed below.  Pt appears below baseline level of function and based on current level of function, pt would benefit from continued skilled therapy while in the hospital to ensure safety, decrease risk of falls, and regain strength/mobility back to baseline.  Once stable enough for discharge, pt would benefit from low intensity therapy.     PT Assessment Results: Decreased strength, Decreased mobility, Impaired balance, Pain  Rehab Prognosis: Good  Evaluation/Treatment Tolerance: Patient tolerated treatment well  Medical Staff Made Aware: Yes  End of Session Communication: Bedside nurse  End of Session Patient Position: Up in chair, Alarm on  IP OR SWING BED PT PLAN  Inpatient or Swing Bed: Inpatient  PT Plan  Treatment/Interventions: Bed mobility, Transfer training, Balance training, Gait training, Neuromuscular re-education, Strengthening, Range of motion, Therapeutic exercise, Therapeutic activity, Home exercise program  PT Plan: Ongoing PT  PT Frequency: 3 times per week  PT Discharge Recommendations: Low intensity level of continued care  Equipment Recommended upon Discharge: Straight cane  PT Recommended Transfer Status: Contact guard  PT - OK to Discharge: Yes - To next level of care when cleared by medical team    Subjective     Current Problem:  1. SVT (supraventricular tachycardia) (CMS-HCC)  Transthoracic Echo (TTE) Complete    Transthoracic Echo (TTE) Complete    CANCELED: Transthoracic Echo (TTE) Limited    CANCELED: Nuclear Stress Test    CANCELED: Transthoracic Echo (TTE) Limited    CANCELED: Nuclear Stress Test    CANCELED: Transthoracic Echo (TTE) Limited    CANCELED: Transthoracic Echo (TTE) Limited    CANCELED: Transthoracic  Echo (TTE) Complete    CANCELED: Transthoracic Echo (TTE) Complete      2. Heart palpitations  Transthoracic Echo (TTE) Complete    Transthoracic Echo (TTE) Complete    CANCELED: Transthoracic Echo (TTE) Limited    CANCELED: Transthoracic Echo (TTE) Limited    CANCELED: Transthoracic Echo (TTE) Limited    CANCELED: Transthoracic Echo (TTE) Limited    CANCELED: Transthoracic Echo (TTE) Complete    CANCELED: Transthoracic Echo (TTE) Complete      3. Myocardial infarction (lateral wall) (Multi)  CANCELED: Nuclear Stress Test    CANCELED: Nuclear Stress Test          Past Medical History:  Patient Active Problem List   Diagnosis    CAD S/P percutaneous coronary angioplasty    Chronic obstructive pulmonary disease (Multi)    Major depression    Moderate persistent asthma    Abnormal findings on diagnostic imaging of lung    Angina, class III (CMS-HCC)    Asthma    Cellulitis    Chronic diarrhea    Chronic pain of toe of left foot    Chronic pain of toe of right foot    Controlled type 2 diabetes mellitus without complication, without long-term current use of insulin (Multi)    Diabetes mellitus (Multi)    Edema    External hemorrhoids    Fracture of ankle    Gastroesophageal reflux disease    Ground glass opacity present on imaging of lung    H/O unstable angina    Hallux valgus, acquired    Hemangioma of skin and subcutaneous tissue    Hematuria    History of diabetes mellitus    History of malignant neoplasm    Hypertension, essential, benign    Hypertensive retinopathy    Lateral malleolar fracture    Mixed hyperlipidemia    Nail fungus    Tinea pedis of both feet    Obesity    Other melanin hyperpigmentation    Other seborrheic keratosis    Overweight with body mass index (BMI) 25.0-29.9    Overweight    Paroxysmal atrial fibrillation (Multi)    Postsurgical hypothyroidism    Psoriasis vulgaris    PVD (posterior vitreous detachment)    Rectal prolapse    Renal insufficiency    Senile nuclear sclerosis     Sensorineural hearing loss (SNHL) of both ears    Sensorineural hearing loss, bilateral    Left lumbar radiculopathy    Spinal stenosis of lumbar region without neurogenic claudication    Thyroid nodule    Mixed stress and urge urinary incontinence    Urge incontinence of urine    Dysuria    Urinary incontinence    Vaginal atrophy    Vaginitis    Vitamin D deficiency    Vitreous degeneration    Recurrent urinary tract infection    Depression    Major depressive disorder    Ventricular tachycardia (Multi)    Cardiac dysrhythmia    Mood disorder (CMS-[facility])    Unspecified complication of internal orthopedic prosthetic device, implant and graft, initial encounter (CMS-HCC)    Unilateral primary osteoarthritis, unspecified knee    Presence of left artificial knee joint    Personal history of other diseases of the nervous system and sense organs    Mechanical loosening of unspecified internal prosthetic joint, initial encounter (CMS-HCC)    Mechanical loosening of internal left knee prosthetic joint    Long term (current) use of aspirin    Other specified anemias    Esophageal disorder    Elevated white blood cell count, unspecified    CKD (chronic kidney disease)    Asymptomatic menopause    BMI 27.0-27.9,adult    Mild vitamin D deficiency    Never smoked any substance    Personal history of calcium pyrophosphate deposition disease (CPPD)    Medicare annual wellness visit, subsequent    Shortness of breath    Overactive bladder    Difficulty walking    Syncope and collapse    Penicillin allergy    Allergy to sulfa drugs    Nitrofurantoin adverse reaction    Cyst of left ovary    Encounter for medication review and counseling    Encounter to discuss treatment options    Urge incontinence    Iron deficiency anemia due to chronic blood loss    Type 2 diabetes mellitus with other specified complication, without long-term current use of insulin    SVT (supraventricular tachycardia) (CMS-HCC)    Palpitations    Bladder wall  thickening    Pelvic lymphadenopathy    Irritable bowel syndrome with both constipation and diarrhea       General Visit Information:  Per EMR: pt presented to the ED with right lower quadrant pain and palpitations.  States that she was at home not exerting herself when she continued to have right lower quadrant pain along with intermittent left lower quadrant pain that she found unbearable.  She reports being diagnosed with stones weeks ago and does not think that she passed them.  Reports that she has been given antibiotics however has had troubles getting them secured from the pharmacy due to the very long lines.  She is aware she was supposed to follow-up with cardiology for continued follow-up however was unable to time an appointment appropriately between her hospital visits.Of note, she was admitted to [facility] [place] [place] on 1/12-1/14 she was admitted to for palpitations secondary to SVT/A-fib. She was treated with lopresser and after that she was managed with her own home medications. She was also treated for a UTI during this admission with rocephin and macrobid. She was discharged with 3 days of macrobid and just picked up the medication yesterday.  On imaging, there was asymmetric thickening of the right posterior lateral bladder wall that extended into the UVJ.  Urology consulted but unable to see the patient.  Patient states that she would prefer to follow-up with her primary urologist. Per chart review, this follow up did not happen. She was admitted to [facility] [place] [place] on 1/15-1/17 again for palpitations.  During this hospital stay she was seen by cardiology and her metoprolol was increased to 75 twice daily.  She was discharged home with a cardiology follow-up for a Holter monitor echo and stress test which were not completed per chart review.  A CT of the abdomen and pelvis was performed for a complaint of pain in the RLQ at [facility] ED on 1/9/2025 shows Several bladder calculi are noted, new from prior exam. There is  moderate bladder wall thickening, mucosal hyperenhancement and perivesical inflammatory stranding concerning for infectious cystitis. Correlate with urinalysis. Enlarged lymph nodes suspected to be secondary to cystitis. Redemonstration of a 3.6 cm hypodense lesion in the left adnexa likely related to left ovary. This is stable when compared to prior CT. She was discharged home after receiving rocephin, Toradol and pyridium at that ED visit.       She reports intermittent diarrhea and constipation.  States she has less of an appetite and feels as though she has lost possibly 10 pounds in the last 2 weeks.  Reports no recent falls or sick exposures.     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  Pt in no apparent distress and agreeable to therapy.    General  Reason for Referral: impaired mobility, gait training, impaired cognition/safety awareness  Referred By: Adrianna Castro  Co-Treatment: OT  Prior to Session Communication: Bedside nurse  Patient Position Received: Bed, 3 rail up, Alarm on    Home Living/PLOF:  Pt lives alone in house with 0 JOSE.  1 floor set up.  IND with ADLs.  Has tub shower with Gbs.  Owns shower chair but was not using.  IND with mobility, but owns FWW and SPC.  Pt cooks, cleans, does laundry, drives, and shops.  Son assists as needed.     Precautions:  Precautions  Medical Precautions: Fall precautions     Objective     Pain:  Pain Assessment  Pain Assessment: 0-10  0-10 (Numeric) Pain Score: 7 (pt also reports 3/10 LBP)  Pain Location: Abdomen  Pain Orientation: Right, Lower  Pain Interventions: Ambulation/increased activity    Cognition:  Cognition  Overall Cognitive Status: Within Functional Limits  Orientation Level: Oriented X4    General Assessments:      Activity Tolerance  Endurance: Endurance does not  limit participation in activity    Static Sitting Balance  Static Sitting-Comment/Number of Minutes: good  Dynamic Sitting Balance  Dynamic Sitting-Comments: good  Static Standing Balance  Static Standing-Comment/Number of Minutes: fair plus  Dynamic Standing Balance  Dynamic Standing-Comments: fair    Extremity/Trunk Assessments:  BLE strength: WFL    Functional Mobility:  Bed mobility  Supine to sit: SBA    Transfers  Sit to stand: SBA; increased time required to steady self 2/2 lightheadedness     Stand to sit: SBA    Toilet transfer: onto standard toilet with CGA; IND with pericare care and getting off standard toilet    Ambulation/Stairs  Pt ambulated 10 ft x2 with SBA and 1 HHA for support; grossly steady without LOB; walks guarded with increased safety awareness    Outcome Measures:  [facility] Basic Mobility  Turning from your back to your side while in a flat bed without using bedrails: None  Moving from lying on your back to sitting on the side of a flat bed without using bedrails: None  Moving to and from bed to chair (including a wheelchair): A little  Standing up from a chair using your arms (e.g. wheelchair or bedside chair): A little  To walk in hospital room: A little  Climbing 3-5 steps with railing: A little  Basic Mobility - Total Score: 20    Goals:  Encounter Problems       Encounter Problems (Active)       PT Problem       Pt will be able to perform all bed mobility tasks with IND.  (Progressing)       Start:  01/24/25    Expected End:  02/07/25            Pt will perform all transfers with Mod I and LRAD with proper safety mechanics.   (Progressing)       Start:  01/24/25    Expected End:  02/07/25            Pt will ambulate 150 ft with Mod I using LRAD for improved functional independence.  (Progressing)       Start:  01/24/25    Expected End:  02/07/25                 Education Documentation  Body Mechanics, taught by Yazmin Bolivar, PT at 1/24/2025  1:57 PM.  Learner: Patient  Readiness:  Acceptance  Method: Explanation  Response: Verbalizes Understanding    Home Exercise Program, taught by Yazmin Bolivar, PT at 1/24/2025  1:57 PM.  Learner: Patient  Readiness: Acceptance  Method: Explanation  Response: Verbalizes Understanding    Mobility Training, taught by Yazmin Bolivar, PT at 1/24/2025  1:57 PM.  Learner: Patient  Readiness: Acceptance  Method: Explanation  Response: Verbalizes Understanding    Education Comments  No comments found.

## 2025-01-24 NOTE — H&P
Internal Medicine History & Physical     Patient Myrna Rodrigues PCP Calvin Baker MD    MRN [id number]  Admission Date 1/23/2025      Chief Complaint/Reason for Admission:  Myrna Rodrigues is a 74 y.o. female who presented 1/23/2025 with right lower quadrant pain and palpitations.    Subjective    Subjective   HPI  Ms. Myrna Rodrigues is a 74 y.o. female who has a past medical history of afib s/p ablation on Eliquis, HTN, CAD, HLD, GERD, DM, SVT and hypothyroidism presented to the ED with right lower quadrant pain and palpitations.  States that she was at home not exerting herself when she continued to have right lower quadrant pain along with intermittent left lower quadrant pain that she found unbearable.  She reports being diagnosed with stones weeks ago and does not think that she passed them.  Reports that she has been given antibiotics however has had troubles getting them secured from the pharmacy due to the very long lines.  She is aware she was supposed to follow-up with cardiology for continued follow-up however was unable to time an appointment appropriately between her hospital visits.Of note, she was admitted to [facility] on 1/12-1/14 she was admitted to for palpitations secondary to SVT/A-fib. She was treated with lopresser and after that she was managed with her own home medications. She was also treated for a UTI during this admission with rocephin and macrobid. She was discharged with 3 days of macrobid and just picked up the medication yesterday.  On imaging, there was asymmetric thickening of the right posterior lateral bladder wall that extended into the UVJ.  Urology consulted but unable to see the patient.  Patient states that she would prefer to follow-up with her primary urologist. Per chart review, this follow up did not happen. She was admitted to [facility] on 1/15-1/17 again for palpitations.  During this hospital stay she was seen by cardiology and her metoprolol was increased to 75 twice  daily.  She was discharged home with a cardiology follow-up for a Holter monitor echo and stress test which were not completed per chart review.  A CT of the abdomen and pelvis was performed for a complaint of pain in the RLQ at [facility] ED on 1/9/2025 shows Several bladder calculi are noted, new from prior exam. There is moderate bladder wall thickening, mucosal hyperenhancement and perivesical inflammatory stranding concerning for infectious cystitis. Correlate with urinalysis. Enlarged lymph nodes suspected to be secondary to cystitis. Redemonstration of a 3.6 cm hypodense lesion in the left adnexa likely related to left ovary. This is stable when compared to prior CT. She was discharged home after receiving rocephin, Toradol and pyridium at that ED visit.      She reports intermittent diarrhea and constipation.  States she has less of an appetite and feels as though she has lost possibly 10 pounds in the last 2 weeks.  Reports no recent falls or sick exposures.    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; lives at home alone with her dog; son, Eriberto, is local and is her next of kin      ED Course  Vitals: Temperature 36.7, -> 80s, RR 18, /88, 98% on room air  Labs: CMP unremarkable, mag 1.74, , troponin 156-151, WBC 12, H/H 9.4/31.7 which is at baseline,  Micro: UA pending  EKG: SVT bpm 153 QTc 485 on subsequent ->normal sinus rhythm bpm 94, QTc 442  Imaging: none  Interventions: Consulted cardiology in the emergency department was advised to give adenosine as needed for SVT to admit for cardiac workup, morphine for abdominal pain    Full Code    Review of Systems   Constitutional:  Positive for activity change and appetite change. Negative for chills and fever.   HENT: Negative.     Eyes: Negative.    Respiratory:          Heart palpatations   Gastrointestinal:  Positive for abdominal pain, constipation and diarrhea. Negative  "for nausea and vomiting.   Endocrine: Negative.    Genitourinary:  Positive for flank pain and pelvic pain. Negative for decreased urine volume, difficulty urinating, dyspareunia and dysuria.   Skin: Negative.    Allergic/Immunologic: Negative.    Neurological:  Positive for weakness.   Psychiatric/Behavioral: Negative.           Objective   Vital Signs:  Visit Vitals  /74   Pulse 82   Temp 36.7 °C (98.1 °F) (Temporal)   Resp 18   Ht 1.575 m (5' 2\")   Wt 64.4 kg (142 lb)   SpO2 96%   BMI 25.97 kg/m²   OB Status Postmenopausal   Smoking Status Never   BSA 1.68 m²        Physical Examination:  Physical Exam  Constitutional:       General: She is not in acute distress.     Appearance: She is not ill-appearing.   HENT:      Mouth/Throat:      Mouth: Mucous membranes are moist.      Pharynx: Oropharynx is clear.   Cardiovascular:      Rate and Rhythm: Normal rate and regular rhythm.      Heart sounds: Normal heart sounds.   Pulmonary:      Effort: Pulmonary effort is normal.      Breath sounds: Normal breath sounds.   Abdominal:      General: Abdomen is flat. Bowel sounds are normal.      Palpations: Abdomen is soft.      Tenderness: There is right CVA tenderness and left CVA tenderness.   Musculoskeletal:      Right lower leg: No edema.      Left lower leg: No edema.   Neurological:      General: No focal deficit present.      Mental Status: She is alert. Mental status is at baseline.   Psychiatric:         Mood and Affect: Mood normal.          Laboratory Data:    Results from last 7 days   Lab Units 01/23/25 2059   WBC AUTO x10*3/uL 12.0*   RBC AUTO x10*6/uL 4.39   HEMOGLOBIN g/dL 9.4*     Results from last 7 days   Lab Units 01/23/25 2222 01/23/25 2059   SODIUM mmol/L  --  134*   POTASSIUM mmol/L  --  3.9   CHLORIDE mmol/L  --  99   CO2 mmol/L  --  24   BUN mg/dL  --  11   CREATININE mg/dL  --  0.91   CALCIUM mg/dL  --  9.2   MAGNESIUM mg/dL 1.74  --    BILIRUBIN TOTAL mg/dL  --  0.4   ALT U/L  --  8   AST " U/L  --  10       Imaging:  CT abdomen pelvis wo IV contrast    Result Date: 1/24/2025  Interpreted By:  Gerard Maxwell, STUDY: CT ABDOMEN PELVIS WO IV CONTRAST;  1/24/2025 3:25 am   INDICATION: Signs/Symptoms:kidney stones. History of overactive bladder with continence control stimulator insert in 2020. Symptoms recurred in 2023 with urge incontinence and underwent stimulator revision on 10/14/2024.     COMPARISON: 01/09/2025   ACCESSION NUMBER(S): [id number]   ORDERING CLINICIAN: TIGIST KEYS   TECHNIQUE: Contiguous axial images of the abdomen and pelvis were obtained without intravenous contrast. Coronal and sagittal reformatted images were reconstructed from the axial data.   FINDINGS: Note: The absence of intravenous contrast limits evaluation of the solid organs and vasculature.   LOWER CHEST: No acute abnormality.     ABDOMEN/PELVIS:   ABDOMINAL WALL: There is a bladder control device in the right buttock with a single lead coursing through the right S3 neural foramen.   LIVER: The liver demonstrates a normal noncontrast attenuation.   BILE DUCTS: Prominent intrahepatic and extrahepatic bile ducts are likely secondary to post-cholecystectomy status.   GALLBLADDER: Surgically absent.   PANCREAS: No significant abnormality.   SPLEEN: No significant abnormality.   ADRENALS: No significant abnormality.   KIDNEYS, URETERS, BLADDER: Non-obstructing 0.2 cm right renal calculus. No left renal calculus. No hydronephrosis. Urinary bladder wall is inflamed with perivesical fat stranding similar to prior study. There is thickening and inflammation of the distal right ureter. There is eccentric wall thickening adjacent to the right uterovesical junction which was seen on prior study as well. There are multiple bladder calculi measuring 0.8 cm and 1.3 cm.   REPRODUCTIVE ORGANS: Surgically absent uterus. There is a simple left ovarian cyst measuring 3.4 cm, slightly smaller compared to prior study.   VESSELS: Mild  aortic atherosclerosis without AAA.   RETROPERITONEUM/LYMPH NODES: There is progression of now extensive inflammatory fat stranding within multiple retroperitoneal compartments. This is most severe on the right pelvic sidewall, extends into the right femoral canal, involves the presacral space, and extends superiorly to the aortoiliac bifurcation and along the aorta at the level of the renal veins. This stranding is accompanied by diffuse lymphadenopathy which is also progressed in degree and extent compared to prior study. For example, a 1.3 cm right common iliac node previously measured 0.9 cm. A 1.6 cm right common iliac node previously measured 0.7 cm, 1.3 cm by common iliac ovaries noted 0.8 cm, 2.1 cm by external iliac node previously measured 0.9 cm, 2 cm right external iliac lymph node previously measured 1.6 cm amongst others. There are no enlarged left external iliac lymph nodes. There is only a 0.7 cm left common iliac node that is unchanged. There are also newly enlarged aortocaval and periaortic lymph nodes predominantly measuring between 0.5 cm and 1 cm.   BOWEL/PERITONEUM: There is colonic diverticulosis. No inflammatory bowel wall thickening or dilatation. Normal appendix.   No ascites, free air, or fluid collection.     MUSCULOSKELETAL: No acute osseous abnormality. No suspicious osseous lesion. Severe L4-L5 disc height loss with grade 1 degenerative anterolisthesis of L4 on L5 and moderate bilateral facet arthropathy. There is not severe central canal stenosis at L4-L5 and severe bilateral L4-5 foraminal stenosis. There are decompressive laminectomies at L2-L4.       1. Significant progression of severe inflammatory changes in the right pelvic sidewall, presacral space, right femoral canal and extending superiorly along the aorta and IVC to the level of the renal veins accompanied by diffuse lymphadenopathy which is also significantly progressed as detailed above. This inflammatory perinodal  stranding favors an infectious or inflammatory etiology, although this the extent and degree of change is unusual for typical bladder infection. Recommend timely urological follow-up for further investigation. Correlation with urinalysis recommended.   2. Redemonstration of diffusely thickened inflamed bladder wall with diffuse thickening of the distal right ureter and multiple intraluminal bladder calculi may service continued nidus of continued infection.   3. No hydronephrosis. Nonobstructing 0.2 cm right renal calculus.   MACRO: None.   Signed by: Gerard Maxwell 1/24/2025 3:41 AM Dictation workstation:   [id number]      Medications:  Scheduled medications  insulin lispro, 0-10 Units, subcutaneous, TID AC  magnesium sulfate, 2 g, intravenous, Once  piperacillin-tazobactam, 3.375 g, intravenous, q8h  polyethylene glycol, 17 g, oral, Daily      Continuous medications     PRN medications  PRN medications: dextrose, dextrose, glucagon, glucagon, ketorolac    Assessment    Assessment & Plan   Ms. Myrna Rodrigues is a 74 y.o. female who has a past medical history of afib s/p ablation on Eliquis, HTN, CAD, HLD, GERD, DM, SVT and hypothyroidism presented to the ED with right lower quadrant pain and palpitations.  Through chart review patient has been seen for this similar complaint several times over the last 6 weeks she has not been able to complete the necessary follow-up needed to resolve both issues.  She had did not complete her outpatient antibiotic regimen for her cystitis.  She also has not followed up with cardiology to have a stress test, echo, or Holter monitor.  Considering she has not been out of the hospital long enough to have these outpatient appointments made it will be beneficial to perform as much of the evaluation as possible while inpatient for her to avoid readmission.  She reports decreased appetite and weakness patient would benefit from a PT OT evaluation and likely placement again to avoid  readmission.    #SVT  #Type II MI  #H/O A-fib s/p ablation on Eliquis  #h/o CAD  :: troponin 156-151  :: EKG: SVT bpm 153 QTc 485 on subsequent ->normal sinus rhythm bpm 94, QTc 442  Plan:  -Adenosine as needed for intermittent SVT per cardiology ED Yonathan  -Echo pending  -Stress test pending  -Patient will need discharged with a Holter monitor  -Cardiology consulted, appreciate the recommendations  -Continue home Eliquis once med rec complete  -Keep Mg>2, K>4    #Cystitis  #Bladder stones  :: Again no outpatient follow-up has been performed, will be best to get a urology consult while inpatient to complete the workup.  :: 1/24 CT abdomen/pelvis = bladder calculi, moderate bladder wall thickening, mucosal hyperenhancement and perivesicular inflammatory stranding concerning for infectious cystitis; Redemonstration of diffusely thickened inflamed bladder wall with diffuse thickening of the distal right ureter and multiple intraluminal bladder calculi may service continued nidus of continued infection. No hydronephrosis. Nonobstructing 0.2 cm right renal calculus.  :: UA = 250 leukocyte esterase  Plan:  -Urology consulted, appreciate the recommendations  -ESR/CRP pending  -zosyn  -Follow cultures  -morphine PRN    #h/o Hypothyroidism  :: Recently had levothyroxine increased to 100  Plan:  -TSH pending  -continue home levothyroxine when med rec complete    #h/o T2DM  ::home med = metformin  ::Hemoglobin A1c 5.9 (H) 01/02/2025   Plan:  -SSI  -POCT    Chronic problems:  #HTN  #HLD  #GERD  -continue home meds as appropriate when med rec complete      Global Plan of Care  Fluid: PRN  Electrolytes: K>4, Mg >2  Nutrition: Regular  DVT Prophylaxis: continue home eliquis  GI Prophylaxis: PPI  Code Status: Full Code        Patient seen and discussed with the attending.  Signature: Josephine Kelly DO  Date: January 24, 2025  This note has been transcribed using Dragon voice recognition system and there is a possibility of  unintentional typing misprints.  Any information found to be copied from previous providers is done in the best interest of the patient to provide accurate, quality, and continuity of care.      DAY TEAM ADDENDUM  Patient seen and examined this morning at bedside. Continues to endorse lower abdominal pain, but denies any chest pain, palpitations, or shortness of breath at this time. Denies any urinary symptoms. Complains of intermittent diarrhea and constipation, but reports this is baseline. Denies any urinary symptoms. On physical exam, patient is A&O x3, no focal neurologic deficits. Heart rate regular on my exam, no tachycardia noted. Lungs clear to auscultation. Abdomen soft, with some firmness detected in the suprapubic region with tenderness noted as well. Bowel sounds auscultated throughout. No lower extremity edema. Will continue to monitor on telemetry, cardiology has been consulted. Will proceed with updated echocardiogram, but will hold on stress testing at this time given concern for active process in the bladder. Urology has been consulted for worsening cystitis. Will continue zosyn for now. Follow up urine cultures collected both in the ED and day prior to admission. Will obtain blood cultures if patient spikes fevers. Elevated TSH and free thyroxine, patient will need to repeat TFTs in 4 weeks.     Alejandra Moore,   Internal Medicine PGY-1 Resident

## 2025-01-24 NOTE — CONSULTS
"  [facility]  Consult Note    CC:   Chief Complaint   Patient presents with    Shortness of Breath    Palpitations    Abdominal Pain     Pt presents to the ED with RLQ pain x 2 weeks - pt also states she has been having shortness of breath and palpitations all day \"on and off\" - she checked her heart rate at home and saw it was 157. Pt does have hx of A-fib. Pt denies chest pain. Pt endorses nausea but denies vomiting. Pt has loss of appetite and has not ate anything in 24 hours. Pt able to tolerate liquids.      HPI: Myrna Rodrigues is a 74 y.o. femalewho I have been consulted to see regarding UTI, retroperitoneal lymphadenopathy.    Interval Hx: 74-year-old white female with a past medical history of atrial fibrillation status post ablation on Eliquis, hypertension, CAD, hyperlipidemia, GERD, diabetes mellitus, SVT, and hypothyroidism who presented to the emergency room with right lower quadrant pain and palpitations.  Patient reports she was at home and not exerting herself when she developed continued right lower quadrant pain which she found unbearable.  Of note, she was recently admitted to [facility] secondary to SVT/A-fib.  She was treated with Lopressor.  She was also noted to have a UTI during this admission and was treated with Rocephin and Macrobid    Patient has an extensive urologic history.  She is a patient of Dr. Thurman.  She has a history of OAB/urge incontinence.  She has an InterStim device.  This was recently exchanged in October 2024.  At that time, she also had a cystoscopy and was found to have squamous metaplasia on biopsy.    Patient reports intermittent diarrhea and constipation.  She reports decreased appetite and reports she has lost 10 pounds in the last 2 weeks    ROS: 12pt ROS otherwise negative unless stated above in HPI      Past Medical History:   Diagnosis Date    Anxiety and depression     Arrhythmia     Asthma     Cancer (Multi)     Coronary artery disease     Diabetes mellitus " (Multi)     Disease of thyroid gland     GERD (gastroesophageal reflux disease)     Heart disease     Hyperlipidemia     Hypertension     Irregular heart beat     Occipital neuralgia 06/10/2022    Occipital neuralgia of right side    Personal history of malignant neoplasm, unspecified 10/04/2021    History of malignant neoplasm    Personal history of other diseases of the circulatory system     History of hypertension    Personal history of other diseases of the circulatory system 10/04/2021    History of essential hypertension    Personal history of other diseases of the circulatory system     History of coronary artery disease    Personal history of other diseases of the digestive system 03/11/2020    History of chronic constipation    Personal history of other diseases of the musculoskeletal system and connective tissue     History of arthritis    Personal history of other diseases of the nervous system and sense organs 10/04/2021    History of eye problem    Personal history of other diseases of the respiratory system     History of asthma    Personal history of other specified conditions 10/04/2021    History of chest pain    PONV (postoperative nausea and vomiting)     Stool incontinence     Thyroid cancer (Multi)     Urinary incontinence      Past Surgical History:   Procedure Laterality Date    APPENDECTOMY  08/13/2015    Appendectomy    BREAST LUMPECTOMY  08/13/2015    Breast Surgery Lumpectomy    CARPAL TUNNEL RELEASE  08/13/2015    Neuroplasty Decompression Median Nerve At Carpal Tunnel    GALLBLADDER SURGERY  08/13/2015    Gallbladder Surgery    HYSTERECTOMY  08/13/2015    Hysterectomy    KNEE ARTHROSCOPY W/ MENISCAL REPAIR  08/13/2015    Knee Arthroscopy With Medial Meniscus Repair    KNEE SURGERY  08/13/2015    Knee Surgery    MR HEAD ANGIO WO IV CONTRAST  09/05/2021    MR HEAD ANGIO WO IV CONTRAST 9/5/2021 [facility]    OTHER SURGICAL HISTORY  08/13/2015    Shoulder Surgery Left    OTHER  SURGICAL HISTORY  08/13/2015    Repair Of Bladder Reconstruction    OTHER SURGICAL HISTORY  09/20/2021    Breast surgery    OTHER SURGICAL HISTORY  09/20/2021    Bladder surgery    OTHER SURGICAL HISTORY  09/20/2021    Complete colonoscopy    OTHER SURGICAL HISTORY  09/20/2021    Shoulder surgery    OTHER SURGICAL HISTORY  09/20/2021    Foot surgery    OTHER SURGICAL HISTORY  09/20/2021    Percutaneous transluminal coronary angioplasty    OTHER SURGICAL HISTORY  10/11/2021    Thyroidectomy total    THYROIDECTOMY      TOTAL KNEE ARTHROPLASTY  08/13/2015    Knee Replacement     Social History     Socioeconomic History    Marital status:    Tobacco Use    Smoking status: Never    Smokeless tobacco: Never   Vaping Use    Vaping status: Never Used   Substance and Sexual Activity    Alcohol use: Not Currently    Drug use: Never    Sexual activity: Defer     Social Drivers of Health     Financial Resource Strain: Low Risk  (1/24/2025)    Overall Financial Resource Strain (CARDIA)     Difficulty of Paying Living Expenses: Not very hard   Food Insecurity: No Food Insecurity (1/24/2025)    Hunger Vital Sign     Worried About Running Out of Food in the Last Year: Never true     Ran Out of Food in the Last Year: Never true   Transportation Needs: No Transportation Needs (1/24/2025)    PRAPARE - Transportation     Lack of Transportation (Medical): No     Lack of Transportation (Non-Medical): No   Intimate Partner Violence: Not At Risk (1/24/2025)    Humiliation, Afraid, Rape, and Kick questionnaire     Fear of Current or Ex-Partner: No     Emotionally Abused: No     Physically Abused: No     Sexually Abused: No   Housing Stability: Low Risk  (1/24/2025)    Housing Stability Vital Sign     Unable to Pay for Housing in the Last Year: No     Number of Times Moved in the Last Year: 0     Homeless in the Last Year: No     Family History   Problem Relation Name Age of Onset    Lung cancer Mother      Lymphoma Mother      Bone  cancer Mother      Heart attack Father      Liver cancer Brother      Lung cancer Brother       No Known Allergies  Scheduled medications  apixaban, 5 mg, oral, BID  atorvastatin, 10 mg, oral, Daily  insulin lispro, 0-10 Units, subcutaneous, TID AC  ipratropium, 0.5 mg, nebulization, q6h  levothyroxine, 100 mcg, oral, Daily  losartan, 50 mg, oral, Daily  metoprolol tartrate, 75 mg, oral, BID  montelukast, 10 mg, oral, Nightly  pantoprazole, 40 mg, oral, BID AC  perflutren lipid microspheres, 0.5-10 mL of dilution, intravenous, Once in imaging  perflutren protein A microsphere, 0.5 mL, intravenous, Once in imaging  piperacillin-tazobactam, 3.375 g, intravenous, q8h  polyethylene glycol, 17 g, oral, Daily  regadenoson, 0.4 mg, intravenous, Once  sulfur hexafluoride microsphr, 2 mL, intravenous, Once in imaging      Continuous medications     PRN medications  PRN medications: acetaminophen, albuterol, dextrose, dextrose, glucagon, glucagon, HYDROmorphone **OR** HYDROmorphone, traZODone    Objective                                                                                                                                   Intake/Output Summary (Last 24 hours) at 1/24/2025 1640  Last data filed at 1/24/2025 0958  Gross per 24 hour   Intake 50 ml   Output 550 ml   Net -500 ml     Lab Results   Component Value Date    WBC 11.5 (H) 01/24/2025    HGB 8.1 (L) 01/24/2025    HCT 28.2 (L) 01/24/2025    MCV 72 (L) 01/24/2025     01/24/2025     Lab Results   Component Value Date    GLUCOSE 90 01/24/2025    CALCIUM 8.7 01/24/2025     01/24/2025    K 4.5 01/24/2025    CO2 25 01/24/2025     01/24/2025    BUN 10 01/24/2025    CREATININE 0.81 01/24/2025     Lab Results   Component Value Date    ALT 8 01/23/2025    AST 10 01/23/2025    ALKPHOS 66 01/23/2025    BILITOT 0.4 01/23/2025     Susceptibility data from last 90 days.  Collected Specimen Info Organism Amoxicillin/Clavulanate Ampicillin Ampicillin/Sulbactam  Cefazolin Cefazolin (uncomplicated UTIs only) Ceftriaxone Ciprofloxacin Gentamicin Levofloxacin Nitrofurantoin Penicillin   01/09/25 Urine from Clean Catch/Voided Aerococcus urinae       S  S   S   S     Escherichia coli  S  R  I  S  S   S  S   S    11/05/24 Urine from Clean Catch/Voided Klebsiella pneumoniae/variicola  S  R  S  S  S   S  S   I      Aerococcus urinae                Collected Specimen Info Organism Piperacillin/Tazobactam Tetracycline Trimethoprim/Sulfamethoxazole Vancomycin   01/09/25 Urine from Clean Catch/Voided Aerococcus urinae   S   S     Escherichia coli  S   S    11/05/24 Urine from Clean Catch/Voided Klebsiella pneumoniae/variicola  S   S      Aerococcus urinae            Imaging                                                                                                                                 CT abdomen/pelvis:  IMPRESSION:  1. Significant progression of severe inflammatory changes in the  right pelvic sidewall, presacral space, right femoral canal and  extending superiorly along the aorta and IVC to the level of the  renal veins accompanied by diffuse lymphadenopathy which is also  significantly progressed as detailed above. This inflammatory  perinodal stranding favors an infectious or inflammatory etiology,  although this the extent and degree of change is unusual for typical  bladder infection. Recommend timely urological follow-up for further  investigation. Correlation with urinalysis recommended.      2. Redemonstration of diffusely thickened inflamed bladder wall with  diffuse thickening of the distal right ureter and multiple  intraluminal bladder calculi may service continued nidus of continued  infection.      3. No hydronephrosis. Nonobstructing 0.2 cm right renal calculus.      Physical Exam                                                                                                                      Vitals:    01/24/25 1600   BP:    Pulse: (!) 144   Resp: 22    Temp:    SpO2:      General: in NAD, appears stated age  Head: normocephalic, atraumatic  Neck: supple; trachea is midline  Respiratory: normal effort, no use of accessory muscles  Cardiovascular: no peripheral edema  Abdomen: soft, nondistended, nontender, no rebound or guarding, no organomegaly, no CVA tenderness, no hernia  Lymphatic: no lymphadenopathy noted  Skin: normal turgor, no rashes  Neurologic: grossly intact, oriented to person/place/time  Psychiatric: mode and affect appropriate        Assessment & Plan                                                                                                              Myrna Rodrigues is a 74 y.o. female     Impression:  1.  UTI  2.  Bladder calculi  3.  Retroperitoneal/pelvic lymphadenopathy    Plan:    I am concerned by her CT findings.  While certainly the amount of stranding and lymphadenopathy could be related to an infectious/inflammatory process, 1 must consider malignancy given the progression on imaging.  Cultures pending.  I would continue broad-spectrum antibiotics.  Ultimately, would recommend repeat cystoscopy, right retrograde pyelogram, and right ureteroscopy for further evaluation.  Eliquis will need to be held for this.  Will discuss this with her primary urologist, Dr. Thurman.  No plan for acute intervention this weekend    Reviewed and approved by ERIC RECIO on 1/24/25 at 4:40 PM.

## 2025-01-24 NOTE — CONSULTS
"CARDIOLOGY SERVICE - Initial CONSULT    [place]. Clement Stevens  [facility] [place] NAME: Myrna Rodrigues  PATIENT MRN: [id number]    SERVICE DATE:  1/24/2025  SERVICE TIME: 5:36 AM    CONSULTANT: Clement Stevens MD [facility]  PRIMARY CARE PHYSICIAN: Calvin Baker MD  ATTENDING PHYSICIAN: Adrianna Castro, DO    IMPRESSIONS  Presented with abdominal pain and was found to have recurrent SVT/A-fib  Recent SVTs [facility] 1/12-14/2025 received BB, and 1/15-17/2025 when BB was increased to 75 twice daily  Parox A-fib  (Cardioversion 1/2024)history of ablation, history of SVT  AC Eliquis  Myocardial injury peak troponin 156 (120 during recent admission 10 days ago)  CAD, PCI LAD, cath 3/2023: patent LAD stents  Diastolic dysfunction EF 60%, mild AI, MR TR Echo 2023  Hypertension  Hyperlipidemia LDL 33 in 1/2025, at home on statin   DM, Thyroid, Asthma COPD, urinary bladder stones, microcytic anemia ~ 9, back surgery  Current meds PTA: Eliquis 5 twice daily, metoprolol 75 twice daily, losartan 50 daily, Lipitor 10    RECOMMENDATIONS  BB  Tele  Stress Test  Echo  AC    Thank you for this consultation.  =============================================================    REASON FOR CONSULTATION: Palpitation  Chief Complaint   Patient presents with    Shortness of Breath    Palpitations    Abdominal Pain     Pt presents to the ED with RLQ pain x 2 weeks - pt also states she has been having shortness of breath and palpitations all day \"on and off\" - she checked her heart rate at home and saw it was 157. Pt does have hx of A-fib. Pt denies chest pain. Pt endorses nausea but denies vomiting. Pt has loss of appetite and has not ate anything in 24 hours. Pt able to tolerate liquids.      /74 (BP Location: Left arm, Patient Position: Lying)   Pulse 80   Temp 36.2 °C (97.2 °F) (Temporal)   Resp 18   Ht 1.575 m (5' 2\")   Wt 64 kg (141 lb 1.5 oz)   SpO2 97%   BMI 25.81 kg/m²   MEDICATIONS:  No current facility-administered medications " on file prior to encounter.     Current Outpatient Medications on File Prior to Encounter   Medication Sig Dispense Refill    acetaminophen (Tylenol) 500 mg tablet Take 2 tablets (1,000 mg) by mouth every 6 hours. 30 tablet 0    amoxicillin-pot clavulanate (Augmentin) 875-125 mg tablet Take 1 tablet (875 mg) by mouth 2 times a day for 7 days. 14 tablet 0    atorvastatin (Lipitor) 10 mg tablet TAKE 1 TABLET BY MOUTH DAILY 90 tablet 3    cholecalciferol (Vitamin D-3) 25 MCG (1000 UT) tablet Take 1 tablet (1,000 Units) by mouth once daily.      [] dicyclomine (Bentyl) 10 mg capsule Take 2 capsules (20 mg) by mouth 4 times a day as needed (abdominal discomfort or cramping) for up to 5 days. 20 capsule 0    diphenoxylate-atropine (Lomotil) 2.5-0.025 mg tablet Take 1 tablet by mouth 4 times a day as needed for diarrhea. 24 tablet 0    Eliquis 5 mg tablet TAKE 1 TABLET BY MOUTH TWICE  DAILY 180 tablet 3    estradiol (Estrace) 0.01 % (0.1 mg/gram) vaginal cream Apply pea size amount 0.5 gram to vaginal opening with finger daily for 2 weeks, then 2-3 times per week. 42.5 g 5    levothyroxine (Synthroid, Levoxyl) 100 mcg tablet Take 1 tablet (100 mcg) by mouth early in the morning..      losartan (Cozaar) 50 mg tablet Take 1 tablet (50 mg) by mouth once daily. 30 tablet 0    magnesium oxide (Mag-Ox) 400 mg (241.3 mg magnesium) tablet Take 1 tablet (400 mg) by mouth once daily. 30 tablet 3    metFORMIN XR (Glucophage-XR) 500 mg 24 hr tablet Take 1 tablet (500 mg) by mouth 2 times a day.      metoprolol tartrate (Lopressor) 75 mg tablet Take 1 tablet (75 mg) by mouth 2 times a day. 60 tablet 11    montelukast (Singulair) 10 mg tablet TAKE 1 TABLET BY MOUTH AT  BEDTIME 90 tablet 3    [] nitrofurantoin, macrocrystal-monohydrate, (Macrobid) 100 mg capsule Take 1 capsule (100 mg) by mouth 2 times a day for 6 doses. 6 capsule 0    omeprazole (PriLOSEC) 40 mg DR capsule TAKE 1 CAPSULE BY MOUTH TWICE  DAILY 180 capsule 3  "   potassium gluconate 595 mg (99 mg) tablet Take 1 tablet by mouth once daily.      tiotropium (Spiriva Respimat) 2.5 mcg/actuation inhaler Inhale 2 puffs once daily.      traZODone (Desyrel) 50 mg tablet Take 1-3 tablets ( mg) by mouth as needed at bedtime for sleep.      Ventolin HFA 90 mcg/actuation inhaler USE 2 INHALATIONS BY MOUTH EVERY 4 HOURS AS NEEDED 108 g 3     HISTORY: Myrna Rodrigues is a 74 y.o. female in with abdominal pain.  She had recurrence of SVT/A-fib in ED.  No distinct lightheadedness dizziness chest pain or dyspnea.  She was admitted to [facility] this month for the same was seen by cardiology and beta-blockers were started and later dose was increased.  Compliance is to be verified  BNP   Date/Time Value Ref Range Status   01/23/2025 08:59  0 - 99 pg/mL Final     Lab Results   Component Value Date    CHOL 120 01/15/2025    CHOL 126 07/17/2024    CHOL 118 01/03/2022     Lab Results   Component Value Date    HDL 66.4 01/15/2025    HDL 75.1 07/17/2024    HDL 46.0 01/03/2022     Lab Results   Component Value Date    LDLCALC 33 01/15/2025    LDLCALC 32 07/17/2024     Lab Results   Component Value Date    TRIG 101 01/15/2025    TRIG 97 07/17/2024    TRIG 136 01/03/2022     No components found for: \"CHOLHDL\"  TROPHS   Date/Time Value Ref Range Status   01/23/2025 10:01  0 - 13 ng/L Final     Comment:     Previous result verified on 1/23/2025 2141 on specimen/case [id number] called with component TRPHS for procedure Troponin I, High Sensitivity, Initial with value 156 ng/L.   01/23/2025 08:59  0 - 13 ng/L Final   01/16/2025 04:31 AM 95 0 - 13 ng/L Final     Comment:     Previous result verified on 1/15/2025 1237 on specimen/case [id number] called with component TRPHS for procedure Troponin I, High Sensitivity, Initial with value 113 ng/L.       Echo  No results found for this or any previous visit.    Encounter Date: 01/15/25   ECG 12 lead   Result Value    Ventricular Rate " 63    Atrial Rate 63    MI Interval 214    QRS Duration 86    QT Interval 450    QTC Calculation(Bazett) 460    P Axis 71    R Axis -46    T Axis 46    QRS Count 10    Q Onset 212    P Onset 105    P Offset 169    T Offset 437    QTC Fredericia 457    Narrative    Sinus rhythm with 1st degree AV block  Left axis deviation  Possible Anterior infarct , age undetermined  Abnormal ECG  When compared with ECG of 16-JAN-2025 06:38, (unconfirmed)  Nonspecific T wave abnormality no longer evident in Anterior leads  Confirmed by Luigi Chisholm ([id number]) on 1/17/2025 10:17:22 PM     PAST MEDICAL HISTORY:    Past Medical History:   Diagnosis Date    Anxiety and depression     Arrhythmia     Asthma     Cancer (Multi)     Coronary artery disease     Diabetes mellitus (Multi)     Disease of thyroid gland     GERD (gastroesophageal reflux disease)     Heart disease     Hyperlipidemia     Hypertension     Irregular heart beat     Occipital neuralgia 06/10/2022    Occipital neuralgia of right side    Personal history of malignant neoplasm, unspecified 10/04/2021    History of malignant neoplasm    Personal history of other diseases of the circulatory system     History of hypertension    Personal history of other diseases of the circulatory system 10/04/2021    History of essential hypertension    Personal history of other diseases of the circulatory system     History of coronary artery disease    Personal history of other diseases of the digestive system 03/11/2020    History of chronic constipation    Personal history of other diseases of the musculoskeletal system and connective tissue     History of arthritis    Personal history of other diseases of the nervous system and sense organs 10/04/2021    History of eye problem    Personal history of other diseases of the respiratory system     History of asthma    Personal history of other specified conditions 10/04/2021    History of chest pain    PONV (postoperative nausea and vomiting)      Stool incontinence     Thyroid cancer (Multi)     Urinary incontinence      PAST SURGICAL HISTORY:  Past Surgical History:   Procedure Laterality Date    APPENDECTOMY  08/13/2015    Appendectomy    BREAST LUMPECTOMY  08/13/2015    Breast Surgery Lumpectomy    CARPAL TUNNEL RELEASE  08/13/2015    Neuroplasty Decompression Median Nerve At Carpal Tunnel    GALLBLADDER SURGERY  08/13/2015    Gallbladder Surgery    HYSTERECTOMY  08/13/2015    Hysterectomy    KNEE ARTHROSCOPY W/ MENISCAL REPAIR  08/13/2015    Knee Arthroscopy With Medial Meniscus Repair    KNEE SURGERY  08/13/2015    Knee Surgery    MR HEAD ANGIO WO IV CONTRAST  09/05/2021    MR HEAD ANGIO WO IV CONTRAST 9/5/2021 [facility]    OTHER SURGICAL HISTORY  08/13/2015    Shoulder Surgery Left    OTHER SURGICAL HISTORY  08/13/2015    Repair Of Bladder Reconstruction    OTHER SURGICAL HISTORY  09/20/2021    Breast surgery    OTHER SURGICAL HISTORY  09/20/2021    Bladder surgery    OTHER SURGICAL HISTORY  09/20/2021    Complete colonoscopy    OTHER SURGICAL HISTORY  09/20/2021    Shoulder surgery    OTHER SURGICAL HISTORY  09/20/2021    Foot surgery    OTHER SURGICAL HISTORY  09/20/2021    Percutaneous transluminal coronary angioplasty    OTHER SURGICAL HISTORY  10/11/2021    Thyroidectomy total    THYROIDECTOMY      TOTAL KNEE ARTHROPLASTY  08/13/2015    Knee Replacement     ALLERGIES AND DRUG INTOLERANCES: No Known Allergies  FAMILY HISTORY:    Family History   Problem Relation Name Age of Onset    Lung cancer Mother      Lymphoma Mother      Bone cancer Mother      Heart attack Father      Liver cancer Brother      Lung cancer Brother       SOCIAL HISTORY:    Social History     Socioeconomic History    Marital status:      Spouse name: Not on file    Number of children: Not on file    Years of education: Not on file    Highest education level: Not on file   Occupational History    Not on file   Tobacco Use    Smoking status: Never     Smokeless tobacco: Never   Vaping Use    Vaping status: Never Used   Substance and Sexual Activity    Alcohol use: Not Currently    Drug use: Never    Sexual activity: Defer   Other Topics Concern    Not on file   Social History Narrative    Not on file     Social Drivers of Health     Financial Resource Strain: Low Risk  (1/24/2025)    Overall Financial Resource Strain (CARDIA)     Difficulty of Paying Living Expenses: Not very hard   Food Insecurity: No Food Insecurity (1/24/2025)    Hunger Vital Sign     Worried About Running Out of Food in the Last Year: Never true     Ran Out of Food in the Last Year: Never true   Transportation Needs: No Transportation Needs (1/24/2025)    PRAPARE - Transportation     Lack of Transportation (Medical): No     Lack of Transportation (Non-Medical): No   Physical Activity: Not on file   Stress: Not on file   Social Connections: Not on file   Intimate Partner Violence: Not At Risk (1/24/2025)    Humiliation, Afraid, Rape, and Kick questionnaire     Fear of Current or Ex-Partner: No     Emotionally Abused: No     Physically Abused: No     Sexually Abused: No   Housing Stability: Low Risk  (1/24/2025)    Housing Stability Vital Sign     Unable to Pay for Housing in the Last Year: No     Number of Times Moved in the Last Year: 0     Homeless in the Last Year: No     COMPLETE REVIEW OF SYSTEMS:  12 systems were reviewed with the patient heart and a chart     PHYSICAL EXAM:  Not in distress  Awake alert  Heart RRR  Lungs decreased BSs  Extremities no edema    LABS:  Lab Results   Component Value Date    GLUCOSE 192 (H) 01/23/2025    CALCIUM 9.2 01/23/2025     (L) 01/23/2025    K 3.9 01/23/2025    CO2 24 01/23/2025    CL 99 01/23/2025    BUN 11 01/23/2025    CREATININE 0.91 01/23/2025      Lab Results   Component Value Date    WBC 12.0 (H) 01/23/2025    HGB 9.4 (L) 01/23/2025    HCT 31.7 (L) 01/23/2025    MCV 72 (L) 01/23/2025     01/23/2025        This clinical note has  been produced using speech recognition software and may contain errors related to that system including grammar, punctuation, spelling, gender and words and phrases that may be inappropriate. Some errors were not noted in checking the note before saving.     SIGNATURE: Clement Stevens MD [facility]  OFFICE: [phone]

## 2025-01-24 NOTE — PROGRESS NOTES
"Nutrition Initial Assessment:   Nutrition Assessment    Reason for Assessment: Admission nursing screening (MST=2 for \"unplanned weight loss and loss of appetite\")    Patient is a 74 y.o. female presenting with right lower quadrant pain and palpitations. Pt found to have recurrent SVT/A-fib.    Past Medical History:   Diagnosis Date    Anxiety and depression     Arrhythmia     Asthma     Cancer (Multi)     Coronary artery disease     Diabetes mellitus (Multi)     Disease of thyroid gland     GERD (gastroesophageal reflux disease)     Heart disease     Hyperlipidemia     Hypertension     Irregular heart beat     Occipital neuralgia 06/10/2022    Occipital neuralgia of right side    Personal history of malignant neoplasm, unspecified 10/04/2021    History of malignant neoplasm    Personal history of other diseases of the circulatory system     History of hypertension    Personal history of other diseases of the circulatory system 10/04/2021    History of essential hypertension    Personal history of other diseases of the circulatory system     History of coronary artery disease    Personal history of other diseases of the digestive system 03/11/2020    History of chronic constipation    Personal history of other diseases of the musculoskeletal system and connective tissue     History of arthritis    Personal history of other diseases of the nervous system and sense organs 10/04/2021    History of eye problem    Personal history of other diseases of the respiratory system     History of asthma    Personal history of other specified conditions 10/04/2021    History of chest pain    PONV (postoperative nausea and vomiting)     Stool incontinence     Thyroid cancer (Multi)     Urinary incontinence      Past Surgical History:   Procedure Laterality Date    APPENDECTOMY  08/13/2015    Appendectomy    BREAST LUMPECTOMY  08/13/2015    Breast Surgery Lumpectomy    CARPAL TUNNEL RELEASE  08/13/2015    Neuroplasty Decompression " Median Nerve At Carpal Tunnel    GALLBLADDER SURGERY  08/13/2015    Gallbladder Surgery    HYSTERECTOMY  08/13/2015    Hysterectomy    KNEE ARTHROSCOPY W/ MENISCAL REPAIR  08/13/2015    Knee Arthroscopy With Medial Meniscus Repair    KNEE SURGERY  08/13/2015    Knee Surgery    MR HEAD ANGIO WO IV CONTRAST  09/05/2021    MR HEAD ANGIO WO IV CONTRAST 9/5/2021 [facility]    OTHER SURGICAL HISTORY  08/13/2015    Shoulder Surgery Left    OTHER SURGICAL HISTORY  08/13/2015    Repair Of Bladder Reconstruction    OTHER SURGICAL HISTORY  09/20/2021    Breast surgery    OTHER SURGICAL HISTORY  09/20/2021    Bladder surgery    OTHER SURGICAL HISTORY  09/20/2021    Complete colonoscopy    OTHER SURGICAL HISTORY  09/20/2021    Shoulder surgery    OTHER SURGICAL HISTORY  09/20/2021    Foot surgery    OTHER SURGICAL HISTORY  09/20/2021    Percutaneous transluminal coronary angioplasty    OTHER SURGICAL HISTORY  10/11/2021    Thyroidectomy total    THYROIDECTOMY      TOTAL KNEE ARTHROPLASTY  08/13/2015    Knee Replacement         Nutrition History:  Energy Intake: Poor < 50 %  Food and Nutrient History: Pt seen by RDN at [facility] last week and now she is seen by RDN at [facility] this week. she complained of severe diarrhea last week, but today states it has resolved. She explains that she is a very independent woman who has been living alone for the past 3 years since her spouse passed Since [place] (maybe a little before), she has suffered with diarrhea (and constipation intermittently) along with increased nausea due to pain in her RLQ - she states she has a bladder stone, but her cardiac status seems to be the priority at this time. She reports she has been >24 hours with no oral intake and she remains NPO awaiting Cardiology eval. At [facility], she was started on Glucerna (because she prefers strawberry flavor), but she denies being diabetic.  Vitamin/Herbal Supplement Use: D3, potassium gluconate per home med list    "    Anthropometrics:  Height: 157.5 cm (5' 2\")   Weight: 64 kg (141 lb 1.5 oz)   BMI (Calculated): 25.8             Weight History:   Wt Readings from Last 10 Encounters:   01/24/25 64 kg (141 lb 1.5 oz)   01/23/25 64.4 kg (142 lb)   01/17/25 65.4 kg (144 lb 2.9 oz)   01/12/25 62.6 kg (138 lb)   01/09/25 65.8 kg (145 lb)   01/06/25 67.1 kg (148 lb)   12/13/24 65.8 kg (145 lb)   11/05/24 65.8 kg (145 lb)   10/14/24 66.7 kg (147 lb)   10/09/24 66.9 kg (147 lb 7.8 oz)       Weight Change %:  Weight History / % Weight Change: significant loss of 7 lbs (4.7%) in 2.5 weeks.  Significant Weight Loss: Yes    Nutrition Focused Physical Exam Findings:    Subcutaneous Fat Loss:   Orbital Fat Pads: Mild-Moderate (slight dark circles and slight hollowing)  Buccal Fat Pads: Mild-Moderate (flat cheeks, minimal bounce)  Triceps: Defer  Ribs: Defer  Muscle Wasting:  Temporalis: Mild-Moderate (slight depression)  Pectoralis (Clavicular Region): Defer  Interosseous: Mild-Moderate (slightly depressed area between thumb and forefinger)  Edema:  Edema: none  Physical Findings:  Hair: Negative  Respiratory : Negative  Digestive System Findings: Abdominal pain, Anorexia    Nutrition Significant Labs:  CBC Trend:   Results from last 7 days   Lab Units 01/24/25  0611 01/23/25 2059   WBC AUTO x10*3/uL 11.5* 12.0*   RBC AUTO x10*6/uL 3.92* 4.39   HEMOGLOBIN g/dL 8.1* 9.4*   HEMATOCRIT % 28.2* 31.7*   MCV fL 72* 72*   PLATELETS AUTO x10*3/uL 259 262    , BMP Trend:   Results from last 7 days   Lab Units 01/24/25  0451 01/23/25 2059   GLUCOSE mg/dL 90 192*   CALCIUM mg/dL 8.7 9.2   SODIUM mmol/L 136 134*   POTASSIUM mmol/L 4.5 3.9   CO2 mmol/L 25 24   CHLORIDE mmol/L 103 99   BUN mg/dL 10 11   CREATININE mg/dL 0.81 0.91    , A1C:  Lab Results   Component Value Date    HGBA1C 5.9 (H) 01/02/2025   , BG POCT trend:   Results from last 7 days   Lab Units 01/24/25  1539 01/24/25  1140 01/24/25  0720   POCT GLUCOSE mg/dL 110* 87 98        Nutrition " Specific Medications:  Scheduled medications  apixaban, 5 mg, oral, BID  atorvastatin, 10 mg, oral, Daily  insulin lispro, 0-10 Units, subcutaneous, TID AC  ipratropium, 0.5 mg, nebulization, q6h  levothyroxine, 100 mcg, oral, Daily  losartan, 50 mg, oral, Daily  metoprolol tartrate, 75 mg, oral, BID  montelukast, 10 mg, oral, Nightly  pantoprazole, 40 mg, oral, BID AC  perflutren lipid microspheres, 0.5-10 mL of dilution, intravenous, Once in imaging  perflutren protein A microsphere, 0.5 mL, intravenous, Once in imaging  piperacillin-tazobactam, 3.375 g, intravenous, q8h  polyethylene glycol, 17 g, oral, Daily  regadenoson, 0.4 mg, intravenous, Once  sulfur hexafluoride microsphr, 2 mL, intravenous, Once in imaging      Continuous medications     PRN medications  PRN medications: acetaminophen, albuterol, dextrose, dextrose, glucagon, glucagon, HYDROmorphone **OR** HYDROmorphone, traZODone      I/O:    ;      Dietary Orders (From admission, onward)       Start     Ordered    01/24/25 1351  Oral nutritional supplements  Until discontinued        Question Answer Comment   Deliver with Breakfast strawberry   Deliver with Dinner    Select supplement: Ensure Plus High Protein        01/24/25 1351    01/24/25 1345  Adult diet Cardiac; 70 gm fat; 2 - 3 grams Sodium  Diet effective now        Question Answer Comment   Diet type Cardiac    Fat restriction: 70 gm fat    Sodium restriction: 2 - 3 grams Sodium        01/24/25 1344    01/24/25 0444  May Participate in Room Service  ( ROOM SERVICE MAY PARTICIPATE)  Once        Question:  .  Answer:  Yes    01/24/25 8428                     Estimated Needs:   Total Energy Estimated Needs in 24 hours (kCal):  (3890-1955 kcal (25-30 kcal/kg))     Total Protein Estimated Needs in 24 Hours (g):  (64-77g protein (1.0-1.2g/kg))     Total Fluid Estimated Needs in 24 Hours (mL):  (1 ml/kcal)           Nutrition Diagnosis   Malnutrition Diagnosis  Patient has Malnutrition Diagnosis:  Yes  Diagnosis Status: New  Malnutrition Diagnosis: Moderate malnutrition related to acute disease or injury  As Evidenced by: <75% of estimated energy requirements for > 7 days, >4% wt loss x 2.5 weeks            Nutrition Interventions/Recommendations   Nutrition prescription for oral nutrition    Nutrition Recommendations:  Individualized Nutrition Prescription Provided for : Continue CARDIAC diet as ordered plus ENSURE PLUS HP twice daily.    Nutrition Interventions/Goals:   Interventions: Medical food supplement  Medical Food Supplement: Commercial beverage medical food supplement therapy  Goal: ENSURE PLUS HP (350 kcal/20g protein each 8-ounce)      Education Documentation  No documentation found.    Will continue to follow - pt not interested at this time. Reviewed menu ordering procedures and ONS options.        Nutrition Monitoring and Evaluation   Food/Nutrient Related History Monitoring  Monitoring and Evaluation Plan: Intake / amount of food  Intake / Amount of food: Consumes at least 75% or more of meals/snacks/supplements    Anthropometric Measurements  Monitoring and Evaluation Plan: Body weight  Body Weight: Body weight - Promote weight restoration         Physical Exam Findings  Monitoring and Evaluation Plan: Digestive System  Digestive System Finding: Abdominal pain, Anorexia  Criteria: resolves    Goal Status: New goal(s) identified    Time Spent (min): 30 minutes

## 2025-01-24 NOTE — ED NOTES
"Patient walked to bathroom with minimal assist. Patient states she feels a little \"woozy\" when she first stands up. After walking she feels more steady.      Renata Cruz RN  01/24/25 0241    "

## 2025-01-24 NOTE — CARE PLAN
The patient's goals for the shift include  to have good pain control    The clinical goals for the shift include patient will remain in NSR with a conrolled rate    Patient complained of right lower quadrant pain throughout shift, medicated per prn order, did have an episode of SVT, patient spontaneously converted to a controlled rate, family in to visit, pt up to bathroom with stand by assist, slightly unsteady, safety maintained, bed alarm on for safety, call light in reach      Problem: Pain - Adult  Goal: Verbalizes/displays adequate comfort level or baseline comfort level  Outcome: Progressing     Problem: Safety - Adult  Goal: Free from fall injury  Outcome: Progressing     Problem: Discharge Planning  Goal: Discharge to home or other facility with appropriate resources  Outcome: Progressing     Problem: Chronic Conditions and Co-morbidities  Goal: Patient's chronic conditions and co-morbidity symptoms are monitored and maintained or improved  Outcome: Progressing     Problem: Diabetes  Goal: Maintain glucose levels >70mg/dl to <250mg/dl throughout shift  Outcome: Progressing     Problem: Pain  Goal: Turns in bed with improved pain control throughout the shift  Outcome: Progressing  Goal: Performs ADL's with improved pain control throughout shift  Outcome: Progressing

## 2025-01-24 NOTE — CONSULTS
Infectious Disease Consult    PATIENT NAME: Myrna Rodrigues    MRN: [id number]  SERVICE DATE:  1/24/2025   SERVICE TIME:  12:14 PM    SIGNATURE: Andreia Parkinson MD    PRIMARY CARE PHYSICIAN: Calvin Baker MD  REASON FOR CONSULT: UTI  REQUESTING PHYSICIAN:         ASSESSMENT :   -Urinary tract infection  -Bladder calculi  -Right pelvic wall, right femoral canal and right ureter inflammation with  lymphadenopathy  -History of CAD, hypertension, diabetes, A-fib, hypothyroidism  -Presented with right lower quadrant pain      PLAN:  -Follow-up urine culture  -Continue Zosyn  -Awaiting urology evaluation  -Closely monitor for antibiotics side effects including rash, Diarrhea/CDI, thrombocytopenia, KAJAL, etc.      Will continue to follow.          HPI   74-year-old female with past medical history as listed below who presented with chief complaint of right lower quadrant pain.  The patient has history of recurrent UTI, bladder calculi, most recent positive urine culture was on 1/9 positive for E. coli and Aerococcus urinae, urine culture on 1/12 negative.  Urine culture this admission is pending, but UA did show pyuria.  WBC 11.5, creatinine 0.81    PAST MEDICAL HISTORY:   Past Medical History:   Diagnosis Date    Anxiety and depression     Arrhythmia     Asthma     Cancer (Multi)     Coronary artery disease     Diabetes mellitus (Multi)     Disease of thyroid gland     GERD (gastroesophageal reflux disease)     Heart disease     Hyperlipidemia     Hypertension     Irregular heart beat     Occipital neuralgia 06/10/2022    Occipital neuralgia of right side    Personal history of malignant neoplasm, unspecified 10/04/2021    History of malignant neoplasm    Personal history of other diseases of the circulatory system     History of hypertension    Personal history of other diseases of the circulatory system 10/04/2021    History of essential hypertension    Personal history of other diseases of the circulatory  system     History of coronary artery disease    Personal history of other diseases of the digestive system 03/11/2020    History of chronic constipation    Personal history of other diseases of the musculoskeletal system and connective tissue     History of arthritis    Personal history of other diseases of the nervous system and sense organs 10/04/2021    History of eye problem    Personal history of other diseases of the respiratory system     History of asthma    Personal history of other specified conditions 10/04/2021    History of chest pain    PONV (postoperative nausea and vomiting)     Stool incontinence     Thyroid cancer (Multi)     Urinary incontinence      PAST SURGICAL HISTORY:   Past Surgical History:   Procedure Laterality Date    APPENDECTOMY  08/13/2015    Appendectomy    BREAST LUMPECTOMY  08/13/2015    Breast Surgery Lumpectomy    CARPAL TUNNEL RELEASE  08/13/2015    Neuroplasty Decompression Median Nerve At Carpal Tunnel    GALLBLADDER SURGERY  08/13/2015    Gallbladder Surgery    HYSTERECTOMY  08/13/2015    Hysterectomy    KNEE ARTHROSCOPY W/ MENISCAL REPAIR  08/13/2015    Knee Arthroscopy With Medial Meniscus Repair    KNEE SURGERY  08/13/2015    Knee Surgery    MR HEAD ANGIO WO IV CONTRAST  09/05/2021    MR HEAD ANGIO WO IV CONTRAST 9/5/2021 [facility]    OTHER SURGICAL HISTORY  08/13/2015    Shoulder Surgery Left    OTHER SURGICAL HISTORY  08/13/2015    Repair Of Bladder Reconstruction    OTHER SURGICAL HISTORY  09/20/2021    Breast surgery    OTHER SURGICAL HISTORY  09/20/2021    Bladder surgery    OTHER SURGICAL HISTORY  09/20/2021    Complete colonoscopy    OTHER SURGICAL HISTORY  09/20/2021    Shoulder surgery    OTHER SURGICAL HISTORY  09/20/2021    Foot surgery    OTHER SURGICAL HISTORY  09/20/2021    Percutaneous transluminal coronary angioplasty    OTHER SURGICAL HISTORY  10/11/2021    Thyroidectomy total    THYROIDECTOMY      TOTAL KNEE ARTHROPLASTY  08/13/2015    Knee  Replacement     FAMILY HISTORY:   Family History   Problem Relation Name Age of Onset    Lung cancer Mother      Lymphoma Mother      Bone cancer Mother      Heart attack Father      Liver cancer Brother      Lung cancer Brother       SOCIAL HISTORY:   Social History     Tobacco Use    Smoking status: Never    Smokeless tobacco: Never   Vaping Use    Vaping status: Never Used   Substance Use Topics    Alcohol use: Not Currently    Drug use: Never     CURRENT ALLERGIES:   Allergies as of 01/23/2025    (No Known Allergies)     MEDICATIONS:    Current Facility-Administered Medications:     acetaminophen (Tylenol) tablet 650 mg, 650 mg, oral, q6h PRN, Alejandra Moore DO    apixaban (Eliquis) tablet 5 mg, 5 mg, oral, BID, Alejandra Moore DO    atorvastatin (Lipitor) tablet 10 mg, 10 mg, oral, Daily, Alejandra Moore DO    dextrose 50 % injection 12.5 g, 12.5 g, intravenous, q15 min PRN, Alejandra Moore DO    dextrose 50 % injection 25 g, 25 g, intravenous, q15 min PRN, Alejandra Moore DO    glucagon (Glucagen) injection 1 mg, 1 mg, intramuscular, q15 min PRN, Alejandra Moore DO    glucagon (Glucagen) injection 1 mg, 1 mg, intramuscular, q15 min PRN, Alejandra Moore DO    insulin lispro injection 0-10 Units, 0-10 Units, subcutaneous, TID AC, Alejandra Moore DO    levothyroxine (Synthroid, Levoxyl) tablet 100 mcg, 100 mcg, oral, Daily, Alejandra Moore DO    losartan (Cozaar) tablet 50 mg, 50 mg, oral, Daily, Alejandra Moore DO    metoprolol tartrate (Lopressor) tablet 75 mg, 75 mg, oral, BID, Alejandra Moore DO    montelukast (Singulair) tablet 10 mg, 10 mg, oral, Nightly, Alejandra Moore DO    morphine injection 1 mg, 1 mg, intravenous, q4h PRN, Alejandra Moore DO    morphine injection 2 mg, 2 mg, intravenous, q4h PRN, Alejandra Moore DO    pantoprazole (ProtoNix) EC tablet 40 mg, 40 mg, oral, BID AC, Alejandra Moore DO    perflutren lipid microspheres (Definity) injection 0.5-10 mL of  "dilution, 0.5-10 mL of dilution, intravenous, Once in imaging, Agustín Galdamez DO    perflutren protein A microsphere (Optison) injection 0.5 mL, 0.5 mL, intravenous, Once in imaging, Agustín Galdamez DO    piperacillin-tazobactam (Zosyn) 3.375 g in dextrose (iso) IV 50 mL, 3.375 g, intravenous, q8h, Alejandra Moore DO, Stopped at 01/24/25 1033    polyethylene glycol (Glycolax, Miralax) packet 17 g, 17 g, oral, Daily, Alejandra Moore DO    regadenoson (Lexiscan) injection 0.4 mg, 0.4 mg, intravenous, Once, Clement Stevens MD    sulfur hexafluoride microsphr (Lumason) injection 24.28 mg, 2 mL, intravenous, Once in imaging, Agustín Galdamez DO    traZODone (Desyrel) tablet 50 mg, 50 mg, oral, Nightly PRN, Alejandra Moore DO       COMPLETE REVIEW OF SYSTEMS:    Review of systems shows no findings other than what is described in the narrative above.  Specifically, the patient has had no significant changes in eyesight, no sore throat, no post nasal drip, no ear pains.  There has been no cough or chest pains, pleuritic or otherwise.   There is nothing unusual in the joints or muscles, or any skin rashes or skin swellings or abscesses noted.   All other systems were reviewed and are negative.        PHYSICAL EXAM:  Patient Vitals for the past 24 hrs:   BP Temp Temp src Pulse Resp SpO2 Height Weight   01/24/25 1200 -- -- -- 76 -- -- -- --   01/24/25 0800 -- -- -- 72 -- -- -- --   01/24/25 0721 166/74 36.5 °C (97.7 °F) Temporal 76 18 98 % -- --   01/24/25 0615 168/70 -- -- 72 -- -- -- --   01/24/25 0600 177/80 -- -- 80 -- -- -- --   01/24/25 0440 180/74 36.2 °C (97.2 °F) Temporal 80 18 97 % 1.575 m (5' 2\") 64 kg (141 lb 1.5 oz)   01/24/25 0300 158/74 -- -- 82 18 96 % -- --   01/24/25 0230 (!) 188/80 -- -- 84 16 97 % -- --   01/24/25 0200 137/65 -- -- 83 16 (!) 93 % -- --   01/24/25 0130 161/71 -- -- 81 18 94 % -- --   01/24/25 0100 155/70 -- -- 82 18 (!) 93 % -- --   01/24/25 0030 157/60 -- -- 81 18 94 % -- -- " "  01/24/25 0000 161/70 -- -- 82 18 95 % -- --   01/23/25 2330 148/70 -- -- -- 18 -- -- --   01/23/25 2230 (!) 185/86 -- -- 86 -- 97 % -- --   01/23/25 2050 169/88 36.7 °C (98.1 °F) Temporal (!) 158 18 98 % 1.575 m (5' 2\") 64.4 kg (142 lb)     Body mass index is 25.81 kg/m².  Gen: NAD  Neck: symmetric, no mass  Cardiovascular: RRR  Respiratory: No distress   GI: Abd soft,RLQ TTP  Neuro: alert and oriented times 3  : no thompson     Labs:  Lab Results   Component Value Date    WBC 11.5 (H) 01/24/2025    HGB 8.1 (L) 01/24/2025    HCT 28.2 (L) 01/24/2025    MCV 72 (L) 01/24/2025     01/24/2025     Lab Results   Component Value Date    GLUCOSE 90 01/24/2025    CALCIUM 8.7 01/24/2025     01/24/2025    K 4.5 01/24/2025    CO2 25 01/24/2025     01/24/2025    BUN 10 01/24/2025    CREATININE 0.81 01/24/2025   ESR: --  Lab Results   Component Value Date    SEDRATE 15 11/17/2021     Lab Results   Component Value Date    CRP 3.01 (A) 11/17/2021     Lab Results   Component Value Date    ALT 8 01/23/2025    AST 10 01/23/2025    ALKPHOS 66 01/23/2025    BILITOT 0.4 01/23/2025       DATA:   Diagnostic tests reviewed for today's visit:    Labs this admission reviewed  Imagings this admission reviewed  Cultures: Reviewed        Thank you so much for this consultation         Andreia Parkinson MD.   Infectious Disease Attending        This note was partially created using voice recognition software and is inherently subject to errors including those of syntax and \"sound-alike\" substitutions which may escape proofreading. In such instances, original meaning may be extrapolated by contextual derivation       "

## 2025-01-24 NOTE — CARE PLAN
Clinical goals for the shift include patient will remain HDS throughout shift. The patient met this goal. Patient admitted to room 2100 this shift. BP elevated, Dr Kelly notified and up to see patient, given 5 mg lopressor per orders. Ambulates to bathroom with standby assist. NPO for cardiology anetteal

## 2025-01-24 NOTE — ED PROVIDER NOTES
"EMERGENCY DEPARTMENT ENCOUNTER      Pt Name: Myrna Rodrigues  MRN: [id number]  Birthdate 1950  Date of evaluation: 1/23/2025  Provider: Zahra Anderson MD    CHIEF COMPLAINT       Chief Complaint   Patient presents with    Shortness of Breath    Palpitations    Abdominal Pain     Pt presents to the ED with RLQ pain x 2 weeks - pt also states she has been having shortness of breath and palpitations all day \"on and off\" - she checked her heart rate at home and saw it was 157. Pt does have hx of A-fib. Pt denies chest pain. Pt endorses nausea but denies vomiting. Pt has loss of appetite and has not ate anything in 24 hours. Pt able to tolerate liquids.      HISTORY OF PRESENT ILLNESS    Myrna Rodrigues is a 74 y.o. year old female who presents to the ER for lower quadrant pain and palpitations.  Patient reports that she has been having this right lower quadrant pain for the past 2 weeks.  She reports that she was seen in the hospital and was diagnosed with cystitis with bladder stones.  The patient was given a prescription of Augmentin but she only took 1 dose, she reports that the pharmacies were busy.  Patient denies being febrile, denies dizziness or headaches, denies any chest pain.  She endorses intermittent palpitations throughout the day and shortness of breath.  The patient endorses nausea due to the pain but denies vomiting.  The patient reports having a poor appetite for the past few days and denies any changes in bowel or bladder habits.  She denies UTI symptoms.  She endorses abdominal pain, she reports it as dull and aching.    Patient was recently admitted on 1/15/2025 for SVT.  She saw cardiology and her metoprolol was increased to 75 mg twice daily and has increased her Synthroid dose to 100 mcg daily.  She has not had any cardiac workup.    PMH is significant for CAD, diabetes, thyroid disease, GERD, A-fib on apixaban and metoprolol, asthma  PAST MEDICAL HISTORY     Past Medical History:   Diagnosis Date    " Anxiety and depression     Arrhythmia     Asthma     Cancer (Multi)     Coronary artery disease     Diabetes mellitus (Multi)     Disease of thyroid gland     GERD (gastroesophageal reflux disease)     Heart disease     Hyperlipidemia     Hypertension     Irregular heart beat     Occipital neuralgia 06/10/2022    Occipital neuralgia of right side    Personal history of malignant neoplasm, unspecified 10/04/2021    History of malignant neoplasm    Personal history of other diseases of the circulatory system     History of hypertension    Personal history of other diseases of the circulatory system 10/04/2021    History of essential hypertension    Personal history of other diseases of the circulatory system     History of coronary artery disease    Personal history of other diseases of the digestive system 03/11/2020    History of chronic constipation    Personal history of other diseases of the musculoskeletal system and connective tissue     History of arthritis    Personal history of other diseases of the nervous system and sense organs 10/04/2021    History of eye problem    Personal history of other diseases of the respiratory system     History of asthma    Personal history of other specified conditions 10/04/2021    History of chest pain    PONV (postoperative nausea and vomiting)     Stool incontinence     Thyroid cancer (Multi)     Urinary incontinence      CURRENT MEDICATIONS       Previous Medications    ACETAMINOPHEN (TYLENOL) 500 MG TABLET    Take 2 tablets (1,000 mg) by mouth every 6 hours.    AMOXICILLIN-POT CLAVULANATE (AUGMENTIN) 875-125 MG TABLET    Take 1 tablet (875 mg) by mouth 2 times a day for 7 days.    ATORVASTATIN (LIPITOR) 10 MG TABLET    TAKE 1 TABLET BY MOUTH DAILY    CHOLECALCIFEROL (VITAMIN D-3) 25 MCG (1000 UT) TABLET    Take 1 tablet (1,000 Units) by mouth once daily.    DIPHENOXYLATE-ATROPINE (LOMOTIL) 2.5-0.025 MG TABLET    Take 1 tablet by mouth 4 times a day as needed for  diarrhea.    ELIQUIS 5 MG TABLET    TAKE 1 TABLET BY MOUTH TWICE  DAILY    ESTRADIOL (ESTRACE) 0.01 % (0.1 MG/GRAM) VAGINAL CREAM    Apply pea size amount 0.5 gram to vaginal opening with finger daily for 2 weeks, then 2-3 times per week.    LEVOTHYROXINE (SYNTHROID, LEVOXYL) 100 MCG TABLET    Take 1 tablet (100 mcg) by mouth early in the morning..    LOSARTAN (COZAAR) 50 MG TABLET    Take 1 tablet (50 mg) by mouth once daily.    MAGNESIUM OXIDE (MAG-OX) 400 MG (241.3 MG MAGNESIUM) TABLET    Take 1 tablet (400 mg) by mouth once daily.    METFORMIN XR (GLUCOPHAGE-XR) 500 MG 24 HR TABLET    Take 1 tablet (500 mg) by mouth 2 times a day.    METOPROLOL TARTRATE (LOPRESSOR) 75 MG TABLET    Take 1 tablet (75 mg) by mouth 2 times a day.    MONTELUKAST (SINGULAIR) 10 MG TABLET    TAKE 1 TABLET BY MOUTH AT  BEDTIME    OMEPRAZOLE (PRILOSEC) 40 MG DR CAPSULE    TAKE 1 CAPSULE BY MOUTH TWICE  DAILY    POTASSIUM GLUCONATE 595 MG (99 MG) TABLET    Take 1 tablet by mouth once daily.    TIOTROPIUM (SPIRIVA RESPIMAT) 2.5 MCG/ACTUATION INHALER    Inhale 2 puffs once daily.    TRAZODONE (DESYREL) 50 MG TABLET    Take 1-3 tablets ( mg) by mouth as needed at bedtime for sleep.    VENTOLIN HFA 90 MCG/ACTUATION INHALER    USE 2 INHALATIONS BY MOUTH EVERY 4 HOURS AS NEEDED     SURGICAL HISTORY       Past Surgical History:   Procedure Laterality Date    APPENDECTOMY  08/13/2015    Appendectomy    BREAST LUMPECTOMY  08/13/2015    Breast Surgery Lumpectomy    CARPAL TUNNEL RELEASE  08/13/2015    Neuroplasty Decompression Median Nerve At Carpal Tunnel    GALLBLADDER SURGERY  08/13/2015    Gallbladder Surgery    HYSTERECTOMY  08/13/2015    Hysterectomy    KNEE ARTHROSCOPY W/ MENISCAL REPAIR  08/13/2015    Knee Arthroscopy With Medial Meniscus Repair    KNEE SURGERY  08/13/2015    Knee Surgery    MR HEAD ANGIO WO IV CONTRAST  09/05/2021    MR HEAD ANGIO WO IV CONTRAST 9/5/2021 [facility]    OTHER SURGICAL HISTORY  08/13/2015     Shoulder Surgery Left    OTHER SURGICAL HISTORY  08/13/2015    Repair Of Bladder Reconstruction    OTHER SURGICAL HISTORY  09/20/2021    Breast surgery    OTHER SURGICAL HISTORY  09/20/2021    Bladder surgery    OTHER SURGICAL HISTORY  09/20/2021    Complete colonoscopy    OTHER SURGICAL HISTORY  09/20/2021    Shoulder surgery    OTHER SURGICAL HISTORY  09/20/2021    Foot surgery    OTHER SURGICAL HISTORY  09/20/2021    Percutaneous transluminal coronary angioplasty    OTHER SURGICAL HISTORY  10/11/2021    Thyroidectomy total    THYROIDECTOMY      TOTAL KNEE ARTHROPLASTY  08/13/2015    Knee Replacement     ALLERGIES     Patient has no known allergies.  FAMILY HISTORY       Family History   Problem Relation Name Age of Onset    Lung cancer Mother      Lymphoma Mother      Bone cancer Mother      Heart attack Father      Liver cancer Brother      Lung cancer Brother       SOCIAL HISTORY       Social History     Tobacco Use    Smoking status: Never    Smokeless tobacco: Never   Vaping Use    Vaping status: Never Used   Substance Use Topics    Alcohol use: Not Currently    Drug use: Never     PHYSICAL EXAM  (up to 7 for level 4, 8 or more for level 5)     ED Triage Vitals [01/23/25 2050]   Temperature Heart Rate Respirations BP   36.7 °C (98.1 °F) (!) 158 18 169/88      Pulse Ox Temp Source Heart Rate Source Patient Position   98 % Temporal Monitor Sitting      BP Location FiO2 (%)     Right arm --       Physical Exam  HENT:      Head: Normocephalic and atraumatic.   Cardiovascular:      Rate and Rhythm: Regular rhythm. Tachycardia present.      Heart sounds: No murmur heard.  Pulmonary:      Effort: Pulmonary effort is normal.      Breath sounds: Normal breath sounds.   Chest:      Chest wall: No tenderness.   Abdominal:      Tenderness: There is abdominal tenderness in the right lower quadrant. There is guarding. Negative signs include Lan's sign.      Hernia: No hernia is present.   Musculoskeletal:      Cervical  back: Normal range of motion and neck supple.      Right lower leg: No tenderness. No edema.      Left lower leg: No tenderness. No edema.   Skin:     General: Skin is warm and dry.      Capillary Refill: Capillary refill takes less than 2 seconds.   Neurological:      General: No focal deficit present.      Mental Status: She is alert.        DIAGNOSTIC RESULTS   LABS:  Labs Reviewed   CBC WITH AUTO DIFFERENTIAL - Abnormal       Result Value    WBC 12.0 (*)     nRBC 0.0      RBC 4.39      Hemoglobin 9.4 (*)     Hematocrit 31.7 (*)     MCV 72 (*)     MCH 21.4 (*)     MCHC 29.7 (*)     RDW 18.5 (*)     Platelets 262      Neutrophils % 74.8      Immature Granulocytes %, Automated 0.3      Lymphocytes % 14.4      Monocytes % 7.8      Eosinophils % 2.4      Basophils % 0.3      Neutrophils Absolute 8.94 (*)     Immature Granulocytes Absolute, Automated 0.04      Lymphocytes Absolute 1.73      Monocytes Absolute 0.94 (*)     Eosinophils Absolute 0.29      Basophils Absolute 0.04     COMPREHENSIVE METABOLIC PANEL - Abnormal    Glucose 192 (*)     Sodium 134 (*)     Potassium 3.9      Chloride 99      Bicarbonate 24      Anion Gap 15      Urea Nitrogen 11      Creatinine 0.91      eGFR 66      Calcium 9.2      Albumin 3.6      Alkaline Phosphatase 66      Total Protein 6.9      AST 10      Bilirubin, Total 0.4      ALT 8     B-TYPE NATRIURETIC PEPTIDE - Abnormal     (*)     Narrative:        <100 pg/mL - Heart failure unlikely  100-299 pg/mL - Intermediate probability of acute heart                  failure exacerbation. Correlate with clinical                  context and patient history.    >=300 pg/mL - Heart Failure likely. Correlate with clinical                  context and patient history.    BNP testing is performed using different testing methodology at [facility] than at other [facility] hospitals. Direct result comparisons should only be made within the same method.      PROTIME-INR - Abnormal     Protime 15.8 (*)     INR 1.4 (*)    SERIAL TROPONIN-INITIAL - Abnormal    Troponin I, High Sensitivity 156 (*)     Narrative:     Less than 99th percentile of normal range cutoff-  Female and children under 18 years old <14 ng/L; Male <21 ng/L: Negative  Repeat testing should be performed if clinically indicated.     Female and children under 18 years old 14-50 ng/L; Male 21-50 ng/L:  Consistent with possible cardiac damage and possible increased clinical   risk. Serial measurements may help to assess extent of myocardial damage.     >50 ng/L: Consistent with cardiac damage, increased clinical risk and  myocardial infarction. Serial measurements may help assess extent of   myocardial damage.      NOTE: Children less than 1 year old may have higher baseline troponin   levels and results should be interpreted in conjunction with the overall   clinical context.     NOTE: Troponin I testing is performed using a different   testing methodology at [facility] than at other   [facility]. Direct result comparisons should only   be made within the same method.   SERIAL TROPONIN, 1 HOUR - Abnormal    Troponin I, High Sensitivity 151 (*)     Narrative:     Less than 99th percentile of normal range cutoff-  Female and children under 18 years old <14 ng/L; Male <21 ng/L: Negative  Repeat testing should be performed if clinically indicated.     Female and children under 18 years old 14-50 ng/L; Male 21-50 ng/L:  Consistent with possible cardiac damage and possible increased clinical   risk. Serial measurements may help to assess extent of myocardial damage.     >50 ng/L: Consistent with cardiac damage, increased clinical risk and  myocardial infarction. Serial measurements may help assess extent of   myocardial damage.      NOTE: Children less than 1 year old may have higher baseline troponin   levels and results should be interpreted in conjunction with the overall   clinical context.     NOTE: Troponin I  testing is performed using a different   testing methodology at [facility] than at other   [facility]. Direct result comparisons should only   be made within the same method.   LIPASE - Normal    Lipase 10      Narrative:     Venipuncture immediately after or during the administration of Metamizole may lead to falsely low results. Testing should be performed immediately prior to Metamizole dosing.   MAGNESIUM - Normal    Magnesium 1.74     TROPONIN SERIES- (INITIAL, 1 HR)    Narrative:     The following orders were created for panel order Troponin I Series, High Sensitivity (0, 1 HR).  Procedure                               Abnormality         Status                     ---------                               -----------         ------                     Troponin I, High Sensiti...[[id number]]  Abnormal            Final result               Troponin, High Sensitivi...[[id number]]  Abnormal            Final result                 Please view results for these tests on the individual orders.   URINALYSIS WITH REFLEX CULTURE AND MICROSCOPIC    Narrative:     The following orders were created for panel order Urinalysis with Reflex Culture and Microscopic.  Procedure                               Abnormality         Status                     ---------                               -----------         ------                     Urinalysis with Reflex C...[[id number]]                                                 Extra Urine Gray Tube[[id number]]                                                         Please view results for these tests on the individual orders.   URINALYSIS WITH REFLEX CULTURE AND MICROSCOPIC   EXTRA URINE GRAY TUBE     All other labs were within normal range or not returned as of this dictation.  Imaging  No orders to display      Procedure  Critical Care    Performed by: Javier Cm DO  Authorized by: Javier Cm DO    Critical care provider statement:     Critical  care time (minutes):  35    Critical care was time spent personally by me on the following activities:  Ordering and performing treatments and interventions, ordering and review of laboratory studies, review of old charts and discussions with consultants  Comments:      Patient is a cardiac dysrhythmia SVT.  Therefore IV Identicard was given.  Patient did not convert.  Patient has to be meticulously monitored and watch for any further decompensation.  Patient has elevated troponins therefore cardiology consulted.    EMERGENCY DEPARTMENT COURSE/MDM:   Medical Decision Making    Vitals:    Vitals:    01/24/25 0000 01/24/25 0030 01/24/25 0100 01/24/25 0130   BP:       BP Location:       Patient Position:       Pulse: 82 81 82 81   Resp:       Temp:       TempSrc:       SpO2: 95% 94% (!) 93% 94%   Weight:       Height:         Myrna Rodrigues is a female 74 y.o. who presents to the ER for palpitations and right lower quadrant pain.. On arrival the patients vital signs were: Afebrile, Tachycardic, Hypertensive, and Normoxic on room air. History obtained from: patient and Son.     Physical exam is significant for tachycardia in regular rhythm.  Heart rate in the 150s.  The patient is ANO x 4, no chest pain to palpation, lungs are clear to auscultation.  There is right-sided tenderness in the abdomen on palpation.  Belly is soft, patient was guarding on palpation.  No CVA tenderness.  No injuries or in the lower extremities.    on independent interpretation of the labs, her troponins were elevated at 151 and on repeat 156.  Her BNP is 164.  CBC showed evidence of infection with a elevated white count of 12, with left shift.  Absolute neutrophil count of 8.94.  Her hemoglobin and hematocrit are low at 9.4 and 31.7 however it is at her baseline, consistent with her history of chronic anemia.    Previous CT 1/9/25 shows thickening of the bladder with bladder stones.  The patient has been in the hospital for multiple UTIs, urology  believes that she has a complicated polymicrobial UTI, she was prescribed Augmentin.  Her primary care physician Dr. Wynn believes that her previously controlled A-fib is due to a chronic inflammatory process in the bladder.    EKG showed the patient was in SVT.  6 mg of Adenocard was given after failed Valsalva maneuvers, the patient was converted and was back in normal sinus rhythm.    The patient was given morphine for her discomfort, on reassessment the patient feels a lot more comfortable, reports that her pain is gone.  The patient has not gone back into SVT.  Blood pressures have improved with pain control.    Cardiology was consulted and did not advise for any emergent treatments.  On discussion with the patient, the patient wants to be admitted to the hospital for further workup.    ED Course as of 01/24/25 0151   u Jan 23, 2025   2201 Troponin I Series, High Sensitivity (0, 1 HR)(!!) [GV]   2202 Adenosine given, pt converted into normal sinus, HR 89 [GV]   2241 Troponin I, High Sensitivity(!!): 156 [GV]      ED Course User Index  [GV] Zahra Anderson MD         Diagnoses as of 01/24/25 0151   SVT (supraventricular tachycardia) (CMS-[facility])       Consultant discussions: Cardiology was consulted and did not advise for any emergent treatments.  He did agree that the patient needed a cardiac workup.  Diagnostic testing considered but not performed: CT of the abdomen pelvis was considered but not performed due to the patient's unchanged pain and no new symptoms.   External Records Reviewed: I reviewed recent and relevant outside records including inpatient notes, outpatient records      Shared decision making for disposition  Patient and/or patient´s representative was counseled regarding labs, imaging, likely diagnosis. All questions were answered. Recommendation was made   for Admission given the need for further escalation of care to inpatient management. Patient agreed and was admitted in stable  condition. Admitting team was notified of any pending labs or imaging to ensure continuity of care.     ED Medications administered this visit:    Medications   adenosine (Adenocard) injection 6 mg (6 mg intravenous Given 1/23/25 2200)   morphine injection 4 mg (4 mg intravenous Given 1/23/25 2229)       New Prescriptions from this visit:    New Prescriptions    No medications on file       Follow-up:  No follow-up provider specified.      Final Impression:   1. SVT (supraventricular tachycardia) (CMS-HCC)          Please excuse any misspellings or unintended errors related to the Dragon speech recognition software used to dictate this note.    I reviewed the case with the attending ED physician. The attending ED physician agrees with the plan.      Zahra Anderson MD  Resident  01/24/25 0201        The patient was seen by the resident/fellow.  I have personally performed a substantive portion of the encounter.  I have seen and examined the patient; agree with the workup, evaluation, MDM, management and diagnosis.  The care plan has been discussed with the resident; I have reviewed the resident’s note and agree with the documented findings.                                                    Javier Cm, DO  01/25/25 4212

## 2025-01-24 NOTE — PROGRESS NOTES
Occupational Therapy    Occupational Therapy    Evaluation    Patient Name: Myrna Rodrigues  MRN: [id number]  Today's Date: 1/24/2025  Time Calculation  Start Time: 1157  Stop Time: 1217  Time Calculation (min): 20 min  2100/2100-A    Assessment  IP OT Assessment  OT Assessment: Pt pleasant and cooperative. Pt close to baseline level of function, which is independent. Recommend home with [facility] OT to ensure safety and independence with ADL's  Prognosis: Good  End of Session Communication: Bedside nurse  End of Session Patient Position: Up in chair, Alarm on    Plan:  Treatment Interventions: ADL retraining, Functional transfer training, Endurance training, Neuromuscular reeducation  OT Frequency: 3 times per week  OT Discharge Recommendations: Low intensity level of continued care ([facility] OT)  Equipment Recommended upon Discharge: Straight cane  OT Recommended Transfer Status:  (CGA)  OT - OK to Discharge: Yes (to next level of care)    Subjective     Current Problem:  1. SVT (supraventricular tachycardia) (CMS-HCC)  Transthoracic Echo (TTE) Complete    Transthoracic Echo (TTE) Complete    CANCELED: Transthoracic Echo (TTE) Limited    CANCELED: Nuclear Stress Test    CANCELED: Transthoracic Echo (TTE) Limited    CANCELED: Nuclear Stress Test    CANCELED: Transthoracic Echo (TTE) Limited    CANCELED: Transthoracic Echo (TTE) Limited    CANCELED: Transthoracic Echo (TTE) Complete    CANCELED: Transthoracic Echo (TTE) Complete      2. Heart palpitations  Transthoracic Echo (TTE) Complete    Transthoracic Echo (TTE) Complete    CANCELED: Transthoracic Echo (TTE) Limited    CANCELED: Transthoracic Echo (TTE) Limited    CANCELED: Transthoracic Echo (TTE) Limited    CANCELED: Transthoracic Echo (TTE) Limited    CANCELED: Transthoracic Echo (TTE) Complete    CANCELED: Transthoracic Echo (TTE) Complete      3. Myocardial infarction (lateral wall) (Multi)  CANCELED: Nuclear Stress Test    CANCELED: Nuclear Stress Test      4. Dyspnea,  unspecified  Transthoracic Echo (TTE) Complete          General:  General  Reason for Referral: ADL's; Safety Assessment  Referred By: Adrianna Castro  Co-Treatment: PT  Co-Treatment Reason: safety  Prior to Session Communication: Bedside nurse  Patient Position Received: Bed, 3 rail up, Alarm on    General Visit Information:  Per EMR: pt presented to the ED with right lower quadrant pain and palpitations.  States that she was at home not exerting herself when she continued to have right lower quadrant pain along with intermittent left lower quadrant pain that she found unbearable.  She reports being diagnosed with stones weeks ago and does not think that she passed them.  Reports that she has been given antibiotics however has had troubles getting them secured from the pharmacy due to the very long lines.  She is aware she was supposed to follow-up with cardiology for continued follow-up however was unable to time an appointment appropriately between her hospital visits.Of note, she was admitted to [facility] on 1/12-1/14 she was admitted to for palpitations secondary to SVT/A-fib. She was treated with lopresser and after that she was managed with her own home medications. She was also treated for a UTI during this admission with rocephin and macrobid. She was discharged with 3 days of macrobid and just picked up the medication yesterday.  On imaging, there was asymmetric thickening of the right posterior lateral bladder wall that extended into the UVJ.  Urology consulted but unable to see the patient.  Patient states that she would prefer to follow-up with her primary urologist. Per chart review, this follow up did not happen. She was admitted to [facility] on 1/15-1/17 again for palpitations.  During this hospital stay she was seen by cardiology and her metoprolol was increased to 75 twice daily.  She was discharged home with a cardiology follow-up for a Holter monitor echo and stress test which were not completed  per chart review.  A CT of the abdomen and pelvis was performed for a complaint of pain in the RLQ at [facility] ED on 1/9/2025 shows Several bladder calculi are noted, new from prior exam. There is moderate bladder wall thickening, mucosal hyperenhancement and perivesical inflammatory stranding concerning for infectious cystitis. Correlate with urinalysis. Enlarged lymph nodes suspected to be secondary to cystitis. Redemonstration of a 3.6 cm hypodense lesion in the left adnexa likely related to left ovary. This is stable when compared to prior CT. She was discharged home after receiving rocephin, Toradol and pyridium at that ED visit.       She reports intermittent diarrhea and constipation.  States she has less of an appetite and feels as though she has lost possibly 10 pounds in the last 2 weeks.  Reports no recent falls or sick exposures.    Precautions:  Medical Precautions: Fall precautions  Precautions Comment: bed/chair alarm    Vital Signs:  Heart Rate: 78  SpO2: 96 %  BP: (!) 182/79    Pain:  Pain Assessment  Pain Assessment: 0-10  0-10 (Numeric) Pain Score: 6 (lower abdominal pain; pt also with 3/10 back pain)    Objective     Cognition:  Overall Cognitive Status: Within Functional Limits  Orientation Level: Oriented X4    Home Living/PLOF:  Pt lives alone in house with 0 JOSE.  1 floor set up.  IND with ADLs.  Has tub shower with Gbs.  Owns shower chair but was not using.  IND with mobility, but owns FWW and SPC.  Pt cooks, cleans, does laundry, drives, and shops.  Son assists as needed     ADL:  Eating Assistance: Independent  Grooming Assistance: Stand by  Bathing Assistance: Stand by  UE Dressing Assistance: Stand by  LE Dressing Assistance: Stand by  Toileting Assistance with Device: Stand by    Activity Tolerance:  Endurance: Endurance does not limit participation in activity    Transfers  Sit to stand: SBA; increased time required to steady self 2/2 lightheadedness      Stand to sit: SBA     Toilet  transfer: onto standard toilet with CGA; IND with pericare care and getting off standard toilet     Ambulation/Stairs  Pt ambulated 10 ft x2 with SBA and 1 HHA for support; grossly steady without LOB; walks guarded with increased safety awareness    Sitting Balance:  Static Sitting Balance  Static Sitting-Comment/Number of Minutes: good  Dynamic Sitting Balance  Dynamic Sitting-Comments: fair+    Standing Balance:  Static Standing Balance  Static Standing-Comment/Number of Minutes: fair  Dynamic Standing Balance  Dynamic Standing-Comments: fair-    Sensation:  Light Touch: No apparent deficits    Strength:  Strength Comments: NANDINI STONER's WFL    Extremities: RUE   RUE : Within Functional Limits and LUE   LUE: Within Functional Limits    Outcome Measures: [facility] Daily Activity  Putting on and taking off regular lower body clothing: None  Bathing (including washing, rinsing, drying): A little  Putting on and taking off regular upper body clothing: None  Toileting, which includes using toilet, bedpan or urinal: None  Taking care of personal grooming such as brushing teeth: None  Eating Meals: None  Daily Activity - Total Score: 23                       EDUCATION:  Education  Individual(s) Educated: Patient  Education Provided:  (safety)  Education Documentation  Body Mechanics, taught by Joan Beard OT at 1/24/2025  4:48 PM.  Learner: Patient  Readiness: Acceptance  Method: Explanation  Response: Verbalizes Understanding    Precautions, taught by Joan Beard OT at 1/24/2025  4:48 PM.  Learner: Patient  Readiness: Acceptance  Method: Explanation  Response: Verbalizes Understanding    Education Comments  No comments found.        Goals:   Encounter Problems       Encounter Problems (Active)       OT Goals       OT Goal 1 (Progressing)       Start:  01/24/25    Expected End:  02/07/25       Pt with complete all transfers independently            OT Goal 2 (Progressing)       Start:  01/24/25    Expected End:  02/07/25        Pt will complete LB dressing with modified independence using adaptive device as needed           OT Goal 3 (Progressing)       Start:  01/24/25    Expected End:  02/07/25       Pt will complete grooming ADL's independently and good stand balance                 Treatment: Pt was able to complete bed mobility with SBA. Pt with good sit balance on EOB. Pt completed all transfers with SBA. Pt ambulated in room with CGA using wheeled walker. Pt completed toilet transfer with CGA, and was independent with grecia care. Pt stood at sink with fair+ stand balance to complete grooming ADL's. Pt returned to and remained in bedside chair with chair alarm on and call light within reach.

## 2025-01-25 ENCOUNTER — APPOINTMENT (OUTPATIENT)
Dept: CARDIOLOGY | Facility: HOSPITAL | Age: 75
End: 2025-01-25
Payer: MEDICARE

## 2025-01-25 LAB
ALBUMIN SERPL BCP-MCNC: 3.2 G/DL (ref 3.4–5)
ANION GAP SERPL CALC-SCNC: 12 MMOL/L (ref 10–20)
ATRIAL RATE: 105 BPM
ATRIAL RATE: 68 BPM
ATRIAL RATE: 94 BPM
BACTERIA UR CULT: NORMAL
BASOPHILS # BLD AUTO: 0.05 X10*3/UL (ref 0–0.1)
BASOPHILS NFR BLD AUTO: 0.5 %
BUN SERPL-MCNC: 8 MG/DL (ref 6–23)
CALCIUM SERPL-MCNC: 8.6 MG/DL (ref 8.6–10.3)
CHLORIDE SERPL-SCNC: 101 MMOL/L (ref 98–107)
CO2 SERPL-SCNC: 27 MMOL/L (ref 21–32)
CREAT SERPL-MCNC: 0.82 MG/DL (ref 0.5–1.05)
EGFRCR SERPLBLD CKD-EPI 2021: 74 ML/MIN/1.73M*2
EOSINOPHIL # BLD AUTO: 0.47 X10*3/UL (ref 0–0.4)
EOSINOPHIL NFR BLD AUTO: 4.4 %
ERYTHROCYTE [DISTWIDTH] IN BLOOD BY AUTOMATED COUNT: 18.6 % (ref 11.5–14.5)
GLUCOSE BLD MANUAL STRIP-MCNC: 107 MG/DL (ref 74–99)
GLUCOSE BLD MANUAL STRIP-MCNC: 108 MG/DL (ref 74–99)
GLUCOSE BLD MANUAL STRIP-MCNC: 126 MG/DL (ref 74–99)
GLUCOSE BLD MANUAL STRIP-MCNC: 174 MG/DL (ref 74–99)
GLUCOSE SERPL-MCNC: 100 MG/DL (ref 74–99)
HCT VFR BLD AUTO: 32.9 % (ref 36–46)
HGB BLD-MCNC: 9.4 G/DL (ref 12–16)
IMM GRANULOCYTES # BLD AUTO: 0.03 X10*3/UL (ref 0–0.5)
IMM GRANULOCYTES NFR BLD AUTO: 0.3 % (ref 0–0.9)
LYMPHOCYTES # BLD AUTO: 2.01 X10*3/UL (ref 0.8–3)
LYMPHOCYTES NFR BLD AUTO: 18.7 %
MAGNESIUM SERPL-MCNC: 2.14 MG/DL (ref 1.6–2.4)
MCH RBC QN AUTO: 21 PG (ref 26–34)
MCHC RBC AUTO-ENTMCNC: 28.6 G/DL (ref 32–36)
MCV RBC AUTO: 73 FL (ref 80–100)
MONOCYTES # BLD AUTO: 0.85 X10*3/UL (ref 0.05–0.8)
MONOCYTES NFR BLD AUTO: 7.9 %
NEUTROPHILS # BLD AUTO: 7.35 X10*3/UL (ref 1.6–5.5)
NEUTROPHILS NFR BLD AUTO: 68.2 %
NRBC BLD-RTO: 0 /100 WBCS (ref 0–0)
P AXIS: 80 DEGREES
P OFFSET: 176 MS
P ONSET: 127 MS
PHOSPHATE SERPL-MCNC: 3.6 MG/DL (ref 2.5–4.9)
PLATELET # BLD AUTO: 245 X10*3/UL (ref 150–450)
POTASSIUM SERPL-SCNC: 4.2 MMOL/L (ref 3.5–5.3)
PR INTERVAL: 170 MS
Q ONSET: 212 MS
Q ONSET: 214 MS
Q ONSET: 216 MS
QRS COUNT: 15 BEATS
QRS COUNT: 23 BEATS
QRS COUNT: 25 BEATS
QRS DURATION: 76 MS
QRS DURATION: 84 MS
QRS DURATION: 84 MS
QT INTERVAL: 304 MS
QT INTERVAL: 330 MS
QT INTERVAL: 354 MS
QTC CALCULATION(BAZETT): 442 MS
QTC CALCULATION(BAZETT): 485 MS
QTC CALCULATION(BAZETT): 505 MS
QTC FREDERICIA: 411 MS
QTC FREDERICIA: 415 MS
QTC FREDERICIA: 438 MS
R AXIS: -62 DEGREES
R AXIS: -66 DEGREES
R AXIS: -66 DEGREES
RBC # BLD AUTO: 4.48 X10*6/UL (ref 4–5.2)
SODIUM SERPL-SCNC: 136 MMOL/L (ref 136–145)
T AXIS: 57 DEGREES
T AXIS: 58 DEGREES
T AXIS: 64 DEGREES
T OFFSET: 366 MS
T OFFSET: 381 MS
T OFFSET: 389 MS
VENTRICULAR RATE: 141 BPM
VENTRICULAR RATE: 153 BPM
VENTRICULAR RATE: 94 BPM
WBC # BLD AUTO: 10.8 X10*3/UL (ref 4.4–11.3)

## 2025-01-25 PROCEDURE — 2500000004 HC RX 250 GENERAL PHARMACY W/ HCPCS (ALT 636 FOR OP/ED): Mod: JZ | Performed by: STUDENT IN AN ORGANIZED HEALTH CARE EDUCATION/TRAINING PROGRAM

## 2025-01-25 PROCEDURE — 93005 ELECTROCARDIOGRAM TRACING: CPT

## 2025-01-25 PROCEDURE — 2500000004 HC RX 250 GENERAL PHARMACY W/ HCPCS (ALT 636 FOR OP/ED)

## 2025-01-25 PROCEDURE — 83735 ASSAY OF MAGNESIUM: CPT

## 2025-01-25 PROCEDURE — 99233 SBSQ HOSP IP/OBS HIGH 50: CPT | Performed by: STUDENT IN AN ORGANIZED HEALTH CARE EDUCATION/TRAINING PROGRAM

## 2025-01-25 PROCEDURE — 36415 COLL VENOUS BLD VENIPUNCTURE: CPT

## 2025-01-25 PROCEDURE — 80069 RENAL FUNCTION PANEL: CPT

## 2025-01-25 PROCEDURE — 82947 ASSAY GLUCOSE BLOOD QUANT: CPT

## 2025-01-25 PROCEDURE — 85025 COMPLETE CBC W/AUTO DIFF WBC: CPT

## 2025-01-25 PROCEDURE — 2500000002 HC RX 250 W HCPCS SELF ADMINISTERED DRUGS (ALT 637 FOR MEDICARE OP, ALT 636 FOR OP/ED)

## 2025-01-25 PROCEDURE — 2060000001 HC INTERMEDIATE ICU ROOM DAILY

## 2025-01-25 PROCEDURE — 2500000001 HC RX 250 WO HCPCS SELF ADMINISTERED DRUGS (ALT 637 FOR MEDICARE OP)

## 2025-01-25 RX ORDER — DOCUSATE SODIUM 100 MG/1
100 CAPSULE, LIQUID FILLED ORAL DAILY
Status: DISCONTINUED | OUTPATIENT
Start: 2025-01-25 | End: 2025-02-02 | Stop reason: HOSPADM

## 2025-01-25 RX ORDER — ACETAMINOPHEN 500 MG
5 TABLET ORAL NIGHTLY
Status: DISCONTINUED | OUTPATIENT
Start: 2025-01-25 | End: 2025-02-02 | Stop reason: HOSPADM

## 2025-01-25 RX ORDER — POLYETHYLENE GLYCOL 3350 17 G/17G
17 POWDER, FOR SOLUTION ORAL DAILY PRN
Status: DISCONTINUED | OUTPATIENT
Start: 2025-01-25 | End: 2025-01-27

## 2025-01-25 RX ADMIN — METOPROLOL TARTRATE 75 MG: 50 TABLET, FILM COATED ORAL at 20:11

## 2025-01-25 RX ADMIN — DOCUSATE SODIUM 100 MG: 100 CAPSULE, LIQUID FILLED ORAL at 13:35

## 2025-01-25 RX ADMIN — LEVOTHYROXINE SODIUM 100 MCG: 0.1 TABLET ORAL at 05:36

## 2025-01-25 RX ADMIN — HYDROMORPHONE HYDROCHLORIDE 0.2 MG: 0.2 INJECTION, SOLUTION INTRAMUSCULAR; INTRAVENOUS; SUBCUTANEOUS at 01:53

## 2025-01-25 RX ADMIN — MONTELUKAST 10 MG: 10 TABLET, FILM COATED ORAL at 20:11

## 2025-01-25 RX ADMIN — PIPERACILLIN SODIUM AND TAZOBACTAM SODIUM 3.38 G: 3; .375 INJECTION, SOLUTION INTRAVENOUS at 13:35

## 2025-01-25 RX ADMIN — METOPROLOL TARTRATE 75 MG: 50 TABLET, FILM COATED ORAL at 08:09

## 2025-01-25 RX ADMIN — HYDROMORPHONE HYDROCHLORIDE 0.2 MG: 0.2 INJECTION, SOLUTION INTRAMUSCULAR; INTRAVENOUS; SUBCUTANEOUS at 09:47

## 2025-01-25 RX ADMIN — APIXABAN 5 MG: 5 TABLET, FILM COATED ORAL at 08:09

## 2025-01-25 RX ADMIN — INSULIN LISPRO 2 UNITS: 100 INJECTION, SOLUTION INTRAVENOUS; SUBCUTANEOUS at 18:23

## 2025-01-25 RX ADMIN — TRAZODONE HYDROCHLORIDE 50 MG: 50 TABLET ORAL at 01:52

## 2025-01-25 RX ADMIN — PIPERACILLIN SODIUM AND TAZOBACTAM SODIUM 3.38 G: 3; .375 INJECTION, SOLUTION INTRAVENOUS at 20:10

## 2025-01-25 RX ADMIN — Medication 5 MG: at 20:11

## 2025-01-25 RX ADMIN — HYDROMORPHONE HYDROCHLORIDE 0.4 MG: 1 INJECTION, SOLUTION INTRAMUSCULAR; INTRAVENOUS; SUBCUTANEOUS at 20:36

## 2025-01-25 RX ADMIN — TRAZODONE HYDROCHLORIDE 50 MG: 50 TABLET ORAL at 20:14

## 2025-01-25 RX ADMIN — PANTOPRAZOLE SODIUM 40 MG: 40 TABLET, DELAYED RELEASE ORAL at 06:08

## 2025-01-25 RX ADMIN — LOSARTAN POTASSIUM 50 MG: 50 TABLET, FILM COATED ORAL at 08:09

## 2025-01-25 RX ADMIN — ATORVASTATIN CALCIUM 10 MG: 10 TABLET, FILM COATED ORAL at 08:09

## 2025-01-25 RX ADMIN — APIXABAN 5 MG: 5 TABLET, FILM COATED ORAL at 20:11

## 2025-01-25 RX ADMIN — PANTOPRAZOLE SODIUM 40 MG: 40 TABLET, DELAYED RELEASE ORAL at 18:23

## 2025-01-25 RX ADMIN — HYDROMORPHONE HYDROCHLORIDE 0.2 MG: 0.2 INJECTION, SOLUTION INTRAMUSCULAR; INTRAVENOUS; SUBCUTANEOUS at 17:19

## 2025-01-25 RX ADMIN — PIPERACILLIN SODIUM AND TAZOBACTAM SODIUM 3.38 G: 3; .375 INJECTION, SOLUTION INTRAVENOUS at 04:29

## 2025-01-25 ASSESSMENT — PAIN SCALES - GENERAL
PAINLEVEL_OUTOF10: 9
PAINLEVEL_OUTOF10: 1
PAINLEVEL_OUTOF10: 6
PAINLEVEL_OUTOF10: 2
PAINLEVEL_OUTOF10: 6
PAINLEVEL_OUTOF10: 2
PAINLEVEL_OUTOF10: 0 - NO PAIN
PAINLEVEL_OUTOF10: 6

## 2025-01-25 ASSESSMENT — PAIN DESCRIPTION - LOCATION
LOCATION: ABDOMEN
LOCATION: ABDOMEN

## 2025-01-25 ASSESSMENT — PAIN - FUNCTIONAL ASSESSMENT
PAIN_FUNCTIONAL_ASSESSMENT: 0-10

## 2025-01-25 ASSESSMENT — PAIN SCALES - PAIN ASSESSMENT IN ADVANCED DEMENTIA (PAINAD): TOTALSCORE: MEDICATION (SEE MAR)

## 2025-01-25 ASSESSMENT — PAIN DESCRIPTION - DESCRIPTORS: DESCRIPTORS: ACHING

## 2025-01-25 NOTE — PROGRESS NOTES
"CARDIOLOGY SERVICE   CONSULT Progress note   CVM Dr. Clement Stevens  [facility] [place]    PATIENT NAME: Myrna Rodrigues  PATIENT MRN: [id number]  SERVICE DATE:  1/25/2025  SERVICE TIME: 1:38 PM    CONSULTANT: Clement Stevens MD  PRIMARY CARE PHYSICIAN: Calvin Baker MD  ATTENDING PHYSICIAN: Adrianna Castro DO      IMPRESSIONS:  Admitted for abdominal pain, complicated UTIs and was found to have recurrent SVT/A-fib  Recent SVTs [facility] 1/12-14/2025 received BB, and 1/15-17/2025 when BB was increased to 75 twice daily not clear if she had the chance to implement the higher dose before admission  Parox A-fib  (Cardioversion 1/2024)history of ablation, history of SVT  AC Eliquis  Myocardial injury peak troponin 156 (120 during recent admission 10 days ago)  CAD, PCI LAD, cath 3/2023: patent LAD stents  Diastolic dysfunction EF 60%, mild AI, MR TR Echo 2023  Hypertension  Hyperlipidemia LDL 33 in 1/2025, at home on statin   DM, Thyroid, Asthma COPD, urinary bladder stones, microcytic anemia ~ 9, back surgery  Current meds PTA: Eliquis 5 twice daily, metoprolol recently increased dose to 75 twice daily, losartan 50 daily, Lipitor 10    RECOMMENDATIONS:  Telemetry  Continue BB  NM Lexiscan Monday     /78 (BP Location: Left arm, Patient Position: Sitting)   Pulse 74   Temp 37 °C (98.6 °F) (Temporal)   Resp 18   Ht 1.575 m (5' 2\")   Wt 64 kg (141 lb 1.5 oz)   SpO2 95%   BMI 25.81 kg/m²     ====================================================    SUBJECTIVE:  ***    BNP   Date/Time Value Ref Range Status   01/23/2025 08:59  0 - 99 pg/mL Final     Lab Results   Component Value Date    CHOL 120 01/15/2025    CHOL 126 07/17/2024    CHOL 118 01/03/2022     Lab Results   Component Value Date    HDL 66.4 01/15/2025    HDL 75.1 07/17/2024    HDL 46.0 01/03/2022     Lab Results   Component Value Date    LDLCALC 33 01/15/2025    LDLCALC 32 07/17/2024     Lab Results   Component Value Date    TRIG 101 01/15/2025    TRIG 97 " "07/17/2024    TRIG 136 01/03/2022     No components found for: \"CHOLHDL\"  TROPHS   Date/Time Value Ref Range Status   01/23/2025 10:01  0 - 13 ng/L Final     Comment:     Previous result verified on 1/23/2025 2141 on specimen/case [id number] called with component TRPHS for procedure Troponin I, High Sensitivity, Initial with value 156 ng/L.   01/23/2025 08:59  0 - 13 ng/L Final   01/16/2025 04:31 AM 95 0 - 13 ng/L Final     Comment:     Previous result verified on 1/15/2025 1237 on specimen/case [id number] called with component TRPHS for procedure Troponin I, High Sensitivity, Initial with value 113 ng/L.       Echo  No results found for this or any previous visit.    Encounter Date: 01/23/25   Electrocardiogram, 12-lead PRN ACS symptoms   Result Value    Ventricular Rate 141    Atrial Rate 105    QRS Duration 84    QT Interval 330    QTC Calculation(Bazett) 505    R Axis -66    T Axis 57    QRS Count 23    Q Onset 216    T Offset 381    QTC Fredericia 438    Narrative    Supraventricular tachycardia with occasional Premature ventricular complexes  Left axis deviation  Pulmonary disease pattern  Possible Inferior infarct , age undetermined  Abnormal ECG  When compared with ECG of 23-JAN-2025 21:55, (unconfirmed)  Premature ventricular complexes are now Present  Vent. rate has increased BY  47 BPM  Nonspecific T wave abnormality no longer evident in Anterior leads     LABS:  Lab Results   Component Value Date    WBC 10.8 01/25/2025    HGB 9.4 (L) 01/25/2025    HCT 32.9 (L) 01/25/2025    MCV 73 (L) 01/25/2025     01/25/2025      Lab Results   Component Value Date    GLUCOSE 100 (H) 01/25/2025    CALCIUM 8.6 01/25/2025     01/25/2025    K 4.2 01/25/2025    CO2 27 01/25/2025     01/25/2025    BUN 8 01/25/2025    CREATININE 0.82 01/25/2025        CURRENT CARDIOVASCULAR MEDICATIONS:  Current Facility-Administered Medications   Medication Dose Route Frequency Provider Last Rate Last " Admin    acetaminophen (Tylenol) tablet 650 mg  650 mg oral q6h PRN Alejandra Moore, DO        albuterol 2.5 mg /3 mL (0.083 %) nebulizer solution 2.5 mg  2.5 mg nebulization q4h PRN Irma Phoenix, PharmD        apixaban (Eliquis) tablet 5 mg  5 mg oral BID Alejandra Moore, DO   5 mg at 01/25/25 0809    atorvastatin (Lipitor) tablet 10 mg  10 mg oral Daily Alejandra Polka, DO   10 mg at 01/25/25 0809    dextrose 50 % injection 12.5 g  12.5 g intravenous q15 min PRN Alejandra Moore, DO        dextrose 50 % injection 25 g  25 g intravenous q15 min PRN Alejandra Moore, DO        docusate sodium (Colace) capsule 100 mg  100 mg oral Daily Alejandra Moore, DO   100 mg at 01/25/25 1335    glucagon (Glucagen) injection 1 mg  1 mg intramuscular q15 min PRN Alejandra Moore, DO        glucagon (Glucagen) injection 1 mg  1 mg intramuscular q15 min PRN Alejandra Moore, DO        HYDROmorphone PF (Dilaudid) injection 0.2 mg  0.2 mg intravenous q3h PRN Adrianna Castro, DO   0.2 mg at 01/25/25 0947    Or    HYDROmorphone (Dilaudid) injection 0.4 mg  0.4 mg intravenous q3h PRN Adrianna Castro, DO        insulin lispro injection 0-10 Units  0-10 Units subcutaneous TID AC Alejandra Moore DO        ipratropium (Atrovent) 0.02 % nebulizer solution 0.5 mg  0.5 mg nebulization TID Adrianna Castro, DO        levothyroxine (Synthroid, Levoxyl) tablet 100 mcg  100 mcg oral Daily Alejandra Moore, DO   100 mcg at 01/25/25 0536    losartan (Cozaar) tablet 50 mg  50 mg oral Daily Alejandra Polka, DO   50 mg at 01/25/25 0809    melatonin tablet 5 mg  5 mg oral Nightly Alejandra Moore, DO        metoprolol tartrate (Lopressor) tablet 75 mg  75 mg oral BID Alejandra Polka, DO   75 mg at 01/25/25 0809    montelukast (Singulair) tablet 10 mg  10 mg oral Nightly Alejandra Moore DO   10 mg at 01/24/25 2015    pantoprazole (ProtoNix) EC tablet 40 mg  40 mg oral BID AC Alejandra Moore DO   40 mg at 01/25/25 0608     piperacillin-tazobactam (Zosyn) 3.375 g in dextrose (iso) IV 50 mL  3.375 g intravenous q8h Alejandra Hruska, DO 0 mL/hr at 01/25/25 0827 3.375 g at 01/25/25 1335    polyethylene glycol (Glycolax, Miralax) packet 17 g  17 g oral Daily PRN Alejandra Hruska, DO        traZODone (Desyrel) tablet 50 mg  50 mg oral Nightly PRN Alejandra Hruska, DO   50 mg at 01/25/25 0152      PHYSICAL EXAM:  Awake Alert   Neck: No JVD  Cardiac:  S1 S2  Resp: Decreased BSs   Ext: No peripheral edema    This clinical note has been produced using speech recognition software and may contain errors related to that system including grammar, punctuation, spelling, gender and words and phrases that may be inappropriate. Some errors were not noted in checking the note before saving.     SIGNATURE: Clement Stevens MD MD [facility]  OFFICE: [phone]

## 2025-01-25 NOTE — PROGRESS NOTES
"ID:  Myrna Rodrigues is a 74 y.o. female on hospital day 1 of admission presenting with SVT (supraventricular tachycardia) (CMS-[facility]).    Subjective   The patient seen and examined this morning at bedside. No overnight events reported, but she did have periods of time of tachycardia noted on telemetry. She appears to have converted to NSR herself without any medical intervention. She does not routinely feel palpitations. Denies any chest pain, shortness of breath. She does express anxiety about her abdominal pain, which is her primary complaint today. She does report her pain is better controlled than it was on presentation. Denies nausea or vomiting. Complains of constipation, but this is not out of the ordinary for her. Does endorse dizziness and lightheadedness on exam this morning.       Objective     Visit Vitals  /78 (BP Location: Left arm, Patient Position: Sitting)   Pulse 76   Temp 37 °C (98.6 °F) (Temporal)   Resp 18   Ht 1.575 m (5' 2\")   Wt 64 kg (141 lb 1.5 oz)   SpO2 98%   BMI 25.81 kg/m²   OB Status Postmenopausal   Smoking Status Never   BSA 1.67 m²        Physical Examination:  General: Not in acute distress, A&O x3, alert, coopertive, well-developed  HEENT: Normocephalic, atraumatic, EOMI, moist mucous membranes  Neck: Neck supple, trachea midline, no evidence of trauma  Cardiovascular: Heart rate regular on exam this morning, rhythm regular on exam  Respiratory: Vesicular breath sounds appreciated bilaterally, no adventitious sounds appreciated, no increased work of breathing  GI: Abdomen soft with some fullness and firmness noted in the RLQ and suprapubic region (improved compared to yesterday), nondistended, tender to palpation in RLQ, bowel sounds present  Extremities: No edema appreciated in lower extremities bilaterally, no cyanosis  Neuro: A&O x3, no focal deficits, strength and sensation intact bilaterally  Skin: Warm and dry, without lesions or rashes  Psychiatric: Judgment intact.  " Appropriate mood, affect and behavior.      Laboratory Data:  Results for orders placed or performed during the hospital encounter of 01/23/25 (from the past 24 hours)   POCT GLUCOSE   Result Value Ref Range    POCT Glucose 87 74 - 99 mg/dL   Transthoracic Echo (TTE) Complete   Result Value Ref Range    AV pk kosta 1.42 m/s    LV Biplane EF 71 %    LVOT diam 1.80 cm    MV E/A ratio 1.00     MV avg E/e' ratio 13.00     Tricuspid annular plane systolic excursion 2.2 cm    LA vol index A/L 32.8 ml/m2    LV EF 71 %    RV free wall pk S' 14.00 cm/s    LVIDd 5.63 cm    Aortic Valve Area by Continuity of Peak Velocity 1.88 cm2    AV pk grad 8 mmHg    LV A4C EF 69.4    POCT GLUCOSE   Result Value Ref Range    POCT Glucose 110 (H) 74 - 99 mg/dL   Electrocardiogram, 12-lead PRN ACS symptoms   Result Value Ref Range    Ventricular Rate 141 BPM    Atrial Rate 105 BPM    QRS Duration 84 ms    QT Interval 330 ms    QTC Calculation(Bazett) 505 ms    R Axis -66 degrees    T Axis 57 degrees    QRS Count 23 beats    Q Onset 216 ms    T Offset 381 ms    QTC Fredericia 438 ms   POCT GLUCOSE   Result Value Ref Range    POCT Glucose 111 (H) 74 - 99 mg/dL   POCT GLUCOSE   Result Value Ref Range    POCT Glucose 110 (H) 74 - 99 mg/dL   CBC and Auto Differential   Result Value Ref Range    WBC 10.8 4.4 - 11.3 x10*3/uL    nRBC 0.0 0.0 - 0.0 /100 WBCs    RBC 4.48 4.00 - 5.20 x10*6/uL    Hemoglobin 9.4 (L) 12.0 - 16.0 g/dL    Hematocrit 32.9 (L) 36.0 - 46.0 %    MCV 73 (L) 80 - 100 fL    MCH 21.0 (L) 26.0 - 34.0 pg    MCHC 28.6 (L) 32.0 - 36.0 g/dL    RDW 18.6 (H) 11.5 - 14.5 %    Platelets 245 150 - 450 x10*3/uL    Neutrophils % 68.2 40.0 - 80.0 %    Immature Granulocytes %, Automated 0.3 0.0 - 0.9 %    Lymphocytes % 18.7 13.0 - 44.0 %    Monocytes % 7.9 2.0 - 10.0 %    Eosinophils % 4.4 0.0 - 6.0 %    Basophils % 0.5 0.0 - 2.0 %    Neutrophils Absolute 7.35 (H) 1.60 - 5.50 x10*3/uL    Immature Granulocytes Absolute, Automated 0.03 0.00 - 0.50  x10*3/uL    Lymphocytes Absolute 2.01 0.80 - 3.00 x10*3/uL    Monocytes Absolute 0.85 (H) 0.05 - 0.80 x10*3/uL    Eosinophils Absolute 0.47 (H) 0.00 - 0.40 x10*3/uL    Basophils Absolute 0.05 0.00 - 0.10 x10*3/uL   Renal function panel   Result Value Ref Range    Glucose 100 (H) 74 - 99 mg/dL    Sodium 136 136 - 145 mmol/L    Potassium 4.2 3.5 - 5.3 mmol/L    Chloride 101 98 - 107 mmol/L    Bicarbonate 27 21 - 32 mmol/L    Anion Gap 12 10 - 20 mmol/L    Urea Nitrogen 8 6 - 23 mg/dL    Creatinine 0.82 0.50 - 1.05 mg/dL    eGFR 74 >60 mL/min/1.73m*2    Calcium 8.6 8.6 - 10.3 mg/dL    Phosphorus 3.6 2.5 - 4.9 mg/dL    Albumin 3.2 (L) 3.4 - 5.0 g/dL   Magnesium   Result Value Ref Range    Magnesium 2.14 1.60 - 2.40 mg/dL   POCT GLUCOSE   Result Value Ref Range    POCT Glucose 107 (H) 74 - 99 mg/dL     *Note: Due to a large number of results and/or encounters for the requested time period, some results have not been displayed. A complete set of results can be found in Results Review.       Imaging:  Electrocardiogram, 12-lead PRN ACS symptoms    Result Date: 1/25/2025  Supraventricular tachycardia with occasional Premature ventricular complexes Left axis deviation Pulmonary disease pattern Possible Inferior infarct , age undetermined Abnormal ECG When compared with ECG of 23-JAN-2025 21:55, (unconfirmed) Premature ventricular complexes are now Present Vent. rate has increased BY  47 BPM Nonspecific T wave abnormality no longer evident in Anterior leads    Transthoracic Echo (TTE) Complete    Result Date: 1/24/2025            [facility] [address], [facility] [address]    Tel [phone] Fax [phone] TRANSTHORACIC ECHOCARDIOGRAM REPORT Patient Name:       LISSETTE ZAVALA         Reading Physician:    [id number] Clement Stevens MD Study Date:         1/24/2025            Ordering Provider:    [id number] TIGIST BOB                                                                 MASSIMO MRN/PID:            [id number]             Fellow: Accession#:         [id number]         Nurse: Date of Birth/Age:  1950 / 74      Sonographer:          Flakita Quarles RDCS                     years Gender Assigned at  F                    Additional Staff: Birth: Height:             157.48 cm            Admit Date:           1/23/2025 Weight:             63.96 kg             Admission Status:     Inpatient -                                                                Priority                                                                discharge BSA / BMI:          1.65 m2 / 25.79      Department Location:  [facility] [place]                     kg/m2 Blood Pressure: 168 /70 mmHg Study Type:    TRANSTHORACIC ECHO (TTE) COMPLETE Diagnosis/ICD: Dyspnea, unspecified-R06.00 Indication:    Dyspnea CPT Codes:     Echo Complete w Full [id number] Patient History: Diabetes:          Yes Pertinent History: Dyspnea, Palpitations, CAD, A-Fib, HTN, Hyperlipidemia and                    Previous SVT. GERD; previous echocardiogram 12-. Study Detail: The following Echo studies were performed: M-Mode, 2D, Doppler and               color flow.  PHYSICIAN INTERPRETATION: Left Ventricle: Left ventricular ejection fraction is normal, calculated by Rangel's biplane at 71%. There is mild left ventricular hypertrophy. There are no regional wall motion abnormalities. The left ventricular cavity size is normal. Spectral Doppler shows a Grade I (impaired relaxation pattern) of left ventricular diastolic filling with normal left atrial filling pressure. Left Atrium: The left atrium is mild to moderately dilated. Right Ventricle: The right ventricle is upper limits of normal in size. There is normal right ventricular global systolic function. Right Atrium: The right atrium is mildly dilated. Aortic Valve: The aortic valve appears structurally normal. There is mild aortic valve cusp calcification.  There is mild to moderate aortic valve regurgitation. The peak instantaneous gradient of the aortic valve is 8 mmHg. Mitral Valve: The mitral valve is normal in structure. The peak instantaneous gradient of the mitral valve is 5 mmHg. There is mild mitral valve regurgitation. Tricuspid Valve: The tricuspid valve is structurally normal. There is mild tricuspid regurgitation. Pulmonic Valve: The pulmonic valve was not assessed. There is trace to mild pulmonic valve regurgitation. Pericardium: No pericardial effusion noted. Aorta: The aortic root is normal.  CONCLUSIONS:  1. Left ventricular ejection fraction is normal, calculated by Rangel's biplane at 71%.  2. Spectral Doppler shows a Grade I (impaired relaxation pattern) of left ventricular diastolic filling with normal left atrial filling pressure.  3. There is normal right ventricular global systolic function.  4. The left atrium is mild to moderately dilated.  5. Mild to moderate aortic valve regurgitation. QUANTITATIVE DATA SUMMARY:  2D MEASUREMENTS:            Normal Ranges: LAs:             4.50 cm    (2.7-4.0cm) IVSd:            0.86 cm    (0.6-1.1cm) LVPWd:           1.06 cm    (0.6-1.1cm) LVIDd:           5.63 cm    (3.9-5.9cm) LVIDs:           4.00 cm LV Mass Index:   127.2 g/m2 LV % FS          29.0 %  LA VOLUME:                    Normal Ranges: LA Vol A4C:        55.9 ml    (22+/-6mL/m2) LA Vol A2C:        50.6 ml LA Vol BP:         54.1 ml LA Vol Index A4C:  34.0ml/m2 LA Vol Index A2C:  30.7 ml/m2 LA Vol Index BP:   32.8 ml/m2 LA Area A4C:       20.2 cm2 LA Area A2C:       18.9 cm2 LA Major Axis A4C: 6.2 cm LA Major Axis A2C: 6.0 cm LA Volume Index:   30.9 ml/m2 LA Vol A4C:        53.0 ml LA Vol A2C:        48.0 ml LA Vol Index BSA:  30.7 ml/m2  M-MODE MEASUREMENTS:         Normal Ranges: Ao Root:             3.20 cm (2.0-3.7cm)  AORTA MEASUREMENTS:         Normal Ranges: Ao Sinus, d:        3.00 cm (2.1-3.5cm) Ao STJ, d:          2.60 cm (1.7-3.4cm)  Asc Ao, d:          3.30 cm (2.1-3.4cm)  LV SYSTOLIC FUNCTION BY 2D PLANIMETRY (MOD):                      Normal Ranges: EF-A4C View:    69 % (>=55%) EF-A2C View:    75 % EF-Biplane:     71 % LV EF Reported: 71 %  LV DIASTOLIC FUNCTION:             Normal Ranges: MV Peak E:             1.10 m/s    (0.7-1.2 m/s) MV Peak A:             1.10 m/s    (0.42-0.7 m/s) E/A Ratio:             1.00        (1.0-2.2) MV e'                  0.080 m/s   (>8.0) MV lateral e'          0.08 m/s MV medial e'           0.08 m/s E/e' Ratio:            13.76       (<8.0) PulmV Sys Elieser:         49.90 cm/s PulmV Flores Elieser:        35.50 cm/s PulmV S/D Elieser:         1.40 PulmV A Revs Elieser:      26.70 cm/s PulmV A Revs Dur:      148.00 msec  MITRAL VALVE:          Normal Ranges: MV Vmax:      1.12 m/s (<=1.3m/s) MV peak P.0 mmHg (<5mmHg) MV mean P.0 mmHg (<48mmHg) MV DT:        190 msec (150-240msec)  AORTIC VALVE:           Normal Ranges: AoV Vmax:      1.42 m/s (<=1.7m/s) AoV Peak P.1 mmHg (<20mmHg) LVOT Max Elieser:  1.05 m/s (<=1.1m/s) LVOT VTI:      27.40 cm LVOT Diameter: 1.80 cm  (1.8-2.4cm) AoV Area,Vmax: 1.88 cm2 (2.5-4.5cm2)  RIGHT VENTRICLE: RV Basal 2.80 cm RV Mid   2.70 cm RV Major 7.0 cm TAPSE:   22.0 mm RV s'    0.14 m/s  PULMONIC VALVE:          Normal Ranges: PV Accel Time:  95 msec  (>120ms) PV Max Elieser:     0.9 m/s  (0.6-0.9m/s) PV Max P.9 mmHg  Pulmonary Veins: PulmV A Revs Dur: 148.00 msec PulmV A Revs Elieser: 26.70 cm/s PulmV Flores Elieser:   35.50 cm/s PulmV S/D Elieser:    1.40 PulmV Sys Elieser:    49.90 cm/s  [id number] Clement Stevens MD Electronically signed on 2025 at 1:58:10 PM  ** Final **     ECG 12 lead    Result Date: 2025  Supraventricular tachycardia Left axis deviation Anterior infarct (cited on or before 2025) Abnormal ECG When compared with ECG of 2025 06:56, UT interval has decreased Vent. rate has increased BY  90 BPM    ECG 12 lead    Result Date: 2025  Normal sinus rhythm  Left axis deviation Poor R-wave progression ; consider anterior infarct, lead placement, or normal variant Abnormal ECG When compared with ECG of 23-JAN-2025 20:48, (unconfirmed) Vent. rate has decreased BY  59 BPM    CT abdomen pelvis wo IV contrast    Result Date: 1/24/2025  Interpreted By:  Gerard Maxwell, STUDY: CT ABDOMEN PELVIS WO IV CONTRAST;  1/24/2025 3:25 am   INDICATION: Signs/Symptoms:kidney stones. History of overactive bladder with continence control stimulator insert in 2020. Symptoms recurred in 2023 with urge incontinence and underwent stimulator revision on 10/14/2024.     COMPARISON: 01/09/2025   ACCESSION NUMBER(S): [id number]   ORDERING CLINICIAN: TIGIST KEYS   TECHNIQUE: Contiguous axial images of the abdomen and pelvis were obtained without intravenous contrast. Coronal and sagittal reformatted images were reconstructed from the axial data.   FINDINGS: Note: The absence of intravenous contrast limits evaluation of the solid organs and vasculature.   LOWER CHEST: No acute abnormality.     ABDOMEN/PELVIS:   ABDOMINAL WALL: There is a bladder control device in the right buttock with a single lead coursing through the right S3 neural foramen.   LIVER: The liver demonstrates a normal noncontrast attenuation.   BILE DUCTS: Prominent intrahepatic and extrahepatic bile ducts are likely secondary to post-cholecystectomy status.   GALLBLADDER: Surgically absent.   PANCREAS: No significant abnormality.   SPLEEN: No significant abnormality.   ADRENALS: No significant abnormality.   KIDNEYS, URETERS, BLADDER: Non-obstructing 0.2 cm right renal calculus. No left renal calculus. No hydronephrosis. Urinary bladder wall is inflamed with perivesical fat stranding similar to prior study. There is thickening and inflammation of the distal right ureter. There is eccentric wall thickening adjacent to the right uterovesical junction which was seen on prior study as well. There are multiple bladder calculi  measuring 0.8 cm and 1.3 cm.   REPRODUCTIVE ORGANS: Surgically absent uterus. There is a simple left ovarian cyst measuring 3.4 cm, slightly smaller compared to prior study.   VESSELS: Mild aortic atherosclerosis without AAA.   RETROPERITONEUM/LYMPH NODES: There is progression of now extensive inflammatory fat stranding within multiple retroperitoneal compartments. This is most severe on the right pelvic sidewall, extends into the right femoral canal, involves the presacral space, and extends superiorly to the aortoiliac bifurcation and along the aorta at the level of the renal veins. This stranding is accompanied by diffuse lymphadenopathy which is also progressed in degree and extent compared to prior study. For example, a 1.3 cm right common iliac node previously measured 0.9 cm. A 1.6 cm right common iliac node previously measured 0.7 cm, 1.3 cm by common iliac ovaries noted 0.8 cm, 2.1 cm by external iliac node previously measured 0.9 cm, 2 cm right external iliac lymph node previously measured 1.6 cm amongst others. There are no enlarged left external iliac lymph nodes. There is only a 0.7 cm left common iliac node that is unchanged. There are also newly enlarged aortocaval and periaortic lymph nodes predominantly measuring between 0.5 cm and 1 cm.   BOWEL/PERITONEUM: There is colonic diverticulosis. No inflammatory bowel wall thickening or dilatation. Normal appendix.   No ascites, free air, or fluid collection.     MUSCULOSKELETAL: No acute osseous abnormality. No suspicious osseous lesion. Severe L4-L5 disc height loss with grade 1 degenerative anterolisthesis of L4 on L5 and moderate bilateral facet arthropathy. There is not severe central canal stenosis at L4-L5 and severe bilateral L4-5 foraminal stenosis. There are decompressive laminectomies at L2-L4.       1. Significant progression of severe inflammatory changes in the right pelvic sidewall, presacral space, right femoral canal and extending  superiorly along the aorta and IVC to the level of the renal veins accompanied by diffuse lymphadenopathy which is also significantly progressed as detailed above. This inflammatory perinodal stranding favors an infectious or inflammatory etiology, although this the extent and degree of change is unusual for typical bladder infection. Recommend timely urological follow-up for further investigation. Correlation with urinalysis recommended.   2. Redemonstration of diffusely thickened inflamed bladder wall with diffuse thickening of the distal right ureter and multiple intraluminal bladder calculi may service continued nidus of continued infection.   3. No hydronephrosis. Nonobstructing 0.2 cm right renal calculus.   MACRO: None.   Signed by: Gerard Maxwell 1/24/2025 3:41 AM Dictation workstation:   [id number]      Medications:  Scheduled medications  apixaban, 5 mg, oral, BID  atorvastatin, 10 mg, oral, Daily  docusate sodium, 100 mg, oral, Daily  insulin lispro, 0-10 Units, subcutaneous, TID AC  ipratropium, 0.5 mg, nebulization, TID  levothyroxine, 100 mcg, oral, Daily  losartan, 50 mg, oral, Daily  melatonin, 5 mg, oral, Nightly  metoprolol tartrate, 75 mg, oral, BID  montelukast, 10 mg, oral, Nightly  pantoprazole, 40 mg, oral, BID AC  piperacillin-tazobactam, 3.375 g, intravenous, q8h      Continuous medications     PRN medications  PRN medications: acetaminophen, albuterol, dextrose, dextrose, glucagon, glucagon, HYDROmorphone **OR** HYDROmorphone, polyethylene glycol, traZODone    Assessment    Assessment & Plan   Ms. Myrna Rodrigues is a 74 y.o. female who presents with past medical history of afib s/p ablation on Eliquis, HTN, CAD, HLD, GERD, DM, SVT and hypothyroidism presented to the ED with right lower quadrant pain and palpitations.  Through chart review patient has been seen for this similar complaint several times over the last 6 weeks she has not been able to complete the necessary follow-up needed to  resolve both issues.  She had did not complete her outpatient antibiotic regimen for her cystitis.  She also has not followed up with cardiology to have a stress test, echo, or Holter monitor.  Considering she has not been out of the hospital long enough to have these outpatient appointments made it will be beneficial to perform as much of the evaluation as possible while inpatient for her to avoid readmission.  She reports decreased appetite and weakness patient would benefit from a PT OT evaluation and likely placement again to avoid readmission.     Assessment & Plan  SVT (supraventricular tachycardia) (CMS-HCC)       # Complicated UTI with severe inflammatory changes on imaging  # Retroperitoneal/pelvic lymphadenopathy  # Bladder stones  Patient presents with ongoing abdominal pain, most notably in the suprapubic region as well as right lower quadrant.  Has had multiple hospital admissions recently with similar complaints.  Given the progressive nature of patient's imaging findings (comparing CTs from 1/9 and 1/24) there is concern for an ongoing inflammatory versus infectious process, or potentially a malignant process as well.  -Patient did have 2 UAs collected, 1 at PCP visit on 1/23 and an additional 1 on presentation the ED, early morning on 1/24.  --Initial POCT UA showed pink urine with trace protein, trace blood, trace ketones, 1+ bilirubin and trace leukocytes as well as positive nitrites.  That urine culture was positive for enteric bacilli.  --Subsequent UA collected in the ED was largely unremarkable, with only 2050 leukocyte esterase noted.  That urine culture did not have any clinically significant growth.  -CRP elevated, ESR within normal limits  -Urology consulted, appreciate the recommendations - recommend intervention to further evaluate pelvic process, tentative plan for patient's urologist, Dr. Thurman, to perform procedure middle of next week  -Patient will need Eliquis held 48 hours prior to  procedure  -Continue Zosyn in the setting of complicated UTI  -ID has been consulted for further management of complicated UTI and ongoing pelvic inflammatory versus infectious process, appreciate recommendations  -Multimodal pain regimen    # SVT  # Type II MI  # H/O A-fib s/p ablation on Eliquis  # h/o CAD  Patient presented to [facility] with SVT, initial EKG with .  Subsequent EKG showed normal sinus rhythm with a MI of 94 demonstration of adenosine.  Has had hospitalizations in the past with a similar presentation, has not yet followed up with cardiology on an outpatient basis for further workup.  -Suspect that intermittent tachycardia is likely secondary to underlying infectious versus inflammatory process in the pelvis, would caution any intentional stress on her heart until further urologic workup has been completed  -Troponin 156 -> 151, likely type II MI in the setting of infectious vs inflammatory pelvic process. No ischemic changes noted on EKG.  -Echo performed 1/24, LVEF 71%, grade 1 pattern of left ventricular diastolic filling  -Cardiology has been consulted, appreciate recommendations  -Patient will need discharged with Holter monitor, will require outpatient stress testing once acute process has improved  -Continue home Eliquis while inpatient (will need to be held 48 hours prior to urologic intervention)  -Monitor on telemetry   -Optimize electrolytes, goal K>4, Mg>2     # Hypothyroidism  Patient does have history of hypothyroidism, with dose adjustment within the last year. Previously on every other day 100mcg and 50 mcg dosing, recently increased to 100 mcg daily dosing on 1/10/25.   -TSH on presentation was elevated, recovering. Free T4 mildly elevated.  -Can consider repeat TFTs in 4 weeks once acute illness has resolved.   -Continue home regimen at this time     # Type 2 Diabetes Mellitus  Patient does have a history of non-insulin dependent diabetes mellitus.  Home medications include  metformin.  Hemoglobin A1c was checked on 1/2/2025, 5.9.  -SSI #2  -POCT  -Hypoglycemia protocol     Chronic problems:  #HTN  #HLD  #GERD  -continue home meds as appropriate when med rec complete    Global Plan of Care  IVF: None  Diet: Cardiac  DVT prophylaxis: Home Eliquis, SCDs  Dispo: Patient will require inpatient urologic procedure after resolution of UTI, likely middle of next week  Consults: Urology, ID, cardiology, PT/OT  Code Status: Full code    Patient seen and plan of care was discussed with senior resident and the attending.  Signature: Alejandra Moore DO  Internal Medicine PGY-1 Resident  Date: January 25, 2025

## 2025-01-25 NOTE — HOSPITAL COURSE
Myrna Rodrigues is a 74-year-old female presented to [facility] ED on 1/23 with primary complaint of palpitations and abdominal pain.  Patient has been in and out of the hospital for the past month with similar complaints, and has not been able to complete outpatient workup secondary to continued symptoms.  On presentation to the ED, patient was tachycardic and hypertensive but otherwise hemodynamically stable.  Labs were largely within normal limits, but an elevated troponin which was flat.  Initial EKG showed SVT, but that the patient did convert to NSR after dose of adenosine.  Cardiology was consulted in the emergency department who recommended admission for further cardiac workup.    On admission, patient's primary complaint was abdominal pain.  CT scan was obtained which showed progressive worsening of inflammatory changes in the pelvis, concerning for an acute process.  Given this presentation and her continued symptoms, it was felt that this was likely driving her tachycardia.  Urology was consulted, who did recommend intervention. ***    Cardiology was consulted, and echocardiogram was performed.  Stress testing was deferred given patient's continued abdominal pain.   secondary to anticoagulation discontinuation.  Patient next day returning to her normal mentation with no focal neurological deficits.  Given improved right lower quadrant pain patient was fit for discharge with cystoscopy to be done as an outpatient.  Per neurology recommendation Eliquis should be continued till before the cystoscopy with bridging with other anticoagulant before the procedure.  Neurology also recommended delaying the procedure if not urgent 2 to 4 weeks and follow-up as an outpatient.  Urology team was agreeable to that.  She requested to be sent to facility.  She was accepted at [facility].

## 2025-01-25 NOTE — CARE PLAN
Reason for call:  Patient reporting a symptom  Symptom or request: bitten by bed bug  Duration (how long have symptoms been present): 3 days  Have you been treated for this before? No  Additional comments: please call patient  Phone Number patient can be reached at:  Home number on file [phone] (home)  Best Time:  any  Can we leave a detailed message on this number:  YES  Call taken on 4/28/2017 at 11:37 AM by SINDY MENJIVAR   The patient's goals for the shift include      The clinical goals for the shift include Patient's pain will be well controlled.      Problem: Pain - Adult  Goal: Verbalizes/displays adequate comfort level or baseline comfort level  Outcome: Progressing     Problem: Safety - Adult  Goal: Free from fall injury  Outcome: Progressing     Problem: Discharge Planning  Goal: Discharge to home or other facility with appropriate resources  Outcome: Progressing     Problem: Chronic Conditions and Co-morbidities  Goal: Patient's chronic conditions and co-morbidity symptoms are monitored and maintained or improved  Outcome: Progressing     Problem: Nutrition  Goal: Nutrient intake appropriate for maintaining nutritional needs  Outcome: Progressing     Problem: Diabetes  Goal: Maintain glucose levels >70mg/dl to <250mg/dl throughout shift  Outcome: Progressing     Problem: Pain  Goal: Turns in bed with improved pain control throughout the shift  Outcome: Progressing  Goal: Performs ADL's with improved pain control throughout shift  Outcome: Progressing     [facility]; transferred care.

## 2025-01-26 LAB
ALBUMIN SERPL BCP-MCNC: 3.1 G/DL (ref 3.4–5)
ANION GAP SERPL CALC-SCNC: 13 MMOL/L (ref 10–20)
BACTERIA UR CULT: ABNORMAL
BASOPHILS # BLD AUTO: 0.04 X10*3/UL (ref 0–0.1)
BASOPHILS NFR BLD AUTO: 0.4 %
BUN SERPL-MCNC: 14 MG/DL (ref 6–23)
CALCIUM SERPL-MCNC: 8.5 MG/DL (ref 8.6–10.3)
CHLORIDE SERPL-SCNC: 100 MMOL/L (ref 98–107)
CO2 SERPL-SCNC: 27 MMOL/L (ref 21–32)
CREAT SERPL-MCNC: 0.92 MG/DL (ref 0.5–1.05)
EGFRCR SERPLBLD CKD-EPI 2021: 65 ML/MIN/1.73M*2
EOSINOPHIL # BLD AUTO: 0.39 X10*3/UL (ref 0–0.4)
EOSINOPHIL NFR BLD AUTO: 3.5 %
ERYTHROCYTE [DISTWIDTH] IN BLOOD BY AUTOMATED COUNT: 18.4 % (ref 11.5–14.5)
GLUCOSE BLD MANUAL STRIP-MCNC: 113 MG/DL (ref 74–99)
GLUCOSE BLD MANUAL STRIP-MCNC: 167 MG/DL (ref 74–99)
GLUCOSE BLD MANUAL STRIP-MCNC: 172 MG/DL (ref 74–99)
GLUCOSE BLD MANUAL STRIP-MCNC: 83 MG/DL (ref 74–99)
GLUCOSE SERPL-MCNC: 101 MG/DL (ref 74–99)
HCT VFR BLD AUTO: 30.9 % (ref 36–46)
HGB BLD-MCNC: 8.9 G/DL (ref 12–16)
IMM GRANULOCYTES # BLD AUTO: 0.04 X10*3/UL (ref 0–0.5)
IMM GRANULOCYTES NFR BLD AUTO: 0.4 % (ref 0–0.9)
LYMPHOCYTES # BLD AUTO: 2.36 X10*3/UL (ref 0.8–3)
LYMPHOCYTES NFR BLD AUTO: 21.2 %
MAGNESIUM SERPL-MCNC: 2.17 MG/DL (ref 1.6–2.4)
MCH RBC QN AUTO: 20.7 PG (ref 26–34)
MCHC RBC AUTO-ENTMCNC: 28.8 G/DL (ref 32–36)
MCV RBC AUTO: 72 FL (ref 80–100)
MONOCYTES # BLD AUTO: 1.06 X10*3/UL (ref 0.05–0.8)
MONOCYTES NFR BLD AUTO: 9.5 %
NEUTROPHILS # BLD AUTO: 7.23 X10*3/UL (ref 1.6–5.5)
NEUTROPHILS NFR BLD AUTO: 65 %
NRBC BLD-RTO: 0 /100 WBCS (ref 0–0)
PHOSPHATE SERPL-MCNC: 4.5 MG/DL (ref 2.5–4.9)
PLATELET # BLD AUTO: 254 X10*3/UL (ref 150–450)
POTASSIUM SERPL-SCNC: 3.5 MMOL/L (ref 3.5–5.3)
RBC # BLD AUTO: 4.3 X10*6/UL (ref 4–5.2)
SODIUM SERPL-SCNC: 136 MMOL/L (ref 136–145)
WBC # BLD AUTO: 11.1 X10*3/UL (ref 4.4–11.3)

## 2025-01-26 PROCEDURE — 2060000001 HC INTERMEDIATE ICU ROOM DAILY

## 2025-01-26 PROCEDURE — 2500000004 HC RX 250 GENERAL PHARMACY W/ HCPCS (ALT 636 FOR OP/ED): Mod: JZ

## 2025-01-26 PROCEDURE — 2500000002 HC RX 250 W HCPCS SELF ADMINISTERED DRUGS (ALT 637 FOR MEDICARE OP, ALT 636 FOR OP/ED)

## 2025-01-26 PROCEDURE — 2500000004 HC RX 250 GENERAL PHARMACY W/ HCPCS (ALT 636 FOR OP/ED): Performed by: STUDENT IN AN ORGANIZED HEALTH CARE EDUCATION/TRAINING PROGRAM

## 2025-01-26 PROCEDURE — 94640 AIRWAY INHALATION TREATMENT: CPT

## 2025-01-26 PROCEDURE — 2500000001 HC RX 250 WO HCPCS SELF ADMINISTERED DRUGS (ALT 637 FOR MEDICARE OP)

## 2025-01-26 PROCEDURE — 85025 COMPLETE CBC W/AUTO DIFF WBC: CPT

## 2025-01-26 PROCEDURE — 83735 ASSAY OF MAGNESIUM: CPT

## 2025-01-26 PROCEDURE — 2500000002 HC RX 250 W HCPCS SELF ADMINISTERED DRUGS (ALT 637 FOR MEDICARE OP, ALT 636 FOR OP/ED): Performed by: STUDENT IN AN ORGANIZED HEALTH CARE EDUCATION/TRAINING PROGRAM

## 2025-01-26 PROCEDURE — 2500000004 HC RX 250 GENERAL PHARMACY W/ HCPCS (ALT 636 FOR OP/ED)

## 2025-01-26 PROCEDURE — 99232 SBSQ HOSP IP/OBS MODERATE 35: CPT | Performed by: STUDENT IN AN ORGANIZED HEALTH CARE EDUCATION/TRAINING PROGRAM

## 2025-01-26 PROCEDURE — 82947 ASSAY GLUCOSE BLOOD QUANT: CPT

## 2025-01-26 PROCEDURE — 80069 RENAL FUNCTION PANEL: CPT

## 2025-01-26 PROCEDURE — 36415 COLL VENOUS BLD VENIPUNCTURE: CPT

## 2025-01-26 RX ORDER — POTASSIUM CHLORIDE 20 MEQ/1
40 TABLET, EXTENDED RELEASE ORAL ONCE
Status: COMPLETED | OUTPATIENT
Start: 2025-01-26 | End: 2025-01-26

## 2025-01-26 RX ORDER — OXYCODONE HYDROCHLORIDE 5 MG/1
5 TABLET ORAL EVERY 6 HOURS PRN
Status: DISCONTINUED | OUTPATIENT
Start: 2025-01-26 | End: 2025-01-26

## 2025-01-26 RX ORDER — OXYCODONE HYDROCHLORIDE 5 MG/1
5 TABLET ORAL EVERY 4 HOURS PRN
Status: DISCONTINUED | OUTPATIENT
Start: 2025-01-26 | End: 2025-02-02 | Stop reason: HOSPADM

## 2025-01-26 RX ORDER — HYDROMORPHONE HYDROCHLORIDE 0.2 MG/ML
0.2 INJECTION INTRAMUSCULAR; INTRAVENOUS; SUBCUTANEOUS
Status: DISCONTINUED | OUTPATIENT
Start: 2025-01-26 | End: 2025-01-27

## 2025-01-26 RX ADMIN — INSULIN LISPRO 2 UNITS: 100 INJECTION, SOLUTION INTRAVENOUS; SUBCUTANEOUS at 13:34

## 2025-01-26 RX ADMIN — MONTELUKAST 10 MG: 10 TABLET, FILM COATED ORAL at 20:06

## 2025-01-26 RX ADMIN — LEVOTHYROXINE SODIUM 100 MCG: 0.1 TABLET ORAL at 05:01

## 2025-01-26 RX ADMIN — METOPROLOL TARTRATE 75 MG: 50 TABLET, FILM COATED ORAL at 20:06

## 2025-01-26 RX ADMIN — APIXABAN 5 MG: 5 TABLET, FILM COATED ORAL at 20:06

## 2025-01-26 RX ADMIN — APIXABAN 5 MG: 5 TABLET, FILM COATED ORAL at 09:15

## 2025-01-26 RX ADMIN — PIPERACILLIN SODIUM AND TAZOBACTAM SODIUM 3.38 G: 3; .375 INJECTION, SOLUTION INTRAVENOUS at 04:20

## 2025-01-26 RX ADMIN — OXYCODONE HYDROCHLORIDE 5 MG: 5 TABLET ORAL at 18:24

## 2025-01-26 RX ADMIN — PANTOPRAZOLE SODIUM 40 MG: 40 TABLET, DELAYED RELEASE ORAL at 18:24

## 2025-01-26 RX ADMIN — IPRATROPIUM BROMIDE 0.5 MG: 0.5 SOLUTION RESPIRATORY (INHALATION) at 14:37

## 2025-01-26 RX ADMIN — HYDROMORPHONE HYDROCHLORIDE 0.4 MG: 1 INJECTION, SOLUTION INTRAMUSCULAR; INTRAVENOUS; SUBCUTANEOUS at 05:36

## 2025-01-26 RX ADMIN — POTASSIUM CHLORIDE 40 MEQ: 1500 TABLET, EXTENDED RELEASE ORAL at 09:15

## 2025-01-26 RX ADMIN — OXYCODONE HYDROCHLORIDE 5 MG: 5 TABLET ORAL at 13:34

## 2025-01-26 RX ADMIN — METOPROLOL TARTRATE 75 MG: 50 TABLET, FILM COATED ORAL at 09:15

## 2025-01-26 RX ADMIN — HYDROMORPHONE HYDROCHLORIDE 0.2 MG: 0.2 INJECTION, SOLUTION INTRAMUSCULAR; INTRAVENOUS; SUBCUTANEOUS at 19:08

## 2025-01-26 RX ADMIN — HYDROMORPHONE HYDROCHLORIDE 0.4 MG: 1 INJECTION, SOLUTION INTRAMUSCULAR; INTRAVENOUS; SUBCUTANEOUS at 09:09

## 2025-01-26 RX ADMIN — IPRATROPIUM BROMIDE 0.5 MG: 0.5 SOLUTION RESPIRATORY (INHALATION) at 20:54

## 2025-01-26 RX ADMIN — POTASSIUM CHLORIDE 40 MEQ: 1500 TABLET, EXTENDED RELEASE ORAL at 13:34

## 2025-01-26 RX ADMIN — HYDROMORPHONE HYDROCHLORIDE 0.2 MG: 0.2 INJECTION, SOLUTION INTRAMUSCULAR; INTRAVENOUS; SUBCUTANEOUS at 22:23

## 2025-01-26 RX ADMIN — PANTOPRAZOLE SODIUM 40 MG: 40 TABLET, DELAYED RELEASE ORAL at 05:01

## 2025-01-26 RX ADMIN — LOSARTAN POTASSIUM 50 MG: 50 TABLET, FILM COATED ORAL at 09:15

## 2025-01-26 RX ADMIN — ATORVASTATIN CALCIUM 10 MG: 10 TABLET, FILM COATED ORAL at 09:15

## 2025-01-26 RX ADMIN — DOCUSATE SODIUM 100 MG: 100 CAPSULE, LIQUID FILLED ORAL at 09:15

## 2025-01-26 RX ADMIN — PIPERACILLIN SODIUM AND TAZOBACTAM SODIUM 3.38 G: 3; .375 INJECTION, SOLUTION INTRAVENOUS at 22:24

## 2025-01-26 RX ADMIN — Medication 5 MG: at 20:06

## 2025-01-26 RX ADMIN — IPRATROPIUM BROMIDE 0.5 MG: 0.5 SOLUTION RESPIRATORY (INHALATION) at 08:19

## 2025-01-26 RX ADMIN — PIPERACILLIN SODIUM AND TAZOBACTAM SODIUM 3.38 G: 3; .375 INJECTION, SOLUTION INTRAVENOUS at 13:34

## 2025-01-26 ASSESSMENT — PAIN DESCRIPTION - LOCATION
LOCATION: ABDOMEN
LOCATION: ABDOMEN
LOCATION: PELVIS
LOCATION: ABDOMEN

## 2025-01-26 ASSESSMENT — PAIN - FUNCTIONAL ASSESSMENT
PAIN_FUNCTIONAL_ASSESSMENT: 0-10

## 2025-01-26 ASSESSMENT — PAIN SCALES - GENERAL
PAINLEVEL_OUTOF10: 0 - NO PAIN
PAINLEVEL_OUTOF10: 2
PAINLEVEL_OUTOF10: 5 - MODERATE PAIN
PAINLEVEL_OUTOF10: 7
PAINLEVEL_OUTOF10: 7
PAINLEVEL_OUTOF10: 0 - NO PAIN
PAINLEVEL_OUTOF10: 7
PAINLEVEL_OUTOF10: 9
PAINLEVEL_OUTOF10: 10 - WORST POSSIBLE PAIN
PAINLEVEL_OUTOF10: 3
PAINLEVEL_OUTOF10: 0 - NO PAIN
PAINLEVEL_OUTOF10: 9

## 2025-01-26 ASSESSMENT — PAIN DESCRIPTION - ORIENTATION
ORIENTATION: RIGHT
ORIENTATION: RIGHT

## 2025-01-26 NOTE — CARE PLAN
The patient's goals for the shift include      The clinical goals for the shift include Patient safety will be maintained.      Problem: Pain - Adult  Goal: Verbalizes/displays adequate comfort level or baseline comfort level  Outcome: Progressing     Problem: Safety - Adult  Goal: Free from fall injury  Outcome: Progressing     Problem: Discharge Planning  Goal: Discharge to home or other facility with appropriate resources  Outcome: Progressing     Problem: Chronic Conditions and Co-morbidities  Goal: Patient's chronic conditions and co-morbidity symptoms are monitored and maintained or improved  Outcome: Progressing     Problem: Nutrition  Goal: Nutrient intake appropriate for maintaining nutritional needs  Outcome: Progressing     Problem: Diabetes  Goal: Maintain glucose levels >70mg/dl to <250mg/dl throughout shift  Outcome: Progressing     Problem: Pain  Goal: Turns in bed with improved pain control throughout the shift  Outcome: Progressing  Goal: Performs ADL's with improved pain control throughout shift  Outcome: Progressing

## 2025-01-26 NOTE — SIGNIFICANT EVENT
Attending Physician Attestation  I saw and evaluated the patient.  I personally obtained the key and critical portions of the history and physical exam or was physically present for key and  critical portions performed by the resident/student. I reviewed the resident/student's documentation and discussed the patient with the resident/student. I agree with the documentation as detailed in the note unless stated otherwise in this attestation. Physical exam findings as documented in attestation.       S: pt continuing to have RLQ abdominal pain. She reports sleeping a little better last night than prior. Still feeling tired and weak. She was able to walk with assistance from nursing tech to the bathroom to brush her teeth. She denies SOB, CP     O:  Constitutional: sitting on chair,  no acute distress   Eyes: clear sclera, making eye contact   Respiratory/Thorax: CTA bilaterally, no acute respiratory distress  Cardiovascular: regular rate and rhythm  Gastrointestinal: Nondistended, soft, moderate tenderness RLQ   Musculoskeletal: no significant peripheral edema appreciated   Neurological: alert, answering questions appropriately, speech is clear and fluent   Psychological: Appropriate mood and behavior  Skin: Warm and dry     TTE 1/24: CONCLUSIONS:   1. Left ventricular ejection fraction is normal, calculated by Rangel's biplane at 71%.   2. Spectral Doppler shows a Grade I (impaired relaxation pattern) of left ventricular diastolic filling with normal left atrial filling pressure.   3. There is normal right ventricular global systolic function.   4. The left atrium is mild to moderately dilated.   5. Mild to moderate aortic valve regurgitation.     A/P:  Complicated UTI with severe inflammatory changes  Retroperitoneal lymphadenopathy  Bladder stones  CT imaging from 1/9 demonstrated bladder wall thickening with some reactive inflammatory changes, urology consult recommended at that time.  On review of  hospitalization, patient deferred urology evaluation to her outpatient urologist.  She was discharged on Macrobid on 1/14 for 3 more days.  She was readmitted to the hospital on 1/15-1/17 due to palpitations/SVT.  She was evaluated by her primary care physician on 1/23 and started on Augmentin, she took 1 dose of this before presenting to the hospital.  Repeat CT imaging performed on 1/23 with significantly worsening inflammatory changes noted to the right lower quadrant.       Presentation is concerning for infectious versus inflammatory process.  There is concern for malignancy however less consistent given the rapid progression.  Continue empiric antibiotic coverage with Zosyn (started 1/24). Urine culture 1/23 with 20-80K E coli. Urine culture 1/24 negative.  Infectious disease consult appreciated.  ESR 29, CRP 4.4 - repeat tomorrow. If fever develops, will pursue blood cultures.      Urology consult appreciated. Cystoscopy with right retrograde pyelogram, and right ureteroscopy recommended. On discussion with team, this will not be done until Wed at earliest, Dr. Thurman to assess patient on 1/28. Will hold eliquis starting 1/27 in anticipation for procedure.       SVT, resolved  Paroxysmal atrial fibrillation  Type II MI  Suspect intermittent tachycardia likely secondary to underlying infectious versus inflammatory process in the pelvis.  Would avoid stress test until acute infectious/inflammatory process is addressed.  Echocardiogram completed with results as above.  Patient denies chest pain.  EKG without acute ST or T wave changes, T wave flattening/inversion in the leads V1 to V3 appear similar to prior. No evidence of acute coronary syndrome. Cardiology consulted and following.       Hypothyroidism  Free T4: 1/12 0.96 > 1/15 1.21 > 1/23 1.38  TSH: 1/12 18.84 > 1/15 10.2 > 1/23 5.25   Patient was on levothyroxine 100 mcg 4 times a week and 50 mcg 3 times a week.  She was increased to 100 mcg daily starting  1/10.  TSH is recovering however free T4 mildly elevated.  Reluctant to make changes to home levothyroxine at this time due to acute illness.     Discussed with Dr. Moore and resident team   Discussed with pt and RN     DISPO: >48 hours       Time spent in coordination of care 45 min     Adrianna Castro DO

## 2025-01-26 NOTE — PROGRESS NOTES
"ID:  Myrna Rodrigues is a 74 y.o. female on hospital day 2 of admission presenting with SVT (supraventricular tachycardia) (CMS-HCC).    Subjective   Patient was seen and evaluated at bedside.  She denied having any lightheadedness, dizziness, fever, chest pain, or urinary symptoms.  She does endorse to having some constipation however she reported having a big bowel movement this morning.  She also reported having some shortness of breath.  She does endorse having right lower quadrant abdominal pain alternating between sharp and dull in quality which is aggravated by movement.       Objective     Visit Vitals  /64 (BP Location: Left arm, Patient Position: Lying)   Pulse 68   Temp 36.6 °C (97.9 °F) (Temporal)   Resp 18   Ht 1.575 m (5' 2\")   Wt 64 kg (141 lb 1.5 oz)   SpO2 94%   BMI 25.81 kg/m²   OB Status Postmenopausal   Smoking Status Never   BSA 1.67 m²        Physical Examination:  General: Not in acute distress, A&O x3, alert, coopertive, well-developed  HEENT: Normocephalic, atraumatic, EOMI, moist mucous membranes  Neck: Neck supple, trachea midline, no evidence of trauma  Cardiovascular: Heart rate regular on exam this morning, rhythm regular on exam  Respiratory: Vesicular breath sounds appreciated bilaterally, no adventitious sounds appreciated, no increased work of breathing  GI: Abdomen soft with some fullness and firmness noted in the RLQ and suprapubic region (improved compared to yesterday), nondistended, tender to palpation in RLQ, bowel sounds present  Extremities: No edema appreciated in lower extremities bilaterally, no cyanosis  Neuro: no focal deficits, strength and sensation intact bilaterally  Skin: Warm and dry, without lesions or rashes  Psychiatric: Judgment intact.  Appropriate mood, affect and behavior.      Laboratory Data:  Results for orders placed or performed during the hospital encounter of 01/23/25 (from the past 24 hours)   POCT GLUCOSE   Result Value Ref Range    POCT Glucose " 174 (H) 74 - 99 mg/dL   POCT GLUCOSE   Result Value Ref Range    POCT Glucose 126 (H) 74 - 99 mg/dL   CBC and Auto Differential   Result Value Ref Range    WBC 11.1 4.4 - 11.3 x10*3/uL    nRBC 0.0 0.0 - 0.0 /100 WBCs    RBC 4.30 4.00 - 5.20 x10*6/uL    Hemoglobin 8.9 (L) 12.0 - 16.0 g/dL    Hematocrit 30.9 (L) 36.0 - 46.0 %    MCV 72 (L) 80 - 100 fL    MCH 20.7 (L) 26.0 - 34.0 pg    MCHC 28.8 (L) 32.0 - 36.0 g/dL    RDW 18.4 (H) 11.5 - 14.5 %    Platelets 254 150 - 450 x10*3/uL    Neutrophils % 65.0 40.0 - 80.0 %    Immature Granulocytes %, Automated 0.4 0.0 - 0.9 %    Lymphocytes % 21.2 13.0 - 44.0 %    Monocytes % 9.5 2.0 - 10.0 %    Eosinophils % 3.5 0.0 - 6.0 %    Basophils % 0.4 0.0 - 2.0 %    Neutrophils Absolute 7.23 (H) 1.60 - 5.50 x10*3/uL    Immature Granulocytes Absolute, Automated 0.04 0.00 - 0.50 x10*3/uL    Lymphocytes Absolute 2.36 0.80 - 3.00 x10*3/uL    Monocytes Absolute 1.06 (H) 0.05 - 0.80 x10*3/uL    Eosinophils Absolute 0.39 0.00 - 0.40 x10*3/uL    Basophils Absolute 0.04 0.00 - 0.10 x10*3/uL   Renal function panel   Result Value Ref Range    Glucose 101 (H) 74 - 99 mg/dL    Sodium 136 136 - 145 mmol/L    Potassium 3.5 3.5 - 5.3 mmol/L    Chloride 100 98 - 107 mmol/L    Bicarbonate 27 21 - 32 mmol/L    Anion Gap 13 10 - 20 mmol/L    Urea Nitrogen 14 6 - 23 mg/dL    Creatinine 0.92 0.50 - 1.05 mg/dL    eGFR 65 >60 mL/min/1.73m*2    Calcium 8.5 (L) 8.6 - 10.3 mg/dL    Phosphorus 4.5 2.5 - 4.9 mg/dL    Albumin 3.1 (L) 3.4 - 5.0 g/dL   Magnesium   Result Value Ref Range    Magnesium 2.17 1.60 - 2.40 mg/dL   POCT GLUCOSE   Result Value Ref Range    POCT Glucose 113 (H) 74 - 99 mg/dL   POCT GLUCOSE   Result Value Ref Range    POCT Glucose 167 (H) 74 - 99 mg/dL     *Note: Due to a large number of results and/or encounters for the requested time period, some results have not been displayed. A complete set of results can be found in Results Review.       Imaging:  Electrocardiogram, 12-lead PRN ACS  symptoms    Result Date: 1/25/2025  Supraventricular tachycardia with occasional Premature ventricular complexes Left axis deviation Pulmonary disease pattern Possible Inferior infarct , age undetermined Abnormal ECG When compared with ECG of 23-JAN-2025 21:55, (unconfirmed) Premature ventricular complexes are now Present Vent. rate has increased BY  47 BPM Nonspecific T wave abnormality no longer evident in Anterior leads Confirmed by Clement Stevens ([id number]) on 1/25/2025 1:40:07 PM      Medications:  Scheduled medications  apixaban, 5 mg, oral, BID  atorvastatin, 10 mg, oral, Daily  docusate sodium, 100 mg, oral, Daily  insulin lispro, 0-10 Units, subcutaneous, TID AC  ipratropium, 0.5 mg, nebulization, TID  levothyroxine, 100 mcg, oral, Daily  losartan, 50 mg, oral, Daily  melatonin, 5 mg, oral, Nightly  metoprolol tartrate, 75 mg, oral, BID  montelukast, 10 mg, oral, Nightly  pantoprazole, 40 mg, oral, BID AC  piperacillin-tazobactam, 3.375 g, intravenous, q8h  potassium chloride CR, 40 mEq, oral, Once      Continuous medications     PRN medications  PRN medications: acetaminophen, albuterol, dextrose, dextrose, glucagon, glucagon, [Held by provider] HYDROmorphone **OR** [Held by provider] HYDROmorphone, oxyCODONE, polyethylene glycol, traZODone    Assessment    Assessment & Plan   Ms. Myrna Rodrigues is a 74 y.o. female who presents with past medical history of afib s/p ablation on Eliquis, HTN, CAD, HLD, GERD, DM, SVT and hypothyroidism presented to the ED with right lower quadrant pain and palpitations.  Through chart review patient has been seen for this similar complaint several times over the last 6 weeks she has not been able to complete the necessary follow-up needed to resolve both issues.  She had did not complete her outpatient antibiotic regimen for her cystitis.  She also has not followed up with cardiology to have a stress test, echo, or Holter monitor.  Considering she has not been out of the hospital long  enough to have these outpatient appointments made it will be beneficial to perform as much of the evaluation as possible while inpatient for her to avoid readmission.  She reports decreased appetite and weakness patient would benefit from a PT OT evaluation and likely placement again to avoid readmission.     Assessment & Plan  SVT (supraventricular tachycardia) (CMS-HCC)       # Complicated UTI with severe inflammatory changes on imaging  # Retroperitoneal/pelvic lymphadenopathy  # Bladder stones  Patient presents with ongoing abdominal pain, most notably in the suprapubic region as well as right lower quadrant.  Has had multiple hospital admissions recently with similar complaints.  Given the progressive nature of patient's imaging findings (comparing CTs from 1/9 and 1/24) there is concern for an ongoing inflammatory versus infectious process, or potentially a malignant process as well.  -Patient did have 2 UAs collected, 1 at PCP visit on 1/23 and an additional 1 on presentation the ED, early morning on 1/24.  --Initial POCT UA showed pink urine with trace protein, trace blood, trace ketones, 1+ bilirubin and trace leukocytes as well as positive nitrites.  That urine culture was positive for enteric bacilli.  -Subsequent UA collected in the ED was largely unremarkable, with only 2050 leukocyte esterase noted.  That urine culture did not have any clinically significant growth.  -CRP elevated, ESR within normal limits    Plan  -Urology consulted, appreciate the recommendations - recommend intervention to further evaluate pelvic process, tentative plan for patient's urologist, Dr. Thurman, to perform procedure on 1/28-/29  -Eliquis is scheduled to be held tomorrow at 7 AM  -Continue Zosyn in the setting of complicated UTI  -ID has been consulted for further management of complicated UTI and ongoing pelvic inflammatory versus infectious process, appreciate recommendations  -Multimodal pain regimen  -Urine culture came  back positive for E. coli resistant to ampicillin.    # SVT  # Type II MI  # H/O A-fib s/p ablation on Eliquis  # h/o CAD  Patient presented to [facility] with SVT, initial EKG with .  Subsequent EKG showed normal sinus rhythm with a NV of 94 demonstration of adenosine.  Has had hospitalizations in the past with a similar presentation, has not yet followed up with cardiology on an outpatient basis for further workup.  -Suspect that intermittent tachycardia is likely secondary to underlying infectious versus inflammatory process in the pelvis, would caution any intentional stress on her heart until further urologic workup has been completed  -Troponin 156 -> 151, likely type II MI in the setting of infectious vs inflammatory pelvic process. No ischemic changes noted on EKG.  -Echo performed 1/24, LVEF 71%, grade 1 pattern of left ventricular diastolic filling    Plan  -Cardiology has been consulted, appreciate recommendations  -Patient will need discharged with Holter monitor, will require outpatient stress testing once acute process has improved  -Continue home Eliquis while inpatient (will need to be held 48 hours prior to urologic intervention)  -Monitor on telemetry   -Optimize electrolytes, goal K>4, Mg>2     # Hypothyroidism  Patient does have history of hypothyroidism, with dose adjustment within the last year. Previously on every other day 100mcg and 50 mcg dosing, recently increased to 100 mcg daily dosing on 1/10/25.   -TSH on presentation was elevated, recovering. Free T4 mildly elevated.    Plan  -Can consider repeat TFTs in 4 weeks once acute illness has resolved.   -Continue home regimen at this time     # Type 2 Diabetes Mellitus  Patient does have a history of non-insulin dependent diabetes mellitus.  Home medications include metformin.  Hemoglobin A1c was checked on 1/2/2025, 5.9.  -SSI #2  -POCT  -Hypoglycemia protocol     Chronic problems:  #HTN  #HLD  #GERD  -continue home meds as appropriate when  med rec complete    Global Plan of Care  IVF: None  Diet: Cardiac  DVT prophylaxis: Home Eliquis, SCDs  Dispo: Patient will require inpatient urologic procedure after resolution of UTI, likely middle of next week  Consults: Urology, ID, cardiology, PT/OT  Code Status: Full code    Patient seen and plan of care was discussed with senior resident and the attending.  Signature: CHEO GRIDER MD  Internal Medicine PGY-1 Resident  Date: January 26, 2025

## 2025-01-27 ENCOUNTER — APPOINTMENT (OUTPATIENT)
Dept: CARDIOLOGY | Facility: HOSPITAL | Age: 75
End: 2025-01-27
Payer: MEDICARE

## 2025-01-27 ENCOUNTER — APPOINTMENT (OUTPATIENT)
Dept: CARDIOLOGY | Facility: HOSPITAL | Age: 75
DRG: 689 | End: 2025-01-27
Payer: MEDICARE

## 2025-01-27 VITALS
WEIGHT: 141.09 LBS | OXYGEN SATURATION: 97 % | DIASTOLIC BLOOD PRESSURE: 57 MMHG | HEIGHT: 62 IN | BODY MASS INDEX: 25.96 KG/M2 | RESPIRATION RATE: 17 BRPM | TEMPERATURE: 97.9 F | SYSTOLIC BLOOD PRESSURE: 122 MMHG | HEART RATE: 70 BPM

## 2025-01-27 LAB
ALBUMIN SERPL BCP-MCNC: 3.3 G/DL (ref 3.4–5)
ANION GAP SERPL CALC-SCNC: 11 MMOL/L (ref 10–20)
BASOPHILS # BLD AUTO: 0.02 X10*3/UL (ref 0–0.1)
BASOPHILS NFR BLD AUTO: 0.2 %
BUN SERPL-MCNC: 15 MG/DL (ref 6–23)
CALCIUM SERPL-MCNC: 8.9 MG/DL (ref 8.6–10.3)
CHLORIDE SERPL-SCNC: 101 MMOL/L (ref 98–107)
CO2 SERPL-SCNC: 28 MMOL/L (ref 21–32)
CREAT SERPL-MCNC: 0.98 MG/DL (ref 0.5–1.05)
CRP SERPL-MCNC: 5.48 MG/DL
EGFRCR SERPLBLD CKD-EPI 2021: 60 ML/MIN/1.73M*2
EOSINOPHIL # BLD AUTO: 0.55 X10*3/UL (ref 0–0.4)
EOSINOPHIL NFR BLD AUTO: 5.3 %
ERYTHROCYTE [DISTWIDTH] IN BLOOD BY AUTOMATED COUNT: 18.5 % (ref 11.5–14.5)
GLUCOSE BLD MANUAL STRIP-MCNC: 101 MG/DL (ref 74–99)
GLUCOSE BLD MANUAL STRIP-MCNC: 119 MG/DL (ref 74–99)
GLUCOSE BLD MANUAL STRIP-MCNC: 133 MG/DL (ref 74–99)
GLUCOSE BLD MANUAL STRIP-MCNC: 95 MG/DL (ref 74–99)
GLUCOSE SERPL-MCNC: 106 MG/DL (ref 74–99)
HCT VFR BLD AUTO: 31.6 % (ref 36–46)
HGB BLD-MCNC: 9 G/DL (ref 12–16)
IMM GRANULOCYTES # BLD AUTO: 0.05 X10*3/UL (ref 0–0.5)
IMM GRANULOCYTES NFR BLD AUTO: 0.5 % (ref 0–0.9)
LACTATE SERPL-SCNC: 0.9 MMOL/L (ref 0.4–2)
LYMPHOCYTES # BLD AUTO: 2.56 X10*3/UL (ref 0.8–3)
LYMPHOCYTES NFR BLD AUTO: 24.9 %
MAGNESIUM SERPL-MCNC: 2.15 MG/DL (ref 1.6–2.4)
MCH RBC QN AUTO: 20.6 PG (ref 26–34)
MCHC RBC AUTO-ENTMCNC: 28.5 G/DL (ref 32–36)
MCV RBC AUTO: 72 FL (ref 80–100)
MONOCYTES # BLD AUTO: 0.96 X10*3/UL (ref 0.05–0.8)
MONOCYTES NFR BLD AUTO: 9.3 %
NEUTROPHILS # BLD AUTO: 6.15 X10*3/UL (ref 1.6–5.5)
NEUTROPHILS NFR BLD AUTO: 59.8 %
NRBC BLD-RTO: 0 /100 WBCS (ref 0–0)
PHOSPHATE SERPL-MCNC: 4 MG/DL (ref 2.5–4.9)
PLATELET # BLD AUTO: 296 X10*3/UL (ref 150–450)
POTASSIUM SERPL-SCNC: 4.4 MMOL/L (ref 3.5–5.3)
RBC # BLD AUTO: 4.37 X10*6/UL (ref 4–5.2)
SODIUM SERPL-SCNC: 136 MMOL/L (ref 136–145)
WBC # BLD AUTO: 10.3 X10*3/UL (ref 4.4–11.3)

## 2025-01-27 PROCEDURE — 99232 SBSQ HOSP IP/OBS MODERATE 35: CPT | Performed by: STUDENT IN AN ORGANIZED HEALTH CARE EDUCATION/TRAINING PROGRAM

## 2025-01-27 PROCEDURE — 97530 THERAPEUTIC ACTIVITIES: CPT | Mod: GP,CQ

## 2025-01-27 PROCEDURE — 2500000004 HC RX 250 GENERAL PHARMACY W/ HCPCS (ALT 636 FOR OP/ED)

## 2025-01-27 PROCEDURE — 93005 ELECTROCARDIOGRAM TRACING: CPT

## 2025-01-27 PROCEDURE — 2500000001 HC RX 250 WO HCPCS SELF ADMINISTERED DRUGS (ALT 637 FOR MEDICARE OP)

## 2025-01-27 PROCEDURE — 80069 RENAL FUNCTION PANEL: CPT

## 2025-01-27 PROCEDURE — 2500000002 HC RX 250 W HCPCS SELF ADMINISTERED DRUGS (ALT 637 FOR MEDICARE OP, ALT 636 FOR OP/ED): Performed by: STUDENT IN AN ORGANIZED HEALTH CARE EDUCATION/TRAINING PROGRAM

## 2025-01-27 PROCEDURE — 83605 ASSAY OF LACTIC ACID: CPT

## 2025-01-27 PROCEDURE — 83735 ASSAY OF MAGNESIUM: CPT

## 2025-01-27 PROCEDURE — 36415 COLL VENOUS BLD VENIPUNCTURE: CPT

## 2025-01-27 PROCEDURE — 2060000001 HC INTERMEDIATE ICU ROOM DAILY

## 2025-01-27 PROCEDURE — 86140 C-REACTIVE PROTEIN: CPT

## 2025-01-27 PROCEDURE — 94640 AIRWAY INHALATION TREATMENT: CPT

## 2025-01-27 PROCEDURE — 85025 COMPLETE CBC W/AUTO DIFF WBC: CPT

## 2025-01-27 PROCEDURE — 2500000004 HC RX 250 GENERAL PHARMACY W/ HCPCS (ALT 636 FOR OP/ED): Mod: JZ

## 2025-01-27 PROCEDURE — 82947 ASSAY GLUCOSE BLOOD QUANT: CPT

## 2025-01-27 PROCEDURE — 2500000002 HC RX 250 W HCPCS SELF ADMINISTERED DRUGS (ALT 637 FOR MEDICARE OP, ALT 636 FOR OP/ED)

## 2025-01-27 PROCEDURE — 2500000001 HC RX 250 WO HCPCS SELF ADMINISTERED DRUGS (ALT 637 FOR MEDICARE OP): Performed by: INTERNAL MEDICINE

## 2025-01-27 RX ORDER — POLYETHYLENE GLYCOL 3350 17 G/17G
17 POWDER, FOR SOLUTION ORAL DAILY
Status: DISCONTINUED | OUTPATIENT
Start: 2025-01-27 | End: 2025-02-02 | Stop reason: HOSPADM

## 2025-01-27 RX ORDER — CIPROFLOXACIN 500 MG/1
500 TABLET ORAL EVERY 12 HOURS SCHEDULED
Status: DISCONTINUED | OUTPATIENT
Start: 2025-01-27 | End: 2025-02-02 | Stop reason: HOSPADM

## 2025-01-27 RX ORDER — HYDROMORPHONE HYDROCHLORIDE 0.2 MG/ML
0.2 INJECTION INTRAMUSCULAR; INTRAVENOUS; SUBCUTANEOUS
Status: DISCONTINUED | OUTPATIENT
Start: 2025-01-27 | End: 2025-02-02 | Stop reason: HOSPADM

## 2025-01-27 RX ORDER — METOPROLOL TARTRATE 100 MG/1
100 TABLET ORAL 2 TIMES DAILY
Status: DISCONTINUED | OUTPATIENT
Start: 2025-01-28 | End: 2025-02-02 | Stop reason: HOSPADM

## 2025-01-27 RX ORDER — METOPROLOL TARTRATE 1 MG/ML
5 INJECTION, SOLUTION INTRAVENOUS ONCE
Status: COMPLETED | OUTPATIENT
Start: 2025-01-27 | End: 2025-01-27

## 2025-01-27 RX ORDER — ONDANSETRON 4 MG/1
4 TABLET, ORALLY DISINTEGRATING ORAL EVERY 8 HOURS PRN
Status: DISCONTINUED | OUTPATIENT
Start: 2025-01-27 | End: 2025-02-02 | Stop reason: HOSPADM

## 2025-01-27 RX ORDER — METOPROLOL TARTRATE 25 MG/1
25 TABLET, FILM COATED ORAL ONCE
Status: DISCONTINUED | OUTPATIENT
Start: 2025-01-27 | End: 2025-01-30

## 2025-01-27 RX ORDER — ONDANSETRON HYDROCHLORIDE 2 MG/ML
4 INJECTION, SOLUTION INTRAVENOUS EVERY 8 HOURS PRN
Status: DISCONTINUED | OUTPATIENT
Start: 2025-01-27 | End: 2025-02-02 | Stop reason: HOSPADM

## 2025-01-27 RX ADMIN — METOPROLOL TARTRATE 5 MG: 5 INJECTION INTRAVENOUS at 20:34

## 2025-01-27 RX ADMIN — IPRATROPIUM BROMIDE 0.5 MG: 0.5 SOLUTION RESPIRATORY (INHALATION) at 09:12

## 2025-01-27 RX ADMIN — ATORVASTATIN CALCIUM 10 MG: 10 TABLET, FILM COATED ORAL at 20:55

## 2025-01-27 RX ADMIN — LEVOTHYROXINE SODIUM 100 MCG: 0.1 TABLET ORAL at 06:11

## 2025-01-27 RX ADMIN — CIPROFLOXACIN 500 MG: 500 TABLET ORAL at 18:01

## 2025-01-27 RX ADMIN — DOCUSATE SODIUM 100 MG: 100 CAPSULE, LIQUID FILLED ORAL at 12:39

## 2025-01-27 RX ADMIN — LOSARTAN POTASSIUM 50 MG: 50 TABLET, FILM COATED ORAL at 12:39

## 2025-01-27 RX ADMIN — HYDROMORPHONE HYDROCHLORIDE 0.2 MG: 0.2 INJECTION, SOLUTION INTRAMUSCULAR; INTRAVENOUS; SUBCUTANEOUS at 14:17

## 2025-01-27 RX ADMIN — ONDANSETRON 4 MG: 2 INJECTION INTRAMUSCULAR; INTRAVENOUS at 08:51

## 2025-01-27 RX ADMIN — OXYCODONE HYDROCHLORIDE 5 MG: 5 TABLET ORAL at 13:23

## 2025-01-27 RX ADMIN — POLYETHYLENE GLYCOL 3350 17 G: 17 POWDER, FOR SOLUTION ORAL at 12:39

## 2025-01-27 RX ADMIN — MONTELUKAST 10 MG: 10 TABLET, FILM COATED ORAL at 20:24

## 2025-01-27 RX ADMIN — ACETAMINOPHEN 650 MG: 325 TABLET ORAL at 06:11

## 2025-01-27 RX ADMIN — METOPROLOL TARTRATE 75 MG: 50 TABLET, FILM COATED ORAL at 20:24

## 2025-01-27 RX ADMIN — ACETAMINOPHEN 650 MG: 325 TABLET ORAL at 00:14

## 2025-01-27 RX ADMIN — HYDROMORPHONE HYDROCHLORIDE 0.2 MG: 0.2 INJECTION, SOLUTION INTRAMUSCULAR; INTRAVENOUS; SUBCUTANEOUS at 11:04

## 2025-01-27 RX ADMIN — PANTOPRAZOLE SODIUM 40 MG: 40 TABLET, DELAYED RELEASE ORAL at 18:01

## 2025-01-27 RX ADMIN — OXYCODONE HYDROCHLORIDE 5 MG: 5 TABLET ORAL at 18:01

## 2025-01-27 RX ADMIN — PIPERACILLIN SODIUM AND TAZOBACTAM SODIUM 3.38 G: 3; .375 INJECTION, SOLUTION INTRAVENOUS at 06:11

## 2025-01-27 RX ADMIN — IPRATROPIUM BROMIDE 0.5 MG: 0.5 SOLUTION RESPIRATORY (INHALATION) at 15:07

## 2025-01-27 RX ADMIN — Medication 5 MG: at 20:24

## 2025-01-27 RX ADMIN — METOPROLOL TARTRATE 75 MG: 50 TABLET, FILM COATED ORAL at 12:39

## 2025-01-27 RX ADMIN — PANTOPRAZOLE SODIUM 40 MG: 40 TABLET, DELAYED RELEASE ORAL at 06:11

## 2025-01-27 RX ADMIN — HYDROMORPHONE HYDROCHLORIDE 0.2 MG: 0.2 INJECTION, SOLUTION INTRAMUSCULAR; INTRAVENOUS; SUBCUTANEOUS at 18:57

## 2025-01-27 RX ADMIN — IPRATROPIUM BROMIDE 0.5 MG: 0.5 SOLUTION RESPIRATORY (INHALATION) at 21:08

## 2025-01-27 ASSESSMENT — COGNITIVE AND FUNCTIONAL STATUS - GENERAL
MOVING TO AND FROM BED TO CHAIR: A LITTLE
MOBILITY SCORE: 20
DRESSING REGULAR LOWER BODY CLOTHING: A LITTLE
STANDING UP FROM CHAIR USING ARMS: A LITTLE
CLIMB 3 TO 5 STEPS WITH RAILING: A LITTLE
DRESSING REGULAR UPPER BODY CLOTHING: A LITTLE
TOILETING: A LITTLE
WALKING IN HOSPITAL ROOM: A LITTLE
DAILY ACTIVITIY SCORE: 20
CLIMB 3 TO 5 STEPS WITH RAILING: A LITTLE
STANDING UP FROM CHAIR USING ARMS: A LITTLE
MOVING TO AND FROM BED TO CHAIR: A LITTLE
HELP NEEDED FOR BATHING: A LITTLE
MOBILITY SCORE: 20
WALKING IN HOSPITAL ROOM: A LITTLE

## 2025-01-27 ASSESSMENT — PAIN SCALES - GENERAL
PAINLEVEL_OUTOF10: 0 - NO PAIN
PAINLEVEL_OUTOF10: 3
PAINLEVEL_OUTOF10: 7
PAINLEVEL_OUTOF10: 3
PAINLEVEL_OUTOF10: 0 - NO PAIN
PAINLEVEL_OUTOF10: 10 - WORST POSSIBLE PAIN
PAINLEVEL_OUTOF10: 8
PAINLEVEL_OUTOF10: 10 - WORST POSSIBLE PAIN

## 2025-01-27 ASSESSMENT — PAIN - FUNCTIONAL ASSESSMENT
PAIN_FUNCTIONAL_ASSESSMENT: 0-10

## 2025-01-27 ASSESSMENT — PAIN DESCRIPTION - LOCATION
LOCATION: ABDOMEN

## 2025-01-27 ASSESSMENT — PAIN DESCRIPTION - DESCRIPTORS: DESCRIPTORS: SPASM

## 2025-01-27 ASSESSMENT — PAIN DESCRIPTION - ORIENTATION: ORIENTATION: RIGHT;LOWER

## 2025-01-27 NOTE — PROGRESS NOTES
Physical Therapy    Physical Therapy Treatment    Patient Name: Myrna Rodrigues  MRN: [id number]  Today's Date: 1/27/2025  Time Calculation  Start Time: 1710  Stop Time: 1725  Time Calculation (min): 15 min     2100/2100-A       01/27/25 1710   PT  Visit   PT Received On 01/27/25   Response to Previous Treatment Patient with no complaints from previous session.   General   Reason for Referral impaired mobility, gait training, impaired cognition/safety awareness   Referred By Adrianna Castro   Prior to Session Communication Bedside nurse   Patient Position Received Bed, 3 rail up;Alarm on   General Comment Pt was sitting on the EOB when I arrived. PCA recently helped her on /off the BSC. Pt HR was eleveated 140-150's during session. Nurse was called in to assess. Session was cut short due to HR.   Precautions   Medical Precautions Fall precautions   Precautions Comment bed/chair alarm   Cognition   Overall Cognitive Status WFL   Orientation Level Oriented X4   Static Sitting Balance   Static Sitting-Comment/Number of Minutes Steady sitting on the EOB with no back support. No l loss of balance.   Dynamic Sitting Balance   Dynamic Sitting-Comments Able to shift weight and lean with no loss of balance   Bed Mobility 1   Bed Mobility 1 Sitting to supine   Level of Assistance 1 Contact guard  (Max x 2 to move the head of the bed.)   Bed Mobility Comments 1 Sit to supine with CGA. Pt, move to the middle of the bed on her own.   Transfers   Transfer Yes   Transfer 1   Technique 1 Sit to stand;Stand to sit   Transfer Device 1 Hand held assist   Trials/Comments 1 Contact Guard Assist.   Transfers 2   Technique 2 Lateral  (Bed >< Chair with Hand held assist, CGA. to steady.)   Trials/Comments 2 Pt moved slowly but steadily.   Activity Tolerance   Endurance Endurance does not limit participation in activity   PT Assessment   PT Assessment Results Decreased strength;Decreased mobility;Impaired balance;Pain   Rehab Prognosis Good   End  of Session Communication Bedside nurse   End of Session Patient Position Bed, 2 rail up  (Nurse present to perform EKG.)   PT Plan   PT Plan Ongoing PT   PT Frequency 3 times per week   PT Discharge Recommendations Low intensity level of continued care   Equipment Recommended upon Discharge Straight cane       Assessment/Plan   PT Assessment  PT Assessment Results: Decreased strength, Decreased mobility, Impaired balance, Pain  Rehab Prognosis: Good  End of Session Communication: Bedside nurse  End of Session Patient Position: Bed, 2 rail up (care give present to get EKG repor)     PT Plan  Treatment/Interventions: Bed mobility, Transfer training, Balance training, Gait training, Neuromuscular re-education, Strengthening, Range of motion, Therapeutic exercise, Therapeutic activity, Home exercise program  PT Plan: Ongoing PT  PT Frequency: 3 times per week  PT Discharge Recommendations: Low intensity level of continued care  Equipment Recommended upon Discharge: Straight cane  PT Recommended Transfer Status: Contact guard  PT - OK to Discharge: Yes    Outcome Measures:  [facility] Basic Mobility  Turning from your back to your side while in a flat bed without using bedrails: None  Moving from lying on your back to sitting on the side of a flat bed without using bedrails: None  Moving to and from bed to chair (including a wheelchair): A little  Standing up from a chair using your arms (e.g. wheelchair or bedside chair): A little  To walk in hospital room: A little  Climbing 3-5 steps with railing: A little  Basic Mobility - Total Score: 20                         EDUCATION:     Education Documentation  Body Mechanics, taught by Raúl Jha PTA at 1/27/2025  5:31 PM.  Learner: Patient  Readiness: Eager  Method: Explanation, Demonstration  Response: Verbalizes Understanding    Mobility Training, taught by Raúl Jha PTA at 1/27/2025  5:31 PM.  Learner: Patient  Readiness: Eager  Method: Explanation,  Demonstration  Response: Verbalizes Understanding    Education Comments  No comments found.        GOALS:  Encounter Problems       Encounter Problems (Active)       PT Problem       Pt will be able to perform all bed mobility tasks with IND.  (Progressing)       Start:  01/24/25    Expected End:  02/07/25            Pt will perform all transfers with Mod I and LRAD with proper safety mechanics.   (Progressing)       Start:  01/24/25    Expected End:  02/07/25            Pt will ambulate 150 ft with Mod I using LRAD for improved functional independence.  (Progressing)       Start:  01/24/25    Expected End:  02/07/25               Pain - Adult

## 2025-01-27 NOTE — PROGRESS NOTES
INPATIENT PROGRESS NOTES    PATIENT NAME: Myrna Rodrigues    MRN: [id number]  SERVICE DATE:  1/27/2025   SERVICE TIME:  1:46 PM    SIGNATURE: Andreia Parkinson MD        ASSESSMENT :   -Urinary tract infection  -Urine culture E. coli  -Bladder calculi  -Right pelvic wall, right femoral canal and right ureter inflammation with  lymphadenopathy  -History of CAD, hypertension, diabetes, A-fib, hypothyroidism  -Presented with right lower quadrant pain        PLAN:  -Switch antibiotics to ciprofloxacin for 10 days  -Follow-up with me in 2 weeks  -Urology team is following for possible cystoscopy  -Closely monitor for antibiotics side effects including rash, Diarrhea/CDI, thrombocytopenia, KAJAL, etc.           SUBJECTIVE  Afebrile  No events overnight       OBJECTIVE  PHYSICAL EXAM:   Patient Vitals for the past 24 hrs:   BP Temp Temp src Pulse Resp SpO2   01/27/25 1200 -- -- -- 82 11 99 %   01/27/25 0845 146/66 36.2 °C (97.2 °F) Temporal 84 21 97 %   01/27/25 0800 -- -- -- 86 (!) 34 --   01/27/25 0400 -- -- -- 60 16 --   01/27/25 0014 139/61 37 °C (98.6 °F) -- 68 (!) 9 97 %   01/27/25 0000 -- -- -- 70 17 --   01/26/25 2056 -- -- -- -- -- 97 %   01/26/25 2000 122/57 -- -- 76 18 --   01/26/25 1957 122/57 -- -- 76 15 98 %   01/26/25 1800 -- -- -- 88 (!) 28 --   01/26/25 1600 -- -- -- 66 14 --         Gen: NAD  Neck: symmetric, no mass  Cardiovascular: RRR  Respiratory: No distress       Labs:  Lab Results   Component Value Date    WBC 10.3 01/27/2025    HGB 9.0 (L) 01/27/2025    HCT 31.6 (L) 01/27/2025    MCV 72 (L) 01/27/2025     01/27/2025     Lab Results   Component Value Date    GLUCOSE 106 (H) 01/27/2025    CALCIUM 8.9 01/27/2025     01/27/2025    K 4.4 01/27/2025    CO2 28 01/27/2025     01/27/2025    BUN 15 01/27/2025    CREATININE 0.98 01/27/2025   ESR: --  Lab Results   Component Value Date    SEDRATE 29 01/24/2025     Lab Results   Component Value Date    CRP 5.48 (H) 01/27/2025     Lab Results  "  Component Value Date    ALT 8 01/23/2025    AST 10 01/23/2025    ALKPHOS 66 01/23/2025    BILITOT 0.4 01/23/2025         DATA:   Diagnostic tests reviewed for today's visit:    Labs this admission reviewed  Imagings this admission reviewed  Cultures: Reviewed        Andreia Parkinson MD.   Infectious Disease Attending            This note was partially created using voice recognition software and is inherently subject to errors including those of syntax and \"sound-alike\" substitutions which may escape proofreading. In such instances, original meaning may be extrapolated by contextual derivation       "

## 2025-01-27 NOTE — DOCUMENTATION CLARIFICATION NOTE
"    PATIENT:               LISSETTE ZAVALA  ACCT #:                  [id number]  MRN:                       [id number]  :                       1950  ADMIT DATE:       2025 9:09 PM  DISCH DATE:  RESPONDING PROVIDER #:        [id number]          PROVIDER RESPONSE TEXT:    I agree with dietician diagnosis of Moderate malnutrition on 25    CDI QUERY TEXT:    Clarification    Instruction:    Based on your assessment of the patient and the clinical information, please provide the requested documentation by clicking on the appropriate radio button and enter any additional information if prompted.    Question: Please further clarify this patient nutritional status as    When answering this query, please exercise your independent professional judgment. The fact that a question is being asked, does not imply that any particular answer is desired or expected.    The patient's clinical indicators include:  Clinical Information: 75 y/o F admitted with acute UTI    Clinical Indicators:    BMI: 25.97    dietician PN : \"Malnutrition Diagnosis: Moderate malnutrition related to acute disease or injury As Evidenced by: less than 75 percent of estimated energy requirements for greater than 7 days, greater than 4 precent wt loss x 2.5 weeks\"    Treatment: dietician consult, cardiac diet, ensure plus HP BID    Risk Factors: recent weight loss, acute UTI, Retroperitoneal/pelvic lymphadenopathy, SV, type II MI  Options provided:  -- I agree with dietician diagnosis of Moderate malnutrition on 25  -- Other - I will add my own diagnosis  -- Refer to Clinical Documentation Reviewer    Query created by: Zoraida Barry on 2025 11:30 AM      Electronically signed by:  TIGIST KEYS DO 2025 3:14 PM          "

## 2025-01-27 NOTE — PROGRESS NOTES
"CARDIOLOGY SERVICE   CONSULT Progress note   [place]. Clement Stevens  [facility] [place] NAME: Myrna Rodrigues  PATIENT MRN: [id number]  SERVICE DATE:  1/27/2025  SERVICE TIME: 8:16 AM    CONSULTANT: Clement Stevens MD  PRIMARY CARE PHYSICIAN: Calvin Baker MD  ATTENDING PHYSICIAN: Adrianna Castro,       IMPRESSIONS:  Admitted for abdominal pain, complicated UTIs and was found to have recurrent SVT/A-fib  Recent SVTs [facility] 1/12-14/2025 received BB, and 1/15-17/2025 when BB was increased to 75 twice daily not clear if she had the chance to implement the higher dose before admission  [facility]ox A-fib  (Cardioversion 1/2024)history of ablation, history of SVT  AC Eliquis  Myocardial injury peak troponin 156 (120 during recent admission 10 days ago)  CAD, PCI LAD, cath 3/2023: patent LAD stents  Diastolic dysfunction EF 60%, mild AI, MR TR Echo 2023  Mild to moderate AI, mildly dilated left atrium, preserved systolic function of the left ventricle EF 71% echo 1/2025  Hypertension  Hyperlipidemia LDL 33 in 1/2025, at home on statin   DM, Thyroid, Asthma COPD, urinary bladder stones, microcytic anemia ~ 9, back surgery  Current meds PTA: Eliquis 5 twice daily, metoprolol recently increased dose to 75 twice daily, losartan 50 daily, Lipitor 10    RECOMMENDATIONS:  Telemetry  Continue BB  NM Lexiscan      /61   Pulse 86   Temp 37 °C (98.6 °F)   Resp (!) 34   Ht 1.575 m (5' 2\")   Wt 64 kg (141 lb 1.5 oz)   SpO2 97%   BMI 25.81 kg/m²     ====================================================    SUBJECTIVE: No chest pain comfortable    BNP   Date/Time Value Ref Range Status   01/23/2025 08:59  0 - 99 pg/mL Final     Lab Results   Component Value Date    CHOL 120 01/15/2025    CHOL 126 07/17/2024    CHOL 118 01/03/2022     Lab Results   Component Value Date    HDL 66.4 01/15/2025    HDL 75.1 07/17/2024    HDL 46.0 01/03/2022     Lab Results   Component Value Date    LDLCALC 33 01/15/2025    LDLCALC 32 07/17/2024 " "    Lab Results   Component Value Date    TRIG 101 01/15/2025    TRIG 97 07/17/2024    TRIG 136 01/03/2022     No components found for: \"CHOLHDL\"  TROPHS   Date/Time Value Ref Range Status   01/23/2025 10:01  0 - 13 ng/L Final     Comment:     Previous result verified on 1/23/2025 2141 on specimen/case [id number] called with component TRPHS for procedure Troponin I, High Sensitivity, Initial with value 156 ng/L.   01/23/2025 08:59  0 - 13 ng/L Final   01/16/2025 04:31 AM 95 0 - 13 ng/L Final     Comment:     Previous result verified on 1/15/2025 1237 on specimen/case [id number] called with component TRPHS for procedure Troponin I, High Sensitivity, Initial with value 113 ng/L.       Echo  No results found for this or any previous visit.    Encounter Date: 01/23/25   Electrocardiogram, 12-lead PRN ACS symptoms   Result Value    Ventricular Rate 141    Atrial Rate 105    QRS Duration 84    QT Interval 330    QTC Calculation(Bazett) 505    R Axis -66    T Axis 57    QRS Count 23    Q Onset 216    T Offset 381    QTC Fredericia 438    Narrative    Supraventricular tachycardia with occasional Premature ventricular complexes  Left axis deviation  Pulmonary disease pattern  Possible Inferior infarct , age undetermined  Abnormal ECG  When compared with ECG of 23-JAN-2025 21:55, (unconfirmed)  Premature ventricular complexes are now Present  Vent. rate has increased BY  47 BPM  Nonspecific T wave abnormality no longer evident in Anterior leads  Confirmed by Clement Stevens ([id number]) on 1/25/2025 1:40:07 PM     LABS:  Lab Results   Component Value Date    WBC 10.3 01/27/2025    HGB 9.0 (L) 01/27/2025    HCT 31.6 (L) 01/27/2025    MCV 72 (L) 01/27/2025     01/27/2025      Lab Results   Component Value Date    GLUCOSE 106 (H) 01/27/2025    CALCIUM 8.9 01/27/2025     01/27/2025    K 4.4 01/27/2025    CO2 28 01/27/2025     01/27/2025    BUN 15 01/27/2025    CREATININE 0.98 01/27/2025    "     CURRENT CARDIOVASCULAR MEDICATIONS:  Current Facility-Administered Medications   Medication Dose Route Frequency Provider Last Rate Last Admin    acetaminophen (Tylenol) tablet 650 mg  650 mg oral q6h PRN Alejandra Polka, DO   650 mg at 01/27/25 0611    albuterol 2.5 mg /3 mL (0.083 %) nebulizer solution 2.5 mg  2.5 mg nebulization q4h PRN Reji TavaresD        [Held by provider] apixaban (Eliquis) tablet 5 mg  5 mg oral BID Alejandra Moore, DO   5 mg at 01/26/25 2006    atorvastatin (Lipitor) tablet 10 mg  10 mg oral Daily Alejandra Polka, DO   10 mg at 01/26/25 0915    dextrose 50 % injection 12.5 g  12.5 g intravenous q15 min PRN Alejandra Moore, DO        dextrose 50 % injection 25 g  25 g intravenous q15 min PRN Alejandra Moore, DO        docusate sodium (Colace) capsule 100 mg  100 mg oral Daily Alejandra Polka, DO   100 mg at 01/26/25 0915    glucagon (Glucagen) injection 1 mg  1 mg intramuscular q15 min PRN Alejandra Moore, DO        glucagon (Glucagen) injection 1 mg  1 mg intramuscular q15 min PRN Alejandra Moore, DO        HYDROmorphone PF (Dilaudid) injection 0.2 mg  0.2 mg intravenous q3h PRN Mansoor Max MD   0.2 mg at 01/26/25 2223    insulin lispro injection 0-10 Units  0-10 Units subcutaneous TID AC Alejandra Moore, DO   2 Units at 01/26/25 1334    ipratropium (Atrovent) 0.02 % nebulizer solution 0.5 mg  0.5 mg nebulization TID Adrianna Castro DO   0.5 mg at 01/26/25 2054    levothyroxine (Synthroid, Levoxyl) tablet 100 mcg  100 mcg oral Daily Alejandra Polka, DO   100 mcg at 01/27/25 0611    losartan (Cozaar) tablet 50 mg  50 mg oral Daily Alejandra Polka, DO   50 mg at 01/26/25 0915    melatonin tablet 5 mg  5 mg oral Nightly Alejandra Polka, DO   5 mg at 01/26/25 2006    metoprolol tartrate (Lopressor) tablet 75 mg  75 mg oral BID Alejandra Moore DO   75 mg at 01/26/25 2006    montelukast (Singulair) tablet 10 mg  10 mg oral Nightly  Alejandra Moore DO   10 mg at 01/26/25 2006    oxyCODONE (Roxicodone) immediate release tablet 5 mg  5 mg oral q4h PRN Mansoor Max MD   5 mg at 01/26/25 1824    pantoprazole (ProtoNix) EC tablet 40 mg  40 mg oral BID AC Alejandra Moore, DO   40 mg at 01/27/25 0611    piperacillin-tazobactam (Zosyn) 3.375 g in dextrose (iso) IV 50 mL  3.375 g intravenous q8h Alejandra Moore DO 0 mL/hr at 01/27/25 0250 3.375 g at 01/27/25 0611    polyethylene glycol (Glycolax, Miralax) packet 17 g  17 g oral Daily PRN Alejandra Moore DO        traZODone (Desyrel) tablet 50 mg  50 mg oral Nightly PRN Alejandra Moore DO   50 mg at 01/25/25 2014      PHYSICAL EXAM:  Awake Alert   Neck: No JVD  Cardiac:  S1 S2  Resp: Decreased BSs   Ext: No peripheral edema    This clinical note has been produced using speech recognition software and may contain errors related to that system including grammar, punctuation, spelling, gender and words and phrases that may be inappropriate. Some errors were not noted in checking the note before saving.     SIGNATURE: Clement Stevens MD MD [facility]  OFFICE: [phone]

## 2025-01-27 NOTE — PROGRESS NOTES
"ID:  Myrna Rodrigues is a 74 y.o. female on hospital day 3 of admission presenting with SVT (supraventricular tachycardia) (CMS-[facility]).    Subjective   Patient was seen and evaluated at bedside.  She denied having any lightheadedness, dizziness, fever, chest pain, or urinary symptoms.  She also reported having some shortness of breath for which she received breathing treatment. She does endorse having right lower quadrant abdominal pain alternating between sharp and dull in quality which is aggravated by movement however improved compared to yesterday rating (3 out of 10).  In the afternoon patient reported having worsening pain in her right lower quadrant.  Bladder scan was done which showed a volume of 252 mL and the patient was able to void.  She denied having any constipation with last bowel movement being yesterday.  Pain improved after receiving oxycodone and Dilaudid for breakthrough pain.  Patient's son was at bedside this afternoon he was asking about management plan and the date for cystoscopy with urology.  I have updated him about the management plan and that erythema.  She to urology to confirm the date of the procedure.      Objective     Visit Vitals  BP (!) 183/78   Pulse 68   Temp 36.2 °C (97.2 °F) (Temporal)   Resp 22   Ht 1.575 m (5' 2\")   Wt 64 kg (141 lb 1.5 oz)   SpO2 99%   BMI 25.81 kg/m²   OB Status Postmenopausal   Smoking Status Never   BSA 1.67 m²        Physical Examination:  General: Not in acute distress, A&O x3, alert, coopertive, well-developed  HEENT: Normocephalic, atraumatic, EOMI, moist mucous membranes  Neck: Neck supple, trachea midline, no evidence of trauma  Cardiovascular: Heart rate regular on exam this morning, rhythm regular on exam  Respiratory: Vesicular breath sounds appreciated bilaterally, no adventitious sounds appreciated, no increased work of breathing  GI: Abdomen soft with some fullness and firmness noted in the RLQ and suprapubic region (improved compared to " yesterday), nondistended, tender to palpation in RLQ, bowel sounds present  Extremities: No edema appreciated in lower extremities bilaterally, no cyanosis  Neuro: no focal deficits, strength and sensation intact bilaterally  Skin: Warm and dry, without lesions or rashes  Psychiatric: Judgment intact.  Appropriate mood, affect and behavior.      Laboratory Data:  Results for orders placed or performed during the hospital encounter of 01/23/25 (from the past 24 hours)   POCT GLUCOSE   Result Value Ref Range    POCT Glucose 172 (H) 74 - 99 mg/dL   CBC and Auto Differential   Result Value Ref Range    WBC 10.3 4.4 - 11.3 x10*3/uL    nRBC 0.0 0.0 - 0.0 /100 WBCs    RBC 4.37 4.00 - 5.20 x10*6/uL    Hemoglobin 9.0 (L) 12.0 - 16.0 g/dL    Hematocrit 31.6 (L) 36.0 - 46.0 %    MCV 72 (L) 80 - 100 fL    MCH 20.6 (L) 26.0 - 34.0 pg    MCHC 28.5 (L) 32.0 - 36.0 g/dL    RDW 18.5 (H) 11.5 - 14.5 %    Platelets 296 150 - 450 x10*3/uL    Neutrophils % 59.8 40.0 - 80.0 %    Immature Granulocytes %, Automated 0.5 0.0 - 0.9 %    Lymphocytes % 24.9 13.0 - 44.0 %    Monocytes % 9.3 2.0 - 10.0 %    Eosinophils % 5.3 0.0 - 6.0 %    Basophils % 0.2 0.0 - 2.0 %    Neutrophils Absolute 6.15 (H) 1.60 - 5.50 x10*3/uL    Immature Granulocytes Absolute, Automated 0.05 0.00 - 0.50 x10*3/uL    Lymphocytes Absolute 2.56 0.80 - 3.00 x10*3/uL    Monocytes Absolute 0.96 (H) 0.05 - 0.80 x10*3/uL    Eosinophils Absolute 0.55 (H) 0.00 - 0.40 x10*3/uL    Basophils Absolute 0.02 0.00 - 0.10 x10*3/uL   Renal function panel   Result Value Ref Range    Glucose 106 (H) 74 - 99 mg/dL    Sodium 136 136 - 145 mmol/L    Potassium 4.4 3.5 - 5.3 mmol/L    Chloride 101 98 - 107 mmol/L    Bicarbonate 28 21 - 32 mmol/L    Anion Gap 11 10 - 20 mmol/L    Urea Nitrogen 15 6 - 23 mg/dL    Creatinine 0.98 0.50 - 1.05 mg/dL    eGFR 60 (L) >60 mL/min/1.73m*2    Calcium 8.9 8.6 - 10.3 mg/dL    Phosphorus 4.0 2.5 - 4.9 mg/dL    Albumin 3.3 (L) 3.4 - 5.0 g/dL   Magnesium    Result Value Ref Range    Magnesium 2.15 1.60 - 2.40 mg/dL   C-reactive protein   Result Value Ref Range    C-Reactive Protein 5.48 (H) <1.00 mg/dL   POCT GLUCOSE   Result Value Ref Range    POCT Glucose 101 (H) 74 - 99 mg/dL   ECG 12 Lead   Result Value Ref Range    Ventricular Rate 83 BPM    Atrial Rate 83 BPM    MI Interval 172 ms    QRS Duration 84 ms    QT Interval 394 ms    QTC Calculation(Bazett) 462 ms    P Axis 63 degrees    R Axis 86 degrees    T Axis 20 degrees    QRS Count 14 beats    Q Onset 213 ms    P Onset 127 ms    P Offset 166 ms    T Offset 410 ms    QTC Fredericia 439 ms   POCT GLUCOSE   Result Value Ref Range    POCT Glucose 119 (H) 74 - 99 mg/dL   Lactate   Result Value Ref Range    Lactate 0.9 0.4 - 2.0 mmol/L   POCT GLUCOSE   Result Value Ref Range    POCT Glucose 95 74 - 99 mg/dL     *Note: Due to a large number of results and/or encounters for the requested time period, some results have not been displayed. A complete set of results can be found in Results Review.       Imaging:  ECG 12 Lead    Result Date: 1/27/2025  Normal sinus rhythm Normal ECG When compared with ECG of 24-JAN-2025 16:10, Premature ventricular complexes are no longer Present Vent. rate has decreased BY  58 BPM QRS axis Shifted right Borderline criteria for Inferior infarct are no longer Present Nonspecific T wave abnormality now evident in Inferior leads T wave amplitude has decreased in Anterior leads      Medications:  Scheduled medications  [Held by provider] apixaban, 5 mg, oral, BID  atorvastatin, 10 mg, oral, Daily  ciprofloxacin, 500 mg, oral, q12h JANINE  docusate sodium, 100 mg, oral, Daily  insulin lispro, 0-10 Units, subcutaneous, TID AC  ipratropium, 0.5 mg, nebulization, TID  levothyroxine, 100 mcg, oral, Daily  losartan, 50 mg, oral, Daily  melatonin, 5 mg, oral, Nightly  metoprolol tartrate, 75 mg, oral, BID  montelukast, 10 mg, oral, Nightly  pantoprazole, 40 mg, oral, BID AC  polyethylene glycol, 17 g,  oral, Daily      Continuous medications     PRN medications  PRN medications: acetaminophen, albuterol, dextrose, dextrose, glucagon, glucagon, HYDROmorphone, ondansetron ODT **OR** ondansetron, oxyCODONE, traZODone    Assessment    Assessment & Plan   Ms. Myrna Rodrigues is a 74 y.o. female who presents with past medical history of afib s/p ablation on Eliquis, HTN, CAD, HLD, GERD, DM, SVT and hypothyroidism presented to the ED with right lower quadrant pain and palpitations.  Through chart review patient has been seen for this similar complaint several times over the last 6 weeks she has not been able to complete the necessary follow-up needed to resolve both issues.  She had did not complete her outpatient antibiotic regimen for her cystitis.  She also has not followed up with cardiology to have a stress test, echo, or Holter monitor.  Considering she has not been out of the hospital long enough to have these outpatient appointments made it will be beneficial to perform as much of the evaluation as possible while inpatient for her to avoid readmission.  She reports decreased appetite and weakness patient would benefit from a PT OT evaluation and likely placement again to avoid readmission.     Assessment & Plan  SVT (supraventricular tachycardia) (CMS-HCC)       # Complicated UTI with severe inflammatory changes on imaging  # Retroperitoneal/pelvic lymphadenopathy  # Bladder stones  Patient presents with ongoing abdominal pain, most notably in the suprapubic region as well as right lower quadrant.  Has had multiple hospital admissions recently with similar complaints.  Given the progressive nature of patient's imaging findings (comparing CTs from 1/9 and 1/24) there is concern for an ongoing inflammatory versus infectious process, or potentially a malignant process as well.  -Patient did have 2 UAs collected, 1 at PCP visit on 1/23 and an additional 1 on presentation the ED, early morning on 1/24.  --Initial POCT UA  showed pink urine with trace protein, trace blood, trace ketones, 1+ bilirubin and trace leukocytes as well as positive nitrites.  That urine culture was positive for enteric bacilli.  -Subsequent UA collected in the ED was largely unremarkable, with only 2050 leukocyte esterase noted.  That urine culture did not have any clinically significant growth.  -CRP elevated, ESR within normal limits    Plan  -Urology consulted, appreciate the recommendations - recommend intervention to further evaluate pelvic process, tentative plan for patient's urologist, Dr. Thurman, to perform procedure within the next couple of days  -Will continue to hold Eliquis at this time  -Urine culture came back positive for E. coli resistant to ampicillin.  -Per ID recommendations patient will be switched from Zosyn to ciprofloxacin for 10 days and to follow-up with Dr. Parkinson in 2 weeks  -Multimodal pain regimen  -CRP was ordered.  Came back as 5.48 trending up from 4.40  -Lactate was ordered came back as 0.9  -Will change lab check to be every other days.  -PT OT consulted and recommended low intensity continued level of care.  -Dr. Kraus was contacted and he recommended repeat urine culture at this time to evaluate for any remaining bacterial infection.      # SVT  # Type II MI  # H/O A-fib s/p ablation on Eliquis  # h/o CAD  Patient [facility] with SVT, initial EKG with .  Subsequent EKG showed normal sinus rhythm with a ND of 94 demonstration of adenosine.  Has had hospitalizations in the past with a similar presentation, has not yet followed up with cardiology on an outpatient basis for further workup.  -Suspect that intermittent tachycardia is likely secondary to underlying infectious versus inflammatory process in the pelvis, would caution any intentional stress on her heart until further urologic workup has been completed  -Troponin 156 -> 151, likely type II MI in the setting of infectious vs inflammatory pelvic  process. No ischemic changes noted on EKG.  -Echo performed 1/24, LVEF 71%, grade 1 pattern of left ventricular diastolic filling    Plan  -Cardiology has been consulted, appreciate recommendations  -Patient will need discharged with Holter monitor, will require outpatient stress testing once acute process has improved  -Continue home Eliquis while inpatient (will need to be held 48 hours prior to urologic intervention)  -Monitor on telemetry   -Optimize electrolytes, goal K>4, Mg>2  -Dr. Stevens evaluated the patient and recommended outpatient stress test after stabilizing the patient medically.     # Hypothyroidism  Patient does have history of hypothyroidism, with dose adjustment within the last year. Previously on every other day 100mcg and 50 mcg dosing, recently increased to 100 mcg daily dosing on 1/10/25.   -TSH on presentation was elevated, recovering. Free T4 mildly elevated.    Plan  -Can consider repeat TFTs in 4 weeks once acute illness has resolved.   -Continue home regimen at this time     # Type 2 Diabetes Mellitus  Patient does have a history of non-insulin dependent diabetes mellitus.  Home medications include metformin.  Hemoglobin A1c was checked on 1/2/2025, 5.9.  -SSI #2  -POCT  -Hypoglycemia protocol     Chronic problems:  #HTN  #HLD  #GERD  -continue home meds as appropriate when med rec complete    Global Plan of Care  IVF: None  Diet: Cardiac  DVT prophylaxis: Home Eliquis, SCDs  Dispo: Patient will require inpatient urologic procedure after resolution of UTI, likely middle of next week  Consults: Urology, ID, cardiology, PT/OT  Code Status: Full code    Patient seen and plan of care was discussed with senior resident and the attending.  Signature: CHEO GRIDER MD  Internal Medicine PGY-1 Resident  Date: January 27, 2025

## 2025-01-27 NOTE — NURSING NOTE
Pt up to chair with physical therapy.  Tele shows 's, sustained, pt c/o dizziness when asked to bear down.  Also attempted other valsalva maneuvers without effectiveness.  Back to bed.  EKG done, Dr ALBERTO Cerda notified and here to examine pt.  Pt converted to nsr 70's.

## 2025-01-28 ENCOUNTER — APPOINTMENT (OUTPATIENT)
Dept: RADIOLOGY | Facility: HOSPITAL | Age: 75
DRG: 689 | End: 2025-01-28
Payer: MEDICARE

## 2025-01-28 ENCOUNTER — APPOINTMENT (OUTPATIENT)
Dept: RADIOLOGY | Facility: CLINIC | Age: 75
End: 2025-01-28
Payer: MEDICARE

## 2025-01-28 ENCOUNTER — APPOINTMENT (OUTPATIENT)
Dept: CARDIOLOGY | Facility: HOSPITAL | Age: 75
DRG: 689 | End: 2025-01-28
Payer: MEDICARE

## 2025-01-28 ENCOUNTER — APPOINTMENT (OUTPATIENT)
Dept: UROLOGY | Facility: CLINIC | Age: 75
End: 2025-01-28
Payer: MEDICARE

## 2025-01-28 ENCOUNTER — APPOINTMENT (OUTPATIENT)
Dept: CARDIOLOGY | Facility: CLINIC | Age: 75
End: 2025-01-28
Payer: MEDICARE

## 2025-01-28 DIAGNOSIS — R39.89 OTHER SYMPTOMS AND SIGNS INVOLVING THE GENITOURINARY SYSTEM: ICD-10-CM

## 2025-01-28 DIAGNOSIS — R31.9 URINARY TRACT INFECTION WITH HEMATURIA, SITE UNSPECIFIED: ICD-10-CM

## 2025-01-28 DIAGNOSIS — N39.0 URINARY TRACT INFECTION WITH HEMATURIA, SITE UNSPECIFIED: ICD-10-CM

## 2025-01-28 LAB
ALBUMIN SERPL BCP-MCNC: 3.5 G/DL (ref 3.4–5)
ALBUMIN SERPL BCP-MCNC: 3.6 G/DL (ref 3.4–5)
ALP SERPL-CCNC: 68 U/L (ref 33–136)
ALT SERPL W P-5'-P-CCNC: 6 U/L (ref 7–45)
ANION GAP SERPL CALC-SCNC: 12 MMOL/L (ref 10–20)
ANION GAP SERPL CALC-SCNC: 17 MMOL/L (ref 10–20)
APPEARANCE UR: CLEAR
AST SERPL W P-5'-P-CCNC: 13 U/L (ref 9–39)
BASOPHILS # BLD AUTO: 0.03 X10*3/UL (ref 0–0.1)
BASOPHILS # BLD AUTO: 0.04 X10*3/UL (ref 0–0.1)
BASOPHILS NFR BLD AUTO: 0.3 %
BASOPHILS NFR BLD AUTO: 0.3 %
BILIRUB SERPL-MCNC: 0.4 MG/DL (ref 0–1.2)
BILIRUB UR STRIP.AUTO-MCNC: NEGATIVE MG/DL
BUN SERPL-MCNC: 13 MG/DL (ref 6–23)
BUN SERPL-MCNC: 13 MG/DL (ref 6–23)
CALCIUM SERPL-MCNC: 9.2 MG/DL (ref 8.6–10.3)
CALCIUM SERPL-MCNC: 9.4 MG/DL (ref 8.6–10.3)
CHLORIDE SERPL-SCNC: 99 MMOL/L (ref 98–107)
CHLORIDE SERPL-SCNC: 99 MMOL/L (ref 98–107)
CK SERPL-CCNC: 30 U/L (ref 0–215)
CO2 SERPL-SCNC: 23 MMOL/L (ref 21–32)
CO2 SERPL-SCNC: 28 MMOL/L (ref 21–32)
COLOR UR: ABNORMAL
CREAT SERPL-MCNC: 0.86 MG/DL (ref 0.5–1.05)
CREAT SERPL-MCNC: 0.89 MG/DL (ref 0.5–1.05)
EGFRCR SERPLBLD CKD-EPI 2021: 67 ML/MIN/1.73M*2
EGFRCR SERPLBLD CKD-EPI 2021: 70 ML/MIN/1.73M*2
EOSINOPHIL # BLD AUTO: 0.44 X10*3/UL (ref 0–0.4)
EOSINOPHIL # BLD AUTO: 0.46 X10*3/UL (ref 0–0.4)
EOSINOPHIL NFR BLD AUTO: 3 %
EOSINOPHIL NFR BLD AUTO: 3.9 %
ERYTHROCYTE [DISTWIDTH] IN BLOOD BY AUTOMATED COUNT: 18.6 % (ref 11.5–14.5)
ERYTHROCYTE [DISTWIDTH] IN BLOOD BY AUTOMATED COUNT: 18.7 % (ref 11.5–14.5)
GLUCOSE BLD MANUAL STRIP-MCNC: 110 MG/DL (ref 74–99)
GLUCOSE BLD MANUAL STRIP-MCNC: 141 MG/DL (ref 74–99)
GLUCOSE BLD MANUAL STRIP-MCNC: 155 MG/DL (ref 74–99)
GLUCOSE BLD MANUAL STRIP-MCNC: 164 MG/DL (ref 74–99)
GLUCOSE BLD MANUAL STRIP-MCNC: 99 MG/DL (ref 74–99)
GLUCOSE SERPL-MCNC: 106 MG/DL (ref 74–99)
GLUCOSE SERPL-MCNC: 112 MG/DL (ref 74–99)
GLUCOSE UR STRIP.AUTO-MCNC: NORMAL MG/DL
HCT VFR BLD AUTO: 34.2 % (ref 36–46)
HCT VFR BLD AUTO: 34.6 % (ref 36–46)
HGB BLD-MCNC: 10.1 G/DL (ref 12–16)
HGB BLD-MCNC: 9.9 G/DL (ref 12–16)
HOLD SPECIMEN: NORMAL
HOLD SPECIMEN: NORMAL
IMM GRANULOCYTES # BLD AUTO: 0.04 X10*3/UL (ref 0–0.5)
IMM GRANULOCYTES # BLD AUTO: 0.07 X10*3/UL (ref 0–0.5)
IMM GRANULOCYTES NFR BLD AUTO: 0.3 % (ref 0–0.9)
IMM GRANULOCYTES NFR BLD AUTO: 0.5 % (ref 0–0.9)
INR PPP: 1.1 (ref 0.9–1.1)
KETONES UR STRIP.AUTO-MCNC: NEGATIVE MG/DL
LACTATE SERPL-SCNC: 1.9 MMOL/L (ref 0.4–2)
LEUKOCYTE ESTERASE UR QL STRIP.AUTO: NEGATIVE
LYMPHOCYTES # BLD AUTO: 2.23 X10*3/UL (ref 0.8–3)
LYMPHOCYTES # BLD AUTO: 4.01 X10*3/UL (ref 0.8–3)
LYMPHOCYTES NFR BLD AUTO: 18.9 %
LYMPHOCYTES NFR BLD AUTO: 27.1 %
MAGNESIUM SERPL-MCNC: 2 MG/DL (ref 1.6–2.4)
MAGNESIUM SERPL-MCNC: 2.23 MG/DL (ref 1.6–2.4)
MCH RBC QN AUTO: 21.3 PG (ref 26–34)
MCH RBC QN AUTO: 21.4 PG (ref 26–34)
MCHC RBC AUTO-ENTMCNC: 28.6 G/DL (ref 32–36)
MCHC RBC AUTO-ENTMCNC: 29.5 G/DL (ref 32–36)
MCV RBC AUTO: 72 FL (ref 80–100)
MCV RBC AUTO: 75 FL (ref 80–100)
MONOCYTES # BLD AUTO: 1.01 X10*3/UL (ref 0.05–0.8)
MONOCYTES # BLD AUTO: 1.07 X10*3/UL (ref 0.05–0.8)
MONOCYTES NFR BLD AUTO: 6.8 %
MONOCYTES NFR BLD AUTO: 9.1 %
MUCOUS THREADS #/AREA URNS AUTO: ABNORMAL /LPF
NEUTROPHILS # BLD AUTO: 7.99 X10*3/UL (ref 1.6–5.5)
NEUTROPHILS # BLD AUTO: 9.2 X10*3/UL (ref 1.6–5.5)
NEUTROPHILS NFR BLD AUTO: 62.3 %
NEUTROPHILS NFR BLD AUTO: 67.5 %
NITRITE UR QL STRIP.AUTO: NEGATIVE
NRBC BLD-RTO: 0 /100 WBCS (ref 0–0)
NRBC BLD-RTO: 0 /100 WBCS (ref 0–0)
PH UR STRIP.AUTO: 6.5 [PH]
PHOSPHATE SERPL-MCNC: 3.2 MG/DL (ref 2.5–4.9)
PHOSPHATE SERPL-MCNC: 3.7 MG/DL (ref 2.5–4.9)
PLATELET # BLD AUTO: 298 X10*3/UL (ref 150–450)
PLATELET # BLD AUTO: 299 X10*3/UL (ref 150–450)
POTASSIUM SERPL-SCNC: 4.2 MMOL/L (ref 3.5–5.3)
POTASSIUM SERPL-SCNC: 4.3 MMOL/L (ref 3.5–5.3)
PROT SERPL-MCNC: 7.2 G/DL (ref 6.4–8.2)
PROT UR STRIP.AUTO-MCNC: NEGATIVE MG/DL
PROTHROMBIN TIME: 12.5 SECONDS (ref 9.8–12.8)
RBC # BLD AUTO: 4.64 X10*6/UL (ref 4–5.2)
RBC # BLD AUTO: 4.73 X10*6/UL (ref 4–5.2)
RBC # UR STRIP.AUTO: ABNORMAL /UL
RBC #/AREA URNS AUTO: ABNORMAL /HPF
SODIUM SERPL-SCNC: 135 MMOL/L (ref 136–145)
SODIUM SERPL-SCNC: 135 MMOL/L (ref 136–145)
SP GR UR STRIP.AUTO: 1.01
SQUAMOUS #/AREA URNS AUTO: ABNORMAL /HPF
TSH SERPL-ACNC: 3.08 MIU/L (ref 0.44–3.98)
UFH PPP CHRO-ACNC: 0.3 IU/ML
UROBILINOGEN UR STRIP.AUTO-MCNC: NORMAL MG/DL
WBC # BLD AUTO: 11.8 X10*3/UL (ref 4.4–11.3)
WBC # BLD AUTO: 14.8 X10*3/UL (ref 4.4–11.3)
WBC #/AREA URNS AUTO: ABNORMAL /HPF

## 2025-01-28 PROCEDURE — 36415 COLL VENOUS BLD VENIPUNCTURE: CPT

## 2025-01-28 PROCEDURE — 84100 ASSAY OF PHOSPHORUS: CPT

## 2025-01-28 PROCEDURE — 82550 ASSAY OF CK (CPK): CPT | Performed by: STUDENT IN AN ORGANIZED HEALTH CARE EDUCATION/TRAINING PROGRAM

## 2025-01-28 PROCEDURE — 85025 COMPLETE CBC W/AUTO DIFF WBC: CPT

## 2025-01-28 PROCEDURE — 99232 SBSQ HOSP IP/OBS MODERATE 35: CPT | Performed by: STUDENT IN AN ORGANIZED HEALTH CARE EDUCATION/TRAINING PROGRAM

## 2025-01-28 PROCEDURE — 81001 URINALYSIS AUTO W/SCOPE: CPT

## 2025-01-28 PROCEDURE — 80069 RENAL FUNCTION PANEL: CPT

## 2025-01-28 PROCEDURE — 2500000004 HC RX 250 GENERAL PHARMACY W/ HCPCS (ALT 636 FOR OP/ED)

## 2025-01-28 PROCEDURE — 70498 CT ANGIOGRAPHY NECK: CPT

## 2025-01-28 PROCEDURE — 2500000001 HC RX 250 WO HCPCS SELF ADMINISTERED DRUGS (ALT 637 FOR MEDICARE OP)

## 2025-01-28 PROCEDURE — 2500000002 HC RX 250 W HCPCS SELF ADMINISTERED DRUGS (ALT 637 FOR MEDICARE OP, ALT 636 FOR OP/ED): Performed by: STUDENT IN AN ORGANIZED HEALTH CARE EDUCATION/TRAINING PROGRAM

## 2025-01-28 PROCEDURE — 83735 ASSAY OF MAGNESIUM: CPT

## 2025-01-28 PROCEDURE — 2500000001 HC RX 250 WO HCPCS SELF ADMINISTERED DRUGS (ALT 637 FOR MEDICARE OP): Performed by: INTERNAL MEDICINE

## 2025-01-28 PROCEDURE — 85520 HEPARIN ASSAY: CPT | Performed by: STUDENT IN AN ORGANIZED HEALTH CARE EDUCATION/TRAINING PROGRAM

## 2025-01-28 PROCEDURE — 85610 PROTHROMBIN TIME: CPT

## 2025-01-28 PROCEDURE — 2550000001 HC RX 255 CONTRASTS

## 2025-01-28 PROCEDURE — 2500000004 HC RX 250 GENERAL PHARMACY W/ HCPCS (ALT 636 FOR OP/ED): Performed by: STUDENT IN AN ORGANIZED HEALTH CARE EDUCATION/TRAINING PROGRAM

## 2025-01-28 PROCEDURE — 2500000004 HC RX 250 GENERAL PHARMACY W/ HCPCS (ALT 636 FOR OP/ED): Mod: JZ

## 2025-01-28 PROCEDURE — 70498 CT ANGIOGRAPHY NECK: CPT | Performed by: RADIOLOGY

## 2025-01-28 PROCEDURE — 82947 ASSAY GLUCOSE BLOOD QUANT: CPT

## 2025-01-28 PROCEDURE — 99291 CRITICAL CARE FIRST HOUR: CPT | Performed by: STUDENT IN AN ORGANIZED HEALTH CARE EDUCATION/TRAINING PROGRAM

## 2025-01-28 PROCEDURE — 70450 CT HEAD/BRAIN W/O DYE: CPT | Performed by: RADIOLOGY

## 2025-01-28 PROCEDURE — 93005 ELECTROCARDIOGRAM TRACING: CPT

## 2025-01-28 PROCEDURE — 83605 ASSAY OF LACTIC ACID: CPT

## 2025-01-28 PROCEDURE — 70496 CT ANGIOGRAPHY HEAD: CPT | Performed by: RADIOLOGY

## 2025-01-28 PROCEDURE — 2500000002 HC RX 250 W HCPCS SELF ADMINISTERED DRUGS (ALT 637 FOR MEDICARE OP, ALT 636 FOR OP/ED)

## 2025-01-28 PROCEDURE — 84075 ASSAY ALKALINE PHOSPHATASE: CPT

## 2025-01-28 PROCEDURE — 94640 AIRWAY INHALATION TREATMENT: CPT

## 2025-01-28 PROCEDURE — 70450 CT HEAD/BRAIN W/O DYE: CPT

## 2025-01-28 PROCEDURE — 2060000001 HC INTERMEDIATE ICU ROOM DAILY

## 2025-01-28 PROCEDURE — 84443 ASSAY THYROID STIM HORMONE: CPT | Performed by: STUDENT IN AN ORGANIZED HEALTH CARE EDUCATION/TRAINING PROGRAM

## 2025-01-28 RX ORDER — METOPROLOL TARTRATE 1 MG/ML
5 INJECTION, SOLUTION INTRAVENOUS EVERY 5 MIN PRN
Status: DISCONTINUED | OUTPATIENT
Start: 2025-01-28 | End: 2025-02-02 | Stop reason: HOSPADM

## 2025-01-28 RX ORDER — LORAZEPAM 2 MG/ML
0.5 INJECTION INTRAMUSCULAR ONCE
Status: COMPLETED | OUTPATIENT
Start: 2025-01-28 | End: 2025-01-28

## 2025-01-28 RX ORDER — LABETALOL HYDROCHLORIDE 5 MG/ML
10 INJECTION, SOLUTION INTRAVENOUS ONCE
Status: COMPLETED | OUTPATIENT
Start: 2025-01-28 | End: 2025-01-28

## 2025-01-28 RX ORDER — LORAZEPAM 2 MG/ML
0.5 INJECTION INTRAMUSCULAR ONCE
Status: DISCONTINUED | OUTPATIENT
Start: 2025-01-28 | End: 2025-01-30

## 2025-01-28 RX ORDER — ADENOSINE 3 MG/ML
6 INJECTION, SOLUTION INTRAVENOUS ONCE
Status: COMPLETED | OUTPATIENT
Start: 2025-01-28 | End: 2025-01-28

## 2025-01-28 RX ORDER — HALOPERIDOL 5 MG/ML
5 INJECTION INTRAMUSCULAR ONCE
Status: COMPLETED | OUTPATIENT
Start: 2025-01-28 | End: 2025-01-28

## 2025-01-28 RX ORDER — ASPIRIN 300 MG/1
600 SUPPOSITORY RECTAL DAILY
Status: DISCONTINUED | OUTPATIENT
Start: 2025-01-28 | End: 2025-01-28

## 2025-01-28 RX ORDER — LABETALOL HYDROCHLORIDE 5 MG/ML
20 INJECTION, SOLUTION INTRAVENOUS ONCE
Status: COMPLETED | OUTPATIENT
Start: 2025-01-28 | End: 2025-01-28

## 2025-01-28 RX ORDER — NAPROXEN SODIUM 220 MG/1
324 TABLET, FILM COATED ORAL ONCE
Status: COMPLETED | OUTPATIENT
Start: 2025-01-28 | End: 2025-01-28

## 2025-01-28 RX ORDER — METOPROLOL TARTRATE 1 MG/ML
INJECTION, SOLUTION INTRAVENOUS CODE/TRAUMA/SEDATION MEDICATION
Status: COMPLETED | OUTPATIENT
Start: 2025-01-28 | End: 2025-01-28

## 2025-01-28 RX ORDER — NAPROXEN SODIUM 220 MG/1
81 TABLET, FILM COATED ORAL DAILY
Status: DISCONTINUED | OUTPATIENT
Start: 2025-01-29 | End: 2025-02-02 | Stop reason: HOSPADM

## 2025-01-28 RX ADMIN — ATORVASTATIN CALCIUM 10 MG: 10 TABLET, FILM COATED ORAL at 21:39

## 2025-01-28 RX ADMIN — HYDROMORPHONE HYDROCHLORIDE 0.2 MG: 0.2 INJECTION, SOLUTION INTRAMUSCULAR; INTRAVENOUS; SUBCUTANEOUS at 20:00

## 2025-01-28 RX ADMIN — HALOPERIDOL LACTATE 5 MG: 5 INJECTION, SOLUTION INTRAMUSCULAR at 18:28

## 2025-01-28 RX ADMIN — CIPROFLOXACIN 500 MG: 500 TABLET ORAL at 08:03

## 2025-01-28 RX ADMIN — CIPROFLOXACIN 500 MG: 500 TABLET ORAL at 21:39

## 2025-01-28 RX ADMIN — POLYETHYLENE GLYCOL 3350 17 G: 17 POWDER, FOR SOLUTION ORAL at 08:01

## 2025-01-28 RX ADMIN — METOPROLOL TARTRATE 100 MG: 100 TABLET, FILM COATED ORAL at 08:00

## 2025-01-28 RX ADMIN — MONTELUKAST 10 MG: 10 TABLET, FILM COATED ORAL at 21:40

## 2025-01-28 RX ADMIN — IPRATROPIUM BROMIDE 0.5 MG: 0.5 SOLUTION RESPIRATORY (INHALATION) at 14:40

## 2025-01-28 RX ADMIN — PANTOPRAZOLE SODIUM 40 MG: 40 TABLET, DELAYED RELEASE ORAL at 06:10

## 2025-01-28 RX ADMIN — OXYCODONE HYDROCHLORIDE 5 MG: 5 TABLET ORAL at 00:25

## 2025-01-28 RX ADMIN — LORAZEPAM 0.5 MG: 2 INJECTION, SOLUTION INTRAMUSCULAR; INTRAVENOUS at 18:15

## 2025-01-28 RX ADMIN — METOPROLOL TARTRATE 5 MG: 5 INJECTION, SOLUTION INTRAVENOUS at 16:38

## 2025-01-28 RX ADMIN — OXYCODONE HYDROCHLORIDE 5 MG: 5 TABLET ORAL at 07:52

## 2025-01-28 RX ADMIN — LEVOTHYROXINE SODIUM 100 MCG: 0.1 TABLET ORAL at 06:10

## 2025-01-28 RX ADMIN — METOPROLOL TARTRATE 100 MG: 100 TABLET, FILM COATED ORAL at 21:39

## 2025-01-28 RX ADMIN — IOHEXOL 60 ML: 350 INJECTION, SOLUTION INTRAVENOUS at 18:48

## 2025-01-28 RX ADMIN — HYDROMORPHONE HYDROCHLORIDE 0.2 MG: 0.2 INJECTION, SOLUTION INTRAMUSCULAR; INTRAVENOUS; SUBCUTANEOUS at 10:27

## 2025-01-28 RX ADMIN — DOCUSATE SODIUM 100 MG: 100 CAPSULE, LIQUID FILLED ORAL at 08:00

## 2025-01-28 RX ADMIN — LOSARTAN POTASSIUM 50 MG: 50 TABLET, FILM COATED ORAL at 08:00

## 2025-01-28 RX ADMIN — ADENOSINE 6 MG: 3 INJECTION, SOLUTION INTRAVENOUS at 18:08

## 2025-01-28 RX ADMIN — Medication 5 MG: at 21:39

## 2025-01-28 RX ADMIN — ASPIRIN 81 MG CHEWABLE TABLET 324 MG: 81 TABLET CHEWABLE at 19:55

## 2025-01-28 RX ADMIN — LABETALOL HYDROCHLORIDE 10 MG: 5 INJECTION, SOLUTION INTRAVENOUS at 17:45

## 2025-01-28 RX ADMIN — LABETALOL HYDROCHLORIDE 20 MG: 5 INJECTION, SOLUTION INTRAVENOUS at 19:55

## 2025-01-28 RX ADMIN — LORAZEPAM 0.5 MG: 2 INJECTION, SOLUTION INTRAMUSCULAR; INTRAVENOUS at 18:20

## 2025-01-28 ASSESSMENT — PAIN SCALES - GENERAL
PAINLEVEL_OUTOF10: 2
PAINLEVEL_OUTOF10: 5 - MODERATE PAIN
PAINLEVEL_OUTOF10: 4
PAINLEVEL_OUTOF10: 1
PAINLEVEL_OUTOF10: 9
PAINLEVEL_OUTOF10: 3
PAINLEVEL_OUTOF10: 4
PAINLEVEL_OUTOF10: 9
PAINLEVEL_OUTOF10: 4

## 2025-01-28 ASSESSMENT — PAIN - FUNCTIONAL ASSESSMENT
PAIN_FUNCTIONAL_ASSESSMENT: 0-10

## 2025-01-28 ASSESSMENT — COGNITIVE AND FUNCTIONAL STATUS - GENERAL
WALKING IN HOSPITAL ROOM: A LITTLE
CLIMB 3 TO 5 STEPS WITH RAILING: A LITTLE
MOBILITY SCORE: 22
DAILY ACTIVITIY SCORE: 24

## 2025-01-28 ASSESSMENT — PAIN DESCRIPTION - LOCATION
LOCATION: ABDOMEN

## 2025-01-28 ASSESSMENT — PAIN DESCRIPTION - ORIENTATION
ORIENTATION: RIGHT;LOWER
ORIENTATION: RIGHT;LOWER

## 2025-01-28 NOTE — CARE PLAN
The patient's goals for the shift include      The clinical goals for the shift include Patient safety will be maintained.    Problem: Pain - Adult  Goal: Verbalizes/displays adequate comfort level or baseline comfort level  Outcome: Progressing     Problem: Safety - Adult  Goal: Free from fall injury  Outcome: Progressing     Problem: Discharge Planning  Goal: Discharge to home or other facility with appropriate resources  Outcome: Progressing     Problem: Chronic Conditions and Co-morbidities  Goal: Patient's chronic conditions and co-morbidity symptoms are monitored and maintained or improved  Outcome: Progressing     Problem: Discharge Planning  Goal: Discharge to home or other facility with appropriate resources  Outcome: Progressing

## 2025-01-28 NOTE — SIGNIFICANT EVENT
Rapid Response Call/Brain Attack   Ms. Myrna Rodrigues is a 74 y.o. female who has a past medical history of afib s/p ablation on Eliquis, HTN, CAD, HLD, GERD, DM, SVT and hypothyroidism presented to the ED with right lower quadrant pain and palpitations.  She was found to have complicated urinary tract infection with imaging changes raising suspicion for inflammatory process versus malignancy.  Additionally, she was found to have DVT.    At 1625 is the patients last known normal. Per family, the nurse was notified The nursing staff was notified that the patient had slurred speech. Upon checking the patient, she was found confused and not following commands for which a brain attack alert was initiated.  On arrival to the patient's room, vitals at that time were /85, SpO2 100% on room air, , RR 35.  Patient was confused and not directable, thus NIHSS could not be obtained at that time however she was alert and able to her extremities but not following commands.  1 dose of Lopressor 5 mg IV was given which did not significantly affect the heart rate (heart rate went down to 148, /105).     CBC (leukocytosis with left shift 14.8, hemoglobin 9.9), CMP (sodium 135, other electrolytes were normal), Mg (2.23), PO (3.2), lactate (1.9), INR (1.1) were ordered which were not changed from morning labs when the patient was at her baseline.    EKG was positive for SVT with occasional premature ventricular complexes with no signs of ischemia.    Adenosine 6 mg was administered which converted the patient to sinus rhythm however reverted back to SVT.  Throughout the way to the CT scan she kept transitioning between SVT and sinus rhythm.    CT brain attack and without contrast was done which did not show any acute intracranial pathology with moderate degree of nonspecific white matter hypodensity consistent with chronic small vessel  ischemic disease.    Neuro telestroke was contacted who recommended CTA, heparin assay, TSH, administration of labetalol 10 mg IV.    Due to patient increased agitation she was given Ativan 0.5 IV x 2 in addition to Haldol 5 mg IM.    CT angio head and neck with and without IV contrast was done which did not show proximal large vessel occlusion based on neurologist evaluation.    Throughout the rapid the patient remained hypertensive (200-180s/100-80s) which raise suspicion for hypertensive emergency causing encephalopathy. However, stroke can't be entirely ruled out at this time. Patient is currently scheduled for MRI.    Rapid ended at 1739.    Dr. Adrianna Castro was present during the whole rapid.

## 2025-01-28 NOTE — NURSING NOTE
1630: Rapid response called due to change in mental status. Upon rapid nurse arrival patient was in SVT. EKG obtained. 5mg of IV lopressor given. Patient remained in SVT. 6mg of adenosine administered and patient converted to NSR. After stabilization patient immediately transported down for CT Head. ADT consult placed and Dr. Castro spoke with on call neurologist. Unable to calculate NIH score d/t patient not cooperating. At this patient is not a candidate for TNK. Patient remained hypertensive after CT. 10mg of IV labetalol administered. Brain attack/rapid response ended.

## 2025-01-28 NOTE — CARE PLAN
Pt alert and oriented. HR in the 150s on tele, EKG done showing SVT, pt asymptomatic. MD notified. Pt instructed to bear down with no improvement in HR. Given scheduled metoprolol and additional 5mg iv lopressor. Converted to NSR, HR remains 60-70s. Given prn oxy for complaints of right abdominal pain. Safety maintained, will continue to monitor     Problem: Pain - Adult  Goal: Verbalizes/displays adequate comfort level or baseline comfort level  Outcome: Progressing     Problem: Safety - Adult  Goal: Free from fall injury  Outcome: Progressing     Problem: Discharge Planning  Goal: Discharge to home or other facility with appropriate resources  Outcome: Progressing     Problem: Chronic Conditions and Co-morbidities  Goal: Patient's chronic conditions and co-morbidity symptoms are monitored and maintained or improved  Outcome: Progressing     Problem: Nutrition  Goal: Nutrient intake appropriate for maintaining nutritional needs  Outcome: Progressing     Problem: Diabetes  Goal: Maintain glucose levels >70mg/dl to <250mg/dl throughout shift  Outcome: Progressing     Problem: Arrythmia/Dysrhythmia  Goal: Serial ECG will return to baseline  Outcome: Progressing  Goal: Vital signs return to baseline  Outcome: Progressing

## 2025-01-28 NOTE — PROGRESS NOTES
"ID:  Myrna Rodrigues is a 74 y.o. female on hospital day 4 of admission presenting with SVT (supraventricular tachycardia) (CMS-HCC).    Subjective   Patient was seen and evaluated at bedside.  No acute events at night. She denied having any lightheadedness, dizziness, fever, SOB, chest pain, or urinary symptoms. She does endorse having right lower quadrant abdominal pain alternating between sharp and dull in quality which is aggravated by movement however improved compared to yesterday rating (3 out of 10). She still did not have any BM yet.    Objective     Visit Vitals  /70 (BP Location: Right arm, Patient Position: Lying)   Pulse 66   Temp 36.7 °C (98.1 °F) (Temporal)   Resp 16   Ht 1.575 m (5' 2\")   Wt 63.3 kg (139 lb 8.8 oz)   SpO2 95%   BMI 25.52 kg/m²   OB Status Postmenopausal   Smoking Status Never   BSA 1.66 m²        Physical Examination:  General: Not in acute distress, A&O x3, alert, coopertive, well-developed  HEENT: Normocephalic, atraumatic, EOMI, moist mucous membranes  Neck: Neck supple, trachea midline, no evidence of trauma  Cardiovascular: Heart rate regular on exam this morning, rhythm regular on exam  Respiratory: Vesicular breath sounds appreciated bilaterally, no adventitious sounds appreciated, no increased work of breathing  GI: Abdomen soft with some fullness and firmness noted in the RLQ and suprapubic region (improved compared to yesterday), nondistended, tender to palpation in RLQ, bowel sounds present  Extremities: No edema appreciated in lower extremities bilaterally, no cyanosis  Neuro: no focal deficits, strength and sensation intact bilaterally  Skin: Warm and dry, without lesions or rashes  Psychiatric: Judgment intact.  Appropriate mood, affect and behavior.      Laboratory Data:  Results for orders placed or performed during the hospital encounter of 01/23/25 (from the past 24 hours)   POCT GLUCOSE   Result Value Ref Range    POCT Glucose 95 74 - 99 mg/dL   POCT GLUCOSE "   Result Value Ref Range    POCT Glucose 133 (H) 74 - 99 mg/dL   Urinalysis with Reflex Culture and Microscopic   Result Value Ref Range    Color, Urine Light-Yellow Light-Yellow, Yellow, Dark-Yellow    Appearance, Urine Clear Clear    Specific Gravity, Urine 1.015 1.005 - 1.035    pH, Urine 6.5 5.0, 5.5, 6.0, 6.5, 7.0, 7.5, 8.0    Protein, Urine NEGATIVE NEGATIVE, 10 (TRACE), 20 (TRACE) mg/dL    Glucose, Urine Normal Normal mg/dL    Blood, Urine 0.03 (TRACE) (A) NEGATIVE    Ketones, Urine NEGATIVE NEGATIVE mg/dL    Bilirubin, Urine NEGATIVE NEGATIVE    Urobilinogen, Urine Normal Normal mg/dL    Nitrite, Urine NEGATIVE NEGATIVE    Leukocyte Esterase, Urine NEGATIVE NEGATIVE   Urinalysis Microscopic   Result Value Ref Range    WBC, Urine 1-5 1-5, NONE /HPF    RBC, Urine 11-20 (A) NONE, 1-2, 3-5 /HPF    Squamous Epithelial Cells, Urine 1-9 (SPARSE) Reference range not established. /HPF    Mucus, Urine FEW Reference range not established. /LPF   POCT GLUCOSE   Result Value Ref Range    POCT Glucose 110 (H) 74 - 99 mg/dL   CBC and Auto Differential   Result Value Ref Range    WBC 11.8 (H) 4.4 - 11.3 x10*3/uL    nRBC 0.0 0.0 - 0.0 /100 WBCs    RBC 4.73 4.00 - 5.20 x10*6/uL    Hemoglobin 10.1 (L) 12.0 - 16.0 g/dL    Hematocrit 34.2 (L) 36.0 - 46.0 %    MCV 72 (L) 80 - 100 fL    MCH 21.4 (L) 26.0 - 34.0 pg    MCHC 29.5 (L) 32.0 - 36.0 g/dL    RDW 18.6 (H) 11.5 - 14.5 %    Platelets 299 150 - 450 x10*3/uL    Neutrophils % 67.5 40.0 - 80.0 %    Immature Granulocytes %, Automated 0.3 0.0 - 0.9 %    Lymphocytes % 18.9 13.0 - 44.0 %    Monocytes % 9.1 2.0 - 10.0 %    Eosinophils % 3.9 0.0 - 6.0 %    Basophils % 0.3 0.0 - 2.0 %    Neutrophils Absolute 7.99 (H) 1.60 - 5.50 x10*3/uL    Immature Granulocytes Absolute, Automated 0.04 0.00 - 0.50 x10*3/uL    Lymphocytes Absolute 2.23 0.80 - 3.00 x10*3/uL    Monocytes Absolute 1.07 (H) 0.05 - 0.80 x10*3/uL    Eosinophils Absolute 0.46 (H) 0.00 - 0.40 x10*3/uL    Basophils Absolute  0.03 0.00 - 0.10 x10*3/uL   Magnesium   Result Value Ref Range    Magnesium 2.00 1.60 - 2.40 mg/dL   Renal function panel   Result Value Ref Range    Glucose 112 (H) 74 - 99 mg/dL    Sodium 135 (L) 136 - 145 mmol/L    Potassium 4.3 3.5 - 5.3 mmol/L    Chloride 99 98 - 107 mmol/L    Bicarbonate 28 21 - 32 mmol/L    Anion Gap 12 10 - 20 mmol/L    Urea Nitrogen 13 6 - 23 mg/dL    Creatinine 0.89 0.50 - 1.05 mg/dL    eGFR 67 >60 mL/min/1.73m*2    Calcium 9.2 8.6 - 10.3 mg/dL    Phosphorus 3.7 2.5 - 4.9 mg/dL    Albumin 3.5 3.4 - 5.0 g/dL   POCT GLUCOSE   Result Value Ref Range    POCT Glucose 141 (H) 74 - 99 mg/dL   POCT GLUCOSE   Result Value Ref Range    POCT Glucose 164 (H) 74 - 99 mg/dL     *Note: Due to a large number of results and/or encounters for the requested time period, some results have not been displayed. A complete set of results can be found in Results Review.       Imaging:  ECG 12 Lead    Result Date: 1/27/2025  Normal sinus rhythm Normal ECG When compared with ECG of 24-JAN-2025 16:10, Premature ventricular complexes are no longer Present Vent. rate has decreased BY  58 BPM QRS axis Shifted right Borderline criteria for Inferior infarct are no longer Present Nonspecific T wave abnormality now evident in Inferior leads T wave amplitude has decreased in Anterior leads      Medications:  Scheduled medications  [Held by provider] apixaban, 5 mg, oral, BID  atorvastatin, 10 mg, oral, Daily  ciprofloxacin, 500 mg, oral, q12h JANINE  docusate sodium, 100 mg, oral, Daily  insulin lispro, 0-10 Units, subcutaneous, TID AC  ipratropium, 0.5 mg, nebulization, TID  levothyroxine, 100 mcg, oral, Daily  losartan, 50 mg, oral, Daily  melatonin, 5 mg, oral, Nightly  metoprolol tartrate, 100 mg, oral, BID  metoprolol tartrate, 25 mg, oral, Once  montelukast, 10 mg, oral, Nightly  pantoprazole, 40 mg, oral, BID AC  polyethylene glycol, 17 g, oral, Daily      Continuous medications     PRN medications  PRN medications:  acetaminophen, albuterol, dextrose, dextrose, glucagon, glucagon, HYDROmorphone, ondansetron ODT **OR** ondansetron, oxyCODONE, traZODone    Assessment    Assessment & Plan   Ms. Myrna Rodrigues is a 74 y.o. female who presents with past medical history of afib s/p ablation on Eliquis, HTN, CAD, HLD, GERD, DM, SVT and hypothyroidism presented to the ED with right lower quadrant pain and palpitations.  Through chart review patient has been seen for this similar complaint several times over the last 6 weeks she has not been able to complete the necessary follow-up needed to resolve both issues.  She had did not complete her outpatient antibiotic regimen for her cystitis.  She also has not followed up with cardiology to have a stress test, echo, or Holter monitor.  Considering she has not been out of the hospital long enough to have these outpatient appointments made it will be beneficial to perform as much of the evaluation as possible while inpatient for her to avoid readmission.  She reports decreased appetite and weakness patient would benefit from a PT OT evaluation and likely placement again to avoid readmission.     Assessment & Plan  SVT (supraventricular tachycardia) (CMS-HCC)       # Complicated UTI with severe inflammatory changes on imaging  # Retroperitoneal/pelvic lymphadenopathy  # Bladder stones  Patient presents with ongoing abdominal pain, most notably in the suprapubic region as well as right lower quadrant.  Has had multiple hospital admissions recently with similar complaints.  Given the progressive nature of patient's imaging findings (comparing CTs from 1/9 and 1/24) there is concern for an ongoing inflammatory versus infectious process, or potentially a malignant process as well.  -Patient did have 2 UAs collected, 1 at PCP visit on 1/23 and an additional 1 on presentation the ED, early morning on 1/24.  --Initial POCT UA showed pink urine with trace protein, trace blood, trace ketones, 1+ bilirubin  and trace leukocytes as well as positive nitrites.  That urine culture was positive for enteric bacilli.  -Subsequent UA collected in the ED was largely unremarkable, with only 2050 leukocyte esterase noted.  That urine culture did not have any clinically significant growth.  -CRP elevated, ESR within normal limits    Plan  -Urology consulted, appreciate the recommendations - recommend intervention to further evaluate pelvic process, tentative plan for patient's urologist, Dr. Thurman, to perform procedure within the next couple of days  -Will continue to hold Eliquis at this time  -Urine culture came back positive for E. coli resistant to ampicillin.  -Per ID recommendations patient will be switched from Zosyn to ciprofloxacin for 10 days and to follow-up with Dr. Parkinson in 2 weeks  -Multimodal pain regimen  -CRP was ordered.  Came back as 5.48 trending up from 4.40  -Lactate was ordered came back as 0.9  -Will change lab check to be every other days.  -PT OT consulted and recommended low intensity continued level of care.  -Dr. Kraus was contacted and he recommended repeat urine culture at this time to evaluate for any remaining bacterial infection. UA was negative for leukocyte esterase and nitrite with no bacteria.       # SVT  # Type II MI  # H/O A-fib s/p ablation on Eliquis  # h/o CAD  Patient presented to [facility] with SVT, initial EKG with .  Subsequent EKG showed normal sinus rhythm with a UT of 94 demonstration of adenosine.  Has had hospitalizations in the past with a similar presentation, has not yet followed up with cardiology on an outpatient basis for further workup.  -Suspect that intermittent tachycardia is likely secondary to underlying infectious versus inflammatory process in the pelvis, would caution any intentional stress on her heart until further urologic workup has been completed  -Troponin 156 -> 151, likely type II MI in the setting of infectious vs inflammatory pelvic process. No  ischemic changes noted on EKG.  -Echo performed 1/24, LVEF 71%, grade 1 pattern of left ventricular diastolic filling    Plan  -Cardiology has been consulted, appreciate recommendations  -Patient will need discharged with Holter monitor, will require outpatient stress testing once acute process has improved  -Continue home Eliquis while inpatient (will need to be held 48 hours prior to urologic intervention)  -Monitor on telemetry   -Optimize electrolytes, goal K>4, Mg>2  -Dr. Stevens evaluated the patient and recommended outpatient stress test after stabilizing the patient medically.  -Due to patient having a run of SVT yesterday she received Metop 5mg IV in addition to bedtime meds.  -Oral metoprolol Increased to 100 mg BID.       # Hypothyroidism  Patient does have history of hypothyroidism, with dose adjustment within the last year. Previously on every other day 100mcg and 50 mcg dosing, recently increased to 100 mcg daily dosing on 1/10/25.   -TSH on presentation was elevated, recovering. Free T4 mildly elevated.    Plan  -Can consider repeat TFTs in 4 weeks once acute illness has resolved.   -Continue home regimen at this time     # Type 2 Diabetes Mellitus  Patient does have a history of non-insulin dependent diabetes mellitus.  Home medications include metformin.  Hemoglobin A1c was checked on 1/2/2025, 5.9.  -SSI #2  -POCT  -Hypoglycemia protocol     Chronic problems:  #HTN  #HLD  #GERD  -continue home meds as appropriate when med rec complete    Global Plan of Care  IVF: None  Diet: Cardiac  DVT prophylaxis: Home Eliquis, SCDs  Dispo: Patient will require inpatient urologic procedure after resolution of UTI, likely middle of next week  Consults: Urology, ID, cardiology, PT/OT  Code Status: Full code    Patient seen and plan of care was discussed with senior resident and the attending.  Signature: CHEO GRIDER MD  Internal Medicine PGY-1 Resident  Date: January 28, 2025

## 2025-01-28 NOTE — CARE PLAN
The patient's goals for the shift include      The clinical goals for the shift include pt will remain in NSR throughout the shift      Problem: Pain - Adult  Goal: Verbalizes/displays adequate comfort level or baseline comfort level  1/28/2025 0849 by Violet Carlton RN  Outcome: Progressing  1/27/2025 2017 by Violet Carlton RN  Outcome: Progressing     Problem: Safety - Adult  Goal: Free from fall injury  1/28/2025 0849 by Violet Carlton RN  Outcome: Progressing  1/27/2025 2017 by Violet Carlton RN  Outcome: Progressing     Problem: Discharge Planning  Goal: Discharge to home or other facility with appropriate resources  1/28/2025 0849 by Violet Carlton RN  Outcome: Progressing  1/27/2025 2017 by Violet Carlton RN  Outcome: Progressing     Problem: Chronic Conditions and Co-morbidities  Goal: Patient's chronic conditions and co-morbidity symptoms are monitored and maintained or improved  1/28/2025 0849 by Violet Carlton RN  Outcome: Progressing  1/27/2025 2017 by Violet Carlton RN  Outcome: Progressing

## 2025-01-28 NOTE — PROGRESS NOTES
INPATIENT PROGRESS NOTES    PATIENT NAME: Myrna Rodrigues    MRN: [id number]  SERVICE DATE:  1/28/2025   SERVICE TIME:  12:34 PM    SIGNATURE: Andreia Parkinson MD        ASSESSMENT :   -Urinary tract infection  -Urine culture E. coli  -Bladder calculi  -Right pelvic wall, right femoral canal and right ureter inflammation with  lymphadenopathy  -History of CAD, hypertension, diabetes, A-fib, hypothyroidism  -Presented with right lower quadrant pain        PLAN:  -Tolerating ciprofloxacin without any side effects  -Follow-up with me in 2 weeks     ID team will sign off please call back for any questions            SUBJECTIVE  Afebrile  No events overnight       OBJECTIVE  PHYSICAL EXAM:   Patient Vitals for the past 24 hrs:   BP Temp Temp src Pulse Resp SpO2 Weight   01/28/25 1200 -- -- -- 64 16 -- --   01/28/25 0800 -- -- -- 76 24 -- --   01/28/25 0752 140/67 36.2 °C (97.2 °F) Temporal 82 22 95 % --   01/28/25 0400 -- -- -- 72 17 94 % --   01/28/25 0335 145/65 36.3 °C (97.3 °F) Temporal 72 15 99 % 63.3 kg (139 lb 8.8 oz)   01/28/25 0000 -- -- -- 72 17 94 % --   01/27/25 2335 117/59 36.3 °C (97.3 °F) Temporal 72 20 95 % --   01/27/25 2135 128/60 -- -- 72 18 92 % --   01/27/25 2035 130/75 -- -- (!) 126 16 98 % --   01/27/25 2000 142/79 37 °C (98.6 °F) Temporal (!) 162 (!) 33 96 % --   01/27/25 1803 165/74 -- -- 74 19 97 % --   01/27/25 1600 (!) 183/78 -- -- 68 22 99 % --   01/27/25 1507 -- -- -- -- -- 94 % --   01/27/25 1500 149/67 36.5 °C (97.7 °F) Temporal 60 14 95 % --   01/27/25 1300 161/74 36.4 °C (97.5 °F) Temporal 78 12 99 % --         Gen: NAD        Labs:  Lab Results   Component Value Date    WBC 11.8 (H) 01/28/2025    HGB 10.1 (L) 01/28/2025    HCT 34.2 (L) 01/28/2025    MCV 72 (L) 01/28/2025     01/28/2025     Lab Results   Component Value Date    GLUCOSE 112 (H) 01/28/2025    CALCIUM 9.2 01/28/2025     (L) 01/28/2025    K 4.3 01/28/2025    CO2 28 01/28/2025    CL 99 01/28/2025    BUN 13 01/28/2025     "CREATININE 0.89 01/28/2025   ESR: --  Lab Results   Component Value Date    SEDRATE 29 01/24/2025     Lab Results   Component Value Date    CRP 5.48 (H) 01/27/2025     Lab Results   Component Value Date    ALT 8 01/23/2025    AST 10 01/23/2025    ALKPHOS 66 01/23/2025    BILITOT 0.4 01/23/2025         DATA:   Diagnostic tests reviewed for today's visit:    Labs this admission reviewed  Imagings this admission reviewed  Cultures: Reviewed        Andreia Parkinson MD.   Infectious Disease Attending            This note was partially created using voice recognition software and is inherently subject to errors including those of syntax and \"sound-alike\" substitutions which may escape proofreading. In such instances, original meaning may be extrapolated by contextual derivation       "

## 2025-01-28 NOTE — NURSING NOTE
"Patients son alerted staff that patient called out to son \"Eriberto, I dont' know where I am.\"  Upon assessment, pt not following commands, unable to speak clearly, bp elevated.  Last known well 1628. Brain attack called. See RRT flowsheet.  "

## 2025-01-28 NOTE — CONSULTS
"Consults    History Of Present Illness:  Historian:  Hospitalist provider  Myrna Rodrigues is a 74 y.o. female hospitalized for UTI and awaiting cystoscopy developed acute onset confusion. H/O A fib, eliquis is held since yesterday morning in anticipation of procedure. .  Last known well: 4:30 PM  Had stroke symptoms resolved at time of presentation: No     Prior Functional Status (Modified White Salmon Scale):  2 The patient has slight disability; unable to carry out all pre-stroke activities but able to look after self without daily help.     Stroke Risk Factors:  Hypertension and Atrial Fibrillation    Last Recorded Vitals:  Blood pressure (!) 184/81, pulse 89, temperature 36.7 °C (98.1 °F), temperature source Temporal, resp. rate 25, height 1.575 m (5' 2\"), weight 63.3 kg (139 lb 8.8 oz), SpO2 99%.      Exam:  As reported by provider - alert, moving all extremities symmetrically, incoherent speech and not following commands        Relevant Results:  LABS:  Glucose   Date Value Ref Range Status   01/28/2025 106 (H) 74 - 99 mg/dL Final     INR   Date Value Ref Range Status   01/28/2025 1.1 0.9 - 1.1 Final      Results for orders placed or performed during the hospital encounter of 01/23/25 (from the past 24 hours)   POCT GLUCOSE   Result Value Ref Range    POCT Glucose 133 (H) 74 - 99 mg/dL   Urinalysis with Reflex Culture and Microscopic   Result Value Ref Range    Color, Urine Light-Yellow Light-Yellow, Yellow, Dark-Yellow    Appearance, Urine Clear Clear    Specific Gravity, Urine 1.015 1.005 - 1.035    pH, Urine 6.5 5.0, 5.5, 6.0, 6.5, 7.0, 7.5, 8.0    Protein, Urine NEGATIVE NEGATIVE, 10 (TRACE), 20 (TRACE) mg/dL    Glucose, Urine Normal Normal mg/dL    Blood, Urine 0.03 (TRACE) (A) NEGATIVE    Ketones, Urine NEGATIVE NEGATIVE mg/dL    Bilirubin, Urine NEGATIVE NEGATIVE    Urobilinogen, Urine Normal Normal mg/dL    Nitrite, Urine NEGATIVE NEGATIVE    Leukocyte Esterase, Urine NEGATIVE NEGATIVE   Extra Urine Gray Tube "   Result Value Ref Range    Extra Tube Hold for add-ons.    Urinalysis Microscopic   Result Value Ref Range    WBC, Urine 1-5 1-5, NONE /HPF    RBC, Urine 11-20 (A) NONE, 1-2, 3-5 /HPF    Squamous Epithelial Cells, Urine 1-9 (SPARSE) Reference range not established. /HPF    Mucus, Urine FEW Reference range not established. /LPF   POCT GLUCOSE   Result Value Ref Range    POCT Glucose 110 (H) 74 - 99 mg/dL   CBC and Auto Differential   Result Value Ref Range    WBC 11.8 (H) 4.4 - 11.3 x10*3/uL    nRBC 0.0 0.0 - 0.0 /100 WBCs    RBC 4.73 4.00 - 5.20 x10*6/uL    Hemoglobin 10.1 (L) 12.0 - 16.0 g/dL    Hematocrit 34.2 (L) 36.0 - 46.0 %    MCV 72 (L) 80 - 100 fL    MCH 21.4 (L) 26.0 - 34.0 pg    MCHC 29.5 (L) 32.0 - 36.0 g/dL    RDW 18.6 (H) 11.5 - 14.5 %    Platelets 299 150 - 450 x10*3/uL    Neutrophils % 67.5 40.0 - 80.0 %    Immature Granulocytes %, Automated 0.3 0.0 - 0.9 %    Lymphocytes % 18.9 13.0 - 44.0 %    Monocytes % 9.1 2.0 - 10.0 %    Eosinophils % 3.9 0.0 - 6.0 %    Basophils % 0.3 0.0 - 2.0 %    Neutrophils Absolute 7.99 (H) 1.60 - 5.50 x10*3/uL    Immature Granulocytes Absolute, Automated 0.04 0.00 - 0.50 x10*3/uL    Lymphocytes Absolute 2.23 0.80 - 3.00 x10*3/uL    Monocytes Absolute 1.07 (H) 0.05 - 0.80 x10*3/uL    Eosinophils Absolute 0.46 (H) 0.00 - 0.40 x10*3/uL    Basophils Absolute 0.03 0.00 - 0.10 x10*3/uL   Magnesium   Result Value Ref Range    Magnesium 2.00 1.60 - 2.40 mg/dL   Renal function panel   Result Value Ref Range    Glucose 112 (H) 74 - 99 mg/dL    Sodium 135 (L) 136 - 145 mmol/L    Potassium 4.3 3.5 - 5.3 mmol/L    Chloride 99 98 - 107 mmol/L    Bicarbonate 28 21 - 32 mmol/L    Anion Gap 12 10 - 20 mmol/L    Urea Nitrogen 13 6 - 23 mg/dL    Creatinine 0.89 0.50 - 1.05 mg/dL    eGFR 67 >60 mL/min/1.73m*2    Calcium 9.2 8.6 - 10.3 mg/dL    Phosphorus 3.7 2.5 - 4.9 mg/dL    Albumin 3.5 3.4 - 5.0 g/dL   POCT GLUCOSE   Result Value Ref Range    POCT Glucose 141 (H) 74 - 99 mg/dL   POCT  GLUCOSE   Result Value Ref Range    POCT Glucose 164 (H) 74 - 99 mg/dL   POCT GLUCOSE   Result Value Ref Range    POCT Glucose 99 74 - 99 mg/dL   CBC and Auto Differential   Result Value Ref Range    WBC 14.8 (H) 4.4 - 11.3 x10*3/uL    nRBC 0.0 0.0 - 0.0 /100 WBCs    RBC 4.64 4.00 - 5.20 x10*6/uL    Hemoglobin 9.9 (L) 12.0 - 16.0 g/dL    Hematocrit 34.6 (L) 36.0 - 46.0 %    MCV 75 (L) 80 - 100 fL    MCH 21.3 (L) 26.0 - 34.0 pg    MCHC 28.6 (L) 32.0 - 36.0 g/dL    RDW 18.7 (H) 11.5 - 14.5 %    Platelets 298 150 - 450 x10*3/uL    Neutrophils % 62.3 40.0 - 80.0 %    Immature Granulocytes %, Automated 0.5 0.0 - 0.9 %    Lymphocytes % 27.1 13.0 - 44.0 %    Monocytes % 6.8 2.0 - 10.0 %    Eosinophils % 3.0 0.0 - 6.0 %    Basophils % 0.3 0.0 - 2.0 %    Neutrophils Absolute 9.20 (H) 1.60 - 5.50 x10*3/uL    Immature Granulocytes Absolute, Automated 0.07 0.00 - 0.50 x10*3/uL    Lymphocytes Absolute 4.01 (H) 0.80 - 3.00 x10*3/uL    Monocytes Absolute 1.01 (H) 0.05 - 0.80 x10*3/uL    Eosinophils Absolute 0.44 (H) 0.00 - 0.40 x10*3/uL    Basophils Absolute 0.04 0.00 - 0.10 x10*3/uL   Comprehensive metabolic panel   Result Value Ref Range    Glucose 106 (H) 74 - 99 mg/dL    Sodium 135 (L) 136 - 145 mmol/L    Potassium 4.2 3.5 - 5.3 mmol/L    Chloride 99 98 - 107 mmol/L    Bicarbonate 23 21 - 32 mmol/L    Anion Gap 17 10 - 20 mmol/L    Urea Nitrogen 13 6 - 23 mg/dL    Creatinine 0.86 0.50 - 1.05 mg/dL    eGFR 70 >60 mL/min/1.73m*2    Calcium 9.4 8.6 - 10.3 mg/dL    Albumin 3.6 3.4 - 5.0 g/dL    Alkaline Phosphatase 68 33 - 136 U/L    Total Protein 7.2 6.4 - 8.2 g/dL    AST 13 9 - 39 U/L    Bilirubin, Total 0.4 0.0 - 1.2 mg/dL    ALT 6 (L) 7 - 45 U/L   Magnesium   Result Value Ref Range    Magnesium 2.23 1.60 - 2.40 mg/dL   Protime-INR   Result Value Ref Range    Protime 12.5 9.8 - 12.8 seconds    INR 1.1 0.9 - 1.1   Phosphorus   Result Value Ref Range    Phosphorus 3.2 2.5 - 4.9 mg/dL   Heparin Assay   Result Value Ref Range     Heparin Unfractionated 0.3 See Comment Below for Therapeutic Ranges IU/mL   TSH with reflex to Free T4 if abnormal   Result Value Ref Range    Thyroid Stimulating Hormone 3.08 0.44 - 3.98 mIU/L   Lactate   Result Value Ref Range    Lactate 1.9 0.4 - 2.0 mmol/L     *Note: Due to a large number of results and/or encounters for the requested time period, some results have not been displayed. A complete set of results can be found in Results Review.        CT Head Imaging:  CTH imaging personally reviewed, showed no acute ischemic / hemorrhagic changes     CTA Head and Neck Imaging:  CTA head and neck imaging personally reviewed, no large vessel occlusion or severe stenosis seen      Diagnosis:  Acute ischemic stroke vs Hypertensive encephalopathy vs Delirium    IV Thrombolysis IV Thrombolysis Checklist      IV Thrombolysis Given: No; Thrombolysis contraindication reason: Patient receiving direct thrombin inhibitors or direct Factor Xa inhibitors  <48 hrs ago.     Anti Xa 0.3 indicating coagulopathy.       Patient is a candidate for thrombectomy:  yes/no: No; contraindication reason: No evidence of proximal occlusion    Additional Recommendations:  Stroke workup  MRI brain w/o contrast stroke protocol  EKG, troponin  Utox  TTE w/ bubble study  Lipid panel, A1c  Continue eliquis if MRI brain shows no stroke or small stroke burden.   Blood pressure goals: avoid hypotension SBP <100 that could worsen cerebral perfusion. Cautious lowering by 15-20% in next 24 hrs  Glucose Goals: early treatment of hyperglycemia to goal glucose 140-180 mg/dl with long-term goal A1c < 7%   NPO until nurse bedside swallow assessment   Consider focused stroke order set   Smoking Cessation and Education  Assessment for Rehabilitation needs   Patient and family education on signs and symptoms of stroke, calling 911, healthy strategies for stroke prevention.      Disposition:  Patient will remain at referring facility for further evaluation and  management.    Virtual or Telephone Consent    A telephone visit (audio only) between the Hospitalist provider (at the originating site) and the provider (at the distant site) was utilized to provide this telehealth service.     Virtual Visit start time: 1/28/2024 0520PM    Christiano Park MD

## 2025-01-29 ENCOUNTER — APPOINTMENT (OUTPATIENT)
Dept: PRIMARY CARE | Facility: CLINIC | Age: 75
End: 2025-01-29
Payer: MEDICARE

## 2025-01-29 ENCOUNTER — APPOINTMENT (OUTPATIENT)
Dept: RADIOLOGY | Facility: HOSPITAL | Age: 75
DRG: 689 | End: 2025-01-29
Payer: MEDICARE

## 2025-01-29 LAB
ALBUMIN SERPL BCP-MCNC: 3.3 G/DL (ref 3.4–5)
ANION GAP SERPL CALC-SCNC: 13 MMOL/L (ref 10–20)
BASOPHILS # BLD AUTO: 0.04 X10*3/UL (ref 0–0.1)
BASOPHILS NFR BLD AUTO: 0.3 %
BUN SERPL-MCNC: 12 MG/DL (ref 6–23)
CALCIUM SERPL-MCNC: 9 MG/DL (ref 8.6–10.3)
CHLORIDE SERPL-SCNC: 100 MMOL/L (ref 98–107)
CO2 SERPL-SCNC: 27 MMOL/L (ref 21–32)
CREAT SERPL-MCNC: 0.83 MG/DL (ref 0.5–1.05)
CRP SERPL-MCNC: 4.78 MG/DL
EGFRCR SERPLBLD CKD-EPI 2021: 73 ML/MIN/1.73M*2
EOSINOPHIL # BLD AUTO: 0.57 X10*3/UL (ref 0–0.4)
EOSINOPHIL NFR BLD AUTO: 4.9 %
ERYTHROCYTE [DISTWIDTH] IN BLOOD BY AUTOMATED COUNT: 18.5 % (ref 11.5–14.5)
GLUCOSE BLD MANUAL STRIP-MCNC: 107 MG/DL (ref 74–99)
GLUCOSE BLD MANUAL STRIP-MCNC: 111 MG/DL (ref 74–99)
GLUCOSE BLD MANUAL STRIP-MCNC: 192 MG/DL (ref 74–99)
GLUCOSE BLD MANUAL STRIP-MCNC: 96 MG/DL (ref 74–99)
GLUCOSE SERPL-MCNC: 113 MG/DL (ref 74–99)
HCT VFR BLD AUTO: 30.6 % (ref 36–46)
HGB BLD-MCNC: 9 G/DL (ref 12–16)
IMM GRANULOCYTES # BLD AUTO: 0.05 X10*3/UL (ref 0–0.5)
IMM GRANULOCYTES NFR BLD AUTO: 0.4 % (ref 0–0.9)
LYMPHOCYTES # BLD AUTO: 2.15 X10*3/UL (ref 0.8–3)
LYMPHOCYTES NFR BLD AUTO: 18.6 %
MAGNESIUM SERPL-MCNC: 2.16 MG/DL (ref 1.6–2.4)
MCH RBC QN AUTO: 20.9 PG (ref 26–34)
MCHC RBC AUTO-ENTMCNC: 29.4 G/DL (ref 32–36)
MCV RBC AUTO: 71 FL (ref 80–100)
MONOCYTES # BLD AUTO: 1.03 X10*3/UL (ref 0.05–0.8)
MONOCYTES NFR BLD AUTO: 8.9 %
NEUTROPHILS # BLD AUTO: 7.74 X10*3/UL (ref 1.6–5.5)
NEUTROPHILS NFR BLD AUTO: 66.9 %
NRBC BLD-RTO: 0 /100 WBCS (ref 0–0)
PHOSPHATE SERPL-MCNC: 3.9 MG/DL (ref 2.5–4.9)
PLATELET # BLD AUTO: 297 X10*3/UL (ref 150–450)
POTASSIUM SERPL-SCNC: 3.9 MMOL/L (ref 3.5–5.3)
RBC # BLD AUTO: 4.3 X10*6/UL (ref 4–5.2)
SODIUM SERPL-SCNC: 136 MMOL/L (ref 136–145)
UFH PPP CHRO-ACNC: 0.4 IU/ML
WBC # BLD AUTO: 11.6 X10*3/UL (ref 4.4–11.3)

## 2025-01-29 PROCEDURE — 36415 COLL VENOUS BLD VENIPUNCTURE: CPT

## 2025-01-29 PROCEDURE — 85025 COMPLETE CBC W/AUTO DIFF WBC: CPT

## 2025-01-29 PROCEDURE — 99223 1ST HOSP IP/OBS HIGH 75: CPT

## 2025-01-29 PROCEDURE — 2500000002 HC RX 250 W HCPCS SELF ADMINISTERED DRUGS (ALT 637 FOR MEDICARE OP, ALT 636 FOR OP/ED): Performed by: STUDENT IN AN ORGANIZED HEALTH CARE EDUCATION/TRAINING PROGRAM

## 2025-01-29 PROCEDURE — 2500000001 HC RX 250 WO HCPCS SELF ADMINISTERED DRUGS (ALT 637 FOR MEDICARE OP)

## 2025-01-29 PROCEDURE — 83735 ASSAY OF MAGNESIUM: CPT

## 2025-01-29 PROCEDURE — 87040 BLOOD CULTURE FOR BACTERIA: CPT | Mod: STJLAB

## 2025-01-29 PROCEDURE — 86140 C-REACTIVE PROTEIN: CPT | Performed by: STUDENT IN AN ORGANIZED HEALTH CARE EDUCATION/TRAINING PROGRAM

## 2025-01-29 PROCEDURE — 99233 SBSQ HOSP IP/OBS HIGH 50: CPT | Performed by: STUDENT IN AN ORGANIZED HEALTH CARE EDUCATION/TRAINING PROGRAM

## 2025-01-29 PROCEDURE — 2500000001 HC RX 250 WO HCPCS SELF ADMINISTERED DRUGS (ALT 637 FOR MEDICARE OP): Performed by: INTERNAL MEDICINE

## 2025-01-29 PROCEDURE — 2500000004 HC RX 250 GENERAL PHARMACY W/ HCPCS (ALT 636 FOR OP/ED): Performed by: STUDENT IN AN ORGANIZED HEALTH CARE EDUCATION/TRAINING PROGRAM

## 2025-01-29 PROCEDURE — 70551 MRI BRAIN STEM W/O DYE: CPT | Performed by: RADIOLOGY

## 2025-01-29 PROCEDURE — 2550000001 HC RX 255 CONTRASTS: Performed by: STUDENT IN AN ORGANIZED HEALTH CARE EDUCATION/TRAINING PROGRAM

## 2025-01-29 PROCEDURE — 70551 MRI BRAIN STEM W/O DYE: CPT

## 2025-01-29 PROCEDURE — 2500000004 HC RX 250 GENERAL PHARMACY W/ HCPCS (ALT 636 FOR OP/ED)

## 2025-01-29 PROCEDURE — 82947 ASSAY GLUCOSE BLOOD QUANT: CPT

## 2025-01-29 PROCEDURE — 2060000001 HC INTERMEDIATE ICU ROOM DAILY

## 2025-01-29 PROCEDURE — 99222 1ST HOSP IP/OBS MODERATE 55: CPT | Performed by: UROLOGY

## 2025-01-29 PROCEDURE — 74177 CT ABD & PELVIS W/CONTRAST: CPT

## 2025-01-29 PROCEDURE — 85520 HEPARIN ASSAY: CPT | Performed by: STUDENT IN AN ORGANIZED HEALTH CARE EDUCATION/TRAINING PROGRAM

## 2025-01-29 PROCEDURE — 2500000002 HC RX 250 W HCPCS SELF ADMINISTERED DRUGS (ALT 637 FOR MEDICARE OP, ALT 636 FOR OP/ED)

## 2025-01-29 PROCEDURE — 80069 RENAL FUNCTION PANEL: CPT

## 2025-01-29 PROCEDURE — 94640 AIRWAY INHALATION TREATMENT: CPT

## 2025-01-29 RX ORDER — HYDRALAZINE HYDROCHLORIDE 20 MG/ML
10 INJECTION INTRAMUSCULAR; INTRAVENOUS
Status: ACTIVE | OUTPATIENT
Start: 2025-01-29 | End: 2025-01-31

## 2025-01-29 RX ORDER — HEPARIN SODIUM 10000 [USP'U]/100ML
0-4000 INJECTION, SOLUTION INTRAVENOUS CONTINUOUS
Status: DISCONTINUED | OUTPATIENT
Start: 2025-01-29 | End: 2025-01-29

## 2025-01-29 RX ORDER — LORAZEPAM 2 MG/ML
0.5 INJECTION INTRAMUSCULAR ONCE
Status: COMPLETED | OUTPATIENT
Start: 2025-01-30 | End: 2025-01-29

## 2025-01-29 RX ORDER — HYDRALAZINE HYDROCHLORIDE 25 MG/1
25 TABLET, FILM COATED ORAL EVERY 6 HOURS PRN
Status: DISCONTINUED | OUTPATIENT
Start: 2025-01-31 | End: 2025-02-02 | Stop reason: HOSPADM

## 2025-01-29 RX ORDER — LABETALOL HYDROCHLORIDE 5 MG/ML
10 INJECTION, SOLUTION INTRAVENOUS EVERY 10 MIN PRN
Status: ACTIVE | OUTPATIENT
Start: 2025-01-29 | End: 2025-01-31

## 2025-01-29 RX ORDER — ATORVASTATIN CALCIUM 40 MG/1
40 TABLET, FILM COATED ORAL NIGHTLY
Status: DISCONTINUED | OUTPATIENT
Start: 2025-01-29 | End: 2025-02-02 | Stop reason: HOSPADM

## 2025-01-29 RX ORDER — HEPARIN SODIUM 10000 [USP'U]/100ML
0-4000 INJECTION, SOLUTION INTRAVENOUS CONTINUOUS
Status: DISCONTINUED | OUTPATIENT
Start: 2025-01-29 | End: 2025-01-31

## 2025-01-29 RX ADMIN — HEPARIN SODIUM 800 UNITS/HR: 10000 INJECTION, SOLUTION INTRAVENOUS at 17:02

## 2025-01-29 RX ADMIN — DOCUSATE SODIUM 100 MG: 100 CAPSULE, LIQUID FILLED ORAL at 09:55

## 2025-01-29 RX ADMIN — METOPROLOL TARTRATE 100 MG: 100 TABLET, FILM COATED ORAL at 21:40

## 2025-01-29 RX ADMIN — IPRATROPIUM BROMIDE 0.5 MG: 0.5 SOLUTION RESPIRATORY (INHALATION) at 14:50

## 2025-01-29 RX ADMIN — LEVOTHYROXINE SODIUM 100 MCG: 0.1 TABLET ORAL at 06:29

## 2025-01-29 RX ADMIN — CIPROFLOXACIN 500 MG: 500 TABLET ORAL at 21:40

## 2025-01-29 RX ADMIN — PANTOPRAZOLE SODIUM 40 MG: 40 TABLET, DELAYED RELEASE ORAL at 06:29

## 2025-01-29 RX ADMIN — METOPROLOL TARTRATE 100 MG: 100 TABLET, FILM COATED ORAL at 09:56

## 2025-01-29 RX ADMIN — IPRATROPIUM BROMIDE 0.5 MG: 0.5 SOLUTION RESPIRATORY (INHALATION) at 20:07

## 2025-01-29 RX ADMIN — PANTOPRAZOLE SODIUM 40 MG: 40 TABLET, DELAYED RELEASE ORAL at 17:20

## 2025-01-29 RX ADMIN — ASPIRIN 81 MG CHEWABLE TABLET 81 MG: 81 TABLET CHEWABLE at 09:55

## 2025-01-29 RX ADMIN — IPRATROPIUM BROMIDE 0.5 MG: 0.5 SOLUTION RESPIRATORY (INHALATION) at 10:34

## 2025-01-29 RX ADMIN — LORAZEPAM 0.5 MG: 2 INJECTION INTRAMUSCULAR; INTRAVENOUS at 23:40

## 2025-01-29 RX ADMIN — MONTELUKAST 10 MG: 10 TABLET, FILM COATED ORAL at 21:40

## 2025-01-29 RX ADMIN — OXYCODONE HYDROCHLORIDE 5 MG: 5 TABLET ORAL at 06:30

## 2025-01-29 RX ADMIN — LOSARTAN POTASSIUM 50 MG: 50 TABLET, FILM COATED ORAL at 09:56

## 2025-01-29 RX ADMIN — IOHEXOL 75 ML: 350 INJECTION, SOLUTION INTRAVENOUS at 09:18

## 2025-01-29 RX ADMIN — CIPROFLOXACIN 500 MG: 500 TABLET ORAL at 09:55

## 2025-01-29 RX ADMIN — ATORVASTATIN CALCIUM 40 MG: 40 TABLET, FILM COATED ORAL at 21:40

## 2025-01-29 RX ADMIN — Medication 5 MG: at 21:40

## 2025-01-29 RX ADMIN — SODIUM CHLORIDE, POTASSIUM CHLORIDE, SODIUM LACTATE AND CALCIUM CHLORIDE 500 ML: 600; 310; 30; 20 INJECTION, SOLUTION INTRAVENOUS at 09:56

## 2025-01-29 RX ADMIN — TRAZODONE HYDROCHLORIDE 50 MG: 50 TABLET ORAL at 23:44

## 2025-01-29 ASSESSMENT — PAIN SCALES - GENERAL
PAINLEVEL_OUTOF10: 6
PAINLEVEL_OUTOF10: 0 - NO PAIN
PAINLEVEL_OUTOF10: 3
PAINLEVEL_OUTOF10: 2

## 2025-01-29 ASSESSMENT — VISUAL ACUITY: VA_NORMAL: 1

## 2025-01-29 ASSESSMENT — COGNITIVE AND FUNCTIONAL STATUS - GENERAL
DAILY ACTIVITIY SCORE: 24
WALKING IN HOSPITAL ROOM: A LITTLE
MOBILITY SCORE: 22
DAILY ACTIVITIY SCORE: 24
CLIMB 3 TO 5 STEPS WITH RAILING: A LITTLE
MOBILITY SCORE: 22
WALKING IN HOSPITAL ROOM: A LITTLE
CLIMB 3 TO 5 STEPS WITH RAILING: A LITTLE

## 2025-01-29 ASSESSMENT — PAIN DESCRIPTION - LOCATION: LOCATION: ABDOMEN

## 2025-01-29 ASSESSMENT — PAIN - FUNCTIONAL ASSESSMENT
PAIN_FUNCTIONAL_ASSESSMENT: 0-10

## 2025-01-29 ASSESSMENT — PAIN DESCRIPTION - ORIENTATION: ORIENTATION: RIGHT;LOWER

## 2025-01-29 NOTE — CONSULTS
"Inpatient consult to Neurology  Consult performed by: Inocente Hayes DO  Consult ordered by: Adrianna Castro DO        History Of Present Illness  Myrna Rodrigues is a 74 y.o. female who initially presented to [facility] ED 1/24/25 with complaints of R and L LQ pain and palpitations. Admitted with tachycardia in 150s with EKG showing SVT. Recently diagnosed with kidney stones. Recent admission at [facility] 1/12-1/14 for SVT/A-fib, found to have c/f nephroureterolithiasis, however, documentation in H&P states urology was unable to see the patient. Unfortunately, readmitted again to [facility] 1/15-1/17 for palpitations, where during this time, betablocker increased, and she was discharged on Holter monitor with referral to cardiology to complete stress test. This had not been completed upon admission to [facility]. At [facility], CT abdomen/pelvis showing bladder calculi, new from prior and c/f for cystitis.     Currently hospitalized with complicated UTI and SVT. She also has known h/o a-fib with prior cardioversion 1/2024 and h/o ablation. She's been managed with eliquis, which was stopped on day of admission for 3 doses given plan for urologic intervention.     BAT called 1/28 for acute confusion. LNW 4:30 PM. Telestroke neurologist contacted, documenting symptoms had not resolved at the time of consultation. Vitals noted to be 184/81, P89 and afebrile. Exam was noted to be \"alert, moving all extremities symmetrically, incoherent speech and not following commands\" per telestroke neurologist documentation 1/28/24. CTH negative for acute hemorrhagic changes. Recommended MRI brain, utox, TTE w/ bubble, lipid panel and A1c with continuation of eliquis once acute illness/surgery resolved/completed.     TTE done 1/24/25:    1. Left ventricular ejection fraction is normal, calculated by Rangel's biplane at 71%.   2. Spectral Doppler shows a Grade I (impaired relaxation pattern) of left ventricular diastolic filling with normal left atrial " "filling pressure.   3. There is normal right ventricular global systolic function.   4. The left atrium is mild to moderately dilated.   5. Mild to moderate aortic valve regurgitation.    CT BAT 1/28: negative for acute hemorrhage. Positive for chronic small vessel ischemic disease    CTA H/N 1/28: Negative for major vessel occlusion    MRI brain w/o 1/29: multiple small patchy foci of abnormally restricted diffusion in b/l cerebral hemispheres. C/f cardio-embolic CVAs. Some ventriculomegaly. Small vessel ischemic disease    PMH: Afib s/p ablation on Eliquis, HTN, CAD (LAD stents), HLD, GERD, DM, SVT and hypothyroidism     Patient seen and examined at bedside.  She is resting comfortably in hospital bed saturating appropriate on room air.  She is ANO x 3 and answers questions appropriately.  She is currently complaining of no symptoms.  Son at bedside, Eriberto, does have concerns that his mom appears more \"foggy\" and not like her normal self.  At baseline, she is ANO x 3, conversant, drives on her own, completes ADLs without difficulty.  Discussion had with patient and son at bedside regarding results of most recent MRI.  All questions were answered.      Past Medical History  Past Medical History:   Diagnosis Date    Anxiety and depression     Arrhythmia     Asthma     Cancer (Multi)     Coronary artery disease     Diabetes mellitus (Multi)     Disease of thyroid gland     GERD (gastroesophageal reflux disease)     Heart disease     Hyperlipidemia     Hypertension     Irregular heart beat     Occipital neuralgia 06/10/2022    Occipital neuralgia of right side    Personal history of malignant neoplasm, unspecified 10/04/2021    History of malignant neoplasm    Personal history of other diseases of the circulatory system     History of hypertension    Personal history of other diseases of the circulatory system 10/04/2021    History of essential hypertension    Personal history of other diseases of the circulatory " system     History of coronary artery disease    Personal history of other diseases of the digestive system 03/11/2020    History of chronic constipation    Personal history of other diseases of the musculoskeletal system and connective tissue     History of arthritis    Personal history of other diseases of the nervous system and sense organs 10/04/2021    History of eye problem    Personal history of other diseases of the respiratory system     History of asthma    Personal history of other specified conditions 10/04/2021    History of chest pain    PONV (postoperative nausea and vomiting)     Stool incontinence     Thyroid cancer (Multi)     Urinary incontinence      Surgical History  Past Surgical History:   Procedure Laterality Date    APPENDECTOMY  08/13/2015    Appendectomy    BREAST LUMPECTOMY  08/13/2015    Breast Surgery Lumpectomy    CARPAL TUNNEL RELEASE  08/13/2015    Neuroplasty Decompression Median Nerve At Carpal Tunnel    GALLBLADDER SURGERY  08/13/2015    Gallbladder Surgery    HYSTERECTOMY  08/13/2015    Hysterectomy    KNEE ARTHROSCOPY W/ MENISCAL REPAIR  08/13/2015    Knee Arthroscopy With Medial Meniscus Repair    KNEE SURGERY  08/13/2015    Knee Surgery    MR HEAD ANGIO WO IV CONTRAST  09/05/2021    MR HEAD ANGIO WO IV CONTRAST 9/5/2021 [facility]    OTHER SURGICAL HISTORY  08/13/2015    Shoulder Surgery Left    OTHER SURGICAL HISTORY  08/13/2015    Repair Of Bladder Reconstruction    OTHER SURGICAL HISTORY  09/20/2021    Breast surgery    OTHER SURGICAL HISTORY  09/20/2021    Bladder surgery    OTHER SURGICAL HISTORY  09/20/2021    Complete colonoscopy    OTHER SURGICAL HISTORY  09/20/2021    Shoulder surgery    OTHER SURGICAL HISTORY  09/20/2021    Foot surgery    OTHER SURGICAL HISTORY  09/20/2021    Percutaneous transluminal coronary angioplasty    OTHER SURGICAL HISTORY  10/11/2021    Thyroidectomy total    THYROIDECTOMY      TOTAL KNEE ARTHROPLASTY  08/13/2015    Knee  Replacement     Social History  Social History     Tobacco Use    Smoking status: Never    Smokeless tobacco: Never   Vaping Use    Vaping status: Never Used   Substance Use Topics    Alcohol use: Not Currently    Drug use: Never     Allergies  Patient has no known allergies.  Home Medications  Medications Prior to Admission   Medication Sig Dispense Refill Last Dose/Taking    amoxicillin-pot clavulanate (Augmentin) 875-125 mg tablet Take 1 tablet (875 mg) by mouth 2 times a day for 7 days. 14 tablet 0 2025 Bedtime    atorvastatin (Lipitor) 10 mg tablet TAKE 1 TABLET BY MOUTH DAILY 90 tablet 3 2025 Morning    cholecalciferol (Vitamin D-3) 25 MCG (1000 UT) tablet Take 1 tablet (1,000 Units) by mouth once daily.   2025 Morning    [] dicyclomine (Bentyl) 10 mg capsule Take 2 capsules (20 mg) by mouth 4 times a day as needed (abdominal discomfort or cramping) for up to 5 days. 20 capsule 0 Past Week    diphenoxylate-atropine (Lomotil) 2.5-0.025 mg tablet Take 1 tablet by mouth 4 times a day as needed for diarrhea. 24 tablet 0 Past Week    Eliquis 5 mg tablet TAKE 1 TABLET BY MOUTH TWICE  DAILY 180 tablet 3 2025 Bedtime    estradiol (Estrace) 0.01 % (0.1 mg/gram) vaginal cream Apply pea size amount 0.5 gram to vaginal opening with finger daily for 2 weeks, then 2-3 times per week. 42.5 g 5 Past Week    levothyroxine (Synthroid, Levoxyl) 100 mcg tablet Take 1 tablet (100 mcg) by mouth early in the morning..   2025 Morning    losartan (Cozaar) 50 mg tablet Take 1 tablet (50 mg) by mouth once daily. 30 tablet 0 2025 Morning    magnesium oxide (Mag-Ox) 400 mg (241.3 mg magnesium) tablet Take 1 tablet (400 mg) by mouth once daily. 30 tablet 3 2025 Morning    metFORMIN XR (Glucophage-XR) 500 mg 24 hr tablet Take 1 tablet (500 mg) by mouth 2 times a day.   2025 Bedtime    metoprolol tartrate (Lopressor) 75 mg tablet Take 1 tablet (75 mg) by mouth 2 times a day. 60 tablet 11  2025 Bedtime    montelukast (Singulair) 10 mg tablet TAKE 1 TABLET BY MOUTH AT  BEDTIME 90 tablet 3 2025 Bedtime    omeprazole (PriLOSEC) 40 mg DR capsule TAKE 1 CAPSULE BY MOUTH TWICE  DAILY 180 capsule 3 2025 Bedtime    potassium gluconate 595 mg (99 mg) tablet Take 1 tablet by mouth once daily.   2025 Morning    tiotropium (Spiriva Respimat) 2.5 mcg/actuation inhaler Inhale 2 puffs once daily.   2025 Morning    traZODone (Desyrel) 50 mg tablet Take 1-3 tablets ( mg) by mouth as needed at bedtime for sleep.   Past Week    Ventolin HFA 90 mcg/actuation inhaler USE 2 INHALATIONS BY MOUTH EVERY 4 HOURS AS NEEDED 108 g 3 Past Week    acetaminophen (Tylenol) 500 mg tablet Take 2 tablets (1,000 mg) by mouth every 6 hours. (Patient not taking: Reported on 2025) 30 tablet 0 Not Taking    [] nitrofurantoin, macrocrystal-monohydrate, (Macrobid) 100 mg capsule Take 1 capsule (100 mg) by mouth 2 times a day for 6 doses. (Patient not taking: Reported on 2025) 6 capsule 0 Not Taking       Neurological Exam  Mental Status  Awake, alert and oriented to person, place and time.    Cranial Nerves  CN II: Visual acuity is normal. Visual fields full to confrontation.  CN III, IV, VI: Extraocular movements intact bilaterally. Normal lids and orbits bilaterally. Pupils equal round and reactive to light bilaterally.  CN V: Facial sensation is normal.  CN VII: Full and symmetric facial movement.  CN VIII: Hearing is normal.  CN IX, X: Palate elevates symmetrically. Normal gag reflex.  CN XI: Shoulder shrug strength is normal.  CN XII: Tongue midline without atrophy or fasciculations.    Motor   Strength is 5/5 throughout all four extremities.    Sensory  Sensation is intact to light touch, pinprick, vibration and proprioception in all four extremities.    Coordination    Finger-to-nose, rapid alternating movements and heel-to-shin normal bilaterally without dysmetria.    Physical Exam  Eyes:  "     General: Lids are normal.      Extraocular Movements: Extraocular movements intact.      Pupils: Pupils are equal, round, and reactive to light.   Neurological:      Motor: Motor strength is normal.     Coordination: Coordination is intact.         Last Recorded Vitals  Blood pressure 158/73, pulse 64, temperature 36.4 °C (97.5 °F), temperature source Temporal, resp. rate 25, height 1.575 m (5' 2\"), weight 63.3 kg (139 lb 8.8 oz), SpO2 97%.        Relevant Results                Dahlen Coma Scale  Best Eye Response: Spontaneous  Best Verbal Response: Confused  Best Motor Response: Follows commands  Belinda Coma Scale Score: 14                MR brain wo IV contrast    Result Date: 1/29/2025  Interpreted By:  Nicolas Sarmiento, STUDY: MR BRAIN WO IV CONTRAST;  1/29/2025 2:34 pm   INDICATION: Signs/Symptoms:aphasia, history of PAF with interruption in anticoagulation.     COMPARISON: CT head and CTA head yesterday.   ACCESSION NUMBER(S): [id number]   ORDERING CLINICIAN: TIGIST KEYS   TECHNIQUE: Patient could only tolerate limited examination. Axial DWI, ADC, sagittal and axial T1 weighted sequences were obtained.   FINDINGS: Multiple small patchy foci of abnormally restricted diffusion are seen in the bilateral cerebral hemispheres predominantly each centrum semiovale and oriented in AP direction with some additional involvement of the anterior right middle frontal gyrus cortex, the posterior right corpus callosum, paramedian right occipital lobe. Questionable additional involvement of left-greater-than-right cerebellar hemispheres.   No definite intracranial hemorrhage.     No extra-axial fluid collection. No intracranial mass.   Mild to moderate white matter T2 signal increase, most commonly seen with chronic small vessel ischemic change.  Presence of older small infarctions in these areas not excluded.   Degree of ventriculomegaly is commensurate with overall degree of brain parenchymal volume loss, which " is mild.       1. Multiple small patchy foci of abnormally restricted diffusion are seen in the bilateral cerebral hemispheres predominantly each centrum semiovale and oriented in AP direction with some additional involvement of the anterior right middle frontal gyrus cortex, the posterior right corpus callosum, paramedian right occipital lobe. Questionable additional involvement of left-greater-than-right cerebellar hemispheres. Findings consistent with acute to subacute infarctions. The AP orientation of the high bilateral cerebral hemispheres could indicate a recent hypotensive event. 2. Mild to moderate white matter T2 signal increase, most commonly seen with chronic small vessel ischemic change.  Presence of older small infarctions in these areas not excluded. 3. Degree of ventriculomegaly is commensurate with overall degree of brain parenchymal volume loss, which is mild.   Note the patient was only able to tolerate limited examination as described above.   MACRO: None   Signed by: Nicolas Sarmiento 1/29/2025 3:12 PM Dictation workstation:   [id number]   CT brain attack head wo IV contrast    Result Date: 1/28/2025  Interpreted By:  Howard Millard, STUDY: TIGIST KEYS; 1/28/2025 5:08 pm   INDICATION: Signs/Symptoms:brain attack;   COMPARISON: 01/28/2024   ACCESSION NUMBER(S): [id number]   ORDERING CLINICIAN: TIGIST KEYS   TECHNIQUE: Contiguous axial images were acquired from the vertex through the posterior fossa without IV contrast. All CT examinations are performed with 1 or more of the following dose reduction techniques: Automated exposure control, adjustment of mA and/or kv according to patient's size, or use of iterative reconstruction techniques.   FINDINGS: No focal mass effect or midline shift is identified. The ventricles and sulci are symmetric and appropriate for the patient's age.   The gray white matter differentiation is preserved. Again seen is a moderate degree of nonspecific white  matter hypodensity, most consistent with chronic small-vessel ischemic disease, not significantly changed from the prior study.   No acute intracranial hemorrhage is seen. No intra-axial or extra-axial fluid collection is seen.   The visualized paranasal sinuses and mastoid air cells are clear. There is a small-moderate sized mucous retention cysts in the visualized left maxillary sinus.       No CT evidence for acute intracranial pathology.   Moderate degree of nonspecific white matter hypodensity, most consistent with chronic small-vessel ischemic disease, not significantly changed when compared to the prior study.         Findings discussed with the physician on the 2nd floor at 5:20 p.m. on 01/28/2025   Signed by: Howard Millard 1/28/2025 5:39 PM Dictation workstation:   [id number]   Transthoracic Echo (TTE) Complete    Result Date: 1/24/2025            [facility] [address] [facility] [address]    Tel [phone] Fax [phone] TRANSTHORACIC ECHOCARDIOGRAM REPORT Patient Name:       LISSETTE ZAVALA         Reading Physician:    [id number] Clement Stevens MD Study Date:         1/24/2025            Ordering Provider:    [id number] TIGIST KEYS MRN/PID:            [id number]             Fellow: Accession#:         [id number]         Nurse: Date of Birth/Age:  1950 / 74      Sonographer:          Flakita Quarles RDCS                     years Gender Assigned at  F                    Additional Staff: Birth: Height:             157.48 cm            Admit Date:           1/23/2025 Weight:             63.96 kg             Admission Status:     Inpatient -                                                                Priority                                                                discharge BSA / BMI:          1.65 m2 / 25.79      Department Location:  [facility] [place]                      kg/m2 Blood Pressure: 168 /70 mmHg Study Type:    TRANSTHORACIC ECHO (TTE) COMPLETE Diagnosis/ICD: Dyspnea, unspecified-R06.00 Indication:    Dyspnea CPT Codes:     Echo Complete w Full [id number] Patient History: Diabetes:          Yes Pertinent History: Dyspnea, Palpitations, CAD, A-Fib, HTN, Hyperlipidemia and                    Previous SVT. GERD; previous echocardiogram 12-. Study Detail: The following Echo studies were performed: M-Mode, 2D, Doppler and               color flow.  PHYSICIAN INTERPRETATION: Left Ventricle: Left ventricular ejection fraction is normal, calculated by Rangel's biplane at 71%. There is mild left ventricular hypertrophy. There are no regional wall motion abnormalities. The left ventricular cavity size is normal. Spectral Doppler shows a Grade I (impaired relaxation pattern) of left ventricular diastolic filling with normal left atrial filling pressure. Left Atrium: The left atrium is mild to moderately dilated. Right Ventricle: The right ventricle is upper limits of normal in size. There is normal right ventricular global systolic function. Right Atrium: The right atrium is mildly dilated. Aortic Valve: The aortic valve appears structurally normal. There is mild aortic valve cusp calcification. There is mild to moderate aortic valve regurgitation. The peak instantaneous gradient of the aortic valve is 8 mmHg. Mitral Valve: The mitral valve is normal in structure. The peak instantaneous gradient of the mitral valve is 5 mmHg. There is mild mitral valve regurgitation. Tricuspid Valve: The tricuspid valve is structurally normal. There is mild tricuspid regurgitation. Pulmonic Valve: The pulmonic valve was not assessed. There is trace to mild pulmonic valve regurgitation. Pericardium: No pericardial effusion noted. Aorta: The aortic root is normal.  CONCLUSIONS:  1. Left ventricular ejection fraction is normal, calculated by Rangel's biplane at 71%.  2.  Spectral Doppler shows a Grade I (impaired relaxation pattern) of left ventricular diastolic filling with normal left atrial filling pressure.  3. There is normal right ventricular global systolic function.  4. The left atrium is mild to moderately dilated.  5. Mild to moderate aortic valve regurgitation. QUANTITATIVE DATA SUMMARY:  2D MEASUREMENTS:            Normal Ranges: LAs:             4.50 cm    (2.7-4.0cm) IVSd:            0.86 cm    (0.6-1.1cm) LVPWd:           1.06 cm    (0.6-1.1cm) LVIDd:           5.63 cm    (3.9-5.9cm) LVIDs:           4.00 cm LV Mass Index:   127.2 g/m2 LV % FS          29.0 %  LA VOLUME:                    Normal Ranges: LA Vol A4C:        55.9 ml    (22+/-6mL/m2) LA Vol A2C:        50.6 ml LA Vol BP:         54.1 ml LA Vol Index A4C:  34.0ml/m2 LA Vol Index A2C:  30.7 ml/m2 LA Vol Index BP:   32.8 ml/m2 LA Area A4C:       20.2 cm2 LA Area A2C:       18.9 cm2 LA Major Axis A4C: 6.2 cm LA Major Axis A2C: 6.0 cm LA Volume Index:   30.9 ml/m2 LA Vol A4C:        53.0 ml LA Vol A2C:        48.0 ml LA Vol Index BSA:  30.7 ml/m2  M-MODE MEASUREMENTS:         Normal Ranges: Ao Root:             3.20 cm (2.0-3.7cm)  AORTA MEASUREMENTS:         Normal Ranges: Ao Sinus, d:        3.00 cm (2.1-3.5cm) Ao STJ, d:          2.60 cm (1.7-3.4cm) Asc Ao, d:          3.30 cm (2.1-3.4cm)  LV SYSTOLIC FUNCTION BY 2D PLANIMETRY (MOD):                      Normal Ranges: EF-A4C View:    69 % (>=55%) EF-A2C View:    75 % EF-Biplane:     71 % LV EF Reported: 71 %  LV DIASTOLIC FUNCTION:             Normal Ranges: MV Peak E:             1.10 m/s    (0.7-1.2 m/s) MV Peak A:             1.10 m/s    (0.42-0.7 m/s) E/A Ratio:             1.00        (1.0-2.2) MV e'                  0.080 m/s   (>8.0) MV lateral e'          0.08 m/s MV medial e'           0.08 m/s E/e' Ratio:            13.76       (<8.0) PulmV Sys Elieser:         49.90 cm/s PulmV Flores Elieser:        35.50 cm/s PulmV S/D Elieser:         1.40 PulmV A Revs  Elieser:      26.70 cm/s PulmV A Revs Dur:      148.00 msec  MITRAL VALVE:          Normal Ranges: MV Vmax:      1.12 m/s (<=1.3m/s) MV peak P.0 mmHg (<5mmHg) MV mean P.0 mmHg (<48mmHg) MV DT:        190 msec (150-240msec)  AORTIC VALVE:           Normal Ranges: AoV Vmax:      1.42 m/s (<=1.7m/s) AoV Peak P.1 mmHg (<20mmHg) LVOT Max Elieser:  1.05 m/s (<=1.1m/s) LVOT VTI:      27.40 cm LVOT Diameter: 1.80 cm  (1.8-2.4cm) AoV Area,Vmax: 1.88 cm2 (2.5-4.5cm2)  RIGHT VENTRICLE: RV Basal 2.80 cm RV Mid   2.70 cm RV Major 7.0 cm TAPSE:   22.0 mm RV s'    0.14 m/s  PULMONIC VALVE:          Normal Ranges: PV Accel Time:  95 msec  (>120ms) PV Max Elieser:     0.9 m/s  (0.6-0.9m/s) PV Max P.9 mmHg  Pulmonary Veins: PulmV A Revs Dur: 148.00 msec PulmV A Revs Elieser: 26.70 cm/s PulmV Flores Elieser:   35.50 cm/s PulmV S/D Elieser:    1.40 PulmV Sys Elieser:    49.90 cm/s  [id number] Clement Stevens MD Electronically signed on 2025 at 1:58:10 PM  ** Final **          BNP   Date/Time Value Ref Range Status   2025 08:59  (H) 0 - 99 pg/mL Final        I have personally reviewed the following imaging results MR brain wo IV contrast    Result Date: 2025  Interpreted By:  Nicolas Sarmiento, STUDY: MR BRAIN WO IV CONTRAST;  2025 2:34 pm   INDICATION: Signs/Symptoms:aphasia, history of PAF with interruption in anticoagulation.     COMPARISON: CT head and CTA head yesterday.   ACCESSION NUMBER(S): [id number]   ORDERING CLINICIAN: TIGIST KEYS   TECHNIQUE: Patient could only tolerate limited examination. Axial DWI, ADC, sagittal and axial T1 weighted sequences were obtained.   FINDINGS: Multiple small patchy foci of abnormally restricted diffusion are seen in the bilateral cerebral hemispheres predominantly each centrum semiovale and oriented in AP direction with some additional involvement of the anterior right middle frontal gyrus cortex, the posterior right corpus callosum, paramedian right occipital lobe.  Questionable additional involvement of left-greater-than-right cerebellar hemispheres.   No definite intracranial hemorrhage.     No extra-axial fluid collection. No intracranial mass.   Mild to moderate white matter T2 signal increase, most commonly seen with chronic small vessel ischemic change.  Presence of older small infarctions in these areas not excluded.   Degree of ventriculomegaly is commensurate with overall degree of brain parenchymal volume loss, which is mild.       1. Multiple small patchy foci of abnormally restricted diffusion are seen in the bilateral cerebral hemispheres predominantly each centrum semiovale and oriented in AP direction with some additional involvement of the anterior right middle frontal gyrus cortex, the posterior right corpus callosum, paramedian right occipital lobe. Questionable additional involvement of left-greater-than-right cerebellar hemispheres. Findings consistent with acute to subacute infarctions. The AP orientation of the high bilateral cerebral hemispheres could indicate a recent hypotensive event. 2. Mild to moderate white matter T2 signal increase, most commonly seen with chronic small vessel ischemic change.  Presence of older small infarctions in these areas not excluded. 3. Degree of ventriculomegaly is commensurate with overall degree of brain parenchymal volume loss, which is mild.   Note the patient was only able to tolerate limited examination as described above.   MACRO: None   Signed by: Nicolas Sarmiento 1/29/2025 3:12 PM Dictation workstation:   [id number]    Electrocardiogram, 12-lead PRN ACS symptoms    Result Date: 1/29/2025  Supraventricular tachycardia with occasional Premature ventricular complexes and Fusion complexes Right axis deviation Nonspecific ST abnormality Abnormal ECG When compared with ECG of 27-JAN-2025 20:14, (unconfirmed) Fusion complexes are now Present Premature ventricular complexes are now Present QRS axis Shifted right  Borderline criteria for Inferior infarct are no longer Present Nonspecific T wave abnormality now evident in Inferior leads    Electrocardiogram, 12-lead PRN ACS symptoms    Result Date: 1/29/2025  Normal sinus rhythm Left axis deviation Pulmonary disease pattern Abnormal ECG When compared with ECG of 27-JAN-2025 09:00, (unconfirmed) QRS axis Shifted left    Electrocardiogram, 12-lead PRN ACS symptoms    Result Date: 1/29/2025  Supraventricular tachycardia Low voltage QRS Possible Inferior infarct , age undetermined Abnormal ECG When compared with ECG of 27-JAN-2025 17:29, (unconfirmed) Vent. rate has increased BY  75 BPM QRS axis Shifted right ST now depressed in Anterior leads    CT abdomen pelvis w IV contrast    Result Date: 1/29/2025  Interpreted By:  Evonne Yi, STUDY: CT ABDOMEN PELVIS W IV CONTRAST;  1/29/2025 9:32 am   INDICATION: Signs/Symptoms:abdominal pain with acute encephalopathy.     COMPARISON: CT abdomen pelvis 01/24/2025 CT chest abdomen pelvis 01/28/2024   ACCESSION NUMBER(S): [id number]   ORDERING CLINICIAN: TGIIST KEYS   TECHNIQUE: CT of the abdomen and pelvis was performed.  Standard contiguous axial images were obtained at 3 mm slice thickness through the abdomen and pelvis. Coronal and sagittal reconstructions at 3 mm slice thickness were performed.  75 ML of Omnipaque 350 was administered intravenously without immediate complication.   FINDINGS: Please note that the evaluation of vessels, lymph nodes and organs is limited without intravenous contrast.   LOWER CHEST: The heart is normal in size without evidence of pericardial effusion. No consolidation or pleural effusion is present. No concerning lung nodule. Small hiatal hernia again noted.   ABDOMEN:   LIVER: The liver is normal in size without evidence of focal liver lesions.   BILE DUCTS: Stable mild intrahepatic and extrahepatic biliary duct dilatation, likely secondary to cholecystectomy.   GALLBLADDER: Cholecystectomy    PANCREAS: The pancreas appears unremarkable without evidence of ductal dilatation or masses.   SPLEEN: The spleen is normal in size without focal lesions.   ADRENAL GLANDS: No adrenal nodularity or thickening.   KIDNEYS AND URETERS: The kidneys are normal in size. Stable nonobstructive punctuate calculus in the right kidney. Stable sub 5 mm hypoattenuating lesions in the bilateral kidneys, which are too small to further characterize. No hydroureteronephrosis or left nephroureterolithiasis is identified.   PELVIS:   BLADDER: The bladder is completely opacified by contrast from prior imaging, which limits evaluation.   REPRODUCTIVE ORGANS: No pelvic masses. A stable 48 mm left adnexal cyst is seen on series 2, image 116. A 21 x 19 mm right adnexal lesion could be a right adnexal cyst or a perivesicular lymph node.   BOWEL: Small hiatal hernia. The small and large bowel are normal in caliber and demonstrate no wall thickening. The appendix is not definitely visualized. There is however no pericecal stranding or fluid.   VESSELS: There is no aneurysmal dilatation of the abdominal aorta. Mild atherosclerotic calcifications of the aortoiliac arteries. The IVC appears normal.   PERITONEUM/RETROPERITONEUM/LYMPH NODES: Retroperitoneal lymphadenopathy with adjacent stranding, stable when compared to prior CT from 01/24/2025, and new when compared to prior CT from 01/28/2024, for example as seen on series 2: *16 x 10 mm aortocaval lymph node, image 75 *12 x 11 mm retrocaval lymph node, image 91 *16 x 15 mm right common iliac lymph node, image 97 *20 x 16 mm right internal iliac lymph node, image 104 *29 x 20 mm right external iliac lymph node, image 126 *24 x 14 mm right external iliac lymph node, image 135 *21 x 19 mm perivascular lesion, which could be a lymph node or right adnexal cyst, image 131 *20 x 18 mm right pelvic sidewall lesion, image 117   No ascites or fluid collection. No peritoneal nodularity or implant.    ABDOMINAL WALL: The abdominal wall soft tissues appear normal. Sacral spinal stimulator noted.   BONES: No suspicious osseous lesions are identified. Degenerative discogenic disease is noted in the lower thoracic and lumbar spine. Grade 1 anterolisthesis of the L4 and L5 vertebral bodies.       1. Retroperitoneal lymphadenopathy, stable when compared to prior CT from 01/24/2025, and new when compared to prior CT from 01/28/2024. Though the adjacent stranding favors an infectious/inflammatory etiology, underlying malignancy such as lymphoma or bladder carcinoma (given the bladder wall thickening seen on prior CT) can not be excluded. If clinical signs/symptoms favor infectious etiology, short-term follow-up CT abdomen pelvis in 4-6 weeks is recommended. Alternatively, correlation with serum LDH levels, cystoscopy, tissue diagnosis and/or PET-CT are other diagnostic considerations that could be obtained as per clinical need. 2. No significant interval change as compared to prior CT from 01/24/2025.   MACRO: Critical Finding:  There are multiple critical findings. See findings. notification was initiated on 1/29/2025 at 10:55 am by Evonne Yi.  (**-YCF-**)   Signed by: Evonne Yi 1/29/2025 10:56 AM Dictation workstation:   [id number]    CT angio head and neck w and wo IV contrast    Result Date: 1/28/2025  Interpreted By:  Nicolas Sarmiento, STUDY: CT ANGIO HEAD AND NECK W AND WO IV CONTRAST;  1/28/2025 6:46 pm   INDICATION: Signs/Symptoms:stroke rule out.     COMPARISON: CT head today.   ACCESSION NUMBER(S): [id number]   ORDERING CLINICIAN: TIGIST KEYS   TECHNIQUE: Unenhanced CT images of the head were obtained.  Subsequently,  60 ML of Omnipaque 350 was administered intravenously and axial images of the head and neck were acquired.  Coronal, sagittal, and 3-D reconstructions were provided for review.   FINDINGS:   CTA COW: No major vascular occlusion. Mild atherosclerotic changes through the carotid  siphons. No aneurysms.  No vascular malformations.   CTA CAROTID: No aortic aneurysm or dissection. No significant stenoses at the origins of the great vessels from the aortic arch. No significant stenoses of the brachycephalic artery or bilateral subclavian arteries.   Mild atherosclerotic changes at the carotid bifurcations. No significant stenoses bilateral carotid arterial systems. No significant stenoses bilateral vertebral arterial systems.   NONVASCULAR HEAD: No intracranial mass. No intracranial hemorrhage. No evidence of large evolving cortical infarction. Supratentorial white matter hypodensities most likely related to mild chronic small vessel ischemic change.     Bones intact. Mucous retention cyst left maxillary sinus.   NONVASCULAR NECK: No soft tissue mass. No adenopathy. Salivary glands are unremarkable. Thyroid gland unremarkable. Bones intact.       1. No intracranial major vascular occlusion. 2. No flow-limiting stenosis or significant plaque irregularity in the neck.     MACRO: None   Signed by: Nicolas Sarmiento 1/28/2025 7:30 PM Dictation workstation:   [id number]    CT brain attack head wo IV contrast    Result Date: 1/28/2025  Interpreted By:  Howard Millard, STUDY: TIGIST KEYS; 1/28/2025 5:08 pm   INDICATION: Signs/Symptoms:brain attack;   COMPARISON: 01/28/2024   ACCESSION NUMBER(S): [id number]   ORDERING CLINICIAN: TIGIST KEYS   TECHNIQUE: Contiguous axial images were acquired from the vertex through the posterior fossa without IV contrast. All CT examinations are performed with 1 or more of the following dose reduction techniques: Automated exposure control, adjustment of mA and/or kv according to patient's size, or use of iterative reconstruction techniques.   FINDINGS: No focal mass effect or midline shift is identified. The ventricles and sulci are symmetric and appropriate for the patient's age.   The gray white matter differentiation is preserved. Again seen is a moderate  degree of nonspecific white matter hypodensity, most consistent with chronic small-vessel ischemic disease, not significantly changed from the prior study.   No acute intracranial hemorrhage is seen. No intra-axial or extra-axial fluid collection is seen.   The visualized paranasal sinuses and mastoid air cells are clear. There is a small-moderate sized mucous retention cysts in the visualized left maxillary sinus.       No CT evidence for acute intracranial pathology.   Moderate degree of nonspecific white matter hypodensity, most consistent with chronic small-vessel ischemic disease, not significantly changed when compared to the prior study.         Findings discussed with the physician on the 2nd floor at 5:20 p.m. on 01/28/2025   Signed by: Howard Millard 1/28/2025 5:39 PM Dictation workstation:   [id number]    ECG 12 Lead    Result Date: 1/28/2025  Normal sinus rhythm Left axis deviation Pulmonary disease pattern T wave abnormality, consider anterior ischemia Abnormal ECG When compared with ECG of 24-JAN-2025 16:10, Premature ventricular complexes are no longer Present Vent. rate has decreased BY  67 BPM Nonspecific T wave abnormality now evident in Inferior leads T wave inversion now evident in Anterior leads    ECG 12 Lead    Result Date: 1/27/2025  Normal sinus rhythm Normal ECG When compared with ECG of 24-JAN-2025 16:10, Premature ventricular complexes are no longer Present Vent. rate has decreased BY  58 BPM QRS axis Shifted right Borderline criteria for Inferior infarct are no longer Present Nonspecific T wave abnormality now evident in Inferior leads T wave amplitude has decreased in Anterior leads    ECG 12 lead    Result Date: 1/25/2025  Supraventricular tachycardia Left axis deviation Anterior infarct (cited on or before 17-JAN-2025) Abnormal ECG When compared with ECG of 17-JAN-2025 06:56, RI interval has decreased Vent. rate has increased BY  90 BPM Confirmed by Clement Stevens ([id number]) on  1/25/2025 1:40:18 PM    ECG 12 lead    Result Date: 1/25/2025  Normal sinus rhythm Left axis deviation Poor R-wave progression ; consider anterior infarct, lead placement, or normal variant Abnormal ECG When compared with ECG of 23-JAN-2025 20:48, (unconfirmed) Vent. rate has decreased BY  59 BPM Confirmed by Clement Stevens ([id number]) on 1/25/2025 1:40:12 PM    Electrocardiogram, 12-lead PRN ACS symptoms    Result Date: 1/25/2025  Supraventricular tachycardia with occasional Premature ventricular complexes Left axis deviation Pulmonary disease pattern Possible Inferior infarct , age undetermined Abnormal ECG When compared with ECG of 23-JAN-2025 21:55, (unconfirmed) Premature ventricular complexes are now Present Vent. rate has increased BY  47 BPM Nonspecific T wave abnormality no longer evident in Anterior leads Confirmed by Clement Stevens ([id number]) on 1/25/2025 1:40:07 PM    Transthoracic Echo (TTE) Complete    Result Date: 1/24/2025            [facility] [place] [id number] [id number]    Tel [phone] Fax [phone] TRANSTHORACIC ECHOCARDIOGRAM REPORT Patient Name:       LISSETTE ZAVALA         Reading Physician:    [id number] Clement Stevens MD Study Date:         1/24/2025            Ordering Provider:    [id number] TIGIST KEYS MRN/PID:            [id number]             Fellow: Accession#:         [id number]         Nurse: Date of Birth/Age:  1950 / 74      Sonographer:          Flakita Quarles RDCS                     years Gender Assigned at  F                    Additional Staff: Birth: Height:             157.48 cm            Admit Date:           1/23/2025 Weight:             63.96 kg             Admission Status:     Inpatient -                                                                Priority                                                                discharge  BSA / BMI:          1.65 m2 / 25.79      Department Location:  [facility]                     kg/m2 Blood Pressure: 168 /70 mmHg Study Type:    TRANSTHORACIC ECHO (TTE) COMPLETE Diagnosis/ICD: Dyspnea, unspecified-R06.00 Indication:    Dyspnea CPT Codes:     Echo Complete w Full [id number] Patient History: Diabetes:          Yes Pertinent History: Dyspnea, Palpitations, CAD, A-Fib, HTN, Hyperlipidemia and                    Previous SVT. GERD; previous echocardiogram 12-. Study Detail: The following Echo studies were performed: M-Mode, 2D, Doppler and               color flow.  PHYSICIAN INTERPRETATION: Left Ventricle: Left ventricular ejection fraction is normal, calculated by Rangel's biplane at 71%. There is mild left ventricular hypertrophy. There are no regional wall motion abnormalities. The left ventricular cavity size is normal. Spectral Doppler shows a Grade I (impaired relaxation pattern) of left ventricular diastolic filling with normal left atrial filling pressure. Left Atrium: The left atrium is mild to moderately dilated. Right Ventricle: The right ventricle is upper limits of normal in size. There is normal right ventricular global systolic function. Right Atrium: The right atrium is mildly dilated. Aortic Valve: The aortic valve appears structurally normal. There is mild aortic valve cusp calcification. There is mild to moderate aortic valve regurgitation. The peak instantaneous gradient of the aortic valve is 8 mmHg. Mitral Valve: The mitral valve is normal in structure. The peak instantaneous gradient of the mitral valve is 5 mmHg. There is mild mitral valve regurgitation. Tricuspid Valve: The tricuspid valve is structurally normal. There is mild tricuspid regurgitation. Pulmonic Valve: The pulmonic valve was not assessed. There is trace to mild pulmonic valve regurgitation. Pericardium: No pericardial effusion noted. Aorta: The aortic root is normal.  CONCLUSIONS:  1. Left ventricular  ejection fraction is normal, calculated by Rangel's biplane at 71%.  2. Spectral Doppler shows a Grade I (impaired relaxation pattern) of left ventricular diastolic filling with normal left atrial filling pressure.  3. There is normal right ventricular global systolic function.  4. The left atrium is mild to moderately dilated.  5. Mild to moderate aortic valve regurgitation. QUANTITATIVE DATA SUMMARY:  2D MEASUREMENTS:            Normal Ranges: LAs:             4.50 cm    (2.7-4.0cm) IVSd:            0.86 cm    (0.6-1.1cm) LVPWd:           1.06 cm    (0.6-1.1cm) LVIDd:           5.63 cm    (3.9-5.9cm) LVIDs:           4.00 cm LV Mass Index:   127.2 g/m2 LV % FS          29.0 %  LA VOLUME:                    Normal Ranges: LA Vol A4C:        55.9 ml    (22+/-6mL/m2) LA Vol A2C:        50.6 ml LA Vol BP:         54.1 ml LA Vol Index A4C:  34.0ml/m2 LA Vol Index A2C:  30.7 ml/m2 LA Vol Index BP:   32.8 ml/m2 LA Area A4C:       20.2 cm2 LA Area A2C:       18.9 cm2 LA Major Axis A4C: 6.2 cm LA Major Axis A2C: 6.0 cm LA Volume Index:   30.9 ml/m2 LA Vol A4C:        53.0 ml LA Vol A2C:        48.0 ml LA Vol Index BSA:  30.7 ml/m2  M-MODE MEASUREMENTS:         Normal Ranges: Ao Root:             3.20 cm (2.0-3.7cm)  AORTA MEASUREMENTS:         Normal Ranges: Ao Sinus, d:        3.00 cm (2.1-3.5cm) Ao STJ, d:          2.60 cm (1.7-3.4cm) Asc Ao, d:          3.30 cm (2.1-3.4cm)  LV SYSTOLIC FUNCTION BY 2D PLANIMETRY (MOD):                      Normal Ranges: EF-A4C View:    69 % (>=55%) EF-A2C View:    75 % EF-Biplane:     71 % LV EF Reported: 71 %  LV DIASTOLIC FUNCTION:             Normal Ranges: MV Peak E:             1.10 m/s    (0.7-1.2 m/s) MV Peak A:             1.10 m/s    (0.42-0.7 m/s) E/A Ratio:             1.00        (1.0-2.2) MV e'                  0.080 m/s   (>8.0) MV lateral e'          0.08 m/s MV medial e'           0.08 m/s E/e' Ratio:            13.76       (<8.0) PulmV Sys Elieser:         49.90 cm/s PulmV  Flores Elieser:        35.50 cm/s PulmV S/D Elieser:         1.40 PulmV A Revs Elieser:      26.70 cm/s PulmV A Revs Dur:      148.00 msec  MITRAL VALVE:          Normal Ranges: MV Vmax:      1.12 m/s (<=1.3m/s) MV peak P.0 mmHg (<5mmHg) MV mean P.0 mmHg (<48mmHg) MV DT:        190 msec (150-240msec)  AORTIC VALVE:           Normal Ranges: AoV Vmax:      1.42 m/s (<=1.7m/s) AoV Peak P.1 mmHg (<20mmHg) LVOT Max Elieser:  1.05 m/s (<=1.1m/s) LVOT VTI:      27.40 cm LVOT Diameter: 1.80 cm  (1.8-2.4cm) AoV Area,Vmax: 1.88 cm2 (2.5-4.5cm2)  RIGHT VENTRICLE: RV Basal 2.80 cm RV Mid   2.70 cm RV Major 7.0 cm TAPSE:   22.0 mm RV s'    0.14 m/s  PULMONIC VALVE:          Normal Ranges: PV Accel Time:  95 msec  (>120ms) PV Max Elieser:     0.9 m/s  (0.6-0.9m/s) PV Max P.9 mmHg  Pulmonary Veins: PulmV A Revs Dur: 148.00 msec PulmV A Revs Elieser: 26.70 cm/s PulmV Flores Elieser:   35.50 cm/s PulmV S/D Elieser:    1.40 PulmV Sys Elieser:    49.90 cm/s  [id number] Clement Stevens MD Electronically signed on 2025 at 1:58:10 PM  ** Final **     CT abdomen pelvis wo IV contrast    Result Date: 2025  Interpreted By:  Gerard Maxwell, STUDY: CT ABDOMEN PELVIS WO IV CONTRAST;  2025 3:25 am   INDICATION: Signs/Symptoms:kidney stones. History of overactive bladder with continence control stimulator insert in . Symptoms recurred in  with urge incontinence and underwent stimulator revision on 10/14/2024.     COMPARISON: 2025   ACCESSION NUMBER(S): [id number]   ORDERING CLINICIAN: TIGIST MASSIMO   TECHNIQUE: Contiguous axial images of the abdomen and pelvis were obtained without intravenous contrast. Coronal and sagittal reformatted images were reconstructed from the axial data.   FINDINGS: Note: The absence of intravenous contrast limits evaluation of the solid organs and vasculature.   LOWER CHEST: No acute abnormality.     ABDOMEN/PELVIS:   ABDOMINAL WALL: There is a bladder control device in the right buttock with a single  lead coursing through the right S3 neural foramen.   LIVER: The liver demonstrates a normal noncontrast attenuation.   BILE DUCTS: Prominent intrahepatic and extrahepatic bile ducts are likely secondary to post-cholecystectomy status.   GALLBLADDER: Surgically absent.   PANCREAS: No significant abnormality.   SPLEEN: No significant abnormality.   ADRENALS: No significant abnormality.   KIDNEYS, URETERS, BLADDER: Non-obstructing 0.2 cm right renal calculus. No left renal calculus. No hydronephrosis. Urinary bladder wall is inflamed with perivesical fat stranding similar to prior study. There is thickening and inflammation of the distal right ureter. There is eccentric wall thickening adjacent to the right uterovesical junction which was seen on prior study as well. There are multiple bladder calculi measuring 0.8 cm and 1.3 cm.   REPRODUCTIVE ORGANS: Surgically absent uterus. There is a simple left ovarian cyst measuring 3.4 cm, slightly smaller compared to prior study.   VESSELS: Mild aortic atherosclerosis without AAA.   RETROPERITONEUM/LYMPH NODES: There is progression of now extensive inflammatory fat stranding within multiple retroperitoneal compartments. This is most severe on the right pelvic sidewall, extends into the right femoral canal, involves the presacral space, and extends superiorly to the aortoiliac bifurcation and along the aorta at the level of the renal veins. This stranding is accompanied by diffuse lymphadenopathy which is also progressed in degree and extent compared to prior study. For example, a 1.3 cm right common iliac node previously measured 0.9 cm. A 1.6 cm right common iliac node previously measured 0.7 cm, 1.3 cm by common iliac ovaries noted 0.8 cm, 2.1 cm by external iliac node previously measured 0.9 cm, 2 cm right external iliac lymph node previously measured 1.6 cm amongst others. There are no enlarged left external iliac lymph nodes. There is only a 0.7 cm left common iliac node  that is unchanged. There are also newly enlarged aortocaval and periaortic lymph nodes predominantly measuring between 0.5 cm and 1 cm.   BOWEL/PERITONEUM: There is colonic diverticulosis. No inflammatory bowel wall thickening or dilatation. Normal appendix.   No ascites, free air, or fluid collection.     MUSCULOSKELETAL: No acute osseous abnormality. No suspicious osseous lesion. Severe L4-L5 disc height loss with grade 1 degenerative anterolisthesis of L4 on L5 and moderate bilateral facet arthropathy. There is not severe central canal stenosis at L4-L5 and severe bilateral L4-5 foraminal stenosis. There are decompressive laminectomies at L2-L4.       1. Significant progression of severe inflammatory changes in the right pelvic sidewall, presacral space, right femoral canal and extending superiorly along the aorta and IVC to the level of the renal veins accompanied by diffuse lymphadenopathy which is also significantly progressed as detailed above. This inflammatory perinodal stranding favors an infectious or inflammatory etiology, although this the extent and degree of change is unusual for typical bladder infection. Recommend timely urological follow-up for further investigation. Correlation with urinalysis recommended.   2. Redemonstration of diffusely thickened inflamed bladder wall with diffuse thickening of the distal right ureter and multiple intraluminal bladder calculi may service continued nidus of continued infection.   3. No hydronephrosis. Nonobstructing 0.2 cm right renal calculus.   MACRO: None.   Signed by: Gerard Maxwell 1/24/2025 3:41 AM Dictation workstation:   [id number]    ECG 12 lead    Result Date: 1/17/2025  Sinus rhythm with 1st degree AV block Left axis deviation Possible Anterior infarct , age undetermined Abnormal ECG When compared with ECG of 16-JAN-2025 06:38, (unconfirmed) Nonspecific T wave abnormality no longer evident in Anterior leads Confirmed by Luigi Chisholm ([id number]) on  1/17/2025 10:17:22 PM    ECG 12 lead    Result Date: 1/17/2025  Normal sinus rhythm Left axis deviation Nonspecific T wave abnormality Abnormal ECG When compared with ECG of 15-ANTONIO-2025 13:47, (unconfirmed) Premature ventricular complexes are no longer Present Nonspecific T wave abnormality, worse in Anterior leads Confirmed by Luigi Chisholm ([id number]) on 1/17/2025 8:14:36 PM    ECG 12 lead    Result Date: 1/15/2025  Sinus rhythm with sinus arrhythmia with occasional Premature ventricular complexes Left axis deviation Abnormal ECG When compared with ECG of 15-ANTONIO-2025 11:23, (unconfirmed) Premature ventricular complexes are now Present See ED provider note for full interpretation and clinical correlation    ECG 12 lead    Result Date: 1/15/2025  Normal sinus rhythm Left axis deviation Pulmonary disease pattern Abnormal ECG When compared with ECG of 13-JAN-2025 07:22, No significant change was found See ED provider note for full interpretation and clinical correlation    XR chest 1 view    Result Date: 1/15/2025  Interpreted By:  Monique Caballero, STUDY: XR CHEST 1 VIEW;  1/15/2025 11:11 am   INDICATION: Signs/Symptoms:Chest Pain.   COMPARISON: None.   ACCESSION NUMBER(S): [id number]   ORDERING CLINICIAN: ESCOBAR GALLEGOS   FINDINGS: CARDIOMEDIASTINAL SILHOUETTE: Cardiomediastinal silhouette is normal in size and configuration.     LUNGS: Lungs are clear.   ABDOMEN: No remarkable upper abdominal findings.     BONES: No acute osseous changes.       No acute cardiopulmonary process.   MACRO: None   Signed by: Monique Caballero 1/15/2025 11:21 AM Dictation workstation:   [id number]    ECG 12 lead    Result Date: 1/13/2025  Normal sinus rhythm Left axis deviation Abnormal ECG When compared with ECG of 12-JAN-2025 19:42, (unconfirmed) Vent. rate has decreased BY  65 BPM Criteria for Anterior infarct are no longer Present Criteria for Anterolateral infarct are no longer Present Nonspecific T wave abnormality now evident in Inferior  leads Confirmed by Luigi Chisholm ([id number]) on 1/13/2025 2:59:12 PM    XR chest 1 view    Result Date: 1/12/2025  Interpreted By:  Wendy Lee, STUDY: XR CHEST 1 VIEW;  1/12/2025 8:05 pm   INDICATION: Signs/Symptoms:Chest Pain.   COMPARISON: 11/24/2023   ACCESSION NUMBER(S): [id number]   ORDERING CLINICIAN: CHASE LANDERS   FINDINGS:     CARDIOMEDIASTINAL SILHOUETTE: Cardiomediastinal silhouette is normal in size and configuration.   LUNGS: No pulmonary consolidation, pleural effusion or pneumothorax.   ABDOMEN: No remarkable upper abdominal findings.   BONES: No acute osseous abnormality.       No acute cardiopulmonary process.   MACRO: None   Signed by: Wendy Lee 1/12/2025 8:20 PM Dictation workstation:   [id number]    ECG 12 lead    Result Date: 1/12/2025  Supraventricular tachycardia Left anterior fascicular block Inferior infarct , age undetermined Anterolateral infarct , age undetermined Abnormal ECG When compared with ECG of 09-OCT-2024 13:41, Significant changes have occurred    US PELVIS TRANSABDOMINAL WITH TRANSVAGINAL    Result Date: 1/9/2025  Interpreted By:  Roger Polanco, STUDY: US PELVIS TRANSABDOMINAL WITH TRANSVAGINAL  1/9/2025 8:55 pm   INDICATION: 75 y/o   F with  Signs/Symptoms:rlq pain history of ovarian cyst, possible torsion     COMPARISON: Pelvic ultrasound of 04/19/2024. Correlation also made to CT abdomen and pelvis of 01/09/2025.   ACCESSION NUMBER(S): [id number]   ORDERING CLINICIAN: ERIC HO   TECHNIQUE: Routine ultrasound of the pelvis was performed with duplex Doppler (color and spectral) evaluation.  Evaluation of the female pelvis was performed by transabdominal technique .  Static images were obtained for remote interpretation.   FINDINGS: UTERUS: Absent.     RIGHT OVARY: Not seen, presumably secondary to prior oophorectomy.   LEFT OVARY: The technologist mistakenly labeled the ovary with cyst as being the right ovary; correlation with the prior CT, however,  demonstrates the left ovary remaining in situ, with a cyst. The left ovary measures 4.9 x 2.6 x 3.9 cm. There is a stable cyst in the left ovary with thin septation measuring approximately 4.3 x 3.0 x 3.7 cm. Normal blood flow is present.   There are 2 hypoechoic masses in the right adnexa, the larger measuring up to 1.7 cm in diameter, correlating to abnormal and enlarged right pelvic sidewall lymph nodes seen on prior CT.   FREE FLUID:   None.       The technologist mistakenly labeled the ovary with cyst as being the right ovary; correlation with the prior CT, however, demonstrates the left ovary remaining in situ, with a cyst. The left ovary measures 4.9 x 2.6 x 3.9 cm. There is a stable cyst in the left ovary with thin septation measuring approximately 4.3 x 3.0 x 3.7 cm. Normal blood flow is present.   There are 2 hypoechoic masses in the right adnexa, the larger measuring up to 1.7 cm in diameter, correlating to abnormal and enlarged right pelvic sidewall lymph nodes seen on prior CT.   Hysterectomy. Right ovary not seen, presumably secondary to prior oophorectomy.   MACRO: None   Signed by: Roger Polanco 1/9/2025 9:29 PM Dictation workstation:   [id number]    CT abdomen pelvis w IV contrast    Result Date: 1/9/2025  Interpreted By:  Dat Quintanilla, STUDY: CT ABDOMEN PELVIS W IV CONTRAST;  1/9/2025 7:02 pm   INDICATION: Signs/Symptoms:rlq pain with rebound tenderness.   COMPARISON: CT scan of the abdomen pelvis 01/28/2024   ACCESSION NUMBER(S): [id number]   ORDERING CLINICIAN: ERIC HO   TECHNIQUE: Axial CT images of the abdomen and pelvis with coronal and sagittal reconstructed images obtained after intravenous administration of 75 mL of Omnipaque 350   FINDINGS: LOWER CHEST: Bilateral Bochdalek's hernias containing fat. Heart size is within the upper limits of normal. Trace pericardial effusion.   ABDOMEN:   LIVER: Within normal limits. BILE DUCTS: Mild central biliary ductal prominence with the  extrahepatic common duct dilated up to 10 mm which is within normal limits for postsurgical state. GALLBLADDER: Status post cholecystectomy. PANCREAS: Within normal limits. SPLEEN: Within normal limits. ADRENALS: Within normal limits. KIDNEYS and URETERS: Symmetric renal enhancement. No hydronephrosis or perinephric fluid collection.   VESSELS:  Calcific atherosclerosis of the aortoiliac and mesenteric vessels. No aortic aneurysm. RETROPERITONEUM: Enlarged right external iliac lymph node measures up to 1.4 cm in short axis additional prominent pelvic lymph nodes noted.   PELVIS:   REPRODUCTIVE ORGANS: Uterus is surgically absent. There is a 3.6 cm hypodense lesion in the left adnexa likely relate to the left ovary. This is stable when compared to prior CT of 01/28/2024 BLADDER: Bladder is under distended with wall thickening, mucosal hyperenhancement and perivesical inflammatory stranding. A right posterior bladder wall diverticula noted. There is somewhat asymmetric wall thickening and soft tissue enhancement on the right extending to the UVJ and above described diverticula. This is best visualized on axial image 107 of series 201 and coronal image 55 of series 202. Several calculi noted in the bladder.   BOWEL: Small hiatal hernia. Stomach is under distended. Visualized loops of bowel are without evidence for obstruction. Appendix is not identified with certainty. No pericecal inflammatory changes seen. Moderate stool burden. Scattered colonic diverticula in the descending colon without evidence for acute diverticulitis. No pneumatosis or portal venous gas. PERITONEUM: No ascites or free air, no fluid collection.   ABDOMINAL WALL: A right sacral neurostimulator device is stable. BONES: Postsurgical changes of laminectomy at L2 through L4. Multilevel degenerative changes of the spine. Grade 1 anterolisthesis of L4 on 5. Stable mild compression deformity along the superior endplate of the T11 vertebral body.        Several bladder calculi are noted, new from prior exam. There is moderate bladder wall thickening, mucosal hyperenhancement and perivesical inflammatory stranding concerning for infectious cystitis. Correlate with urinalysis.   There is somewhat asymmetric thickening of the right posterolateral bladder wall extending to the UVJ. There are prominent and mildly enlarged pelvic lymph nodes as described above. These findings could be secondary to cystitis with reactive adenopathy. An underlying mass lesion can not be excluded. Correlate with urine cytology and urological consultation for the need for further evaluation with direct visualization versus continued short-term follow-up.   Diverticulosis without evidence for acute diverticulitis.   Redemonstration of a 3.6 cm hypodense lesion in the left adnexa likely related to left ovary. This is stable when compared to prior CT. Correlate with prior ultrasound and attention on continued follow-up is advised.   Additional findings as described above.   MACRO: None   Signed by: Dat Quintanilla 1/9/2025 7:24 PM Dictation workstation:   [id number]  .     IV Thrombolysis IV Thrombolysis Checklist      IV Thrombolysis Given: No; Thrombolysis contraindication reason: Neurologic signs are mild and judged to be non-disabling       Assessment/Plan   Assessment & Plan  SVT (supraventricular tachycardia) (CMS-HCC)    Bladder wall thickening      Type: Ischemic stroke  Subtype/etiology: c/f cardio-embolic CVA  Neurological manifestations: acute confusion  NIHSS (worst at presentation):    Diagnostic evaluation: CTH, CTA H/N, MRI brain  Antiplatelet/antithrombotic plan for stroke prevention: ASA load, followed by ASA 81  VTE prophylaxis: on hold for surgery  Vascular Risk Factor modification goals:  Blood pressure goals: avoid hypotension SBP <100 that could worsen cerebral perfusion, Ischemic stroke- early permissive hypertension SBP < 220 mmHg with cautious inpatient  lowering  Lipid Goals: education on healthy diet and statin therapy to maintain or achieve goal LDL-cholesterol < 70mg  Glucose Goals: early treatment of hyperglycemia to goal glucose 140-180 mg/dl with long-term goal A1c < 7%   Smoking Cessation and Education  Assessment for Rehabilitation needs   Patient and family education on signs and symptoms of stroke, calling 911, healthy strategies for stroke prevention.             #C/f cardio-embolic CVA vs ?endocarditis  #Intermittent SVT, likely 2/2 acute uncomplicated UTI  #H/o Afib s/p cardioversion and ablation  #CAD s/p LAD stents  -Etiology of diffuse/small ischemic CVAs likely 2/2 narrow-complex tachyarrhythmia PM of 1/28 and being off AC for ~3 dose 2/2 plan for urologic intervention/bladder biopsy  -MRI with scattered and small foci of b/l cerebral infarcts c/f cardioembolic etiology in the setting of being off AC for ~45 hours, however, imaging study aborted early 2/2 patient inability to complete.   -Recommend repeating MRI  -TTE 1/24/25: EF 71%, G1DD, mild/mod LA dilation and AR without obvious documented evidence of valvulopathy, vegetation, or LV thrombus  -BAT called 1/28 with patient demonstrating acute confusion.  EKG collected demonstrating narrow complex tachyarrhythmia, (intermittent irregularity), for which adenosine was given at 1808 1/28  -Recommend continuing therapeutic heparin in light of cardioembolic CVA.  Urology following with documentation indicating cystoscopy, possible ureteroscopy with biopsy at some point this hospitalization  -Ucx with 20k-80k E. Coli -- Recommend blood cultures to rule out bacteremia and possible endocarditis  -Possible indication for GAVIN, will await recommendations from cardiology given negative valvulopathy, LV thrombus, vegetation seen on TTE  -Neurology will continue to follow -- Despite scattered hyperintensities reflecting ischemic CVA throughout cerebrum, patient without significant neurologic deficits outside  of some brain fog    Case discussed with Dr. Manuela Hayes DO  IM PGY3

## 2025-01-29 NOTE — SIGNIFICANT EVENT
Went to bedside to update patient's son on neuroimaging thus far and plan for MRI tomorrow and further ongoing care.  Answered questions with family.  Patient hemodynamically stable but tachycardic.  Noted abdominal pain, will give pain medication and assess for improvement in vitals pending evening medications and care. No further questions or concerns of patient and family at this time      01/28/25 at 8:08 PM - Gilberto Lyn DO     04-Aug-2021 09:23

## 2025-01-29 NOTE — PROGRESS NOTES
Physical Therapy                 Therapy Communication Note    Patient Name: Myrna Rodrigues  MRN: [id number]  Today's Date: 1/29/2025     Discipline: Physical Therapy    Room 2100    Missed Time:   Missed Visit Reason:   Comment:     01/29/25 1259   General   PT Missed Visit Yes   Missed Visit Reason Patient placed on medical hold    Pt. scheduled for MRI and CT scans. Will wait for results for tests.

## 2025-01-29 NOTE — PROGRESS NOTES
"ID:  Myrna Rodrigues is a 74 y.o. female on hospital day 5 of admission presenting with SVT (supraventricular tachycardia) (CMS-HCC).    Subjective   Patient was seen and evaluated at bedside.  No acute events at night. She denied having any lightheadedness, dizziness, fever, SOB, chest pain, or urinary symptoms. She does endorse having right lower quadrant abdominal pain unchanged from yesterday.  When I asked her about the event that happened to her yesterday she said that she only remember that she felt there was something wrong and does not remember anything after that.  During the event she had multiple bowel movements.  Today on my neurological assessment I did not notice any focal neurological deficits and the patient was at her baseline.    Objective     Visit Vitals  /68 (BP Location: Right arm, Patient Position: Lying)   Pulse 58   Temp 36.5 °C (97.7 °F) (Temporal)   Resp 18   Ht 1.575 m (5' 2\")   Wt 63.3 kg (139 lb 8.8 oz)   SpO2 96%   BMI 25.52 kg/m²   OB Status Postmenopausal   Smoking Status Never   BSA 1.66 m²        Physical Examination:  General: Not in acute distress, A&O x3, alert, coopertive, well-developed  HEENT: Normocephalic, atraumatic, EOMI, moist mucous membranes  Neck: Neck supple, trachea midline, no evidence of trauma  Cardiovascular: Heart rate regular on exam this morning, rhythm regular on exam  Respiratory: Vesicular breath sounds appreciated bilaterally, no adventitious sounds appreciated, no increased work of breathing  GI: Abdomen soft with some fullness and firmness noted in the RLQ and suprapubic region (improved compared to yesterday), nondistended, tender to palpation in RLQ, bowel sounds present  Extremities: No edema appreciated in lower extremities bilaterally, no cyanosis  Neuro: no focal deficits, strength and sensation intact bilaterally  Skin: Warm and dry, without lesions or rashes  Psychiatric: Judgment intact.  Appropriate mood, affect and behavior.      Laboratory " Data:  Results for orders placed or performed during the hospital encounter of 01/23/25 (from the past 24 hours)   POCT GLUCOSE   Result Value Ref Range    POCT Glucose 155 (H) 74 - 99 mg/dL   POCT GLUCOSE   Result Value Ref Range    POCT Glucose 96 74 - 99 mg/dL   Renal function panel   Result Value Ref Range    Glucose 113 (H) 74 - 99 mg/dL    Sodium 136 136 - 145 mmol/L    Potassium 3.9 3.5 - 5.3 mmol/L    Chloride 100 98 - 107 mmol/L    Bicarbonate 27 21 - 32 mmol/L    Anion Gap 13 10 - 20 mmol/L    Urea Nitrogen 12 6 - 23 mg/dL    Creatinine 0.83 0.50 - 1.05 mg/dL    eGFR 73 >60 mL/min/1.73m*2    Calcium 9.0 8.6 - 10.3 mg/dL    Phosphorus 3.9 2.5 - 4.9 mg/dL    Albumin 3.3 (L) 3.4 - 5.0 g/dL   CBC and Auto Differential   Result Value Ref Range    WBC 11.6 (H) 4.4 - 11.3 x10*3/uL    nRBC 0.0 0.0 - 0.0 /100 WBCs    RBC 4.30 4.00 - 5.20 x10*6/uL    Hemoglobin 9.0 (L) 12.0 - 16.0 g/dL    Hematocrit 30.6 (L) 36.0 - 46.0 %    MCV 71 (L) 80 - 100 fL    MCH 20.9 (L) 26.0 - 34.0 pg    MCHC 29.4 (L) 32.0 - 36.0 g/dL    RDW 18.5 (H) 11.5 - 14.5 %    Platelets 297 150 - 450 x10*3/uL    Neutrophils % 66.9 40.0 - 80.0 %    Immature Granulocytes %, Automated 0.4 0.0 - 0.9 %    Lymphocytes % 18.6 13.0 - 44.0 %    Monocytes % 8.9 2.0 - 10.0 %    Eosinophils % 4.9 0.0 - 6.0 %    Basophils % 0.3 0.0 - 2.0 %    Neutrophils Absolute 7.74 (H) 1.60 - 5.50 x10*3/uL    Immature Granulocytes Absolute, Automated 0.05 0.00 - 0.50 x10*3/uL    Lymphocytes Absolute 2.15 0.80 - 3.00 x10*3/uL    Monocytes Absolute 1.03 (H) 0.05 - 0.80 x10*3/uL    Eosinophils Absolute 0.57 (H) 0.00 - 0.40 x10*3/uL    Basophils Absolute 0.04 0.00 - 0.10 x10*3/uL   Magnesium   Result Value Ref Range    Magnesium 2.16 1.60 - 2.40 mg/dL   C-reactive protein   Result Value Ref Range    C-Reactive Protein 4.78 (H) <1.00 mg/dL   POCT GLUCOSE   Result Value Ref Range    POCT Glucose 107 (H) 74 - 99 mg/dL   POCT GLUCOSE   Result Value Ref Range    POCT Glucose 111 (H)  74 - 99 mg/dL     *Note: Due to a large number of results and/or encounters for the requested time period, some results have not been displayed. A complete set of results can be found in Results Review.       Imaging:  MR brain wo IV contrast    Result Date: 1/29/2025  Interpreted By:  Nicolas Sarmiento, STUDY: MR BRAIN WO IV CONTRAST;  1/29/2025 2:34 pm   INDICATION: Signs/Symptoms:aphasia, history of PAF with interruption in anticoagulation.     COMPARISON: CT head and CTA head yesterday.   ACCESSION NUMBER(S): [id number]   ORDERING CLINICIAN: TIGIST KEYS   TECHNIQUE: Patient could only tolerate limited examination. Axial DWI, ADC, sagittal and axial T1 weighted sequences were obtained.   FINDINGS: Multiple small patchy foci of abnormally restricted diffusion are seen in the bilateral cerebral hemispheres predominantly each centrum semiovale and oriented in AP direction with some additional involvement of the anterior right middle frontal gyrus cortex, the posterior right corpus callosum, paramedian right occipital lobe. Questionable additional involvement of left-greater-than-right cerebellar hemispheres.   No definite intracranial hemorrhage.     No extra-axial fluid collection. No intracranial mass.   Mild to moderate white matter T2 signal increase, most commonly seen with chronic small vessel ischemic change.  Presence of older small infarctions in these areas not excluded.   Degree of ventriculomegaly is commensurate with overall degree of brain parenchymal volume loss, which is mild.       1. Multiple small patchy foci of abnormally restricted diffusion are seen in the bilateral cerebral hemispheres predominantly each centrum semiovale and oriented in AP direction with some additional involvement of the anterior right middle frontal gyrus cortex, the posterior right corpus callosum, paramedian right occipital lobe. Questionable additional involvement of left-greater-than-right cerebellar hemispheres.  Findings consistent with acute to subacute infarctions. The AP orientation of the high bilateral cerebral hemispheres could indicate a recent hypotensive event. 2. Mild to moderate white matter T2 signal increase, most commonly seen with chronic small vessel ischemic change.  Presence of older small infarctions in these areas not excluded. 3. Degree of ventriculomegaly is commensurate with overall degree of brain parenchymal volume loss, which is mild.   Note the patient was only able to tolerate limited examination as described above.   MACRO: None   Signed by: Nicolas Sarmiento 1/29/2025 3:12 PM Dictation workstation:   [id number]    Electrocardiogram, 12-lead PRN ACS symptoms    Result Date: 1/29/2025  Supraventricular tachycardia with occasional Premature ventricular complexes and Fusion complexes Right axis deviation Nonspecific ST abnormality Abnormal ECG When compared with ECG of 27-JAN-2025 20:14, (unconfirmed) Fusion complexes are now Present Premature ventricular complexes are now Present QRS axis Shifted right Borderline criteria for Inferior infarct are no longer Present Nonspecific T wave abnormality now evident in Inferior leads    Electrocardiogram, 12-lead PRN ACS symptoms    Result Date: 1/29/2025  Supraventricular tachycardia Low voltage QRS Possible Inferior infarct , age undetermined Abnormal ECG When compared with ECG of 27-JAN-2025 17:29, (unconfirmed) Vent. rate has increased BY  75 BPM QRS axis Shifted right ST now depressed in Anterior leads    CT abdomen pelvis w IV contrast    Result Date: 1/29/2025  Interpreted By:  Evonne Yi, STUDY: CT ABDOMEN PELVIS W IV CONTRAST;  1/29/2025 9:32 am   INDICATION: Signs/Symptoms:abdominal pain with acute encephalopathy.     COMPARISON: CT abdomen pelvis 01/24/2025 CT chest abdomen pelvis 01/28/2024   ACCESSION NUMBER(S): [id number]   ORDERING CLINICIAN: TIGIST KEYS   TECHNIQUE: CT of the abdomen and pelvis was performed.  Standard contiguous  axial images were obtained at 3 mm slice thickness through the abdomen and pelvis. Coronal and sagittal reconstructions at 3 mm slice thickness were performed.  75 ML of Omnipaque 350 was administered intravenously without immediate complication.   FINDINGS: Please note that the evaluation of vessels, lymph nodes and organs is limited without intravenous contrast.   LOWER CHEST: The heart is normal in size without evidence of pericardial effusion. No consolidation or pleural effusion is present. No concerning lung nodule. Small hiatal hernia again noted.   ABDOMEN:   LIVER: The liver is normal in size without evidence of focal liver lesions.   BILE DUCTS: Stable mild intrahepatic and extrahepatic biliary duct dilatation, likely secondary to cholecystectomy.   GALLBLADDER: Cholecystectomy   PANCREAS: The pancreas appears unremarkable without evidence of ductal dilatation or masses.   SPLEEN: The spleen is normal in size without focal lesions.   ADRENAL GLANDS: No adrenal nodularity or thickening.   KIDNEYS AND URETERS: The kidneys are normal in size. Stable nonobstructive punctuate calculus in the right kidney. Stable sub 5 mm hypoattenuating lesions in the bilateral kidneys, which are too small to further characterize. No hydroureteronephrosis or left nephroureterolithiasis is identified.   PELVIS:   BLADDER: The bladder is completely opacified by contrast from prior imaging, which limits evaluation.   REPRODUCTIVE ORGANS: No pelvic masses. A stable 48 mm left adnexal cyst is seen on series 2, image 116. A 21 x 19 mm right adnexal lesion could be a right adnexal cyst or a perivesicular lymph node.   BOWEL: Small hiatal hernia. The small and large bowel are normal in caliber and demonstrate no wall thickening. The appendix is not definitely visualized. There is however no pericecal stranding or fluid.   VESSELS: There is no aneurysmal dilatation of the abdominal aorta. Mild atherosclerotic calcifications of the  aortoiliac arteries. The IVC appears normal.   PERITONEUM/RETROPERITONEUM/LYMPH NODES: Retroperitoneal lymphadenopathy with adjacent stranding, stable when compared to prior CT from 01/24/2025, and new when compared to prior CT from 01/28/2024, for example as seen on series 2: *16 x 10 mm aortocaval lymph node, image 75 *12 x 11 mm retrocaval lymph node, image 91 *16 x 15 mm right common iliac lymph node, image 97 *20 x 16 mm right internal iliac lymph node, image 104 *29 x 20 mm right external iliac lymph node, image 126 *24 x 14 mm right external iliac lymph node, image 135 *21 x 19 mm perivascular lesion, which could be a lymph node or right adnexal cyst, image 131 *20 x 18 mm right pelvic sidewall lesion, image 117   No ascites or fluid collection. No peritoneal nodularity or implant.   ABDOMINAL WALL: The abdominal wall soft tissues appear normal. Sacral spinal stimulator noted.   BONES: No suspicious osseous lesions are identified. Degenerative discogenic disease is noted in the lower thoracic and lumbar spine. Grade 1 anterolisthesis of the L4 and L5 vertebral bodies.       1. Retroperitoneal lymphadenopathy, stable when compared to prior CT from 01/24/2025, and new when compared to prior CT from 01/28/2024. Though the adjacent stranding favors an infectious/inflammatory etiology, underlying malignancy such as lymphoma or bladder carcinoma (given the bladder wall thickening seen on prior CT) can not be excluded. If clinical signs/symptoms favor infectious etiology, short-term follow-up CT abdomen pelvis in 4-6 weeks is recommended. Alternatively, correlation with serum LDH levels, cystoscopy, tissue diagnosis and/or PET-CT are other diagnostic considerations that could be obtained as per clinical need. 2. No significant interval change as compared to prior CT from 01/24/2025.   MACRO: Critical Finding:  There are multiple critical findings. See findings. notification was initiated on 1/29/2025 at 10:55 am by  Evonne Yi.  (**-YCF-**)   Signed by: Evonne Yi 1/29/2025 10:56 AM Dictation workstation:   [id number]    CT angio head and neck w and wo IV contrast    Result Date: 1/28/2025  Interpreted By:  Nicolas Sarmiento, STUDY: CT ANGIO HEAD AND NECK W AND WO IV CONTRAST;  1/28/2025 6:46 pm   INDICATION: Signs/Symptoms:stroke rule out.     COMPARISON: CT head today.   ACCESSION NUMBER(S): [id number]   ORDERING CLINICIAN: TIGIST KEYS   TECHNIQUE: Unenhanced CT images of the head were obtained.  Subsequently,  60 ML of Omnipaque 350 was administered intravenously and axial images of the head and neck were acquired.  Coronal, sagittal, and 3-D reconstructions were provided for review.   FINDINGS:   CTA COW: No major vascular occlusion. Mild atherosclerotic changes through the carotid siphons. No aneurysms.  No vascular malformations.   CTA CAROTID: No aortic aneurysm or dissection. No significant stenoses at the origins of the great vessels from the aortic arch. No significant stenoses of the brachycephalic artery or bilateral subclavian arteries.   Mild atherosclerotic changes at the carotid bifurcations. No significant stenoses bilateral carotid arterial systems. No significant stenoses bilateral vertebral arterial systems.   NONVASCULAR HEAD: No intracranial mass. No intracranial hemorrhage. No evidence of large evolving cortical infarction. Supratentorial white matter hypodensities most likely related to mild chronic small vessel ischemic change.     Bones intact. Mucous retention cyst left maxillary sinus.   NONVASCULAR NECK: No soft tissue mass. No adenopathy. Salivary glands are unremarkable. Thyroid gland unremarkable. Bones intact.       1. No intracranial major vascular occlusion. 2. No flow-limiting stenosis or significant plaque irregularity in the neck.     MACRO: None   Signed by: Nicolas Sarmiento 1/28/2025 7:30 PM Dictation workstation:   [id number]    CT brain attack head wo IV contrast    Result  Date: 1/28/2025  Interpreted By:  Howard Millard, STUDY: TIGIST KEYS; 1/28/2025 5:08 pm   INDICATION: Signs/Symptoms:brain attack;   COMPARISON: 01/28/2024   ACCESSION NUMBER(S): [id number]   ORDERING CLINICIAN: TIGIST KEYS   TECHNIQUE: Contiguous axial images were acquired from the vertex through the posterior fossa without IV contrast. All CT examinations are performed with 1 or more of the following dose reduction techniques: Automated exposure control, adjustment of mA and/or kv according to patient's size, or use of iterative reconstruction techniques.   FINDINGS: No focal mass effect or midline shift is identified. The ventricles and sulci are symmetric and appropriate for the patient's age.   The gray white matter differentiation is preserved. Again seen is a moderate degree of nonspecific white matter hypodensity, most consistent with chronic small-vessel ischemic disease, not significantly changed from the prior study.   No acute intracranial hemorrhage is seen. No intra-axial or extra-axial fluid collection is seen.   The visualized paranasal sinuses and mastoid air cells are clear. There is a small-moderate sized mucous retention cysts in the visualized left maxillary sinus.       No CT evidence for acute intracranial pathology.   Moderate degree of nonspecific white matter hypodensity, most consistent with chronic small-vessel ischemic disease, not significantly changed when compared to the prior study.         Findings discussed with the physician on the 2nd floor at 5:20 p.m. on 01/28/2025   Signed by: Howard Millard 1/28/2025 5:39 PM Dictation workstation:   [id number]      Medications:  Scheduled medications  [Held by provider] apixaban, 5 mg, oral, BID  aspirin, 81 mg, oral, Daily  atorvastatin, 10 mg, oral, Daily  atorvastatin, 40 mg, oral, Nightly  ciprofloxacin, 500 mg, oral, q12h JANINE  docusate sodium, 100 mg, oral, Daily  insulin lispro, 0-10 Units, subcutaneous, TID  AC  ipratropium, 0.5 mg, nebulization, TID  levothyroxine, 100 mcg, oral, Daily  LORazepam, 0.5 mg, intravenous, Once  losartan, 50 mg, oral, Daily  melatonin, 5 mg, oral, Nightly  metoprolol tartrate, 100 mg, oral, BID  metoprolol tartrate, 25 mg, oral, Once  montelukast, 10 mg, oral, Nightly  pantoprazole, 40 mg, oral, BID AC  polyethylene glycol, 17 g, oral, Daily      Continuous medications  heparin, 0-4,000 Units/hr, Last Rate: 800 Units/hr (01/29/25 1702)      PRN medications  PRN medications: acetaminophen, albuterol, dextrose, dextrose, glucagon, glucagon, heparin, hydrALAZINE **FOLLOWED BY** [START ON 1/31/2025] hydrALAZINE, HYDROmorphone, labetaloL, metoprolol, ondansetron ODT **OR** ondansetron, oxyCODONE, oxygen, traZODone    Assessment    Assessment & Plan   Ms. Myrna Rodrigues is a 74 y.o. female who presents with past medical history of afib s/p ablation on Eliquis, HTN, CAD, HLD, GERD, DM, SVT and hypothyroidism presented to the ED with right lower quadrant pain and palpitations.  Through chart review patient has been seen for this similar complaint several times over the last 6 weeks she has not been able to complete the necessary follow-up needed to resolve both issues.  She had did not complete her outpatient antibiotic regimen for her cystitis.  She also has not followed up with cardiology to have a stress test, echo, or Holter monitor.  Considering she has not been out of the hospital long enough to have these outpatient appointments made it will be beneficial to perform as much of the evaluation as possible while inpatient for her to avoid readmission.  She reports decreased appetite and weakness patient would benefit from a PT OT evaluation and likely placement again to avoid readmission.     Assessment & Plan  SVT (supraventricular tachycardia) (CMS-HCC)    Bladder wall thickening       # Complicated UTI with severe inflammatory changes on imaging  # Retroperitoneal/pelvic lymphadenopathy  #  Bladder stones  Patient presents with ongoing abdominal pain, most notably in the suprapubic region as well as right lower quadrant.  Has had multiple hospital admissions recently with similar complaints.  Given the progressive nature of patient's imaging findings (comparing CTs from 1/9 and 1/24) there is concern for an ongoing inflammatory versus infectious process, or potentially a malignant process as well.  -Patient did have 2 UAs collected, 1 at PCP visit on 1/23 and an additional 1 on presentation the ED, early morning on 1/24.  --Initial POCT UA showed pink urine with trace protein, trace blood, trace ketones, 1+ bilirubin and trace leukocytes as well as positive nitrites.  That urine culture was positive for enteric bacilli.  -Subsequent UA collected in the ED was largely unremarkable, with only 2050 leukocyte esterase noted.  That urine culture did not have any clinically significant growth.  -CRP elevated, ESR within normal limits    Plan  -Urology consulted, appreciate the recommendations - recommend intervention to further evaluate pelvic process, tentative plan for patient's urologist, Dr. Thurman, to perform procedure within the next couple of days  -Will continue to hold Eliquis at this time  -Patient was started on heparin drip after consulting neurology who evaluated the MRI and reported multiple small patchy foci in the bilateral cerebral hemispheres consistent with acute to subacute infarctions.  -Urine culture came back positive for E. coli resistant to ampicillin.  -Per ID recommendations patient will be switched from Zosyn to ciprofloxacin for 10 days and to follow-up with Dr. Parkinson in 2 weeks  -Multimodal pain regimen  -CRP was ordered.  Came back as 5.48 trending up from 4.40  -Lactate was ordered came back as 0.9  -PT OT consulted and recommended low intensity continued level of care.  -Urology team was contacted and it was decided that Dr. Carranza will be conducting the cystoscopy with  possible ureteroscopy and biopsy most likely on Friday after medical optimizing the patient.    # SVT  # Type II MI  # H/O A-fib s/p ablation on Eliquis  # h/o CAD  Patient presented to [facility] with SVT, initial EKG with .  Subsequent EKG showed normal sinus rhythm with a OK of 94 demonstration of adenosine.  Has had hospitalizations in the past with a similar presentation, has not yet followed up with cardiology on an outpatient basis for further workup.  -Suspect that intermittent tachycardia is likely secondary to underlying infectious versus inflammatory process in the pelvis, would caution any intentional stress on her heart until further urologic workup has been completed  -Troponin 156 -> 151, likely type II MI in the setting of infectious vs inflammatory pelvic process. No ischemic changes noted on EKG.  -Echo performed 1/24, LVEF 71%, grade 1 pattern of left ventricular diastolic filling    Plan  -Cardiology has been consulted, appreciate recommendations  -Patient will need discharged with Holter monitor, will require outpatient stress testing once acute process has improved  -Continue home Eliquis while inpatient (will need to be held 48 hours prior to urologic intervention)  -Monitor on telemetry   -Optimize electrolytes, goal K>4, Mg>2  -Dr. Stevens evaluated the patient and recommended outpatient stress test after stabilizing the patient medically.  -Due to patient having a run of SVT yesterday she received Metop 5mg IV in addition to bedtime meds.  -Oral metoprolol Increased to 100 mg BID.  -Patient did not have any SVT episodes since yesterday.  Will continue the current regimen with no further changes.  -Cardiology Dr. Stevens was contacted regarding utility of GAVIN for evaluation of valvular disease/regurgitation.  He was agreeable to that and the patient will be n.p.o. at midnight     # Hypothyroidism  Patient does have history of hypothyroidism, with dose adjustment within the last year.  Previously on every other day 100mcg and 50 mcg dosing, recently increased to 100 mcg daily dosing on 1/10/25.   -TSH on presentation was elevated, recovering. Free T4 mildly elevated.    Plan  -Can consider repeat TFTs in 4 weeks once acute illness has resolved.   -Continue home regimen at this time     # Type 2 Diabetes Mellitus  Patient does have a history of non-insulin dependent diabetes mellitus.  Home medications include metformin.  Hemoglobin A1c was checked on 1/2/2025, 5.9.  -SSI #2  -POCT  -Hypoglycemia protocol     Chronic problems:  #HTN  #HLD  #GERD  -continue home meds as appropriate when med rec complete    Global Plan of Care  IVF: None  Diet: Cardiac  DVT prophylaxis: Home Eliquis, SCDs  Dispo: Patient will require inpatient urologic procedure after resolution of UTI, likely middle of next week  Consults: Urology, ID, cardiology, PT/OT  Code Status: Full code    Patient seen and plan of care was discussed with senior resident and the attending.  Signature: CHEO GRIDER MD  Internal Medicine PGY-1 Resident  Date: January 29, 2025

## 2025-01-29 NOTE — CONSULTS
Reason For Consult  Bladder wall thickening, hydronephrosis, possible metastatic malignancy of urothelial origin, urinary tract infection bladder stones    History Of Present Illness  Myrna Rodrigues is a 74 y.o. female Has a history of A-fib on Eliquis, hypertension, CAD, hyperlipidemia who is currently admitted with right lower quadrant pain, altered mental status.  I am being consulted after previous evaluation to consider repeat cystoscopy, treatment of bladder stones, possible bladder biopsy, possible ureteroscopy and biopsy.  She has undergone biopsy previously which just showed squamous metaplasia but no obvious malignancy.  Right ureteral orifice was unable to be identified at that time.    I reviewed her CT scan from admission and again today, on my review she has large retroperitoneal adenopathy up to 3 cm in size, bladder wall is thick, kidneys unremarkable but right distal ureter appears to have possible soft tissue     Past Medical History  She has a past medical history of Anxiety and depression, Arrhythmia, Asthma, Cancer (Multi), Coronary artery disease, Diabetes mellitus (Multi), Disease of thyroid gland, GERD (gastroesophageal reflux disease), Heart disease, Hyperlipidemia, Hypertension, Irregular heart beat, Occipital neuralgia (06/10/2022), Personal history of malignant neoplasm, unspecified (10/04/2021), Personal history of other diseases of the circulatory system, Personal history of other diseases of the circulatory system (10/04/2021), Personal history of other diseases of the circulatory system, Personal history of other diseases of the digestive system (03/11/2020), Personal history of other diseases of the musculoskeletal system and connective tissue, Personal history of other diseases of the nervous system and sense organs (10/04/2021), Personal history of other diseases of the respiratory system, Personal history of other specified conditions (10/04/2021), PONV (postoperative nausea and  "vomiting), Stool incontinence, Thyroid cancer (Multi), and Urinary incontinence.    Surgical History  She has a past surgical history that includes Hysterectomy (08/13/2015); Knee arthroscopy w/ meniscal repair (08/13/2015); Total knee arthroplasty (08/13/2015); Other surgical history (08/13/2015); Gallbladder surgery (08/13/2015); Knee surgery (08/13/2015); Appendectomy (08/13/2015); Other surgical history (08/13/2015); Carpal tunnel release (08/13/2015); Breast lumpectomy (08/13/2015); Other surgical history (09/20/2021); Other surgical history (09/20/2021); Other surgical history (09/20/2021); Other surgical history (09/20/2021); Other surgical history (09/20/2021); Other surgical history (09/20/2021); Other surgical history (10/11/2021); MR angio head wo IV contrast (09/05/2021); and Thyroidectomy.     Social History  She reports that she has never smoked. She has never used smokeless tobacco. She reports that she does not currently use alcohol. She reports that she does not use drugs.    Family History  Family History   Problem Relation Name Age of Onset    Lung cancer Mother      Lymphoma Mother      Bone cancer Mother      Heart attack Father      Liver cancer Brother      Lung cancer Brother          Allergies  Patient has no known allergies.    Review of Systems  A 12 system review was completed and is negative with the exception of those signs and symptoms noted in the history of present illness.     Physical Exam  General: in NAD, appears stated age  Head: normocephalic, atraumatic  Respiratory: normal effort, no use of accessory muscles  Cardiovascular: no edema noted  Skin: normal turgor, no rashes  Neurologic: grossly intact, oriented to person/place/time  Psychiatric: mode and affect appropriate     Last Recorded Vitals  Blood pressure 158/73, pulse 58, temperature 36.4 °C (97.5 °F), temperature source Temporal, resp. rate 18, height 1.575 m (5' 2\"), weight 63.3 kg (139 lb 8.8 oz), SpO2 " 96%.    Relevant Results      See HPI     Assessment/Plan     # Bladder wall thickening, right urothelial thickening, retroperitoneal adenopathy of unclear etiology  -Agree with repeat cystoscopy, possible ureteroscopy with biopsy  -Alternatively, if nodes persist, can proceed with lymph node biopsy  -Plan was for procedure today but patient was not optimized, reconsider surgery tomorrow or Friday    Andrea Carranza MD

## 2025-01-29 NOTE — CARE PLAN
The patient's goals for the shift include    Problem: Pain - Adult  Goal: Verbalizes/displays adequate comfort level or baseline comfort level  Outcome: Progressing     Problem: Safety - Adult  Goal: Free from fall injury  Outcome: Progressing     Problem: Discharge Planning  Goal: Discharge to home or other facility with appropriate resources  Outcome: Progressing     Problem: Chronic Conditions and Co-morbidities  Goal: Patient's chronic conditions and co-morbidity symptoms are monitored and maintained or improved  Outcome: Progressing     Problem: Nutrition  Goal: Nutrient intake appropriate for maintaining nutritional needs  Outcome: Progressing     Problem: Diabetes  Goal: Maintain glucose levels >70mg/dl to <250mg/dl throughout shift  Outcome: Progressing     Problem: Pain  Goal: Turns in bed with improved pain control throughout the shift  Outcome: Progressing  Goal: Performs ADL's with improved pain control throughout shift  Outcome: Progressing     Problem: Arrythmia/Dysrhythmia  Goal: Lab values return to normal range  Outcome: Progressing  Goal: No evidence of post procedure complications  Outcome: Progressing  Goal: Promote self management  Outcome: Progressing  Goal: Serial ECG will return to baseline  Outcome: Progressing  Goal: Verbalize understanding of procedures/devices  Outcome: Progressing  Goal: Vital signs return to baseline  Outcome: Progressing  Goal: Care and maintenance of device (specify)  Outcome: Progressing     Problem: Skin  Goal: Prevent/manage excess moisture  Outcome: Progressing  Goal: Prevent/minimize sheer/friction injuries  Outcome: Progressing  Goal: Promote skin healing  Outcome: Progressing       The clinical goals for the shift include pt's neuro status will remain intact throughout the shift

## 2025-01-29 NOTE — PROGRESS NOTES
Occupational Therapy                 Therapy Communication Note    Patient Name: Myrna Rodrigues  MRN: [id number]  Department: [facility]  Room: 2100/2100-A  Today's Date: 1/29/2025     Discipline: Occupational Therapy    OT Missed Visit: Yes     Missed Visit Reason: Missed Visit Reason: Patient placed on medical hold (Pt is scheduled for and MRI today. Awaiting results of test.)    Missed Time: Attempt    Comment:

## 2025-01-29 NOTE — PROGRESS NOTES
Spiritual Care Visit  Spiritual Care Request    Reason for Visit:  Routine Visit: Introduction     Request Received From:       Focus of Care:  Visited With: Patient         Refer to :          Spiritual Care Assessment    Spiritual Assessment:                      Care Provided:  Intended Effects: Promote sense of peace, Preserve dignity and respect, Meaning-making  Methods: Offer spiritual/Anabaptism support  Interventions: Share words of hope and inspiration, [place]    Sense of Community and or Amish Affiliation:  Mosque         Addressed Needs/Concerns and/or [place] Through:          Outcome:        Advance Directives:         Spiritual Care Annotation    Annotation:  Patient talked about her son and grand kids who range from 35-2.   prayed at her request.

## 2025-01-29 NOTE — PROGRESS NOTES
Nutrition Follow Up Assessment:   Nutrition Assessment       Patient is a 74 y.o. female presenting with right lower quadrant pain and palpitations. Pt found to have recurrent SVT/A-fib.    1/29/25: follow up note. Neurology and ID on consult. Pt w/brain attack called 1/28. Pt attempted MRI today. Pt off floor then resting during RD attempts to visit. RN and family at bedside report pt likes the Ensures, but not eating much solid food. Family also brought pt her favorite [employer] soup, but pt refused. Pt remains constipated - RN aware.     Past Medical History:   Diagnosis Date    Anxiety and depression     Arrhythmia     Asthma     Cancer (Multi)     Coronary artery disease     Diabetes mellitus (Multi)     Disease of thyroid gland     GERD (gastroesophageal reflux disease)     Heart disease     Hyperlipidemia     Hypertension     Irregular heart beat     Occipital neuralgia 06/10/2022    Occipital neuralgia of right side    Personal history of malignant neoplasm, unspecified 10/04/2021    History of malignant neoplasm    Personal history of other diseases of the circulatory system     History of hypertension    Personal history of other diseases of the circulatory system 10/04/2021    History of essential hypertension    Personal history of other diseases of the circulatory system     History of coronary artery disease    Personal history of other diseases of the digestive system 03/11/2020    History of chronic constipation    Personal history of other diseases of the musculoskeletal system and connective tissue     History of arthritis    Personal history of other diseases of the nervous system and sense organs 10/04/2021    History of eye problem    Personal history of other diseases of the respiratory system     History of asthma    Personal history of other specified conditions 10/04/2021    History of chest pain    PONV (postoperative nausea and vomiting)     Stool incontinence     Thyroid cancer (Multi)      Urinary incontinence      Past Surgical History:   Procedure Laterality Date    APPENDECTOMY  08/13/2015    Appendectomy    BREAST LUMPECTOMY  08/13/2015    Breast Surgery Lumpectomy    CARPAL TUNNEL RELEASE  08/13/2015    Neuroplasty Decompression Median Nerve At Carpal Tunnel    GALLBLADDER SURGERY  08/13/2015    Gallbladder Surgery    HYSTERECTOMY  08/13/2015    Hysterectomy    KNEE ARTHROSCOPY W/ MENISCAL REPAIR  08/13/2015    Knee Arthroscopy With Medial Meniscus Repair    KNEE SURGERY  08/13/2015    Knee Surgery    MR HEAD ANGIO WO IV CONTRAST  09/05/2021    MR HEAD ANGIO WO IV CONTRAST 9/5/2021 [facility]    OTHER SURGICAL HISTORY  08/13/2015    Shoulder Surgery Left    OTHER SURGICAL HISTORY  08/13/2015    Repair Of Bladder Reconstruction    OTHER SURGICAL HISTORY  09/20/2021    Breast surgery    OTHER SURGICAL HISTORY  09/20/2021    Bladder surgery    OTHER SURGICAL HISTORY  09/20/2021    Complete colonoscopy    OTHER SURGICAL HISTORY  09/20/2021    Shoulder surgery    OTHER SURGICAL HISTORY  09/20/2021    Foot surgery    OTHER SURGICAL HISTORY  09/20/2021    Percutaneous transluminal coronary angioplasty    OTHER SURGICAL HISTORY  10/11/2021    Thyroidectomy total    THYROIDECTOMY      TOTAL KNEE ARTHROPLASTY  08/13/2015    Knee Replacement     Nutrition History:  Energy Intake: Poor < 50 %  Food and Nutrient History: Pt seen by RDN at [place] last week and now she is seen by RDN at [place] this week. she complained of severe diarrhea last week, but today states it has resolved. She explains that she is a very independent woman who has been living alone for the past 3 years since her spouse passed Since [place] (maybe a little before), she has suffered with diarrhea (and constipation intermittently) along with increased nausea due to pain in her RLQ - she states she has a bladder stone, but her cardiac status seems to be the priority at this time. She reports she has been >24 hours with no oral  "intake and she remains NPO awaiting Cardiology eval. At [place], she was started on Glucerna (because she prefers strawberry flavor), but she denies being diabetic.  Vitamin/Herbal Supplement Use: D3, potassium gluconate per home med list     Anthropometrics:  Height: 157.5 cm (5' 2\")   Weight: 63.3 kg (139 lb 8.8 oz)   BMI (Calculated): 25.52       Weight History:   Wt Readings from Last 10 Encounters:   01/28/25 63.3 kg (139 lb 8.8 oz)   01/23/25 64.4 kg (142 lb)   01/17/25 65.4 kg (144 lb 2.9 oz)   01/12/25 62.6 kg (138 lb)   01/09/25 65.8 kg (145 lb)   01/06/25 67.1 kg (148 lb)   12/13/24 65.8 kg (145 lb)   11/05/24 65.8 kg (145 lb)   10/14/24 66.7 kg (147 lb)   10/09/24 66.9 kg (147 lb 7.8 oz)     Weight Change %:  Weight History / % Weight Change: significant loss of 7 lbs (4.7%) in 2.5 weeks.  Significant Weight Loss: Yes    Nutrition Focused Physical Exam Findings:    Subcutaneous Fat Loss:   Orbital Fat Pads: Mild-Moderate (slight dark circles and slight hollowing)  Buccal Fat Pads: Mild-Moderate (flat cheeks, minimal bounce)  Triceps: Defer  Ribs: Defer  Muscle Wasting:  Temporalis: Mild-Moderate (slight depression)  Pectoralis (Clavicular Region): Defer  Interosseous: Mild-Moderate (slightly depressed area between thumb and forefinger)  Edema:  Edema: none  Physical Findings:  Skin: Negative (pale, warm, dry, bruising)  Digestive System Findings: Constipation, Anorexia    Nutrition Significant Labs:  CBC Trend:   Results from last 7 days   Lab Units 01/29/25  0805 01/28/25  1648 01/28/25  0822 01/27/25  0604   WBC AUTO x10*3/uL 11.6* 14.8* 11.8* 10.3   RBC AUTO x10*6/uL 4.30 4.64 4.73 4.37   HEMOGLOBIN g/dL 9.0* 9.9* 10.1* 9.0*   HEMATOCRIT % 30.6* 34.6* 34.2* 31.6*   MCV fL 71* 75* 72* 72*   PLATELETS AUTO x10*3/uL 297 298 299 296    , BMP Trend:   Results from last 7 days   Lab Units 01/29/25  0805 01/28/25  1648 01/28/25  0822 01/27/25  0604   GLUCOSE mg/dL 113* 106* 112* 106*   CALCIUM mg/dL 9.0 9.4 " 9.2 8.9   SODIUM mmol/L 136 135* 135* 136   POTASSIUM mmol/L 3.9 4.2 4.3 4.4   CO2 mmol/L 27 23 28 28   CHLORIDE mmol/L 100 99 99 101   BUN mg/dL 12 13 13 15   CREATININE mg/dL 0.83 0.86 0.89 0.98    , A1C:  Lab Results   Component Value Date    HGBA1C 5.9 (H) 01/02/2025   , BG POCT trend:   Results from last 7 days   Lab Units 01/29/25  1110 01/29/25  0718 01/28/25  1925 01/28/25  1631 01/28/25  1147   POCT GLUCOSE mg/dL 107* 96 155* 99 164*      Nutrition Specific Medications:  Scheduled medications  [Held by provider] apixaban, 5 mg, oral, BID  aspirin, 81 mg, oral, Daily  atorvastatin, 10 mg, oral, Daily  atorvastatin, 40 mg, oral, Nightly  ciprofloxacin, 500 mg, oral, q12h JANINE  docusate sodium, 100 mg, oral, Daily  insulin lispro, 0-10 Units, subcutaneous, TID AC  ipratropium, 0.5 mg, nebulization, TID  levothyroxine, 100 mcg, oral, Daily  LORazepam, 0.5 mg, intravenous, Once  losartan, 50 mg, oral, Daily  melatonin, 5 mg, oral, Nightly  metoprolol tartrate, 100 mg, oral, BID  metoprolol tartrate, 25 mg, oral, Once  montelukast, 10 mg, oral, Nightly  pantoprazole, 40 mg, oral, BID AC  polyethylene glycol, 17 g, oral, Daily      Continuous medications     PRN medications  PRN medications: acetaminophen, albuterol, dextrose, dextrose, glucagon, glucagon, hydrALAZINE **FOLLOWED BY** [START ON 1/31/2025] hydrALAZINE, HYDROmorphone, labetaloL, metoprolol, ondansetron ODT **OR** ondansetron, oxyCODONE, oxygen, traZODone      I/O:   Last BM Date: 01/24/25; Stool Appearance: Loose (01/28/25 1730)    Dietary Orders (From admission, onward)       Start     Ordered    01/29/25 1601  Oral nutritional supplements  Until discontinued        Question Answer Comment   Deliver with All meals strawberry   Select supplement: Ensure Plus High Protein        01/29/25 1600    01/27/25 1002  Adult diet Cardiac; 70 gm fat; 2 - 3 grams Sodium  Diet effective now        Question Answer Comment   Diet type Cardiac    Fat restriction: 70  gm fat    Sodium restriction: 2 - 3 grams Sodium        01/27/25 1001    01/24/25 0444  May Participate in Room Service  ( ROOM SERVICE MAY PARTICIPATE)  Once        Question:  .  Answer:  Yes    01/24/25 0443                Estimated Needs:   Total Energy Estimated Needs in 24 hours (kCal):  (1637-9445 kcal (25-30 kcal/kg))     Total Protein Estimated Needs in 24 Hours (g):  (64-77g protein (1.0-1.2g/kg))     Total Fluid Estimated Needs in 24 Hours (mL):  (1 ml/kcal)         Nutrition Diagnosis   Malnutrition Diagnosis  Patient has Malnutrition Diagnosis: Yes  Diagnosis Status: Active  Malnutrition Diagnosis: Moderate malnutrition related to acute disease or injury  As Evidenced by: <75% of estimated energy requirements for > 7 days, >4% wt loss x 2.5 weeks        Nutrition Interventions/Recommendations   Nutrition prescription for oral nutrition    Nutrition Recommendations:  Individualized Nutrition Prescription Provided for : Diet: continue oral diet of Cardiac, 70gm Fat, 2-3gm Na per day diet and will increase Ensure Plus HP to TID w/meals.    Nutrition Interventions/Goals:   Interventions: Meals and snacks, Medical food supplement  Meals and Snacks: Fat-modified diet, Mineral-modified diet  Goal: will consume 50% of meals  Medical Food Supplement: Commercial beverage medical food supplement therapy  Goal: will increase Ensure Plus HP to TID w/meals (350kcal, 20g pro per 8oz serving).  Coordination of Care with Providers: Nursing (GLO Lundy)    Education Documentation  No documentation found.    Will continue to follow - pt not interested at this time. Reviewed menu ordering procedures and ONS options.    1/29: pt sleeping.     Nutrition Monitoring and Evaluation   Food/Nutrient Related History Monitoring  Monitoring and Evaluation Plan: Intake / amount of food, Estimated Energy Intake  Estimated Energy Intake: Energy intake greater or equal to 75% of estimated energy needs  Intake / Amount of food: Consumes at  least 75% or more of meals/snacks/supplements    Anthropometric Measurements  Monitoring and Evaluation Plan: Body weight  Body Weight: Body weight - Maintain stable weight     Physical Exam Findings  Monitoring and Evaluation Plan: Digestive System  Digestive System Finding: Constipation  Criteria: resolve    Goal Status: Goal(s) not achieved    Time Spent (min): 30 minutes  Follow up 5-7 days  Last RD note 1/29/25

## 2025-01-29 NOTE — CARE PLAN
Pt agitated and restless at start of shift. Complaining of severe right sided abdominal pain. Alert and oriented x2, disoriented to time and situation. MD updated and up to see pt at bedside. Given dilaudid per MD, pt resting comfortably for the rest of the night. Remained NSR on tele. Pt's orientation back to baseline this AM, alert and oriented x4. Safety maintained, will continue to monitor     Problem: Pain - Adult  Goal: Verbalizes/displays adequate comfort level or baseline comfort level  Outcome: Progressing     Problem: Safety - Adult  Goal: Free from fall injury  Outcome: Progressing     Problem: Discharge Planning  Goal: Discharge to home or other facility with appropriate resources  Outcome: Progressing     Problem: Chronic Conditions and Co-morbidities  Goal: Patient's chronic conditions and co-morbidity symptoms are monitored and maintained or improved  Outcome: Progressing     Problem: Nutrition  Goal: Nutrient intake appropriate for maintaining nutritional needs  Outcome: Progressing     Problem: Diabetes  Goal: Maintain glucose levels >70mg/dl to <250mg/dl throughout shift  Outcome: Progressing     Problem: Arrythmia/Dysrhythmia  Goal: Serial ECG will return to baseline  Outcome: Progressing  Goal: Vital signs return to baseline  Outcome: Progressing

## 2025-01-30 ENCOUNTER — APPOINTMENT (OUTPATIENT)
Dept: CARDIOLOGY | Facility: HOSPITAL | Age: 75
End: 2025-01-30
Payer: MEDICARE

## 2025-01-30 ENCOUNTER — APPOINTMENT (OUTPATIENT)
Dept: CARDIOLOGY | Facility: HOSPITAL | Age: 75
DRG: 689 | End: 2025-01-30
Payer: MEDICARE

## 2025-01-30 PROBLEM — I63.9 ACUTE ISCHEMIC STROKE (MULTI): Status: ACTIVE | Noted: 2025-01-30

## 2025-01-30 LAB
ALBUMIN SERPL BCP-MCNC: 3.4 G/DL (ref 3.4–5)
ANION GAP SERPL CALC-SCNC: 12 MMOL/L (ref 10–20)
ATRIAL RATE: 81 BPM
BASOPHILS # BLD AUTO: 0.04 X10*3/UL (ref 0–0.1)
BASOPHILS NFR BLD AUTO: 0.3 %
BODY SURFACE AREA: 1.61 M2
BUN SERPL-MCNC: 14 MG/DL (ref 6–23)
CALCIUM SERPL-MCNC: 9.3 MG/DL (ref 8.6–10.3)
CHLORIDE SERPL-SCNC: 100 MMOL/L (ref 98–107)
CO2 SERPL-SCNC: 28 MMOL/L (ref 21–32)
CREAT SERPL-MCNC: 0.88 MG/DL (ref 0.5–1.05)
EGFRCR SERPLBLD CKD-EPI 2021: 68 ML/MIN/1.73M*2
EOSINOPHIL # BLD AUTO: 0.64 X10*3/UL (ref 0–0.4)
EOSINOPHIL NFR BLD AUTO: 4.8 %
ERYTHROCYTE [DISTWIDTH] IN BLOOD BY AUTOMATED COUNT: 18.4 % (ref 11.5–14.5)
GLUCOSE BLD MANUAL STRIP-MCNC: 100 MG/DL (ref 74–99)
GLUCOSE BLD MANUAL STRIP-MCNC: 108 MG/DL (ref 74–99)
GLUCOSE BLD MANUAL STRIP-MCNC: 155 MG/DL (ref 74–99)
GLUCOSE BLD MANUAL STRIP-MCNC: 93 MG/DL (ref 74–99)
GLUCOSE SERPL-MCNC: 116 MG/DL (ref 74–99)
HCT VFR BLD AUTO: 31.4 % (ref 36–46)
HGB BLD-MCNC: 9.1 G/DL (ref 12–16)
IMM GRANULOCYTES # BLD AUTO: 0.04 X10*3/UL (ref 0–0.5)
IMM GRANULOCYTES NFR BLD AUTO: 0.3 % (ref 0–0.9)
LYMPHOCYTES # BLD AUTO: 2.32 X10*3/UL (ref 0.8–3)
LYMPHOCYTES NFR BLD AUTO: 17.5 %
MAGNESIUM SERPL-MCNC: 2.12 MG/DL (ref 1.6–2.4)
MCH RBC QN AUTO: 21.1 PG (ref 26–34)
MCHC RBC AUTO-ENTMCNC: 29 G/DL (ref 32–36)
MCV RBC AUTO: 73 FL (ref 80–100)
MONOCYTES # BLD AUTO: 0.98 X10*3/UL (ref 0.05–0.8)
MONOCYTES NFR BLD AUTO: 7.4 %
NEUTROPHILS # BLD AUTO: 9.2 X10*3/UL (ref 1.6–5.5)
NEUTROPHILS NFR BLD AUTO: 69.7 %
NRBC BLD-RTO: 0 /100 WBCS (ref 0–0)
PHOSPHATE SERPL-MCNC: 3.3 MG/DL (ref 2.5–4.9)
PLATELET # BLD AUTO: 317 X10*3/UL (ref 150–450)
POTASSIUM SERPL-SCNC: 4 MMOL/L (ref 3.5–5.3)
Q ONSET: 215 MS
QRS COUNT: 21 BEATS
QRS DURATION: 82 MS
QT INTERVAL: 342 MS
QTC CALCULATION(BAZETT): 506 MS
QTC FREDERICIA: 445 MS
R AXIS: -84 DEGREES
RBC # BLD AUTO: 4.31 X10*6/UL (ref 4–5.2)
SODIUM SERPL-SCNC: 136 MMOL/L (ref 136–145)
T AXIS: 66 DEGREES
T OFFSET: 386 MS
UFH PPP CHRO-ACNC: 0.4 IU/ML
UFH PPP CHRO-ACNC: 0.5 IU/ML
VENTRICULAR RATE: 132 BPM
WBC # BLD AUTO: 13.2 X10*3/UL (ref 4.4–11.3)

## 2025-01-30 PROCEDURE — 7100000010 HC PHASE TWO TIME - EACH INCREMENTAL 1 MINUTE

## 2025-01-30 PROCEDURE — 82947 ASSAY GLUCOSE BLOOD QUANT: CPT

## 2025-01-30 PROCEDURE — 2500000004 HC RX 250 GENERAL PHARMACY W/ HCPCS (ALT 636 FOR OP/ED)

## 2025-01-30 PROCEDURE — 36415 COLL VENOUS BLD VENIPUNCTURE: CPT | Performed by: STUDENT IN AN ORGANIZED HEALTH CARE EDUCATION/TRAINING PROGRAM

## 2025-01-30 PROCEDURE — 94640 AIRWAY INHALATION TREATMENT: CPT

## 2025-01-30 PROCEDURE — 99232 SBSQ HOSP IP/OBS MODERATE 35: CPT | Performed by: NURSE PRACTITIONER

## 2025-01-30 PROCEDURE — 2500000001 HC RX 250 WO HCPCS SELF ADMINISTERED DRUGS (ALT 637 FOR MEDICARE OP)

## 2025-01-30 PROCEDURE — 80069 RENAL FUNCTION PANEL: CPT

## 2025-01-30 PROCEDURE — 7100000009 HC PHASE TWO TIME - INITIAL BASE CHARGE

## 2025-01-30 PROCEDURE — B24BZZ4 ULTRASONOGRAPHY OF HEART WITH AORTA, TRANSESOPHAGEAL: ICD-10-PCS | Performed by: SPECIALIST

## 2025-01-30 PROCEDURE — 85520 HEPARIN ASSAY: CPT | Performed by: STUDENT IN AN ORGANIZED HEALTH CARE EDUCATION/TRAINING PROGRAM

## 2025-01-30 PROCEDURE — 2500000002 HC RX 250 W HCPCS SELF ADMINISTERED DRUGS (ALT 637 FOR MEDICARE OP, ALT 636 FOR OP/ED)

## 2025-01-30 PROCEDURE — 99233 SBSQ HOSP IP/OBS HIGH 50: CPT | Performed by: STUDENT IN AN ORGANIZED HEALTH CARE EDUCATION/TRAINING PROGRAM

## 2025-01-30 PROCEDURE — 2500000004 HC RX 250 GENERAL PHARMACY W/ HCPCS (ALT 636 FOR OP/ED): Performed by: SPECIALIST

## 2025-01-30 PROCEDURE — 83735 ASSAY OF MAGNESIUM: CPT

## 2025-01-30 PROCEDURE — 99152 MOD SED SAME PHYS/QHP 5/>YRS: CPT

## 2025-01-30 PROCEDURE — 2500000002 HC RX 250 W HCPCS SELF ADMINISTERED DRUGS (ALT 637 FOR MEDICARE OP, ALT 636 FOR OP/ED): Performed by: STUDENT IN AN ORGANIZED HEALTH CARE EDUCATION/TRAINING PROGRAM

## 2025-01-30 PROCEDURE — 36415 COLL VENOUS BLD VENIPUNCTURE: CPT

## 2025-01-30 PROCEDURE — 85025 COMPLETE CBC W/AUTO DIFF WBC: CPT

## 2025-01-30 PROCEDURE — 2500000001 HC RX 250 WO HCPCS SELF ADMINISTERED DRUGS (ALT 637 FOR MEDICARE OP): Performed by: INTERNAL MEDICINE

## 2025-01-30 PROCEDURE — 2060000001 HC INTERMEDIATE ICU ROOM DAILY

## 2025-01-30 RX ORDER — HYDROXYZINE HYDROCHLORIDE 25 MG/1
25 TABLET, FILM COATED ORAL ONCE
Status: COMPLETED | OUTPATIENT
Start: 2025-01-30 | End: 2025-01-30

## 2025-01-30 RX ORDER — LORAZEPAM 2 MG/ML
0.5 INJECTION INTRAMUSCULAR ONCE
Status: DISCONTINUED | OUTPATIENT
Start: 2025-01-30 | End: 2025-01-30

## 2025-01-30 RX ORDER — FENTANYL CITRATE 50 UG/ML
INJECTION, SOLUTION INTRAMUSCULAR; INTRAVENOUS AS NEEDED
Status: DISCONTINUED | OUTPATIENT
Start: 2025-01-30 | End: 2025-01-30 | Stop reason: HOSPADM

## 2025-01-30 RX ORDER — MIDAZOLAM HYDROCHLORIDE 1 MG/ML
INJECTION, SOLUTION INTRAMUSCULAR; INTRAVENOUS AS NEEDED
Status: DISCONTINUED | OUTPATIENT
Start: 2025-01-30 | End: 2025-01-30 | Stop reason: HOSPADM

## 2025-01-30 RX ADMIN — PANTOPRAZOLE SODIUM 40 MG: 40 TABLET, DELAYED RELEASE ORAL at 17:25

## 2025-01-30 RX ADMIN — Medication 5 MG: at 21:03

## 2025-01-30 RX ADMIN — MIDAZOLAM 0.5 MG: 1 INJECTION INTRAMUSCULAR; INTRAVENOUS at 10:15

## 2025-01-30 RX ADMIN — LEVOTHYROXINE SODIUM 100 MCG: 0.1 TABLET ORAL at 06:46

## 2025-01-30 RX ADMIN — CIPROFLOXACIN 500 MG: 500 TABLET ORAL at 12:44

## 2025-01-30 RX ADMIN — METOPROLOL TARTRATE 100 MG: 100 TABLET, FILM COATED ORAL at 21:03

## 2025-01-30 RX ADMIN — MONTELUKAST 10 MG: 10 TABLET, FILM COATED ORAL at 21:03

## 2025-01-30 RX ADMIN — HYDROXYZINE HYDROCHLORIDE 25 MG: 25 TABLET ORAL at 04:27

## 2025-01-30 RX ADMIN — METOPROLOL TARTRATE 100 MG: 100 TABLET, FILM COATED ORAL at 12:44

## 2025-01-30 RX ADMIN — MIDAZOLAM 1 MG: 1 INJECTION INTRAMUSCULAR; INTRAVENOUS at 10:14

## 2025-01-30 RX ADMIN — HYDROXYZINE HYDROCHLORIDE 25 MG: 25 TABLET ORAL at 01:20

## 2025-01-30 RX ADMIN — ATORVASTATIN CALCIUM 40 MG: 40 TABLET, FILM COATED ORAL at 21:03

## 2025-01-30 RX ADMIN — CIPROFLOXACIN 500 MG: 500 TABLET ORAL at 21:06

## 2025-01-30 RX ADMIN — FENTANYL CITRATE 50 MCG: 50 INJECTION, SOLUTION INTRAMUSCULAR; INTRAVENOUS at 10:14

## 2025-01-30 RX ADMIN — PANTOPRAZOLE SODIUM 40 MG: 40 TABLET, DELAYED RELEASE ORAL at 06:46

## 2025-01-30 RX ADMIN — IPRATROPIUM BROMIDE 0.5 MG: 0.5 SOLUTION RESPIRATORY (INHALATION) at 20:42

## 2025-01-30 RX ADMIN — IPRATROPIUM BROMIDE 0.5 MG: 0.5 SOLUTION RESPIRATORY (INHALATION) at 15:52

## 2025-01-30 RX ADMIN — ASPIRIN 81 MG CHEWABLE TABLET 81 MG: 81 TABLET CHEWABLE at 12:44

## 2025-01-30 RX ADMIN — DOCUSATE SODIUM 100 MG: 100 CAPSULE, LIQUID FILLED ORAL at 12:44

## 2025-01-30 RX ADMIN — LOSARTAN POTASSIUM 50 MG: 50 TABLET, FILM COATED ORAL at 12:44

## 2025-01-30 RX ADMIN — TRAZODONE HYDROCHLORIDE 50 MG: 50 TABLET ORAL at 21:03

## 2025-01-30 RX ADMIN — FENTANYL CITRATE 50 MCG: 50 INJECTION, SOLUTION INTRAMUSCULAR; INTRAVENOUS at 10:18

## 2025-01-30 ASSESSMENT — PAIN SCALES - GENERAL
PAINLEVEL_OUTOF10: 0 - NO PAIN

## 2025-01-30 ASSESSMENT — COGNITIVE AND FUNCTIONAL STATUS - GENERAL
MOBILITY SCORE: 22
CLIMB 3 TO 5 STEPS WITH RAILING: A LITTLE
WALKING IN HOSPITAL ROOM: A LITTLE
DAILY ACTIVITIY SCORE: 24

## 2025-01-30 ASSESSMENT — PAIN - FUNCTIONAL ASSESSMENT
PAIN_FUNCTIONAL_ASSESSMENT: 0-10

## 2025-01-30 ASSESSMENT — PAIN SCALES - WONG BAKER: WONGBAKER_NUMERICALRESPONSE: NO HURT

## 2025-01-30 NOTE — PROGRESS NOTES
01/30/25 1604   Discharge Planning   Who is requesting discharge planning? Provider   Home or Post Acute Services Post acute facilities (Rehab/SNF/etc)   Type of Post Acute Facility Services Skilled nursing   Expected Discharge Disposition SNF   Does the patient need discharge transport arranged? Yes   RoundTrip coordination needed? Yes   Has discharge transport been arranged? No     Spoke with Dr. Castro and patient's son is requesting information on SNF's for patient to go to on discharge. Son is also stating that they would do private pay if needed to get patient (mother) into a rehab facility. Will get a choice list printed for son and patient to choose a facility. CT team to follow patient.     1625- Spoke with son Eriberto at bedside. Discussed that he would not want patient to go home without some rehab. Looked over list and made choices. 1. [facility]  2. [facility]. Requested DSC to place referrals to SNF for acceptance. Will need pre cert. CT team to follow.

## 2025-01-30 NOTE — PROGRESS NOTES
"ID:  Myrna Rodrigues is a 74 y.o. female on hospital day 6 of admission presenting with SVT (supraventricular tachycardia) (CMS-HCC).    Subjective   Patient was seen and evaluated at bedside.  No acute events over night. She denied having any lightheadedness, dizziness, fever, SOB, chest pain, or urinary symptoms. She does endorse having right lower quadrant abdominal pain unchanged from yesterday.        Objective     Visit Vitals  /72 (BP Location: Right arm, Patient Position: Lying)   Pulse 60   Temp 36.2 °C (97.2 °F) (Temporal)   Resp 20   Ht 1.575 m (5' 2\")   Wt 59.6 kg (131 lb 6.3 oz)   SpO2 96%   BMI 24.03 kg/m²   OB Status Postmenopausal   Smoking Status Never   BSA 1.61 m²        Physical Examination:  General: Not in acute distress, A&O x3, alert, coopertive, well-developed  HEENT: Normocephalic, atraumatic, EOMI, moist mucous membranes  Neck: Neck supple, trachea midline, no evidence of trauma  Cardiovascular: Heart rate regular on exam this morning, rhythm regular on exam  Respiratory: Vesicular breath sounds appreciated bilaterally, no adventitious sounds appreciated, no increased work of breathing  GI: Abdomen soft with some fullness and firmness noted in the RLQ and suprapubic region (improved compared to yesterday), nondistended, tenderness has significantly improved.   Extremities: No edema appreciated in lower extremities bilaterally, no cyanosis  Neuro: no focal deficits, strength and sensation intact bilaterally  Skin: Warm and dry, without lesions or rashes  Psychiatric: Judgment intact.  Appropriate mood, affect and behavior.      Laboratory Data:  Results for orders placed or performed during the hospital encounter of 01/23/25 (from the past 24 hours)   POCT GLUCOSE   Result Value Ref Range    POCT Glucose 111 (H) 74 - 99 mg/dL   Blood Culture    Specimen: Peripheral Venipuncture; Blood culture   Result Value Ref Range    Blood Culture Loaded on Instrument - Culture in progress    Blood " Culture    Specimen: Peripheral Venipuncture; Blood culture   Result Value Ref Range    Blood Culture Loaded on Instrument - Culture in progress    POCT GLUCOSE   Result Value Ref Range    POCT Glucose 192 (H) 74 - 99 mg/dL   Heparin Assay   Result Value Ref Range    Heparin Unfractionated 0.4 See Comment Below for Therapeutic Ranges IU/mL   Heparin Assay   Result Value Ref Range    Heparin Unfractionated 0.5 See Comment Below for Therapeutic Ranges IU/mL   CBC and Auto Differential   Result Value Ref Range    WBC 13.2 (H) 4.4 - 11.3 x10*3/uL    nRBC 0.0 0.0 - 0.0 /100 WBCs    RBC 4.31 4.00 - 5.20 x10*6/uL    Hemoglobin 9.1 (L) 12.0 - 16.0 g/dL    Hematocrit 31.4 (L) 36.0 - 46.0 %    MCV 73 (L) 80 - 100 fL    MCH 21.1 (L) 26.0 - 34.0 pg    MCHC 29.0 (L) 32.0 - 36.0 g/dL    RDW 18.4 (H) 11.5 - 14.5 %    Platelets 317 150 - 450 x10*3/uL    Neutrophils % 69.7 40.0 - 80.0 %    Immature Granulocytes %, Automated 0.3 0.0 - 0.9 %    Lymphocytes % 17.5 13.0 - 44.0 %    Monocytes % 7.4 2.0 - 10.0 %    Eosinophils % 4.8 0.0 - 6.0 %    Basophils % 0.3 0.0 - 2.0 %    Neutrophils Absolute 9.20 (H) 1.60 - 5.50 x10*3/uL    Immature Granulocytes Absolute, Automated 0.04 0.00 - 0.50 x10*3/uL    Lymphocytes Absolute 2.32 0.80 - 3.00 x10*3/uL    Monocytes Absolute 0.98 (H) 0.05 - 0.80 x10*3/uL    Eosinophils Absolute 0.64 (H) 0.00 - 0.40 x10*3/uL    Basophils Absolute 0.04 0.00 - 0.10 x10*3/uL   Magnesium   Result Value Ref Range    Magnesium 2.12 1.60 - 2.40 mg/dL   Renal function panel   Result Value Ref Range    Glucose 116 (H) 74 - 99 mg/dL    Sodium 136 136 - 145 mmol/L    Potassium 4.0 3.5 - 5.3 mmol/L    Chloride 100 98 - 107 mmol/L    Bicarbonate 28 21 - 32 mmol/L    Anion Gap 12 10 - 20 mmol/L    Urea Nitrogen 14 6 - 23 mg/dL    Creatinine 0.88 0.50 - 1.05 mg/dL    eGFR 68 >60 mL/min/1.73m*2    Calcium 9.3 8.6 - 10.3 mg/dL    Phosphorus 3.3 2.5 - 4.9 mg/dL    Albumin 3.4 3.4 - 5.0 g/dL   Heparin Assay   Result Value Ref Range     Heparin Unfractionated 0.4 See Comment Below for Therapeutic Ranges IU/mL   POCT GLUCOSE   Result Value Ref Range    POCT Glucose 100 (H) 74 - 99 mg/dL   Transesophageal Echo (GAVIN)   Result Value Ref Range    BSA 1.61 m2   POCT GLUCOSE   Result Value Ref Range    POCT Glucose 108 (H) 74 - 99 mg/dL     *Note: Due to a large number of results and/or encounters for the requested time period, some results have not been displayed. A complete set of results can be found in Results Review.       Imaging:  MR brain wo IV contrast    Result Date: 1/29/2025  Interpreted By:  Nicolas Sarmiento, STUDY: MR BRAIN WO IV CONTRAST;  1/29/2025 2:34 pm   INDICATION: Signs/Symptoms:aphasia, history of PAF with interruption in anticoagulation.     COMPARISON: CT head and CTA head yesterday.   ACCESSION NUMBER(S): [id number]   ORDERING CLINICIAN: TIGIST KEYS   TECHNIQUE: Patient could only tolerate limited examination. Axial DWI, ADC, sagittal and axial T1 weighted sequences were obtained.   FINDINGS: Multiple small patchy foci of abnormally restricted diffusion are seen in the bilateral cerebral hemispheres predominantly each centrum semiovale and oriented in AP direction with some additional involvement of the anterior right middle frontal gyrus cortex, the posterior right corpus callosum, paramedian right occipital lobe. Questionable additional involvement of left-greater-than-right cerebellar hemispheres.   No definite intracranial hemorrhage.     No extra-axial fluid collection. No intracranial mass.   Mild to moderate white matter T2 signal increase, most commonly seen with chronic small vessel ischemic change.  Presence of older small infarctions in these areas not excluded.   Degree of ventriculomegaly is commensurate with overall degree of brain parenchymal volume loss, which is mild.       1. Multiple small patchy foci of abnormally restricted diffusion are seen in the bilateral cerebral hemispheres predominantly each  centrum semiovale and oriented in AP direction with some additional involvement of the anterior right middle frontal gyrus cortex, the posterior right corpus callosum, paramedian right occipital lobe. Questionable additional involvement of left-greater-than-right cerebellar hemispheres. Findings consistent with acute to subacute infarctions. The AP orientation of the high bilateral cerebral hemispheres could indicate a recent hypotensive event. 2. Mild to moderate white matter T2 signal increase, most commonly seen with chronic small vessel ischemic change.  Presence of older small infarctions in these areas not excluded. 3. Degree of ventriculomegaly is commensurate with overall degree of brain parenchymal volume loss, which is mild.   Note the patient was only able to tolerate limited examination as described above.   MACRO: None   Signed by: Nicolas Sarmiento 1/29/2025 3:12 PM Dictation workstation:   [id number]    Electrocardiogram, 12-lead PRN ACS symptoms    Result Date: 1/29/2025  Supraventricular tachycardia with occasional Premature ventricular complexes and Fusion complexes Right axis deviation Nonspecific ST abnormality Abnormal ECG When compared with ECG of 27-JAN-2025 20:14, (unconfirmed) Fusion complexes are now Present Premature ventricular complexes are now Present QRS axis Shifted right Borderline criteria for Inferior infarct are no longer Present Nonspecific T wave abnormality now evident in Inferior leads    CT abdomen pelvis w IV contrast    Result Date: 1/29/2025  Interpreted By:  Evonne Yi, STUDY: CT ABDOMEN PELVIS W IV CONTRAST;  1/29/2025 9:32 am   INDICATION: Signs/Symptoms:abdominal pain with acute encephalopathy.     COMPARISON: CT abdomen pelvis 01/24/2025 CT chest abdomen pelvis 01/28/2024   ACCESSION NUMBER(S): [id number]   ORDERING CLINICIAN: TIGIST KEYS   TECHNIQUE: CT of the abdomen and pelvis was performed.  Standard contiguous axial images were obtained at 3 mm slice  thickness through the abdomen and pelvis. Coronal and sagittal reconstructions at 3 mm slice thickness were performed.  75 ML of Omnipaque 350 was administered intravenously without immediate complication.   FINDINGS: Please note that the evaluation of vessels, lymph nodes and organs is limited without intravenous contrast.   LOWER CHEST: The heart is normal in size without evidence of pericardial effusion. No consolidation or pleural effusion is present. No concerning lung nodule. Small hiatal hernia again noted.   ABDOMEN:   LIVER: The liver is normal in size without evidence of focal liver lesions.   BILE DUCTS: Stable mild intrahepatic and extrahepatic biliary duct dilatation, likely secondary to cholecystectomy.   GALLBLADDER: Cholecystectomy   PANCREAS: The pancreas appears unremarkable without evidence of ductal dilatation or masses.   SPLEEN: The spleen is normal in size without focal lesions.   ADRENAL GLANDS: No adrenal nodularity or thickening.   KIDNEYS AND URETERS: The kidneys are normal in size. Stable nonobstructive punctuate calculus in the right kidney. Stable sub 5 mm hypoattenuating lesions in the bilateral kidneys, which are too small to further characterize. No hydroureteronephrosis or left nephroureterolithiasis is identified.   PELVIS:   BLADDER: The bladder is completely opacified by contrast from prior imaging, which limits evaluation.   REPRODUCTIVE ORGANS: No pelvic masses. A stable 48 mm left adnexal cyst is seen on series 2, image 116. A 21 x 19 mm right adnexal lesion could be a right adnexal cyst or a perivesicular lymph node.   BOWEL: Small hiatal hernia. The small and large bowel are normal in caliber and demonstrate no wall thickening. The appendix is not definitely visualized. There is however no pericecal stranding or fluid.   VESSELS: There is no aneurysmal dilatation of the abdominal aorta. Mild atherosclerotic calcifications of the aortoiliac arteries. The IVC appears normal.    PERITONEUM/RETROPERITONEUM/LYMPH NODES: Retroperitoneal lymphadenopathy with adjacent stranding, stable when compared to prior CT from 01/24/2025, and new when compared to prior CT from 01/28/2024, for example as seen on series 2: *16 x 10 mm aortocaval lymph node, image 75 *12 x 11 mm retrocaval lymph node, image 91 *16 x 15 mm right common iliac lymph node, image 97 *20 x 16 mm right internal iliac lymph node, image 104 *29 x 20 mm right external iliac lymph node, image 126 *24 x 14 mm right external iliac lymph node, image 135 *21 x 19 mm perivascular lesion, which could be a lymph node or right adnexal cyst, image 131 *20 x 18 mm right pelvic sidewall lesion, image 117   No ascites or fluid collection. No peritoneal nodularity or implant.   ABDOMINAL WALL: The abdominal wall soft tissues appear normal. Sacral spinal stimulator noted.   BONES: No suspicious osseous lesions are identified. Degenerative discogenic disease is noted in the lower thoracic and lumbar spine. Grade 1 anterolisthesis of the L4 and L5 vertebral bodies.       1. Retroperitoneal lymphadenopathy, stable when compared to prior CT from 01/24/2025, and new when compared to prior CT from 01/28/2024. Though the adjacent stranding favors an infectious/inflammatory etiology, underlying malignancy such as lymphoma or bladder carcinoma (given the bladder wall thickening seen on prior CT) can not be excluded. If clinical signs/symptoms favor infectious etiology, short-term follow-up CT abdomen pelvis in 4-6 weeks is recommended. Alternatively, correlation with serum LDH levels, cystoscopy, tissue diagnosis and/or PET-CT are other diagnostic considerations that could be obtained as per clinical need. 2. No significant interval change as compared to prior CT from 01/24/2025.   MACRO: Critical Finding:  There are multiple critical findings. See findings. notification was initiated on 1/29/2025 at 10:55 am by Evonne Yi.  (**-YCF-**)   Signed by: Evonne  Kassidy 1/29/2025 10:56 AM Dictation workstation:   [id number]    CT angio head and neck w and wo IV contrast    Result Date: 1/28/2025  Interpreted By:  Nicolas Sarmiento, STUDY: CT ANGIO HEAD AND NECK W AND WO IV CONTRAST;  1/28/2025 6:46 pm   INDICATION: Signs/Symptoms:stroke rule out.     COMPARISON: CT head today.   ACCESSION NUMBER(S): [id number]   ORDERING CLINICIAN: TIGIST KEYS   TECHNIQUE: Unenhanced CT images of the head were obtained.  Subsequently,  60 ML of Omnipaque 350 was administered intravenously and axial images of the head and neck were acquired.  Coronal, sagittal, and 3-D reconstructions were provided for review.   FINDINGS:   CTA COW: No major vascular occlusion. Mild atherosclerotic changes through the carotid siphons. No aneurysms.  No vascular malformations.   CTA CAROTID: No aortic aneurysm or dissection. No significant stenoses at the origins of the great vessels from the aortic arch. No significant stenoses of the brachycephalic artery or bilateral subclavian arteries.   Mild atherosclerotic changes at the carotid bifurcations. No significant stenoses bilateral carotid arterial systems. No significant stenoses bilateral vertebral arterial systems.   NONVASCULAR HEAD: No intracranial mass. No intracranial hemorrhage. No evidence of large evolving cortical infarction. Supratentorial white matter hypodensities most likely related to mild chronic small vessel ischemic change.     Bones intact. Mucous retention cyst left maxillary sinus.   NONVASCULAR NECK: No soft tissue mass. No adenopathy. Salivary glands are unremarkable. Thyroid gland unremarkable. Bones intact.       1. No intracranial major vascular occlusion. 2. No flow-limiting stenosis or significant plaque irregularity in the neck.     MACRO: None   Signed by: Nicolas Sarmiento 1/28/2025 7:30 PM Dictation workstation:   [id number]    CT brain attack head wo IV contrast    Result Date: 1/28/2025  Interpreted By:  [place]  Howard, STUDY: TIGIST KEYS; 1/28/2025 5:08 pm   INDICATION: Signs/Symptoms:brain attack;   COMPARISON: 01/28/2024   ACCESSION NUMBER(S): [id number]   ORDERING CLINICIAN: TIGIST KEYS   TECHNIQUE: Contiguous axial images were acquired from the vertex through the posterior fossa without IV contrast. All CT examinations are performed with 1 or more of the following dose reduction techniques: Automated exposure control, adjustment of mA and/or kv according to patient's size, or use of iterative reconstruction techniques.   FINDINGS: No focal mass effect or midline shift is identified. The ventricles and sulci are symmetric and appropriate for the patient's age.   The gray white matter differentiation is preserved. Again seen is a moderate degree of nonspecific white matter hypodensity, most consistent with chronic small-vessel ischemic disease, not significantly changed from the prior study.   No acute intracranial hemorrhage is seen. No intra-axial or extra-axial fluid collection is seen.   The visualized paranasal sinuses and mastoid air cells are clear. There is a small-moderate sized mucous retention cysts in the visualized left maxillary sinus.       No CT evidence for acute intracranial pathology.   Moderate degree of nonspecific white matter hypodensity, most consistent with chronic small-vessel ischemic disease, not significantly changed when compared to the prior study.         Findings discussed with the physician on the 2nd floor at 5:20 p.m. on 01/28/2025   Signed by: Howard Millard 1/28/2025 5:39 PM Dictation workstation:   [id number]      Medications:  Scheduled medications  [Held by provider] apixaban, 5 mg, oral, BID  aspirin, 81 mg, oral, Daily  atorvastatin, 40 mg, oral, Nightly  ciprofloxacin, 500 mg, oral, q12h JANINE  docusate sodium, 100 mg, oral, Daily  insulin lispro, 0-10 Units, subcutaneous, TID AC  ipratropium, 0.5 mg, nebulization, TID  levothyroxine, 100 mcg, oral,  Daily  LORazepam, 0.5 mg, intravenous, Once  losartan, 50 mg, oral, Daily  melatonin, 5 mg, oral, Nightly  metoprolol tartrate, 100 mg, oral, BID  metoprolol tartrate, 25 mg, oral, Once  montelukast, 10 mg, oral, Nightly  pantoprazole, 40 mg, oral, BID AC  polyethylene glycol, 17 g, oral, Daily      Continuous medications  heparin, 0-4,000 Units/hr, Last Rate: 800 Units/hr (01/29/25 1702)      PRN medications  PRN medications: acetaminophen, albuterol, dextrose, dextrose, glucagon, glucagon, heparin, hydrALAZINE **FOLLOWED BY** [START ON 1/31/2025] hydrALAZINE, HYDROmorphone, labetaloL, metoprolol, ondansetron ODT **OR** ondansetron, oxyCODONE, oxygen, traZODone    Assessment    Assessment & Plan   Ms. Myrna Rodrigues is a 74 y.o. female who presents with past medical history of afib s/p ablation on Eliquis, HTN, CAD, HLD, GERD, DM, SVT and hypothyroidism presented to the ED with right lower quadrant pain and palpitations.  Through chart review patient has been seen for this similar complaint several times over the last 6 weeks she has not been able to complete the necessary follow-up needed to resolve both issues.  She had did not complete her outpatient antibiotic regimen for her cystitis.  She also has not followed up with cardiology to have a stress test, echo, or Holter monitor.  Considering she has not been out of the hospital long enough to have these outpatient appointments made it will be beneficial to perform as much of the evaluation as possible while inpatient for her to avoid readmission.  She reports decreased appetite and weakness patient would benefit from a PT OT evaluation and likely placement again to avoid readmission.     Assessment & Plan  SVT (supraventricular tachycardia) (CMS-HCC)    Bladder wall thickening       # Acute ischemic bilateral multifocal territory infarcts.  -MRI was positive for the above.  -Patient does not have any focal deficits  -Per neurology assessment most likely due to  anticoagulation discontinuation.    Plan  -GAVIN and Blood Cx ordered to rule out endocarditis. GAVIN came back negative for vegetation.  -Start the patient on heparin drip at this time. pending transition to another agent on discharge.   -Neurology team recommended holding off on conduction procedure for 2-4 weeks if not urgent.    # Complicated UTI with severe inflammatory changes on imaging  # Retroperitoneal/pelvic lymphadenopathy  # Bladder stones  Patient presents with ongoing abdominal pain, most notably in the suprapubic region as well as right lower quadrant.  Has had multiple hospital admissions recently with similar complaints.  Given the progressive nature of patient's imaging findings (comparing CTs from 1/9 and 1/24) there is concern for an ongoing inflammatory versus infectious process, or potentially a malignant process as well.  -Patient did have 2 UAs collected, 1 at PCP visit on 1/23 and an additional 1 on presentation the ED, early morning on 1/24.  --Initial POCT UA showed pink urine with trace protein, trace blood, trace ketones, 1+ bilirubin and trace leukocytes as well as positive nitrites.  That urine culture was positive for enteric bacilli.  -Subsequent UA collected in the ED was largely unremarkable, with only 2050 leukocyte esterase noted.  That urine culture did not have any clinically significant growth.  -CRP elevated, ESR within normal limits    Plan  -Urology consulted, appreciate the recommendations - recommend intervention to further evaluate pelvic process, tentative plan for patient's urologist, Dr. Thurman, to perform procedure within the next couple of days  -Will continue to hold Eliquis at this time  -Patient was started on heparin drip after consulting neurology who evaluated the MRI and reported multiple small patchy foci in the bilateral cerebral hemispheres consistent with acute to subacute infarctions.  -Urine culture came back positive for E. coli resistant to  ampicillin.  -Per ID recommendations patient will be switched from Zosyn to ciprofloxacin for 10 days and to follow-up with Dr. Parkinson in 2 weeks  -Multimodal pain regimen  -CRP was ordered.  Came back as 5.48 trending up from 4.40  -Lactate was ordered came back as 0.9  -PT OT consulted and recommended low intensity continued level of care.  -Urology team was contacted and updated regarding GAVIN findings and neurology recs. Pending response.   -ID team was contacted and updated regarding the events and changes in plans.       # SVT (resolved)  # Type II MI (resolved)  # H/O A-fib s/p ablation on Eliquis  # h/o CAD  Patient presented to [facility] with SVT, initial EKG with .  Subsequent EKG showed normal sinus rhythm with a [place] of 94 demonstration of adenosine.  Has had hospitalizations in the past with a similar presentation, has not yet followed up with cardiology on an outpatient basis for further workup.  -Suspect that intermittent tachycardia is likely secondary to underlying infectious versus inflammatory process in the pelvis, would caution any intentional stress on her heart until further urologic workup has been completed  -Troponin 156 -> 151, likely type II MI in the setting of infectious vs inflammatory pelvic process. No ischemic changes noted on EKG.  -Echo performed 1/24, LVEF 71%, grade 1 pattern of left ventricular diastolic filling    Plan  -Cardiology has been consulted, appreciate recommendations  -Patient will need discharged with Holter monitor, will require outpatient stress testing once acute process has improved  -Continue home Eliquis while inpatient (will need to be held 48 hours prior to urologic intervention)  -Monitor on telemetry   -Optimize electrolytes, goal K>4, Mg>2  -Dr. Stevens evaluated the patient and recommended outpatient stress test after stabilizing the patient medically.  -Due to patient having a run of SVT yesterday she received Metop 5mg IV in addition to bedtime  meds.  -Oral metoprolol Increased to 100 mg BID.  -Patient did not have any SVT episodes since yesterday.  Will continue the current regimen with no further changes.  -Cardiology Dr. Stevens was contacted regarding utility of GAVIN for evaluation of valvular disease/regurgitation.  He was agreeable to that and the patient will be n.p.o. at midnight.  -GAVIN was done which did not show any vegetations.        # Hypothyroidism  Patient does have history of hypothyroidism, with dose adjustment within the last year. Previously on every other day 100mcg and 50 mcg dosing, recently increased to 100 mcg daily dosing on 1/10/25.   -TSH on presentation was elevated, recovering. Free T4 mildly elevated.    Plan  -Can consider repeat TFTs in 4 weeks once acute illness has resolved.   -Continue home regimen at this time     # Type 2 Diabetes Mellitus  Patient does have a history of non-insulin dependent diabetes mellitus.  Home medications include metformin.  Hemoglobin A1c was checked on 1/2/2025, 5.9.  -SSI #2  -POCT  -Hypoglycemia protocol     Chronic problems:  #HTN  #HLD  #GERD  -continue home meds as appropriate when med rec complete    Global Plan of Care  IVF: None  Diet: Cardiac  DVT prophylaxis: Home Eliquis, SCDs  Dispo: Patient will require inpatient urologic procedure after resolution of UTI, likely middle of next week  Consults: Urology, ID, cardiology, PT/OT  Code Status: Full code    Patient seen and plan of care was discussed with senior resident and the attending.  Signature: CHEO GRIDER MD  Internal Medicine PGY-1 Resident  Date: January 30, 2025

## 2025-01-30 NOTE — PROGRESS NOTES
"Myrna Rodrigues is a 74 y.o. female on day 6 of admission presenting with SVT (supraventricular tachycardia) (CMS-HCC).    Subjective   Evaluated at bedside.  Pt feels well, just tired; she didn't sleep well last night.   GAVIN done today.        Objective     Last Recorded Vitals  Blood pressure 171/72, pulse 60, temperature 36.2 °C (97.2 °F), temperature source Temporal, resp. rate 20, height 1.575 m (5' 2\"), weight 59.6 kg (131 lb 6.3 oz), SpO2 96%.    Physical Exam  Eyes:      Extraocular Movements: Extraocular movements intact.      Pupils: Pupils are equal, round, and reactive to light.   Neurological:      Motor: Motor strength is normal.  Psychiatric:         Speech: Speech normal.       Neurological Exam  Mental Status  Drowsy. Oriented only to person, place and time. Speech is normal. Language is fluent with no aphasia. Attention and concentration are normal. Fund of knowledge is appropriate for level of education.  Naming and repetition intact. .    Cranial Nerves  CN II: Visual fields full to confrontation.  CN III, IV, VI: Extraocular movements intact bilaterally. Pupils equal round and reactive to light bilaterally.  CN V: Facial sensation is normal.  CN VII: Full and symmetric facial movement.  CN VIII: Hearing is normal.  CN IX, X: Palate elevates symmetrically  CN XI: Shoulder shrug strength is normal.  CN XII: Tongue midline without atrophy or fasciculations.    Motor   Strength is 5/5 throughout all four extremities.    Sensory  Light touch is normal in upper and lower extremities.     Coordination  Right: Finger-to-nose normal.Left: Finger-to-nose normal.    Relevant Results    Assessment & Plan  SVT (supraventricular tachycardia) (CMS-HCC)    Bladder wall thickening    Paroxysmal atrial fibrillation (Multi)    Acute ischemic stroke (Multi)    UTI (urinary tract infection)    Acute ischemic strokes- likely cardioembolic 2/2 to afib off anticoagulation.   No focal neurological deficits. GAVIN showed no " evidence of vegetation.    Plan to continue heparin drip for now and transition to Eliquis when feasible. Urology is planning procedure for today or tomorrow, although would recommend postponing procedure for 2-4 weeks if not urgent.   Repeat MRI canceled as there is no concern for septic emboli at this time. Pt states that she prefers to cancel it if possible. Continue aspirin, statin. Neuro checks every 4 hours. PT/OT evaluations. Follow up with Dr. Roy within 6-8 weeks of discharge.     Case/plan discussed and pt seen with Dr. Roy.     Vascular Risk Factor modification goals:  Blood pressure goals: avoid hypotension SBP <100 that could worsen cerebral perfusion, Ischemic stroke- early permissive hypertension SBP < 220 mmHg with cautious inpatient lowering  Lipid Goals: education on healthy diet and statin therapy to maintain or achieve goal LDL-cholesterol < 70mg  Glucose Goals: early treatment of hyperglycemia to goal glucose 140-180 mg/dl with long-term goal A1c < 7%   Smoking Cessation and Education  Assessment for Rehabilitation needs   Patient and family education on signs and symptoms of stroke, calling 911, healthy strategies for stroke prevention.         Flory Duff, APRN-CNP

## 2025-01-30 NOTE — SIGNIFICANT EVENT
Was called to bedside by patient's nurse Emily Pranke at around 11:30 PM on 1/29/2025 for patient having anxiety and wanting to leave the hospital.    Presented to patient bedside and she was seen standing with both nurses stating that she was anxious, felt worried and like she needed to leave.  She did not have any focal deficits at this time, no pupillary changes, no facial droop, cranial nerves were intact, she had equal strength bilaterally, was able to walk with assistance with walker.  She was endorsing intense anxiety and feeling like she had to leave the hospital.  She was oriented to herself, where she was, and why she was here.  Patient was redirected, instructed on vagal breathing, and given a one-time dose of 0.5 mg of Ativan.  Patient's anxiety alleviated and after walking around the floor she was amenable to returning to her room.      01/30/25 at 1:35 AM - Gilberto Lyn DO

## 2025-01-30 NOTE — PROGRESS NOTES
"CARDIOLOGY SERVICE   CONSULT Progress note   CVM Dr. Clement Stevens  [facility] [place]    PATIENT NAME: Myrna Rodrigues  PATIENT MRN: [id number]  SERVICE DATE:  1/30/2025  SERVICE TIME: 10:05 AM    CONSULTANT: Clement Stevens MD  PRIMARY CARE PHYSICIAN: Calvin Baker MD  ATTENDING PHYSICIAN: Adrianna Castro,       IMPRESSIONS:  Admitted for abdominal pain, complicated UTIs and was found to have recurrent SVT/A-fib  Recent SVTs [facility] 1/12-14/2025 received BB, and 1/15-17/2025 when BB was increased to 75 twice daily not clear if she had the chance to implement the higher dose before admission  Parox A-fib  (Cardioversion 1/2024)history of ablation, history of SVT  AC Eliquis  Myocardial injury peak troponin 156 (120 during recent admission 10 days ago)  Embolic stroke, primary service requested GAVIN  CAD, PCI LAD, cath 3/2023: patent LAD stents  Diastolic dysfunction EF 60%, mild AI, MR TR Echo 2023  Mild to moderate AI, mildly dilated left atrium, preserved systolic function of the left ventricle EF 71% echo 1/2025  Hypertension  Hyperlipidemia LDL 33 in 1/2025, at home on statin   DM, Thyroid, Asthma COPD, urinary bladder stones, microcytic anemia ~ 9, back surgery  Current meds PTA: Eliquis 5 twice daily, metoprolol recently increased dose to 75 twice daily, losartan 50 daily, Lipitor 10    RECOMMENDATIONS:  Transesophageal echocardiogram  Procedure indication complication alternatives were discussed the patient request to proceed    /69 (BP Location: Right arm, Patient Position: Lying)   Pulse 66   Temp 36.3 °C (97.3 °F) (Temporal)   Resp 20   Ht 1.575 m (5' 2\")   Wt 59.6 kg (131 lb 6.3 oz)   SpO2 96%   BMI 24.03 kg/m²     ====================================================    SUBJECTIVE: No chest pain comfortable    BNP   Date/Time Value Ref Range Status   01/23/2025 08:59  0 - 99 pg/mL Final     Lab Results   Component Value Date    CHOL 120 01/15/2025    CHOL 126 07/17/2024    CHOL 118 01/03/2022 " "    Lab Results   Component Value Date    HDL 66.4 01/15/2025    HDL 75.1 07/17/2024    HDL 46.0 01/03/2022     Lab Results   Component Value Date    LDLCALC 33 01/15/2025    LDLCALC 32 07/17/2024     Lab Results   Component Value Date    TRIG 101 01/15/2025    TRIG 97 07/17/2024    TRIG 136 01/03/2022     No components found for: \"CHOLHDL\"  TROPHS   Date/Time Value Ref Range Status   01/23/2025 10:01  0 - 13 ng/L Final     Comment:     Previous result verified on 1/23/2025 2141 on specimen/case [id number] called with component SSEV for procedure Troponin I, High Sensitivity, Initial with value 156 ng/L.   01/23/2025 08:59  0 - 13 ng/L Final   01/16/2025 04:31 AM 95 0 - 13 ng/L Final     Comment:     Previous result verified on 1/15/2025 1237 on specimen/case [id number] called with component SSEV for procedure Troponin I, High Sensitivity, Initial with value 113 ng/L.       Echo  No results found for this or any previous visit.    Encounter Date: 01/23/25   Electrocardiogram, 12-lead PRN ACS symptoms   Result Value    Ventricular Rate 156    Atrial Rate 120    QRS Duration 80    QT Interval 290    QTC Calculation(Bazett) 467    R Axis 129    T Axis 28    QRS Count 26    Q Onset 218    P Onset 178    P Offset 198    T Offset 363    QTC Fredericia 398    Narrative    Supraventricular tachycardia with occasional Premature ventricular complexes and Fusion complexes  Right axis deviation  Nonspecific ST abnormality  Abnormal ECG  When compared with ECG of 27-JAN-2025 20:14, (unconfirmed)  Fusion complexes are now Present  Premature ventricular complexes are now Present  QRS axis Shifted right  Borderline criteria for Inferior infarct are no longer Present  Nonspecific T wave abnormality now evident in Inferior leads     LABS:  Lab Results   Component Value Date    WBC 13.2 (H) 01/30/2025    HGB 9.1 (L) 01/30/2025    HCT 31.4 (L) 01/30/2025    MCV 73 (L) 01/30/2025     01/30/2025      Lab " Results   Component Value Date    GLUCOSE 116 (H) 01/30/2025    CALCIUM 9.3 01/30/2025     01/30/2025    K 4.0 01/30/2025    CO2 28 01/30/2025     01/30/2025    BUN 14 01/30/2025    CREATININE 0.88 01/30/2025        CURRENT CARDIOVASCULAR MEDICATIONS:  Current Facility-Administered Medications   Medication Dose Route Frequency Provider Last Rate Last Admin    acetaminophen (Tylenol) tablet 650 mg  650 mg oral q6h PRN Alejandra Moore DO   650 mg at 01/27/25 0611    albuterol 2.5 mg /3 mL (0.083 %) nebulizer solution 2.5 mg  2.5 mg nebulization q4h PRN Reji TavaresD        [Held by provider] apixaban (Eliquis) tablet 5 mg  5 mg oral BID Alejandra Moore DO   5 mg at 01/26/25 2006    aspirin chewable tablet 81 mg  81 mg oral Daily Gwynaba Salguero DO   81 mg at 01/29/25 0955    atorvastatin (Lipitor) tablet 40 mg  40 mg oral Nightly Gwynaba Terryapriyank, DO   40 mg at 01/29/25 2140    ciprofloxacin (Cipro) tablet 500 mg  500 mg oral q12h [facility] Andreia Parkinson MD   500 mg at 01/29/25 2140    dextrose 50 % injection 12.5 g  12.5 g intravenous q15 min PRN Alejandra Moore, DO        dextrose 50 % injection 25 g  25 g intravenous q15 min PRN Alejandra Moore, DO        docusate sodium (Colace) capsule 100 mg  100 mg oral Daily Alejandra Moore DO   100 mg at 01/29/25 0955    glucagon (Glucagen) injection 1 mg  1 mg intramuscular q15 min PRN Alejandra Moore, DO        glucagon (Glucagen) injection 1 mg  1 mg intramuscular q15 min PRN Alejandra Moore DO        heparin 25,000 Units in dextrose 5% 250 mL (100 Units/mL) infusion (premix)  0-4,000 Units/hr intravenous Continuous Adrianna Castro DO 8 mL/hr at 01/29/25 1702 800 Units/hr at 01/29/25 1702    heparin bolus from bag 1,500-3,000 Units  1,500-3,000 Units intravenous q4h PRN Adrianna Castro DO        hydrALAZINE (Apresoline) injection 10 mg  10 mg intravenous q20 min PRN Gwyn Salguero DO        Followed by    [START ON 1/31/2025]  hydrALAZINE (Apresoline) tablet 25 mg  25 mg oral q6h PRN Gwyn Salguero, DO        HYDROmorphone PF (Dilaudid) injection 0.2 mg  0.2 mg intravenous q3h PRN Jw Cerda, DO   0.2 mg at 01/28/25 2000    insulin lispro injection 0-10 Units  0-10 Units subcutaneous TID AC Alejandra Hrflacoa, DO   2 Units at 01/26/25 1334    ipratropium (Atrovent) 0.02 % nebulizer solution 0.5 mg  0.5 mg nebulization TID Adrianna Castro, DO   0.5 mg at 01/29/25 2007    labetaloL (Normodyne,Trandate) injection 10 mg  10 mg intravenous q10 min PRN Gwyn Salguero, DO        levothyroxine (Synthroid, Levoxyl) tablet 100 mcg  100 mcg oral Daily Alejandra Moore, DO   100 mcg at 01/30/25 0646    LORazepam (Ativan) injection 0.5 mg  0.5 mg intravenous Once Mansoor Max MD        losartan (Cozaar) tablet 50 mg  50 mg oral Daily Alejandra Polka, DO   50 mg at 01/29/25 0956    melatonin tablet 5 mg  5 mg oral Nightly Alejandra Brushuska, DO   5 mg at 01/29/25 2140    metoprolol tartrate (Lopressor) injection 5 mg  5 mg intravenous q5 min PRN Josephine Cooper, DO        metoprolol tartrate (Lopressor) tablet 100 mg  100 mg oral BID Gilberto Lyn, DO   100 mg at 01/29/25 2140    metoprolol tartrate (Lopressor) tablet 25 mg  25 mg oral Once Gilberto Ewinger, DO        montelukast (Singulair) tablet 10 mg  10 mg oral Nightly Alejandra Polka, DO   10 mg at 01/29/25 2140    ondansetron ODT (Zofran-ODT) disintegrating tablet 4 mg  4 mg oral q8h PRN Mansoor Max MD        Or    ondansetron (Zofran) injection 4 mg  4 mg intravenous q8h PRN Mansoor Max MD   4 mg at 01/27/25 0851    oxyCODONE (Roxicodone) immediate release tablet 5 mg  5 mg oral q4h PRN Mansoor Max MD   5 mg at 01/29/25 0630    oxygen (O2) therapy   inhalation Continuous PRN - O2/gases Gwyn Salguero DO        pantoprazole (ProtoNix) EC tablet 40 mg  40 mg oral BID LALITA Moore DO    40 mg at 01/30/25 0646    polyethylene glycol (Glycolax, Miralax) packet 17 g  17 g oral Daily Mansoor Max MD   17 g at 01/28/25 0801    traZODone (Desyrel) tablet 50 mg  50 mg oral Nightly PRN Alejandra Hruska, DO   50 mg at 01/29/25 2344      PHYSICAL EXAM:  Awake Alert   Neck: No JVD  Cardiac:  S1 S2  Resp: Decreased BSs   Ext: No peripheral edema    This clinical note has been produced using speech recognition software and may contain errors related to that system including grammar, punctuation, spelling, gender and words and phrases that may be inappropriate. Some errors were not noted in checking the note before saving.     SIGNATURE: Clement Stevens MD MD [facility]  OFFICE: [phone]

## 2025-01-30 NOTE — CARE PLAN
Pt remained safe throughout shift. No neurological deficits. Vitals stable. Pt had panic attack overnight and wanted to leave. Anxiety improved with ativan, trazadone, presence of her son. Pt unable to sleep overnight. Atarax x2 given. Dr. Lyn notified. Pt denies pain throughout shift.

## 2025-01-30 NOTE — CARE PLAN
The patient's goals for the shift include    Problem: Safety - Adult  Goal: Free from fall injury  Outcome: Progressing     Problem: Discharge Planning  Goal: Discharge to home or other facility with appropriate resources  Outcome: Progressing     Problem: Chronic Conditions and Co-morbidities  Goal: Patient's chronic conditions and co-morbidity symptoms are monitored and maintained or improved  Outcome: Progressing     Problem: Nutrition  Goal: Nutrient intake appropriate for maintaining nutritional needs  Outcome: Progressing     Problem: Diabetes  Goal: Maintain glucose levels >70mg/dl to <250mg/dl throughout shift  Outcome: Progressing     Problem: Pain  Goal: Turns in bed with improved pain control throughout the shift  Outcome: Progressing  Goal: Performs ADL's with improved pain control throughout shift  Outcome: Progressing     Problem: Arrythmia/Dysrhythmia  Goal: Lab values return to normal range  Outcome: Progressing  Goal: No evidence of post procedure complications  Outcome: Progressing  Goal: Promote self management  Outcome: Progressing  Goal: Serial ECG will return to baseline  Outcome: Progressing  Goal: Verbalize understanding of procedures/devices  Outcome: Progressing  Goal: Vital signs return to baseline  Outcome: Progressing  Goal: Care and maintenance of device (specify)  Outcome: Progressing     Problem: Skin  Goal: Prevent/manage excess moisture  Outcome: Progressing  Goal: Prevent/minimize sheer/friction injuries  Outcome: Progressing  Goal: Promote skin healing  Outcome: Progressing       The clinical goals for the shift include Pt will remain HDS throughout shift

## 2025-01-30 NOTE — SIGNIFICANT EVENT
Pt's son is at bedside and requested an update. Medical update provided. He understands neuro recommendation is to wait 2-4 weeks to pursue urologic procedure. Still waiting on urology input if postponing procedure is felt to be reasonable option. He voices concern about her ability to return home in current state. She lives alone and feels she may be safer spending 1-2 weeks in SNF while she awaits the urologic procedure. He reports willingness to pursue private pay avenue if necessary. Will ask PT OT to repeat evaluations. Discussed with [facility] who will provide pt and son a list of facilities today.     Adrianna Castro, DO

## 2025-01-31 PROBLEM — I48.0 PAROXYSMAL ATRIAL FIBRILLATION (MULTI): Status: RESOLVED | Noted: 2023-09-06 | Resolved: 2025-01-31

## 2025-01-31 PROBLEM — I47.10 SVT (SUPRAVENTRICULAR TACHYCARDIA) (CMS-HCC): Status: RESOLVED | Noted: 2025-01-12 | Resolved: 2025-01-31

## 2025-01-31 PROBLEM — I63.9 ACUTE ISCHEMIC STROKE (MULTI): Status: RESOLVED | Noted: 2025-01-30 | Resolved: 2025-01-31

## 2025-01-31 LAB
ALBUMIN SERPL BCP-MCNC: 3.1 G/DL (ref 3.4–5)
ANION GAP SERPL CALC-SCNC: 10 MMOL/L (ref 10–20)
ATRIAL RATE: 74 BPM
ATRIAL RATE: 83 BPM
BASOPHILS # BLD AUTO: 0.02 X10*3/UL (ref 0–0.1)
BASOPHILS NFR BLD AUTO: 0.2 %
BUN SERPL-MCNC: 12 MG/DL (ref 6–23)
CALCIUM SERPL-MCNC: 8.7 MG/DL (ref 8.6–10.3)
CHLORIDE SERPL-SCNC: 101 MMOL/L (ref 98–107)
CO2 SERPL-SCNC: 28 MMOL/L (ref 21–32)
CREAT SERPL-MCNC: 0.85 MG/DL (ref 0.5–1.05)
EGFRCR SERPLBLD CKD-EPI 2021: 71 ML/MIN/1.73M*2
EOSINOPHIL # BLD AUTO: 0.45 X10*3/UL (ref 0–0.4)
EOSINOPHIL NFR BLD AUTO: 4.3 %
ERYTHROCYTE [DISTWIDTH] IN BLOOD BY AUTOMATED COUNT: 18.4 % (ref 11.5–14.5)
ERYTHROCYTE [DISTWIDTH] IN BLOOD BY AUTOMATED COUNT: 18.6 % (ref 11.5–14.5)
GLUCOSE BLD MANUAL STRIP-MCNC: 120 MG/DL (ref 74–99)
GLUCOSE BLD MANUAL STRIP-MCNC: 123 MG/DL (ref 74–99)
GLUCOSE BLD MANUAL STRIP-MCNC: 134 MG/DL (ref 74–99)
GLUCOSE BLD MANUAL STRIP-MCNC: 145 MG/DL (ref 74–99)
GLUCOSE SERPL-MCNC: 112 MG/DL (ref 74–99)
HCT VFR BLD AUTO: 30 % (ref 36–46)
HCT VFR BLD AUTO: 31.1 % (ref 36–46)
HGB BLD-MCNC: 8.7 G/DL (ref 12–16)
HGB BLD-MCNC: 9.1 G/DL (ref 12–16)
HOLD SPECIMEN: NORMAL
IMM GRANULOCYTES # BLD AUTO: 0.02 X10*3/UL (ref 0–0.5)
IMM GRANULOCYTES NFR BLD AUTO: 0.2 % (ref 0–0.9)
LYMPHOCYTES # BLD AUTO: 1.64 X10*3/UL (ref 0.8–3)
LYMPHOCYTES NFR BLD AUTO: 15.6 %
MAGNESIUM SERPL-MCNC: 2.06 MG/DL (ref 1.6–2.4)
MCH RBC QN AUTO: 21 PG (ref 26–34)
MCH RBC QN AUTO: 21.4 PG (ref 26–34)
MCHC RBC AUTO-ENTMCNC: 29 G/DL (ref 32–36)
MCHC RBC AUTO-ENTMCNC: 29.3 G/DL (ref 32–36)
MCV RBC AUTO: 73 FL (ref 80–100)
MCV RBC AUTO: 73 FL (ref 80–100)
MONOCYTES # BLD AUTO: 0.83 X10*3/UL (ref 0.05–0.8)
MONOCYTES NFR BLD AUTO: 7.9 %
NEUTROPHILS # BLD AUTO: 7.54 X10*3/UL (ref 1.6–5.5)
NEUTROPHILS NFR BLD AUTO: 71.8 %
NRBC BLD-RTO: 0 /100 WBCS (ref 0–0)
NRBC BLD-RTO: 0 /100 WBCS (ref 0–0)
P AXIS: 100 DEGREES
P AXIS: 63 DEGREES
P OFFSET: 166 MS
P OFFSET: 169 MS
P ONSET: 122 MS
P ONSET: 127 MS
PHOSPHATE SERPL-MCNC: 3.6 MG/DL (ref 2.5–4.9)
PLATELET # BLD AUTO: 312 X10*3/UL (ref 150–450)
PLATELET # BLD AUTO: 319 X10*3/UL (ref 150–450)
POTASSIUM SERPL-SCNC: 3.9 MMOL/L (ref 3.5–5.3)
PR INTERVAL: 172 MS
PR INTERVAL: 180 MS
Q ONSET: 212 MS
Q ONSET: 213 MS
QRS COUNT: 12 BEATS
QRS COUNT: 14 BEATS
QRS DURATION: 84 MS
QRS DURATION: 84 MS
QT INTERVAL: 394 MS
QT INTERVAL: 404 MS
QTC CALCULATION(BAZETT): 448 MS
QTC CALCULATION(BAZETT): 462 MS
QTC FREDERICIA: 433 MS
QTC FREDERICIA: 439 MS
R AXIS: -33 DEGREES
R AXIS: 86 DEGREES
RBC # BLD AUTO: 4.14 X10*6/UL (ref 4–5.2)
RBC # BLD AUTO: 4.26 X10*6/UL (ref 4–5.2)
SODIUM SERPL-SCNC: 135 MMOL/L (ref 136–145)
T AXIS: 20 DEGREES
T AXIS: 59 DEGREES
T OFFSET: 410 MS
T OFFSET: 414 MS
UFH PPP CHRO-ACNC: 0.4 IU/ML
UFH PPP CHRO-ACNC: 0.6 IU/ML
UFH PPP CHRO-ACNC: 0.7 IU/ML
UFH PPP CHRO-ACNC: <0.1 IU/ML
VENTRICULAR RATE: 74 BPM
VENTRICULAR RATE: 83 BPM
WBC # BLD AUTO: 10.5 X10*3/UL (ref 4.4–11.3)
WBC # BLD AUTO: 11.8 X10*3/UL (ref 4.4–11.3)

## 2025-01-31 PROCEDURE — 2500000002 HC RX 250 W HCPCS SELF ADMINISTERED DRUGS (ALT 637 FOR MEDICARE OP, ALT 636 FOR OP/ED): Performed by: STUDENT IN AN ORGANIZED HEALTH CARE EDUCATION/TRAINING PROGRAM

## 2025-01-31 PROCEDURE — 99232 SBSQ HOSP IP/OBS MODERATE 35: CPT | Performed by: NURSE PRACTITIONER

## 2025-01-31 PROCEDURE — 97116 GAIT TRAINING THERAPY: CPT | Mod: GP,CQ

## 2025-01-31 PROCEDURE — 97166 OT EVAL MOD COMPLEX 45 MIN: CPT | Mod: GO

## 2025-01-31 PROCEDURE — 2500000001 HC RX 250 WO HCPCS SELF ADMINISTERED DRUGS (ALT 637 FOR MEDICARE OP)

## 2025-01-31 PROCEDURE — 2060000001 HC INTERMEDIATE ICU ROOM DAILY

## 2025-01-31 PROCEDURE — 85027 COMPLETE CBC AUTOMATED: CPT | Performed by: STUDENT IN AN ORGANIZED HEALTH CARE EDUCATION/TRAINING PROGRAM

## 2025-01-31 PROCEDURE — 2500000001 HC RX 250 WO HCPCS SELF ADMINISTERED DRUGS (ALT 637 FOR MEDICARE OP): Performed by: INTERNAL MEDICINE

## 2025-01-31 PROCEDURE — 36415 COLL VENOUS BLD VENIPUNCTURE: CPT

## 2025-01-31 PROCEDURE — 85025 COMPLETE CBC W/AUTO DIFF WBC: CPT

## 2025-01-31 PROCEDURE — 94640 AIRWAY INHALATION TREATMENT: CPT

## 2025-01-31 PROCEDURE — 83735 ASSAY OF MAGNESIUM: CPT

## 2025-01-31 PROCEDURE — 85520 HEPARIN ASSAY: CPT | Performed by: STUDENT IN AN ORGANIZED HEALTH CARE EDUCATION/TRAINING PROGRAM

## 2025-01-31 PROCEDURE — 82947 ASSAY GLUCOSE BLOOD QUANT: CPT

## 2025-01-31 PROCEDURE — 2500000004 HC RX 250 GENERAL PHARMACY W/ HCPCS (ALT 636 FOR OP/ED): Performed by: STUDENT IN AN ORGANIZED HEALTH CARE EDUCATION/TRAINING PROGRAM

## 2025-01-31 PROCEDURE — 36415 COLL VENOUS BLD VENIPUNCTURE: CPT | Performed by: STUDENT IN AN ORGANIZED HEALTH CARE EDUCATION/TRAINING PROGRAM

## 2025-01-31 PROCEDURE — 97530 THERAPEUTIC ACTIVITIES: CPT | Mod: GP,CQ

## 2025-01-31 PROCEDURE — 82565 ASSAY OF CREATININE: CPT

## 2025-01-31 PROCEDURE — 2500000002 HC RX 250 W HCPCS SELF ADMINISTERED DRUGS (ALT 637 FOR MEDICARE OP, ALT 636 FOR OP/ED)

## 2025-01-31 PROCEDURE — 99239 HOSP IP/OBS DSCHRG MGMT >30: CPT | Performed by: STUDENT IN AN ORGANIZED HEALTH CARE EDUCATION/TRAINING PROGRAM

## 2025-01-31 RX ORDER — CIPROFLOXACIN 500 MG/1
500 TABLET ORAL EVERY 12 HOURS SCHEDULED
Start: 2025-01-31 | End: 2025-02-02

## 2025-01-31 RX ORDER — OXYCODONE HYDROCHLORIDE 5 MG/1
5 TABLET ORAL EVERY 4 HOURS PRN
Start: 2025-01-31 | End: 2025-02-05

## 2025-01-31 RX ORDER — ACETAMINOPHEN 325 MG/1
650 TABLET ORAL EVERY 6 HOURS PRN
Start: 2025-01-31

## 2025-01-31 RX ADMIN — ATORVASTATIN CALCIUM 40 MG: 40 TABLET, FILM COATED ORAL at 20:37

## 2025-01-31 RX ADMIN — LOSARTAN POTASSIUM 50 MG: 50 TABLET, FILM COATED ORAL at 10:12

## 2025-01-31 RX ADMIN — APIXABAN 5 MG: 5 TABLET, FILM COATED ORAL at 20:37

## 2025-01-31 RX ADMIN — CIPROFLOXACIN 500 MG: 500 TABLET ORAL at 10:12

## 2025-01-31 RX ADMIN — DOCUSATE SODIUM 100 MG: 100 CAPSULE, LIQUID FILLED ORAL at 10:12

## 2025-01-31 RX ADMIN — CIPROFLOXACIN 500 MG: 500 TABLET ORAL at 20:37

## 2025-01-31 RX ADMIN — IPRATROPIUM BROMIDE 0.5 MG: 0.5 SOLUTION RESPIRATORY (INHALATION) at 08:01

## 2025-01-31 RX ADMIN — LEVOTHYROXINE SODIUM 100 MCG: 0.1 TABLET ORAL at 05:31

## 2025-01-31 RX ADMIN — IPRATROPIUM BROMIDE 0.5 MG: 0.5 SOLUTION RESPIRATORY (INHALATION) at 14:07

## 2025-01-31 RX ADMIN — IPRATROPIUM BROMIDE 0.5 MG: 0.5 SOLUTION RESPIRATORY (INHALATION) at 22:25

## 2025-01-31 RX ADMIN — Medication 5 MG: at 20:37

## 2025-01-31 RX ADMIN — MONTELUKAST 10 MG: 10 TABLET, FILM COATED ORAL at 20:37

## 2025-01-31 RX ADMIN — METOPROLOL TARTRATE 100 MG: 100 TABLET, FILM COATED ORAL at 10:12

## 2025-01-31 RX ADMIN — PANTOPRAZOLE SODIUM 40 MG: 40 TABLET, DELAYED RELEASE ORAL at 06:11

## 2025-01-31 RX ADMIN — HEPARIN SODIUM 800 UNITS/HR: 10000 INJECTION, SOLUTION INTRAVENOUS at 00:59

## 2025-01-31 RX ADMIN — ASPIRIN 81 MG CHEWABLE TABLET 81 MG: 81 TABLET CHEWABLE at 10:12

## 2025-01-31 RX ADMIN — METOPROLOL TARTRATE 100 MG: 100 TABLET, FILM COATED ORAL at 20:37

## 2025-01-31 RX ADMIN — PANTOPRAZOLE SODIUM 40 MG: 40 TABLET, DELAYED RELEASE ORAL at 16:46

## 2025-01-31 ASSESSMENT — COGNITIVE AND FUNCTIONAL STATUS - GENERAL
MOVING TO AND FROM BED TO CHAIR: A LITTLE
DAILY ACTIVITIY SCORE: 18
DRESSING REGULAR LOWER BODY CLOTHING: A LITTLE
TURNING FROM BACK TO SIDE WHILE IN FLAT BAD: A LITTLE
HELP NEEDED FOR BATHING: A LITTLE
MOBILITY SCORE: 19
PERSONAL GROOMING: A LITTLE
HELP NEEDED FOR BATHING: A LITTLE
DAILY ACTIVITIY SCORE: 20
DRESSING REGULAR UPPER BODY CLOTHING: A LITTLE
TOILETING: A LITTLE
CLIMB 3 TO 5 STEPS WITH RAILING: A LITTLE
EATING MEALS: A LITTLE
MOVING TO AND FROM BED TO CHAIR: A LITTLE
STANDING UP FROM CHAIR USING ARMS: A LITTLE
PERSONAL GROOMING: A LITTLE
MOBILITY SCORE: 18
STANDING UP FROM CHAIR USING ARMS: A LITTLE
TURNING FROM BACK TO SIDE WHILE IN FLAT BAD: A LITTLE
WALKING IN HOSPITAL ROOM: A LITTLE
WALKING IN HOSPITAL ROOM: A LITTLE
DRESSING REGULAR LOWER BODY CLOTHING: A LITTLE
TOILETING: A LITTLE
CLIMB 3 TO 5 STEPS WITH RAILING: A LOT

## 2025-01-31 ASSESSMENT — PAIN - FUNCTIONAL ASSESSMENT
PAIN_FUNCTIONAL_ASSESSMENT: 0-10

## 2025-01-31 ASSESSMENT — PAIN SCALES - GENERAL
PAINLEVEL_OUTOF10: 0 - NO PAIN

## 2025-01-31 ASSESSMENT — ACTIVITIES OF DAILY LIVING (ADL)
BATHING_LEVEL_OF_ASSISTANCE: MINIMUM ASSISTANCE
BATHING_COMMENTS: SIMULATED TASK

## 2025-01-31 NOTE — DISCHARGE SUMMARY
Addendum 2/2: Remained clinically stable overnight, repeat labs showed improvement of creatinine.  Pre-CERT obtained 2/2, patient discharged on 2/2 to skilled nursing facility.      Addendum 2/1: Patient seen examined, no acute events overnight.  Waiting for pre-CERT for placement.  Labs did show slight bump in her creatinine today, will continue to monitor renal function and check urine output / bladder scan if need.    Discharge Diagnosis  SVT (supraventricular tachycardia) (CMS-HCC)  Paroxysmal A-fib  Type II MI  Complicated urinary tract infection  Acute cardioembolic stroke  Hypothyroidism  Diabetes mellitus type 2    Issues Requiring Follow-Up  Follow-up with your PCP regarding your most recent hospital admission any changes to medication  Follow-up with infectious diseases regarding reevaluation and possible extension of antibiotics if needed  Follow-up with neurologist regarding acute cardioembolic stroke evaluation  Follow-up with urologist regarding repeating cystoscopy and performing biopsy    Discharge Meds     Medication List      START taking these medications     ciprofloxacin 500 mg tablet; Commonly known as: Cipro; Take 1 tablet   (500 mg) by mouth every 12 hours for 12 doses.   oxyCODONE 5 mg immediate release tablet; Commonly known as: Roxicodone;   Take 1 tablet (5 mg) by mouth every 4 hours if needed for moderate pain (4   - 6) for up to 3 days.     CHANGE how you take these medications     acetaminophen 325 mg tablet; Commonly known as: Tylenol; Take 2 tablets   (650 mg) by mouth every 6 hours if needed for mild pain (1 - 3).; What   changed: medication strength, how much to take, when to take this, reasons   to take this     CONTINUE taking these medications     atorvastatin 10 mg tablet; Commonly known as: Lipitor; TAKE 1 TABLET BY   MOUTH DAILY   cholecalciferol 25 MCG (1000 UT) tablet; Commonly known as: Vitamin D-3   diphenoxylate-atropine 2.5-0.025 mg tablet; Commonly known as: Lomotil;    Take 1 tablet by mouth 4 times a day as needed for diarrhea.   Eliquis 5 mg tablet; Generic drug: apixaban; TAKE 1 TABLET BY MOUTH   TWICE  DAILY   estradiol 0.01 % (0.1 mg/gram) vaginal cream; Commonly known as:   Estrace; Apply pea size amount 0.5 gram to vaginal opening with finger   daily for 2 weeks, then 2-3 times per week.   levothyroxine 100 mcg tablet; Commonly known as: Synthroid, Levoxyl   losartan 50 mg tablet; Commonly known as: Cozaar; Take 1 tablet (50 mg)   by mouth once daily.   magnesium oxide 400 mg (241.3 mg magnesium) tablet; Commonly known as:   Mag-Ox; Take 1 tablet (400 mg) by mouth once daily.   metFORMIN  mg 24 hr tablet; Commonly known as: Glucophage-XR   metoprolol tartrate 75 mg tablet; Commonly known as: Lopressor; Take 1   tablet (75 mg) by mouth 2 times a day.   montelukast 10 mg tablet; Commonly known as: Singulair; TAKE 1 TABLET BY   MOUTH AT  BEDTIME   omeprazole 40 mg DR capsule; Commonly known as: PriLOSEC; TAKE 1 CAPSULE   BY MOUTH TWICE  DAILY   potassium gluconate 595 mg (99 mg) tablet   tiotropium 2.5 mcg/actuation inhaler; Commonly known as: Spiriva   Respimat   traZODone 50 mg tablet; Commonly known as: Desyrel   Ventolin HFA 90 mcg/actuation inhaler; Generic drug: albuterol; USE 2   INHALATIONS BY MOUTH EVERY 4 HOURS AS NEEDED     STOP taking these medications     amoxicillin-pot clavulanate 875-125 mg tablet; Commonly known as:   Augmentin   dicyclomine 10 mg capsule; Commonly known as: Bentyl   nitrofurantoin (macrocrystal-monohydrate) 100 mg capsule; Commonly known   as: Macrobid       Test Results Pending At Discharge  Pending Labs       Order Current Status    Heparin Assay Collected (01/31/25 1401)    Blood Culture Preliminary result    Blood Culture Preliminary result            Hospital Course  Myrna Rodrigues is a 74-year-old female presented to [facility] ED on 1/23 with primary complaint of palpitations and abdominal pain.  Patient has been in and out of the  hospital for the past month with similar complaints, and has not been able to complete outpatient workup secondary to continued symptoms.  On presentation to the ED, patient was tachycardic and hypertensive but otherwise hemodynamically stable.  Labs were largely within normal limits, but an elevated troponin which was flat.  Initial EKG showed SVT, but that the patient did convert to NSR after dose of adenosine.  Cardiology was consulted in the emergency department who recommended admission for further cardiac workup. Cardiology was consulted, and echocardiogram was performed.  Stress testing was deferred given patient's continued abdominal pain.    On admission, patient's primary complaint was abdominal pain.  CT scan was obtained which showed progressive worsening of inflammatory changes in the pelvis, concerning for an acute process.  Given this presentation and her continued symptoms, it was felt that this was likely driving her tachycardia.  Her urinalysis also showed signs of urinary tract infection for which she received initially Zosyn that was switched later on based on culture and sensitivity to ciprofloxacin to be continued for 10 days.  Urology was consulted, who did recommend intervention in the form of cystoscopy, right retrograde pyelogram, and right ureteroscopy for further evaluation.    During her hospital stay, after Eliquis was held in preparation for cystoscopy patient started to show encephalopathic symptoms when she was confused and did not know where she is.  At that time neurological assessment could not be done as the patient could not follow orders.  Subsequent imaging showed acute to subacute ischemic strokes in bilateral hemispheres.  Per neurology evaluation it was decided that this most likely acute cardioembolic stroke secondary to anticoagulation discontinuation.  Patient next day returning to her normal mentation with no focal neurological deficits.  Given improved right lower  quadrant pain patient was fit for discharge with cystoscopy to be done as an outpatient.  Per neurology recommendation Eliquis should be continued till before the cystoscopy with bridging with other anticoagulant before the procedure.  Neurology also recommended delaying the procedure if not urgent 2 to 4 weeks and follow-up as an outpatient.  Urology team was agreeable to that.  She requested to be sent to facility.  She was accepted at [facility]        Pertinent Physical Exam At Time of Discharge  Physical Exam  Constitutional:       Appearance: Normal appearance. She is normal weight.   HENT:      Head: Normocephalic and atraumatic.      Right Ear: External ear normal.      Left Ear: External ear normal.      Nose: Nose normal.      Mouth/Throat:      Mouth: Mucous membranes are moist.      Pharynx: Oropharynx is clear.   Eyes:      Extraocular Movements: Extraocular movements intact.      Conjunctiva/sclera: Conjunctivae normal.      Pupils: Pupils are equal, round, and reactive to light.   Cardiovascular:      Rate and Rhythm: Normal rate and regular rhythm.      Pulses: Normal pulses.      Heart sounds: Normal heart sounds.   Pulmonary:      Effort: Pulmonary effort is normal.      Breath sounds: Normal breath sounds.   Abdominal:      General: Abdomen is flat. Bowel sounds are normal.      Palpations: Abdomen is soft.      Tenderness: There is abdominal tenderness (Mild right lower quadrant tenderness significantly improved compared to initial presentation).   Musculoskeletal:         General: Normal range of motion.      Cervical back: Normal range of motion and neck supple.   Skin:     General: Skin is warm and dry.   Neurological:      General: No focal deficit present.      Mental Status: She is alert and oriented to person, place, and time.   Psychiatric:         Mood and Affect: Mood normal.         Behavior: Behavior normal.         Outpatient Follow-Up  Future Appointments   Date Time Provider  [facility]   2/5/2025  2:40 PM Lizette Temple APRN-CNP [id number] [place]   2/27/2025  1:15 PM Shashank Proctor MD [id number] [place]   3/25/2025  2:00 PM Lesia Degroot MD [id number] [place]   4/9/2025 11:20 AM Major Jurado MD [id number] [facility]   4/30/2025 11:20 AM Calvin Baker MD [id number] [place]   7/16/2025  2:15 PM Shashank Proctor MD [id number] [facility]   8/28/2025 11:10 AM Calvin Baker MD [id number] [place]   10/16/2025 11:00 AM Evin Thurman MD [id number] [place]         CHEO GRIDER MD

## 2025-01-31 NOTE — CARE PLAN
Patient admitted on 1/23 for SVT and a UTI. Patient remains on heparin gtt, to transition back to home eliquis this evening. VSS. Safety maintained. Precert started for patient to go to [facility] on discharge.     Problem: Safety - Adult  Goal: Free from fall injury  Outcome: Met     Problem: Discharge Planning  Goal: Discharge to home or other facility with appropriate resources  Outcome: Progressing     Problem: Chronic Conditions and Co-morbidities  Goal: Patient's chronic conditions and co-morbidity symptoms are monitored and maintained or improved  Outcome: Progressing     Problem: Nutrition  Goal: Nutrient intake appropriate for maintaining nutritional needs  Outcome: Progressing     Problem: Diabetes  Goal: Maintain glucose levels >70mg/dl to <250mg/dl throughout shift  Outcome: Met     Problem: Arrythmia/Dysrhythmia  Goal: Lab values return to normal range  Outcome: Progressing  Goal: Serial ECG will return to baseline  Outcome: Progressing

## 2025-01-31 NOTE — PROGRESS NOTES
"Myrna Rodrigues is a 74 y.o. female on day 7 of admission presenting with SVT (supraventricular tachycardia) (CMS-[facility]).      Subjective   No focal neurological complaints today.       Objective     Last Recorded Vitals  Blood pressure 105/59, pulse 56, temperature 36.1 °C (97 °F), temperature source Temporal, resp. rate 20, height 1.575 m (5' 2\"), weight 59.6 kg (131 lb 6.3 oz), SpO2 95%.    Physical Exam  Neurological Exam  Mental Status  Awake, alert, up in chair. Oriented only to person, place and time. Speech is normal. Language is fluent with no aphasia. Attention and concentration are normal. Fund of knowledge is appropriate.  Naming and repetition intact.      Cranial Nerves  CN II: Visual fields full to confrontation.  CN III, IV, VI: Extraocular movements intact bilaterally. Pupils equal round and reactive to light bilaterally.  CN V: Facial sensation is normal.  CN VII: Full and symmetric facial movement.  CN VIII: Hearing is normal.  CN IX, X: Palate elevates symmetrically  CN XI: Shoulder shrug strength is normal.  CN XII: Tongue midline without atrophy or fasciculations.     Motor   Strength is 5/5 throughout all four extremities.     Sensory  Light touch is normal in upper and lower extremities.      Coordination  Right: Finger-to-nose normal.Left: Finger-to-nose normal.    Relevant Results  Scheduled medications  [Held by provider] apixaban, 5 mg, oral, BID  aspirin, 81 mg, oral, Daily  atorvastatin, 40 mg, oral, Nightly  ciprofloxacin, 500 mg, oral, q12h JANINE  docusate sodium, 100 mg, oral, Daily  insulin lispro, 0-10 Units, subcutaneous, TID AC  ipratropium, 0.5 mg, nebulization, TID  levothyroxine, 100 mcg, oral, Daily  losartan, 50 mg, oral, Daily  melatonin, 5 mg, oral, Nightly  metoprolol tartrate, 100 mg, oral, BID  montelukast, 10 mg, oral, Nightly  pantoprazole, 40 mg, oral, BID AC  polyethylene glycol, 17 g, oral, Daily      Continuous medications  heparin, 0-4,000 Units/hr, Last Rate: 900 " Units/hr (25 0954)      PRN medications  PRN medications: acetaminophen, albuterol, dextrose, dextrose, glucagon, glucagon, heparin, [] hydrALAZINE **FOLLOWED BY** hydrALAZINE, HYDROmorphone, metoprolol, ondansetron ODT **OR** ondansetron, oxyCODONE, oxygen, traZODone  Results for orders placed or performed during the hospital encounter of 25 (from the past 24 hours)   POCT GLUCOSE   Result Value Ref Range    POCT Glucose 93 74 - 99 mg/dL   POCT GLUCOSE   Result Value Ref Range    POCT Glucose 155 (H) 74 - 99 mg/dL   CBC   Result Value Ref Range    WBC 11.8 (H) 4.4 - 11.3 x10*3/uL    nRBC 0.0 0.0 - 0.0 /100 WBCs    RBC 4.26 4.00 - 5.20 x10*6/uL    Hemoglobin 9.1 (L) 12.0 - 16.0 g/dL    Hematocrit 31.1 (L) 36.0 - 46.0 %    MCV 73 (L) 80 - 100 fL    MCH 21.4 (L) 26.0 - 34.0 pg    MCHC 29.3 (L) 32.0 - 36.0 g/dL    RDW 18.6 (H) 11.5 - 14.5 %    Platelets 319 150 - 450 x10*3/uL   CBC and Auto Differential   Result Value Ref Range    WBC 10.5 4.4 - 11.3 x10*3/uL    nRBC 0.0 0.0 - 0.0 /100 WBCs    RBC 4.14 4.00 - 5.20 x10*6/uL    Hemoglobin 8.7 (L) 12.0 - 16.0 g/dL    Hematocrit 30.0 (L) 36.0 - 46.0 %    MCV 73 (L) 80 - 100 fL    MCH 21.0 (L) 26.0 - 34.0 pg    MCHC 29.0 (L) 32.0 - 36.0 g/dL    RDW 18.4 (H) 11.5 - 14.5 %    Platelets 312 150 - 450 x10*3/uL    Neutrophils % 71.8 40.0 - 80.0 %    Immature Granulocytes %, Automated 0.2 0.0 - 0.9 %    Lymphocytes % 15.6 13.0 - 44.0 %    Monocytes % 7.9 2.0 - 10.0 %    Eosinophils % 4.3 0.0 - 6.0 %    Basophils % 0.2 0.0 - 2.0 %    Neutrophils Absolute 7.54 (H) 1.60 - 5.50 x10*3/uL    Immature Granulocytes Absolute, Automated 0.02 0.00 - 0.50 x10*3/uL    Lymphocytes Absolute 1.64 0.80 - 3.00 x10*3/uL    Monocytes Absolute 0.83 (H) 0.05 - 0.80 x10*3/uL    Eosinophils Absolute 0.45 (H) 0.00 - 0.40 x10*3/uL    Basophils Absolute 0.02 0.00 - 0.10 x10*3/uL   Magnesium   Result Value Ref Range    Magnesium 2.06 1.60 - 2.40 mg/dL   Renal function panel   Result Value  Ref Range    Glucose 112 (H) 74 - 99 mg/dL    Sodium 135 (L) 136 - 145 mmol/L    Potassium 3.9 3.5 - 5.3 mmol/L    Chloride 101 98 - 107 mmol/L    Bicarbonate 28 21 - 32 mmol/L    Anion Gap 10 10 - 20 mmol/L    Urea Nitrogen 12 6 - 23 mg/dL    Creatinine 0.85 0.50 - 1.05 mg/dL    eGFR 71 >60 mL/min/1.73m*2    Calcium 8.7 8.6 - 10.3 mg/dL    Phosphorus 3.6 2.5 - 4.9 mg/dL    Albumin 3.1 (L) 3.4 - 5.0 g/dL   Heparin Assay   Result Value Ref Range    Heparin Unfractionated <0.1 See Comment Below for Therapeutic Ranges IU/mL   POCT GLUCOSE   Result Value Ref Range    POCT Glucose 120 (H) 74 - 99 mg/dL   Heparin Assay   Result Value Ref Range    Heparin Unfractionated 0.7 See Comment Below for Therapeutic Ranges IU/mL   POCT GLUCOSE   Result Value Ref Range    POCT Glucose 145 (H) 74 - 99 mg/dL     *Note: Due to a large number of results and/or encounters for the requested time period, some results have not been displayed. A complete set of results can be found in Results Review.     ECG 12 lead    Result Date: 1/30/2025  Supraventricular tachycardia Left anterior fascicular block Inferior infarct , age undetermined Anterolateral infarct , age undetermined Abnormal ECG When compared with ECG of 09-OCT-2024 13:41, Significant changes have occurred See ED provider note for full interpretation and clinical correlation Confirmed by Ne Shea ([id number]) on 1/30/2025 1:50:49 PM    MR brain wo IV contrast    Result Date: 1/29/2025  Interpreted By:  Nicolas Sarmiento, STUDY: MR BRAIN WO IV CONTRAST;  1/29/2025 2:34 pm   INDICATION: Signs/Symptoms:aphasia, history of PAF with interruption in anticoagulation.     COMPARISON: CT head and CTA head yesterday.   ACCESSION NUMBER(S): [id number]   ORDERING CLINICIAN: TIGIST KEYS   TECHNIQUE: Patient could only tolerate limited examination. Axial DWI, ADC, sagittal and axial T1 weighted sequences were obtained.   FINDINGS: Multiple small patchy foci of abnormally restricted  diffusion are seen in the bilateral cerebral hemispheres predominantly each centrum semiovale and oriented in AP direction with some additional involvement of the anterior right middle frontal gyrus cortex, the posterior right corpus callosum, paramedian right occipital lobe. Questionable additional involvement of left-greater-than-right cerebellar hemispheres.   No definite intracranial hemorrhage.     No extra-axial fluid collection. No intracranial mass.   Mild to moderate white matter T2 signal increase, most commonly seen with chronic small vessel ischemic change.  Presence of older small infarctions in these areas not excluded.   Degree of ventriculomegaly is commensurate with overall degree of brain parenchymal volume loss, which is mild.       1. Multiple small patchy foci of abnormally restricted diffusion are seen in the bilateral cerebral hemispheres predominantly each centrum semiovale and oriented in AP direction with some additional involvement of the anterior right middle frontal gyrus cortex, the posterior right corpus callosum, paramedian right occipital lobe. Questionable additional involvement of left-greater-than-right cerebellar hemispheres. Findings consistent with acute to subacute infarctions. The AP orientation of the high bilateral cerebral hemispheres could indicate a recent hypotensive event. 2. Mild to moderate white matter T2 signal increase, most commonly seen with chronic small vessel ischemic change.  Presence of older small infarctions in these areas not excluded. 3. Degree of ventriculomegaly is commensurate with overall degree of brain parenchymal volume loss, which is mild.   Note the patient was only able to tolerate limited examination as described above.   MACRO: None   Signed by: Nicolas Sarmiento 1/29/2025 3:12 PM Dictation workstation:   [id number]    Electrocardiogram, 12-lead PRN ACS symptoms    Result Date: 1/29/2025  Supraventricular tachycardia with occasional Premature  ventricular complexes and Fusion complexes Right axis deviation Nonspecific ST abnormality Abnormal ECG When compared with ECG of 27-JAN-2025 20:14, (unconfirmed) Fusion complexes are now Present Premature ventricular complexes are now Present QRS axis Shifted right Borderline criteria for Inferior infarct are no longer Present Nonspecific T wave abnormality now evident in Inferior leads    Electrocardiogram, 12-lead PRN ACS symptoms    Result Date: 1/29/2025  Normal sinus rhythm Left axis deviation Pulmonary disease pattern Abnormal ECG When compared with ECG of 27-JAN-2025 09:00, (unconfirmed) QRS axis Shifted left    Electrocardiogram, 12-lead PRN ACS symptoms    Result Date: 1/29/2025  Supraventricular tachycardia Low voltage QRS Possible Inferior infarct , age undetermined Abnormal ECG When compared with ECG of 27-JAN-2025 17:29, (unconfirmed) Vent. rate has increased BY  75 BPM QRS axis Shifted right ST now depressed in Anterior leads    CT abdomen pelvis w IV contrast    Result Date: 1/29/2025  Interpreted By:  Evonne Yi, STUDY: CT ABDOMEN PELVIS W IV CONTRAST;  1/29/2025 9:32 am   INDICATION: Signs/Symptoms:abdominal pain with acute encephalopathy.     COMPARISON: CT abdomen pelvis 01/24/2025 CT chest abdomen pelvis 01/28/2024   ACCESSION NUMBER(S): [id number]   ORDERING CLINICIAN: TIGIST KEYS   TECHNIQUE: CT of the abdomen and pelvis was performed.  Standard contiguous axial images were obtained at 3 mm slice thickness through the abdomen and pelvis. Coronal and sagittal reconstructions at 3 mm slice thickness were performed.  75 ML of Omnipaque 350 was administered intravenously without immediate complication.   FINDINGS: Please note that the evaluation of vessels, lymph nodes and organs is limited without intravenous contrast.   LOWER CHEST: The heart is normal in size without evidence of pericardial effusion. No consolidation or pleural effusion is present. No concerning lung nodule. Small  hiatal hernia again noted.   ABDOMEN:   LIVER: The liver is normal in size without evidence of focal liver lesions.   BILE DUCTS: Stable mild intrahepatic and extrahepatic biliary duct dilatation, likely secondary to cholecystectomy.   GALLBLADDER: Cholecystectomy   PANCREAS: The pancreas appears unremarkable without evidence of ductal dilatation or masses.   SPLEEN: The spleen is normal in size without focal lesions.   ADRENAL GLANDS: No adrenal nodularity or thickening.   KIDNEYS AND URETERS: The kidneys are normal in size. Stable nonobstructive punctuate calculus in the right kidney. Stable sub 5 mm hypoattenuating lesions in the bilateral kidneys, which are too small to further characterize. No hydroureteronephrosis or left nephroureterolithiasis is identified.   PELVIS:   BLADDER: The bladder is completely opacified by contrast from prior imaging, which limits evaluation.   REPRODUCTIVE ORGANS: No pelvic masses. A stable 48 mm left adnexal cyst is seen on series 2, image 116. A 21 x 19 mm right adnexal lesion could be a right adnexal cyst or a perivesicular lymph node.   BOWEL: Small hiatal hernia. The small and large bowel are normal in caliber and demonstrate no wall thickening. The appendix is not definitely visualized. There is however no pericecal stranding or fluid.   VESSELS: There is no aneurysmal dilatation of the abdominal aorta. Mild atherosclerotic calcifications of the aortoiliac arteries. The IVC appears normal.   PERITONEUM/RETROPERITONEUM/LYMPH NODES: Retroperitoneal lymphadenopathy with adjacent stranding, stable when compared to prior CT from 01/24/2025, and new when compared to prior CT from 01/28/2024, for example as seen on series 2: *16 x 10 mm aortocaval lymph node, image 75 *12 x 11 mm retrocaval lymph node, image 91 *16 x 15 mm right common iliac lymph node, image 97 *20 x 16 mm right internal iliac lymph node, image 104 *29 x 20 mm right external iliac lymph node, image 126 *24 x 14 mm  right external iliac lymph node, image 135 *21 x 19 mm perivascular lesion, which could be a lymph node or right adnexal cyst, image 131 *20 x 18 mm right pelvic sidewall lesion, image 117   No ascites or fluid collection. No peritoneal nodularity or implant.   ABDOMINAL WALL: The abdominal wall soft tissues appear normal. Sacral spinal stimulator noted.   BONES: No suspicious osseous lesions are identified. Degenerative discogenic disease is noted in the lower thoracic and lumbar spine. Grade 1 anterolisthesis of the L4 and L5 vertebral bodies.       1. Retroperitoneal lymphadenopathy, stable when compared to prior CT from 01/24/2025, and new when compared to prior CT from 01/28/2024. Though the adjacent stranding favors an infectious/inflammatory etiology, underlying malignancy such as lymphoma or bladder carcinoma (given the bladder wall thickening seen on prior CT) can not be excluded. If clinical signs/symptoms favor infectious etiology, short-term follow-up CT abdomen pelvis in 4-6 weeks is recommended. Alternatively, correlation with serum LDH levels, cystoscopy, tissue diagnosis and/or PET-CT are other diagnostic considerations that could be obtained as per clinical need. 2. No significant interval change as compared to prior CT from 01/24/2025.   MACRO: Critical Finding:  There are multiple critical findings. See findings. notification was initiated on 1/29/2025 at 10:55 am by Evonne Yi.  (**-YCF-**)   Signed by: Evonne Yi 1/29/2025 10:56 AM Dictation workstation:   [id number]    CT angio head and neck w and wo IV contrast    Result Date: 1/28/2025  Interpreted By:  Nicolas Sarmiento, STUDY: CT ANGIO HEAD AND NECK W AND WO IV CONTRAST;  1/28/2025 6:46 pm   INDICATION: Signs/Symptoms:stroke rule out.     COMPARISON: CT head today.   ACCESSION NUMBER(S): [id number]   ORDERING CLINICIAN: TIGIST KEYS   TECHNIQUE: Unenhanced CT images of the head were obtained.  Subsequently,  60 ML of Omnipaque 350  was administered intravenously and axial images of the head and neck were acquired.  Coronal, sagittal, and 3-D reconstructions were provided for review.   FINDINGS:   CTA COW: No major vascular occlusion. Mild atherosclerotic changes through the carotid siphons. No aneurysms.  No vascular malformations.   CTA CAROTID: No aortic aneurysm or dissection. No significant stenoses at the origins of the great vessels from the aortic arch. No significant stenoses of the brachycephalic artery or bilateral subclavian arteries.   Mild atherosclerotic changes at the carotid bifurcations. No significant stenoses bilateral carotid arterial systems. No significant stenoses bilateral vertebral arterial systems.   NONVASCULAR HEAD: No intracranial mass. No intracranial hemorrhage. No evidence of large evolving cortical infarction. Supratentorial white matter hypodensities most likely related to mild chronic small vessel ischemic change.     Bones intact. Mucous retention cyst left maxillary sinus.   NONVASCULAR NECK: No soft tissue mass. No adenopathy. Salivary glands are unremarkable. Thyroid gland unremarkable. Bones intact.       1. No intracranial major vascular occlusion. 2. No flow-limiting stenosis or significant plaque irregularity in the neck.     MACRO: None   Signed by: Nicolas Sarmiento 1/28/2025 7:30 PM Dictation workstation:   [id number]    CT brain attack head wo IV contrast    Result Date: 1/28/2025  Interpreted By:  Howard Millard, STUDY: TIGIST KEYS; 1/28/2025 5:08 pm   INDICATION: Signs/Symptoms:brain attack;   COMPARISON: 01/28/2024   ACCESSION NUMBER(S): [id number]   ORDERING CLINICIAN: TIGIST KEYS   TECHNIQUE: Contiguous axial images were acquired from the vertex through the posterior fossa without IV contrast. All CT examinations are performed with 1 or more of the following dose reduction techniques: Automated exposure control, adjustment of mA and/or kv according to patient's size, or use of  iterative reconstruction techniques.   FINDINGS: No focal mass effect or midline shift is identified. The ventricles and sulci are symmetric and appropriate for the patient's age.   The gray white matter differentiation is preserved. Again seen is a moderate degree of nonspecific white matter hypodensity, most consistent with chronic small-vessel ischemic disease, not significantly changed from the prior study.   No acute intracranial hemorrhage is seen. No intra-axial or extra-axial fluid collection is seen.   The visualized paranasal sinuses and mastoid air cells are clear. There is a small-moderate sized mucous retention cysts in the visualized left maxillary sinus.       No CT evidence for acute intracranial pathology.   Moderate degree of nonspecific white matter hypodensity, most consistent with chronic small-vessel ischemic disease, not significantly changed when compared to the prior study.         Findings discussed with the physician on the 2nd floor at 5:20 p.m. on 01/28/2025   Signed by: Howard Millard 1/28/2025 5:39 PM Dictation workstation:   [id number]    ECG 12 Lead    Result Date: 1/28/2025  Normal sinus rhythm Left axis deviation Pulmonary disease pattern T wave abnormality, consider anterior ischemia Abnormal ECG When compared with ECG of 24-JAN-2025 16:10, Premature ventricular complexes are no longer Present Vent. rate has decreased BY  67 BPM Nonspecific T wave abnormality now evident in Inferior leads T wave inversion now evident in Anterior leads    ECG 12 Lead    Result Date: 1/27/2025  Normal sinus rhythm Normal ECG When compared with ECG of 24-JAN-2025 16:10, Premature ventricular complexes are no longer Present Vent. rate has decreased BY  58 BPM QRS axis Shifted right Borderline criteria for Inferior infarct are no longer Present Nonspecific T wave abnormality now evident in Inferior leads T wave amplitude has decreased in Anterior leads    ECG 12 lead    Result Date:  1/25/2025  Supraventricular tachycardia Left axis deviation Anterior infarct (cited on or before 17-JAN-2025) Abnormal ECG When compared with ECG of 17-JAN-2025 06:56, OH interval has decreased Vent. rate has increased BY  90 BPM Confirmed by Clement Stevens ([id number]) on 1/25/2025 1:40:18 PM    ECG 12 lead    Result Date: 1/25/2025  Normal sinus rhythm Left axis deviation Poor R-wave progression ; consider anterior infarct, lead placement, or normal variant Abnormal ECG When compared with ECG of 23-JAN-2025 20:48, (unconfirmed) Vent. rate has decreased BY  59 BPM Confirmed by Clement Stevens ([id number]) on 1/25/2025 1:40:12 PM    Electrocardiogram, 12-lead PRN ACS symptoms    Result Date: 1/25/2025  Supraventricular tachycardia with occasional Premature ventricular complexes Left axis deviation Pulmonary disease pattern Possible Inferior infarct , age undetermined Abnormal ECG When compared with ECG of 23-JAN-2025 21:55, (unconfirmed) Premature ventricular complexes are now Present Vent. rate has increased BY  47 BPM Nonspecific T wave abnormality no longer evident in Anterior leads Confirmed by Clement Stevens ([id number]) on 1/25/2025 1:40:07 PM    Transthoracic Echo (TTE) Complete    Result Date: 1/24/2025            [facility] [place] [address] [id number]    Tel [phone] Fax [phone] TRANSTHORACIC ECHOCARDIOGRAM REPORT Patient Name:       LISSETTE BOB SALLY Palma Physician:    [id number] Clement Stevens MD Study Date:         1/24/2025            Ordering Provider:    [id number] TIGIST KEYS MRN/PID:            [id number]             Fellow: Accession#:         [id number]         Nurse: Date of Birth/Age:  1950 / 74      Sonographer:          Flakita Quarles RDCS                     years Gender Assigned at  F                    Additional Staff: Birth: Height:             157.48  cm            Admit Date:           1/23/2025 Weight:             63.96 kg             Admission Status:     Inpatient -                                                                Priority                                                                discharge BSA / BMI:          1.65 m2 / 25.79      Department Location:  [facility]                     kg/m2 Blood Pressure: 168 /70 mmHg Study Type:    TRANSTHORACIC ECHO (TTE) COMPLETE Diagnosis/ICD: Dyspnea, unspecified-R06.00 Indication:    Dyspnea CPT Codes:     Echo Complete w Full [id number] Patient History: Diabetes:          Yes Pertinent History: Dyspnea, Palpitations, CAD, A-Fib, HTN, Hyperlipidemia and                    Previous SVT. GERD; previous echocardiogram 12-. Study Detail: The following Echo studies were performed: M-Mode, 2D, Doppler and               color flow.  PHYSICIAN INTERPRETATION: Left Ventricle: Left ventricular ejection fraction is normal, calculated by Rangel's biplane at 71%. There is mild left ventricular hypertrophy. There are no regional wall motion abnormalities. The left ventricular cavity size is normal. Spectral Doppler shows a Grade I (impaired relaxation pattern) of left ventricular diastolic filling with normal left atrial filling pressure. Left Atrium: The left atrium is mild to moderately dilated. Right Ventricle: The right ventricle is upper limits of normal in size. There is normal right ventricular global systolic function. Right Atrium: The right atrium is mildly dilated. Aortic Valve: The aortic valve appears structurally normal. There is mild aortic valve cusp calcification. There is mild to moderate aortic valve regurgitation. The peak instantaneous gradient of the aortic valve is 8 mmHg. Mitral Valve: The mitral valve is normal in structure. The peak instantaneous gradient of the mitral valve is 5 mmHg. There is mild mitral valve regurgitation. Tricuspid Valve: The tricuspid valve is structurally  normal. There is mild tricuspid regurgitation. Pulmonic Valve: The pulmonic valve was not assessed. There is trace to mild pulmonic valve regurgitation. Pericardium: No pericardial effusion noted. Aorta: The aortic root is normal.  CONCLUSIONS:  1. Left ventricular ejection fraction is normal, calculated by Rangel's biplane at 71%.  2. Spectral Doppler shows a Grade I (impaired relaxation pattern) of left ventricular diastolic filling with normal left atrial filling pressure.  3. There is normal right ventricular global systolic function.  4. The left atrium is mild to moderately dilated.  5. Mild to moderate aortic valve regurgitation. QUANTITATIVE DATA SUMMARY:  2D MEASUREMENTS:            Normal Ranges: LAs:             4.50 cm    (2.7-4.0cm) IVSd:            0.86 cm    (0.6-1.1cm) LVPWd:           1.06 cm    (0.6-1.1cm) LVIDd:           5.63 cm    (3.9-5.9cm) LVIDs:           4.00 cm LV Mass Index:   127.2 g/m2 LV % FS          29.0 %  LA VOLUME:                    Normal Ranges: LA Vol A4C:        55.9 ml    (22+/-6mL/m2) LA Vol A2C:        50.6 ml LA Vol BP:         54.1 ml LA Vol Index A4C:  34.0ml/m2 LA Vol Index A2C:  30.7 ml/m2 LA Vol Index BP:   32.8 ml/m2 LA Area A4C:       20.2 cm2 LA Area A2C:       18.9 cm2 LA Major Axis A4C: 6.2 cm LA Major Axis A2C: 6.0 cm LA Volume Index:   30.9 ml/m2 LA Vol A4C:        53.0 ml LA Vol A2C:        48.0 ml LA Vol Index BSA:  30.7 ml/m2  M-MODE MEASUREMENTS:         Normal Ranges: Ao Root:             3.20 cm (2.0-3.7cm)  AORTA MEASUREMENTS:         Normal Ranges: Ao Sinus, d:        3.00 cm (2.1-3.5cm) Ao STJ, d:          2.60 cm (1.7-3.4cm) Asc Ao, d:          3.30 cm (2.1-3.4cm)  LV SYSTOLIC FUNCTION BY 2D PLANIMETRY (MOD):                      Normal Ranges: EF-A4C View:    69 % (>=55%) EF-A2C View:    75 % EF-Biplane:     71 % LV EF Reported: 71 %  LV DIASTOLIC FUNCTION:             Normal Ranges: MV Peak E:             1.10 m/s    (0.7-1.2 m/s) MV Peak A:              1.10 m/s    (0.42-0.7 m/s) E/A Ratio:             1.00        (1.0-2.2) MV e'                  0.080 m/s   (>8.0) MV lateral e'          0.08 m/s MV medial e'           0.08 m/s E/e' Ratio:            13.76       (<8.0) PulmV Sys Elieser:         49.90 cm/s PulmV Flores Elieser:        35.50 cm/s PulmV S/D Elieser:         1.40 PulmV A Revs Elieser:      26.70 cm/s PulmV A Revs Dur:      148.00 msec  MITRAL VALVE:          Normal Ranges: MV Vmax:      1.12 m/s (<=1.3m/s) MV peak P.0 mmHg (<5mmHg) MV mean P.0 mmHg (<48mmHg) MV DT:        190 msec (150-240msec)  AORTIC VALVE:           Normal Ranges: AoV Vmax:      1.42 m/s (<=1.7m/s) AoV Peak P.1 mmHg (<20mmHg) LVOT Max Elieser:  1.05 m/s (<=1.1m/s) LVOT VTI:      27.40 cm LVOT Diameter: 1.80 cm  (1.8-2.4cm) AoV Area,Vmax: 1.88 cm2 (2.5-4.5cm2)  RIGHT VENTRICLE: RV Basal 2.80 cm RV Mid   2.70 cm RV Major 7.0 cm TAPSE:   22.0 mm RV s'    0.14 m/s  PULMONIC VALVE:          Normal Ranges: PV Accel Time:  95 msec  (>120ms) PV Max Elieser:     0.9 m/s  (0.6-0.9m/s) PV Max P.9 mmHg  Pulmonary Veins: PulmV A Revs Dur: 148.00 msec PulmV A Revs Elieser: 26.70 cm/s PulmV Flores Elieser:   35.50 cm/s PulmV S/D Elieser:    1.40 PulmV Sys Elieser:    49.90 cm/s  [id number] Clement Stevens MD Electronically signed on 2025 at 1:58:10 PM  ** Final **     CT abdomen pelvis wo IV contrast    Result Date: 2025  Interpreted By:  Gerard Maxwell, STUDY: CT ABDOMEN PELVIS WO IV CONTRAST;  2025 3:25 am   INDICATION: Signs/Symptoms:kidney stones. History of overactive bladder with continence control stimulator insert in . Symptoms recurred in  with urge incontinence and underwent stimulator revision on 10/14/2024.     COMPARISON: 2025   ACCESSION NUMBER(S): [id number]   ORDERING CLINICIAN: ITGIST KEYS   TECHNIQUE: Contiguous axial images of the abdomen and pelvis were obtained without intravenous contrast. Coronal and sagittal reformatted images were reconstructed from the  axial data.   FINDINGS: Note: The absence of intravenous contrast limits evaluation of the solid organs and vasculature.   LOWER CHEST: No acute abnormality.     ABDOMEN/PELVIS:   ABDOMINAL WALL: There is a bladder control device in the right buttock with a single lead coursing through the right S3 neural foramen.   LIVER: The liver demonstrates a normal noncontrast attenuation.   BILE DUCTS: Prominent intrahepatic and extrahepatic bile ducts are likely secondary to post-cholecystectomy status.   GALLBLADDER: Surgically absent.   PANCREAS: No significant abnormality.   SPLEEN: No significant abnormality.   ADRENALS: No significant abnormality.   KIDNEYS, URETERS, BLADDER: Non-obstructing 0.2 cm right renal calculus. No left renal calculus. No hydronephrosis. Urinary bladder wall is inflamed with perivesical fat stranding similar to prior study. There is thickening and inflammation of the distal right ureter. There is eccentric wall thickening adjacent to the right uterovesical junction which was seen on prior study as well. There are multiple bladder calculi measuring 0.8 cm and 1.3 cm.   REPRODUCTIVE ORGANS: Surgically absent uterus. There is a simple left ovarian cyst measuring 3.4 cm, slightly smaller compared to prior study.   VESSELS: Mild aortic atherosclerosis without AAA.   RETROPERITONEUM/LYMPH NODES: There is progression of now extensive inflammatory fat stranding within multiple retroperitoneal compartments. This is most severe on the right pelvic sidewall, extends into the right femoral canal, involves the presacral space, and extends superiorly to the aortoiliac bifurcation and along the aorta at the level of the renal veins. This stranding is accompanied by diffuse lymphadenopathy which is also progressed in degree and extent compared to prior study. For example, a 1.3 cm right common iliac node previously measured 0.9 cm. A 1.6 cm right common iliac node previously measured 0.7 cm, 1.3 cm by common  iliac ovaries noted 0.8 cm, 2.1 cm by external iliac node previously measured 0.9 cm, 2 cm right external iliac lymph node previously measured 1.6 cm amongst others. There are no enlarged left external iliac lymph nodes. There is only a 0.7 cm left common iliac node that is unchanged. There are also newly enlarged aortocaval and periaortic lymph nodes predominantly measuring between 0.5 cm and 1 cm.   BOWEL/PERITONEUM: There is colonic diverticulosis. No inflammatory bowel wall thickening or dilatation. Normal appendix.   No ascites, free air, or fluid collection.     MUSCULOSKELETAL: No acute osseous abnormality. No suspicious osseous lesion. Severe L4-L5 disc height loss with grade 1 degenerative anterolisthesis of L4 on L5 and moderate bilateral facet arthropathy. There is not severe central canal stenosis at L4-L5 and severe bilateral L4-5 foraminal stenosis. There are decompressive laminectomies at L2-L4.       1. Significant progression of severe inflammatory changes in the right pelvic sidewall, presacral space, right femoral canal and extending superiorly along the aorta and IVC to the level of the renal veins accompanied by diffuse lymphadenopathy which is also significantly progressed as detailed above. This inflammatory perinodal stranding favors an infectious or inflammatory etiology, although this the extent and degree of change is unusual for typical bladder infection. Recommend timely urological follow-up for further investigation. Correlation with urinalysis recommended.   2. Redemonstration of diffusely thickened inflamed bladder wall with diffuse thickening of the distal right ureter and multiple intraluminal bladder calculi may service continued nidus of continued infection.   3. No hydronephrosis. Nonobstructing 0.2 cm right renal calculus.   MACRO: None.   Signed by: Gerard Maxwell 1/24/2025 3:41 AM Dictation workstation:   [id number]    ECG 12 lead    Result Date: 1/17/2025  Sinus rhythm  with 1st degree AV block Left axis deviation Possible Anterior infarct , age undetermined Abnormal ECG When compared with ECG of 16-JAN-2025 06:38, (unconfirmed) Nonspecific T wave abnormality no longer evident in Anterior leads Confirmed by Luigi Chisholm ([id number]) on 1/17/2025 10:17:22 PM    ECG 12 lead    Result Date: 1/17/2025  Normal sinus rhythm Left axis deviation Nonspecific T wave abnormality Abnormal ECG When compared with ECG of 15-ANTONIO-2025 13:47, (unconfirmed) Premature ventricular complexes are no longer Present Nonspecific T wave abnormality, worse in Anterior leads Confirmed by Luigi Chisholm ([id number]) on 1/17/2025 8:14:36 PM    ECG 12 lead    Result Date: 1/15/2025  Sinus rhythm with sinus arrhythmia with occasional Premature ventricular complexes Left axis deviation Abnormal ECG When compared with ECG of 15-ANTONIO-2025 11:23, (unconfirmed) Premature ventricular complexes are now Present See ED provider note for full interpretation and clinical correlation    ECG 12 lead    Result Date: 1/15/2025  Normal sinus rhythm Left axis deviation Pulmonary disease pattern Abnormal ECG When compared with ECG of 13-JAN-2025 07:22, No significant change was found See ED provider note for full interpretation and clinical correlation    XR chest 1 view    Result Date: 1/15/2025  Interpreted By:  Monique Caballero, STUDY: XR CHEST 1 VIEW;  1/15/2025 11:11 am   INDICATION: Signs/Symptoms:Chest Pain.   COMPARISON: None.   ACCESSION NUMBER(S): [id number]   ORDERING CLINICIAN: ESCOBAR GALLEGOS   FINDINGS: CARDIOMEDIASTINAL SILHOUETTE: Cardiomediastinal silhouette is normal in size and configuration.     LUNGS: Lungs are clear.   ABDOMEN: No remarkable upper abdominal findings.     BONES: No acute osseous changes.       No acute cardiopulmonary process.   MACRO: None   Signed by: Monique Caballero 1/15/2025 11:21 AM Dictation workstation:   [id number]    ECG 12 lead    Result Date: 1/13/2025  Normal sinus rhythm Left axis deviation  Abnormal ECG When compared with ECG of 12-JAN-2025 19:42, (unconfirmed) Vent. rate has decreased BY  65 BPM Criteria for Anterior infarct are no longer Present Criteria for Anterolateral infarct are no longer Present Nonspecific T wave abnormality now evident in Inferior leads Confirmed by Luigi Chisholm ([id number]) on 1/13/2025 2:59:12 PM    XR chest 1 view    Result Date: 1/12/2025  Interpreted By:  Wendy Lee, STUDY: XR CHEST 1 VIEW;  1/12/2025 8:05 pm   INDICATION: Signs/Symptoms:Chest Pain.   COMPARISON: 11/24/2023   ACCESSION NUMBER(S): [id number]   ORDERING CLINICIAN: CHASE LANDERS   FINDINGS:     CARDIOMEDIASTINAL SILHOUETTE: Cardiomediastinal silhouette is normal in size and configuration.   LUNGS: No pulmonary consolidation, pleural effusion or pneumothorax.   ABDOMEN: No remarkable upper abdominal findings.   BONES: No acute osseous abnormality.       No acute cardiopulmonary process.   MACRO: None   Signed by: Wendy Lee 1/12/2025 8:20 PM Dictation workstation:   [id number]    US PELVIS TRANSABDOMINAL WITH TRANSVAGINAL    Result Date: 1/9/2025  Interpreted By:  Roger Polanco, STUDY: US PELVIS TRANSABDOMINAL WITH TRANSVAGINAL  1/9/2025 8:55 pm   INDICATION: 73 y/o   F with  Signs/Symptoms:rlq pain history of ovarian cyst, possible torsion     COMPARISON: Pelvic ultrasound of 04/19/2024. Correlation also made to CT abdomen and pelvis of 01/09/2025.   ACCESSION NUMBER(S): [id number]   ORDERING CLINICIAN: ERIC HO   TECHNIQUE: Routine ultrasound of the pelvis was performed with duplex Doppler (color and spectral) evaluation.  Evaluation of the female pelvis was performed by transabdominal technique .  Static images were obtained for remote interpretation.   FINDINGS: UTERUS: Absent.     RIGHT OVARY: Not seen, presumably secondary to prior oophorectomy.   LEFT OVARY: The technologist mistakenly labeled the ovary with cyst as being the right ovary; correlation with the prior CT, however,  demonstrates the left ovary remaining in situ, with a cyst. The left ovary measures 4.9 x 2.6 x 3.9 cm. There is a stable cyst in the left ovary with thin septation measuring approximately 4.3 x 3.0 x 3.7 cm. Normal blood flow is present.   There are 2 hypoechoic masses in the right adnexa, the larger measuring up to 1.7 cm in diameter, correlating to abnormal and enlarged right pelvic sidewall lymph nodes seen on prior CT.   FREE FLUID:   None.       The technologist mistakenly labeled the ovary with cyst as being the right ovary; correlation with the prior CT, however, demonstrates the left ovary remaining in situ, with a cyst. The left ovary measures 4.9 x 2.6 x 3.9 cm. There is a stable cyst in the left ovary with thin septation measuring approximately 4.3 x 3.0 x 3.7 cm. Normal blood flow is present.   There are 2 hypoechoic masses in the right adnexa, the larger measuring up to 1.7 cm in diameter, correlating to abnormal and enlarged right pelvic sidewall lymph nodes seen on prior CT.   Hysterectomy. Right ovary not seen, presumably secondary to prior oophorectomy.   MACRO: None   Signed by: Roger Polanco 1/9/2025 9:29 PM Dictation workstation:   [id number]    CT abdomen pelvis w IV contrast    Result Date: 1/9/2025  Interpreted By:  Dat Quintanilla, STUDY: CT ABDOMEN PELVIS W IV CONTRAST;  1/9/2025 7:02 pm   INDICATION: Signs/Symptoms:rlq pain with rebound tenderness.   COMPARISON: CT scan of the abdomen pelvis 01/28/2024   ACCESSION NUMBER(S): [id number]   ORDERING CLINICIAN: ERIC HO   TECHNIQUE: Axial CT images of the abdomen and pelvis with coronal and sagittal reconstructed images obtained after intravenous administration of 75 mL of Omnipaque 350   FINDINGS: LOWER CHEST: Bilateral Bochdalek's hernias containing fat. Heart size is within the upper limits of normal. Trace pericardial effusion.   ABDOMEN:   LIVER: Within normal limits. BILE DUCTS: Mild central biliary ductal prominence with the  extrahepatic common duct dilated up to 10 mm which is within normal limits for postsurgical state. GALLBLADDER: Status post cholecystectomy. PANCREAS: Within normal limits. SPLEEN: Within normal limits. ADRENALS: Within normal limits. KIDNEYS and URETERS: Symmetric renal enhancement. No hydronephrosis or perinephric fluid collection.   VESSELS:  Calcific atherosclerosis of the aortoiliac and mesenteric vessels. No aortic aneurysm. RETROPERITONEUM: Enlarged right external iliac lymph node measures up to 1.4 cm in short axis additional prominent pelvic lymph nodes noted.   PELVIS:   REPRODUCTIVE ORGANS: Uterus is surgically absent. There is a 3.6 cm hypodense lesion in the left adnexa likely relate to the left ovary. This is stable when compared to prior CT of 01/28/2024 BLADDER: Bladder is under distended with wall thickening, mucosal hyperenhancement and perivesical inflammatory stranding. A right posterior bladder wall diverticula noted. There is somewhat asymmetric wall thickening and soft tissue enhancement on the right extending to the UVJ and above described diverticula. This is best visualized on axial image 107 of series 201 and coronal image 55 of series 202. Several calculi noted in the bladder.   BOWEL: Small hiatal hernia. Stomach is under distended. Visualized loops of bowel are without evidence for obstruction. Appendix is not identified with certainty. No pericecal inflammatory changes seen. Moderate stool burden. Scattered colonic diverticula in the descending colon without evidence for acute diverticulitis. No pneumatosis or portal venous gas. PERITONEUM: No ascites or free air, no fluid collection.   ABDOMINAL WALL: A right sacral neurostimulator device is stable. BONES: Postsurgical changes of laminectomy at L2 through L4. Multilevel degenerative changes of the spine. Grade 1 anterolisthesis of L4 on 5. Stable mild compression deformity along the superior endplate of the T11 vertebral body.        Several bladder calculi are noted, new from prior exam. There is moderate bladder wall thickening, mucosal hyperenhancement and perivesical inflammatory stranding concerning for infectious cystitis. Correlate with urinalysis.   There is somewhat asymmetric thickening of the right posterolateral bladder wall extending to the UVJ. There are prominent and mildly enlarged pelvic lymph nodes as described above. These findings could be secondary to cystitis with reactive adenopathy. An underlying mass lesion can not be excluded. Correlate with urine cytology and urological consultation for the need for further evaluation with direct visualization versus continued short-term follow-up.   Diverticulosis without evidence for acute diverticulitis.   Redemonstration of a 3.6 cm hypodense lesion in the left adnexa likely related to left ovary. This is stable when compared to prior CT. Correlate with prior ultrasound and attention on continued follow-up is advised.   Additional findings as described above.   MACRO: None   Signed by: Dat Quintanilla 1/9/2025 7:24 PM Dictation workstation:   [id number]     Assessment & Plan  SVT (supraventricular tachycardia) (CMS-HCC)    Bladder wall thickening    Paroxysmal atrial fibrillation (Multi)    Acute ischemic stroke (Multi)    UTI (urinary tract infection)    Acute ischemic strokes- likely cardioembolic 2/2 to afib off anticoagulation.   No focal neurological deficits.      May transition to Eliquis. Urology postponing procedure for 2-4 weeks per our recommendation. Continue aspirin, statin. Neuro checks every 4 hours. PT/OT evaluations. Follow up with Dr. Roy within 6-8 weeks of discharge.      Case/plan discussed and pt seen with Dr. Roy.      Vascular Risk Factor modification goals:  Blood pressure goals: avoid hypotension SBP <100 that could worsen cerebral perfusion, Ischemic stroke- early permissive hypertension SBP < 220 mmHg with cautious inpatient lowering  Lipid  Goals: education on healthy diet and statin therapy to maintain or achieve goal LDL-cholesterol < 70mg  Glucose Goals: early treatment of hyperglycemia to goal glucose 140-180 mg/dl with long-term goal A1c < 7%   Smoking Cessation and Education  Assessment for Rehabilitation needs   Patient and family education on signs and symptoms of stroke, calling 911, healthy strategies for stroke prevention.      No further needs from neurology; okay for transfer or discharge as per primary team. Please contact if condition changes for re-eval.    Flory Duff, APRN-CNP

## 2025-01-31 NOTE — PROGRESS NOTES
01/31/25 1051   Discharge Planning   Who is requesting discharge planning? Provider   Home or Post Acute Services Post acute facilities (Rehab/SNF/etc)   Type of Post Acute Facility Services Skilled nursing   Expected Discharge Disposition SNF     Patient is medically ready for discharge and FOC is [facility]. Will need pre cert once accepted at facility and they can accept. Will need updated therapy notes for pre cert to be started. Did discuss seeing patient with therapist. CT team to follow.

## 2025-01-31 NOTE — DISCHARGE INSTRUCTIONS
You were seen in the hospital for urinary tract infection and supraventricular tachycardia.  Please follow up with your PCP in the next week regarding hospitalization stay.     Please take all of your medications as directed.     New medications:    Please start taking ciprofloxacin 1 tablet every 12 hours for 6 days  Please take oxycodone 5 mg 1 tablet by mouth every 4 hours as needed for moderate to severe pain.    Discontinued medications:    If you have any worsening symptoms such as increasing pain, fever, chills, confusion, or concerning symptoms, please call your doctor or return to the hospital.    Thank you for allowing us to participate in your health care.    -[facility] Inpatient Medicine Teaching Service.

## 2025-01-31 NOTE — PROGRESS NOTES
Occupational Therapy    Occupational Therapy Treatment    Name: Myrna Rodrigues  MRN: [id number]  Department: [facility]  Room: [address]  Date: 01/31/25       Assessment:  OT Assessment: Patient would benefit from additional skilled rehab at a moderate intensity to fully maximize independence in adls and functional mobility tasks for adls. If patient makes good progress and has greater assist at home, would possibly benefit from therapy at a low intensity. Will update as needed.  Prognosis: Good  End of Session Patient Position: Up in chair, Alarm on  Plan:  Treatment Interventions: ADL retraining, Functional transfer training, Endurance training, Neuromuscular reeducation  OT Frequency: 3 times per week  OT Discharge Recommendations: Low intensity level of continued care ([facility] OT)  Equipment Recommended upon Discharge: Straight cane  OT Recommended Transfer Status:  ([facility])  OT - OK to Discharge: Yes (to next level of care when cleared by medical team)    Subjective   Previous Visit Info:  OT Last Visit  OT Received On: 01/31/25  General:  General  Reason for Referral: activities of daily living  Referred By: Gloria  Patient Position Received: Up in chair, Alarm on  Preferred Learning Style: verbal  General Comment: Pleasant, motivated to participate  Precautions:  Medical Precautions: Fall precautions     Date/Time Vitals Session Patient Position Pulse Resp SpO2 BP MAP (mmHg)    01/31/25 1000 --  --  68  --  --  --  --                Pain Assessment:  Pain Assessment  Pain Assessment: 0-10  0-10 (Numeric) Pain Score: 0 - No pain     Objective   Cognition:  Overall Cognitive Status: Within Functional Limits  Orientation Level: Oriented X4  Activities of Daily Living:      Grooming  Grooming Level of Assistance: Contact guard  Grooming Comments: Handwashing task at sink in room    UE Bathing  UE Bathing Level of Assistance: Minimum assistance  UE Bathing Comments: simulated task    LE Bathing  LE Bathing Level of Assistance:  Minimum assistance  LE Bathing Comments: simulated task    UE Dressing  UE Dressing Level of Assistance: Minimum assistance  UE Dressing Comments: simulated task    LE Dressing  LE Dressing:  (Don/doff socks, MIN X1)         Functional Mobility:  Functional Mobility  Functional Mobility Performed: Yes (In chair at start,check on. MIN X1 sit to stand. MIN X1 amb HHA chair to sink for handwashing task. MIN X1 amb HHA back to bedside chair and sit MIN X1. Rest provided d/t fatigue. In chair at end, chair check on. Mild unsteady at times in ambulation.)       Strength:  Strength  Strength Comments: B UE strength 4-/5 overall    Outcome Measures:  [facility] Daily Activity  Putting on and taking off regular lower body clothing: A little  Bathing (including washing, rinsing, drying): A little  Putting on and taking off regular upper body clothing: A little  Toileting, which includes using toilet, bedpan or urinal: A little  Taking care of personal grooming such as brushing teeth: A little  Eating Meals: A little  Daily Activity - Total Score: 18         and      Education Documentation  No documentation found.  Education Comments  No comments found.      Goals:  Encounter Problems       Encounter Problems (Active)       OT Goals       OT Goal 1 (Progressing)       Start:  01/24/25    Expected End:  02/07/25       Pt with complete all transfers independently            OT Goal 2 (Progressing)       Start:  01/24/25    Expected End:  02/07/25       Pt will complete LB dressing with modified independence using adaptive device as needed           OT Goal 3 (Progressing)       Start:  01/24/25    Expected End:  02/07/25       Pt will complete grooming ADL's independently and good stand balance

## 2025-01-31 NOTE — PROGRESS NOTES
Physical Therapy    Physical Therapy Treatment    Patient Name: Myrna Rodrigues  MRN: [id number]  Today's Date: 1/31/2025  Time Calculation  Start Time: 1133  Stop Time: 1218  Time Calculation (min): 45 min     2100/2100-A     01/31/25 1133   PT  Visit   PT Received On 01/31/25   Response to Previous Treatment Patient with no complaints from previous session.   General   Reason for Referral impaired mobility, gait training, impaired cognition/safety awareness   Referred By Adrianna Castro   Family/Caregiver Present Yes  (Son arrived durni session.)   Prior to Session Communication Bedside nurse   Patient Position Received Bed, 3 rail up;Alarm on   Preferred Learning Style verbal   General Comment Pt was sitting in the chair when I arrived. On IV heparin. Telemetry. No c/o pain. Stated she feels weaker than when I saw her last time.   Precautions   Medical Precautions Fall precautions   Precautions Comment bed/chair alarm   Vital Signs   Vital Signs Comment HR on monitor was 65-70 to start.    Cognition   Orientation Level Oriented X4   Static Sitting Balance   Static Sitting-Comment/Number of Minutes Steady sitting supported in the chair Feet on the floor.   Static Standing Balance   Static Standing-Comment/Number of Minutes No loss of balance. use the walker for support.   Therapeutic Exercise   Therapeutic Exercise Performed Yes   Therapeutic Exercise Activity 1 Seated  Heel Raises, Hip abd/add, LAQ x 10 reps.   Therapeutic Exercise Activity 2 Sit to stand x5 reps.   Ambulation/Gait Training   Ambulation/Gait Training Performed Yes   Ambulation/Gait Training 1   Surface 1 Level tile   Device 1 Rolling walker   Gait Support Devices Gait belt   Assistance 1 Contact guard   Quality of Gait 1 Diminished heel strike;Inconsistent stride length;Listing   Comments/Distance (ft) 1 Pt ambulated wiht the wheeled walker 30x.2 Pace was slow. Right path deviation. Directional cues. Pt. c/o legs feeling heavy/sluggish as she fatigued.  Pace slowed as distance increased. Mild SOB.   Transfers   Transfer Yes   Transfer 1   Technique 1 Sit to stand;Stand to sit   Transfer Device 1 Walker;Gait belt   Transfer Level of Assistance 1 Close supervision   Trials/Comments 1 Cues for hand placement with sit to stand.   Activity Tolerance   Endurance Endurance does not limit participation in activity;Tolerates 30 min exercise with multiple rests   PT Assessment   PT Assessment Results Decreased strength;Decreased mobility;Impaired balance;Pain   Rehab Prognosis Good   End of Session Communication Bedside nurse   End of Session Patient Position Bed, 2 rail up  (care give present to get EKG repor)   PT Plan   PT Plan Ongoing PT   PT Frequency 3 times per week   PT Discharge Recommendations Low intensity level of continued care   Equipment Recommended upon Discharge Straight cane         Assessment/Plan   PT Assessment  PT Assessment Results: Decreased strength, Decreased mobility, Impaired balance, Pain  Rehab Prognosis: Good  End of Session Communication: Bedside nurse  End of Session Patient Position: Bed, 2 rail up (care give present to get EKG repor)     PT Plan  Treatment/Interventions: Bed mobility, Transfer training, Balance training, Gait training, Neuromuscular re-education, Strengthening, Range of motion, Therapeutic exercise, Therapeutic activity, Home exercise program  PT Plan: Ongoing PT  PT Frequency: 3 times per week  PT Discharge Recommendations: Low intensity level of continued care  Equipment Recommended upon Discharge: Straight cane  PT Recommended Transfer Status: Contact guard  PT - OK to Discharge: Yes    Outcome Measures:  [facility] Basic Mobility  Turning from your back to your side while in a flat bed without using bedrails: None  Moving from lying on your back to sitting on the side of a flat bed without using bedrails: A little  Moving to and from bed to chair (including a wheelchair): A little  Standing up from a chair using your arms  (e.g. wheelchair or bedside chair): A little  To walk in hospital room: A little  Climbing 3-5 steps with railing: A lot  Basic Mobility - Total Score: 18                             EDUCATION:     Education Documentation  Body Mechanics, taught by Raúl Jha PTA at 1/31/2025 12:58 PM.  Learner: Patient  Readiness: Acceptance  Method: Explanation, Demonstration  Response: Needs Reinforcement, Verbalizes Understanding    Home Exercise Program, taught by Raúl Jha PTA at 1/31/2025 12:58 PM.  Learner: Patient  Readiness: Acceptance  Method: Explanation, Demonstration  Response: Needs Reinforcement, Verbalizes Understanding    Mobility Training, taught by Raúl Jha PTA at 1/31/2025 12:58 PM.  Learner: Patient  Readiness: Acceptance  Method: Explanation, Demonstration  Response: Needs Reinforcement, Verbalizes Understanding    Education Comments  No comments found.        GOALS:  Encounter Problems       Encounter Problems (Active)       PT Problem       Pt will be able to perform all bed mobility tasks with IND.  (Progressing)       Start:  01/24/25    Expected End:  02/07/25            Pt will perform all transfers with Mod I and LRAD with proper safety mechanics.   (Progressing)       Start:  01/24/25    Expected End:  02/07/25            Pt will ambulate 150 ft with Mod I using LRAD for improved functional independence.  (Progressing)       Start:  01/24/25    Expected End:  02/07/25               Pain - Adult

## 2025-02-01 LAB
ALBUMIN SERPL BCP-MCNC: 3.3 G/DL (ref 3.4–5)
ANION GAP SERPL CALC-SCNC: 10 MMOL/L (ref 10–20)
ATRIAL RATE: 120 BPM
ATRIAL RATE: 153 BPM
ATRIAL RATE: 77 BPM
BASOPHILS # BLD AUTO: 0.04 X10*3/UL (ref 0–0.1)
BASOPHILS NFR BLD AUTO: 0.3 %
BUN SERPL-MCNC: 22 MG/DL (ref 6–23)
CALCIUM SERPL-MCNC: 8.9 MG/DL (ref 8.6–10.3)
CHLORIDE SERPL-SCNC: 100 MMOL/L (ref 98–107)
CO2 SERPL-SCNC: 30 MMOL/L (ref 21–32)
CREAT SERPL-MCNC: 1.14 MG/DL (ref 0.5–1.05)
EGFRCR SERPLBLD CKD-EPI 2021: 50 ML/MIN/1.73M*2
EOSINOPHIL # BLD AUTO: 0.51 X10*3/UL (ref 0–0.4)
EOSINOPHIL NFR BLD AUTO: 4.3 %
ERYTHROCYTE [DISTWIDTH] IN BLOOD BY AUTOMATED COUNT: 18.6 % (ref 11.5–14.5)
GLUCOSE BLD MANUAL STRIP-MCNC: 119 MG/DL (ref 74–99)
GLUCOSE BLD MANUAL STRIP-MCNC: 128 MG/DL (ref 74–99)
GLUCOSE BLD MANUAL STRIP-MCNC: 137 MG/DL (ref 74–99)
GLUCOSE BLD MANUAL STRIP-MCNC: 139 MG/DL (ref 74–99)
GLUCOSE SERPL-MCNC: 120 MG/DL (ref 74–99)
HCT VFR BLD AUTO: 30.9 % (ref 36–46)
HGB BLD-MCNC: 8.9 G/DL (ref 12–16)
IMM GRANULOCYTES # BLD AUTO: 0.06 X10*3/UL (ref 0–0.5)
IMM GRANULOCYTES NFR BLD AUTO: 0.5 % (ref 0–0.9)
LYMPHOCYTES # BLD AUTO: 2.38 X10*3/UL (ref 0.8–3)
LYMPHOCYTES NFR BLD AUTO: 20.3 %
MAGNESIUM SERPL-MCNC: 2.18 MG/DL (ref 1.6–2.4)
MCH RBC QN AUTO: 20.8 PG (ref 26–34)
MCHC RBC AUTO-ENTMCNC: 28.8 G/DL (ref 32–36)
MCV RBC AUTO: 72 FL (ref 80–100)
MONOCYTES # BLD AUTO: 1.2 X10*3/UL (ref 0.05–0.8)
MONOCYTES NFR BLD AUTO: 10.2 %
NEUTROPHILS # BLD AUTO: 7.56 X10*3/UL (ref 1.6–5.5)
NEUTROPHILS NFR BLD AUTO: 64.4 %
NRBC BLD-RTO: 0 /100 WBCS (ref 0–0)
P AXIS: 96 DEGREES
P OFFSET: 178 MS
P OFFSET: 198 MS
P ONSET: 116 MS
P ONSET: 178 MS
PHOSPHATE SERPL-MCNC: 3.9 MG/DL (ref 2.5–4.9)
PLATELET # BLD AUTO: 343 X10*3/UL (ref 150–450)
POTASSIUM SERPL-SCNC: 4.5 MMOL/L (ref 3.5–5.3)
PR INTERVAL: 196 MS
Q ONSET: 214 MS
Q ONSET: 218 MS
Q ONSET: 218 MS
QRS COUNT: 12 BEATS
QRS COUNT: 25 BEATS
QRS COUNT: 26 BEATS
QRS DURATION: 80 MS
QRS DURATION: 80 MS
QRS DURATION: 84 MS
QT INTERVAL: 290 MS
QT INTERVAL: 300 MS
QT INTERVAL: 388 MS
QTC CALCULATION(BAZETT): 439 MS
QTC CALCULATION(BAZETT): 467 MS
QTC CALCULATION(BAZETT): 477 MS
QTC FREDERICIA: 398 MS
QTC FREDERICIA: 409 MS
QTC FREDERICIA: 421 MS
R AXIS: -30 DEGREES
R AXIS: 129 DEGREES
R AXIS: 61 DEGREES
RBC # BLD AUTO: 4.27 X10*6/UL (ref 4–5.2)
SODIUM SERPL-SCNC: 135 MMOL/L (ref 136–145)
T AXIS: 28 DEGREES
T AXIS: 46 DEGREES
T AXIS: 64 DEGREES
T OFFSET: 363 MS
T OFFSET: 368 MS
T OFFSET: 408 MS
VENTRICULAR RATE: 152 BPM
VENTRICULAR RATE: 156 BPM
VENTRICULAR RATE: 77 BPM
WBC # BLD AUTO: 11.8 X10*3/UL (ref 4.4–11.3)

## 2025-02-01 PROCEDURE — 85025 COMPLETE CBC W/AUTO DIFF WBC: CPT

## 2025-02-01 PROCEDURE — 36415 COLL VENOUS BLD VENIPUNCTURE: CPT

## 2025-02-01 PROCEDURE — 82947 ASSAY GLUCOSE BLOOD QUANT: CPT

## 2025-02-01 PROCEDURE — 94760 N-INVAS EAR/PLS OXIMETRY 1: CPT

## 2025-02-01 PROCEDURE — 2500000001 HC RX 250 WO HCPCS SELF ADMINISTERED DRUGS (ALT 637 FOR MEDICARE OP)

## 2025-02-01 PROCEDURE — 2500000001 HC RX 250 WO HCPCS SELF ADMINISTERED DRUGS (ALT 637 FOR MEDICARE OP): Performed by: INTERNAL MEDICINE

## 2025-02-01 PROCEDURE — 2500000002 HC RX 250 W HCPCS SELF ADMINISTERED DRUGS (ALT 637 FOR MEDICARE OP, ALT 636 FOR OP/ED): Performed by: STUDENT IN AN ORGANIZED HEALTH CARE EDUCATION/TRAINING PROGRAM

## 2025-02-01 PROCEDURE — 83735 ASSAY OF MAGNESIUM: CPT

## 2025-02-01 PROCEDURE — 2060000001 HC INTERMEDIATE ICU ROOM DAILY

## 2025-02-01 PROCEDURE — 2500000004 HC RX 250 GENERAL PHARMACY W/ HCPCS (ALT 636 FOR OP/ED): Mod: JZ

## 2025-02-01 PROCEDURE — 94640 AIRWAY INHALATION TREATMENT: CPT

## 2025-02-01 PROCEDURE — 2500000004 HC RX 250 GENERAL PHARMACY W/ HCPCS (ALT 636 FOR OP/ED)

## 2025-02-01 PROCEDURE — 2500000002 HC RX 250 W HCPCS SELF ADMINISTERED DRUGS (ALT 637 FOR MEDICARE OP, ALT 636 FOR OP/ED)

## 2025-02-01 PROCEDURE — 80069 RENAL FUNCTION PANEL: CPT

## 2025-02-01 RX ADMIN — ASPIRIN 81 MG CHEWABLE TABLET 81 MG: 81 TABLET CHEWABLE at 10:13

## 2025-02-01 RX ADMIN — CIPROFLOXACIN 500 MG: 500 TABLET ORAL at 20:23

## 2025-02-01 RX ADMIN — APIXABAN 5 MG: 5 TABLET, FILM COATED ORAL at 20:22

## 2025-02-01 RX ADMIN — LEVOTHYROXINE SODIUM 100 MCG: 0.1 TABLET ORAL at 06:20

## 2025-02-01 RX ADMIN — METOPROLOL TARTRATE 100 MG: 100 TABLET, FILM COATED ORAL at 20:23

## 2025-02-01 RX ADMIN — MONTELUKAST 10 MG: 10 TABLET, FILM COATED ORAL at 20:23

## 2025-02-01 RX ADMIN — HYDROMORPHONE HYDROCHLORIDE 0.2 MG: 0.2 INJECTION, SOLUTION INTRAMUSCULAR; INTRAVENOUS; SUBCUTANEOUS at 21:31

## 2025-02-01 RX ADMIN — ATORVASTATIN CALCIUM 40 MG: 40 TABLET, FILM COATED ORAL at 20:22

## 2025-02-01 RX ADMIN — PANTOPRAZOLE SODIUM 40 MG: 40 TABLET, DELAYED RELEASE ORAL at 06:20

## 2025-02-01 RX ADMIN — Medication 5 MG: at 20:22

## 2025-02-01 RX ADMIN — METOPROLOL TARTRATE 100 MG: 100 TABLET, FILM COATED ORAL at 10:14

## 2025-02-01 RX ADMIN — POLYETHYLENE GLYCOL 3350 17 G: 17 POWDER, FOR SOLUTION ORAL at 09:30

## 2025-02-01 RX ADMIN — APIXABAN 5 MG: 5 TABLET, FILM COATED ORAL at 10:15

## 2025-02-01 RX ADMIN — PANTOPRAZOLE SODIUM 40 MG: 40 TABLET, DELAYED RELEASE ORAL at 16:00

## 2025-02-01 RX ADMIN — IPRATROPIUM BROMIDE 0.5 MG: 0.5 SOLUTION RESPIRATORY (INHALATION) at 10:30

## 2025-02-01 RX ADMIN — IPRATROPIUM BROMIDE 0.5 MG: 0.5 SOLUTION RESPIRATORY (INHALATION) at 21:57

## 2025-02-01 RX ADMIN — CIPROFLOXACIN 500 MG: 500 TABLET ORAL at 10:15

## 2025-02-01 RX ADMIN — DOCUSATE SODIUM 100 MG: 100 CAPSULE, LIQUID FILLED ORAL at 10:15

## 2025-02-01 RX ADMIN — LOSARTAN POTASSIUM 50 MG: 50 TABLET, FILM COATED ORAL at 10:14

## 2025-02-01 ASSESSMENT — PAIN - FUNCTIONAL ASSESSMENT
PAIN_FUNCTIONAL_ASSESSMENT: 0-10

## 2025-02-01 ASSESSMENT — COGNITIVE AND FUNCTIONAL STATUS - GENERAL
WALKING IN HOSPITAL ROOM: A LITTLE
DRESSING REGULAR LOWER BODY CLOTHING: A LITTLE
CLIMB 3 TO 5 STEPS WITH RAILING: A LITTLE
HELP NEEDED FOR BATHING: A LITTLE
MOBILITY SCORE: 22

## 2025-02-01 ASSESSMENT — PAIN SCALES - GENERAL
PAINLEVEL_OUTOF10: 0 - NO PAIN
PAINLEVEL_OUTOF10: 0 - NO PAIN
PAINLEVEL_OUTOF10: 9
PAINLEVEL_OUTOF10: 0 - NO PAIN
PAINLEVEL_OUTOF10: 5 - MODERATE PAIN
PAINLEVEL_OUTOF10: 3
PAINLEVEL_OUTOF10: 0 - NO PAIN

## 2025-02-01 ASSESSMENT — PAIN DESCRIPTION - LOCATION: LOCATION: BACK

## 2025-02-01 NOTE — CARE PLAN
SHIFT NOTE    Patient has been hds throughout shift. She had no complaints of pain and has remained afebrile. She was transitioned to PO Eliquis overnight and her Heparin drip was discontinued. Neuro wise she has been completely intact and no abnormality noted.     Hourly rounding was performed and patient needs were met. Call light within reach. No other acute events, will continue to monitor.       The patient's goals for the shift include    Problem: Discharge Planning  Goal: Discharge to home or other facility with appropriate resources  2/1/2025 0553 by Cayla Brown RN  Outcome: Progressing  1/31/2025 2125 by Cayla Brown RN  Flowsheets (Taken 1/31/2025 2125)  Discharge to home or other facility with appropriate resources:   Identify barriers to discharge with patient and caregiver   Identify discharge learning needs (meds, wound care, etc)     Problem: Chronic Conditions and Co-morbidities  Goal: Patient's chronic conditions and co-morbidity symptoms are monitored and maintained or improved  2/1/2025 0553 by Cayla Brown RN  Outcome: Progressing  1/31/2025 2125 by Cayla Brown RN  Flowsheets (Taken 1/31/2025 2125)  Care Plan - Patient's Chronic Conditions and Co-Morbidity Symptoms are Monitored and Maintained or Improved:   Monitor and assess patient's chronic conditions and comorbid symptoms for stability, deterioration, or improvement   Collaborate with multidisciplinary team to address chronic and comorbid conditions and prevent exacerbation or deterioration   Update acute care plan with appropriate goals if chronic or comorbid symptoms are exacerbated and prevent overall improvement and discharge     Problem: Pain  Goal: Turns in bed with improved pain control throughout the shift  Outcome: Progressing     Problem: Arrythmia/Dysrhythmia  Goal: Promote self management  2/1/2025 0553 by Cayla Brwon RN  Outcome: Progressing  1/31/2025 2125 by Cayla Brown RN  Flowsheets (Taken 1/31/2025 2125)  Promote  self management:   Encourage activity/cluster activity to conserve energy   Promote nutrition/identify dietary restrictions     Problem: Skin  Goal: Prevent/manage excess moisture  2/1/2025 0553 by Cayla Brown RN  Outcome: Progressing  1/31/2025 2125 by Cayla Brown RN  Flowsheets (Taken 1/31/2025 2125)  Prevent/manage excess moisture: Cleanse incontinence/protect with barrier cream     Problem: Skin  Goal: Prevent/minimize sheer/friction injuries  2/1/2025 0553 by Cayla Brown RN  Outcome: Progressing  1/31/2025 2125 by Cayla Brown RN  Flowsheets (Taken 1/31/2025 2125)  Prevent/minimize sheer/friction injuries:   Complete micro-shifts as needed if patient unable. Adjust patient position to relieve pressure points, not a full turn   HOB 30 degrees or less   Turn/reposition every 2 hours/use positioning/transfer devices       The clinical goals for the shift include Patient will remain hds by end of shift 2/1/25 at 0700.

## 2025-02-02 VITALS
WEIGHT: 133.6 LBS | RESPIRATION RATE: 16 BRPM | TEMPERATURE: 97.5 F | HEART RATE: 62 BPM | HEIGHT: 62 IN | BODY MASS INDEX: 24.59 KG/M2 | SYSTOLIC BLOOD PRESSURE: 146 MMHG | DIASTOLIC BLOOD PRESSURE: 63 MMHG | OXYGEN SATURATION: 98 %

## 2025-02-02 LAB
ALBUMIN SERPL BCP-MCNC: 3.4 G/DL (ref 3.4–5)
ANION GAP SERPL CALC-SCNC: 10 MMOL/L (ref 10–20)
BACTERIA BLD CULT: NORMAL
BACTERIA BLD CULT: NORMAL
BASOPHILS # BLD AUTO: 0.04 X10*3/UL (ref 0–0.1)
BASOPHILS NFR BLD AUTO: 0.3 %
BUN SERPL-MCNC: 21 MG/DL (ref 6–23)
CALCIUM SERPL-MCNC: 9 MG/DL (ref 8.6–10.3)
CHLORIDE SERPL-SCNC: 100 MMOL/L (ref 98–107)
CO2 SERPL-SCNC: 29 MMOL/L (ref 21–32)
CREAT SERPL-MCNC: 1.05 MG/DL (ref 0.5–1.05)
EGFRCR SERPLBLD CKD-EPI 2021: 55 ML/MIN/1.73M*2
EOSINOPHIL # BLD AUTO: 0.65 X10*3/UL (ref 0–0.4)
EOSINOPHIL NFR BLD AUTO: 5 %
ERYTHROCYTE [DISTWIDTH] IN BLOOD BY AUTOMATED COUNT: 18.6 % (ref 11.5–14.5)
GLUCOSE BLD MANUAL STRIP-MCNC: 119 MG/DL (ref 74–99)
GLUCOSE BLD MANUAL STRIP-MCNC: 159 MG/DL (ref 74–99)
GLUCOSE SERPL-MCNC: 126 MG/DL (ref 74–99)
HCT VFR BLD AUTO: 31.7 % (ref 36–46)
HGB BLD-MCNC: 9.1 G/DL (ref 12–16)
IMM GRANULOCYTES # BLD AUTO: 0.04 X10*3/UL (ref 0–0.5)
IMM GRANULOCYTES NFR BLD AUTO: 0.3 % (ref 0–0.9)
LYMPHOCYTES # BLD AUTO: 2.75 X10*3/UL (ref 0.8–3)
LYMPHOCYTES NFR BLD AUTO: 21.4 %
MAGNESIUM SERPL-MCNC: 2.13 MG/DL (ref 1.6–2.4)
MCH RBC QN AUTO: 20.9 PG (ref 26–34)
MCHC RBC AUTO-ENTMCNC: 28.7 G/DL (ref 32–36)
MCV RBC AUTO: 73 FL (ref 80–100)
MONOCYTES # BLD AUTO: 1.32 X10*3/UL (ref 0.05–0.8)
MONOCYTES NFR BLD AUTO: 10.2 %
NEUTROPHILS # BLD AUTO: 8.08 X10*3/UL (ref 1.6–5.5)
NEUTROPHILS NFR BLD AUTO: 62.8 %
NRBC BLD-RTO: 0 /100 WBCS (ref 0–0)
PHOSPHATE SERPL-MCNC: 3.7 MG/DL (ref 2.5–4.9)
PLATELET # BLD AUTO: 379 X10*3/UL (ref 150–450)
POTASSIUM SERPL-SCNC: 4.4 MMOL/L (ref 3.5–5.3)
RBC # BLD AUTO: 4.36 X10*6/UL (ref 4–5.2)
SODIUM SERPL-SCNC: 135 MMOL/L (ref 136–145)
WBC # BLD AUTO: 12.9 X10*3/UL (ref 4.4–11.3)

## 2025-02-02 PROCEDURE — 2500000002 HC RX 250 W HCPCS SELF ADMINISTERED DRUGS (ALT 637 FOR MEDICARE OP, ALT 636 FOR OP/ED)

## 2025-02-02 PROCEDURE — 82947 ASSAY GLUCOSE BLOOD QUANT: CPT

## 2025-02-02 PROCEDURE — 2500000002 HC RX 250 W HCPCS SELF ADMINISTERED DRUGS (ALT 637 FOR MEDICARE OP, ALT 636 FOR OP/ED): Performed by: STUDENT IN AN ORGANIZED HEALTH CARE EDUCATION/TRAINING PROGRAM

## 2025-02-02 PROCEDURE — 2500000001 HC RX 250 WO HCPCS SELF ADMINISTERED DRUGS (ALT 637 FOR MEDICARE OP)

## 2025-02-02 PROCEDURE — 80069 RENAL FUNCTION PANEL: CPT

## 2025-02-02 PROCEDURE — 36415 COLL VENOUS BLD VENIPUNCTURE: CPT

## 2025-02-02 PROCEDURE — 83735 ASSAY OF MAGNESIUM: CPT

## 2025-02-02 PROCEDURE — 94640 AIRWAY INHALATION TREATMENT: CPT

## 2025-02-02 PROCEDURE — 94760 N-INVAS EAR/PLS OXIMETRY 1: CPT

## 2025-02-02 PROCEDURE — 85025 COMPLETE CBC W/AUTO DIFF WBC: CPT

## 2025-02-02 PROCEDURE — 2500000001 HC RX 250 WO HCPCS SELF ADMINISTERED DRUGS (ALT 637 FOR MEDICARE OP): Performed by: INTERNAL MEDICINE

## 2025-02-02 RX ORDER — CIPROFLOXACIN 500 MG/1
500 TABLET ORAL EVERY 12 HOURS SCHEDULED
Qty: 8 TABLET | Refills: 0 | Status: SHIPPED | OUTPATIENT
Start: 2025-02-02 | End: 2025-02-06

## 2025-02-02 RX ADMIN — LEVOTHYROXINE SODIUM 100 MCG: 0.1 TABLET ORAL at 05:57

## 2025-02-02 RX ADMIN — IPRATROPIUM BROMIDE 0.5 MG: 0.5 SOLUTION RESPIRATORY (INHALATION) at 10:39

## 2025-02-02 RX ADMIN — APIXABAN 5 MG: 5 TABLET, FILM COATED ORAL at 08:59

## 2025-02-02 RX ADMIN — CIPROFLOXACIN 500 MG: 500 TABLET ORAL at 08:59

## 2025-02-02 RX ADMIN — LOSARTAN POTASSIUM 50 MG: 50 TABLET, FILM COATED ORAL at 09:00

## 2025-02-02 RX ADMIN — PANTOPRAZOLE SODIUM 40 MG: 40 TABLET, DELAYED RELEASE ORAL at 06:35

## 2025-02-02 RX ADMIN — DOCUSATE SODIUM 100 MG: 100 CAPSULE, LIQUID FILLED ORAL at 08:59

## 2025-02-02 RX ADMIN — ASPIRIN 81 MG CHEWABLE TABLET 81 MG: 81 TABLET CHEWABLE at 08:59

## 2025-02-02 RX ADMIN — OXYCODONE HYDROCHLORIDE 5 MG: 5 TABLET ORAL at 05:57

## 2025-02-02 RX ADMIN — METOPROLOL TARTRATE 100 MG: 100 TABLET, FILM COATED ORAL at 08:59

## 2025-02-02 ASSESSMENT — COGNITIVE AND FUNCTIONAL STATUS - GENERAL
MOVING TO AND FROM BED TO CHAIR: A LITTLE
MOBILITY SCORE: 21
STANDING UP FROM CHAIR USING ARMS: A LITTLE
CLIMB 3 TO 5 STEPS WITH RAILING: A LOT
MOBILITY SCORE: 18
MOVING FROM LYING ON BACK TO SITTING ON SIDE OF FLAT BED WITH BEDRAILS: A LITTLE
HELP NEEDED FOR BATHING: A LITTLE
WALKING IN HOSPITAL ROOM: A LITTLE
TURNING FROM BACK TO SIDE WHILE IN FLAT BAD: A LITTLE
CLIMB 3 TO 5 STEPS WITH RAILING: A LITTLE
WALKING IN HOSPITAL ROOM: A LITTLE
DRESSING REGULAR LOWER BODY CLOTHING: A LITTLE
DAILY ACTIVITIY SCORE: 22

## 2025-02-02 ASSESSMENT — PAIN DESCRIPTION - LOCATION: LOCATION: BACK

## 2025-02-02 ASSESSMENT — PAIN SCALES - GENERAL
PAINLEVEL_OUTOF10: 8
PAINLEVEL_OUTOF10: 0 - NO PAIN
PAINLEVEL_OUTOF10: 0 - NO PAIN
PAINLEVEL_OUTOF10: 4

## 2025-02-02 ASSESSMENT — PAIN - FUNCTIONAL ASSESSMENT
PAIN_FUNCTIONAL_ASSESSMENT: 0-10

## 2025-02-02 NOTE — PROGRESS NOTES
02/02/25 0842   Intensity of Service   Intensity of Service 0-30 min     Waiting on precert to [facility].  1200 auth good 1/31- 2/4/25. Checking with [facility].  1237 spoke with patient. States her son will transport her.

## 2025-02-02 NOTE — CARE PLAN
Pt hds throughout the shift. Medicated for back pain per prn order. Neuro status remains intact. Safety maintained, will continue to monitor     Problem: Discharge Planning  Goal: Discharge to home or other facility with appropriate resources  Outcome: Progressing     Problem: Chronic Conditions and Co-morbidities  Goal: Patient's chronic conditions and co-morbidity symptoms are monitored and maintained or improved  Outcome: Progressing     Problem: Pain  Goal: Turns in bed with improved pain control throughout the shift  Outcome: Progressing  Goal: Performs ADL's with improved pain control throughout shift  Outcome: Progressing     Problem: Arrythmia/Dysrhythmia  Goal: Promote self management  Outcome: Progressing     Problem: Skin  Goal: Prevent/manage excess moisture  Outcome: Progressing  Goal: Prevent/minimize sheer/friction injuries  Outcome: Progressing

## 2025-02-02 NOTE — CARE PLAN
Problem: Discharge Planning  Goal: Discharge to home or other facility with appropriate resources  Outcome: Adequate for Discharge     Problem: Chronic Conditions and Co-morbidities  Goal: Patient's chronic conditions and co-morbidity symptoms are monitored and maintained or improved  Outcome: Adequate for Discharge     Problem: Nutrition  Goal: Nutrient intake appropriate for maintaining nutritional needs  Outcome: Adequate for Discharge     Problem: Pain  Goal: Turns in bed with improved pain control throughout the shift  Outcome: Adequate for Discharge  Goal: Performs ADL's with improved pain control throughout shift  Outcome: Adequate for Discharge     Problem: Arrythmia/Dysrhythmia  Goal: Lab values return to normal range  Outcome: Adequate for Discharge  Goal: No evidence of post procedure complications  Outcome: Adequate for Discharge  Goal: Promote self management  Outcome: Adequate for Discharge  Goal: Serial ECG will return to baseline  Outcome: Adequate for Discharge  Goal: Verbalize understanding of procedures/devices  Outcome: Adequate for Discharge  Goal: Vital signs return to baseline  Outcome: Adequate for Discharge  Goal: Care and maintenance of device (specify)  Outcome: Adequate for Discharge     Problem: Skin  Goal: Prevent/manage excess moisture  Outcome: Adequate for Discharge  Goal: Prevent/minimize sheer/friction injuries  Outcome: Adequate for Discharge  Goal: Promote skin healing  Outcome: Adequate for Discharge   The patient's goals for the shift include      The clinical goals for the shift include pt will remain hds throughout the shift  Patient met all goals prior to discharge

## 2025-02-02 NOTE — PROGRESS NOTES
Physical Therapy    Physical Therapy Treatment    Patient Name: Myrna Rodrigues  MRN: [id number]  Today's Date: 2/2/2025  Time Calculation  Start Time: 1222  Stop Time: 1250  Time Calculation (min): 28 min     2100/2100-A    Assessment/Plan   PT Assessment  PT Assessment Results: Decreased strength, Decreased endurance  Rehab Prognosis: Excellent  Evaluation/Treatment Tolerance: Patient tolerated treatment well  Medical Staff Made Aware: Yes  End of Session Communication: Bedside nurse, Care Coordinator, PCT/NA/CTA  Assessment Comment: Pt will be discharged to rehab facility this date. Continues to benefit from therapy services to optimize independence and activity tolernace in prep to return home alone in private residence  End of Session Patient Position: Bed, 2 rail up     PT Plan  Treatment/Interventions: Bed mobility, Transfer training, Gait training, Therapeutic exercise  PT Plan: Ongoing PT  PT Frequency: 3 times per week  PT Discharge Recommendations: Low intensity level of continued care  Equipment Recommended upon Discharge: Straight cane  PT Recommended Transfer Status: Contact guard  PT - OK to Discharge: Yes    Current Problem:  Patient Active Problem List   Diagnosis    CAD S/P percutaneous coronary angioplasty    Chronic obstructive pulmonary disease (Multi)    Major depression    Moderate persistent asthma    Abnormal findings on diagnostic imaging of lung    Angina, class III (CMS-HCC)    Asthma    Cellulitis    Chronic diarrhea    Chronic pain of toe of left foot    Chronic pain of toe of right foot    Controlled type 2 diabetes mellitus without complication, without long-term current use of insulin (Multi)    Diabetes mellitus (Multi)    Edema    External hemorrhoids    Fracture of ankle    Gastroesophageal reflux disease    Ground glass opacity present on imaging of lung    H/O unstable angina    Hallux valgus, acquired    Hemangioma of skin and subcutaneous tissue    Hematuria    History of diabetes  mellitus    History of malignant neoplasm    Hypertension, essential, benign    Hypertensive retinopathy    Lateral malleolar fracture    Mixed hyperlipidemia    Nail fungus    Tinea pedis of both feet    Obesity    Other melanin hyperpigmentation    Other seborrheic keratosis    Overweight with body mass index (BMI) 25.0-29.9    Overweight    Postsurgical hypothyroidism    Psoriasis vulgaris    PVD (posterior vitreous detachment)    Rectal prolapse    Renal insufficiency    Senile nuclear sclerosis    Sensorineural hearing loss (SNHL) of both ears    Sensorineural hearing loss, bilateral    Left lumbar radiculopathy    Spinal stenosis of lumbar region without neurogenic claudication    Thyroid nodule    Mixed stress and urge urinary incontinence    Urge incontinence of urine    Dysuria    Urinary incontinence    Vaginal atrophy    Vaginitis    Vitamin D deficiency    Vitreous degeneration    UTI (urinary tract infection)    Depression    Major depressive disorder    Ventricular tachycardia (Multi)    Cardiac dysrhythmia    Mood disorder (CMS-[facility])    Unspecified complication of internal orthopedic prosthetic device, implant and graft, initial encounter (CMS-HCC)    Unilateral primary osteoarthritis, unspecified knee    Presence of left artificial knee joint    Personal history of other diseases of the nervous system and sense organs    Mechanical loosening of unspecified internal prosthetic joint, initial encounter (CMS-HCC)    Mechanical loosening of internal left knee prosthetic joint    Long term (current) use of aspirin    Other specified anemias    Esophageal disorder    Elevated white blood cell count, unspecified    CKD (chronic kidney disease)    Asymptomatic menopause    BMI 27.0-27.9,adult    Mild vitamin D deficiency    Never smoked any substance    Personal history of calcium pyrophosphate deposition disease (CPPD)    Medicare annual wellness visit, subsequent    Shortness of breath    Overactive bladder     Difficulty walking    Syncope and collapse    Penicillin allergy    Allergy to sulfa drugs    Nitrofurantoin adverse reaction    Cyst of left ovary    Encounter for medication review and counseling    Encounter to discuss treatment options    Urge incontinence    Iron deficiency anemia due to chronic blood loss    Type 2 diabetes mellitus with other specified complication, without long-term current use of insulin    Palpitations    Bladder wall thickening    Pelvic lymphadenopathy    Irritable bowel syndrome with both constipation and diarrhea       General Visit Information:   PT  Visit  PT Received On: 02/02/25  General  Prior to Session Communication: Bedside nurse  Patient Position Received: Bed, 2 rail up  General Comment: Pt is agreeable to therapy; learned at end of session via T[facility] that she will be discharging to rehab facility today  Subjective     Precautions:  Precautions  Medical Precautions: Fall precautions  Precautions Comment: bed/chair alarm    Vital Signs:  Vital Signs  Heart Rate: 62  Heart Rate Source: Monitor  Objective     Pain:  Pain Assessment  Pain Assessment: 0-10  0-10 (Numeric) Pain Score: 0 - No pain    Cognition:  Cognition  Orientation Level: Oriented X4    Postural Control:       Extremity/Trunk Assessments:                Activity Tolerance:  Activity Tolerance  Endurance: Tolerates less than 10 min exercise, no significant change in vital signs    Treatments:  Therapeutic Exercise  Therapeutic Exercise Performed: Yes  Therapeutic Exercise Activity 1: AP x15  Therapeutic Exercise Activity 2: Marches x15  Therapeutic Exercise Activity 3: LAQ x15           Ambulation/Gait Training  Ambulation/Gait Training Performed: Yes  Ambulation/Gait Training 1  Surface 1: Level tile  Device 1: Rolling walker  Quality of Gait 1: Decreased step length (slow juan pablo; cues for staying within JESSE at all times)  Comments/Distance (ft) 1: 250  Transfer 1  Trials/Comments 1:  (sit<>stand: SBA c min  verbal cues for hand placement SUP<>SIT: mod. Ind)          Outcome Measures:     [facility] Basic Mobility  Turning from your back to your side while in a flat bed without using bedrails: A little  Moving from lying on your back to sitting on the side of a flat bed without using bedrails: A little  Moving to and from bed to chair (including a wheelchair): A little  Standing up from a chair using your arms (e.g. wheelchair or bedside chair): A little  To walk in hospital room: A little  Climbing 3-5 steps with railing: A little  Basic Mobility - Total Score: 18                                      Education Documentation  No documentation found.  Education Comments  No comments found.           EDUCATION:  Outpatient Education  Education Provided: Home Exercise Program, Body Mechanics  Encounter Problems       Encounter Problems (Active)       PT Problem       Pt will be able to perform all bed mobility tasks with IND.  (Progressing)       Start:  01/24/25    Expected End:  02/07/25            Pt will perform all transfers with Mod I and LRAD with proper safety mechanics.   (Progressing)       Start:  01/24/25    Expected End:  02/07/25            Pt will ambulate 150 ft with Mod I using LRAD for improved functional independence.  (Progressing)       Start:  01/24/25    Expected End:  02/07/25               Pain - Adult

## 2025-02-03 LAB
BACTERIA BLD CULT: NORMAL
BACTERIA BLD CULT: NORMAL

## 2025-02-05 ENCOUNTER — APPOINTMENT (OUTPATIENT)
Dept: UROLOGY | Facility: CLINIC | Age: 75
End: 2025-02-05
Payer: MEDICARE

## 2025-02-05 VITALS
DIASTOLIC BLOOD PRESSURE: 76 MMHG | BODY MASS INDEX: 25.76 KG/M2 | WEIGHT: 140 LBS | HEIGHT: 62 IN | TEMPERATURE: 97.8 F | HEART RATE: 66 BPM | SYSTOLIC BLOOD PRESSURE: 127 MMHG

## 2025-02-05 DIAGNOSIS — R39.15 URGENCY OF URINATION: ICD-10-CM

## 2025-02-05 DIAGNOSIS — N39.0 RECURRENT UTI: ICD-10-CM

## 2025-02-05 DIAGNOSIS — N95.8 GENITOURINARY SYNDROME OF MENOPAUSE: ICD-10-CM

## 2025-02-05 DIAGNOSIS — N39.41 URGE INCONTINENCE OF URINE: Primary | ICD-10-CM

## 2025-02-05 DIAGNOSIS — R30.0 DYSURIA: ICD-10-CM

## 2025-02-05 LAB
EJECTION FRACTION: 60 %
POC APPEARANCE, URINE: ABNORMAL
POC BILIRUBIN, URINE: ABNORMAL
POC BLOOD, URINE: ABNORMAL
POC COLOR, URINE: YELLOW
POC GLUCOSE, URINE: NEGATIVE MG/DL
POC KETONES, URINE: NEGATIVE MG/DL
POC LEUKOCYTES, URINE: NEGATIVE
POC NITRITE,URINE: NEGATIVE
POC PH, URINE: 5.5 PH
POC PROTEIN, URINE: ABNORMAL MG/DL
POC SPECIFIC GRAVITY, URINE: >=1.03
POC UROBILINOGEN, URINE: 0.2 EU/DL

## 2025-02-05 PROCEDURE — 81003 URINALYSIS AUTO W/O SCOPE: CPT | Performed by: NURSE PRACTITIONER

## 2025-02-05 PROCEDURE — 3074F SYST BP LT 130 MM HG: CPT | Performed by: NURSE PRACTITIONER

## 2025-02-05 PROCEDURE — 1123F ACP DISCUSS/DSCN MKR DOCD: CPT | Performed by: NURSE PRACTITIONER

## 2025-02-05 PROCEDURE — 1159F MED LIST DOCD IN RCRD: CPT | Performed by: NURSE PRACTITIONER

## 2025-02-05 PROCEDURE — 3078F DIAST BP <80 MM HG: CPT | Performed by: NURSE PRACTITIONER

## 2025-02-05 PROCEDURE — 99214 OFFICE O/P EST MOD 30 MIN: CPT | Performed by: NURSE PRACTITIONER

## 2025-02-05 PROCEDURE — 1111F DSCHRG MED/CURRENT MED MERGE: CPT | Performed by: NURSE PRACTITIONER

## 2025-02-05 PROCEDURE — 1036F TOBACCO NON-USER: CPT | Performed by: NURSE PRACTITIONER

## 2025-02-05 PROCEDURE — G2211 COMPLEX E/M VISIT ADD ON: HCPCS | Performed by: NURSE PRACTITIONER

## 2025-02-05 ASSESSMENT — PATIENT HEALTH QUESTIONNAIRE - PHQ9
10. IF YOU CHECKED OFF ANY PROBLEMS, HOW DIFFICULT HAVE THESE PROBLEMS MADE IT FOR YOU TO DO YOUR WORK, TAKE CARE OF THINGS AT HOME, OR GET ALONG WITH OTHER PEOPLE: SOMEWHAT DIFFICULT
1. LITTLE INTEREST OR PLEASURE IN DOING THINGS: SEVERAL DAYS
2. FEELING DOWN, DEPRESSED OR HOPELESS: SEVERAL DAYS
SUM OF ALL RESPONSES TO PHQ9 QUESTIONS 1 AND 2: 2

## 2025-02-05 NOTE — PROGRESS NOTES
02/05/25   [id number]    Follow up Interstim, recent hospitalization     Subjective      HPI Myrna Rodrigues is a 74 y.o. female who presents follow up intersti, recent hospitalization; last seen on 11/14/24;  seen by both Dr. Thurman and Dr. Carranza during hospitalization;    Since last visit in and out of hospital, SVT, UTI and CT showed concerns for bladder calculi and thickening, patient was scheduled for cytoscopy with biopsy with Dr. Carranza but unfortunately while she was off eIquis, had a stroke;     today urine is small heme neg otherwise; on Cipro now; going home from NH tomorrow;     Told she needs to gain her strength so can have cysto under anesthesia again;     Off oxybutynin; a little leakage, no frequency; Dr. Thurman turned up device in hospital; doesn't feel it;     Having right lower abdominal pain intermittently, didn't take pain medication at NH, has oxycodone on her medication list. Pain is not improved with urination or bowel movement; pain is not over her bladder; son concerned as when pain is unbearable that is when she goes in to SVT;    10/14/24 removal sacral neuromodulation device and full implant of sacral neuromodulation device with fluoroscopy, cystoscopy, biopsy fulguration;     Cystoscopy on 10/14/2024 showed extensive silver fluffy-appearing metaplastic type changes and severe trabeculations. A biopsy from the right bladder wall was taken;     FINAL DIAGNOSIS 10/14/24   A. BLADDER, BIOPSY:  -- FRAGMENTS OF KERATINIZING SQUAMOUS MUCOSA, COMPATIBLE WITH KERATINIZING SQUAMOUS METAPLASIA.     CT abd/pel w IV: 1/29/25 IMPRESSION:  1. Retroperitoneal lymphadenopathy, stable when compared to prior CT  from 01/24/2025, and new when compared to prior CT from 01/28/2024.  Though the adjacent stranding favors an infectious/inflammatory  etiology, underlying malignancy such as lymphoma or bladder carcinoma  (given the bladder wall thickening seen on prior CT) can not be  excluded. If clinical  "signs/symptoms favor infectious etiology,  short-term follow-up CT abdomen pelvis in 4-6 weeks is recommended.  Alternatively, correlation with serum LDH levels, cystoscopy, tissue  diagnosis and/or PET-CT are other diagnostic considerations that  could be obtained as per clinical need.  2. No significant interval change as compared to prior CT from  01/24/2025.            Objective     /76   Pulse 66   Temp 36.6 °C (97.8 °F)   Ht 1.575 m (5' 2\")   Wt 63.5 kg (140 lb)   BMI 25.61 kg/m²    Physical Exam  General: Appears comfortable and in no apparent distress, well nourished  Head: Normocephalic, atraumatic  Neck: trachea midline  Respiratory: respirations unlabored, no wheezes, and no use of accessory muscles  Cardiovascular: at rest no dyspnea, well perfused  Skin: no visible rashes or lesions  Neurologic: grossly intact, oriented to person, place, and time  Psychiatric: mood and affect appropriate  Musculoskeletal: in chair for appt. no difficulty w upper body movement    Assessment/Plan   Problem List Items Addressed This Visit          Genitourinary and Reproductive    Urge incontinence of urine - Primary    Dysuria    Relevant Orders    POCT UA Automated manually resulted (Completed)    Urine Culture     Other Visit Diagnoses       Recurrent UTI        Urgency of urination        Genitourinary syndrome of menopause                Orders Placed This Encounter   Procedures    Urine Culture     Order Specific Question:   Release result to MyChart     Answer:   Immediate [1]    POCT UA Automated manually resulted     Order Specific Question:   Release result to MyChart     Answer:   Immediate [1]      Urine culture sent pending, finishing up Cipro  Planning surgery with Dr. Carranza/Dr. Thurman  Stroke one week ago, trying to gain strength  Discussed if pain right abdomen no better after pain medicine go to ER  Nurse line            Lizette Temple, APRN-CNP  Lab Results   Component Value Date    " GLUCOSE 126 (H) 02/02/2025    CALCIUM 9.0 02/02/2025     (L) 02/02/2025    K 4.4 02/02/2025    CO2 29 02/02/2025     02/02/2025    BUN 21 02/02/2025    CREATININE 1.05 02/02/2025

## 2025-02-05 NOTE — PATIENT INSTRUCTIONS
Urine culture sent pending, finishing up Cipro  Planning surgery with Dr. Carranza/Dr. Thurman  Stroke one week ago, trying to gain strength  Discussed if pain right abdomen no better after pain medicine go to ER  Nurse line

## 2025-02-07 ENCOUNTER — PATIENT OUTREACH (OUTPATIENT)
Dept: PRIMARY CARE | Facility: CLINIC | Age: 75
End: 2025-02-07
Payer: MEDICARE

## 2025-02-07 ENCOUNTER — TELEPHONE (OUTPATIENT)
Dept: UROLOGY | Facility: CLINIC | Age: 75
End: 2025-02-07
Payer: MEDICARE

## 2025-02-07 LAB — BACTERIA UR CULT: NORMAL

## 2025-02-07 NOTE — PROGRESS NOTES
Discharge Facility: [facility]  Discharge Diagnosis: SVT  Admission Date: 2/2/2025  Discharge Date: 2/6/2025    PCP Appointment Date: none  Specialist Appointment Date:   -cardiology 2/27/2025  -pulmonology 4/9/2025    Hospital Encounter and Summary Linked: Yes  ED to Hosp-Admission (Discharged) with Hal Avila MD; Adrianna Castro DO (01/23/2025)   See discharge assessment below for further details    [facility] admission 1/23/2025-2/2/2025    No information from SNF    Wrap Up  Wrap Up Additional Comments: CTS spoke with patient. She was admitted to [facility] with SVT on 2/2/2025. Patient was discharged on 2/6/2025 to home with home care. Patient stated that she was doing ok. Still pretty weak from the hospital. Patient denied any chest pains or shortness of breath. She stated that she was still on an antibiotic for UTI from hospital. There were no records from the SNF. Reviewed medications with her from the hospital. Admitted to [facility] on 1/24/2025 and discharged 2/2/2025 to SNF. Patient does not have an appt with PCP- there were no available appts that was convienent for patient. PAtient voiced no questions or concerns at this time. (2/7/2025  2:41 PM)  Call End Time: 1448 (2/7/2025  2:41 PM)    Engagement  Call Start Time: 1441 (2/7/2025  2:41 PM)    Medications  Medications reviewed with patient/caregiver?: Yes (2/7/2025  2:41 PM)  Is the patient having any side effects they believe may be caused by any medication additions or changes?: No (2/7/2025  2:41 PM)  Does the patient have all medications ordered at discharge?: Yes (2/7/2025  2:41 PM)  Care Management Interventions: No intervention needed (2/7/2025  2:41 PM)  Prescription Comments: new: cipro (2/7/2025  2:41 PM)  Is the patient taking all medications as directed (includes completed medication regime)?: Yes (2/7/2025  2:41 PM)  Medication Comments: see med list (2/7/2025  2:41 PM)    Appointments  Does the patient have a  primary care provider?: Yes (2/7/2025  2:41 PM)  Care Management Interventions: Advised patient to make appointment (2/7/2025  2:41 PM)  Has the patient kept scheduled appointments due by today?: Yes (2/7/2025  2:41 PM)  Care Management Interventions: Advised patient to keep appointment (2/7/2025  2:41 PM)    Self Management  What is the home health agency?: home care ordered from SNF- no agency listed and patient did not know of name (2/7/2025  2:41 PM)  Has home health visited the patient within 72 hours of discharge?: Unsure (2/7/2025  2:41 PM)  What Durable Medical Equipment (DME) was ordered?: n/a (2/7/2025  2:41 PM)    Patient Teaching  Does the patient have access to their discharge instructions?: Yes (2/7/2025  2:41 PM)  Care Management Interventions: Reviewed instructions with patient (2/7/2025  2:41 PM)  What is the patient's perception of their health status since discharge?: Improving (2/7/2025  2:41 PM)  Is the patient/caregiver able to teach back the hierarchy of who to call/visit for symptoms/problems? PCP, Specialist, Home Health nurse, Urgent Care, ED, 911: Yes (2/7/2025  2:41 PM)  Patient/Caregiver Education Comments: see wrap up (2/7/2025  2:41 PM)

## 2025-02-07 NOTE — TELEPHONE ENCOUNTER
----- Message from Lizette Temple sent at 2/7/2025  1:09 PM EST -----  Let patient know UTI cleared up. Thank you  ----- Message -----  From: Beatriz Hollins MA  Sent: 2/5/2025   3:01 PM EST  To: ANA Lizarraga-CNP

## 2025-02-09 LAB
ATRIAL RATE: 68 BPM
ATRIAL RATE: 70 BPM
P AXIS: 71 DEGREES
P AXIS: 89 DEGREES
P OFFSET: 172 MS
P OFFSET: 179 MS
P ONSET: 120 MS
P ONSET: 133 MS
PR INTERVAL: 158 MS
PR INTERVAL: 190 MS
Q ONSET: 212 MS
Q ONSET: 215 MS
QRS COUNT: 11 BEATS
QRS COUNT: 11 BEATS
QRS DURATION: 84 MS
QRS DURATION: 86 MS
QT INTERVAL: 412 MS
QT INTERVAL: 416 MS
QTC CALCULATION(BAZETT): 442 MS
QTC CALCULATION(BAZETT): 444 MS
QTC FREDERICIA: 433 MS
QTC FREDERICIA: 434 MS
R AXIS: -56 DEGREES
R AXIS: -64 DEGREES
T AXIS: 42 DEGREES
T AXIS: 43 DEGREES
T OFFSET: 420 MS
T OFFSET: 421 MS
VENTRICULAR RATE: 68 BPM
VENTRICULAR RATE: 70 BPM

## 2025-02-10 ENCOUNTER — APPOINTMENT (OUTPATIENT)
Dept: CARDIOLOGY | Facility: HOSPITAL | Age: 75
DRG: 669 | End: 2025-02-10
Payer: MEDICARE

## 2025-02-10 ENCOUNTER — APPOINTMENT (OUTPATIENT)
Dept: RADIOLOGY | Facility: HOSPITAL | Age: 75
DRG: 669 | End: 2025-02-10
Payer: MEDICARE

## 2025-02-10 ENCOUNTER — HOSPITAL ENCOUNTER (INPATIENT)
Facility: HOSPITAL | Age: 75
LOS: 2 days | Discharge: HOME | DRG: 669 | End: 2025-02-13
Attending: STUDENT IN AN ORGANIZED HEALTH CARE EDUCATION/TRAINING PROGRAM | Admitting: STUDENT IN AN ORGANIZED HEALTH CARE EDUCATION/TRAINING PROGRAM
Payer: MEDICARE

## 2025-02-10 ENCOUNTER — TELEPHONE (OUTPATIENT)
Dept: UROLOGY | Facility: CLINIC | Age: 75
End: 2025-02-10
Payer: MEDICARE

## 2025-02-10 DIAGNOSIS — R59.0 RETROPERITONEAL LYMPHADENOPATHY: ICD-10-CM

## 2025-02-10 DIAGNOSIS — N32.89 BLADDER MASS: ICD-10-CM

## 2025-02-10 DIAGNOSIS — D50.0 IRON DEFICIENCY ANEMIA DUE TO CHRONIC BLOOD LOSS: ICD-10-CM

## 2025-02-10 DIAGNOSIS — R52 INTRACTABLE PAIN: Primary | ICD-10-CM

## 2025-02-10 LAB
ALBUMIN SERPL BCP-MCNC: 3.2 G/DL (ref 3.4–5)
ALP SERPL-CCNC: 53 U/L (ref 33–136)
ALT SERPL W P-5'-P-CCNC: 5 U/L (ref 7–45)
ANION GAP SERPL CALC-SCNC: 13 MMOL/L (ref 10–20)
AST SERPL W P-5'-P-CCNC: 10 U/L (ref 9–39)
BASOPHILS # BLD AUTO: 0.03 X10*3/UL (ref 0–0.1)
BASOPHILS NFR BLD AUTO: 0.2 %
BILIRUB SERPL-MCNC: 0.3 MG/DL (ref 0–1.2)
BUN SERPL-MCNC: 9 MG/DL (ref 6–23)
CALCIUM SERPL-MCNC: 7.9 MG/DL (ref 8.6–10.3)
CARDIAC TROPONIN I PNL SERPL HS: 45 NG/L (ref 0–13)
CARDIAC TROPONIN I PNL SERPL HS: 49 NG/L (ref 0–13)
CHLORIDE SERPL-SCNC: 105 MMOL/L (ref 98–107)
CO2 SERPL-SCNC: 21 MMOL/L (ref 21–32)
CREAT SERPL-MCNC: 0.67 MG/DL (ref 0.5–1.05)
EGFRCR SERPLBLD CKD-EPI 2021: >90 ML/MIN/1.73M*2
EOSINOPHIL # BLD AUTO: 0.36 X10*3/UL (ref 0–0.4)
EOSINOPHIL NFR BLD AUTO: 2.7 %
ERYTHROCYTE [DISTWIDTH] IN BLOOD BY AUTOMATED COUNT: 18.5 % (ref 11.5–14.5)
FLUAV RNA RESP QL NAA+PROBE: NOT DETECTED
FLUBV RNA RESP QL NAA+PROBE: NOT DETECTED
GLUCOSE SERPL-MCNC: 94 MG/DL (ref 74–99)
HCT VFR BLD AUTO: 33.6 % (ref 36–46)
HGB BLD-MCNC: 10 G/DL (ref 12–16)
HOLD SPECIMEN: NORMAL
HOLD SPECIMEN: NORMAL
IMM GRANULOCYTES # BLD AUTO: 0.04 X10*3/UL (ref 0–0.5)
IMM GRANULOCYTES NFR BLD AUTO: 0.3 % (ref 0–0.9)
LACTATE SERPL-SCNC: 1 MMOL/L (ref 0.4–2)
LIPASE SERPL-CCNC: 5 U/L (ref 9–82)
LYMPHOCYTES # BLD AUTO: 2.01 X10*3/UL (ref 0.8–3)
LYMPHOCYTES NFR BLD AUTO: 15.2 %
MAGNESIUM SERPL-MCNC: 1.51 MG/DL (ref 1.6–2.4)
MCH RBC QN AUTO: 21.2 PG (ref 26–34)
MCHC RBC AUTO-ENTMCNC: 29.8 G/DL (ref 32–36)
MCV RBC AUTO: 71 FL (ref 80–100)
MONOCYTES # BLD AUTO: 0.84 X10*3/UL (ref 0.05–0.8)
MONOCYTES NFR BLD AUTO: 6.3 %
NEUTROPHILS # BLD AUTO: 9.96 X10*3/UL (ref 1.6–5.5)
NEUTROPHILS NFR BLD AUTO: 75.3 %
NRBC BLD-RTO: 0 /100 WBCS (ref 0–0)
PLATELET # BLD AUTO: 356 X10*3/UL (ref 150–450)
POTASSIUM SERPL-SCNC: 3.3 MMOL/L (ref 3.5–5.3)
PROT SERPL-MCNC: 6.3 G/DL (ref 6.4–8.2)
RBC # BLD AUTO: 4.72 X10*6/UL (ref 4–5.2)
SARS-COV-2 RNA RESP QL NAA+PROBE: NOT DETECTED
SODIUM SERPL-SCNC: 136 MMOL/L (ref 136–145)
WBC # BLD AUTO: 13.2 X10*3/UL (ref 4.4–11.3)

## 2025-02-10 PROCEDURE — 84484 ASSAY OF TROPONIN QUANT: CPT

## 2025-02-10 PROCEDURE — 96375 TX/PRO/DX INJ NEW DRUG ADDON: CPT

## 2025-02-10 PROCEDURE — 71045 X-RAY EXAM CHEST 1 VIEW: CPT | Performed by: RADIOLOGY

## 2025-02-10 PROCEDURE — 36415 COLL VENOUS BLD VENIPUNCTURE: CPT

## 2025-02-10 PROCEDURE — 2550000001 HC RX 255 CONTRASTS: Performed by: STUDENT IN AN ORGANIZED HEALTH CARE EDUCATION/TRAINING PROGRAM

## 2025-02-10 PROCEDURE — 96367 TX/PROPH/DG ADDL SEQ IV INF: CPT

## 2025-02-10 PROCEDURE — 83735 ASSAY OF MAGNESIUM: CPT

## 2025-02-10 PROCEDURE — 2500000004 HC RX 250 GENERAL PHARMACY W/ HCPCS (ALT 636 FOR OP/ED): Performed by: STUDENT IN AN ORGANIZED HEALTH CARE EDUCATION/TRAINING PROGRAM

## 2025-02-10 PROCEDURE — 74174 CTA ABD&PLVS W/CONTRAST: CPT | Performed by: RADIOLOGY

## 2025-02-10 PROCEDURE — 2500000004 HC RX 250 GENERAL PHARMACY W/ HCPCS (ALT 636 FOR OP/ED)

## 2025-02-10 PROCEDURE — 93010 ELECTROCARDIOGRAM REPORT: CPT | Performed by: INTERNAL MEDICINE

## 2025-02-10 PROCEDURE — 93005 ELECTROCARDIOGRAM TRACING: CPT

## 2025-02-10 PROCEDURE — 96365 THER/PROPH/DIAG IV INF INIT: CPT

## 2025-02-10 PROCEDURE — 99285 EMERGENCY DEPT VISIT HI MDM: CPT | Mod: 25 | Performed by: STUDENT IN AN ORGANIZED HEALTH CARE EDUCATION/TRAINING PROGRAM

## 2025-02-10 PROCEDURE — 83605 ASSAY OF LACTIC ACID: CPT

## 2025-02-10 PROCEDURE — 74174 CTA ABD&PLVS W/CONTRAST: CPT

## 2025-02-10 PROCEDURE — 96366 THER/PROPH/DIAG IV INF ADDON: CPT

## 2025-02-10 PROCEDURE — 99285 EMERGENCY DEPT VISIT HI MDM: CPT | Performed by: STUDENT IN AN ORGANIZED HEALTH CARE EDUCATION/TRAINING PROGRAM

## 2025-02-10 PROCEDURE — 87636 SARSCOV2 & INF A&B AMP PRB: CPT

## 2025-02-10 PROCEDURE — 96376 TX/PRO/DX INJ SAME DRUG ADON: CPT

## 2025-02-10 PROCEDURE — 83690 ASSAY OF LIPASE: CPT

## 2025-02-10 PROCEDURE — 80053 COMPREHEN METABOLIC PANEL: CPT

## 2025-02-10 PROCEDURE — 71045 X-RAY EXAM CHEST 1 VIEW: CPT

## 2025-02-10 PROCEDURE — 85025 COMPLETE CBC W/AUTO DIFF WBC: CPT

## 2025-02-10 RX ORDER — POTASSIUM CHLORIDE 14.9 MG/ML
20 INJECTION INTRAVENOUS ONCE
Status: COMPLETED | OUTPATIENT
Start: 2025-02-10 | End: 2025-02-10

## 2025-02-10 RX ORDER — METOPROLOL TARTRATE 1 MG/ML
5 INJECTION, SOLUTION INTRAVENOUS ONCE
Status: COMPLETED | OUTPATIENT
Start: 2025-02-10 | End: 2025-02-10

## 2025-02-10 RX ORDER — MAGNESIUM SULFATE HEPTAHYDRATE 40 MG/ML
2 INJECTION, SOLUTION INTRAVENOUS ONCE
Status: COMPLETED | OUTPATIENT
Start: 2025-02-10 | End: 2025-02-11

## 2025-02-10 RX ORDER — MORPHINE SULFATE 4 MG/ML
4 INJECTION, SOLUTION INTRAMUSCULAR; INTRAVENOUS ONCE
Status: COMPLETED | OUTPATIENT
Start: 2025-02-10 | End: 2025-02-10

## 2025-02-10 RX ORDER — ONDANSETRON HYDROCHLORIDE 2 MG/ML
4 INJECTION, SOLUTION INTRAVENOUS ONCE
Status: COMPLETED | OUTPATIENT
Start: 2025-02-10 | End: 2025-02-10

## 2025-02-10 RX ORDER — METOPROLOL TARTRATE 1 MG/ML
INJECTION, SOLUTION INTRAVENOUS
Status: COMPLETED
Start: 2025-02-10 | End: 2025-02-10

## 2025-02-10 RX ADMIN — SODIUM CHLORIDE 500 ML: 9 INJECTION, SOLUTION INTRAVENOUS at 17:53

## 2025-02-10 RX ADMIN — ONDANSETRON 4 MG: 2 INJECTION INTRAMUSCULAR; INTRAVENOUS at 17:53

## 2025-02-10 RX ADMIN — POTASSIUM CHLORIDE 20 MEQ: 14.9 INJECTION, SOLUTION INTRAVENOUS at 20:19

## 2025-02-10 RX ADMIN — MAGNESIUM SULFATE HEPTAHYDRATE 2 G: 40 INJECTION, SOLUTION INTRAVENOUS at 22:47

## 2025-02-10 RX ADMIN — MORPHINE SULFATE 4 MG: 4 INJECTION, SOLUTION INTRAMUSCULAR; INTRAVENOUS at 22:46

## 2025-02-10 RX ADMIN — METOPROLOL TARTRATE 5 MG: 5 INJECTION INTRAVENOUS at 18:00

## 2025-02-10 RX ADMIN — METOPROLOL TARTRATE 5 MG: 1 INJECTION, SOLUTION INTRAVENOUS at 17:33

## 2025-02-10 RX ADMIN — IOHEXOL 90 ML: 350 INJECTION, SOLUTION INTRAVENOUS at 21:29

## 2025-02-10 RX ADMIN — MORPHINE SULFATE 4 MG: 4 INJECTION, SOLUTION INTRAMUSCULAR; INTRAVENOUS at 17:52

## 2025-02-10 RX ADMIN — METOPROLOL TARTRATE 5 MG: 5 INJECTION, SOLUTION INTRAVENOUS at 17:33

## 2025-02-10 ASSESSMENT — PAIN - FUNCTIONAL ASSESSMENT
PAIN_FUNCTIONAL_ASSESSMENT: 0-10
PAIN_FUNCTIONAL_ASSESSMENT: 0-10

## 2025-02-10 ASSESSMENT — COLUMBIA-SUICIDE SEVERITY RATING SCALE - C-SSRS
1. IN THE PAST MONTH, HAVE YOU WISHED YOU WERE DEAD OR WISHED YOU COULD GO TO SLEEP AND NOT WAKE UP?: NO
2. HAVE YOU ACTUALLY HAD ANY THOUGHTS OF KILLING YOURSELF?: NO
6. HAVE YOU EVER DONE ANYTHING, STARTED TO DO ANYTHING, OR PREPARED TO DO ANYTHING TO END YOUR LIFE?: NO

## 2025-02-10 ASSESSMENT — PAIN SCALES - GENERAL
PAINLEVEL_OUTOF10: 5 - MODERATE PAIN
PAINLEVEL_OUTOF10: 9
PAINLEVEL_OUTOF10: 2

## 2025-02-10 ASSESSMENT — PAIN DESCRIPTION - LOCATION: LOCATION: ABDOMEN

## 2025-02-10 ASSESSMENT — PAIN DESCRIPTION - PROGRESSION: CLINICAL_PROGRESSION: GRADUALLY IMPROVING

## 2025-02-10 ASSESSMENT — LIFESTYLE VARIABLES
HAVE PEOPLE ANNOYED YOU BY CRITICIZING YOUR DRINKING: NO
TOTAL SCORE: 0
EVER FELT BAD OR GUILTY ABOUT YOUR DRINKING: NO
HAVE YOU EVER FELT YOU SHOULD CUT DOWN ON YOUR DRINKING: NO
EVER HAD A DRINK FIRST THING IN THE MORNING TO STEADY YOUR NERVES TO GET RID OF A HANGOVER: NO

## 2025-02-10 ASSESSMENT — PAIN DESCRIPTION - PAIN TYPE: TYPE: ACUTE PAIN

## 2025-02-10 NOTE — TELEPHONE ENCOUNTER
Patient was initially supposed to be scheduled for a bladder biopsy. Patient's son called in stating his mother is in a lot of pain and she hasn't really eaten much in weeks following her stroke. She is very weak and he's unsure if patient would even be able to tolerate surgery at this time. Per son he is going to take her back to the emergency room for treatment at this time.

## 2025-02-11 ENCOUNTER — TELEPHONE (OUTPATIENT)
Dept: PRIMARY CARE | Facility: CLINIC | Age: 75
End: 2025-02-11

## 2025-02-11 ENCOUNTER — ANESTHESIA EVENT (OUTPATIENT)
Dept: OPERATING ROOM | Facility: HOSPITAL | Age: 75
DRG: 669 | End: 2025-02-11
Payer: MEDICARE

## 2025-02-11 PROBLEM — N32.89 BLADDER MASS: Status: ACTIVE | Noted: 2025-02-10

## 2025-02-11 PROBLEM — R52 INTRACTABLE PAIN: Status: ACTIVE | Noted: 2025-02-11

## 2025-02-11 PROBLEM — R10.9 ABDOMINAL PAIN: Status: ACTIVE | Noted: 2025-02-11

## 2025-02-11 LAB
ALBUMIN SERPL BCP-MCNC: 3.3 G/DL (ref 3.4–5)
ANION GAP SERPL CALC-SCNC: 10 MMOL/L (ref 10–20)
APPEARANCE UR: CLEAR
APTT PPP: 67 SECONDS (ref 27–38)
ATRIAL RATE: 166 BPM
ATRIAL RATE: 73 BPM
BASOPHILS # BLD AUTO: 0.04 X10*3/UL (ref 0–0.1)
BASOPHILS NFR BLD AUTO: 0.3 %
BILIRUB UR STRIP.AUTO-MCNC: NEGATIVE MG/DL
BUN SERPL-MCNC: 9 MG/DL (ref 6–23)
CALCIUM SERPL-MCNC: 9.1 MG/DL (ref 8.6–10.3)
CHLORIDE SERPL-SCNC: 103 MMOL/L (ref 98–107)
CO2 SERPL-SCNC: 29 MMOL/L (ref 21–32)
COLOR UR: ABNORMAL
CREAT SERPL-MCNC: 0.79 MG/DL (ref 0.5–1.05)
EGFRCR SERPLBLD CKD-EPI 2021: 78 ML/MIN/1.73M*2
EOSINOPHIL # BLD AUTO: 0.61 X10*3/UL (ref 0–0.4)
EOSINOPHIL NFR BLD AUTO: 4.6 %
ERYTHROCYTE [DISTWIDTH] IN BLOOD BY AUTOMATED COUNT: 18.6 % (ref 11.5–14.5)
GLUCOSE BLD MANUAL STRIP-MCNC: 113 MG/DL (ref 74–99)
GLUCOSE BLD MANUAL STRIP-MCNC: 129 MG/DL (ref 74–99)
GLUCOSE BLD MANUAL STRIP-MCNC: 98 MG/DL (ref 74–99)
GLUCOSE BLD MANUAL STRIP-MCNC: 98 MG/DL (ref 74–99)
GLUCOSE SERPL-MCNC: 93 MG/DL (ref 74–99)
GLUCOSE UR STRIP.AUTO-MCNC: NORMAL MG/DL
HCT VFR BLD AUTO: 31.3 % (ref 36–46)
HGB BLD-MCNC: 9 G/DL (ref 12–16)
HOLD SPECIMEN: NORMAL
IMM GRANULOCYTES # BLD AUTO: 0.07 X10*3/UL (ref 0–0.5)
IMM GRANULOCYTES NFR BLD AUTO: 0.5 % (ref 0–0.9)
KETONES UR STRIP.AUTO-MCNC: NEGATIVE MG/DL
LEUKOCYTE ESTERASE UR QL STRIP.AUTO: ABNORMAL
LYMPHOCYTES # BLD AUTO: 1.76 X10*3/UL (ref 0.8–3)
LYMPHOCYTES NFR BLD AUTO: 13.4 %
MAGNESIUM SERPL-MCNC: 2.36 MG/DL (ref 1.6–2.4)
MCH RBC QN AUTO: 20.6 PG (ref 26–34)
MCHC RBC AUTO-ENTMCNC: 28.8 G/DL (ref 32–36)
MCV RBC AUTO: 72 FL (ref 80–100)
MONOCYTES # BLD AUTO: 1.14 X10*3/UL (ref 0.05–0.8)
MONOCYTES NFR BLD AUTO: 8.7 %
NEUTROPHILS # BLD AUTO: 9.51 X10*3/UL (ref 1.6–5.5)
NEUTROPHILS NFR BLD AUTO: 72.5 %
NITRITE UR QL STRIP.AUTO: NEGATIVE
NRBC BLD-RTO: 0 /100 WBCS (ref 0–0)
P AXIS: -23 DEGREES
P OFFSET: 152 MS
P ONSET: 125 MS
PH UR STRIP.AUTO: 7 [PH]
PHOSPHATE SERPL-MCNC: 3.3 MG/DL (ref 2.5–4.9)
PLATELET # BLD AUTO: 299 X10*3/UL (ref 150–450)
POTASSIUM SERPL-SCNC: 4.5 MMOL/L (ref 3.5–5.3)
PR INTERVAL: 178 MS
PROT UR STRIP.AUTO-MCNC: NEGATIVE MG/DL
Q ONSET: 214 MS
Q ONSET: 215 MS
QRS COUNT: 12 BEATS
QRS COUNT: 26 BEATS
QRS DURATION: 78 MS
QRS DURATION: 82 MS
QT INTERVAL: 300 MS
QT INTERVAL: 368 MS
QTC CALCULATION(BAZETT): 405 MS
QTC CALCULATION(BAZETT): 482 MS
QTC FREDERICIA: 393 MS
QTC FREDERICIA: 411 MS
R AXIS: -66 DEGREES
R AXIS: 81 DEGREES
RBC # BLD AUTO: 4.36 X10*6/UL (ref 4–5.2)
RBC # UR STRIP.AUTO: NEGATIVE MG/DL
RBC #/AREA URNS AUTO: ABNORMAL /HPF
SODIUM SERPL-SCNC: 137 MMOL/L (ref 136–145)
SP GR UR STRIP.AUTO: 1.04
SQUAMOUS #/AREA URNS AUTO: ABNORMAL /HPF
T AXIS: 4 DEGREES
T AXIS: 89 DEGREES
T OFFSET: 365 MS
T OFFSET: 398 MS
UFH PPP CHRO-ACNC: 0.8 IU/ML
UFH PPP CHRO-ACNC: 0.9 IU/ML
UROBILINOGEN UR STRIP.AUTO-MCNC: NORMAL MG/DL
VENTRICULAR RATE: 155 BPM
VENTRICULAR RATE: 73 BPM
WBC # BLD AUTO: 13.1 X10*3/UL (ref 4.4–11.3)
WBC #/AREA URNS AUTO: ABNORMAL /HPF
YEAST BUDDING #/AREA UR COMP ASSIST: PRESENT /HPF
YEAST HYPHAE #/AREA UR COMP ASSIST: PRESENT /HPF

## 2025-02-11 PROCEDURE — 99222 1ST HOSP IP/OBS MODERATE 55: CPT

## 2025-02-11 PROCEDURE — 97165 OT EVAL LOW COMPLEX 30 MIN: CPT | Mod: GO

## 2025-02-11 PROCEDURE — 97161 PT EVAL LOW COMPLEX 20 MIN: CPT | Mod: GP

## 2025-02-11 PROCEDURE — 1200000002 HC GENERAL ROOM WITH TELEMETRY DAILY

## 2025-02-11 PROCEDURE — 2500000001 HC RX 250 WO HCPCS SELF ADMINISTERED DRUGS (ALT 637 FOR MEDICARE OP)

## 2025-02-11 PROCEDURE — 87077 CULTURE AEROBIC IDENTIFY: CPT | Mod: STJLAB

## 2025-02-11 PROCEDURE — 2500000004 HC RX 250 GENERAL PHARMACY W/ HCPCS (ALT 636 FOR OP/ED)

## 2025-02-11 PROCEDURE — 85520 HEPARIN ASSAY: CPT | Performed by: STUDENT IN AN ORGANIZED HEALTH CARE EDUCATION/TRAINING PROGRAM

## 2025-02-11 PROCEDURE — 36415 COLL VENOUS BLD VENIPUNCTURE: CPT

## 2025-02-11 PROCEDURE — 0T5B8ZZ DESTRUCTION OF BLADDER, VIA NATURAL OR ARTIFICIAL OPENING ENDOSCOPIC: ICD-10-PCS

## 2025-02-11 PROCEDURE — 85730 THROMBOPLASTIN TIME PARTIAL: CPT

## 2025-02-11 PROCEDURE — 0TBB8ZZ EXCISION OF BLADDER, VIA NATURAL OR ARTIFICIAL OPENING ENDOSCOPIC: ICD-10-PCS

## 2025-02-11 PROCEDURE — 0TCB8ZZ EXTIRPATION OF MATTER FROM BLADDER, VIA NATURAL OR ARTIFICIAL OPENING ENDOSCOPIC: ICD-10-PCS

## 2025-02-11 PROCEDURE — 2500000002 HC RX 250 W HCPCS SELF ADMINISTERED DRUGS (ALT 637 FOR MEDICARE OP, ALT 636 FOR OP/ED)

## 2025-02-11 PROCEDURE — 83735 ASSAY OF MAGNESIUM: CPT

## 2025-02-11 PROCEDURE — 96366 THER/PROPH/DIAG IV INF ADDON: CPT

## 2025-02-11 PROCEDURE — 81001 URINALYSIS AUTO W/SCOPE: CPT

## 2025-02-11 PROCEDURE — 82947 ASSAY GLUCOSE BLOOD QUANT: CPT

## 2025-02-11 PROCEDURE — 99223 1ST HOSP IP/OBS HIGH 75: CPT

## 2025-02-11 PROCEDURE — 85025 COMPLETE CBC W/AUTO DIFF WBC: CPT

## 2025-02-11 PROCEDURE — 80069 RENAL FUNCTION PANEL: CPT

## 2025-02-11 RX ORDER — POTASSIUM CHLORIDE 20 MEQ/1
20 TABLET, EXTENDED RELEASE ORAL ONCE
Status: DISCONTINUED | OUTPATIENT
Start: 2025-02-11 | End: 2025-02-11

## 2025-02-11 RX ORDER — ATORVASTATIN CALCIUM 10 MG/1
10 TABLET, FILM COATED ORAL DAILY
Status: DISCONTINUED | OUTPATIENT
Start: 2025-02-11 | End: 2025-02-13 | Stop reason: HOSPADM

## 2025-02-11 RX ORDER — MORPHINE SULFATE 2 MG/ML
2 INJECTION, SOLUTION INTRAMUSCULAR; INTRAVENOUS EVERY 4 HOURS PRN
Status: DISCONTINUED | OUTPATIENT
Start: 2025-02-11 | End: 2025-02-13 | Stop reason: HOSPADM

## 2025-02-11 RX ORDER — HEPARIN SODIUM 10000 [USP'U]/100ML
0-4500 INJECTION, SOLUTION INTRAVENOUS CONTINUOUS
Status: DISCONTINUED | OUTPATIENT
Start: 2025-02-11 | End: 2025-02-13

## 2025-02-11 RX ORDER — INSULIN LISPRO 100 [IU]/ML
0-5 INJECTION, SOLUTION INTRAVENOUS; SUBCUTANEOUS
Status: DISCONTINUED | OUTPATIENT
Start: 2025-02-11 | End: 2025-02-13 | Stop reason: HOSPADM

## 2025-02-11 RX ORDER — POTASSIUM CHLORIDE 20 MEQ/1
40 TABLET, EXTENDED RELEASE ORAL ONCE
Status: DISCONTINUED | OUTPATIENT
Start: 2025-02-11 | End: 2025-02-11

## 2025-02-11 RX ORDER — PANTOPRAZOLE SODIUM 40 MG/1
40 TABLET, DELAYED RELEASE ORAL
Status: DISCONTINUED | OUTPATIENT
Start: 2025-02-11 | End: 2025-02-13 | Stop reason: HOSPADM

## 2025-02-11 RX ORDER — CHOLECALCIFEROL (VITAMIN D3) 25 MCG
1000 TABLET ORAL DAILY
Status: DISCONTINUED | OUTPATIENT
Start: 2025-02-11 | End: 2025-02-13 | Stop reason: HOSPADM

## 2025-02-11 RX ORDER — DEXTROSE 50 % IN WATER (D50W) INTRAVENOUS SYRINGE
25
Status: DISCONTINUED | OUTPATIENT
Start: 2025-02-11 | End: 2025-02-13 | Stop reason: HOSPADM

## 2025-02-11 RX ORDER — OXYCODONE HYDROCHLORIDE 5 MG/1
5 TABLET ORAL EVERY 4 HOURS PRN
Status: DISCONTINUED | OUTPATIENT
Start: 2025-02-11 | End: 2025-02-13 | Stop reason: HOSPADM

## 2025-02-11 RX ORDER — TRAZODONE HYDROCHLORIDE 50 MG/1
50 TABLET ORAL NIGHTLY PRN
Status: DISCONTINUED | OUTPATIENT
Start: 2025-02-11 | End: 2025-02-13 | Stop reason: HOSPADM

## 2025-02-11 RX ORDER — LOSARTAN POTASSIUM 50 MG/1
50 TABLET ORAL DAILY
Status: DISCONTINUED | OUTPATIENT
Start: 2025-02-11 | End: 2025-02-12

## 2025-02-11 RX ORDER — LEVOTHYROXINE SODIUM 100 UG/1
100 TABLET ORAL DAILY
Status: DISCONTINUED | OUTPATIENT
Start: 2025-02-11 | End: 2025-02-13 | Stop reason: HOSPADM

## 2025-02-11 RX ORDER — DEXTROSE 50 % IN WATER (D50W) INTRAVENOUS SYRINGE
12.5
Status: DISCONTINUED | OUTPATIENT
Start: 2025-02-11 | End: 2025-02-13 | Stop reason: HOSPADM

## 2025-02-11 RX ADMIN — HEPARIN SODIUM 1100 UNITS/HR: 10000 INJECTION, SOLUTION INTRAVENOUS at 12:05

## 2025-02-11 RX ADMIN — MORPHINE SULFATE 2 MG: 2 INJECTION, SOLUTION INTRAMUSCULAR; INTRAVENOUS at 14:22

## 2025-02-11 RX ADMIN — LOSARTAN POTASSIUM 50 MG: 50 TABLET, FILM COATED ORAL at 14:16

## 2025-02-11 RX ADMIN — MORPHINE SULFATE 2 MG: 2 INJECTION, SOLUTION INTRAMUSCULAR; INTRAVENOUS at 09:28

## 2025-02-11 RX ADMIN — METOPROLOL TARTRATE 75 MG: 50 TABLET, FILM COATED ORAL at 21:27

## 2025-02-11 RX ADMIN — METOPROLOL TARTRATE 75 MG: 50 TABLET, FILM COATED ORAL at 09:28

## 2025-02-11 RX ADMIN — LEVOTHYROXINE SODIUM 100 MCG: 0.1 TABLET ORAL at 05:52

## 2025-02-11 RX ADMIN — OXYCODONE HYDROCHLORIDE 5 MG: 5 TABLET ORAL at 18:03

## 2025-02-11 RX ADMIN — PANTOPRAZOLE SODIUM 40 MG: 40 TABLET, DELAYED RELEASE ORAL at 17:03

## 2025-02-11 RX ADMIN — ATORVASTATIN CALCIUM 10 MG: 10 TABLET, FILM COATED ORAL at 14:15

## 2025-02-11 RX ADMIN — Medication 1000 UNITS: at 14:15

## 2025-02-11 RX ADMIN — MORPHINE SULFATE 2 MG: 2 INJECTION, SOLUTION INTRAMUSCULAR; INTRAVENOUS at 21:38

## 2025-02-11 SDOH — SOCIAL STABILITY: SOCIAL INSECURITY: DO YOU FEEL UNSAFE GOING BACK TO THE PLACE WHERE YOU ARE LIVING?: NO

## 2025-02-11 SDOH — SOCIAL STABILITY: SOCIAL INSECURITY: HAVE YOU HAD ANY THOUGHTS OF HARMING ANYONE ELSE?: NO

## 2025-02-11 SDOH — SOCIAL STABILITY: SOCIAL INSECURITY: WITHIN THE LAST YEAR, HAVE YOU BEEN HUMILIATED OR EMOTIONALLY ABUSED IN OTHER WAYS BY YOUR PARTNER OR EX-PARTNER?: NO

## 2025-02-11 SDOH — HEALTH STABILITY: PHYSICAL HEALTH: ON AVERAGE, HOW MANY MINUTES DO YOU ENGAGE IN EXERCISE AT THIS LEVEL?: 0 MIN

## 2025-02-11 SDOH — SOCIAL STABILITY: SOCIAL NETWORK
DO YOU BELONG TO ANY CLUBS OR ORGANIZATIONS SUCH AS CHURCH GROUPS, UNIONS, FRATERNAL OR ATHLETIC GROUPS, OR SCHOOL GROUPS?: NO

## 2025-02-11 SDOH — HEALTH STABILITY: MENTAL HEALTH: HOW OFTEN DO YOU HAVE SIX OR MORE DRINKS ON ONE OCCASION?: NEVER

## 2025-02-11 SDOH — SOCIAL STABILITY: SOCIAL INSECURITY: WITHIN THE LAST YEAR, HAVE YOU BEEN AFRAID OF YOUR PARTNER OR EX-PARTNER?: NO

## 2025-02-11 SDOH — HEALTH STABILITY: MENTAL HEALTH
DO YOU FEEL STRESS - TENSE, RESTLESS, NERVOUS, OR ANXIOUS, OR UNABLE TO SLEEP AT NIGHT BECAUSE YOUR MIND IS TROUBLED ALL THE TIME - THESE DAYS?: NOT AT ALL

## 2025-02-11 SDOH — HEALTH STABILITY: PHYSICAL HEALTH
HOW OFTEN DO YOU NEED TO HAVE SOMEONE HELP YOU WHEN YOU READ INSTRUCTIONS, PAMPHLETS, OR OTHER WRITTEN MATERIAL FROM YOUR DOCTOR OR PHARMACY?: NEVER

## 2025-02-11 SDOH — SOCIAL STABILITY: SOCIAL INSECURITY: ARE YOU MARRIED, WIDOWED, DIVORCED, SEPARATED, NEVER MARRIED, OR LIVING WITH A PARTNER?: WIDOWED

## 2025-02-11 SDOH — ECONOMIC STABILITY: HOUSING INSECURITY: IN THE LAST 12 MONTHS, WAS THERE A TIME WHEN YOU WERE NOT ABLE TO PAY THE MORTGAGE OR RENT ON TIME?: NO

## 2025-02-11 SDOH — SOCIAL STABILITY: SOCIAL INSECURITY: HAVE YOU HAD THOUGHTS OF HARMING ANYONE ELSE?: NO

## 2025-02-11 SDOH — ECONOMIC STABILITY: FOOD INSECURITY: HOW HARD IS IT FOR YOU TO PAY FOR THE VERY BASICS LIKE FOOD, HOUSING, MEDICAL CARE, AND HEATING?: NOT VERY HARD

## 2025-02-11 SDOH — ECONOMIC STABILITY: FOOD INSECURITY: WITHIN THE PAST 12 MONTHS, THE FOOD YOU BOUGHT JUST DIDN'T LAST AND YOU DIDN'T HAVE MONEY TO GET MORE.: NEVER TRUE

## 2025-02-11 SDOH — SOCIAL STABILITY: SOCIAL NETWORK: HOW OFTEN DO YOU ATTEND CHURCH OR RELIGIOUS SERVICES?: NEVER

## 2025-02-11 SDOH — ECONOMIC STABILITY: HOUSING INSECURITY: AT ANY TIME IN THE PAST 12 MONTHS, WERE YOU HOMELESS OR LIVING IN A SHELTER (INCLUDING NOW)?: NO

## 2025-02-11 SDOH — SOCIAL STABILITY: SOCIAL NETWORK: HOW OFTEN DO YOU GET TOGETHER WITH FRIENDS OR RELATIVES?: PATIENT DECLINED

## 2025-02-11 SDOH — SOCIAL STABILITY: SOCIAL INSECURITY: DOES ANYONE TRY TO KEEP YOU FROM HAVING/CONTACTING OTHER FRIENDS OR DOING THINGS OUTSIDE YOUR HOME?: NO

## 2025-02-11 SDOH — SOCIAL STABILITY: SOCIAL INSECURITY: WERE YOU ABLE TO COMPLETE ALL THE BEHAVIORAL HEALTH SCREENINGS?: YES

## 2025-02-11 SDOH — ECONOMIC STABILITY: FOOD INSECURITY: WITHIN THE PAST 12 MONTHS, YOU WORRIED THAT YOUR FOOD WOULD RUN OUT BEFORE YOU GOT THE MONEY TO BUY MORE.: NEVER TRUE

## 2025-02-11 SDOH — SOCIAL STABILITY: SOCIAL INSECURITY: ARE THERE ANY APPARENT SIGNS OF INJURIES/BEHAVIORS THAT COULD BE RELATED TO ABUSE/NEGLECT?: NO

## 2025-02-11 SDOH — SOCIAL STABILITY: SOCIAL INSECURITY: DO YOU FEEL ANYONE HAS EXPLOITED OR TAKEN ADVANTAGE OF YOU FINANCIALLY OR OF YOUR PERSONAL PROPERTY?: NO

## 2025-02-11 SDOH — HEALTH STABILITY: PHYSICAL HEALTH: ON AVERAGE, HOW MANY DAYS PER WEEK DO YOU ENGAGE IN MODERATE TO STRENUOUS EXERCISE (LIKE A BRISK WALK)?: 0 DAYS

## 2025-02-11 SDOH — ECONOMIC STABILITY: INCOME INSECURITY: IN THE PAST 12 MONTHS HAS THE ELECTRIC, GAS, OIL, OR WATER COMPANY THREATENED TO SHUT OFF SERVICES IN YOUR HOME?: NO

## 2025-02-11 SDOH — ECONOMIC STABILITY: TRANSPORTATION INSECURITY: IN THE PAST 12 MONTHS, HAS LACK OF TRANSPORTATION KEPT YOU FROM MEDICAL APPOINTMENTS OR FROM GETTING MEDICATIONS?: NO

## 2025-02-11 SDOH — SOCIAL STABILITY: SOCIAL INSECURITY: ARE YOU OR HAVE YOU BEEN THREATENED OR ABUSED PHYSICALLY, EMOTIONALLY, OR SEXUALLY BY ANYONE?: NO

## 2025-02-11 SDOH — SOCIAL STABILITY: SOCIAL INSECURITY: HAS ANYONE EVER THREATENED TO HURT YOUR FAMILY OR YOUR PETS?: NO

## 2025-02-11 SDOH — SOCIAL STABILITY: SOCIAL NETWORK: HOW OFTEN DO YOU ATTEND MEETINGS OF THE CLUBS OR ORGANIZATIONS YOU BELONG TO?: NEVER

## 2025-02-11 SDOH — SOCIAL STABILITY: SOCIAL NETWORK: IN A TYPICAL WEEK, HOW MANY TIMES DO YOU TALK ON THE PHONE WITH FAMILY, FRIENDS, OR NEIGHBORS?: PATIENT DECLINED

## 2025-02-11 SDOH — ECONOMIC STABILITY: HOUSING INSECURITY: IN THE PAST 12 MONTHS, HOW MANY TIMES HAVE YOU MOVED WHERE YOU WERE LIVING?: 0

## 2025-02-11 SDOH — SOCIAL STABILITY: SOCIAL INSECURITY: ABUSE: ADULT

## 2025-02-11 SDOH — HEALTH STABILITY: MENTAL HEALTH: EXPERIENCED ANY OF THE FOLLOWING LIFE EVENTS: DEATH OF FAMILY/FRIEND

## 2025-02-11 ASSESSMENT — ENCOUNTER SYMPTOMS
HEMATURIA: 0
VOMITING: 1
FEVER: 0
DIARRHEA: 0
NAUSEA: 1
CONSTIPATION: 0
ABDOMINAL PAIN: 1
DYSURIA: 0
CHILLS: 0
DIFFICULTY URINATING: 0

## 2025-02-11 ASSESSMENT — COGNITIVE AND FUNCTIONAL STATUS - GENERAL
WALKING IN HOSPITAL ROOM: A LITTLE
TURNING FROM BACK TO SIDE WHILE IN FLAT BAD: A LITTLE
DRESSING REGULAR UPPER BODY CLOTHING: A LITTLE
MOVING FROM LYING ON BACK TO SITTING ON SIDE OF FLAT BED WITH BEDRAILS: A LITTLE
MOBILITY SCORE: 20
DAILY ACTIVITIY SCORE: 18
CLIMB 3 TO 5 STEPS WITH RAILING: A LITTLE
TOILETING: A LITTLE
DRESSING REGULAR UPPER BODY CLOTHING: A LITTLE
CLIMB 3 TO 5 STEPS WITH RAILING: A LITTLE
HELP NEEDED FOR BATHING: A LITTLE
MOBILITY SCORE: 18
MOVING TO AND FROM BED TO CHAIR: A LITTLE
PERSONAL GROOMING: A LITTLE
DAILY ACTIVITIY SCORE: 19
HELP NEEDED FOR BATHING: A LITTLE
PERSONAL GROOMING: A LITTLE
DAILY ACTIVITIY SCORE: 18
WALKING IN HOSPITAL ROOM: A LITTLE
STANDING UP FROM CHAIR USING ARMS: A LITTLE
DRESSING REGULAR LOWER BODY CLOTHING: A LITTLE
DRESSING REGULAR UPPER BODY CLOTHING: A LITTLE
MOBILITY SCORE: 19
EATING MEALS: A LITTLE
WALKING IN HOSPITAL ROOM: A LITTLE
TURNING FROM BACK TO SIDE WHILE IN FLAT BAD: A LITTLE
MOBILITY SCORE: 24
TOILETING: A LITTLE
PERSONAL GROOMING: A LITTLE
DRESSING REGULAR LOWER BODY CLOTHING: A LITTLE
TOILETING: A LITTLE
STANDING UP FROM CHAIR USING ARMS: A LITTLE
STANDING UP FROM CHAIR USING ARMS: A LITTLE
MOVING TO AND FROM BED TO CHAIR: A LITTLE
PATIENT BASELINE BEDBOUND: NO
STANDING UP FROM CHAIR USING ARMS: A LITTLE
MOBILITY SCORE: 20
HELP NEEDED FOR BATHING: A LITTLE
CLIMB 3 TO 5 STEPS WITH RAILING: A LITTLE
DAILY ACTIVITIY SCORE: 24
CLIMB 3 TO 5 STEPS WITH RAILING: A LITTLE
STANDING UP FROM CHAIR USING ARMS: A LITTLE
DRESSING REGULAR UPPER BODY CLOTHING: A LITTLE
CLIMB 3 TO 5 STEPS WITH RAILING: A LITTLE
DRESSING REGULAR LOWER BODY CLOTHING: A LITTLE
WALKING IN HOSPITAL ROOM: A LITTLE
DRESSING REGULAR UPPER BODY CLOTHING: A LITTLE
WALKING IN HOSPITAL ROOM: A LITTLE
DRESSING REGULAR LOWER BODY CLOTHING: A LITTLE
MOBILITY SCORE: 20
HELP NEEDED FOR BATHING: A LITTLE
DAILY ACTIVITIY SCORE: 18
EATING MEALS: A LITTLE
EATING MEALS: A LITTLE
MOVING TO AND FROM BED TO CHAIR: A LITTLE
PERSONAL GROOMING: A LITTLE
TOILETING: A LITTLE
DAILY ACTIVITIY SCORE: 22
MOVING TO AND FROM BED TO CHAIR: A LITTLE
MOVING TO AND FROM BED TO CHAIR: A LITTLE
TOILETING: A LITTLE

## 2025-02-11 ASSESSMENT — PAIN SCALES - PAIN ASSESSMENT IN ADVANCED DEMENTIA (PAINAD)
BODYLANGUAGE: RELAXED
CONSOLABILITY: NO NEED TO CONSOLE
BREATHING: NORMAL
TOTALSCORE: 0
TOTALSCORE: MEDICATION (SEE MAR)
FACIALEXPRESSION: SMILING OR INEXPRESSIVE

## 2025-02-11 ASSESSMENT — ACTIVITIES OF DAILY LIVING (ADL)
DRESSING YOURSELF: INDEPENDENT
FEEDING YOURSELF: INDEPENDENT
JUDGMENT_ADEQUATE_SAFELY_COMPLETE_DAILY_ACTIVITIES: YES
ASSISTIVE_DEVICE: DENTURES LOWER;DENTURES UPPER;EYEGLASSES
HEARING - RIGHT EAR: FUNCTIONAL
JUDGMENT_ADEQUATE_SAFELY_COMPLETE_DAILY_ACTIVITIES: YES
GROOMING: INDEPENDENT
HEARING - RIGHT EAR: FUNCTIONAL
BATHING: INDEPENDENT
ADEQUATE_TO_COMPLETE_ADL: YES
LACK_OF_TRANSPORTATION: NO
ASSISTIVE_DEVICE: DENTURES LOWER;DENTURES UPPER;EYEGLASSES
LACK_OF_TRANSPORTATION: NO
TOILETING: INDEPENDENT
GROOMING: INDEPENDENT
BATHING: INDEPENDENT
ADEQUATE_TO_COMPLETE_ADL: YES
FEEDING YOURSELF: INDEPENDENT
BATHING_ASSISTANCE: STAND BY
PATIENT'S MEMORY ADEQUATE TO SAFELY COMPLETE DAILY ACTIVITIES?: YES
DRESSING YOURSELF: INDEPENDENT
TOILETING: NEEDS ASSISTANCE
HEARING - LEFT EAR: FUNCTIONAL
WALKS IN HOME: INDEPENDENT
WALKS IN HOME: INDEPENDENT
PATIENT'S MEMORY ADEQUATE TO SAFELY COMPLETE DAILY ACTIVITIES?: YES

## 2025-02-11 ASSESSMENT — LIFESTYLE VARIABLES
HOW OFTEN DO YOU HAVE A DRINK CONTAINING ALCOHOL: NEVER
SKIP TO QUESTIONS 9-10: 1
AUDIT-C TOTAL SCORE: 0
AUDIT-C TOTAL SCORE: 0
HOW OFTEN DO YOU HAVE 6 OR MORE DRINKS ON ONE OCCASION: NEVER
HOW MANY STANDARD DRINKS CONTAINING ALCOHOL DO YOU HAVE ON A TYPICAL DAY: PATIENT DOES NOT DRINK
SUBSTANCE_ABUSE_PAST_12_MONTHS: NO
PRESCIPTION_ABUSE_PAST_12_MONTHS: NO

## 2025-02-11 ASSESSMENT — PAIN SCALES - GENERAL
PAINLEVEL_OUTOF10: 5 - MODERATE PAIN
PAINLEVEL_OUTOF10: 10 - WORST POSSIBLE PAIN
PAINLEVEL_OUTOF10: 0 - NO PAIN
PAINLEVEL_OUTOF10: 2
PAINLEVEL_OUTOF10: 6
PAINLEVEL_OUTOF10: 0 - NO PAIN
PAINLEVEL_OUTOF10: 2
PAINLEVEL_OUTOF10: 2
PAINLEVEL_OUTOF10: 10 - WORST POSSIBLE PAIN

## 2025-02-11 ASSESSMENT — PAIN DESCRIPTION - LOCATION
LOCATION: ABDOMEN

## 2025-02-11 ASSESSMENT — PATIENT HEALTH QUESTIONNAIRE - PHQ9
SUM OF ALL RESPONSES TO PHQ9 QUESTIONS 1 & 2: 0
2. FEELING DOWN, DEPRESSED OR HOPELESS: NOT AT ALL
1. LITTLE INTEREST OR PLEASURE IN DOING THINGS: NOT AT ALL

## 2025-02-11 ASSESSMENT — PAIN - FUNCTIONAL ASSESSMENT
PAIN_FUNCTIONAL_ASSESSMENT: 0-10

## 2025-02-11 NOTE — PROGRESS NOTES
02/11/25 1031   Discharge Planning   Living Arrangements Alone   Support Systems Children   Assistance Needed some   Type of Residence Private residence   Home or Post Acute Services In home services   Type of Home Care Services Home OT;Home PT;Home nursing visits   Expected Discharge Disposition Home H   Does the patient need discharge transport arranged? No   Financial Resource Strain   How hard is it for you to pay for the very basics like food, housing, medical care, and heating? Not hard   Housing Stability   In the last 12 months, was there a time when you were not able to pay the mortgage or rent on time? N   At any time in the past 12 months, were you homeless or living in a shelter (including now)? N   Transportation Needs   In the past 12 months, has lack of transportation kept you from medical appointments or from getting medications? no   In the past 12 months, has lack of transportation kept you from meetings, work, or from getting things needed for daily living? No   Patient Choice   Patient / Family choosing to utilize agency / facility established prior to hospitalization No   Stroke Family Assessment   Stroke Family Assessment Needed No   Intensity of Service   Intensity of Service 0-30 min     Met with patient at the bedside, confirmed demographics, insurance, and pcp is Dr. CANDICE Baker. She was recently discharged from [facility] to home and return to this facility within 4 days. She does live alone but her son is available to assist as needed.

## 2025-02-11 NOTE — CARE PLAN
The patient's goals for the shift include GET SOME REST    The clinical goals for the shift include FREE OF PAIN

## 2025-02-11 NOTE — CARE PLAN
The patient's goals for the shift include      The clinical goals for the shift include      Problem: Pain  Goal: Takes deep breaths with improved pain control throughout the shift  Outcome: Progressing  Goal: Turns in bed with improved pain control throughout the shift  Outcome: Progressing  Goal: Walks with improved pain control throughout the shift  Outcome: Progressing  Goal: Performs ADL's with improved pain control throughout shift  Outcome: Progressing  Goal: Participates in PT with improved pain control throughout the shift  Outcome: Progressing  Goal: Free from opioid side effects throughout the shift  Outcome: Progressing  Goal: Free from acute confusion related to pain meds throughout the shift  Outcome: Progressing     Problem: Pain - Adult  Goal: Verbalizes/displays adequate comfort level or baseline comfort level  Outcome: Progressing     Problem: Safety - Adult  Goal: Free from fall injury  Outcome: Progressing     Problem: Nutrition  Goal: Nutrient intake appropriate for maintaining nutritional needs  Outcome: Progressing     Problem: Fall/Injury  Goal: Not fall by end of shift  Outcome: Progressing  Goal: Be free from injury by end of the shift  Outcome: Progressing  Goal: Verbalize understanding of personal risk factors for fall in the hospital  Outcome: Progressing  Goal: Verbalize understanding of risk factor reduction measures to prevent injury from fall in the home  Outcome: Progressing  Goal: Use assistive devices by end of the shift  Outcome: Progressing  Goal: Pace activities to prevent fatigue by end of the shift  Outcome: Progressing     Problem: Respiratory  Goal: Clear secretions with interventions this shift  Outcome: Progressing  Goal: Minimize anxiety/maximize coping throughout shift  Outcome: Progressing  Goal: Minimal/no exertional discomfort or dyspnea this shift  Outcome: Progressing  Goal: No signs of respiratory distress (eg. Use of accessory muscles. Peds grunting)  Outcome:  Progressing  Goal: Patent airway maintained this shift  Outcome: Progressing  Goal: Tolerate mechanical ventilation evidenced by VS/agitation level this shift  Outcome: Progressing  Goal: Tolerate pulmonary toileting this shift  Outcome: Progressing  Goal: Verbalize decreased shortness of breath this shift  Outcome: Progressing

## 2025-02-11 NOTE — CONSULTS
Inpatient consult to Urology  Consult performed by: Loren Whittington PA-C  Consult ordered by: Avani Collins MD  Reason for consult: Abnormal bladder findings on CT, bladder pain, possible cystoscopy        History Of Present Illness  Myrna Rodrigues is a 74 y.o. female presenting with a PMH of afib s/p ablation on Eliquis, HTN, CAD, HLD, GERD, DM, SVT, OAB/UUI with Interstim device, hysterectomy, left oophorectomy, appendectomy and hypothyroidism who presented to the ED yesterday for abdominal pain, n/v, palpitations.  Per chart review and hx from patient, abnormal bladder findings were discovered in October 2024.  She was having her sacral neuromodulation device (which she uses for urinary and fecal incontinence) replaced by her urogynecologist, Dr. Thurman, and extensive silver fluffy-appearing metaplastic type changes with severe trabeculations were noted during cystoscopy.  UOs were unable to be identified at that time.  Biopsy from the right bladder wall was taken and pathology revealed fragments of keratinizing squamous mucosa.  Patient was not having any pain around that time.  Her abdominal pain developed in January and she was admitted to [facility] for this, palpitations, and SOB.  She was seen by the Urology for UTI, abnormal bladder findings on CT, and retroperitoneal/pelvic lymphadenopathy.  She was readmitted to hospital thereafter and was scheduled for a cystoscopy, right ureteroscopy with biopsies but unfortunately had a stroke while being off Eliquis so it was delayed.  She returned to hospital with the same issues.  She describes her pain as constant over the last 2 wks and rates a 10/10 currently.  Recent urine culture on 02/05/25 was negative.  Denies change in pain with urination or BMs.  Reports passing flatus.    Past Medical History  Past Medical History:   Diagnosis Date    Anxiety and depression     Arrhythmia     Asthma     Cancer (Multi)     Coronary artery disease     Diabetes mellitus (Multi)      Disease of thyroid gland     GERD (gastroesophageal reflux disease)     Heart disease     Hyperlipidemia     Hypertension     Irregular heart beat     Occipital neuralgia 06/10/2022    Occipital neuralgia of right side    Personal history of malignant neoplasm, unspecified 10/04/2021    History of malignant neoplasm    Personal history of other diseases of the circulatory system     History of hypertension    Personal history of other diseases of the circulatory system 10/04/2021    History of essential hypertension    Personal history of other diseases of the circulatory system     History of coronary artery disease    Personal history of other diseases of the digestive system 03/11/2020    History of chronic constipation    Personal history of other diseases of the musculoskeletal system and connective tissue     History of arthritis    Personal history of other diseases of the nervous system and sense organs 10/04/2021    History of eye problem    Personal history of other diseases of the respiratory system     History of asthma    Personal history of other specified conditions 10/04/2021    History of chest pain    PONV (postoperative nausea and vomiting)     Stool incontinence     Thyroid cancer (Multi)     Urinary incontinence         Surgical History  Past Surgical History:   Procedure Laterality Date    APPENDECTOMY  08/13/2015    Appendectomy    BREAST LUMPECTOMY  08/13/2015    Breast Surgery Lumpectomy    CARPAL TUNNEL RELEASE  08/13/2015    Neuroplasty Decompression Median Nerve At Carpal Tunnel    GALLBLADDER SURGERY  08/13/2015    Gallbladder Surgery    HYSTERECTOMY  08/13/2015    Hysterectomy    KNEE ARTHROSCOPY W/ MENISCAL REPAIR  08/13/2015    Knee Arthroscopy With Medial Meniscus Repair    KNEE SURGERY  08/13/2015    Knee Surgery    MR HEAD ANGIO WO IV CONTRAST  09/05/2021    MR HEAD ANGIO WO IV CONTRAST 9/5/2021 [facility]    OTHER SURGICAL HISTORY  08/13/2015    Shoulder Surgery Left     OTHER SURGICAL HISTORY  08/13/2015    Repair Of Bladder Reconstruction    OTHER SURGICAL HISTORY  09/20/2021    Breast surgery    OTHER SURGICAL HISTORY  09/20/2021    Bladder surgery    OTHER SURGICAL HISTORY  09/20/2021    Complete colonoscopy    OTHER SURGICAL HISTORY  09/20/2021    Shoulder surgery    OTHER SURGICAL HISTORY  09/20/2021    Foot surgery    OTHER SURGICAL HISTORY  09/20/2021    Percutaneous transluminal coronary angioplasty    OTHER SURGICAL HISTORY  10/11/2021    Thyroidectomy total    THYROIDECTOMY      TOTAL KNEE ARTHROPLASTY  08/13/2015    Knee Replacement         Social History  Social Drivers of Health     Tobacco Use: Low Risk  (2/5/2025)    Patient History     Smoking Tobacco Use: Never     Smokeless Tobacco Use: Never     Passive Exposure: Not on file   Alcohol Use: Not At Risk (2/11/2025)    AUDIT-C     Frequency of Alcohol Consumption: Never     Average Number of Drinks: Patient does not drink     Frequency of Binge Drinking: Never   Financial Resource Strain: Low Risk  (2/11/2025)    Overall Financial Resource Strain (CARDIA)     Difficulty of Paying Living Expenses: Not very hard   Food Insecurity: No Food Insecurity (2/11/2025)    Hunger Vital Sign     Worried About Running Out of Food in the Last Year: Never true     Ran Out of Food in the Last Year: Never true   Transportation Needs: No Transportation Needs (2/11/2025)    PRAPARE - Transportation     Lack of Transportation (Medical): No     Lack of Transportation (Non-Medical): No   Physical Activity: Inactive (2/11/2025)    Exercise Vital Sign     Days of Exercise per Week: 0 days     Minutes of Exercise per Session: 0 min   Stress: No Stress Concern Present (2/11/2025)    [employer] [place] [employer] - Occupational Stress Questionnaire     Feeling of Stress : Not at all   Social Connections: Unknown (2/11/2025)    Social Connection and Isolation Panel [NHANES]     Frequency of Communication with Friends and Family:  Patient declined     Frequency of Social Gatherings with Friends and Family: Patient declined     Attends Gnosticism Services: Never     Active Member of Clubs or Organizations: No     Attends Club or Organization Meetings: Never     Marital Status:    Intimate Partner Violence: Not At Risk (2/11/2025)    Humiliation, Afraid, Rape, and Kick questionnaire     Fear of Current or Ex-Partner: No     Emotionally Abused: No     Physically Abused: No     Sexually Abused: No   Depression: Not at risk (2/11/2025)    PHQ-2     PHQ-2 Score: 0   Housing Stability: Low Risk  (2/11/2025)    Housing Stability Vital Sign     Unable to Pay for Housing in the Last Year: No     Number of Times Moved in the Last Year: 0     Homeless in the Last Year: No   Utilities: Not At Risk (2/11/2025)    [facility] Utilities     Threatened with loss of utilities: No   Digital Equity: Not on file   Health Literacy: Adequate Health Literacy (2/11/2025)     Health Literacy     Frequency of need for help with medical instructions: Never        Family History  Family History   Problem Relation Name Age of Onset    Lung cancer Mother      Lymphoma Mother      Bone cancer Mother      Heart attack Father      Liver cancer Brother      Lung cancer Brother          Home Medications  Prior to Admission medications    Medication Sig Start Date End Date Taking? Authorizing Provider   atorvastatin (Lipitor) 10 mg tablet TAKE 1 TABLET BY MOUTH DAILY 9/5/24  Yes Shashank Proctor MD   cholecalciferol (Vitamin D-3) 25 MCG (1000 UT) tablet Take 1 tablet (1,000 Units) by mouth once daily.   Yes Historical Provider, MD   Eliquis 5 mg tablet TAKE 1 TABLET BY MOUTH TWICE  DAILY 11/21/24  Yes Shashank Proctor MD   estradiol (Estrace) 0.01 % (0.1 mg/gram) vaginal cream Apply pea size amount 0.5 gram to vaginal opening with finger daily for 2 weeks, then 2-3 times per week. 11/5/24 11/5/25 Yes ANA Lizarraga-CNP   levothyroxine (Synthroid, Levoxyl)  100 mcg tablet Take 1 tablet (100 mcg) by mouth early in the morning..   Yes Historical Provider, MD   losartan (Cozaar) 50 mg tablet TAKE 1 TABLET BY MOUTH EVERY DAY 2/5/25  Yes Anahi Inman PA-C   magnesium oxide (Mag-Ox) 400 mg (241.3 mg magnesium) tablet Take 1 tablet (400 mg) by mouth once daily. 1/17/25 5/17/25 Yes FRED Shay   metFORMIN XR (Glucophage-XR) 500 mg 24 hr tablet Take 1 tablet (500 mg) by mouth 2 times a day.   Yes Historical Provider, MD   metoprolol tartrate (Lopressor) 75 mg tablet Take 1 tablet (75 mg) by mouth 2 times a day. 1/16/25 1/16/26 Yes FRED Shay   montelukast (Singulair) 10 mg tablet TAKE 1 TABLET BY MOUTH AT  BEDTIME 12/3/24  Yes Major Jurado MD   omeprazole (PriLOSEC) 40 mg DR capsule TAKE 1 CAPSULE BY MOUTH TWICE  DAILY 5/6/24  Yes Calvin Baker MD   potassium gluconate 595 mg (99 mg) tablet Take 1 tablet by mouth once daily.   Yes Historical Provider, MD   acetaminophen (Tylenol) 325 mg tablet Take 2 tablets (650 mg) by mouth every 6 hours if needed for mild pain (1 - 3).  Patient not taking: Reported on 2/11/2025 1/31/25   Mansoor Max MD   ciprofloxacin (Cipro) 500 mg tablet Take 1 tablet (500 mg) by mouth every 12 hours for 4 days.  Patient not taking: Reported on 2/11/2025 2/2/25   Mansoor Max MD   diphenoxylate-atropine (Lomotil) 2.5-0.025 mg tablet Take 1 tablet by mouth 4 times a day as needed for diarrhea. 1/14/25   Anahi Inman PA-C   oxyCODONE (Roxicodone) 5 mg immediate release tablet Take 1 tablet (5 mg) by mouth every 4 hours if needed for moderate pain (4 - 6) for up to 3 days.  Patient not taking: Reported on 2/11/2025 1/31/25   Mansoor Max MD   tiotropium (Spiriva Respimat) 2.5 mcg/actuation inhaler Inhale 2 puffs once daily.    Historical Provider, MD   traZODone (Desyrel) 50 mg tablet Take 1-3 tablets ( mg) by mouth as needed  "at bedtime for sleep.    Historical Provider, MD   Ventolin HFA 90 mcg/actuation inhaler USE 2 INHALATIONS BY MOUTH EVERY 4 HOURS AS NEEDED 5/29/24   Major Jurado MD   losartan (Cozaar) 50 mg tablet Take 1 tablet (50 mg) by mouth once daily. 1/15/25 2/5/25  Anaih Inman PA-C        Allergies  Patient has no known allergies.    Review of Systems  Review of Systems   Constitutional:  Negative for chills and fever.   Gastrointestinal:  Positive for abdominal pain, nausea and vomiting. Negative for constipation and diarrhea.   Genitourinary:  Negative for difficulty urinating, dysuria and hematuria.     Physical Exam  Physical Exam  Vitals reviewed.   Constitutional:       General: She is awake. She is not in acute distress.     Appearance: Normal appearance.   Pulmonary:      Effort: Pulmonary effort is normal.   Abdominal:      General: Abdomen is flat.      Palpations: Abdomen is soft.      Tenderness: There is abdominal tenderness in the right lower quadrant.   Genitourinary:     Comments: Using Purewick device.  Neurological:      Mental Status: She is alert.   Psychiatric:         Behavior: Behavior normal. Behavior is cooperative.       Medications  Scheduled medications  [Held by provider] apixaban, 5 mg, oral, q12h  insulin lispro, 0-5 Units, subcutaneous, TID AC  levothyroxine, 100 mcg, oral, Daily  metoprolol tartrate, 75 mg, oral, BID      Continuous medications     PRN medications  PRN medications: dextrose, dextrose, glucagon, glucagon, morphine, oxyCODONE     Last Recorded Vitals  Heart Rate:  []   Temp:  [36.1 °C (97 °F)-37 °C (98.6 °F)]   Resp:  [12-21]   BP: (102-157)/(50-81)   Height:  [157.5 cm (5' 2\")]   Weight:  [63.5 kg (139 lb 15.9 oz)-63.5 kg (140 lb)]   SpO2:  [82 %-100 %]      Input/Output    Intake/Output Summary (Last 24 hours) at 2/11/2025 1032  Last data filed at 2/11/2025 0408  Gross per 24 hour   Intake 100 ml   Output --   Net 100 ml        Labs  Results for orders " placed or performed during the hospital encounter of 02/10/25 (from the past 24 hours)   ECG 12 lead   Result Value Ref Range    Ventricular Rate 155 BPM    Atrial Rate 166 BPM    QRS Duration 78 ms    QT Interval 300 ms    QTC Calculation(Bazett) 482 ms    R Axis -66 degrees    T Axis 89 degrees    QRS Count 26 beats    Q Onset 215 ms    T Offset 365 ms    QTC Fredericia 411 ms   CBC and Auto Differential   Result Value Ref Range    WBC 13.2 (H) 4.4 - 11.3 x10*3/uL    nRBC 0.0 0.0 - 0.0 /100 WBCs    RBC 4.72 4.00 - 5.20 x10*6/uL    Hemoglobin 10.0 (L) 12.0 - 16.0 g/dL    Hematocrit 33.6 (L) 36.0 - 46.0 %    MCV 71 (L) 80 - 100 fL    MCH 21.2 (L) 26.0 - 34.0 pg    MCHC 29.8 (L) 32.0 - 36.0 g/dL    RDW 18.5 (H) 11.5 - 14.5 %    Platelets 356 150 - 450 x10*3/uL    Neutrophils % 75.3 40.0 - 80.0 %    Immature Granulocytes %, Automated 0.3 0.0 - 0.9 %    Lymphocytes % 15.2 13.0 - 44.0 %    Monocytes % 6.3 2.0 - 10.0 %    Eosinophils % 2.7 0.0 - 6.0 %    Basophils % 0.2 0.0 - 2.0 %    Neutrophils Absolute 9.96 (H) 1.60 - 5.50 x10*3/uL    Immature Granulocytes Absolute, Automated 0.04 0.00 - 0.50 x10*3/uL    Lymphocytes Absolute 2.01 0.80 - 3.00 x10*3/uL    Monocytes Absolute 0.84 (H) 0.05 - 0.80 x10*3/uL    Eosinophils Absolute 0.36 0.00 - 0.40 x10*3/uL    Basophils Absolute 0.03 0.00 - 0.10 x10*3/uL   Comprehensive Metabolic Panel   Result Value Ref Range    Glucose 94 74 - 99 mg/dL    Sodium 136 136 - 145 mmol/L    Potassium 3.3 (L) 3.5 - 5.3 mmol/L    Chloride 105 98 - 107 mmol/L    Bicarbonate 21 21 - 32 mmol/L    Anion Gap 13 10 - 20 mmol/L    Urea Nitrogen 9 6 - 23 mg/dL    Creatinine 0.67 0.50 - 1.05 mg/dL    eGFR >90 >60 mL/min/1.73m*2    Calcium 7.9 (L) 8.6 - 10.3 mg/dL    Albumin 3.2 (L) 3.4 - 5.0 g/dL    Alkaline Phosphatase 53 33 - 136 U/L    Total Protein 6.3 (L) 6.4 - 8.2 g/dL    AST 10 9 - 39 U/L    Bilirubin, Total 0.3 0.0 - 1.2 mg/dL    ALT 5 (L) 7 - 45 U/L   Magnesium   Result Value Ref Range     Magnesium 1.51 (L) 1.60 - 2.40 mg/dL   Sars-CoV-2 and Influenza A/B PCR   Result Value Ref Range    Flu A Result Not Detected Not Detected    Flu B Result Not Detected Not Detected    Coronavirus 2019, PCR Not Detected Not Detected   Troponin I, High Sensitivity, Initial   Result Value Ref Range    Troponin I, High Sensitivity 45 (H) 0 - 13 ng/L   Lipase   Result Value Ref Range    Lipase 5 (L) 9 - 82 U/L   Light Blue Top   Result Value Ref Range    Extra Tube Hold for add-ons.    Lavender Top   Result Value Ref Range    Extra Tube Hold for add-ons.    ECG 12 lead   Result Value Ref Range    Ventricular Rate 73 BPM    Atrial Rate 73 BPM    TX Interval 178 ms    QRS Duration 82 ms    QT Interval 368 ms    QTC Calculation(Bazett) 405 ms    P Axis -23 degrees    R Axis 81 degrees    T Axis 4 degrees    QRS Count 12 beats    Q Onset 214 ms    P Onset 125 ms    P Offset 152 ms    T Offset 398 ms    QTC Fredericia 393 ms   Troponin, High Sensitivity, 1 Hour   Result Value Ref Range    Troponin I, High Sensitivity 49 (H) 0 - 13 ng/L   Lactate   Result Value Ref Range    Lactate 1.0 0.4 - 2.0 mmol/L   POCT GLUCOSE   Result Value Ref Range    POCT Glucose 98 74 - 99 mg/dL   CBC and Auto Differential   Result Value Ref Range    WBC 13.1 (H) 4.4 - 11.3 x10*3/uL    nRBC 0.0 0.0 - 0.0 /100 WBCs    RBC 4.36 4.00 - 5.20 x10*6/uL    Hemoglobin 9.0 (L) 12.0 - 16.0 g/dL    Hematocrit 31.3 (L) 36.0 - 46.0 %    MCV 72 (L) 80 - 100 fL    MCH 20.6 (L) 26.0 - 34.0 pg    MCHC 28.8 (L) 32.0 - 36.0 g/dL    RDW 18.6 (H) 11.5 - 14.5 %    Platelets 299 150 - 450 x10*3/uL    Neutrophils % 72.5 40.0 - 80.0 %    Immature Granulocytes %, Automated 0.5 0.0 - 0.9 %    Lymphocytes % 13.4 13.0 - 44.0 %    Monocytes % 8.7 2.0 - 10.0 %    Eosinophils % 4.6 0.0 - 6.0 %    Basophils % 0.3 0.0 - 2.0 %    Neutrophils Absolute 9.51 (H) 1.60 - 5.50 x10*3/uL    Immature Granulocytes Absolute, Automated 0.07 0.00 - 0.50 x10*3/uL    Lymphocytes Absolute 1.76 0.80  - 3.00 x10*3/uL    Monocytes Absolute 1.14 (H) 0.05 - 0.80 x10*3/uL    Eosinophils Absolute 0.61 (H) 0.00 - 0.40 x10*3/uL    Basophils Absolute 0.04 0.00 - 0.10 x10*3/uL   Renal Function Panel   Result Value Ref Range    Glucose 93 74 - 99 mg/dL    Sodium 137 136 - 145 mmol/L    Potassium 4.5 3.5 - 5.3 mmol/L    Chloride 103 98 - 107 mmol/L    Bicarbonate 29 21 - 32 mmol/L    Anion Gap 10 10 - 20 mmol/L    Urea Nitrogen 9 6 - 23 mg/dL    Creatinine 0.79 0.50 - 1.05 mg/dL    eGFR 78 >60 mL/min/1.73m*2    Calcium 9.1 8.6 - 10.3 mg/dL    Phosphorus 3.3 2.5 - 4.9 mg/dL    Albumin 3.3 (L) 3.4 - 5.0 g/dL   Magnesium   Result Value Ref Range    Magnesium 2.36 1.60 - 2.40 mg/dL     *Note: Due to a large number of results and/or encounters for the requested time period, some results have not been displayed. A complete set of results can be found in Results Review.     Imaging  CT angio abdomen pelvis w and or wo IV IV contrast    Result Date: 2/10/2025  Interpreted By:  Felipe Driscoll, STUDY: CT ANGIO ABDOMEN PELVIS W AND/OR WO IV IV CONTRAST;  2/10/2025 9:41 pm   INDICATION: Signs/Symptoms:A-fib with RVR, severe abdominal pain not responding to morphine, assess for mesenteric ischemia.   COMPARISON: 01/29/2025   ACCESSION NUMBER(S): [id number]   ORDERING CLINICIAN: GEORGE LOPEZ   TECHNIQUE: Axial non-contrast images of the abdomen, and pelvis.   Axial CT images of the, abdomen and pelvis were obtained after the intravenous administration of iodinated contrast using angiographic technique with coronal and sagittal reformatted images. MIP images and 3D reconstructions were created on an independent workstation and reviewed.   FINDINGS: VASCULATURE:   Mild atherosclerotic calcification of the aorta without aneurysm or dissection.   Celiac artery, SMA, renal arteries are all widely patent. KATHRYN patent. Iliac arteries patent without stenosis.   CT ABDOMEN/PELVIS:   Arterial phase imaging of the liver, adrenals pancreas and spleen  are grossly unremarkable. Status post cholecystectomy.   No new hydronephrosis.   Redemonstrated is retroperitoneal adenopathy surrounding the distal aorta/cava and extending into the right iliac chain which appears to be progressing compared to previous. Necrotic appearing lymph node in the right pelvic sidewall currently measures 2.2 cm in short axis, previously 1.9 cm.   Stable appearance of the bladder which has trabeculated appearance with right-sided posterior diverticulum. There is some irregular right posterior bladder wall thickening. The possibility of underlying bladder malignancy again should be considered. There is some dependent bladder calculi seen.   No significant new free fluid.   No bowel obstruction or acute colitis.   Unchanged left ovarian cyst measuring 3.5 cm in diameter.   There is multilevel moderate to advanced degenerative disc disease seen in the lumbar spine worse at L4-L5 and L5-S1.       1. No abdominal aortic aneurysm or dissection. No evidence of mesenteric ischemia.   2. Compared to previous study there is progressing retroperitoneal adenopathy with some interval enlargement suspected.   3. Irregular masslike thickening right posterior bladder wall near suspected diverticulum. Underlying bladder malignancy may be present. Correlate with patient history/cystoscopy.   MACRO: None.   Signed by: Felipe Driscoll 2/10/2025 11:12 PM Dictation workstation:   [id number]    Plan  Intractable pain    1. Bladder mass  -Patient readmitted for intractable pain.  -Previous cysto delayed d/t stroke.  -NPO at midnight.  -Plan for cysto, bladder biopsies, right URS with possible biopsy tomorrow.  -Hold blood thinners per MD.  -Urinalysis ordered in preparation for procedure.  -Plan discussed with patient and all questions answered.    Patient seen with Velia Montiel CNP and plan of care discussed with Dr. Dillon MD.    Loren Whittington PA-C

## 2025-02-11 NOTE — TELEPHONE ENCOUNTER
RAMA..... Pt son, Eriberto, called to advise Pt is at [facility] and they will be doing risky procedure cystoscopy of the bladder tomorrow.  He said her quality of life is very poor without this procedure.      They are stopping the eloquis and started on heparin.

## 2025-02-11 NOTE — ED PROVIDER NOTES
Emergency Department Provider Note        History of Present Illness     History provided by: Patient  Limitations to History: None  External Records Reviewed with Brief Summary: None    HPI:  Myrna Rodrigues is a 74 y.o. female A-fib on Eliquis, coronary artery disease, hyperlipidemia, diabetes, asthma cholecystectomy, hysterectomy, appendectomy, left oophorectomy who presents for abdominal pain, nausea, vomiting, palpitations.  Patient has chronic right lower quadrant abdominal pain that she reports is secondary to known bladder nodules.  She was prescribed pain medications but ran out of them and her pain has returned.  She has also been nauseous and has been unable to tolerate her medications.  She was able to take down her Eliquis because her son had told her it was important to take.  However, she has not taken her metoprolol.  She started having some shortness of breath and palpitations and presented to the emergency department.  Denies any chest pain.    Physical Exam   Triage vitals:  T 36.7 °C (98.1 °F)  HR 72  /81  RR 17  O2 (!) 82 % None (Room air)    Physical Exam  Vitals and nursing note reviewed.   Constitutional:       General: She is not in acute distress.     Appearance: She is well-developed.   HENT:      Head: Normocephalic and atraumatic.      Right Ear: External ear normal.      Left Ear: External ear normal.      Nose: Nose normal.      Mouth/Throat:      Mouth: Mucous membranes are moist.   Eyes:      General: No scleral icterus.     Extraocular Movements: Extraocular movements intact.      Conjunctiva/sclera: Conjunctivae normal.      Pupils: Pupils are equal, round, and reactive to light.   Cardiovascular:      Rate and Rhythm: Tachycardia present. Rhythm irregular.      Heart sounds: No murmur heard.  Pulmonary:      Effort: Pulmonary effort is normal. No respiratory distress.      Breath sounds: Normal breath sounds.   Abdominal:      Palpations: Abdomen is soft.      Tenderness:  There is abdominal tenderness. There is no guarding or rebound.      Comments: Tenderness palpation in right lower quadrant.   Musculoskeletal:         General: No swelling.      Cervical back: Neck supple.   Skin:     General: Skin is warm and dry.   Neurological:      General: No focal deficit present.      Mental Status: She is alert.   Psychiatric:         Mood and Affect: Mood normal.          Medical Decision Making & ED Course   Medical Decision Makin y.o. female presenting for palpitations, abdominal pain, nausea and vomiting.  Upon arrival, she is noted to be tachycardic in the 140s to 150s.  Likely A-fib with RVR given the rates as well as history of atrial fibrillation and not being able to tolerate her beta-blocker for rate control.  She does have right lower quadrant abdominal pain on palpation without any peritoneal signs.  Reports that this has been ongoing for about 5 weeks along with some nausea and vomiting.  Patient is given 2 doses of metoprolol 5 mg IV and this does provide rate control and down into the 80s to 90s.  She is given morphine, Zofran, and obtaining CT imaging of abdomen pelvis to evaluate for any acute intra-abdominal pathology. She does not have peritoneal signs but reports that the she is still having pain despite the morphine.  Will obtain CTA to rule out thrombus given a-fib with RVR on arrival, and subacute/acute strokes when her Eliquis was held inpatient recently, suggesting she is high risk for throwing clots. However less likely given that she is anticoagulated and has had generally good compliance to Eliquis.    CBC does show mild leukocytosis 13.2.  Hemoglobin 10.0 similar to prior lab work.  Potassium is decreased at 3.3 and patient is given IV potassium.  Magnesium also decreased to 1.51, replenished with IV magnesium.  Initial troponin elevated 45, second troponin level pending.  Lipase normal.  COVID, influenza negative.  Lactate is normal at 1.0, making  ischemic gut unlikely.    Patient signed out to incoming provider pending CT of the abdomen pelvis, p.o. challenge, reevaluation.  ----     Social Determinants of Health which Significantly Impact Care: None identified     EKG Independent Interpretation: EKG interpreted by myself. Please see ED Course for full interpretation.    Independent Result Review and Interpretation: Relevant laboratory and radiographic results were reviewed and independently interpreted by myself.  As necessary, they are commented on in the ED Course.    Chronic conditions affecting the patient's care: As documented above in [facility]    The patient was discussed with the following consultants/services: None    Care Considerations: As documented above in [facility]    ED Course:  ED Course as of 02/11/25 0335   Mon Feb 10, 2025   2110 EKG at 1721 and 2/10/2025 as interpreted by myself: Ventricular rate 155, QRS 78, QTc 482.  Likely A-fib with RVR.  No acute injury pattern. [AL]   2110 EKG at 1904 on 2/10/2025 as interpreted by myself: Ventricular rate 73, , QRS 82, QTc 4 5.  Normal sinus rhythm.  No acute injury pattern. [AL]      ED Course User Index  [AL] Gerard Castillo DO         Diagnoses as of 02/11/25 0335   Intractable pain   Bladder mass   Retroperitoneal lymphadenopathy     Disposition   Patient was signed out to Dr. Connor at 2100 pending completion of their work-up.  Please see the next provider's transition of care note for the remainder of the patient's care.     Procedures   Procedures    Patient seen and discussed with ED attending physician.    Gerard Castillo DO  Emergency Medicine     Gerard Castillo DO  Resident  02/11/25 0336       Ozzie Blanchard MD  02/12/25 0215

## 2025-02-11 NOTE — ED PROVIDER NOTES
Patient received on handoff with CT of the abdomen pelvis pending.  This demonstrated worsening retroperitoneal adenopathy.  Case discussed with the patient and family at length.  Given multiple hospitalizations and difficulty obtaining the cystoscopy due to the recent stroke during admission when her Eliquis was held, as well as her intractable pain, patient to be admitted to the medicine service for further evaluation and treatment.     Alfa Connor MD  Resident  02/11/25 0050

## 2025-02-11 NOTE — ASSESSMENT & PLAN NOTE
# Intractable abdominal pain  # Afib RVR   # Hx of SVT  # Elevated troponin  # Hypokalemia  - Patient presented with worsening right lower quadrant pain, nausea, non bloody vomiting and palpitation  - Patient was seen admitted recently and was seen by urology with plan for cystoscopy initially on 1/29, however her course was complicated by developing acute aphasia with MRI showing positives multifocal stroke. Patient was seen by neurology team, GAVIN was done ruling out endocarditis. Per discussion with neurology and urology team, due to increase the risk of additional infarct in preoperative and post operative period of 2 to 4 weeks, decision was ultimately made to hold off on the procedure for now with plan to do this as outpatient when she has less risk.   - She received morphine for pain and received two doses of metoprolol tartrate for rate control for Afib RVR.   - EKG showed Afib with RVR of 150's.  - K on admission   Plan   - Admit to the floor  - Monitor vital signs  - Daily labs with RFT and electrolytes  - Urology consultation  - Continue eliquis and metoprolol  - Repelete electrolytes with target K>4 and Mg >2  - Trend troponin  - EKG as needed

## 2025-02-11 NOTE — PROGRESS NOTES
Occupational Therapy    Occupational Therapy    Evaluation    Patient Name: Myrna Rodrigues  MRN: [id number]  Today's Date: 2/11/2025  Time Calculation  Start Time: 1055  Stop Time: 1105  Time Calculation (min): 10 min  3137/3137-A    Assessment  IP OT Assessment  OT Assessment: Pt pleasant and cooperative. Pt agrees she is close to baseline level of function which is independent. Pt does not have any acute OT needs at this time, and OT services will be discharged.  Prognosis: Good  End of Session Communication: Bedside nurse  End of Session Patient Position: Bed, 3 rail up, Alarm on    Plan:  No Skilled OT: At baseline function  OT Frequency: OT eval only  OT Discharge Recommendations: No further acute OT  OT Recommended Transfer Status: Independent  OT - OK to Discharge: Yes (to next level of care)    Subjective     Current Problem:  1. Intractable pain  Case Request Operating Room: CYSTOSCOPY, WITH BLADDER LESION ABLATION, Semirigid ureteroscop with possible biopsy    Case Request Operating Room: CYSTOSCOPY, WITH BLADDER LESION ABLATION, Semirigid ureteroscop with possible biopsy      2. Bladder mass  Case Request Operating Room: CYSTOSCOPY, WITH BLADDER LESION ABLATION, Semirigid ureteroscop with possible biopsy    Case Request Operating Room: CYSTOSCOPY, WITH BLADDER LESION ABLATION, Semirigid ureteroscop with possible biopsy      3. Retroperitoneal lymphadenopathy            General:  General  Reason for Referral: ADL's  Referred By: Avani Collins  Co-Treatment: PT  Co-Treatment Reason: safety  Prior to Session Communication: Bedside nurse  Patient Position Received: Bed, 3 rail up, Alarm on    General Visit Information:  Per EMR: pt presented to the ED with right lower quadrant pain and palpitations.  Patient states that she has a chronic right lower quadrant abdominal pain that she reports is secondary to known bladder nodules and rated the pain 10/10. She reports palpitation, nausea, non bloody vomiting and watery  diarrhea. She stated that she was prescribed pain medicine and ran out of them and her pain returned.  She has also has been nauseous and has been unable to take her medications. She states that she was able to take her eliquis because her son told her it was important to take it. However, she has not taken her metoprolol. She also endorses palpitation and mild shortness of breath. Otherwise she denies cough, chest pain, fever, She denies hematuria or dysuria.  Of note she was admitted to this hospital on 01/24/25- 01/31/25 for abdominal pain with concern for UTI vs malignancy. Patient was seen by urology with plan for cystoscopy initially on 1/29, however her course was complicated by developing acute aphasia with MRI showing positives multifocal stroke. Patient was seen by neurology team, GAVIN was done ruling out endocarditis. Per discussion with neurology and urology team, due to increase the risk of additional infarct in preoperative and post operative period of 2 to 4 weeks, decision was ultimately made to hold off on the procedure with plan to do this as outpatient when she has less risk. Eliquis was restarted prior to discharge.     Precautions:  Medical Precautions: Fall precautions  Precautions Comment: bed/chair alarm    Pain:  Pain Assessment  Pain Assessment: 0-10  0-10 (Numeric) Pain Score: 2 (Pt with complaints of pain R LQ)    Objective     Cognition:  Overall Cognitive Status: Within Functional Limits  Orientation Level: Oriented X4    Home Living/PLOF:  Pt was discharged home from [facility] on Thursday 2/6 s/p hospital stay from 1/24-1/31.  Pt lives alone in house with 0 JOSE. 1 floor set up. IND with ADLs. Has tub shower with Gbs. Owns shower chair but was not using. IND with mobility, but owns FWW and SPC. Pt able to cook, clean, does laundry, drive, and shop but has really done so since being home. Son assists as needed.    ADL:  Eating Assistance: Independent  Grooming Assistance:  Independent  Bathing Assistance: Stand by  UE Dressing Assistance: Independent  LE Dressing Assistance: Stand by  Toileting Assistance with Device: Independent    Activity Tolerance:  Endurance: Endurance does not limit participation in activity    Functional Mobility:  Bed mobility  Supine to sit: IND  Sit to supine: IND     Transfers  Sit to stand: SUP  Stand to sit: SUP     Ambulation/Stairs  Pt ambulated 20 ft with SUP without device; grossly steady without any LOB    Sitting Balance:  Static Sitting Balance  Static Sitting-Comment/Number of Minutes: good  Dynamic Sitting Balance  Dynamic Sitting-Comments: good    Standing Balance:  Static Standing Balance  Static Standing-Comment/Number of Minutes: good  Dynamic Standing Balance  Dynamic Standing-Comments: fair+    Sensation:  Sensation Comment: denies any numbness/tingling    Strength:  Strength Comments: B UE's WFL    Extremities: RUE   RUE : Within Functional Limits and LUE   LUE: Within Functional Limits    Outcome Measures: [facility] Daily Activity  Putting on and taking off regular lower body clothing: None  Bathing (including washing, rinsing, drying): None  Putting on and taking off regular upper body clothing: None  Toileting, which includes using toilet, bedpan or urinal: None  Taking care of personal grooming such as brushing teeth: None  Eating Meals: None  Daily Activity - Total Score: 24                       EDUCATION:  Education  Individual(s) Educated: Patient  Education Provided:  (safety)  Education Documentation  Body Mechanics, taught by Joan Beard OT at 2/11/2025  4:31 PM.  Learner: Patient  Readiness: Acceptance  Method: Explanation  Response: Verbalizes Understanding, Needs Reinforcement    Precautions, taught by Joan Beard OT at 2/11/2025  4:31 PM.  Learner: Patient  Readiness: Acceptance  Method: Explanation  Response: Verbalizes Understanding, Needs Reinforcement    Education Comments  No comments found.

## 2025-02-11 NOTE — H&P
Internal Medicine    Admission H&P      Patient Myrna Rodrigues PCP Calvin Baker MD    MRN [id number]  Admission Date 2/10/2025      Chief Complaint/Reason for Admission:  Myrna is a 74 y.o. female who presented today with  worsening right lower quadrant pain    Subjective    Subjective   History of Presenting Illness  Ms. Myrna Rodrigues is a 74 y.o. female who has a past medical history of afib s/p ablation on Eliquis, HTN, CAD, HLD, GERD, DM, SVT, hysterectomy, appendectomy, left oophorectomy and hypothyroidism presented to the ED with right lower quadrant pain and palpitations.  Patient states that she has a chronic right lower quadrant abdominal pain that she reports is secondary to known bladder nodules and rated the pain 10/10. She reports palpitation, nausea, non bloody vomiting and watery diarrhea. She stated that she was prescribed pain medicine and ran out of them and her pain returned.  She has also has been nauseous and has been unable to take her medications. She states that she was able to take her eliquis because her son told her it was important to take it. However, she has not taken her metoprolol. She also endorses palpitation and mild shortness of breath. Otherwise she denies cough, chest pain, fever, She denies hematuria or dysuria.  Of note she was admitted to this hospital on 01/24/25- 01/31/25 for abdominal pain with concern for UTI vs malignancy. Patient was seen by urology with plan for cystoscopy initially on 1/29, however her course was complicated by developing acute aphasia with MRI showing positives multifocal stroke. Patient was seen by neurology team, GAVIN was done ruling out endocarditis. Per discussion with neurology and urology team, due to increase the risk of additional infarct in preoperative and post operative period of 2 to 4 weeks, decision was ultimately made to hold off on the procedure with plan to do this as outpatient when she has less risk. Eliquis was restarted prior to  discharge.       ED course:  V/S: /81, ->86, RR 17, SpO2 99%  Labs: CBC significant for leukocytosis of 13.2 anemia with H/H of 10.2/33.6, MCV 71.  CMP shows hypokalemia with K of 3.3 otherwise normal electrolytes and creatinine and BUN.  Liver function test WNL.  Lactate WNL.  Lipase 5.  Imaging: CXR no acute cardiopulmonary process  Intervention: Magnesium sulfate 2 g IV, metoprolol 5 mg x 2, morphine 4 mg IV x 2, Zofran 4 mg IV, KCl 20 mg IV, sodium chloride 0.9% bolus of 500 mL    Past Medical History  Active Ambulatory Problems     Diagnosis Date Noted    CAD S/P percutaneous coronary angioplasty 05/09/2023    Chronic obstructive pulmonary disease (Multi) 02/13/2018    Major depression 05/09/2023    Moderate persistent asthma 05/09/2023    Abnormal findings on diagnostic imaging of lung 09/06/2023    Angina, class III (CMS-[facility]) 09/06/2023    Asthma 12/07/2020    Cellulitis 09/06/2023    Chronic diarrhea 09/06/2023    Chronic pain of toe of left foot 09/06/2023    Chronic pain of toe of right foot 09/06/2023    Controlled type 2 diabetes mellitus without complication, without long-term current use of insulin (Multi) 06/07/2021    Diabetes mellitus (Multi) 09/06/2023    Edema 09/06/2023    External hemorrhoids 09/06/2023    Fracture of ankle 09/06/2023    Gastroesophageal reflux disease 04/07/2021    Ground glass opacity present on imaging of lung 09/06/2023    H/O unstable angina 09/06/2023    Hallux valgus, acquired 09/27/2011    Hemangioma of skin and subcutaneous tissue 04/26/2021    Hematuria 09/06/2023    History of diabetes mellitus 09/06/2023    History of malignant neoplasm 09/06/2023    Hypertension, essential, benign 12/07/2020    Hypertensive retinopathy 01/09/2015    Lateral malleolar fracture 09/06/2023    Mixed hyperlipidemia 12/05/2017    Nail fungus 09/06/2023    Tinea pedis of both feet 04/26/2021    Obesity 09/06/2023    Other melanin hyperpigmentation 02/04/2019    Other seborrheic  keratosis 02/04/2019    Overweight with body mass index (BMI) 25.0-29.9 09/06/2023    Overweight 09/06/2023    Postsurgical hypothyroidism 06/07/2021    Psoriasis vulgaris 06/08/2021    PVD (posterior vitreous detachment) 01/29/2016    Rectal prolapse 09/06/2023    Renal insufficiency 09/06/2023    Senile nuclear sclerosis 01/09/2015    Sensorineural hearing loss (SNHL) of both ears 09/06/2023    Sensorineural hearing loss, bilateral 11/19/2014    Left lumbar radiculopathy 09/06/2023    Spinal stenosis of lumbar region without neurogenic claudication 09/06/2023    Thyroid nodule 03/03/2021    Mixed stress and urge urinary incontinence 09/06/2023    Urge incontinence of urine 09/06/2023    Dysuria 09/06/2023    Urinary incontinence 04/07/2021    Vaginal atrophy 09/06/2023    Vaginitis 09/06/2023    Vitamin D deficiency 03/20/2022    Vitreous degeneration 01/09/2015    UTI (urinary tract infection) 09/06/2023    Depression 04/07/2021    Major depressive disorder 09/06/2023    Ventricular tachycardia (Multi) 02/13/2018    Cardiac dysrhythmia 02/13/2018    Mood disorder (CMS-HCC) 02/13/2018    Unspecified complication of internal orthopedic prosthetic device, implant and graft, initial encounter (CMS-HCC) 10/05/2017    Unilateral primary osteoarthritis, unspecified knee 09/29/2017    Presence of left artificial knee joint 08/23/2018    Personal history of other diseases of the nervous system and sense organs 09/06/2023    Mechanical loosening of unspecified internal prosthetic joint, initial encounter (CMS-HCC) 12/14/2017    Mechanical loosening of internal left knee prosthetic joint 02/13/2018    Long term (current) use of aspirin 02/13/2018    Other specified anemias 02/19/2018    Esophageal disorder 02/13/2018    Elevated white blood cell count, unspecified 02/13/2018    CKD (chronic kidney disease) 02/13/2018    Asymptomatic menopause 09/06/2023    BMI 27.0-27.9,adult 09/06/2023    Mild vitamin D deficiency  09/06/2023    Never smoked any substance 09/06/2023    Personal history of calcium pyrophosphate deposition disease (CPPD) 09/06/2023    Medicare annual wellness visit, subsequent 11/14/2023    Shortness of breath 01/17/2024    Overactive bladder 01/17/2024    Difficulty walking 01/17/2024    Syncope and collapse 01/28/2024    Penicillin allergy 03/05/2024    Allergy to sulfa drugs 03/12/2024    Nitrofurantoin adverse reaction 03/12/2024    Cyst of left ovary 04/23/2024    Encounter for medication review and counseling 07/19/2024    Encounter to discuss treatment options 07/19/2024    Urge incontinence 10/03/2024    Iron deficiency anemia due to chronic blood loss 01/06/2025    Type 2 diabetes mellitus with other specified complication, without long-term current use of insulin 01/06/2025    Palpitations 01/15/2025    Bladder wall thickening 01/23/2025    Pelvic lymphadenopathy 01/23/2025    Irritable bowel syndrome with both constipation and diarrhea 01/23/2025     Resolved Ambulatory Problems     Diagnosis Date Noted    Coronary artery disease involving native heart without angina pectoris 04/07/2021    History of atrial fibrillation 04/07/2021    History of elevated lipids 09/06/2023    History of hypertension 09/06/2023    Obesity (BMI 30.0-34.9) 04/07/2021    Paroxysmal atrial fibrillation (Multi) 09/06/2023    S/P angioplasty with stent 06/07/2021    Temporal arteritis (Multi) 09/06/2023    Malignant neoplasm of thyroid gland (Multi) 09/06/2023    Thyroid cancer (Multi) 04/07/2021    Athscl heart disease of native coronary artery w/o ang pctrs 02/13/2018    Presence of coronary angioplasty implant and graft 02/13/2018    H/O heart disorder 09/06/2023    Coronary atherosclerosis 12/05/2017    Type 2 diabetes mellitus with diabetic peripheral angiopathy without gangrene, without long-term current use of insulin (Multi) 09/25/2024    SVT (supraventricular tachycardia) (CMS-[facility]) 01/12/2025    Acute ischemic stroke  (Multi) 01/30/2025     Past Medical History:   Diagnosis Date    Anxiety and depression     Arrhythmia     Cancer (Multi)     Coronary artery disease     Disease of thyroid gland     GERD (gastroesophageal reflux disease)     Heart disease     Hyperlipidemia     Hypertension     Irregular heart beat     Occipital neuralgia 06/10/2022    Personal history of malignant neoplasm, unspecified 10/04/2021    Personal history of other diseases of the circulatory system     Personal history of other diseases of the circulatory system 10/04/2021    Personal history of other diseases of the circulatory system     Personal history of other diseases of the digestive system 03/11/2020    Personal history of other diseases of the musculoskeletal system and connective tissue     Personal history of other diseases of the respiratory system     Personal history of other specified conditions 10/04/2021    PONV (postoperative nausea and vomiting)     Stool incontinence        Home Medication:  Prior to Admission medications    Medication Sig Start Date End Date Taking? Authorizing Provider   acetaminophen (Tylenol) 325 mg tablet Take 2 tablets (650 mg) by mouth every 6 hours if needed for mild pain (1 - 3). 1/31/25   Mansoor Max MD   atorvastatin (Lipitor) 10 mg tablet TAKE 1 TABLET BY MOUTH DAILY 9/5/24   Shashank Proctor MD   cholecalciferol (Vitamin D-3) 25 MCG (1000 UT) tablet Take 1 tablet (1,000 Units) by mouth once daily.    Historical Provider, MD   ciprofloxacin (Cipro) 500 mg tablet Take 1 tablet (500 mg) by mouth every 12 hours for 4 days. 2/2/25 2/6/25  Mansoor Max MD   diphenoxylate-atropine (Lomotil) 2.5-0.025 mg tablet Take 1 tablet by mouth 4 times a day as needed for diarrhea. 1/14/25   Anahi Inman PA-C   Eliquis 5 mg tablet TAKE 1 TABLET BY MOUTH TWICE  DAILY 11/21/24   Shashank Proctor MD   estradiol (Estrace) 0.01 % (0.1 mg/gram) vaginal cream  Apply pea size amount 0.5 gram to vaginal opening with finger daily for 2 weeks, then 2-3 times per week. 11/5/24 11/5/25  ANA Lizarraga-CNP   levothyroxine (Synthroid, Levoxyl) 100 mcg tablet Take 1 tablet (100 mcg) by mouth early in the morning..    Historical Provider, MD   losartan (Cozaar) 50 mg tablet TAKE 1 TABLET BY MOUTH EVERY DAY 2/5/25   Anahi Inman PA-C   magnesium oxide (Mag-Ox) 400 mg (241.3 mg magnesium) tablet Take 1 tablet (400 mg) by mouth once daily. 1/17/25 5/17/25  FRED Shay   metFORMIN XR (Glucophage-XR) 500 mg 24 hr tablet Take 1 tablet (500 mg) by mouth 2 times a day.    Historical Provider, MD   metoprolol tartrate (Lopressor) 75 mg tablet Take 1 tablet (75 mg) by mouth 2 times a day. 1/16/25 1/16/26  FRED Shay   montelukast (Singulair) 10 mg tablet TAKE 1 TABLET BY MOUTH AT  BEDTIME 12/3/24   Major Jurado MD   omeprazole (PriLOSEC) 40 mg DR capsule TAKE 1 CAPSULE BY MOUTH TWICE  DAILY 5/6/24   Calvin Baker MD   oxyCODONE (Roxicodone) 5 mg immediate release tablet Take 1 tablet (5 mg) by mouth every 4 hours if needed for moderate pain (4 - 6) for up to 3 days. 1/31/25 2/5/25  Mansoor Max MD   potassium gluconate 595 mg (99 mg) tablet Take 1 tablet by mouth once daily.    Historical Provider, MD   tiotropium (Spiriva Respimat) 2.5 mcg/actuation inhaler Inhale 2 puffs once daily.    Historical Provider, MD   traZODone (Desyrel) 50 mg tablet Take 1-3 tablets ( mg) by mouth as needed at bedtime for sleep.    Historical Provider, MD   Ventolin HFA 90 mcg/actuation inhaler USE 2 INHALATIONS BY MOUTH EVERY 4 HOURS AS NEEDED 5/29/24   Major Jurado MD   losartan (Cozaar) 50 mg tablet Take 1 tablet (50 mg) by mouth once daily. 1/15/25 2/5/25  Anahi Inman PA-C         Allergies:  No Known Allergies     Review of System:    Pertinent positives and negatives as per history of present illness. Remainder  "of 10 point review of systems is negative.  Objective    Objective   Vital Signs:  Visit Vitals  /75 (BP Location: Right arm, Patient Position: Lying)   Pulse 105   Temp 36.1 °C (97 °F) (Temporal)   Resp 18   Ht 1.575 m (5' 2\")   Wt 63.5 kg (140 lb)   SpO2 99%   BMI 25.61 kg/m²   OB Status Postmenopausal   Smoking Status Never   BSA 1.67 m²        Physical Examination:    Constitutional:       General: She is not in acute distress.  HENT:      Head: Normocephalic and atraumatic.      Right Ear: External ear normal.      Left Ear: External ear normal.      Nose: Nose normal.      Mouth: Mucous membranes are moist.   Eyes:      General: No scleral icterus.     Extraocular Movements: Extraocular movements intact.      Conjunctiva/sclera: Conjunctivae normal.   Cardiovascular:           Heart sounds: No murmur heard.  Pulmonary:      Effort: Pulmonary effort is normal. No respiratory distress.      Breath sounds: Normal breath sounds.   Abdominal:      Palpations: Abdomen is soft.      Tenderness: There is abdominal tenderness. There is no guarding or rebound.      Comments: Tenderness palpation in right lower quadrant.   Musculoskeletal:         General: No swelling.      Cervical back: Neck supple.   Skin:     General: Skin is warm and dry.   Neurological:      General: No focal deficit present.      Mental Status: She is alert.   Psychiatric:         Mood and Affect: Mood normal.           Laboratory Data:    Results from last 7 days   Lab Units 02/10/25  1746   WBC AUTO x10*3/uL 13.2*   RBC AUTO x10*6/uL 4.72   HEMOGLOBIN g/dL 10.0*     Results from last 7 days   Lab Units 02/10/25  1746   SODIUM mmol/L 136   POTASSIUM mmol/L 3.3*   CHLORIDE mmol/L 105   CO2 mmol/L 21   BUN mg/dL 9   CREATININE mg/dL 0.67   CALCIUM mg/dL 7.9*   MAGNESIUM mg/dL 1.51*   BILIRUBIN TOTAL mg/dL 0.3   ALT U/L 5*   AST U/L 10       Imaging:  CT angio abdomen pelvis w and or wo IV IV contrast    Result Date: 2/10/2025  Interpreted By: "  Felipe Driscoll, STUDY: CT ANGIO ABDOMEN PELVIS W AND/OR WO IV IV CONTRAST;  2/10/2025 9:41 pm   INDICATION: Signs/Symptoms:A-fib with RVR, severe abdominal pain not responding to morphine, assess for mesenteric ischemia.   COMPARISON: 01/29/2025   ACCESSION NUMBER(S): [id number]   ORDERING CLINICIAN: GEORGE LOPEZ   TECHNIQUE: Axial non-contrast images of the abdomen, and pelvis.   Axial CT images of the, abdomen and pelvis were obtained after the intravenous administration of iodinated contrast using angiographic technique with coronal and sagittal reformatted images. MIP images and 3D reconstructions were created on an independent workstation and reviewed.   FINDINGS: VASCULATURE:   Mild atherosclerotic calcification of the aorta without aneurysm or dissection.   Celiac artery, SMA, renal arteries are all widely patent. KATHRYN patent. Iliac arteries patent without stenosis.   CT ABDOMEN/PELVIS:   Arterial phase imaging of the liver, adrenals pancreas and spleen are grossly unremarkable. Status post cholecystectomy.   No new hydronephrosis.   Redemonstrated is retroperitoneal adenopathy surrounding the distal aorta/cava and extending into the right iliac chain which appears to be progressing compared to previous. Necrotic appearing lymph node in the right pelvic sidewall currently measures 2.2 cm in short axis, previously 1.9 cm.   Stable appearance of the bladder which has trabeculated appearance with right-sided posterior diverticulum. There is some irregular right posterior bladder wall thickening. The possibility of underlying bladder malignancy again should be considered. There is some dependent bladder calculi seen.   No significant new free fluid.   No bowel obstruction or acute colitis.   Unchanged left ovarian cyst measuring 3.5 cm in diameter.   There is multilevel moderate to advanced degenerative disc disease seen in the lumbar spine worse at L4-L5 and L5-S1.       1. No abdominal aortic aneurysm or  dissection. No evidence of mesenteric ischemia.   2. Compared to previous study there is progressing retroperitoneal adenopathy with some interval enlargement suspected.   3. Irregular masslike thickening right posterior bladder wall near suspected diverticulum. Underlying bladder malignancy may be present. Correlate with patient history/cystoscopy.   MACRO: None.   Signed by: Felipe Driscoll 2/10/2025 11:12 PM Dictation workstation:   [id number]    XR chest 1 view    Result Date: 2/10/2025  Interpreted By:  Wendy Lee, STUDY: XR CHEST 1 VIEW;  2/10/2025 6:19 pm   INDICATION: Signs/Symptoms:Short of breath, vomiting.   COMPARISON: 01/15/2025   ACCESSION NUMBER(S): [id number]   ORDERING CLINICIAN: GEORGE LOPEZ   FINDINGS:     CARDIOMEDIASTINAL SILHOUETTE: Cardiomediastinal silhouette is normal in size and configuration.   LUNGS: No pulmonary consolidation, pleural effusion or pneumothorax.   ABDOMEN: No remarkable upper abdominal findings.   BONES: No acute osseous abnormality.       No acute cardiopulmonary process.   MACRO: None   Signed by: Wendy Lee 2/10/2025 6:31 PM Dictation workstation:   [id number]      Medications:  Scheduled medications  apixaban, 5 mg, oral, q12h  insulin lispro, 0-5 Units, subcutaneous, TID AC  levothyroxine, 100 mcg, oral, Daily  metoprolol tartrate, 75 mg, oral, BID      Continuous medications     PRN medications  PRN medications: dextrose, dextrose, glucagon, glucagon    Assessment    Assessment & Plan   Ms. Myrna Rodrigues is a 74 y.o. female who has a past medical history of afib s/p ablation on Eliquis, HTN, CAD, HLD, GERD, DM, SVT, hysterectomy, appendectomy, left oophorectomy and hypothyroidism admitted with a diagnosis of intractable abdominal pain and Afib with RVR.   Assessment & Plan  Intractable pain    Abdominal pain  # Intractable abdominal pain  # Afib RVR   # Hx of SVT  # Elevated troponin  # Hypokalemia  - Patient presented with worsening right lower quadrant pain,  nausea, non bloody vomiting and palpitation  - Patient was seen admitted recently and was seen by urology with plan for cystoscopy initially on 1/29, however her course was complicated by developing acute aphasia with MRI showing positives multifocal stroke. Patient was seen by neurology team, GAVIN was done ruling out endocarditis. Per discussion with neurology and urology team, due to increase the risk of additional infarct in preoperative and post operative period of 2 to 4 weeks, decision was ultimately made to hold off on the procedure for now with plan to do this as outpatient when she has less risk.   - She received morphine for pain and received two doses of metoprolol tartrate for rate control for Afib RVR.   - EKG showed Afib with RVR of 150's.  - K on admission   Plan   - Admit to the floor  - Monitor vital signs  - Daily labs with RFT and electrolytes  - Urology consultation  - Continue eliquis and metoprolol  - Repelete electrolytes with target K>4 and Mg >2  - Trend troponin  - EKG as needed    # Leukocytosis  # Microcytic anemia  - WBC on admission is 13.2 ( 12.9 on 2/2/25), H/H on admission 10.0/33.6 ( 9.1/31.7 on 2/2/25)  - Patient denies fever or localizing signs for infection  - No active bleeding, she denies hematuria, hematemesis or hemtochezia.  - Monitor vital signs for fever  - Daily CBC   - Outpatient work up for anemia     CHRONIC PROBLEMS:  # Hypothyroidism  # T2DM - SSI  # HTN  # HLD  # GERD  -continue home meds as appropriate when med rec complete         Global Plan of Care  Fluid: PRN  Electrolytes: PRN  Nutrition:   Regular/diabetic/renal/n.p.o.  DVT Prophylaxis:  Heparin/Lovenox/Eliquis/SCD  GI Prophylaxis:  Code Status: Full Code     Emergency Contact: Extended Emergency Contact Information  Primary Emergency Contact: Eriberto Zavala  Mobile Phone: [phone]  Relation: Son  Preferred language: English   needed? No  Secondary Emergency Contact: OMI ZAVALA  Mobile Phone:  [phone]  Relation: Relative   needed? No    Patient seen and discussed with the attending.  To be staffed with attending.  Signature: Franky Briscoe MD  Date: February 11, 2025  This note has been transcribed using Dragon voice recognition system and there is a possibility of unintentional typing misprints.  Any information found to be copied from previous providers is done in the best interest of the patient to provide accurate, quality, and continuity of care.

## 2025-02-11 NOTE — PROGRESS NOTES
Physical Therapy    Physical Therapy Evaluation    Patient Name: Myrna Rodrigues  MRN: [id number]  Today's Date: 2/11/2025   Time Calculation  Start Time: 1054  Stop Time: 1102  Time Calculation (min): 8 min  3137/3137-A    Assessment/Plan   PT Assessment: Pt appears at baseline level of function.  Pt able to ambulate around room with SUP.  Pt denies any concerns with returning home upon DC from the hospital.  Will DC PT orders at this time.    Evaluation/Treatment Tolerance: Patient tolerated treatment well  Medical Staff Made Aware: Yes  End of Session Communication: Bedside nurse  End of Session Patient Position: Bed, 3 rail up, Alarm on  IP OR SWING BED PT PLAN  Inpatient or Swing Bed: Inpatient  PT Plan  PT Plan: PT Eval only  PT Eval Only Reason: At baseline function  PT Frequency: PT eval only  PT Discharge Recommendations: No further acute PT, No PT needed after discharge  PT Recommended Transfer Status: Independent  PT - OK to Discharge: Yes - To next level of care when cleared by medical team    Subjective     Current Problem:  1. Intractable pain  Case Request Operating Room: CYSTOSCOPY, WITH BLADDER LESION ABLATION, Semirigid ureteroscop with possible biopsy    Case Request Operating Room: CYSTOSCOPY, WITH BLADDER LESION ABLATION, Semirigid ureteroscop with possible biopsy      2. Bladder mass  Case Request Operating Room: CYSTOSCOPY, WITH BLADDER LESION ABLATION, Semirigid ureteroscop with possible biopsy    Case Request Operating Room: CYSTOSCOPY, WITH BLADDER LESION ABLATION, Semirigid ureteroscop with possible biopsy      3. Retroperitoneal lymphadenopathy            Past Medical History:  Patient Active Problem List   Diagnosis    CAD S/P percutaneous coronary angioplasty    Chronic obstructive pulmonary disease (Multi)    Major depression    Moderate persistent asthma    Abnormal findings on diagnostic imaging of lung    Angina, class III (CMS-HCC)    Asthma    Cellulitis    Chronic diarrhea    Chronic  pain of toe of left foot    Chronic pain of toe of right foot    Controlled type 2 diabetes mellitus without complication, without long-term current use of insulin (Multi)    Diabetes mellitus (Multi)    Edema    External hemorrhoids    Fracture of ankle    Gastroesophageal reflux disease    Ground glass opacity present on imaging of lung    H/O unstable angina    Hallux valgus, acquired    Hemangioma of skin and subcutaneous tissue    Hematuria    History of diabetes mellitus    History of malignant neoplasm    Hypertension, essential, benign    Hypertensive retinopathy    Lateral malleolar fracture    Mixed hyperlipidemia    Nail fungus    Tinea pedis of both feet    Obesity    Other melanin hyperpigmentation    Other seborrheic keratosis    Overweight with body mass index (BMI) 25.0-29.9    Overweight    Postsurgical hypothyroidism    Psoriasis vulgaris    PVD (posterior vitreous detachment)    Rectal prolapse    Renal insufficiency    Senile nuclear sclerosis    Sensorineural hearing loss (SNHL) of both ears    Sensorineural hearing loss, bilateral    Left lumbar radiculopathy    Spinal stenosis of lumbar region without neurogenic claudication    Thyroid nodule    Mixed stress and urge urinary incontinence    Urge incontinence of urine    Dysuria    Urinary incontinence    Vaginal atrophy    Vaginitis    Vitamin D deficiency    Vitreous degeneration    UTI (urinary tract infection)    Depression    Major depressive disorder    Ventricular tachycardia (Multi)    Cardiac dysrhythmia    Mood disorder (CMS-[facility])    Unspecified complication of internal orthopedic prosthetic device, implant and graft, initial encounter (CMS-HCC)    Unilateral primary osteoarthritis, unspecified knee    Presence of left artificial knee joint    Personal history of other diseases of the nervous system and sense organs    Mechanical loosening of unspecified internal prosthetic joint, initial encounter (CMS-HCC)    Mechanical loosening of  internal left knee prosthetic joint    Long term (current) use of aspirin    Other specified anemias    Esophageal disorder    Elevated white blood cell count, unspecified    CKD (chronic kidney disease)    Asymptomatic menopause    BMI 27.0-27.9,adult    Mild vitamin D deficiency    Never smoked any substance    Personal history of calcium pyrophosphate deposition disease (CPPD)    Medicare annual wellness visit, subsequent    Shortness of breath    Overactive bladder    Difficulty walking    Syncope and collapse    Penicillin allergy    Allergy to sulfa drugs    Nitrofurantoin adverse reaction    Cyst of left ovary    Encounter for medication review and counseling    Encounter to discuss treatment options    Urge incontinence    Iron deficiency anemia due to chronic blood loss    Type 2 diabetes mellitus with other specified complication, without long-term current use of insulin    Palpitations    Bladder wall thickening    Pelvic lymphadenopathy    Irritable bowel syndrome with both constipation and diarrhea    Intractable pain    Abdominal pain    Bladder mass       General Visit Information:  Per EMR: pt presented to the ED with right lower quadrant pain and palpitations.  Patient states that she has a chronic right lower quadrant abdominal pain that she reports is secondary to known bladder nodules and rated the pain 10/10. She reports palpitation, nausea, non bloody vomiting and watery diarrhea. She stated that she was prescribed pain medicine and ran out of them and her pain returned.  She has also has been nauseous and has been unable to take her medications. She states that she was able to take her eliquis because her son told her it was important to take it. However, she has not taken her metoprolol. She also endorses palpitation and mild shortness of breath. Otherwise she denies cough, chest pain, fever, She denies hematuria or dysuria.  Of note she was admitted to this hospital on 01/24/25- 01/31/25 for  abdominal pain with concern for UTI vs malignancy. Patient was seen by urology with plan for cystoscopy initially on 1/29, however her course was complicated by developing acute aphasia with MRI showing positives multifocal stroke. Patient was seen by neurology team, GAVIN was done ruling out endocarditis. Per discussion with neurology and urology team, due to increase the risk of additional infarct in preoperative and post operative period of 2 to 4 weeks, decision was ultimately made to hold off on the procedure with plan to do this as outpatient when she has less risk. Eliquis was restarted prior to discharge.     On arrival, pt supine in bed.  Pt in no apparent distress and agreeable to therapy.    General  Reason for Referral: impaired mobility  Referred By: Avani Collins  Co-Treatment: OT  Prior to Session Communication: Bedside nurse  Patient Position Received: Bed, 3 rail up, Alarm on    Home Living/PLOF:  Pt was discharged home from [facility] on Thursday 2/6 s/p hospital stay from 1/24-1/31.  Pt lives alone in house with 0 JOSE. 1 floor set up. IND with ADLs. Has tub shower with Gbs. Owns shower chair but was not using. IND with mobility, but owns FWW and SPC. Pt able to cook, clean, does laundry, drive, and shop but has really done so since being home. Son assists as needed.    Precautions:  Precautions  Medical Precautions: Fall precautions     Objective     Pain:  Pain Assessment  Pain Assessment: 0-10  0-10 (Numeric) Pain Score: 0 - No pain    Cognition:  Cognition  Overall Cognitive Status: Within Functional Limits  Orientation Level: Oriented X4    General Assessments:      Activity Tolerance  Endurance: Endurance does not limit participation in activity  Sensation  Sensation Comment: denies any numbness/tingling    Static Sitting Balance  Static Sitting-Comment/Number of Minutes: good  Dynamic Sitting Balance  Dynamic Sitting-Comments: good  Static Standing Balance  Static Standing-Comment/Number  of Minutes: good  Dynamic Standing Balance  Dynamic Standing-Comments: good    Extremity/Trunk Assessments:  BLE strength: grossly WFL    Functional Mobility:  Bed mobility  Supine to sit: IND  Sit to supine: IND    Transfers  Sit to stand: SUP  Stand to sit: SUP    Ambulation/Stairs  Pt ambulated 20 ft with SUP without device; grossly steady without any LOB    Outcome Measures:  [facility] Basic Mobility  Turning from your back to your side while in a flat bed without using bedrails: None  Moving from lying on your back to sitting on the side of a flat bed without using bedrails: None  Moving to and from bed to chair (including a wheelchair): None  Standing up from a chair using your arms (e.g. wheelchair or bedside chair): None  To walk in hospital room: None  Climbing 3-5 steps with railing: None  Basic Mobility - Total Score: 24    Education Documentation  Body Mechanics, taught by Yazmin Bolivar, PT at 2/11/2025  1:34 PM.  Learner: Patient  Readiness: Acceptance  Method: Explanation  Response: Verbalizes Understanding    Home Exercise Program, taught by Yazmin Bolivar PT at 2/11/2025  1:34 PM.  Learner: Patient  Readiness: Acceptance  Method: Explanation  Response: Verbalizes Understanding    Mobility Training, taught by Yazmin Bolivar, PT at 2/11/2025  1:34 PM.  Learner: Patient  Readiness: Acceptance  Method: Explanation  Response: Verbalizes Understanding    Education Comments  No comments found.

## 2025-02-11 NOTE — NURSING NOTE
Report  called  to  isabel  in  ortho  and pt.  Was  transferred  to  room  4115  via  bed.  Heparin  drip  remains  infusing.

## 2025-02-12 ENCOUNTER — ANESTHESIA (OUTPATIENT)
Dept: OPERATING ROOM | Facility: HOSPITAL | Age: 75
DRG: 669 | End: 2025-02-12
Payer: MEDICARE

## 2025-02-12 ENCOUNTER — APPOINTMENT (OUTPATIENT)
Dept: CARDIOLOGY | Facility: HOSPITAL | Age: 75
DRG: 669 | End: 2025-02-12
Payer: MEDICARE

## 2025-02-12 ENCOUNTER — APPOINTMENT (OUTPATIENT)
Dept: RADIOLOGY | Facility: HOSPITAL | Age: 75
DRG: 669 | End: 2025-02-12
Payer: MEDICARE

## 2025-02-12 LAB
ALBUMIN SERPL BCP-MCNC: 3 G/DL (ref 3.4–5)
ANION GAP SERPL CALC-SCNC: 10 MMOL/L (ref 10–20)
ATRIAL RATE: 131 BPM
ATRIAL RATE: 150 BPM
ATRIAL RATE: 74 BPM
BASOPHILS # BLD AUTO: 0.03 X10*3/UL (ref 0–0.1)
BASOPHILS NFR BLD AUTO: 0.3 %
BUN SERPL-MCNC: 11 MG/DL (ref 6–23)
CALCIUM SERPL-MCNC: 8.8 MG/DL (ref 8.6–10.3)
CHLORIDE SERPL-SCNC: 101 MMOL/L (ref 98–107)
CO2 SERPL-SCNC: 28 MMOL/L (ref 21–32)
CREAT SERPL-MCNC: 0.77 MG/DL (ref 0.5–1.05)
EGFRCR SERPLBLD CKD-EPI 2021: 80 ML/MIN/1.73M*2
EOSINOPHIL # BLD AUTO: 0.66 X10*3/UL (ref 0–0.4)
EOSINOPHIL NFR BLD AUTO: 5.8 %
ERYTHROCYTE [DISTWIDTH] IN BLOOD BY AUTOMATED COUNT: 18.5 % (ref 11.5–14.5)
ERYTHROCYTE [DISTWIDTH] IN BLOOD BY AUTOMATED COUNT: 18.6 % (ref 11.5–14.5)
FERRITIN SERPL-MCNC: 25 NG/ML (ref 8–150)
GLUCOSE BLD MANUAL STRIP-MCNC: 103 MG/DL (ref 74–99)
GLUCOSE BLD MANUAL STRIP-MCNC: 116 MG/DL (ref 74–99)
GLUCOSE BLD MANUAL STRIP-MCNC: 95 MG/DL (ref 74–99)
GLUCOSE SERPL-MCNC: 102 MG/DL (ref 74–99)
HCT VFR BLD AUTO: 28.5 % (ref 36–46)
HCT VFR BLD AUTO: 28.9 % (ref 36–46)
HGB BLD-MCNC: 8.3 G/DL (ref 12–16)
HGB BLD-MCNC: 8.4 G/DL (ref 12–16)
IMM GRANULOCYTES # BLD AUTO: 0.07 X10*3/UL (ref 0–0.5)
IMM GRANULOCYTES NFR BLD AUTO: 0.6 % (ref 0–0.9)
LYMPHOCYTES # BLD AUTO: 2.49 X10*3/UL (ref 0.8–3)
LYMPHOCYTES NFR BLD AUTO: 21.7 %
MAGNESIUM SERPL-MCNC: 2.12 MG/DL (ref 1.6–2.4)
MCH RBC QN AUTO: 20.8 PG (ref 26–34)
MCH RBC QN AUTO: 21 PG (ref 26–34)
MCHC RBC AUTO-ENTMCNC: 28.7 G/DL (ref 32–36)
MCHC RBC AUTO-ENTMCNC: 29.5 G/DL (ref 32–36)
MCV RBC AUTO: 71 FL (ref 80–100)
MCV RBC AUTO: 72 FL (ref 80–100)
MONOCYTES # BLD AUTO: 0.93 X10*3/UL (ref 0.05–0.8)
MONOCYTES NFR BLD AUTO: 8.1 %
NEUTROPHILS # BLD AUTO: 7.27 X10*3/UL (ref 1.6–5.5)
NEUTROPHILS NFR BLD AUTO: 63.5 %
NRBC BLD-RTO: 0 /100 WBCS (ref 0–0)
NRBC BLD-RTO: 0 /100 WBCS (ref 0–0)
P AXIS: 66 DEGREES
P OFFSET: 179 MS
P ONSET: 124 MS
PHOSPHATE SERPL-MCNC: 3.4 MG/DL (ref 2.5–4.9)
PLATELET # BLD AUTO: 265 X10*3/UL (ref 150–450)
PLATELET # BLD AUTO: 284 X10*3/UL (ref 150–450)
POTASSIUM SERPL-SCNC: 4.1 MMOL/L (ref 3.5–5.3)
PR INTERVAL: 176 MS
Q ONSET: 212 MS
Q ONSET: 214 MS
Q ONSET: 217 MS
QRS COUNT: 12 BEATS
QRS COUNT: 23 BEATS
QRS COUNT: 25 BEATS
QRS DURATION: 78 MS
QRS DURATION: 80 MS
QRS DURATION: 86 MS
QT INTERVAL: 310 MS
QT INTERVAL: 322 MS
QT INTERVAL: 352 MS
QTC CALCULATION(BAZETT): 390 MS
QTC CALCULATION(BAZETT): 479 MS
QTC CALCULATION(BAZETT): 508 MS
QTC FREDERICIA: 377 MS
QTC FREDERICIA: 414 MS
QTC FREDERICIA: 436 MS
R AXIS: -20 DEGREES
R AXIS: -47 DEGREES
R AXIS: -53 DEGREES
RBC # BLD AUTO: 4 X10*6/UL (ref 4–5.2)
RBC # BLD AUTO: 4 X10*6/UL (ref 4–5.2)
SODIUM SERPL-SCNC: 135 MMOL/L (ref 136–145)
T AXIS: 31 DEGREES
T AXIS: 68 DEGREES
T AXIS: 88 DEGREES
T OFFSET: 372 MS
T OFFSET: 375 MS
T OFFSET: 388 MS
UFH PPP CHRO-ACNC: 0.8 IU/ML
VENTRICULAR RATE: 144 BPM
VENTRICULAR RATE: 150 BPM
VENTRICULAR RATE: 74 BPM
WBC # BLD AUTO: 11.1 X10*3/UL (ref 4.4–11.3)
WBC # BLD AUTO: 11.5 X10*3/UL (ref 4.4–11.3)

## 2025-02-12 PROCEDURE — 99233 SBSQ HOSP IP/OBS HIGH 50: CPT

## 2025-02-12 PROCEDURE — 83735 ASSAY OF MAGNESIUM: CPT

## 2025-02-12 PROCEDURE — 2500000004 HC RX 250 GENERAL PHARMACY W/ HCPCS (ALT 636 FOR OP/ED): Performed by: NURSE ANESTHETIST, CERTIFIED REGISTERED

## 2025-02-12 PROCEDURE — 93005 ELECTROCARDIOGRAM TRACING: CPT

## 2025-02-12 PROCEDURE — 2500000004 HC RX 250 GENERAL PHARMACY W/ HCPCS (ALT 636 FOR OP/ED): Performed by: ANESTHESIOLOGIST ASSISTANT

## 2025-02-12 PROCEDURE — 88307 TISSUE EXAM BY PATHOLOGIST: CPT | Mod: TC,STJLAB | Performed by: UROLOGY

## 2025-02-12 PROCEDURE — 2500000002 HC RX 250 W HCPCS SELF ADMINISTERED DRUGS (ALT 637 FOR MEDICARE OP, ALT 636 FOR OP/ED)

## 2025-02-12 PROCEDURE — 2500000005 HC RX 250 GENERAL PHARMACY W/O HCPCS: Performed by: ANESTHESIOLOGY

## 2025-02-12 PROCEDURE — 3600000003 HC OR TIME - INITIAL BASE CHARGE - PROCEDURE LEVEL THREE: Performed by: UROLOGY

## 2025-02-12 PROCEDURE — A52240 PR CYSTOURETHROSCOPY,FULGUR >5 CM LESN: Performed by: NURSE ANESTHETIST, CERTIFIED REGISTERED

## 2025-02-12 PROCEDURE — 2500000001 HC RX 250 WO HCPCS SELF ADMINISTERED DRUGS (ALT 637 FOR MEDICARE OP)

## 2025-02-12 PROCEDURE — 82728 ASSAY OF FERRITIN: CPT

## 2025-02-12 PROCEDURE — 99100 ANES PT EXTEME AGE<1 YR&>70: CPT | Performed by: STUDENT IN AN ORGANIZED HEALTH CARE EDUCATION/TRAINING PROGRAM

## 2025-02-12 PROCEDURE — 99140 ANES COMP EMERGENCY COND: CPT | Performed by: STUDENT IN AN ORGANIZED HEALTH CARE EDUCATION/TRAINING PROGRAM

## 2025-02-12 PROCEDURE — 85025 COMPLETE CBC W/AUTO DIFF WBC: CPT

## 2025-02-12 PROCEDURE — 2500000004 HC RX 250 GENERAL PHARMACY W/ HCPCS (ALT 636 FOR OP/ED)

## 2025-02-12 PROCEDURE — 3700000001 HC GENERAL ANESTHESIA TIME - INITIAL BASE CHARGE: Performed by: UROLOGY

## 2025-02-12 PROCEDURE — 2500000005 HC RX 250 GENERAL PHARMACY W/O HCPCS: Performed by: UROLOGY

## 2025-02-12 PROCEDURE — 36415 COLL VENOUS BLD VENIPUNCTURE: CPT

## 2025-02-12 PROCEDURE — 85027 COMPLETE CBC AUTOMATED: CPT

## 2025-02-12 PROCEDURE — 99232 SBSQ HOSP IP/OBS MODERATE 35: CPT | Performed by: NURSE PRACTITIONER

## 2025-02-12 PROCEDURE — 2720000007 HC OR 272 NO HCPCS: Performed by: UROLOGY

## 2025-02-12 PROCEDURE — 1200000002 HC GENERAL ROOM WITH TELEMETRY DAILY

## 2025-02-12 PROCEDURE — 84100 ASSAY OF PHOSPHORUS: CPT

## 2025-02-12 PROCEDURE — 52240 CYSTOSCOPY AND TREATMENT: CPT | Performed by: UROLOGY

## 2025-02-12 PROCEDURE — A52240 PR CYSTOURETHROSCOPY,FULGUR >5 CM LESN: Performed by: STUDENT IN AN ORGANIZED HEALTH CARE EDUCATION/TRAINING PROGRAM

## 2025-02-12 PROCEDURE — 85520 HEPARIN ASSAY: CPT

## 2025-02-12 PROCEDURE — 3600000008 HC OR TIME - EACH INCREMENTAL 1 MINUTE - PROCEDURE LEVEL THREE: Performed by: UROLOGY

## 2025-02-12 PROCEDURE — 7100000001 HC RECOVERY ROOM TIME - INITIAL BASE CHARGE: Performed by: UROLOGY

## 2025-02-12 PROCEDURE — 7100000002 HC RECOVERY ROOM TIME - EACH INCREMENTAL 1 MINUTE: Performed by: UROLOGY

## 2025-02-12 PROCEDURE — 3700000002 HC GENERAL ANESTHESIA TIME - EACH INCREMENTAL 1 MINUTE: Performed by: UROLOGY

## 2025-02-12 PROCEDURE — 99221 1ST HOSP IP/OBS SF/LOW 40: CPT | Performed by: SPECIALIST

## 2025-02-12 PROCEDURE — 82947 ASSAY GLUCOSE BLOOD QUANT: CPT

## 2025-02-12 RX ORDER — METOPROLOL TARTRATE 1 MG/ML
5 INJECTION, SOLUTION INTRAVENOUS ONCE
Status: DISCONTINUED | OUTPATIENT
Start: 2025-02-12 | End: 2025-02-12

## 2025-02-12 RX ORDER — ACETAMINOPHEN 325 MG/1
975 TABLET ORAL ONCE
Status: DISCONTINUED | OUTPATIENT
Start: 2025-02-12 | End: 2025-02-12 | Stop reason: HOSPADM

## 2025-02-12 RX ORDER — MEPERIDINE HYDROCHLORIDE 50 MG/ML
12.5 INJECTION INTRAMUSCULAR; INTRAVENOUS; SUBCUTANEOUS EVERY 10 MIN PRN
Status: DISCONTINUED | OUTPATIENT
Start: 2025-02-12 | End: 2025-02-12 | Stop reason: HOSPADM

## 2025-02-12 RX ORDER — HYDRALAZINE HYDROCHLORIDE 20 MG/ML
5 INJECTION INTRAMUSCULAR; INTRAVENOUS EVERY 30 MIN PRN
Status: DISCONTINUED | OUTPATIENT
Start: 2025-02-12 | End: 2025-02-12 | Stop reason: HOSPADM

## 2025-02-12 RX ORDER — HYDROMORPHONE HYDROCHLORIDE 1 MG/ML
1 INJECTION, SOLUTION INTRAMUSCULAR; INTRAVENOUS; SUBCUTANEOUS EVERY 5 MIN PRN
Status: DISCONTINUED | OUTPATIENT
Start: 2025-02-12 | End: 2025-02-12 | Stop reason: HOSPADM

## 2025-02-12 RX ORDER — PHENYLEPHRINE HCL IN 0.9% NACL 1 MG/10 ML
SYRINGE (ML) INTRAVENOUS AS NEEDED
Status: DISCONTINUED | OUTPATIENT
Start: 2025-02-12 | End: 2025-02-12

## 2025-02-12 RX ORDER — KETOROLAC TROMETHAMINE 30 MG/ML
INJECTION, SOLUTION INTRAMUSCULAR; INTRAVENOUS AS NEEDED
Status: DISCONTINUED | OUTPATIENT
Start: 2025-02-12 | End: 2025-02-12

## 2025-02-12 RX ORDER — ONDANSETRON HYDROCHLORIDE 2 MG/ML
INJECTION, SOLUTION INTRAVENOUS AS NEEDED
Status: DISCONTINUED | OUTPATIENT
Start: 2025-02-12 | End: 2025-02-12

## 2025-02-12 RX ORDER — FENTANYL CITRATE 50 UG/ML
INJECTION, SOLUTION INTRAMUSCULAR; INTRAVENOUS AS NEEDED
Status: DISCONTINUED | OUTPATIENT
Start: 2025-02-12 | End: 2025-02-12

## 2025-02-12 RX ORDER — LIDOCAINE HYDROCHLORIDE 10 MG/ML
0.1 INJECTION, SOLUTION INFILTRATION; PERINEURAL ONCE
Status: DISCONTINUED | OUTPATIENT
Start: 2025-02-12 | End: 2025-02-12 | Stop reason: HOSPADM

## 2025-02-12 RX ORDER — DIPHENHYDRAMINE HYDROCHLORIDE 50 MG/ML
12.5 INJECTION INTRAMUSCULAR; INTRAVENOUS ONCE AS NEEDED
Status: DISCONTINUED | OUTPATIENT
Start: 2025-02-12 | End: 2025-02-12 | Stop reason: HOSPADM

## 2025-02-12 RX ORDER — SODIUM CHLORIDE, SODIUM LACTATE, POTASSIUM CHLORIDE, CALCIUM CHLORIDE 600; 310; 30; 20 MG/100ML; MG/100ML; MG/100ML; MG/100ML
100 INJECTION, SOLUTION INTRAVENOUS CONTINUOUS
Status: DISCONTINUED | OUTPATIENT
Start: 2025-02-12 | End: 2025-02-12 | Stop reason: HOSPADM

## 2025-02-12 RX ORDER — LOSARTAN POTASSIUM 50 MG/1
50 TABLET ORAL DAILY
Status: DISCONTINUED | OUTPATIENT
Start: 2025-02-13 | End: 2025-02-13 | Stop reason: HOSPADM

## 2025-02-12 RX ORDER — FENTANYL CITRATE 50 UG/ML
12.5 INJECTION, SOLUTION INTRAMUSCULAR; INTRAVENOUS EVERY 5 MIN PRN
Status: DISCONTINUED | OUTPATIENT
Start: 2025-02-12 | End: 2025-02-12 | Stop reason: HOSPADM

## 2025-02-12 RX ORDER — OXYCODONE HYDROCHLORIDE 5 MG/1
5 TABLET ORAL EVERY 4 HOURS PRN
Status: DISCONTINUED | OUTPATIENT
Start: 2025-02-12 | End: 2025-02-12 | Stop reason: HOSPADM

## 2025-02-12 RX ORDER — ROCURONIUM BROMIDE 50 MG/5 ML
SYRINGE (ML) INTRAVENOUS AS NEEDED
Status: DISCONTINUED | OUTPATIENT
Start: 2025-02-12 | End: 2025-02-12

## 2025-02-12 RX ORDER — ROCURONIUM BROMIDE 10 MG/ML
INJECTION, SOLUTION INTRAVENOUS AS NEEDED
Status: DISCONTINUED | OUTPATIENT
Start: 2025-02-12 | End: 2025-02-12

## 2025-02-12 RX ORDER — PROPOFOL 10 MG/ML
INJECTION, EMULSION INTRAVENOUS AS NEEDED
Status: DISCONTINUED | OUTPATIENT
Start: 2025-02-12 | End: 2025-02-12

## 2025-02-12 RX ORDER — MIDAZOLAM HYDROCHLORIDE 1 MG/ML
1 INJECTION, SOLUTION INTRAMUSCULAR; INTRAVENOUS ONCE AS NEEDED
Status: DISCONTINUED | OUTPATIENT
Start: 2025-02-12 | End: 2025-02-12 | Stop reason: HOSPADM

## 2025-02-12 RX ORDER — ONDANSETRON HYDROCHLORIDE 2 MG/ML
4 INJECTION, SOLUTION INTRAVENOUS ONCE AS NEEDED
Status: DISCONTINUED | OUTPATIENT
Start: 2025-02-12 | End: 2025-02-12 | Stop reason: HOSPADM

## 2025-02-12 RX ORDER — CEFAZOLIN SODIUM 2 G/100ML
INJECTION, SOLUTION INTRAVENOUS AS NEEDED
Status: DISCONTINUED | OUTPATIENT
Start: 2025-02-12 | End: 2025-02-12

## 2025-02-12 RX ORDER — ALBUTEROL SULFATE 0.83 MG/ML
2.5 SOLUTION RESPIRATORY (INHALATION) ONCE AS NEEDED
Status: DISCONTINUED | OUTPATIENT
Start: 2025-02-12 | End: 2025-02-12 | Stop reason: HOSPADM

## 2025-02-12 RX ORDER — LIDOCAINE HYDROCHLORIDE 20 MG/ML
INJECTION, SOLUTION INFILTRATION; PERINEURAL AS NEEDED
Status: DISCONTINUED | OUTPATIENT
Start: 2025-02-12 | End: 2025-02-12

## 2025-02-12 RX ORDER — SODIUM CHLORIDE 0.9 G/100ML
INJECTION, SOLUTION IRRIGATION AS NEEDED
Status: DISCONTINUED | OUTPATIENT
Start: 2025-02-12 | End: 2025-02-12 | Stop reason: HOSPADM

## 2025-02-12 RX ADMIN — FENTANYL CITRATE 50 MCG: 50 INJECTION, SOLUTION INTRAMUSCULAR; INTRAVENOUS at 17:25

## 2025-02-12 RX ADMIN — LEVOTHYROXINE SODIUM 100 MCG: 0.1 TABLET ORAL at 05:50

## 2025-02-12 RX ADMIN — OXYCODONE HYDROCHLORIDE 5 MG: 5 TABLET ORAL at 02:25

## 2025-02-12 RX ADMIN — MORPHINE SULFATE 2 MG: 2 INJECTION, SOLUTION INTRAMUSCULAR; INTRAVENOUS at 14:11

## 2025-02-12 RX ADMIN — ROCURONIUM BROMIDE 20 MG: 10 INJECTION INTRAVENOUS at 18:03

## 2025-02-12 RX ADMIN — DEXAMETHASONE SODIUM PHOSPHATE 4 MG: 4 INJECTION, SOLUTION INTRAMUSCULAR; INTRAVENOUS at 17:53

## 2025-02-12 RX ADMIN — SODIUM CHLORIDE, POTASSIUM CHLORIDE, SODIUM LACTATE AND CALCIUM CHLORIDE: 600; 310; 30; 20 INJECTION, SOLUTION INTRAVENOUS at 17:18

## 2025-02-12 RX ADMIN — ONDANSETRON 4 MG: 2 INJECTION INTRAMUSCULAR; INTRAVENOUS at 18:31

## 2025-02-12 RX ADMIN — OXYCODONE HYDROCHLORIDE 5 MG: 5 TABLET ORAL at 11:47

## 2025-02-12 RX ADMIN — SODIUM CHLORIDE 1000 ML: 9 INJECTION, SOLUTION INTRAVENOUS at 09:52

## 2025-02-12 RX ADMIN — METOPROLOL TARTRATE 75 MG: 50 TABLET, FILM COATED ORAL at 21:36

## 2025-02-12 RX ADMIN — PANTOPRAZOLE SODIUM 40 MG: 40 TABLET, DELAYED RELEASE ORAL at 05:50

## 2025-02-12 RX ADMIN — TRAZODONE HYDROCHLORIDE 50 MG: 50 TABLET ORAL at 02:34

## 2025-02-12 RX ADMIN — CEFAZOLIN SODIUM 2 G: 2 INJECTION, SOLUTION INTRAVENOUS at 17:34

## 2025-02-12 RX ADMIN — KETOROLAC TROMETHAMINE 15 MG: 30 INJECTION, SOLUTION INTRAMUSCULAR at 18:31

## 2025-02-12 RX ADMIN — Medication 6 L/MIN: at 18:51

## 2025-02-12 RX ADMIN — METOPROLOL TARTRATE 75 MG: 50 TABLET, FILM COATED ORAL at 09:05

## 2025-02-12 RX ADMIN — LIDOCAINE HYDROCHLORIDE 80 MG: 20 INJECTION, SOLUTION INFILTRATION; PERINEURAL at 17:25

## 2025-02-12 RX ADMIN — Medication 150 MCG: at 17:45

## 2025-02-12 RX ADMIN — Medication 150 MCG: at 17:36

## 2025-02-12 RX ADMIN — PROPOFOL 100 MG: 10 INJECTION, EMULSION INTRAVENOUS at 17:25

## 2025-02-12 RX ADMIN — MORPHINE SULFATE 2 MG: 2 INJECTION, SOLUTION INTRAMUSCULAR; INTRAVENOUS at 09:41

## 2025-02-12 RX ADMIN — ROCURONIUM BROMIDE 50 MG: 10 INJECTION INTRAVENOUS at 17:25

## 2025-02-12 RX ADMIN — FENTANYL CITRATE 50 MCG: 50 INJECTION, SOLUTION INTRAMUSCULAR; INTRAVENOUS at 18:03

## 2025-02-12 RX ADMIN — SUGAMMADEX 200 MG: 100 INJECTION, SOLUTION INTRAVENOUS at 18:42

## 2025-02-12 ASSESSMENT — PAIN - FUNCTIONAL ASSESSMENT
PAIN_FUNCTIONAL_ASSESSMENT: 0-10

## 2025-02-12 ASSESSMENT — PAIN SCALES - GENERAL
PAINLEVEL_OUTOF10: 7
PAINLEVEL_OUTOF10: 9
PAINLEVEL_OUTOF10: 2
PAINLEVEL_OUTOF10: 0 - NO PAIN
PAINLEVEL_OUTOF10: 9
PAINLEVEL_OUTOF10: 0 - NO PAIN
PAINLEVEL_OUTOF10: 0 - NO PAIN
PAINLEVEL_OUTOF10: 8
PAINLEVEL_OUTOF10: 7
PAINLEVEL_OUTOF10: 8
PAINLEVEL_OUTOF10: 0 - NO PAIN
PAINLEVEL_OUTOF10: 0 - NO PAIN

## 2025-02-12 ASSESSMENT — PAIN SCALES - PAIN ASSESSMENT IN ADVANCED DEMENTIA (PAINAD)
BODYLANGUAGE: RELAXED
TOTALSCORE: 0
BREATHING: NORMAL
FACIALEXPRESSION: SMILING OR INEXPRESSIVE
TOTALSCORE: MEDICATION (SEE MAR)
CONSOLABILITY: NO NEED TO CONSOLE

## 2025-02-12 NOTE — PROGRESS NOTES
Myrna Rodrigues 74 y.o. female    Subjective  Patient seen and examined this morning.  Denies nausea and vomiting.  No fever or chills.  Continues to have pain to right lower abdominal quadrant.  Urinating without difficulty.  Denies CP and SOB.  No acute events overnight.       Objective  PHYSICAL EXAM:  Physical Exam  Vitals reviewed.   Constitutional:       General: She is awake.      Appearance: Normal appearance.   Cardiovascular:      Rate and Rhythm: Normal rate and regular rhythm.      Pulses: Normal pulses.      Heart sounds: Normal heart sounds.   Pulmonary:      Effort: Pulmonary effort is normal.      Breath sounds: Normal breath sounds and air entry.   Abdominal:      General: Abdomen is flat. There is no distension.      Palpations: Abdomen is soft.      Tenderness: There is abdominal tenderness in the right lower quadrant. There is no guarding or rebound.   Musculoskeletal:         General: Normal range of motion.      Cervical back: Normal range of motion.   Skin:     General: Skin is warm and dry.   Neurological:      General: No focal deficit present.      Mental Status: She is alert and oriented to person, place, and time.   Psychiatric:         Behavior: Behavior is cooperative.         Vital signs in last 24 hours:  Vitals:    02/12/25 0924   BP: 149/76   Pulse: (!) 144   Resp: 18   Temp: 36.2 °C (97.2 °F)   SpO2: 96%         Intake/Output this shift:    Intake/Output Summary (Last 24 hours) at 2/12/2025 1000  Last data filed at 2/11/2025 1708  Gross per 24 hour   Intake 105.55 ml   Output --   Net 105.55 ml        Allergies:  No Known Allergies     Medications:  Scheduled medications  [Held by provider] apixaban, 5 mg, oral, q12h  atorvastatin, 10 mg, oral, Daily  cholecalciferol, 1,000 Units, oral, Daily  insulin lispro, 0-5 Units, subcutaneous, TID AC  levothyroxine, 100 mcg, oral, Daily  [Held by provider] losartan, 50 mg, oral, Daily  metoprolol tartrate, 75 mg, oral, BID  pantoprazole, 40 mg,  oral, BID AC  sodium chloride, 1,000 mL, intravenous, Once      Continuous medications  [Held by provider] heparin, 0-4,500 Units/hr, Last Rate: Stopped (02/12/25 0728)      PRN medications  PRN medications: dextrose, dextrose, glucagon, glucagon, morphine, oxyCODONE, traZODone       Labs:  Results for orders placed or performed during the hospital encounter of 02/10/25 (from the past 24 hours)   Urinalysis with Reflex Culture and Microscopic   Result Value Ref Range    Color, Urine Light-Yellow Light-Yellow, Yellow, Dark-Yellow    Appearance, Urine Clear Clear    Specific Gravity, Urine 1.043 (N) 1.005 - 1.035    pH, Urine 7.0 5.0, 5.5, 6.0, 6.5, 7.0, 7.5, 8.0    Protein, Urine NEGATIVE NEGATIVE, 10 (TRACE), 20 (TRACE) mg/dL    Glucose, Urine Normal Normal mg/dL    Blood, Urine NEGATIVE NEGATIVE mg/dL    Ketones, Urine NEGATIVE NEGATIVE mg/dL    Bilirubin, Urine NEGATIVE NEGATIVE mg/dL    Urobilinogen, Urine Normal Normal mg/dL    Nitrite, Urine NEGATIVE NEGATIVE    Leukocyte Esterase, Urine 25 Uday/uL (A) NEGATIVE   Extra Urine Gray Tube   Result Value Ref Range    Extra Tube Hold for add-ons.    Microscopic Only, Urine   Result Value Ref Range    WBC, Urine 1-5 1-5, NONE /HPF    RBC, Urine NONE NONE, 1-2, 3-5 /HPF    Squamous Epithelial Cells, Urine 1-9 (SPARSE) Reference range not established. /HPF    Budding Yeast, Urine PRESENT (A) NONE /HPF    Yeast Hyphae, Urine PRESENT (A) NONE /HPF   aPTT - baseline   Result Value Ref Range    aPTT 67 (H) 27 - 38 seconds   POCT GLUCOSE   Result Value Ref Range    POCT Glucose 113 (H) 74 - 99 mg/dL   POCT GLUCOSE   Result Value Ref Range    POCT Glucose 129 (H) 74 - 99 mg/dL   Heparin Assay   Result Value Ref Range    Heparin Unfractionated 0.9 See Comment Below for Therapeutic Ranges IU/mL   Heparin Assay   Result Value Ref Range    Heparin Unfractionated 0.8 See Comment Below for Therapeutic Ranges IU/mL   Heparin Assay, UFH   Result Value Ref Range    Heparin  Unfractionated 0.8 See Comment Below for Therapeutic Ranges IU/mL   CBC and Auto Differential   Result Value Ref Range    WBC 11.5 (H) 4.4 - 11.3 x10*3/uL    nRBC 0.0 0.0 - 0.0 /100 WBCs    RBC 4.00 4.00 - 5.20 x10*6/uL    Hemoglobin 8.4 (L) 12.0 - 16.0 g/dL    Hematocrit 28.5 (L) 36.0 - 46.0 %    MCV 71 (L) 80 - 100 fL    MCH 21.0 (L) 26.0 - 34.0 pg    MCHC 29.5 (L) 32.0 - 36.0 g/dL    RDW 18.5 (H) 11.5 - 14.5 %    Platelets 284 150 - 450 x10*3/uL    Neutrophils % 63.5 40.0 - 80.0 %    Immature Granulocytes %, Automated 0.6 0.0 - 0.9 %    Lymphocytes % 21.7 13.0 - 44.0 %    Monocytes % 8.1 2.0 - 10.0 %    Eosinophils % 5.8 0.0 - 6.0 %    Basophils % 0.3 0.0 - 2.0 %    Neutrophils Absolute 7.27 (H) 1.60 - 5.50 x10*3/uL    Immature Granulocytes Absolute, Automated 0.07 0.00 - 0.50 x10*3/uL    Lymphocytes Absolute 2.49 0.80 - 3.00 x10*3/uL    Monocytes Absolute 0.93 (H) 0.05 - 0.80 x10*3/uL    Eosinophils Absolute 0.66 (H) 0.00 - 0.40 x10*3/uL    Basophils Absolute 0.03 0.00 - 0.10 x10*3/uL   Renal Function Panel   Result Value Ref Range    Glucose 102 (H) 74 - 99 mg/dL    Sodium 135 (L) 136 - 145 mmol/L    Potassium 4.1 3.5 - 5.3 mmol/L    Chloride 101 98 - 107 mmol/L    Bicarbonate 28 21 - 32 mmol/L    Anion Gap 10 10 - 20 mmol/L    Urea Nitrogen 11 6 - 23 mg/dL    Creatinine 0.77 0.50 - 1.05 mg/dL    eGFR 80 >60 mL/min/1.73m*2    Calcium 8.8 8.6 - 10.3 mg/dL    Phosphorus 3.4 2.5 - 4.9 mg/dL    Albumin 3.0 (L) 3.4 - 5.0 g/dL   Magnesium   Result Value Ref Range    Magnesium 2.12 1.60 - 2.40 mg/dL   Ferritin   Result Value Ref Range    Ferritin 25 8 - 150 ng/mL   POCT GLUCOSE   Result Value Ref Range    POCT Glucose 116 (H) 74 - 99 mg/dL     *Note: Due to a large number of results and/or encounters for the requested time period, some results have not been displayed. A complete set of results can be found in Results Review.        Imaging:  ECG 12 lead    Result Date: 2/11/2025  Normal sinus rhythm Poor R-wave  progression ; consider anterior infarct, lead placement, or normal variant Abnormal ECG When compared with ECG of 10-FEB-2025 17:21, (unconfirmed) Vent. rate has decreased BY  82 BPM Questionable change in QRS axis Nonspecific T wave abnormality now evident in Inferior leads T wave inversion now evident in Anterior leads    ECG 12 lead    Result Date: 2/11/2025  Supraventricular tachycardia Indeterminate axis Possible Anterior infarct , age undetermined Abnormal ECG When compared with ECG of 28-JAN-2025 16:33, Fusion complexes are no longer Present Premature ventricular complexes are no longer Present Questionable change in QRS axis Nonspecific T wave abnormality no longer evident in Inferior leads    CT angio abdomen pelvis w and or wo IV IV contrast    Result Date: 2/10/2025  Interpreted By:  Felipe Driscoll, STUDY: CT ANGIO ABDOMEN PELVIS W AND/OR WO IV IV CONTRAST;  2/10/2025 9:41 pm   INDICATION: Signs/Symptoms:A-fib with RVR, severe abdominal pain not responding to morphine, assess for mesenteric ischemia.   COMPARISON: 01/29/2025   ACCESSION NUMBER(S): [id number]   ORDERING CLINICIAN: GEORGE LOPEZ   TECHNIQUE: Axial non-contrast images of the abdomen, and pelvis.   Axial CT images of the, abdomen and pelvis were obtained after the intravenous administration of iodinated contrast using angiographic technique with coronal and sagittal reformatted images. MIP images and 3D reconstructions were created on an independent workstation and reviewed.   FINDINGS: VASCULATURE:   Mild atherosclerotic calcification of the aorta without aneurysm or dissection.   Celiac artery, SMA, renal arteries are all widely patent. KATHRYN patent. Iliac arteries patent without stenosis.   CT ABDOMEN/PELVIS:   Arterial phase imaging of the liver, adrenals pancreas and spleen are grossly unremarkable. Status post cholecystectomy.   No new hydronephrosis.   Redemonstrated is retroperitoneal adenopathy surrounding the distal aorta/cava and  extending into the right iliac chain which appears to be progressing compared to previous. Necrotic appearing lymph node in the right pelvic sidewall currently measures 2.2 cm in short axis, previously 1.9 cm.   Stable appearance of the bladder which has trabeculated appearance with right-sided posterior diverticulum. There is some irregular right posterior bladder wall thickening. The possibility of underlying bladder malignancy again should be considered. There is some dependent bladder calculi seen.   No significant new free fluid.   No bowel obstruction or acute colitis.   Unchanged left ovarian cyst measuring 3.5 cm in diameter.   There is multilevel moderate to advanced degenerative disc disease seen in the lumbar spine worse at L4-L5 and L5-S1.       1. No abdominal aortic aneurysm or dissection. No evidence of mesenteric ischemia.   2. Compared to previous study there is progressing retroperitoneal adenopathy with some interval enlargement suspected.   3. Irregular masslike thickening right posterior bladder wall near suspected diverticulum. Underlying bladder malignancy may be present. Correlate with patient history/cystoscopy.   MACRO: None.   Signed by: Felipe Driscoll 2/10/2025 11:12 PM Dictation workstation:   [id number]    XR chest 1 view    Result Date: 2/10/2025  Interpreted By:  Wendy Lee, STUDY: XR CHEST 1 VIEW;  2/10/2025 6:19 pm   INDICATION: Signs/Symptoms:Short of breath, vomiting.   COMPARISON: 01/15/2025   ACCESSION NUMBER(S): [id number]   ORDERING CLINICIAN: GEORGE LOPEZ   FINDINGS:     CARDIOMEDIASTINAL SILHOUETTE: Cardiomediastinal silhouette is normal in size and configuration.   LUNGS: No pulmonary consolidation, pleural effusion or pneumothorax.   ABDOMEN: No remarkable upper abdominal findings.   BONES: No acute osseous abnormality.       No acute cardiopulmonary process.   MACRO: None   Signed by: Wendy Lee 2/10/2025 6:31 PM Dictation workstation:   [id number]    ECG 12  lead    Result Date: 2/9/2025  Sinus rhythm with sinus arrhythmia with occasional Premature ventricular complexes Left axis deviation Abnormal ECG When compared with ECG of 15-ANTONIO-2025 11:23, (unconfirmed) Premature ventricular complexes are now Present See ED provider note for full interpretation and clinical correlation Confirmed by Ne Shea ([id number]) on 2/9/2025 3:48:21 PM    ECG 12 lead    Result Date: 2/9/2025  Normal sinus rhythm Left axis deviation Pulmonary disease pattern Abnormal ECG When compared with ECG of 13-JAN-2025 07:22, No significant change was found See ED provider note for full interpretation and clinical correlation Confirmed by Ne Shea ([id number]) on 2/9/2025 3:16:08 PM    Transesophageal Echo (GAVIN)    Result Date: 2/5/2025            [facility] [address] [address] [facility] [address]    Tel [phone] Fax [phone] TRANSESOPHAGEAL ECHOCARDIOGRAM REPORT Patient Name:       LISSETTE Palma Physician:    [id number] Jonathan Magana MD Study Date:         1/30/2025            Ordering Provider:    [id number] JONATHAN MAGANA MRN/PID:            [id number]             Fellow: Accession#:         [id number]         Nurse:                Zulma Ribeiro RN Date of Birth/Age:  1950 / 74      Sonographer:          Mely sutton Gender Assigned at  F                    Additional Staff:     Emmanuelle Jorge RN Birth: Height:             157.48 cm            Admit Date: Weight:             59.42 kg             Admission Status:     Inpatient -                                                                Routine BSA / BMI:          1.60 m2 / 23.96      Department Location:  [facility]                     kg/m2 Blood Pressure: 174 /72 mmHg Study Type:    TRANSESOPHAGEAL ECHO (GAVIN) Diagnosis/ICD: Shortness of breath-R06.02;  Unspecified atrial                fibrillation-I48.91; Abnormal findings on diagnostic imaging of                other specified body structures-R93.89 Indication:    SOb, Afib CPT Codes:     GAVIN Complete-92141  Study Detail: Agitated saline used as a contrast agent for intraseptal flow               evaluation.  PHYSICIAN INTERPRETATION: GAVIN Details: Agitated saline contrast and color flow Doppler echo was performed to assess for the presence of a patent foramen ovale. GAVIN Medication: Midazolam and Fentanyl was used to sedate the patient for this exam. GAVIN Procedure: The probe was passed without difficulty. Left Ventricle: There are no regional wall motion abnormalities. The left ventricular cavity size is normal. Spectral Doppler shows a Grade I (impaired relaxation pattern) of left ventricular diastolic filling with normal left atrial filling pressure. Left Atrium: The left atrial size is mild to moderately dilated. A bubble study using agitated saline was performed. Bubble study is negative. The left atrial appendage is enlarged. There is no definite left atrial mass present. Right Ventricle: The right ventricle is upper limits of normal in size. There is normal right ventricular global systolic function. Right Atrium: The right atrial size is normal. Aortic Valve: The aortic valve appears structurally normal. There is trace aortic valve regurgitation. Mitral Valve: The mitral valve is normal in structure. There is moderate to severe mitral valve regurgitation. The mitral regurgitant orifice area is 4 mm2. The mitral regurgitant volume is 8.73 ml. Tricuspid Valve: The tricuspid valve is structurally normal. There is trace to mild tricuspid regurgitation. Pulmonic Valve: The pulmonic valve was not assessed. There is trace pulmonic valve regurgitation. Pericardium: No pericardial effusion noted. Aorta: The aortic root is normal.  CONCLUSIONS:  1. Spectral Doppler shows a Grade I (impaired relaxation pattern) of  left ventricular diastolic filling with normal left atrial filling pressure.  2. There is normal right ventricular global systolic function.  3. The left atrial size is mild to moderately dilated.  4. Moderate to severe mitral valve regurgitation.  5. No left atrial mass.  6. No obvious cardiac source of embolization by this study. QUANTITATIVE DATA SUMMARY:  LV SYSTOLIC FUNCTION:                      Normal Ranges: EF-Visual:      60 % LV EF Reported: 60 %  MITRAL INSUFFICIENCY:             Normal Ranges: PISA Radius:          0.3 cm MR VTI:               208.00 cm MR Vmax:              519.00 cm/s MR Alias Elieser:         38.5 cm/s MR Volume:            8.73 ml MR Flow Rt:           21.77 ml/s MR EROA:              4 mm2  [id number] Clement Stevens MD Electronically signed on 2/5/2025 at 5:22:41 AM  ** Final **     Electrocardiogram, 12-lead PRN ACS symptoms    Result Date: 2/1/2025  Supraventricular tachycardia with occasional Premature ventricular complexes and Fusion complexes Right axis deviation Nonspecific ST abnormality Abnormal ECG When compared with ECG of 27-JAN-2025 20:14, (unconfirmed) Fusion complexes are now Present Premature ventricular complexes are now Present QRS axis Shifted right Borderline criteria for Inferior infarct are no longer Present Nonspecific T wave abnormality now evident in Inferior leads Confirmed by LISBETH Agrawal ([id number]) on 2/1/2025 6:38:48 PM    Electrocardiogram, 12-lead PRN ACS symptoms    Result Date: 2/1/2025  Supraventricular tachycardia Low voltage QRS Possible Inferior infarct , age undetermined Abnormal ECG When compared with ECG of 27-JAN-2025 17:29, (unconfirmed) Vent. rate has increased BY  75 BPM QRS axis Shifted right ST now depressed in Anterior leads Confirmed by LISBETH Agrawal ([id number]) on 2/1/2025 6:38:39 PM    Electrocardiogram, 12-lead PRN ACS symptoms    Result Date: 2/1/2025  Normal sinus rhythm Left axis deviation Pulmonary disease pattern Abnormal ECG When compared with ECG of  27-JAN-2025 09:00, (unconfirmed) QRS axis Shifted left Confirmed by LISBETH Agrawal ([id number]) on 2/1/2025 6:38:23 PM    ECG 12 Lead    Result Date: 1/31/2025  Normal sinus rhythm Normal ECG When compared with ECG of 24-JAN-2025 16:10, Premature ventricular complexes are no longer Present Vent. rate has decreased BY  58 BPM QRS axis Shifted right Borderline criteria for Inferior infarct are no longer Present Nonspecific T wave abnormality now evident in Inferior leads T wave amplitude has decreased in Anterior leads Confirmed by Clement Stevens ([id number]) on 1/31/2025 6:21:28 PM    ECG 12 Lead    Result Date: 1/31/2025  Normal sinus rhythm Left axis deviation Pulmonary disease pattern T wave abnormality, consider anterior ischemia Abnormal ECG When compared with ECG of 24-JAN-2025 16:10, Premature ventricular complexes are no longer Present Vent. rate has decreased BY  67 BPM Nonspecific T wave abnormality now evident in Inferior leads T wave inversion now evident in Anterior leads Confirmed by Clement Stevens ([id number]) on 1/31/2025 6:21:11 PM    MR brain wo IV contrast    Result Date: 1/29/2025  Interpreted By:  Nicolas Sarmiento, STUDY: MR BRAIN WO IV CONTRAST;  1/29/2025 2:34 pm   INDICATION: Signs/Symptoms:aphasia, history of PAF with interruption in anticoagulation.     COMPARISON: CT head and CTA head yesterday.   ACCESSION NUMBER(S): [id number]   ORDERING CLINICIAN: TIGIST KEYS   TECHNIQUE: Patient could only tolerate limited examination. Axial DWI, ADC, sagittal and axial T1 weighted sequences were obtained.   FINDINGS: Multiple small patchy foci of abnormally restricted diffusion are seen in the bilateral cerebral hemispheres predominantly each centrum semiovale and oriented in AP direction with some additional involvement of the anterior right middle frontal gyrus cortex, the posterior right corpus callosum, paramedian right occipital lobe. Questionable additional involvement of left-greater-than-right cerebellar  hemispheres.   No definite intracranial hemorrhage.     No extra-axial fluid collection. No intracranial mass.   Mild to moderate white matter T2 signal increase, most commonly seen with chronic small vessel ischemic change.  Presence of older small infarctions in these areas not excluded.   Degree of ventriculomegaly is commensurate with overall degree of brain parenchymal volume loss, which is mild.       1. Multiple small patchy foci of abnormally restricted diffusion are seen in the bilateral cerebral hemispheres predominantly each centrum semiovale and oriented in AP direction with some additional involvement of the anterior right middle frontal gyrus cortex, the posterior right corpus callosum, paramedian right occipital lobe. Questionable additional involvement of left-greater-than-right cerebellar hemispheres. Findings consistent with acute to subacute infarctions. The AP orientation of the high bilateral cerebral hemispheres could indicate a recent hypotensive event. 2. Mild to moderate white matter T2 signal increase, most commonly seen with chronic small vessel ischemic change.  Presence of older small infarctions in these areas not excluded. 3. Degree of ventriculomegaly is commensurate with overall degree of brain parenchymal volume loss, which is mild.   Note the patient was only able to tolerate limited examination as described above.   MACRO: None   Signed by: Nicolas Sarmiento 1/29/2025 3:12 PM Dictation workstation:   [id number]    CT abdomen pelvis w IV contrast    Result Date: 1/29/2025  Interpreted By:  Evonne Yi, STUDY: CT ABDOMEN PELVIS W IV CONTRAST;  1/29/2025 9:32 am   INDICATION: Signs/Symptoms:abdominal pain with acute encephalopathy.     COMPARISON: CT abdomen pelvis 01/24/2025 CT chest abdomen pelvis 01/28/2024   ACCESSION NUMBER(S): [id number]   ORDERING CLINICIAN: TIGIST KEYS   TECHNIQUE: CT of the abdomen and pelvis was performed.  Standard contiguous axial images were  obtained at 3 mm slice thickness through the abdomen and pelvis. Coronal and sagittal reconstructions at 3 mm slice thickness were performed.  75 ML of Omnipaque 350 was administered intravenously without immediate complication.   FINDINGS: Please note that the evaluation of vessels, lymph nodes and organs is limited without intravenous contrast.   LOWER CHEST: The heart is normal in size without evidence of pericardial effusion. No consolidation or pleural effusion is present. No concerning lung nodule. Small hiatal hernia again noted.   ABDOMEN:   LIVER: The liver is normal in size without evidence of focal liver lesions.   BILE DUCTS: Stable mild intrahepatic and extrahepatic biliary duct dilatation, likely secondary to cholecystectomy.   GALLBLADDER: Cholecystectomy   PANCREAS: The pancreas appears unremarkable without evidence of ductal dilatation or masses.   SPLEEN: The spleen is normal in size without focal lesions.   ADRENAL GLANDS: No adrenal nodularity or thickening.   KIDNEYS AND URETERS: The kidneys are normal in size. Stable nonobstructive punctuate calculus in the right kidney. Stable sub 5 mm hypoattenuating lesions in the bilateral kidneys, which are too small to further characterize. No hydroureteronephrosis or left nephroureterolithiasis is identified.   PELVIS:   BLADDER: The bladder is completely opacified by contrast from prior imaging, which limits evaluation.   REPRODUCTIVE ORGANS: No pelvic masses. A stable 48 mm left adnexal cyst is seen on series 2, image 116. A 21 x 19 mm right adnexal lesion could be a right adnexal cyst or a perivesicular lymph node.   BOWEL: Small hiatal hernia. The small and large bowel are normal in caliber and demonstrate no wall thickening. The appendix is not definitely visualized. There is however no pericecal stranding or fluid.   VESSELS: There is no aneurysmal dilatation of the abdominal aorta. Mild atherosclerotic calcifications of the aortoiliac arteries.  The IVC appears normal.   PERITONEUM/RETROPERITONEUM/LYMPH NODES: Retroperitoneal lymphadenopathy with adjacent stranding, stable when compared to prior CT from 01/24/2025, and new when compared to prior CT from 01/28/2024, for example as seen on series 2: *16 x 10 mm aortocaval lymph node, image 75 *12 x 11 mm retrocaval lymph node, image 91 *16 x 15 mm right common iliac lymph node, image 97 *20 x 16 mm right internal iliac lymph node, image 104 *29 x 20 mm right external iliac lymph node, image 126 *24 x 14 mm right external iliac lymph node, image 135 *21 x 19 mm perivascular lesion, which could be a lymph node or right adnexal cyst, image 131 *20 x 18 mm right pelvic sidewall lesion, image 117   No ascites or fluid collection. No peritoneal nodularity or implant.   ABDOMINAL WALL: The abdominal wall soft tissues appear normal. Sacral spinal stimulator noted.   BONES: No suspicious osseous lesions are identified. Degenerative discogenic disease is noted in the lower thoracic and lumbar spine. Grade 1 anterolisthesis of the L4 and L5 vertebral bodies.       1. Retroperitoneal lymphadenopathy, stable when compared to prior CT from 01/24/2025, and new when compared to prior CT from 01/28/2024. Though the adjacent stranding favors an infectious/inflammatory etiology, underlying malignancy such as lymphoma or bladder carcinoma (given the bladder wall thickening seen on prior CT) can not be excluded. If clinical signs/symptoms favor infectious etiology, short-term follow-up CT abdomen pelvis in 4-6 weeks is recommended. Alternatively, correlation with serum LDH levels, cystoscopy, tissue diagnosis and/or PET-CT are other diagnostic considerations that could be obtained as per clinical need. 2. No significant interval change as compared to prior CT from 01/24/2025.   MACRO: Critical Finding:  There are multiple critical findings. See findings. notification was initiated on 1/29/2025 at 10:55 am by Evonne Yi.   (**-YCF-**)   Signed by: Evonne Yi 1/29/2025 10:56 AM Dictation workstation:   [id number]    CT angio head and neck w and wo IV contrast    Result Date: 1/28/2025  Interpreted By:  Nicolas Sarmiento, STUDY: CT ANGIO HEAD AND NECK W AND WO IV CONTRAST;  1/28/2025 6:46 pm   INDICATION: Signs/Symptoms:stroke rule out.     COMPARISON: CT head today.   ACCESSION NUMBER(S): [id number]   ORDERING CLINICIAN: TIGIST KEYS   TECHNIQUE: Unenhanced CT images of the head were obtained.  Subsequently,  60 ML of Omnipaque 350 was administered intravenously and axial images of the head and neck were acquired.  Coronal, sagittal, and 3-D reconstructions were provided for review.   FINDINGS:   CTA COW: No major vascular occlusion. Mild atherosclerotic changes through the carotid siphons. No aneurysms.  No vascular malformations.   CTA CAROTID: No aortic aneurysm or dissection. No significant stenoses at the origins of the great vessels from the aortic arch. No significant stenoses of the brachycephalic artery or bilateral subclavian arteries.   Mild atherosclerotic changes at the carotid bifurcations. No significant stenoses bilateral carotid arterial systems. No significant stenoses bilateral vertebral arterial systems.   NONVASCULAR HEAD: No intracranial mass. No intracranial hemorrhage. No evidence of large evolving cortical infarction. Supratentorial white matter hypodensities most likely related to mild chronic small vessel ischemic change.     Bones intact. Mucous retention cyst left maxillary sinus.   NONVASCULAR NECK: No soft tissue mass. No adenopathy. Salivary glands are unremarkable. Thyroid gland unremarkable. Bones intact.       1. No intracranial major vascular occlusion. 2. No flow-limiting stenosis or significant plaque irregularity in the neck.     MACRO: None   Signed by: Nicolas Sarmiento 1/28/2025 7:30 PM Dictation workstation:   [id number]    CT brain attack head wo IV contrast    Result Date:  1/28/2025  Interpreted By:  Howard Millard, STUDY: TIGIST KEYS; 1/28/2025 5:08 pm   INDICATION: Signs/Symptoms:brain attack;   COMPARISON: 01/28/2024   ACCESSION NUMBER(S): [id number]   ORDERING CLINICIAN: TIGIST KEYS   TECHNIQUE: Contiguous axial images were acquired from the vertex through the posterior fossa without IV contrast. All CT examinations are performed with 1 or more of the following dose reduction techniques: Automated exposure control, adjustment of mA and/or kv according to patient's size, or use of iterative reconstruction techniques.   FINDINGS: No focal mass effect or midline shift is identified. The ventricles and sulci are symmetric and appropriate for the patient's age.   The gray white matter differentiation is preserved. Again seen is a moderate degree of nonspecific white matter hypodensity, most consistent with chronic small-vessel ischemic disease, not significantly changed from the prior study.   No acute intracranial hemorrhage is seen. No intra-axial or extra-axial fluid collection is seen.   The visualized paranasal sinuses and mastoid air cells are clear. There is a small-moderate sized mucous retention cysts in the visualized left maxillary sinus.       No CT evidence for acute intracranial pathology.   Moderate degree of nonspecific white matter hypodensity, most consistent with chronic small-vessel ischemic disease, not significantly changed when compared to the prior study.         Findings discussed with the physician on the 2nd floor at 5:20 p.m. on 01/28/2025   Signed by: Howard Millard 1/28/2025 5:39 PM Dictation workstation:   [id number]    ECG 12 lead    Result Date: 1/25/2025  Supraventricular tachycardia Left axis deviation Anterior infarct (cited on or before 17-JAN-2025) Abnormal ECG When compared with ECG of 17-JAN-2025 06:56, FL interval has decreased Vent. rate has increased BY  90 BPM Confirmed by Clement Stevens ([id number]) on 1/25/2025 1:40:18 PM    ECG 12  lead    Result Date: 1/25/2025  Normal sinus rhythm Left axis deviation Poor R-wave progression ; consider anterior infarct, lead placement, or normal variant Abnormal ECG When compared with ECG of 23-JAN-2025 20:48, (unconfirmed) Vent. rate has decreased BY  59 BPM Confirmed by Clement Stevens ([id number]) on 1/25/2025 1:40:12 PM    Electrocardiogram, 12-lead PRN ACS symptoms    Result Date: 1/25/2025  Supraventricular tachycardia with occasional Premature ventricular complexes Left axis deviation Pulmonary disease pattern Possible Inferior infarct , age undetermined Abnormal ECG When compared with ECG of 23-JAN-2025 21:55, (unconfirmed) Premature ventricular complexes are now Present Vent. rate has increased BY  47 BPM Nonspecific T wave abnormality no longer evident in Anterior leads Confirmed by Clement Stevens ([id number]) on 1/25/2025 1:40:07 PM    Transthoracic Echo (TTE) Complete    Result Date: 1/24/2025            [facility] [place] [address] [facility] [address]    Tel [phone] Fax [phone] TRANSTHORACIC ECHOCARDIOGRAM REPORT Patient Name:       LISSETTE BOB ZAVALA         Reading Physician:    [id number] Clement Stevens MD Study Date:         1/24/2025            Ordering Provider:    [id number] TIGIST KEYS MRN/PID:            [id number]             Fellow: Accession#:         [id number]         Nurse: Date of Birth/Age:  1950 / 74      Sonographer:          Flakita Quarles RDCS                     years Gender Assigned at  F                    Additional Staff: Birth: Height:             157.48 cm            Admit Date:           1/23/2025 Weight:             63.96 kg             Admission Status:     Inpatient -                                                                Priority                                                                discharge BSA / BMI:          1.65 m2 /  25.79      Department Location:  [facility] CCU SD                     kg/m2 Blood Pressure: 168 /70 mmHg Study Type:    TRANSTHORACIC ECHO (TTE) COMPLETE Diagnosis/ICD: Dyspnea, unspecified-R06.00 Indication:    Dyspnea CPT Codes:     Echo Complete w Full Doppler-[id number] Patient History: Diabetes:          Yes Pertinent History: Dyspnea, Palpitations, CAD, A-Fib, HTN, Hyperlipidemia and                    Previous SVT. GERD; previous echocardiogram 12-. Study Detail: The following Echo studies were performed: M-Mode, 2D, Doppler and               color flow.  PHYSICIAN INTERPRETATION: Left Ventricle: Left ventricular ejection fraction is normal, calculated by Rangel's biplane at 71%. There is mild left ventricular hypertrophy. There are no regional wall motion abnormalities. The left ventricular cavity size is normal. Spectral Doppler shows a Grade I (impaired relaxation pattern) of left ventricular diastolic filling with normal left atrial filling pressure. Left Atrium: The left atrium is mild to moderately dilated. Right Ventricle: The right ventricle is upper limits of normal in size. There is normal right ventricular global systolic function. Right Atrium: The right atrium is mildly dilated. Aortic Valve: The aortic valve appears structurally normal. There is mild aortic valve cusp calcification. There is mild to moderate aortic valve regurgitation. The peak instantaneous gradient of the aortic valve is 8 mmHg. Mitral Valve: The mitral valve is normal in structure. The peak instantaneous gradient of the mitral valve is 5 mmHg. There is mild mitral valve regurgitation. Tricuspid Valve: The tricuspid valve is structurally normal. There is mild tricuspid regurgitation. Pulmonic Valve: The pulmonic valve was not assessed. There is trace to mild pulmonic valve regurgitation. Pericardium: No pericardial effusion noted. Aorta: The aortic root is normal.  CONCLUSIONS:  1. Left ventricular ejection fraction is normal,  calculated by Rangel's biplane at 71%.  2. Spectral Doppler shows a Grade I (impaired relaxation pattern) of left ventricular diastolic filling with normal left atrial filling pressure.  3. There is normal right ventricular global systolic function.  4. The left atrium is mild to moderately dilated.  5. Mild to moderate aortic valve regurgitation. QUANTITATIVE DATA SUMMARY:  2D MEASUREMENTS:            Normal Ranges: LAs:             4.50 cm    (2.7-4.0cm) IVSd:            0.86 cm    (0.6-1.1cm) LVPWd:           1.06 cm    (0.6-1.1cm) LVIDd:           5.63 cm    (3.9-5.9cm) LVIDs:           4.00 cm LV Mass Index:   127.2 g/m2 LV % FS          29.0 %  LA VOLUME:                    Normal Ranges: LA Vol A4C:        55.9 ml    (22+/-6mL/m2) LA Vol A2C:        50.6 ml LA Vol BP:         54.1 ml LA Vol Index A4C:  34.0ml/m2 LA Vol Index A2C:  30.7 ml/m2 LA Vol Index BP:   32.8 ml/m2 LA Area A4C:       20.2 cm2 LA Area A2C:       18.9 cm2 LA Major Axis A4C: 6.2 cm LA Major Axis A2C: 6.0 cm LA Volume Index:   30.9 ml/m2 LA Vol A4C:        53.0 ml LA Vol A2C:        48.0 ml LA Vol Index BSA:  30.7 ml/m2  M-MODE MEASUREMENTS:         Normal Ranges: Ao Root:             3.20 cm (2.0-3.7cm)  AORTA MEASUREMENTS:         Normal Ranges: Ao Sinus, d:        3.00 cm (2.1-3.5cm) Ao STJ, d:          2.60 cm (1.7-3.4cm) Asc Ao, d:          3.30 cm (2.1-3.4cm)  LV SYSTOLIC FUNCTION BY 2D PLANIMETRY (MOD):                      Normal Ranges: EF-A4C View:    69 % (>=55%) EF-A2C View:    75 % EF-Biplane:     71 % LV EF Reported: 71 %  LV DIASTOLIC FUNCTION:             Normal Ranges: MV Peak E:             1.10 m/s    (0.7-1.2 m/s) MV Peak A:             1.10 m/s    (0.42-0.7 m/s) E/A Ratio:             1.00        (1.0-2.2) MV e'                  0.080 m/s   (>8.0) MV lateral e'          0.08 m/s MV medial e'           0.08 m/s E/e' Ratio:            13.76       (<8.0) PulmV Sys Elieser:         49.90 cm/s PulmV Flores Elieser:        35.50 cm/s  PulmV S/D Elieser:         1.40 PulmV A Revs Elieser:      26.70 cm/s PulmV A Revs Dur:      148.00 msec  MITRAL VALVE:          Normal Ranges: MV Vmax:      1.12 m/s (<=1.3m/s) MV peak P.0 mmHg (<5mmHg) MV mean P.0 mmHg (<48mmHg) MV DT:        190 msec (150-240msec)  AORTIC VALVE:           Normal Ranges: AoV Vmax:      1.42 m/s (<=1.7m/s) AoV Peak P.1 mmHg (<20mmHg) LVOT Max Elieser:  1.05 m/s (<=1.1m/s) LVOT VTI:      27.40 cm LVOT Diameter: 1.80 cm  (1.8-2.4cm) AoV Area,Vmax: 1.88 cm2 (2.5-4.5cm2)  RIGHT VENTRICLE: RV Basal 2.80 cm RV Mid   2.70 cm RV Major 7.0 cm TAPSE:   22.0 mm RV s'    0.14 m/s  PULMONIC VALVE:          Normal Ranges: PV Accel Time:  95 msec  (>120ms) PV Max Elieser:     0.9 m/s  (0.6-0.9m/s) PV Max P.9 mmHg  Pulmonary Veins: PulmV A Revs Dur: 148.00 msec PulmV A Revs Elieser: 26.70 cm/s PulmV Flores Elieser:   35.50 cm/s PulmV S/D Elieser:    1.40 PulmV Sys Elieser:    49.90 cm/s  [id number] Clement Stevens MD Electronically signed on 2025 at 1:58:10 PM  ** Final **     CT abdomen pelvis wo IV contrast    Result Date: 2025  Interpreted By:  Gerard Maxwell, STUDY: CT ABDOMEN PELVIS WO IV CONTRAST;  2025 3:25 am   INDICATION: Signs/Symptoms:kidney stones. History of overactive bladder with continence control stimulator insert in . Symptoms recurred in  with urge incontinence and underwent stimulator revision on 10/14/2024.     COMPARISON: 2025   ACCESSION NUMBER(S): [id number]   ORDERING CLINICIAN: TIGIST KEYS   TECHNIQUE: Contiguous axial images of the abdomen and pelvis were obtained without intravenous contrast. Coronal and sagittal reformatted images were reconstructed from the axial data.   FINDINGS: Note: The absence of intravenous contrast limits evaluation of the solid organs and vasculature.   LOWER CHEST: No acute abnormality.     ABDOMEN/PELVIS:   ABDOMINAL WALL: There is a bladder control device in the right buttock with a single lead coursing through the  right S3 neural foramen.   LIVER: The liver demonstrates a normal noncontrast attenuation.   BILE DUCTS: Prominent intrahepatic and extrahepatic bile ducts are likely secondary to post-cholecystectomy status.   GALLBLADDER: Surgically absent.   PANCREAS: No significant abnormality.   SPLEEN: No significant abnormality.   ADRENALS: No significant abnormality.   KIDNEYS, URETERS, BLADDER: Non-obstructing 0.2 cm right renal calculus. No left renal calculus. No hydronephrosis. Urinary bladder wall is inflamed with perivesical fat stranding similar to prior study. There is thickening and inflammation of the distal right ureter. There is eccentric wall thickening adjacent to the right uterovesical junction which was seen on prior study as well. There are multiple bladder calculi measuring 0.8 cm and 1.3 cm.   REPRODUCTIVE ORGANS: Surgically absent uterus. There is a simple left ovarian cyst measuring 3.4 cm, slightly smaller compared to prior study.   VESSELS: Mild aortic atherosclerosis without AAA.   RETROPERITONEUM/LYMPH NODES: There is progression of now extensive inflammatory fat stranding within multiple retroperitoneal compartments. This is most severe on the right pelvic sidewall, extends into the right femoral canal, involves the presacral space, and extends superiorly to the aortoiliac bifurcation and along the aorta at the level of the renal veins. This stranding is accompanied by diffuse lymphadenopathy which is also progressed in degree and extent compared to prior study. For example, a 1.3 cm right common iliac node previously measured 0.9 cm. A 1.6 cm right common iliac node previously measured 0.7 cm, 1.3 cm by common iliac ovaries noted 0.8 cm, 2.1 cm by external iliac node previously measured 0.9 cm, 2 cm right external iliac lymph node previously measured 1.6 cm amongst others. There are no enlarged left external iliac lymph nodes. There is only a 0.7 cm left common iliac node that is unchanged. There  are also newly enlarged aortocaval and periaortic lymph nodes predominantly measuring between 0.5 cm and 1 cm.   BOWEL/PERITONEUM: There is colonic diverticulosis. No inflammatory bowel wall thickening or dilatation. Normal appendix.   No ascites, free air, or fluid collection.     MUSCULOSKELETAL: No acute osseous abnormality. No suspicious osseous lesion. Severe L4-L5 disc height loss with grade 1 degenerative anterolisthesis of L4 on L5 and moderate bilateral facet arthropathy. There is not severe central canal stenosis at L4-L5 and severe bilateral L4-5 foraminal stenosis. There are decompressive laminectomies at L2-L4.       1. Significant progression of severe inflammatory changes in the right pelvic sidewall, presacral space, right femoral canal and extending superiorly along the aorta and IVC to the level of the renal veins accompanied by diffuse lymphadenopathy which is also significantly progressed as detailed above. This inflammatory perinodal stranding favors an infectious or inflammatory etiology, although this the extent and degree of change is unusual for typical bladder infection. Recommend timely urological follow-up for further investigation. Correlation with urinalysis recommended.   2. Redemonstration of diffusely thickened inflamed bladder wall with diffuse thickening of the distal right ureter and multiple intraluminal bladder calculi may service continued nidus of continued infection.   3. No hydronephrosis. Nonobstructing 0.2 cm right renal calculus.   MACRO: None.   Signed by: Gerard Maxwell 1/24/2025 3:41 AM Dictation workstation:   [id number]    ECG 12 lead    Result Date: 1/17/2025  Sinus rhythm with 1st degree AV block Left axis deviation Possible Anterior infarct , age undetermined Abnormal ECG When compared with ECG of 16-JAN-2025 06:38, (unconfirmed) Nonspecific T wave abnormality no longer evident in Anterior leads Confirmed by Luigi Chisholm ([id number]) on 1/17/2025 10:17:22 PM    ECG  12 lead    Result Date: 1/17/2025  Normal sinus rhythm Left axis deviation Nonspecific T wave abnormality Abnormal ECG When compared with ECG of 15-ANTONIO-2025 13:47, (unconfirmed) Premature ventricular complexes are no longer Present Nonspecific T wave abnormality, worse in Anterior leads Confirmed by Luigi Chisholm ([id number]) on 1/17/2025 8:14:36 PM    XR chest 1 view    Result Date: 1/15/2025  Interpreted By:  Monique Caballero, STUDY: XR CHEST 1 VIEW;  1/15/2025 11:11 am   INDICATION: Signs/Symptoms:Chest Pain.   COMPARISON: None.   ACCESSION NUMBER(S): [id number]   ORDERING CLINICIAN: ESCOBAR GALLEGOS   FINDINGS: CARDIOMEDIASTINAL SILHOUETTE: Cardiomediastinal silhouette is normal in size and configuration.     LUNGS: Lungs are clear.   ABDOMEN: No remarkable upper abdominal findings.     BONES: No acute osseous changes.       No acute cardiopulmonary process.   MACRO: None   Signed by: Monique Caballero 1/15/2025 11:21 AM Dictation workstation:   [id number]           Plan  Intractable pain     #Bladder mass  #Recent CVA  - Eliquis on hold.    - Heparin gtt placed on hold this morning for OR today  - NPO  - Plan for cystoscopy with biopsy lesion ablation    Plan of care discussed with Dr. Dillon Montiel, ANA-CNP    I spent 15 minutes in the professional and overall care of this patient.

## 2025-02-12 NOTE — OP NOTE
CYSTOSCOPY, WITH BLADDER LESION ABLATION, CYSTOLITHALOPAXY COMPLEX, TURBT LARGE Operative Note     Date: 2025  OR Location: [facility] OR    Name: Myrna Rodrigues YOB: 1950, Age: 74 y.o., MRN: [id number], Sex: female    Diagnosis  Pre-op Diagnosis      * Intractable pain [R52]     * Bladder mass [N32.89] Post-op Diagnosis     * Intractable pain [R52]     * Bladder mass [N32.89]     Procedures  CYSTOSCOPY, WITH BLADDER LESION ABLATION, CYSTOLITHALOPAXY COMPLEX, TURBT LARGE  [id number] - [place] CYSTOURETHROSCOPY W/DEST &/RMVL MED BLADDER STEVEN      Surgeons      * Andrea Carranza - Primary    Resident/Fellow/Other Assistant:  Surgeons and Role:     * Elijah Buckley MD - Assisting    Staff:   Scrub Person:   Toniaulator: Renata    Anesthesia Staff: Anesthesiologist: Leon Flowers DO  CRNA: ANA Sweeney-CRNA  C-AA: RICHARD Burnham    Procedure Summary  Anesthesia: Anesthesia type not filed in the log.  ASA: IV  Estimated Blood Loss: 5mL  Intra-op Medications:   Administrations occurring from 1615 to 1825 on 25:   Medication Name Total Dose   sodium chloride 0.9 % irrigation solution 3,000 mL   ceFAZolin (Ancef) IV 2 g in 100 mL dextrose (iso) - premix 2 g   dexAMETHasone (Decadron) injection 4 mg/mL 4 mg   fentaNYL (Sublimaze) injection 50 mcg/mL 100 mcg   LR bolus Cannot be calculated   lidocaine (Xylocaine) injection 2 % 80 mg   phenylephrine 100 mcg/mL syringe 10 mL (prefilled) 300 mcg   propofol (Diprivan) injection 10 mg/mL 100 mg   rocuronium (ZeMuron) 50 mg/5 mL injection 70 mg              Anesthesia Record               Intraprocedure I/O Totals          Intake    LR bolus 800.00 mL    Total Intake 800 mL          Specimen:   ID Type Source Tests Collected by Time   1 : BLADDER TUMOR Tissue BLADDER TRANSURETHRAL RESECTION SURGICAL PATHOLOGY EXAM Andrea Carranza MD 2025 [id number]                 Drains and/or Catheters:   Urethral Catheter Latex 22 Fr. (Active)       Tourniquet Times:         Implants:      Findings: Cystolithalopaxy of bladder stones 3cm in aggregate. 5.5cm wide right posterio-lateral bladder tumor incompletely resected due to size. Presumed diverticulum full of tumor-like debris unroofed with thick white debris drained.     Indications: Myrna Rodrigues is an 74 y.o. female who is having surgery for Intractable pain [R52]  Bladder mass [N32.89].     The patient was seen in the preoperative area. The risks, benefits, complications, treatment options, non-operative alternatives, expected recovery and outcomes were discussed with the patient. The possibilities of reaction to medication, pulmonary aspiration, injury to surrounding structures, bleeding, recurrent infection, the need for additional procedures, failure to diagnose a condition, and creating a complication requiring transfusion or operation were discussed with the patient. The patient concurred with the proposed plan, giving informed consent.  The site of surgery was properly noted/marked if necessary per policy. The patient has been actively warmed in preoperative area. Preoperative antibiotics have been ordered and given within 1 hours of incision. Venous thrombosis prophylaxis have been ordered including bilateral sequential compression devices    Procedure Details:     TURBT information:  -  1 single tumor  - 5.5 cm size  - deeply invasive  - primary tumor  -  no concern for CIS  - Bimanual exam under anesthesia showed bladder fixation on the right side.  - Visually INCOMPLETE resection .  -  There was visualization of detrusor muscle in resection base .    Operative report:  Patient consented to procedure in preoperative area. Risks and benefits discussed. Allergies were reviewed and preoperative antibiotics were administered. Patient was brought to the operating room and placed in supine position on the operating room table. A timeout was performed. All were in agreement. Patient underwent general anesthesia without complication. They  were repositioned in dorsal lithotomy and prepped and draped in the usual sterile fashion.     A resectoscope was used to perform cystourethroscopy. Urethra was normal. UOs were in normal orthotopic position. Several large bladder stones were noted in the dependent bladder with an aggregate size of about 3 cm.  Bladder mass measuring approximately 5-1/2 cm wide was noted on the right posterior lateral bladder wall with overlying keratinizing squamous epithelium.  The bladder was extremely trabeculated with many small diverticula at the dome.  The stone  was introduced and the bladder stones were crushed into small pieces and irrigated easily at the bladder.  Next, the thin electrocautery loop was used to resect the bladder tumors systematically.  The bladder seem to overlie a bladder diverticulum which was opened upon resection with a flux of fluffy white exudate.  The interior of the diverticulum also appeared to be involved with bladder tumor which was partially resected but the resection was abandoned due to concerns about the depth of invasion.  There was visible detrusor in the resection specimen.  After resection, electrocautery was used to cauterize the base of the tumors. The bladder was drained and the tumor chips were completely emptied. We reintroduced the resectoscope and carefully checked for hemostasis after emptying the bladder. There was no active bleeding and hemostasis was excellent.  Reinspection of the bladder revealed further keratinizing squamous metaplasia over the right base and left anterior bladder.  We made the decision to leave these in situ given the extensiveness of the prior resection.  All instruments were removed.  A bimanual exam was performed which found the bladder to be fixed on the right side.  A 24 Fr Vega catheter was placed and bladder was drained. Patient was awoken from anesthesia and transferred to PACU in stable condition.     Complications:  None; patient  tolerated the procedure well.    Disposition: PACU - hemodynamically stable.  Condition: stable                 Additional Details: N/a    Attending Attestation:     Andrea Carranza  Phone Number: [phone]

## 2025-02-12 NOTE — ANESTHESIA PREPROCEDURE EVALUATION
Patient: Myrna Rodrigues    Procedure Information       Date/Time: 02/12/25 1043    Procedures:       CYSTOSCOPY, WITH BLADDER LESION ABLATION      Semirigid ureteroscop with possible biopsy (Right)    Location: [facility] / [facility]    Surgeons: Andrea Carranza MD            Relevant Problems   Cardiac   (+) Angina, class III (CMS-HCC)   (+) Cardiac dysrhythmia   (+) Hypertension, essential, benign   (+) Mixed hyperlipidemia   (+) Ventricular tachycardia (Multi)      Pulmonary   (+) Asthma   (+) Chronic obstructive pulmonary disease (Multi)   (+) Moderate persistent asthma      Neuro   (+) Depression   (+) Left lumbar radiculopathy   (+) Major depression   (+) Major depressive disorder      GI   (+) Chronic diarrhea   (+) Gastroesophageal reflux disease   (+) Irritable bowel syndrome with both constipation and diarrhea      /Renal   (+) UTI (urinary tract infection)      Endocrine   (+) Controlled type 2 diabetes mellitus without complication, without long-term current use of insulin (Multi)   (+) Obesity   (+) Postsurgical hypothyroidism   (+) Type 2 diabetes mellitus with other specified complication, without long-term current use of insulin      Hematology   (+) Iron deficiency anemia due to chronic blood loss   (+) Other specified anemias      Musculoskeletal   (+) Spinal stenosis of lumbar region without neurogenic claudication   (+) Unilateral primary osteoarthritis, unspecified knee      HEENT   (+) Sensorineural hearing loss (SNHL) of both ears   (+) Sensorineural hearing loss, bilateral      ID   (+) Nail fungus   (+) Tinea pedis of both feet   (+) UTI (urinary tract infection)       Clinical information reviewed:    Allergies  Meds               NPO Detail:  No data recorded     Physical Exam    Airway  Mallampati: II  TM distance: >3 FB  Neck ROM: full     Cardiovascular - normal exam     Dental    Pulmonary - normal exam     Abdominal - normal exam             Anesthesia Plan    History of general  anesthesia?: yes  History of complications of general anesthesia?: no    ASA 4 - emergent     general   (CONCLUSIONS:   1. Spectral Doppler shows a Grade I (impaired relaxation pattern) of left ventricular diastolic filling with normal left atrial filling pressure.   2. There is normal right ventricular global systolic function.   3. The left atrial size is mild to moderately dilated.   4. Moderate to severe mitral valve regurgitation.   5. No left atrial mass.   6. No obvious cardiac source of embolization by this study.     )  intravenous induction   Anesthetic plan and risks discussed with patient.    Plan discussed with CRNA.

## 2025-02-12 NOTE — PROGRESS NOTES
Myrna Rodrigues is a 74 y.o. female with PMH of afib s/p ablation on Elqiuis, HTN, CAD, HLD, GERD, NIDDM2, SVT, hysterectomy, appendectomy, left oophorectomy, and hypothyroidism on day 1 of admission who presented with intractable abdominal pain with a known bladder lesion awaiting cystoscopy.    Subjective   There were no acute events overnight. This morning her pain was a 4/10. She said she had spoken to her son last night about her options for care. After discussing the risks and benefits of the cystoscopy vs. stroke risk when holding anticoagulation with her son, they decided the cystoscopy is aligning most with her goals.    Objective   Last Recorded Vitals  /76 (BP Location: Left arm, Patient Position: Lying)   Pulse (!) 144   Temp 36.2 °C (97.2 °F) (Temporal)   Resp 18   Wt 62 kg (136 lb 11 oz)   SpO2 96%   Intake/Output last 3 Shifts:    Intake/Output Summary (Last 24 hours) at 2/12/2025 1136  Last data filed at 2/11/2025 1708  Gross per 24 hour   Intake 105.55 ml   Output --   Net 105.55 ml     Admission Weight  Weight: 63.5 kg (139 lb 15.9 oz) (02/10/25 1735)    Daily Weight  02/11/25 : 62 kg (136 lb 11 oz)    Image Results  ECG 12 lead  Normal sinus rhythm  Normal ECG  When compared with ECG of 12-FEB-2025 09:12, (unconfirmed)  Premature ventricular complexes are no longer Present  Vent. rate has decreased BY  70 BPM  Borderline criteria for Inferior infarct are no longer Present  ST no longer depressed in Anterior leads  ECG 12 lead  Supraventricular tachycardia with occasional Premature ventricular complexes  Left axis deviation  Pulmonary disease pattern  Possible Inferior infarct , age undetermined  Abnormal ECG  When compared with ECG of 12-FEB-2025 09:06, (unconfirmed)  Fusion complexes are no longer Present  Premature ventricular complexes are now Present  ECG 12 lead  Poor data quality, interpretation may be adversely affected  Supraventricular tachycardia with Fusion complexes  Left axis  deviation  Pulmonary disease pattern  Nonspecific ST and T wave abnormality  Abnormal ECG  When compared with ECG of 10-FEB-2025 19:04, (unconfirmed)  Significant changes have occurred    Physical Exam  Constitutional:       Appearance: Normal appearance.   HENT:      Head: Normocephalic.      Right Ear: External ear normal.      Left Ear: External ear normal.      Nose: Nose normal.      Mouth/Throat:      Mouth: Mucous membranes are moist.   Eyes:      Extraocular Movements: Extraocular movements intact.   Cardiovascular:      Rate and Rhythm: Normal rate and regular rhythm.   Pulmonary:      Effort: Pulmonary effort is normal.      Breath sounds: Normal breath sounds.   Abdominal:      General: Bowel sounds are normal.      Tenderness: There is abdominal tenderness.      Comments: Tenderness and guarding in the RLQ and suprapubic region, firm to the touch   Musculoskeletal:         General: No swelling.   Skin:     General: Skin is warm.   Neurological:      Mental Status: She is alert.       Relevant Results  Scheduled medications  [Held by provider] apixaban, 5 mg, oral, q12h  atorvastatin, 10 mg, oral, Daily  cholecalciferol, 1,000 Units, oral, Daily  insulin lispro, 0-5 Units, subcutaneous, TID AC  levothyroxine, 100 mcg, oral, Daily  [Held by provider] losartan, 50 mg, oral, Daily  metoprolol tartrate, 75 mg, oral, BID  pantoprazole, 40 mg, oral, BID AC  sodium chloride, 1,000 mL, intravenous, Once      Continuous medications  [Held by provider] heparin, 0-4,500 Units/hr, Last Rate: Stopped (02/12/25 0728)      PRN medications  PRN medications: dextrose, dextrose, glucagon, glucagon, morphine, oxyCODONE, traZODone   Results for orders placed or performed during the hospital encounter of 02/10/25 (from the past 24 hours)   Urinalysis with Reflex Culture and Microscopic   Result Value Ref Range    Color, Urine Light-Yellow Light-Yellow, Yellow, Dark-Yellow    Appearance, Urine Clear Clear    Specific Gravity,  Urine 1.043 (N) 1.005 - 1.035    pH, Urine 7.0 5.0, 5.5, 6.0, 6.5, 7.0, 7.5, 8.0    Protein, Urine NEGATIVE NEGATIVE, 10 (TRACE), 20 (TRACE) mg/dL    Glucose, Urine Normal Normal mg/dL    Blood, Urine NEGATIVE NEGATIVE mg/dL    Ketones, Urine NEGATIVE NEGATIVE mg/dL    Bilirubin, Urine NEGATIVE NEGATIVE mg/dL    Urobilinogen, Urine Normal Normal mg/dL    Nitrite, Urine NEGATIVE NEGATIVE    Leukocyte Esterase, Urine 25 Uday/uL (A) NEGATIVE   Extra Urine Gray Tube   Result Value Ref Range    Extra Tube Hold for add-ons.    Microscopic Only, Urine   Result Value Ref Range    WBC, Urine 1-5 1-5, NONE /HPF    RBC, Urine NONE NONE, 1-2, 3-5 /HPF    Squamous Epithelial Cells, Urine 1-9 (SPARSE) Reference range not established. /HPF    Budding Yeast, Urine PRESENT (A) NONE /HPF    Yeast Hyphae, Urine PRESENT (A) NONE /HPF   aPTT - baseline   Result Value Ref Range    aPTT 67 (H) 27 - 38 seconds   POCT GLUCOSE   Result Value Ref Range    POCT Glucose 113 (H) 74 - 99 mg/dL   POCT GLUCOSE   Result Value Ref Range    POCT Glucose 129 (H) 74 - 99 mg/dL   Heparin Assay   Result Value Ref Range    Heparin Unfractionated 0.9 See Comment Below for Therapeutic Ranges IU/mL   Heparin Assay   Result Value Ref Range    Heparin Unfractionated 0.8 See Comment Below for Therapeutic Ranges IU/mL   Heparin Assay, UFH   Result Value Ref Range    Heparin Unfractionated 0.8 See Comment Below for Therapeutic Ranges IU/mL   CBC and Auto Differential   Result Value Ref Range    WBC 11.5 (H) 4.4 - 11.3 x10*3/uL    nRBC 0.0 0.0 - 0.0 /100 WBCs    RBC 4.00 4.00 - 5.20 x10*6/uL    Hemoglobin 8.4 (L) 12.0 - 16.0 g/dL    Hematocrit 28.5 (L) 36.0 - 46.0 %    MCV 71 (L) 80 - 100 fL    MCH 21.0 (L) 26.0 - 34.0 pg    MCHC 29.5 (L) 32.0 - 36.0 g/dL    RDW 18.5 (H) 11.5 - 14.5 %    Platelets 284 150 - 450 x10*3/uL    Neutrophils % 63.5 40.0 - 80.0 %    Immature Granulocytes %, Automated 0.6 0.0 - 0.9 %    Lymphocytes % 21.7 13.0 - 44.0 %    Monocytes % 8.1 2.0  - 10.0 %    Eosinophils % 5.8 0.0 - 6.0 %    Basophils % 0.3 0.0 - 2.0 %    Neutrophils Absolute 7.27 (H) 1.60 - 5.50 x10*3/uL    Immature Granulocytes Absolute, Automated 0.07 0.00 - 0.50 x10*3/uL    Lymphocytes Absolute 2.49 0.80 - 3.00 x10*3/uL    Monocytes Absolute 0.93 (H) 0.05 - 0.80 x10*3/uL    Eosinophils Absolute 0.66 (H) 0.00 - 0.40 x10*3/uL    Basophils Absolute 0.03 0.00 - 0.10 x10*3/uL   Renal Function Panel   Result Value Ref Range    Glucose 102 (H) 74 - 99 mg/dL    Sodium 135 (L) 136 - 145 mmol/L    Potassium 4.1 3.5 - 5.3 mmol/L    Chloride 101 98 - 107 mmol/L    Bicarbonate 28 21 - 32 mmol/L    Anion Gap 10 10 - 20 mmol/L    Urea Nitrogen 11 6 - 23 mg/dL    Creatinine 0.77 0.50 - 1.05 mg/dL    eGFR 80 >60 mL/min/1.73m*2    Calcium 8.8 8.6 - 10.3 mg/dL    Phosphorus 3.4 2.5 - 4.9 mg/dL    Albumin 3.0 (L) 3.4 - 5.0 g/dL   Magnesium   Result Value Ref Range    Magnesium 2.12 1.60 - 2.40 mg/dL   Ferritin   Result Value Ref Range    Ferritin 25 8 - 150 ng/mL   ECG 12 lead   Result Value Ref Range    Ventricular Rate 150 BPM    Atrial Rate 150 BPM    QRS Duration 80 ms    QT Interval 322 ms    QTC Calculation(Bazett) 508 ms    R Axis -53 degrees    T Axis 68 degrees    QRS Count 25 beats    Q Onset 214 ms    T Offset 375 ms    QTC Fredericia 436 ms   ECG 12 lead   Result Value Ref Range    Ventricular Rate 144 BPM    Atrial Rate 131 BPM    QRS Duration 78 ms    QT Interval 310 ms    QTC Calculation(Bazett) 479 ms    R Axis -47 degrees    T Axis 88 degrees    QRS Count 23 beats    Q Onset 217 ms    T Offset 372 ms    QTC Fredericia 414 ms   ECG 12 lead   Result Value Ref Range    Ventricular Rate 74 BPM    Atrial Rate 74 BPM    NH Interval 176 ms    QRS Duration 86 ms    QT Interval 352 ms    QTC Calculation(Bazett) 390 ms    P Axis 66 degrees    R Axis -20 degrees    T Axis 31 degrees    QRS Count 12 beats    Q Onset 212 ms    P Onset 124 ms    P Offset 179 ms    T Offset 388 ms    QTC Fredericia 377 ms    POCT GLUCOSE   Result Value Ref Range    POCT Glucose 116 (H) 74 - 99 mg/dL     *Note: Due to a large number of results and/or encounters for the requested time period, some results have not been displayed. A complete set of results can be found in Results Review.   ECG 12 lead    Result Date: 2/12/2025  Normal sinus rhythm Normal ECG When compared with ECG of 12-FEB-2025 09:12, (unconfirmed) Premature ventricular complexes are no longer Present Vent. rate has decreased BY  70 BPM Borderline criteria for Inferior infarct are no longer Present ST no longer depressed in Anterior leads    ECG 12 lead    Result Date: 2/12/2025  Poor data quality, interpretation may be adversely affected Supraventricular tachycardia with Fusion complexes Left axis deviation Pulmonary disease pattern Nonspecific ST and T wave abnormality Abnormal ECG When compared with ECG of 10-FEB-2025 19:04, (unconfirmed) Significant changes have occurred    ECG 12 lead    Result Date: 2/12/2025  Supraventricular tachycardia with occasional Premature ventricular complexes Left axis deviation Pulmonary disease pattern Possible Inferior infarct , age undetermined Abnormal ECG When compared with ECG of 12-FEB-2025 09:06, (unconfirmed) Fusion complexes are no longer Present Premature ventricular complexes are now Present    ECG 12 lead    Result Date: 2/11/2025  Normal sinus rhythm Poor R-wave progression ; consider anterior infarct, lead placement, or normal variant Abnormal ECG When compared with ECG of 10-FEB-2025 17:21, (unconfirmed) Vent. rate has decreased BY  82 BPM Questionable change in QRS axis Nonspecific T wave abnormality now evident in Inferior leads T wave inversion now evident in Anterior leads    ECG 12 lead    Result Date: 2/11/2025  Supraventricular tachycardia Indeterminate axis Possible Anterior infarct , age undetermined Abnormal ECG When compared with ECG of 28-JAN-2025 16:33, Fusion complexes are no longer Present Premature  ventricular complexes are no longer Present Questionable change in QRS axis Nonspecific T wave abnormality no longer evident in Inferior leads    CT angio abdomen pelvis w and or wo IV IV contrast    Result Date: 2/10/2025  Interpreted By:  Felipe Driscoll, STUDY: CT ANGIO ABDOMEN PELVIS W AND/OR WO IV IV CONTRAST;  2/10/2025 9:41 pm   INDICATION: Signs/Symptoms:A-fib with RVR, severe abdominal pain not responding to morphine, assess for mesenteric ischemia.   COMPARISON: 01/29/2025   ACCESSION NUMBER(S): [id number]   ORDERING CLINICIAN: GEORGE LOPEZ   TECHNIQUE: Axial non-contrast images of the abdomen, and pelvis.   Axial CT images of the, abdomen and pelvis were obtained after the intravenous administration of iodinated contrast using angiographic technique with coronal and sagittal reformatted images. MIP images and 3D reconstructions were created on an independent workstation and reviewed.   FINDINGS: VASCULATURE:   Mild atherosclerotic calcification of the aorta without aneurysm or dissection.   Celiac artery, SMA, renal arteries are all widely patent. KATHRYN patent. Iliac arteries patent without stenosis.   CT ABDOMEN/PELVIS:   Arterial phase imaging of the liver, adrenals pancreas and spleen are grossly unremarkable. Status post cholecystectomy.   No new hydronephrosis.   Redemonstrated is retroperitoneal adenopathy surrounding the distal aorta/cava and extending into the right iliac chain which appears to be progressing compared to previous. Necrotic appearing lymph node in the right pelvic sidewall currently measures 2.2 cm in short axis, previously 1.9 cm.   Stable appearance of the bladder which has trabeculated appearance with right-sided posterior diverticulum. There is some irregular right posterior bladder wall thickening. The possibility of underlying bladder malignancy again should be considered. There is some dependent bladder calculi seen.   No significant new free fluid.   No bowel obstruction or  acute colitis.   Unchanged left ovarian cyst measuring 3.5 cm in diameter.   There is multilevel moderate to advanced degenerative disc disease seen in the lumbar spine worse at L4-L5 and L5-S1.       1. No abdominal aortic aneurysm or dissection. No evidence of mesenteric ischemia.   2. Compared to previous study there is progressing retroperitoneal adenopathy with some interval enlargement suspected.   3. Irregular masslike thickening right posterior bladder wall near suspected diverticulum. Underlying bladder malignancy may be present. Correlate with patient history/cystoscopy.   MACRO: None.   Signed by: Felipe Driscoll 2/10/2025 11:12 PM Dictation workstation:   [id number]    XR chest 1 view    Result Date: 2/10/2025  Interpreted By:  Wendy Lee, STUDY: XR CHEST 1 VIEW;  2/10/2025 6:19 pm   INDICATION: Signs/Symptoms:Short of breath, vomiting.   COMPARISON: 01/15/2025   ACCESSION NUMBER(S): [id number]   ORDERING CLINICIAN: GEORGE LOPEZ   FINDINGS:     CARDIOMEDIASTINAL SILHOUETTE: Cardiomediastinal silhouette is normal in size and configuration.   LUNGS: No pulmonary consolidation, pleural effusion or pneumothorax.   ABDOMEN: No remarkable upper abdominal findings.   BONES: No acute osseous abnormality.       No acute cardiopulmonary process.   MACRO: None   Signed by: Wendy Lee 2/10/2025 6:31 PM Dictation workstation:   [id number]    ECG 12 lead    Result Date: 2/9/2025  Sinus rhythm with sinus arrhythmia with occasional Premature ventricular complexes Left axis deviation Abnormal ECG When compared with ECG of 15-ANTONIO-2025 11:23, (unconfirmed) Premature ventricular complexes are now Present See ED provider note for full interpretation and clinical correlation Confirmed by Ne Shea ([id number]) on 2/9/2025 3:48:21 PM    ECG 12 lead    Result Date: 2/9/2025  Normal sinus rhythm Left axis deviation Pulmonary disease pattern Abnormal ECG When compared with ECG of 13-JAN-2025 07:22, No significant  change was found See ED provider note for full interpretation and clinical correlation Confirmed by Ne Shea ([id number]) on 2/9/2025 3:16:08 PM    Transesophageal Echo (GAVIN)    Result Date: 2/5/2025            [facility] [address] [place] [address]    Tel [phone] Fax [phone] TRANSESOPHAGEAL ECHOCARDIOGRAM REPORT Patient Name:       LISSETTE ZAVALA           Reading Physician:    [id number] Magana MD Study Date:         1/30/2025            Ordering Provider:    [id number] JONATHAN MAGANA MRN/PID:            [id number]             Fellow: Accession#:         [id number]         Nurse:                Zulma Ribeiro RN Date of Birth/Age:  1950 / 74      Sonographer:          Mely sutton Gender Assigned at  F                    Additional Staff:     Emmanuelle Jorge RN Birth: Height:             157.48 cm            Admit Date: Weight:             59.42 kg             Admission Status:     Inpatient -                                                                Routine BSA / BMI:          1.60 m2 / 23.96      Department Location:  [facility] Cath Lab                     kg/m2 Blood Pressure: 174 /72 mmHg Study Type:    TRANSESOPHAGEAL ECHO (GAVIN) Diagnosis/ICD: Shortness of breath-R06.02; Unspecified atrial                fibrillation-I48.91; Abnormal findings on diagnostic imaging of                other specified body structures-R93.89 Indication:    SOb, Afib CPT Codes:     GAVIN Complete-27812  Study Detail: Agitated saline used as a contrast agent for intraseptal flow               evaluation.  PHYSICIAN INTERPRETATION: GAVIN Details: Agitated saline contrast and color flow Doppler echo was performed to assess for the presence of a patent foramen ovale. GAVIN Medication: Midazolam and Fentanyl was used to sedate the patient for this exam. GAVIN  Procedure: The probe was passed without difficulty. Left Ventricle: There are no regional wall motion abnormalities. The left ventricular cavity size is normal. Spectral Doppler shows a Grade I (impaired relaxation pattern) of left ventricular diastolic filling with normal left atrial filling pressure. Left Atrium: The left atrial size is mild to moderately dilated. A bubble study using agitated saline was performed. Bubble study is negative. The left atrial appendage is enlarged. There is no definite left atrial mass present. Right Ventricle: The right ventricle is upper limits of normal in size. There is normal right ventricular global systolic function. Right Atrium: The right atrial size is normal. Aortic Valve: The aortic valve appears structurally normal. There is trace aortic valve regurgitation. Mitral Valve: The mitral valve is normal in structure. There is moderate to severe mitral valve regurgitation. The mitral regurgitant orifice area is 4 mm2. The mitral regurgitant volume is 8.73 ml. Tricuspid Valve: The tricuspid valve is structurally normal. There is trace to mild tricuspid regurgitation. Pulmonic Valve: The pulmonic valve was not assessed. There is trace pulmonic valve regurgitation. Pericardium: No pericardial effusion noted. Aorta: The aortic root is normal.  CONCLUSIONS:  1. Spectral Doppler shows a Grade I (impaired relaxation pattern) of left ventricular diastolic filling with normal left atrial filling pressure.  2. There is normal right ventricular global systolic function.  3. The left atrial size is mild to moderately dilated.  4. Moderate to severe mitral valve regurgitation.  5. No left atrial mass.  6. No obvious cardiac source of embolization by this study. QUANTITATIVE DATA SUMMARY:  LV SYSTOLIC FUNCTION:                      Normal Ranges: EF-Visual:      60 % LV EF Reported: 60 %  MITRAL INSUFFICIENCY:             Normal Ranges: PISA Radius:          0.3 cm MR VTI:                208.00 cm MR Vmax:              519.00 cm/s MR Darwin Elieser:         38.5 cm/s MR Volume:            8.73 ml MR Flow Rt:           21.77 ml/s MR EROA:              4 mm2  [id number] Clement Stevens MD Electronically signed on 2/5/2025 at 5:22:41 AM  ** Final **     Electrocardiogram, 12-lead PRN ACS symptoms    Result Date: 2/1/2025  Supraventricular tachycardia with occasional Premature ventricular complexes and Fusion complexes Right axis deviation Nonspecific ST abnormality Abnormal ECG When compared with ECG of 27-JAN-2025 20:14, (unconfirmed) Fusion complexes are now Present Premature ventricular complexes are now Present QRS axis Shifted right Borderline criteria for Inferior infarct are no longer Present Nonspecific T wave abnormality now evident in Inferior leads Confirmed by LISBETH Agrawal ([id number]) on 2/1/2025 6:38:48 PM    Electrocardiogram, 12-lead PRN ACS symptoms    Result Date: 2/1/2025  Supraventricular tachycardia Low voltage QRS Possible Inferior infarct , age undetermined Abnormal ECG When compared with ECG of 27-JAN-2025 17:29, (unconfirmed) Vent. rate has increased BY  75 BPM QRS axis Shifted right ST now depressed in Anterior leads Confirmed by LISBETH Agrawal ([id number]) on 2/1/2025 6:38:39 PM    Electrocardiogram, 12-lead PRN ACS symptoms    Result Date: 2/1/2025  Normal sinus rhythm Left axis deviation Pulmonary disease pattern Abnormal ECG When compared with ECG of 27-JAN-2025 09:00, (unconfirmed) QRS axis Shifted left Confirmed by LISBETH Agrawal ([id number]) on 2/1/2025 6:38:23 PM    ECG 12 Lead    Result Date: 1/31/2025  Normal sinus rhythm Normal ECG When compared with ECG of 24-JAN-2025 16:10, Premature ventricular complexes are no longer Present Vent. rate has decreased BY  58 BPM QRS axis Shifted right Borderline criteria for Inferior infarct are no longer Present Nonspecific T wave abnormality now evident in Inferior leads T wave amplitude has decreased in Anterior leads Confirmed by Clement Stevens ([id number]) on 1/31/2025 6:21:28  PM    ECG 12 Lead    Result Date: 1/31/2025  Normal sinus rhythm Left axis deviation Pulmonary disease pattern T wave abnormality, consider anterior ischemia Abnormal ECG When compared with ECG of 24-JAN-2025 16:10, Premature ventricular complexes are no longer Present Vent. rate has decreased BY  67 BPM Nonspecific T wave abnormality now evident in Inferior leads T wave inversion now evident in Anterior leads Confirmed by Clement Stevens ([id number]) on 1/31/2025 6:21:11 PM    MR brain wo IV contrast    Result Date: 1/29/2025  Interpreted By:  Nicolas Sarmiento, STUDY: MR BRAIN WO IV CONTRAST;  1/29/2025 2:34 pm   INDICATION: Signs/Symptoms:aphasia, history of PAF with interruption in anticoagulation.     COMPARISON: CT head and CTA head yesterday.   ACCESSION NUMBER(S): [id number]   ORDERING CLINICIAN: TIGIST KEYS   TECHNIQUE: Patient could only tolerate limited examination. Axial DWI, ADC, sagittal and axial T1 weighted sequences were obtained.   FINDINGS: Multiple small patchy foci of abnormally restricted diffusion are seen in the bilateral cerebral hemispheres predominantly each centrum semiovale and oriented in AP direction with some additional involvement of the anterior right middle frontal gyrus cortex, the posterior right corpus callosum, paramedian right occipital lobe. Questionable additional involvement of left-greater-than-right cerebellar hemispheres.   No definite intracranial hemorrhage.     No extra-axial fluid collection. No intracranial mass.   Mild to moderate white matter T2 signal increase, most commonly seen with chronic small vessel ischemic change.  Presence of older small infarctions in these areas not excluded.   Degree of ventriculomegaly is commensurate with overall degree of brain parenchymal volume loss, which is mild.       1. Multiple small patchy foci of abnormally restricted diffusion are seen in the bilateral cerebral hemispheres predominantly each centrum semiovale and oriented in  AP direction with some additional involvement of the anterior right middle frontal gyrus cortex, the posterior right corpus callosum, paramedian right occipital lobe. Questionable additional involvement of left-greater-than-right cerebellar hemispheres. Findings consistent with acute to subacute infarctions. The AP orientation of the high bilateral cerebral hemispheres could indicate a recent hypotensive event. 2. Mild to moderate white matter T2 signal increase, most commonly seen with chronic small vessel ischemic change.  Presence of older small infarctions in these areas not excluded. 3. Degree of ventriculomegaly is commensurate with overall degree of brain parenchymal volume loss, which is mild.   Note the patient was only able to tolerate limited examination as described above.   MACRO: None   Signed by: Nicolas Sarmiento 1/29/2025 3:12 PM Dictation workstation:   [id number]    CT abdomen pelvis w IV contrast    Result Date: 1/29/2025  Interpreted By:  Evonne Yi, STUDY: CT ABDOMEN PELVIS W IV CONTRAST;  1/29/2025 9:32 am   INDICATION: Signs/Symptoms:abdominal pain with acute encephalopathy.     COMPARISON: CT abdomen pelvis 01/24/2025 CT chest abdomen pelvis 01/28/2024   ACCESSION NUMBER(S): [id number]   ORDERING CLINICIAN: TIGIST KEYS   TECHNIQUE: CT of the abdomen and pelvis was performed.  Standard contiguous axial images were obtained at 3 mm slice thickness through the abdomen and pelvis. Coronal and sagittal reconstructions at 3 mm slice thickness were performed.  75 ML of Omnipaque 350 was administered intravenously without immediate complication.   FINDINGS: Please note that the evaluation of vessels, lymph nodes and organs is limited without intravenous contrast.   LOWER CHEST: The heart is normal in size without evidence of pericardial effusion. No consolidation or pleural effusion is present. No concerning lung nodule. Small hiatal hernia again noted.   ABDOMEN:   LIVER: The liver is  normal in size without evidence of focal liver lesions.   BILE DUCTS: Stable mild intrahepatic and extrahepatic biliary duct dilatation, likely secondary to cholecystectomy.   GALLBLADDER: Cholecystectomy   PANCREAS: The pancreas appears unremarkable without evidence of ductal dilatation or masses.   SPLEEN: The spleen is normal in size without focal lesions.   ADRENAL GLANDS: No adrenal nodularity or thickening.   KIDNEYS AND URETERS: The kidneys are normal in size. Stable nonobstructive punctuate calculus in the right kidney. Stable sub 5 mm hypoattenuating lesions in the bilateral kidneys, which are too small to further characterize. No hydroureteronephrosis or left nephroureterolithiasis is identified.   PELVIS:   BLADDER: The bladder is completely opacified by contrast from prior imaging, which limits evaluation.   REPRODUCTIVE ORGANS: No pelvic masses. A stable 48 mm left adnexal cyst is seen on series 2, image 116. A 21 x 19 mm right adnexal lesion could be a right adnexal cyst or a perivesicular lymph node.   BOWEL: Small hiatal hernia. The small and large bowel are normal in caliber and demonstrate no wall thickening. The appendix is not definitely visualized. There is however no pericecal stranding or fluid.   VESSELS: There is no aneurysmal dilatation of the abdominal aorta. Mild atherosclerotic calcifications of the aortoiliac arteries. The IVC appears normal.   PERITONEUM/RETROPERITONEUM/LYMPH NODES: Retroperitoneal lymphadenopathy with adjacent stranding, stable when compared to prior CT from 01/24/2025, and new when compared to prior CT from 01/28/2024, for example as seen on series 2: *16 x 10 mm aortocaval lymph node, image 75 *12 x 11 mm retrocaval lymph node, image 91 *16 x 15 mm right common iliac lymph node, image 97 *20 x 16 mm right internal iliac lymph node, image 104 *29 x 20 mm right external iliac lymph node, image 126 *24 x 14 mm right external iliac lymph node, image 135 *21 x 19 mm  perivascular lesion, which could be a lymph node or right adnexal cyst, image 131 *20 x 18 mm right pelvic sidewall lesion, image 117   No ascites or fluid collection. No peritoneal nodularity or implant.   ABDOMINAL WALL: The abdominal wall soft tissues appear normal. Sacral spinal stimulator noted.   BONES: No suspicious osseous lesions are identified. Degenerative discogenic disease is noted in the lower thoracic and lumbar spine. Grade 1 anterolisthesis of the L4 and L5 vertebral bodies.       1. Retroperitoneal lymphadenopathy, stable when compared to prior CT from 01/24/2025, and new when compared to prior CT from 01/28/2024. Though the adjacent stranding favors an infectious/inflammatory etiology, underlying malignancy such as lymphoma or bladder carcinoma (given the bladder wall thickening seen on prior CT) can not be excluded. If clinical signs/symptoms favor infectious etiology, short-term follow-up CT abdomen pelvis in 4-6 weeks is recommended. Alternatively, correlation with serum LDH levels, cystoscopy, tissue diagnosis and/or PET-CT are other diagnostic considerations that could be obtained as per clinical need. 2. No significant interval change as compared to prior CT from 01/24/2025.   MACRO: Critical Finding:  There are multiple critical findings. See findings. notification was initiated on 1/29/2025 at 10:55 am by Evonne Yi.  (**-YCF-**)   Signed by: Evonne Yi 1/29/2025 10:56 AM Dictation workstation:   [id number]    CT angio head and neck w and wo IV contrast    Result Date: 1/28/2025  Interpreted By:  Nicolas Sarmiento, STUDY: CT ANGIO HEAD AND NECK W AND WO IV CONTRAST;  1/28/2025 6:46 pm   INDICATION: Signs/Symptoms:stroke rule out.     COMPARISON: CT head today.   ACCESSION NUMBER(S): [id number]   ORDERING CLINICIAN: TIGIST KEYS   TECHNIQUE: Unenhanced CT images of the head were obtained.  Subsequently,  60 ML of Omnipaque 350 was administered intravenously and axial images of the  head and neck were acquired.  Coronal, sagittal, and 3-D reconstructions were provided for review.   FINDINGS:   CTA COW: No major vascular occlusion. Mild atherosclerotic changes through the carotid siphons. No aneurysms.  No vascular malformations.   CTA CAROTID: No aortic aneurysm or dissection. No significant stenoses at the origins of the great vessels from the aortic arch. No significant stenoses of the brachycephalic artery or bilateral subclavian arteries.   Mild atherosclerotic changes at the carotid bifurcations. No significant stenoses bilateral carotid arterial systems. No significant stenoses bilateral vertebral arterial systems.   NONVASCULAR HEAD: No intracranial mass. No intracranial hemorrhage. No evidence of large evolving cortical infarction. Supratentorial white matter hypodensities most likely related to mild chronic small vessel ischemic change.     Bones intact. Mucous retention cyst left maxillary sinus.   NONVASCULAR NECK: No soft tissue mass. No adenopathy. Salivary glands are unremarkable. Thyroid gland unremarkable. Bones intact.       1. No intracranial major vascular occlusion. 2. No flow-limiting stenosis or significant plaque irregularity in the neck.     MACRO: None   Signed by: Nicolas Sarmiento 1/28/2025 7:30 PM Dictation workstation:   [id number]    CT brain attack head wo IV contrast    Result Date: 1/28/2025  Interpreted By:  Howard Millard, STUDY: TIGIST KEYS; 1/28/2025 5:08 pm   INDICATION: Signs/Symptoms:brain attack;   COMPARISON: 01/28/2024   ACCESSION NUMBER(S): [id number]   ORDERING CLINICIAN: TIGIST KEYS   TECHNIQUE: Contiguous axial images were acquired from the vertex through the posterior fossa without IV contrast. All CT examinations are performed with 1 or more of the following dose reduction techniques: Automated exposure control, adjustment of mA and/or kv according to patient's size, or use of iterative reconstruction techniques.   FINDINGS: No focal  mass effect or midline shift is identified. The ventricles and sulci are symmetric and appropriate for the patient's age.   The gray white matter differentiation is preserved. Again seen is a moderate degree of nonspecific white matter hypodensity, most consistent with chronic small-vessel ischemic disease, not significantly changed from the prior study.   No acute intracranial hemorrhage is seen. No intra-axial or extra-axial fluid collection is seen.   The visualized paranasal sinuses and mastoid air cells are clear. There is a small-moderate sized mucous retention cysts in the visualized left maxillary sinus.       No CT evidence for acute intracranial pathology.   Moderate degree of nonspecific white matter hypodensity, most consistent with chronic small-vessel ischemic disease, not significantly changed when compared to the prior study.         Findings discussed with the physician on the 2nd floor at 5:20 p.m. on 01/28/2025   Signed by: Howard Millard 1/28/2025 5:39 PM Dictation workstation:   [id number]    ECG 12 lead    Result Date: 1/25/2025  Supraventricular tachycardia Left axis deviation Anterior infarct (cited on or before 17-JAN-2025) Abnormal ECG When compared with ECG of 17-JAN-2025 06:56, MI interval has decreased Vent. rate has increased BY  90 BPM Confirmed by Clement Stevens ([id number]) on 1/25/2025 1:40:18 PM    ECG 12 lead    Result Date: 1/25/2025  Normal sinus rhythm Left axis deviation Poor R-wave progression ; consider anterior infarct, lead placement, or normal variant Abnormal ECG When compared with ECG of 23-JAN-2025 20:48, (unconfirmed) Vent. rate has decreased BY  59 BPM Confirmed by Clement Stevens ([id number]) on 1/25/2025 1:40:12 PM    Electrocardiogram, 12-lead PRN ACS symptoms    Result Date: 1/25/2025  Supraventricular tachycardia with occasional Premature ventricular complexes Left axis deviation Pulmonary disease pattern Possible Inferior infarct , age undetermined Abnormal ECG When  compared with ECG of 23-JAN-2025 21:55, (unconfirmed) Premature ventricular complexes are now Present Vent. rate has increased BY  47 BPM Nonspecific T wave abnormality no longer evident in Anterior leads Confirmed by Clement Stevens ([id number]) on 1/25/2025 1:40:07 PM    Transthoracic Echo (TTE) Complete    Result Date: 1/24/2025            [facility] [id number] [facility] [address]    Tel [phone] Fax [phone] TRANSTHORACIC ECHOCARDIOGRAM REPORT Patient Name:       LISSETTE BOB SALLY Palma Physician:    [id number] Clement Stevens MD Study Date:         1/24/2025            Ordering Provider:    [id number] TIGIST KEYS MRN/PID:            [id number]             Fellow: Accession#:         [id number]         Nurse: Date of Birth/Age:  1950 / 74      Sonographer:          Flakita Quarles RDCS                     years Gender Assigned at  F                    Additional Staff: Birth: Height:             157.48 cm            Admit Date:           1/23/2025 Weight:             63.96 kg             Admission Status:     Inpatient -                                                                Priority                                                                discharge BSA / BMI:          1.65 m2 / 25.79      Department Location:  [facility] CCU [place]                     kg/m2 Blood Pressure: 168 /70 mmHg Study Type:    TRANSTHORACIC ECHO (TTE) COMPLETE Diagnosis/ICD: Dyspnea, unspecified-R06.00 Indication:    Dyspnea CPT Codes:     Echo Complete w Full Doppler-[id number] Patient History: Diabetes:          Yes Pertinent History: Dyspnea, Palpitations, CAD, A-Fib, HTN, Hyperlipidemia and                    Previous SVT. GERD; previous echocardiogram 12-. Study Detail: The following Echo studies were performed: M-Mode, 2D, Doppler and               color flow.  PHYSICIAN  INTERPRETATION: Left Ventricle: Left ventricular ejection fraction is normal, calculated by Rangel's biplane at 71%. There is mild left ventricular hypertrophy. There are no regional wall motion abnormalities. The left ventricular cavity size is normal. Spectral Doppler shows a Grade I (impaired relaxation pattern) of left ventricular diastolic filling with normal left atrial filling pressure. Left Atrium: The left atrium is mild to moderately dilated. Right Ventricle: The right ventricle is upper limits of normal in size. There is normal right ventricular global systolic function. Right Atrium: The right atrium is mildly dilated. Aortic Valve: The aortic valve appears structurally normal. There is mild aortic valve cusp calcification. There is mild to moderate aortic valve regurgitation. The peak instantaneous gradient of the aortic valve is 8 mmHg. Mitral Valve: The mitral valve is normal in structure. The peak instantaneous gradient of the mitral valve is 5 mmHg. There is mild mitral valve regurgitation. Tricuspid Valve: The tricuspid valve is structurally normal. There is mild tricuspid regurgitation. Pulmonic Valve: The pulmonic valve was not assessed. There is trace to mild pulmonic valve regurgitation. Pericardium: No pericardial effusion noted. Aorta: The aortic root is normal.  CONCLUSIONS:  1. Left ventricular ejection fraction is normal, calculated by Rangel's biplane at 71%.  2. Spectral Doppler shows a Grade I (impaired relaxation pattern) of left ventricular diastolic filling with normal left atrial filling pressure.  3. There is normal right ventricular global systolic function.  4. The left atrium is mild to moderately dilated.  5. Mild to moderate aortic valve regurgitation. QUANTITATIVE DATA SUMMARY:  2D MEASUREMENTS:            Normal Ranges: LAs:             4.50 cm    (2.7-4.0cm) IVSd:            0.86 cm    (0.6-1.1cm) LVPWd:           1.06 cm    (0.6-1.1cm) LVIDd:           5.63 cm     (3.9-5.9cm) LVIDs:           4.00 cm LV Mass Index:   127.2 g/m2 LV % FS          29.0 %  LA VOLUME:                    Normal Ranges: LA Vol A4C:        55.9 ml    (22+/-6mL/m2) LA Vol A2C:        50.6 ml LA Vol BP:         54.1 ml LA Vol Index A4C:  34.0ml/m2 LA Vol Index A2C:  30.7 ml/m2 LA Vol Index BP:   32.8 ml/m2 LA Area A4C:       20.2 cm2 LA Area A2C:       18.9 cm2 LA Major Axis A4C: 6.2 cm LA Major Axis A2C: 6.0 cm LA Volume Index:   30.9 ml/m2 LA Vol A4C:        53.0 ml LA Vol A2C:        48.0 ml LA Vol Index BSA:  30.7 ml/m2  M-MODE MEASUREMENTS:         Normal Ranges: Ao Root:             3.20 cm (2.0-3.7cm)  AORTA MEASUREMENTS:         Normal Ranges: Ao Sinus, d:        3.00 cm (2.1-3.5cm) Ao STJ, d:          2.60 cm (1.7-3.4cm) Asc Ao, d:          3.30 cm (2.1-3.4cm)  LV SYSTOLIC FUNCTION BY 2D PLANIMETRY (MOD):                      Normal Ranges: EF-A4C View:    69 % (>=55%) EF-A2C View:    75 % EF-Biplane:     71 % LV EF Reported: 71 %  LV DIASTOLIC FUNCTION:             Normal Ranges: MV Peak E:             1.10 m/s    (0.7-1.2 m/s) MV Peak A:             1.10 m/s    (0.42-0.7 m/s) E/A Ratio:             1.00        (1.0-2.2) MV e'                  0.080 m/s   (>8.0) MV lateral e'          0.08 m/s MV medial e'           0.08 m/s E/e' Ratio:            13.76       (<8.0) PulmV Sys Elieser:         49.90 cm/s PulmV Flores Elieser:        35.50 cm/s PulmV S/D Elieser:         1.40 PulmV A Revs Elieser:      26.70 cm/s PulmV A Revs Dur:      148.00 msec  MITRAL VALVE:          Normal Ranges: MV Vmax:      1.12 m/s (<=1.3m/s) MV peak P.0 mmHg (<5mmHg) MV mean P.0 mmHg (<48mmHg) MV DT:        190 msec (150-240msec)  AORTIC VALVE:           Normal Ranges: AoV Vmax:      1.42 m/s (<=1.7m/s) AoV Peak P.1 mmHg (<20mmHg) LVOT Max Elieser:  1.05 m/s (<=1.1m/s) LVOT VTI:      27.40 cm LVOT Diameter: 1.80 cm  (1.8-2.4cm) AoV Area,Vmax: 1.88 cm2 (2.5-4.5cm2)  RIGHT VENTRICLE: RV Basal 2.80 cm RV Mid   2.70 cm RV  Major 7.0 cm TAPSE:   22.0 mm RV s'    0.14 m/s  PULMONIC VALVE:          Normal Ranges: PV Accel Time:  95 msec  (>120ms) PV Max Elieser:     0.9 m/s  (0.6-0.9m/s) PV Max P.9 mmHg  Pulmonary Veins: PulmV A Revs Dur: 148.00 msec PulmV A Revs Elieser: 26.70 cm/s PulmV Flores Elieser:   35.50 cm/s PulmV S/D Elieser:    1.40 PulmV Sys Elieser:    49.90 cm/s  [id number] Clement Stevens MD Electronically signed on 2025 at 1:58:10 PM  ** Final **     CT abdomen pelvis wo IV contrast    Result Date: 2025  Interpreted By:  Gerard Maxwell, STUDY: CT ABDOMEN PELVIS WO IV CONTRAST;  2025 3:25 am   INDICATION: Signs/Symptoms:kidney stones. History of overactive bladder with continence control stimulator insert in . Symptoms recurred in  with urge incontinence and underwent stimulator revision on 10/14/2024.     COMPARISON: 2025   ACCESSION NUMBER(S): [id number]   ORDERING CLINICIAN: TIGIST KEYS   TECHNIQUE: Contiguous axial images of the abdomen and pelvis were obtained without intravenous contrast. Coronal and sagittal reformatted images were reconstructed from the axial data.   FINDINGS: Note: The absence of intravenous contrast limits evaluation of the solid organs and vasculature.   LOWER CHEST: No acute abnormality.     ABDOMEN/PELVIS:   ABDOMINAL WALL: There is a bladder control device in the right buttock with a single lead coursing through the right S3 neural foramen.   LIVER: The liver demonstrates a normal noncontrast attenuation.   BILE DUCTS: Prominent intrahepatic and extrahepatic bile ducts are likely secondary to post-cholecystectomy status.   GALLBLADDER: Surgically absent.   PANCREAS: No significant abnormality.   SPLEEN: No significant abnormality.   ADRENALS: No significant abnormality.   KIDNEYS, URETERS, BLADDER: Non-obstructing 0.2 cm right renal calculus. No left renal calculus. No hydronephrosis. Urinary bladder wall is inflamed with perivesical fat stranding similar to prior study. There  is thickening and inflammation of the distal right ureter. There is eccentric wall thickening adjacent to the right uterovesical junction which was seen on prior study as well. There are multiple bladder calculi measuring 0.8 cm and 1.3 cm.   REPRODUCTIVE ORGANS: Surgically absent uterus. There is a simple left ovarian cyst measuring 3.4 cm, slightly smaller compared to prior study.   VESSELS: Mild aortic atherosclerosis without AAA.   RETROPERITONEUM/LYMPH NODES: There is progression of now extensive inflammatory fat stranding within multiple retroperitoneal compartments. This is most severe on the right pelvic sidewall, extends into the right femoral canal, involves the presacral space, and extends superiorly to the aortoiliac bifurcation and along the aorta at the level of the renal veins. This stranding is accompanied by diffuse lymphadenopathy which is also progressed in degree and extent compared to prior study. For example, a 1.3 cm right common iliac node previously measured 0.9 cm. A 1.6 cm right common iliac node previously measured 0.7 cm, 1.3 cm by common iliac ovaries noted 0.8 cm, 2.1 cm by external iliac node previously measured 0.9 cm, 2 cm right external iliac lymph node previously measured 1.6 cm amongst others. There are no enlarged left external iliac lymph nodes. There is only a 0.7 cm left common iliac node that is unchanged. There are also newly enlarged aortocaval and periaortic lymph nodes predominantly measuring between 0.5 cm and 1 cm.   BOWEL/PERITONEUM: There is colonic diverticulosis. No inflammatory bowel wall thickening or dilatation. Normal appendix.   No ascites, free air, or fluid collection.     MUSCULOSKELETAL: No acute osseous abnormality. No suspicious osseous lesion. Severe L4-L5 disc height loss with grade 1 degenerative anterolisthesis of L4 on L5 and moderate bilateral facet arthropathy. There is not severe central canal stenosis at L4-L5 and severe bilateral L4-5 foraminal  stenosis. There are decompressive laminectomies at L2-L4.       1. Significant progression of severe inflammatory changes in the right pelvic sidewall, presacral space, right femoral canal and extending superiorly along the aorta and IVC to the level of the renal veins accompanied by diffuse lymphadenopathy which is also significantly progressed as detailed above. This inflammatory perinodal stranding favors an infectious or inflammatory etiology, although this the extent and degree of change is unusual for typical bladder infection. Recommend timely urological follow-up for further investigation. Correlation with urinalysis recommended.   2. Redemonstration of diffusely thickened inflamed bladder wall with diffuse thickening of the distal right ureter and multiple intraluminal bladder calculi may service continued nidus of continued infection.   3. No hydronephrosis. Nonobstructing 0.2 cm right renal calculus.   MACRO: None.   Signed by: Gerard Maxwell 1/24/2025 3:41 AM Dictation workstation:   [id number]    ECG 12 lead    Result Date: 1/17/2025  Sinus rhythm with 1st degree AV block Left axis deviation Possible Anterior infarct , age undetermined Abnormal ECG When compared with ECG of 16-JAN-2025 06:38, (unconfirmed) Nonspecific T wave abnormality no longer evident in Anterior leads Confirmed by Luigi Chisholm ([id number]) on 1/17/2025 10:17:22 PM    ECG 12 lead    Result Date: 1/17/2025  Normal sinus rhythm Left axis deviation Nonspecific T wave abnormality Abnormal ECG When compared with ECG of 15-ANTONIO-2025 13:47, (unconfirmed) Premature ventricular complexes are no longer Present Nonspecific T wave abnormality, worse in Anterior leads Confirmed by Luigi Chisholm ([id number]) on 1/17/2025 8:14:36 PM    XR chest 1 view    Result Date: 1/15/2025  Interpreted By:  Monique Caballero, STUDY: XR CHEST 1 VIEW;  1/15/2025 11:11 am   INDICATION: Signs/Symptoms:Chest Pain.   COMPARISON: None.   ACCESSION NUMBER(S): [id number]    ORDERING CLINICIAN: ESCOBAR GALLEGOS   FINDINGS: CARDIOMEDIASTINAL SILHOUETTE: Cardiomediastinal silhouette is normal in size and configuration.     LUNGS: Lungs are clear.   ABDOMEN: No remarkable upper abdominal findings.     BONES: No acute osseous changes.       No acute cardiopulmonary process.   MACRO: None   Signed by: Monique Caballero 1/15/2025 11:21 AM Dictation workstation:   [id number]      Assessment/Plan   Myrna Rodrigues is a 74 y.o. female who presented with intractable abdominal pain with a known bladder mass most likely malignancy as it is refractory to antibiotics.     #Intractable abdominal pain   #Bladder Mass concerning for urinary bladder cancer  -Was previously admitted from 1/24-1/31 for abdominal pain found to have a bladder mass possibly infectious vs malignant. Urology planned a cystoscopy inpatient (1/29) requiring her Eliquis to be held. After the Eliquis was held, she experienced an episode of SVT and later acute aphasia. MRI was positive for multifocal stroke. Originally thought to be cardioembolic however SVT is not a risk factor for embolism formation. Most likely occurred given potential hypercoagulable state in the setting of undiagnosed malignancy. She was discharged with a plan for cystoscopy outpatient and a course of Ciprofloxacin, however she has been unable to tolerate the pain while waiting.   -Urology has seen her on this admission and is okay to do the cystoscopy with biopsy inpatient. All teams have discussed the risks of holding anticoagulation for the procedure and she feels the benefit of potentially getting answers about her pain outweighs the potential stroke risk.  -CT abdomen/pelvis: Retroperitoneal lymphadenopathy, stable when compared to prior CT from 01/24/2025, and new when compared to prior CT from 01/28/2024. Though the adjacent stranding favors an infectious/inflammatory etiology, underlying malignancy such as lymphoma or bladder carcinoma (given the bladder wall  thickening seen on prior CT) can not be excluded.   -CTA abdomen/pelvis: Compared to previous study there is progressing retroperitoneal adenopathy with some interval enlargement suspected. Irregular masslike thickening right posterior bladder wall near suspected diverticulum. Underlying bladder malignancy may be present.    Plan:  -Currently n.p.o.  -Currently on heparin, will resume Eliquis after procedure tonight  -Cystoscopy w/ biopsy on 2/12   -Pain control with oxycodone 5/morphine 2 mg as needed for moderate and severe pain  -Oxycodone 5mg PRN  -Urology on consult, appreciate recommendation    #Paroxysmal SVT   #A-fib status s/p ablation on Eliquis   #Hx of Afib RVR   #Hx of multifocal stroke  [id number] score 6   -Initially in the ED, she was in Afib RVR with EKG showing Afib with RVR of 150's. managed with 2 doses of 5mg IV metoprolol, a 500cc bolus, and pain control with morphine. She had an episode of SVT this morning, relieved with her home metoprolol and vagal maneuver.  She is currently now asymptomatic    Plan:  -Patient was bridged from Eliquis to Heparin yesterday awaiting cystoscopy.  -Held Heparin this AM for cystoscopy. Will resume Eliquis this evening after procedure.  -Continue home meds at the same doses.  -Will attempt to hydrate the patient with IV fluid 1 L over 2 hours  -Will medically optimize pain control  -Daily Electrolytes and Mg. Maintain K > 4 and Mg > 2, replace as needed.  -Outpatient follow up with cardiology after discharge.     #Leukocytosis  #Chronic microcytic anemia  - WBC on admission is 13.2 down trended to 11.5 with neutrophilic predominance.  - H/H on admission 10.0/33.6> downtrended to 8.4/28.5  - Patient denies fever or localizing signs for infection  - No active bleeding, she denies hematuria, hematemesis or hemtochezia.  - Monitor vital signs for fever  - Daily CBC   - Outpatient work up for anemia    Chronic problem  #Hypothyroidism  #T2DM -  SSI  #HTN  #HLD  #GERD  -continue home meds as appropriate    Global Plan of Care  Fluid: PRN  Electrolytes: PRN  Nutrition: NPO until after procedure  DVT Prophylaxis: SCD's, will transition back to Eliquis after procedure  GI Prophylaxis: None  Code Status: Full Code      Emergency Contact: Extended Emergency Contact Information  Primary Emergency Contact: Eriberto Zavala  Mobile Phone: [phone]  Relation: Son  Preferred language: English   needed? No  Secondary Emergency Contact: OMI ZAVALA  Mobile Phone: [phone]  Relation: Relative   needed? No     SYED BAUTISTA, MS3    Patient seen and examined independently from above medical history of.  The note as shown as above has been edited to reflect my input.    Assessment and plan discussed with my attending.   Charles Fonseca MD   Internal Medicine, PGY-2 .

## 2025-02-12 NOTE — CARE PLAN
Problem: Pain  Goal: Takes deep breaths with improved pain control throughout the shift  Outcome: Progressing

## 2025-02-12 NOTE — ED PROCEDURE NOTE
Procedure  Critical Care    Performed by: Ozzie Blanchard MD  Authorized by: Ozzie Blanchard MD    Critical care provider statement:     Critical care time (minutes):  14    Critical care time was exclusive of:  Separately billable procedures and treating other patients and teaching time    Critical care was necessary to treat or prevent imminent or life-threatening deterioration of the following conditions:  Circulatory failure    Critical care was time spent personally by me on the following activities:  Blood draw for specimens, ordering and performing treatments and interventions, development of treatment plan with patient or surrogate, ordering and review of laboratory studies, ordering and review of radiographic studies, discussions with primary provider, pulse oximetry, evaluation of patient's response to treatment, re-evaluation of patient's condition, examination of patient and review of old charts    Care discussed with: admitting provider         Ozzie Blanchard MD  02/12/25 0218

## 2025-02-12 NOTE — ANESTHESIA PROCEDURE NOTES
Airway  Date/Time: 2/12/2025 5:31 PM  Urgency: elective    Airway not difficult    Staffing  Performed: CRNA   Authorized by: Leon Flowers DO    Performed by: ANA Sweeney-MINDY  Patient location during procedure: OR    Indications and Patient Condition  Indications for airway management: anesthesia and airway protection  Spontaneous ventilation: present  Sedation level: deep  Preoxygenated: yes  Patient position: sniffing  MILS maintained throughout  Mask difficulty assessment: 1 - vent by mask  No planned trial extubation    Final Airway Details  Final airway type: endotracheal airway      Successful airway: ETT  Cuffed: yes   Successful intubation technique: video laryngoscopy  Facilitating devices/methods: intubating stylet  Endotracheal tube insertion site: oral  Blade: Jon  Blade size: #4  ETT size (mm): 7.5  Cormack-Lehane Classification: grade I - full view of glottis  Placement verified by: chest auscultation and capnometry   Measured from: lips  Number of attempts at approach: 1  Ventilation between attempts: BVM  Number of other approaches attempted: 0

## 2025-02-12 NOTE — CONSULTS
Inpatient consult to Neurology  Consult performed by: Judah Caballero MD  Consult ordered by: Avani Collins MD          History Of Present Illness  Myrna Rodrigues is a 74 y.o. female presenting with Presenting Complaint: Acute ischemic stroke, likely cardioembolic due to atrial fibrillation off anticoagulation    History of Present Illness:  This is a 74-year-old female with a past medical history of atrial fibrillation (s/p ablation, previously on Eliquis), hypertension, coronary artery disease, hyperlipidemia, gastroesophageal reflux disease (GERD), diabetes mellitus, supraventricular tachycardia (SVT), overactive bladder/urinary urgency incontinence (OAB/UUI) with InterStim device, hysterectomy, left oophorectomy, appendectomy, and hypothyroidism, who presents with acute ischemic stroke in the setting of recent interruption of anticoagulation for planned urologic procedures.    The patient was admitted on 01/24/25 - 01/31/25 for abdominal pain, palpitations, and shortness of breath. She was found to have abnormal bladder findings (noted in October 2024 during sacral neuromodulation device replacement) with concern for malignancy, UTI, and retroperitoneal/pelvic lymphadenopathy. She was scheduled for cystoscopy and right ureteroscopy with biopsies on 01/29/25, but her procedure was delayed due to the development of acute aphasia with MRI revealing multifocal ischemic strokes.    ED Course & Current Presentation:  The patient presents to the ED again with recurrent abdominal pain (10/10), nausea, vomiting, palpitations, and persistent right lower quadrant pain. She reports inability to take her medications due to nausea but was able to take Eliquis per her son's instruction. She denies cough, chest pain, fever, hematuria, or dysuria.    Vital Signs:  /81 mmHg,  ? 86 bpm, RR 17, SpO2 99% on RA  Labs:  CBC: Leukocytosis 13.2, anemia Hgb 10.2/Hct 33.6, MCV 71  Electrolytes: Hypokalemia K 3.3, otherwise normal  electrolytes, creatinine, BUN  Liver Function: Bilirubin 2.1, otherwise WNL  VBG: pH 7.47, pCO2 29, pO2 33, HCO3 21.1, AG 9  Cardiac Workup: Negative troponin  Imaging:  CT Head (prior stroke admission): Multifocal ischemic strokes  CXR: No acute cardiopulmonary process  XR Left Knee: Suprapatellar effusion, no fracture  Venous Duplex LLE: No DVT  Interventions in ED:  Magnesium sulfate 2g IV, metoprolol 5mg IV x2, morphine 4mg IV x2, Zofran 4mg IV, KCl 20mg IV, sodium chloride 0.9% bolus 500mL      MRI brain 01.29.2025.   1. Multiple small patchy foci of abnormally restricted diffusion are  seen in the bilateral cerebral hemispheres predominantly each centrum  semiovale and oriented in AP direction with some additional  involvement of the anterior right middle frontal gyrus cortex, the  posterior right corpus callosum, paramedian right occipital lobe.  Questionable additional involvement of left-greater-than-right  cerebellar hemispheres. Findings consistent with acute to subacute  infarctions. The AP orientation of the high bilateral cerebral  hemispheres could indicate a recent hypotensive event.  2. Mild to moderate white matter T2 signal increase, most commonly  seen with chronic small vessel ischemic change.  Presence of older  small infarctions in these areas not excluded.  3. Degree of ventriculomegaly is commensurate with overall degree of  brain parenchymal volume loss, which is mild.      Note the patient was only able to tolerate limited examination as  described above.RI brain:             Past Medical History  Past Medical History:   Diagnosis Date    Anxiety and depression     Arrhythmia     Asthma     Cancer (Multi)     Coronary artery disease     Diabetes mellitus (Multi)     Disease of thyroid gland     GERD (gastroesophageal reflux disease)     Heart disease     Hyperlipidemia     Hypertension     Irregular heart beat     Occipital neuralgia 06/10/2022    Occipital neuralgia of right side     Personal history of malignant neoplasm, unspecified 10/04/2021    History of malignant neoplasm    Personal history of other diseases of the circulatory system     History of hypertension    Personal history of other diseases of the circulatory system 10/04/2021    History of essential hypertension    Personal history of other diseases of the circulatory system     History of coronary artery disease    Personal history of other diseases of the digestive system 03/11/2020    History of chronic constipation    Personal history of other diseases of the musculoskeletal system and connective tissue     History of arthritis    Personal history of other diseases of the nervous system and sense organs 10/04/2021    History of eye problem    Personal history of other diseases of the respiratory system     History of asthma    Personal history of other specified conditions 10/04/2021    History of chest pain    PONV (postoperative nausea and vomiting)     Stool incontinence     Thyroid cancer (Multi)     Urinary incontinence      Surgical History  Past Surgical History:   Procedure Laterality Date    APPENDECTOMY  08/13/2015    Appendectomy    BREAST LUMPECTOMY  08/13/2015    Breast Surgery Lumpectomy    CARPAL TUNNEL RELEASE  08/13/2015    Neuroplasty Decompression Median Nerve At Carpal Tunnel    GALLBLADDER SURGERY  08/13/2015    Gallbladder Surgery    HYSTERECTOMY  08/13/2015    Hysterectomy    KNEE ARTHROSCOPY W/ MENISCAL REPAIR  08/13/2015    Knee Arthroscopy With Medial Meniscus Repair    KNEE SURGERY  08/13/2015    Knee Surgery    MR HEAD ANGIO WO IV CONTRAST  09/05/2021    MR HEAD ANGIO WO IV CONTRAST 9/5/2021 [facility]    OTHER SURGICAL HISTORY  08/13/2015    Shoulder Surgery Left    OTHER SURGICAL HISTORY  08/13/2015    Repair Of Bladder Reconstruction    OTHER SURGICAL HISTORY  09/20/2021    Breast surgery    OTHER SURGICAL HISTORY  09/20/2021    Bladder surgery    OTHER SURGICAL HISTORY  09/20/2021     Complete colonoscopy    OTHER SURGICAL HISTORY  09/20/2021    Shoulder surgery    OTHER SURGICAL HISTORY  09/20/2021    Foot surgery    OTHER SURGICAL HISTORY  09/20/2021    Percutaneous transluminal coronary angioplasty    OTHER SURGICAL HISTORY  10/11/2021    Thyroidectomy total    THYROIDECTOMY      TOTAL KNEE ARTHROPLASTY  08/13/2015    Knee Replacement     Social History  Social History     Tobacco Use    Smoking status: Never    Smokeless tobacco: Never   Vaping Use    Vaping status: Never Used   Substance Use Topics    Alcohol use: Not Currently    Drug use: Never     Allergies  Patient has no known allergies.  Medications Prior to Admission   Medication Sig Dispense Refill Last Dose/Taking    atorvastatin (Lipitor) 10 mg tablet TAKE 1 TABLET BY MOUTH DAILY 90 tablet 3 Past Week    cholecalciferol (Vitamin D-3) 25 MCG (1000 UT) tablet Take 1 tablet (1,000 Units) by mouth once daily.   Past Week    Eliquis 5 mg tablet TAKE 1 TABLET BY MOUTH TWICE  DAILY 180 tablet 3 2/10/2025    estradiol (Estrace) 0.01 % (0.1 mg/gram) vaginal cream Apply pea size amount 0.5 gram to vaginal opening with finger daily for 2 weeks, then 2-3 times per week. 42.5 g 5 Past Week    levothyroxine (Synthroid, Levoxyl) 100 mcg tablet Take 1 tablet (100 mcg) by mouth early in the morning..   Past Week    losartan (Cozaar) 50 mg tablet TAKE 1 TABLET BY MOUTH EVERY DAY 90 tablet 0 Past Week    magnesium oxide (Mag-Ox) 400 mg (241.3 mg magnesium) tablet Take 1 tablet (400 mg) by mouth once daily. 30 tablet 3 Past Week    metFORMIN XR (Glucophage-XR) 500 mg 24 hr tablet Take 1 tablet (500 mg) by mouth 2 times a day.   Past Week    metoprolol tartrate (Lopressor) 75 mg tablet Take 1 tablet (75 mg) by mouth 2 times a day. 60 tablet 11 Past Week    montelukast (Singulair) 10 mg tablet TAKE 1 TABLET BY MOUTH AT  BEDTIME 90 tablet 3 Past Week    omeprazole (PriLOSEC) 40 mg DR capsule TAKE 1 CAPSULE BY MOUTH TWICE  DAILY 180 capsule 3 Past Week     potassium gluconate 595 mg (99 mg) tablet Take 1 tablet by mouth once daily.   Past Week    acetaminophen (Tylenol) 325 mg tablet Take 2 tablets (650 mg) by mouth every 6 hours if needed for mild pain (1 - 3). (Patient not taking: Reported on 2/11/2025)   Not Taking    ciprofloxacin (Cipro) 500 mg tablet Take 1 tablet (500 mg) by mouth every 12 hours for 4 days. (Patient not taking: Reported on 2/11/2025) 8 tablet 0 Not Taking    diphenoxylate-atropine (Lomotil) 2.5-0.025 mg tablet Take 1 tablet by mouth 4 times a day as needed for diarrhea. 24 tablet 0 Unknown    oxyCODONE (Roxicodone) 5 mg immediate release tablet Take 1 tablet (5 mg) by mouth every 4 hours if needed for moderate pain (4 - 6) for up to 3 days. (Patient not taking: Reported on 2/11/2025)   Not Taking    tiotropium (Spiriva Respimat) 2.5 mcg/actuation inhaler Inhale 2 puffs once daily.   Unknown    traZODone (Desyrel) 50 mg tablet Take 1-3 tablets ( mg) by mouth as needed at bedtime for sleep.   Unknown    Ventolin HFA 90 mcg/actuation inhaler USE 2 INHALATIONS BY MOUTH EVERY 4 HOURS AS NEEDED 108 g 3 Unknown     Scheduled medications  [Held by provider] apixaban, 5 mg, oral, q12h  atorvastatin, 10 mg, oral, Daily  cholecalciferol, 1,000 Units, oral, Daily  insulin lispro, 0-5 Units, subcutaneous, TID AC  levothyroxine, 100 mcg, oral, Daily  [Held by provider] losartan, 50 mg, oral, Daily  metoprolol tartrate, 75 mg, oral, BID  pantoprazole, 40 mg, oral, BID AC      Continuous medications  [Held by provider] heparin, 0-4,500 Units/hr, Last Rate: Stopped (02/12/25 0728)      PRN medications  PRN medications: dextrose, dextrose, glucagon, glucagon, morphine, oxyCODONE, traZODone    I have personally reviewed the patient's lab work and results for the last year:  Admission on 02/10/2025   Component Date Value Ref Range Status    WBC 02/10/2025 13.2 (H)  4.4 - 11.3 x10*3/uL Final    nRBC 02/10/2025 0.0  0.0 - 0.0 /100 WBCs Final    RBC 02/10/2025  4.72  4.00 - 5.20 x10*6/uL Final    Hemoglobin 02/10/2025 10.0 (L)  12.0 - 16.0 g/dL Final    Hematocrit 02/10/2025 33.6 (L)  36.0 - 46.0 % Final    MCV 02/10/2025 71 (L)  80 - 100 fL Final    MCH 02/10/2025 21.2 (L)  26.0 - 34.0 pg Final    MCHC 02/10/2025 29.8 (L)  32.0 - 36.0 g/dL Final    RDW 02/10/2025 18.5 (H)  11.5 - 14.5 % Final    Platelets 02/10/2025 356  150 - 450 x10*3/uL Final    Neutrophils % 02/10/2025 75.3  40.0 - 80.0 % Final    Immature Granulocytes %, Automated 02/10/2025 0.3  0.0 - 0.9 % Final    Immature Granulocyte Count (IG) includes promyelocytes, myelocytes and metamyelocytes but does not include bands. Percent differential counts (%) should be interpreted in the context of the absolute cell counts (cells/UL).    Lymphocytes % 02/10/2025 15.2  13.0 - 44.0 % Final    Monocytes % 02/10/2025 6.3  2.0 - 10.0 % Final    Eosinophils % 02/10/2025 2.7  0.0 - 6.0 % Final    Basophils % 02/10/2025 0.2  0.0 - 2.0 % Final    Neutrophils Absolute 02/10/2025 9.96 (H)  1.60 - 5.50 x10*3/uL Final    Percent differential counts (%) should be interpreted in the context of the absolute cell counts (cells/uL).    Immature Granulocytes Absolute, Au* 02/10/2025 0.04  0.00 - 0.50 x10*3/uL Final    Lymphocytes Absolute 02/10/2025 2.01  0.80 - 3.00 x10*3/uL Final    Monocytes Absolute 02/10/2025 0.84 (H)  0.05 - 0.80 x10*3/uL Final    Eosinophils Absolute 02/10/2025 0.36  0.00 - 0.40 x10*3/uL Final    Basophils Absolute 02/10/2025 0.03  0.00 - 0.10 x10*3/uL Final    Glucose 02/10/2025 94  74 - 99 mg/dL Final    Sodium 02/10/2025 136  136 - 145 mmol/L Final    Potassium 02/10/2025 3.3 (L)  3.5 - 5.3 mmol/L Final    Chloride 02/10/2025 105  98 - 107 mmol/L Final    Bicarbonate 02/10/2025 21  21 - 32 mmol/L Final    Anion Gap 02/10/2025 13  10 - 20 mmol/L Final    Urea Nitrogen 02/10/2025 9  6 - 23 mg/dL Final    Creatinine 02/10/2025 0.67  0.50 - 1.05 mg/dL Final    eGFR 02/10/2025 >90  >60 mL/min/1.73m*2 Final     Calculations of estimated GFR are performed using the 2021 CKD-EPI Study Refit equation without the race variable for the IDMS-Traceable creatinine methods.  https://jasn.asnjournals.org/content/early/2021/09/22/ASN.6128342247    Calcium 02/10/2025 7.9 (L)  8.6 - 10.3 mg/dL Final    Albumin 02/10/2025 3.2 (L)  3.4 - 5.0 g/dL Final    Alkaline Phosphatase 02/10/2025 53  33 - 136 U/L Final    Total Protein 02/10/2025 6.3 (L)  6.4 - 8.2 g/dL Final    AST 02/10/2025 10  9 - 39 U/L Final    Bilirubin, Total 02/10/2025 0.3  0.0 - 1.2 mg/dL Final    ALT 02/10/2025 5 (L)  7 - 45 U/L Final    Patients treated with Sulfasalazine may generate falsely decreased results for ALT.    Magnesium 02/10/2025 1.51 (L)  1.60 - 2.40 mg/dL Final    Ventricular Rate 02/10/2025 155  BPM Preliminary    Atrial Rate 02/10/2025 166  BPM Preliminary    QRS Duration 02/10/2025 78  ms Preliminary    QT Interval 02/10/2025 300  ms Preliminary    QTC Calculation(Bazett) 02/10/2025 482  ms Preliminary    R Axis 02/10/2025 -66  degrees Preliminary    T Axis 02/10/2025 89  degrees Preliminary    QRS Count 02/10/2025 26  beats Preliminary    Q Onset 02/10/2025 215  ms Preliminary    T Offset 02/10/2025 365  ms Preliminary    QTC Fredericia 02/10/2025 411  ms Preliminary    Ventricular Rate 02/10/2025 73  BPM Preliminary    Atrial Rate 02/10/2025 73  BPM Preliminary    DC Interval 02/10/2025 178  ms Preliminary    QRS Duration 02/10/2025 82  ms Preliminary    QT Interval 02/10/2025 368  ms Preliminary    QTC Calculation(Bazett) 02/10/2025 405  ms Preliminary    P Axis 02/10/2025 -23  degrees Preliminary    R Axis 02/10/2025 81  degrees Preliminary    T [place] 02/10/2025 4  degrees Preliminary    QRS Count 02/10/2025 12  beats Preliminary    Q Onset 02/10/2025 214  ms Preliminary    P Onset 02/10/2025 125  ms Preliminary    P Offset 02/10/2025 152  ms Preliminary    T Offset 02/10/2025 398  ms Preliminary    QTC Fredericia 02/10/2025 393  ms Preliminary     Flu A Result 02/10/2025 Not Detected  Not Detected Final    Flu B Result 02/10/2025 Not Detected  Not Detected Final    Coronavirus 2019, PCR 02/10/2025 Not Detected  Not Detected Final    Troponin I, High Sensitivity 02/10/2025 45 (H)  0 - 13 ng/L Final    Lipase 02/10/2025 5 (L)  9 - 82 U/L Final    Troponin I, High Sensitivity 02/10/2025 49 (H)  0 - 13 ng/L Final    Extra Tube 02/10/2025 Hold for add-ons.   Final    Auto resulted.    Extra Tube 02/10/2025 Hold for add-ons.   Final    Auto resulted.    Lactate 02/10/2025 1.0  0.4 - 2.0 mmol/L Final    WBC 02/11/2025 13.1 (H)  4.4 - 11.3 x10*3/uL Final    nRBC 02/11/2025 0.0  0.0 - 0.0 /100 WBCs Final    RBC 02/11/2025 4.36  4.00 - 5.20 x10*6/uL Final    Hemoglobin 02/11/2025 9.0 (L)  12.0 - 16.0 g/dL Final    Hematocrit 02/11/2025 31.3 (L)  36.0 - 46.0 % Final    MCV 02/11/2025 72 (L)  80 - 100 fL Final    MCH 02/11/2025 20.6 (L)  26.0 - 34.0 pg Final    MCHC 02/11/2025 28.8 (L)  32.0 - 36.0 g/dL Final    RDW 02/11/2025 18.6 (H)  11.5 - 14.5 % Final    Platelets 02/11/2025 299  150 - 450 x10*3/uL Final    Neutrophils % 02/11/2025 72.5  40.0 - 80.0 % Final    Immature Granulocytes %, Automated 02/11/2025 0.5  0.0 - 0.9 % Final    Immature Granulocyte Count (IG) includes promyelocytes, myelocytes and metamyelocytes but does not include bands. Percent differential counts (%) should be interpreted in the context of the absolute cell counts (cells/UL).    Lymphocytes % 02/11/2025 13.4  13.0 - 44.0 % Final    Monocytes % 02/11/2025 8.7  2.0 - 10.0 % Final    Eosinophils % 02/11/2025 4.6  0.0 - 6.0 % Final    Basophils % 02/11/2025 0.3  0.0 - 2.0 % Final    Neutrophils Absolute 02/11/2025 9.51 (H)  1.60 - 5.50 x10*3/uL Final    Percent differential counts (%) should be interpreted in the context of the absolute cell counts (cells/uL).    Immature Granulocytes Absolute, Au* 02/11/2025 0.07  0.00 - 0.50 x10*3/uL Final    Lymphocytes Absolute 02/11/2025 1.76  0.80 - 3.00  x10*3/uL Final    Monocytes Absolute 02/11/2025 1.14 (H)  0.05 - 0.80 x10*3/uL Final    Eosinophils Absolute 02/11/2025 0.61 (H)  0.00 - 0.40 x10*3/uL Final    Basophils Absolute 02/11/2025 0.04  0.00 - 0.10 x10*3/uL Final    Glucose 02/11/2025 93  74 - 99 mg/dL Final    Sodium 02/11/2025 137  136 - 145 mmol/L Final    Potassium 02/11/2025 4.5  3.5 - 5.3 mmol/L Final    Chloride 02/11/2025 103  98 - 107 mmol/L Final    Bicarbonate 02/11/2025 29  21 - 32 mmol/L Final    Anion Gap 02/11/2025 10  10 - 20 mmol/L Final    Urea Nitrogen 02/11/2025 9  6 - 23 mg/dL Final    Creatinine 02/11/2025 0.79  0.50 - 1.05 mg/dL Final    eGFR 02/11/2025 78  >60 mL/min/1.73m*2 Final    Calculations of estimated GFR are performed using the 2021 CKD-EPI Study Refit equation without the race variable for the IDMS-Traceable creatinine methods.  https://jasn.asnjournals.org/content/early/2021/09/22/ASN.8274549598    Calcium 02/11/2025 9.1  8.6 - 10.3 mg/dL Final    Phosphorus 02/11/2025 3.3  2.5 - 4.9 mg/dL Final    The performance characteristics of phosphorus testing in heparinized plasma have been validated by the individual  laboratory site where testing is performed. Testing on heparinized plasma is not approved by the FDA; however, such approval is not necessary.    Albumin 02/11/2025 3.3 (L)  3.4 - 5.0 g/dL Final    Magnesium 02/11/2025 2.36  1.60 - 2.40 mg/dL Final    POCT Glucose 02/11/2025 98  74 - 99 mg/dL Final    Color, Urine 02/11/2025 Light-Yellow  Light-Yellow, Yellow, Dark-Yellow Final    Appearance, Urine 02/11/2025 Clear  Clear Final    Specific Gravity, Urine 02/11/2025 1.043 (N)  1.005 - 1.035 Final    pH, Urine 02/11/2025 7.0  5.0, 5.5, 6.0, 6.5, 7.0, 7.5, 8.0 Final    Protein, Urine 02/11/2025 NEGATIVE  NEGATIVE, 10 (TRACE), 20 (TRACE) mg/dL Final    Glucose, Urine 02/11/2025 Normal  Normal mg/dL Final    Blood, Urine 02/11/2025 NEGATIVE  NEGATIVE mg/dL Final    Ketones, Urine 02/11/2025 NEGATIVE  NEGATIVE mg/dL  Final    Bilirubin, Urine 02/11/2025 NEGATIVE  NEGATIVE mg/dL Final    Urobilinogen, Urine 02/11/2025 Normal  Normal mg/dL Final    Nitrite, Urine 02/11/2025 NEGATIVE  NEGATIVE Final    Leukocyte Esterase, Urine 02/11/2025 25 Uday/uL (A)  NEGATIVE Final    Extra Tube 02/11/2025 Hold for add-ons.   Final    Auto resulted.    aPTT 02/11/2025 67 (H)  27 - 38 seconds Final    Heparin Unfractionated 02/11/2025 0.9  See Comment Below for Therapeutic Ranges IU/mL Final    POCT Glucose 02/11/2025 113 (H)  74 - 99 mg/dL Final    WBC, Urine 02/11/2025 1-5  1-5, NONE /HPF Final    RBC, Urine 02/11/2025 NONE  NONE, 1-2, 3-5 /HPF Final    Squamous Epithelial Cells, Urine 02/11/2025 1-9 (SPARSE)  Reference range not established. /HPF Final    Budding Yeast, Urine 02/11/2025 PRESENT (A)  NONE /HPF Final    Yeast Hyphae, Urine 02/11/2025 PRESENT (A)  NONE /HPF Final    POCT Glucose 02/11/2025 129 (H)  74 - 99 mg/dL Final    POCT Glucose 02/11/2025 98  74 - 99 mg/dL Final    WBC 02/12/2025 11.5 (H)  4.4 - 11.3 x10*3/uL Final    nRBC 02/12/2025 0.0  0.0 - 0.0 /100 WBCs Final    RBC 02/12/2025 4.00  4.00 - 5.20 x10*6/uL Final    Hemoglobin 02/12/2025 8.4 (L)  12.0 - 16.0 g/dL Final    Hematocrit 02/12/2025 28.5 (L)  36.0 - 46.0 % Final    MCV 02/12/2025 71 (L)  80 - 100 fL Final    MCH 02/12/2025 21.0 (L)  26.0 - 34.0 pg Final    MCHC 02/12/2025 29.5 (L)  32.0 - 36.0 g/dL Final    RDW 02/12/2025 18.5 (H)  11.5 - 14.5 % Final    Platelets 02/12/2025 284  150 - 450 x10*3/uL Final    Neutrophils % 02/12/2025 63.5  40.0 - 80.0 % Final    Immature Granulocytes %, Automated 02/12/2025 0.6  0.0 - 0.9 % Final    Immature Granulocyte Count (IG) includes promyelocytes, myelocytes and metamyelocytes but does not include bands. Percent differential counts (%) should be interpreted in the context of the absolute cell counts (cells/UL).    Lymphocytes % 02/12/2025 21.7  13.0 - 44.0 % Final    Monocytes % 02/12/2025 8.1  2.0 - 10.0 % Final     Eosinophils % 02/12/2025 5.8  0.0 - 6.0 % Final    Basophils % 02/12/2025 0.3  0.0 - 2.0 % Final    Neutrophils Absolute 02/12/2025 7.27 (H)  1.60 - 5.50 x10*3/uL Final    Percent differential counts (%) should be interpreted in the context of the absolute cell counts (cells/uL).    Immature Granulocytes Absolute, Au* 02/12/2025 0.07  0.00 - 0.50 x10*3/uL Final    Lymphocytes Absolute 02/12/2025 2.49  0.80 - 3.00 x10*3/uL Final    Monocytes Absolute 02/12/2025 0.93 (H)  0.05 - 0.80 x10*3/uL Final    Eosinophils Absolute 02/12/2025 0.66 (H)  0.00 - 0.40 x10*3/uL Final    Basophils Absolute 02/12/2025 0.03  0.00 - 0.10 x10*3/uL Final    Glucose 02/12/2025 102 (H)  74 - 99 mg/dL Final    Sodium 02/12/2025 135 (L)  136 - 145 mmol/L Final    Potassium 02/12/2025 4.1  3.5 - 5.3 mmol/L Final    Chloride 02/12/2025 101  98 - 107 mmol/L Final    Bicarbonate 02/12/2025 28  21 - 32 mmol/L Final    Anion Gap 02/12/2025 10  10 - 20 mmol/L Final    Urea Nitrogen 02/12/2025 11  6 - 23 mg/dL Final    Creatinine 02/12/2025 0.77  0.50 - 1.05 mg/dL Final    eGFR 02/12/2025 80  >60 mL/min/1.73m*2 Final    Calculations of estimated GFR are performed using the 2021 CKD-EPI Study Refit equation without the race variable for the IDMS-Traceable creatinine methods.  https://jasn.asnjournals.org/content/early/2021/09/22/ASN.1248243263    Calcium 02/12/2025 8.8  8.6 - 10.3 mg/dL Final    Phosphorus 02/12/2025 3.4  2.5 - 4.9 mg/dL Final    The performance characteristics of phosphorus testing in heparinized plasma have been validated by the individual  laboratory site where testing is performed. Testing on heparinized plasma is not approved by the FDA; however, such approval is not necessary.    Albumin 02/12/2025 3.0 (L)  3.4 - 5.0 g/dL Final    Magnesium 02/12/2025 2.12  1.60 - 2.40 mg/dL Final    Heparin Unfractionated 02/11/2025 0.8  See Comment Below for Therapeutic Ranges IU/mL Final    Heparin Unfractionated 02/12/2025 0.8  See Comment  Below for Therapeutic Ranges IU/mL Final    Ferritin 02/12/2025 25  8 - 150 ng/mL Final   Office Visit on 02/05/2025   Component Date Value Ref Range Status    POC Color, Urine 02/05/2025 Yellow  Straw, Yellow, Light-Yellow Final    POC Appearance, Urine 02/05/2025 Cloudy (A)  Clear Final    POC Glucose, Urine 02/05/2025 NEGATIVE  NEGATIVE mg/dl Final    POC Bilirubin, Urine 02/05/2025 SMALL (1+) (A)  NEGATIVE Final    POC Ketones, Urine 02/05/2025 NEGATIVE  NEGATIVE mg/dl Final    POC Specific Gravity, Urine 02/05/2025 >=1.030  1.005 - 1.035 Final    POC Blood, Urine 02/05/2025 SMALL (1+) (A)  NEGATIVE Final    POC PH, Urine 02/05/2025 5.5  No Reference Range Established PH Final    POC Protein, Urine 02/05/2025 30 (1+) (A)  NEGATIVE mg/dl Final    POC Urobilinogen, Urine 02/05/2025 0.2  0.2, 1.0 EU/DL Final    Poc Nitrite, Urine 02/05/2025 NEGATIVE  NEGATIVE Final    POC Leukocytes, Urine 02/05/2025 NEGATIVE  NEGATIVE Final    CULTURE, URINE, ROUTINE 02/05/2025 SEE NOTE   Final    Comment:     CULTURE, URINE, ROUTINE         Micro Number:      [id number]    Test Status:       Final    Specimen Source:   Not given    Specimen Quality:  Adequate    Result:            No Growth     No results displayed because visit has over 200 results.      Office Visit on 01/23/2025   Component Date Value Ref Range Status    POC Color, Urine 01/23/2025 Pink (A)  Straw, Yellow, Light-Yellow In process    POC Appearance, Urine 01/23/2025 Hazy (A)  Clear In process    POC Glucose, Urine 01/23/2025 NEGATIVE  NEGATIVE mg/dl In process    POC Bilirubin, Urine 01/23/2025 SMALL (1+) (A)  NEGATIVE In process    POC Ketones, Urine 01/23/2025 TRACE (A)  NEGATIVE mg/dl In process    POC Specific Gravity, Urine 01/23/2025 1.015  1.005 - 1.035 In process    POC Blood, Urine 01/23/2025 TRACE-Intact (A)  NEGATIVE In process    POC PH, Urine 01/23/2025 5.5  No Reference Range Established PH In process    POC Protein, Urine 01/23/2025 TRACE (A)   NEGATIVE mg/dl In process    POC Urobilinogen, Urine 01/23/2025 0.2  0.2, 1.0 EU/DL In process    Poc Nitrite, Urine 01/23/2025 POSITIVE (A)  NEGATIVE In process    POC Leukocytes, Urine 01/23/2025 TRACE (A)  NEGATIVE In process    Urine Culture 01/23/2025 20,000 - 80,000 CFU/mL Escherichia coli (A)   Final   Admission on 01/15/2025, Discharged on 01/17/2025   Component Date Value Ref Range Status    WBC 01/15/2025 9.1  4.4 - 11.3 x10*3/uL Final    nRBC 01/15/2025 0.0  0.0 - 0.0 /100 WBCs Final    RBC 01/15/2025 4.68  4.00 - 5.20 x10*6/uL Final    Hemoglobin 01/15/2025 10.0 (L)  12.0 - 16.0 g/dL Final    Hematocrit 01/15/2025 34.0 (L)  36.0 - 46.0 % Final    MCV 01/15/2025 73 (L)  80 - 100 fL Final    MCH 01/15/2025 21.4 (L)  26.0 - 34.0 pg Final    MCHC 01/15/2025 29.4 (L)  32.0 - 36.0 g/dL Final    RDW 01/15/2025 19.2 (H)  11.5 - 14.5 % Final    Platelets 01/15/2025 276  150 - 450 x10*3/uL Final    Neutrophils % 01/15/2025 72.4  40.0 - 80.0 % Final    Immature Granulocytes %, Automated 01/15/2025 0.3  0.0 - 0.9 % Final    Immature Granulocyte Count (IG) includes promyelocytes, myelocytes and metamyelocytes but does not include bands. Percent differential counts (%) should be interpreted in the context of the absolute cell counts (cells/UL).    Lymphocytes % 01/15/2025 16.1  13.0 - 44.0 % Final    Monocytes % 01/15/2025 7.2  2.0 - 10.0 % Final    Eosinophils % 01/15/2025 3.7  0.0 - 6.0 % Final    Basophils % 01/15/2025 0.3  0.0 - 2.0 % Final    Neutrophils Absolute 01/15/2025 6.60 (H)  1.60 - 5.50 x10*3/uL Final    Percent differential counts (%) should be interpreted in the context of the absolute cell counts (cells/uL).    Immature Granulocytes Absolute, Au* 01/15/2025 0.03  0.00 - 0.50 x10*3/uL Final    Lymphocytes Absolute 01/15/2025 1.47  0.80 - 3.00 x10*3/uL Final    Monocytes Absolute 01/15/2025 0.66  0.05 - 0.80 x10*3/uL Final    Eosinophils Absolute 01/15/2025 0.34  0.00 - 0.40 x10*3/uL Final    Basophils  Absolute 01/15/2025 0.03  0.00 - 0.10 x10*3/uL Final    Glucose 01/15/2025 109 (H)  74 - 99 mg/dL Final    Sodium 01/15/2025 138  136 - 145 mmol/L Final    Potassium 01/15/2025 4.0  3.5 - 5.3 mmol/L Final    Chloride 01/15/2025 103  98 - 107 mmol/L Final    Bicarbonate 01/15/2025 29  21 - 32 mmol/L Final    Anion Gap 01/15/2025 10  10 - 20 mmol/L Final    Urea Nitrogen 01/15/2025 8  6 - 23 mg/dL Final    Creatinine 01/15/2025 0.96  0.50 - 1.05 mg/dL Final    eGFR 01/15/2025 61  >60 mL/min/1.73m*2 Final    Calculations of estimated GFR are performed using the 2021 CKD-EPI Study Refit equation without the race variable for the IDMS-Traceable creatinine methods.  https://jasn.asnjournals.org/content/early/2021/09/22/ASN.9743269755    Calcium 01/15/2025 8.8  8.6 - 10.3 mg/dL Final    Albumin 01/15/2025 4.0  3.4 - 5.0 g/dL Final    Alkaline Phosphatase 01/15/2025 110  33 - 136 U/L Final    Total Protein 01/15/2025 7.1  6.4 - 8.2 g/dL Final    AST 01/15/2025 63 (H)  9 - 39 U/L Final    Bilirubin, Total 01/15/2025 0.4  0.0 - 1.2 mg/dL Final    ALT 01/15/2025 31  7 - 45 U/L Final    Patients treated with Sulfasalazine may generate falsely decreased results for ALT.    Magnesium 01/15/2025 2.12  1.60 - 2.40 mg/dL Final    Ventricular Rate 01/15/2025 70  BPM Final    Atrial Rate 01/15/2025 70  BPM Final    IA Interval 01/15/2025 190  ms Final    QRS Duration 01/15/2025 86  ms Final    QT Interval 01/15/2025 412  ms Final    QTC Calculation(Bazett) 01/15/2025 444  ms Final    P Axis 01/15/2025 89  degrees Final    R Axis 01/15/2025 -56  degrees Final    T Axis 01/15/2025 42  degrees Final    QRS Count 01/15/2025 11  beats Final    Q Onset 01/15/2025 215  ms Final    P Onset 01/15/2025 120  ms Final    P Offset 01/15/2025 172  ms Final    T Offset 01/15/2025 421  ms Final    QTC Fredericia 01/15/2025 433  ms Final    Ventricular Rate 01/15/2025 68  BPM Final    Atrial Rate 01/15/2025 68  BPM Final    IA Interval 01/15/2025 158   ms Final    QRS Duration 01/15/2025 84  ms Final    QT Interval 01/15/2025 416  ms Final    QTC Calculation(Bazett) 01/15/2025 442  ms Final    P Axis 01/15/2025 71  degrees Final    R Axis 01/15/2025 -64  degrees Final    T Axis 01/15/2025 43  degrees Final    QRS Count 01/15/2025 11  beats Final    Q Onset 01/15/2025 212  ms Final    P Onset 01/15/2025 133  ms Final    P Offset 01/15/2025 179  ms Final    T Offset 01/15/2025 420  ms Final    QTC Fredericia 01/15/2025 434  ms Final    Lactate 01/15/2025 2.1 (H)  0.4 - 2.0 mmol/L Final    Protime 01/15/2025 20.0 (H)  9.8 - 12.8 seconds Final    INR 01/15/2025 1.8 (H)  0.9 - 1.1 Final    BNP 01/15/2025 143 (H)  0 - 99 pg/mL Final    Flu A Result 01/15/2025 Not Detected  Not Detected Final    Flu B Result 01/15/2025 Not Detected  Not Detected Final    Coronavirus 2019, PCR 01/15/2025 Not Detected  Not Detected Final    Troponin I, High Sensitivity 01/15/2025 113 (HH)  0 - 13 ng/L Final    Color, Urine 01/15/2025 Light-Yellow  Light-Yellow, Yellow, Dark-Yellow Final    Appearance, Urine 01/15/2025 Clear  Clear Final    Specific Gravity, Urine 01/15/2025 1.005  1.005 - 1.035 Final    pH, Urine 01/15/2025 7.0  5.0, 5.5, 6.0, 6.5, 7.0, 7.5, 8.0 Final    Protein, Urine 01/15/2025 NEGATIVE  NEGATIVE, 10 (TRACE), 20 (TRACE) mg/dL Final    Glucose, Urine 01/15/2025 Normal  Normal mg/dL Final    Blood, Urine 01/15/2025 NEGATIVE  NEGATIVE Final    Ketones, Urine 01/15/2025 NEGATIVE  NEGATIVE mg/dL Final    Bilirubin, Urine 01/15/2025 NEGATIVE  NEGATIVE Final    Urobilinogen, Urine 01/15/2025 Normal  Normal mg/dL Final    Nitrite, Urine 01/15/2025 NEGATIVE  NEGATIVE Final    Leukocyte Esterase, Urine 01/15/2025 NEGATIVE  NEGATIVE Final    Extra Tube 01/15/2025 Hold for add-ons.   Final    Auto resulted.    Troponin I, High Sensitivity 01/15/2025 120 (HH)  0 - 13 ng/L Final    Previous result verified on 1/15/2025 1237 on specimen/case [id number] called with component [facility]  for procedure Troponin I, High Sensitivity, Initial with value 113 ng/L.    Lactate 01/15/2025 1.8  0.4 - 2.0 mmol/L Final    Thyroid Stimulating Hormone 01/15/2025 10.20 (H)  0.44 - 3.98 mIU/L Final    Thyroxine, Free 01/15/2025 1.21 (H)  0.61 - 1.12 ng/dL Final    Cholesterol 01/15/2025 120  0 - 199 mg/dL Final          Age      Desirable   Borderline High   High     0-19 Y     0 - 169       170 - 199     >/= 200    20-24 Y     0 - 189       190 - 224     >/= 225         >24 Y     0 - 199       200 - 239     >/= 240   **All ranges are based on fasting samples. Specific   therapeutic targets will vary based on patient-specific   cardiac risk.    Pediatric guidelines reference:Pediatrics 2011, 128(S5).Adult guidelines reference: NCEP ATPIII Guidelines,RAFFY 2001, 258:2486-97    Venipuncture immediately after or during the administration of Metamizole may lead to falsely low results. Testing should be performed immediately prior to Metamizole dosing.    HDL-Cholesterol 01/15/2025 66.4  mg/dL Final      Age       Very Low   Low     Normal    High    0-19 Y    < 35      < 40     40-45     ----  20-24 Y    ----     < 40      >45      ----        >24 Y      ----     < 40     40-60      >60      Cholesterol/HDL Ratio 01/15/2025 1.8   Final      Ref Values  Desirable  < 3.4  High Risk  > 5.0    LDL Calculated 01/15/2025 33  <=99 mg/dL Final                                Near   Borderline      AGE      Desirable  Optimal    High     High     Very High     0-19 Y     0 - 109     ---    110-129   >/= 130     ----    20-24 Y     0 - 119     ---    120-159   >/= 160     ----      >24 Y     0 -  99   100-129  130-159   160-189     >/=190      VLDL 01/15/2025 20  0 - 40 mg/dL Final    Triglycerides 01/15/2025 101  0 - 149 mg/dL Final    Age              Desirable        Borderline         High        Very High  SEX:B           mg/dL             mg/dL               mg/dL      mg/dL  <=14D                       ----                ----        ----  15D-365D                    ----               ----        ----  1Y-9Y           0-74               75-99             >=100       ----  10Y-19Y        0-89                            >=130       ----  20Y-24Y        0-114             115-149             >=150      ----  >= 25Y         0-149             150-199             200-499    >=500      Venipuncture immediately after or during the administration of Metamizole may lead to falsely low results. Testing should be performed immediately prior to Metamizole dosing.    Non HDL Cholesterol 01/15/2025 54  0 - 149 mg/dL Final          Age       Desirable   Borderline High   High     Very High     0-19 Y     0 - 119       120 - 144     >/= 145    >/= 160    20-24 Y     0 - 149       150 - 189     >/= 190      ----         >24 Y    30 mg/dL above LDL Cholesterol goal      POCT Glucose 01/15/2025 90  74 - 99 mg/dL Final    RN NOTIFIED    Troponin I, High Sensitivity 01/16/2025 95 (HH)  0 - 13 ng/L Final    Previous result verified on 1/15/2025 1237 on specimen/case [id number] called with component [facility] for procedure Troponin I, High Sensitivity, Initial with value 113 ng/L.    Glucose 01/16/2025 95  74 - 99 mg/dL Final    Sodium 01/16/2025 136  136 - 145 mmol/L Final    Potassium 01/16/2025 4.1  3.5 - 5.3 mmol/L Final    Chloride 01/16/2025 103  98 - 107 mmol/L Final    Bicarbonate 01/16/2025 25  21 - 32 mmol/L Final    Anion Gap 01/16/2025 12  10 - 20 mmol/L Final    Urea Nitrogen 01/16/2025 8  6 - 23 mg/dL Final    Creatinine 01/16/2025 0.86  0.50 - 1.05 mg/dL Final    eGFR 01/16/2025 70  >60 mL/min/1.73m*2 Final    Calculations of estimated GFR are performed using the 2021 CKD-EPI Study Refit equation without the race variable for the IDMS-Traceable creatinine methods.  https://jasn.asnjournals.org/content/early/2021/09/22/ASN.6107175983    Calcium 01/16/2025 8.3 (L)  8.6 - 10.3 mg/dL Final    Albumin 01/16/2025 3.4  3.4 - 5.0  g/dL Final    Alkaline Phosphatase 01/16/2025 111  33 - 136 U/L Final    Total Protein 01/16/2025 6.0 (L)  6.4 - 8.2 g/dL Final    AST 01/16/2025 45 (H)  9 - 39 U/L Final    Bilirubin, Total 01/16/2025 0.4  0.0 - 1.2 mg/dL Final    ALT 01/16/2025 29  7 - 45 U/L Final    Patients treated with Sulfasalazine may generate falsely decreased results for ALT.    Magnesium 01/16/2025 2.18  1.60 - 2.40 mg/dL Final    POCT Glucose 01/15/2025 149 (H)  74 - 99 mg/dL Final    Ventricular Rate 01/16/2025 63  BPM Final    Atrial Rate 01/16/2025 63  BPM Final    [place] Interval 01/16/2025 204  ms Final    QRS Duration 01/16/2025 84  ms Final    QT Interval 01/16/2025 438  ms Final    QTC Calculation(Bazett) 01/16/2025 448  ms Final    P Axis 01/16/2025 52  degrees Final    R Axis 01/16/2025 -62  degrees Final    T Axis 01/16/2025 19  degrees Final    QRS Count 01/16/2025 11  beats Final    Q Onset 01/16/2025 226  ms Final    P Onset 01/16/2025 124  ms Final    P Offset 01/16/2025 163  ms Final    T Offset 01/16/2025 445  ms Final    QTC Fredericia 01/16/2025 445  ms Final    Extra Tube 01/16/2025 Hold for add-ons.   Final    Auto resulted.    Extra Tube 01/16/2025 Hold for add-ons.   Final    Auto resulted.    POCT Glucose 01/16/2025 94  74 - 99 mg/dL Final    POCT Glucose 01/16/2025 99  74 - 99 mg/dL Final    POCT Glucose 01/16/2025 132 (H)  74 - 99 mg/dL Final    POCT Glucose 01/16/2025 146 (H)  74 - 99 mg/dL Final    Ventricular Rate 01/17/2025 63  BPM Final    Atrial Rate 01/17/2025 63  BPM Final    IL Interval 01/17/2025 214  ms Final    QRS Duration 01/17/2025 86  ms Final    QT Interval 01/17/2025 450  ms Final    QTC Calculation(Bazett) 01/17/2025 460  ms Final    P Axis 01/17/2025 71  degrees Final    R Axis 01/17/2025 -46  degrees Final    T Axis 01/17/2025 46  degrees Final    QRS Count 01/17/2025 10  beats Final    Q Onset 01/17/2025 212  ms Final    P Onset 01/17/2025 105  ms Final    P Offset 01/17/2025 169  ms Final    T  Offset 01/17/2025 437  ms Final    QTC Fredericia 01/17/2025 457  ms Final    POCT Glucose 01/17/2025 111 (H)  74 - 99 mg/dL Final    POCT Glucose 01/17/2025 126 (H)  74 - 99 mg/dL Final   Admission on 01/12/2025, Discharged on 01/14/2025   Component Date Value Ref Range Status    WBC 01/12/2025 9.2  4.4 - 11.3 x10*3/uL Final    nRBC 01/12/2025 0.0  0.0 - 0.0 /100 WBCs Final    RBC 01/12/2025 4.85  4.00 - 5.20 x10*6/uL Final    Hemoglobin 01/12/2025 10.5 (L)  12.0 - 16.0 g/dL Final    Hematocrit 01/12/2025 35.1 (L)  36.0 - 46.0 % Final    MCV 01/12/2025 72 (L)  80 - 100 fL Final    MCH 01/12/2025 21.6 (L)  26.0 - 34.0 pg Final    MCHC 01/12/2025 29.9 (L)  32.0 - 36.0 g/dL Final    RDW 01/12/2025 18.6 (H)  11.5 - 14.5 % Final    Platelets 01/12/2025 280  150 - 450 x10*3/uL Final    Neutrophils % 01/12/2025 60.5  40.0 - 80.0 % Final    Immature Granulocytes %, Automated 01/12/2025 0.3  0.0 - 0.9 % Final    Immature Granulocyte Count (IG) includes promyelocytes, myelocytes and metamyelocytes but does not include bands. Percent differential counts (%) should be interpreted in the context of the absolute cell counts (cells/UL).    Lymphocytes % 01/12/2025 28.9  13.0 - 44.0 % Final    Monocytes % 01/12/2025 7.3  2.0 - 10.0 % Final    Eosinophils % 01/12/2025 2.7  0.0 - 6.0 % Final    Basophils % 01/12/2025 0.3  0.0 - 2.0 % Final    Neutrophils Absolute 01/12/2025 5.53 (H)  1.60 - 5.50 x10*3/uL Final    Percent differential counts (%) should be interpreted in the context of the absolute cell counts (cells/uL).    Immature Granulocytes Absolute, Au* 01/12/2025 0.03  0.00 - 0.50 x10*3/uL Final    Lymphocytes Absolute 01/12/2025 2.64  0.80 - 3.00 x10*3/uL Final    Monocytes Absolute 01/12/2025 0.67  0.05 - 0.80 x10*3/uL Final    Eosinophils Absolute 01/12/2025 0.25  0.00 - 0.40 x10*3/uL Final    Basophils Absolute 01/12/2025 0.03  0.00 - 0.10 x10*3/uL Final    Glucose 01/12/2025 134 (H)  74 - 99 mg/dL Final    Sodium  01/12/2025 136  136 - 145 mmol/L Final    Potassium 01/12/2025 3.6  3.5 - 5.3 mmol/L Final    Chloride 01/12/2025 100  98 - 107 mmol/L Final    Bicarbonate 01/12/2025 26  21 - 32 mmol/L Final    Anion Gap 01/12/2025 14  10 - 20 mmol/L Final    Urea Nitrogen 01/12/2025 12  6 - 23 mg/dL Final    Creatinine 01/12/2025 1.03  0.50 - 1.05 mg/dL Final    eGFR 01/12/2025 56 (L)  >60 mL/min/1.73m*2 Final    Calculations of estimated GFR are performed using the 2021 CKD-EPI Study Refit equation without the race variable for the IDMS-Traceable creatinine methods.  https://jasn.asnjournals.org/content/early/2021/09/22/ASN.5654020254    Calcium 01/12/2025 9.2  8.6 - 10.3 mg/dL Final    Albumin 01/12/2025 4.4  3.4 - 5.0 g/dL Final    Alkaline Phosphatase 01/12/2025 70  33 - 136 U/L Final    Total Protein 01/12/2025 7.7  6.4 - 8.2 g/dL Final    AST 01/12/2025 15  9 - 39 U/L Final    Bilirubin, Total 01/12/2025 0.3  0.0 - 1.2 mg/dL Final    ALT 01/12/2025 8  7 - 45 U/L Final    Patients treated with Sulfasalazine may generate falsely decreased results for ALT.    Magnesium 01/12/2025 1.96  1.60 - 2.40 mg/dL Final    BNP 01/12/2025 95  0 - 99 pg/mL Final    Ventricular Rate 01/12/2025 132  BPM Final    Atrial Rate 01/12/2025 81  BPM Final    QRS Duration 01/12/2025 82  ms Final    QT Interval 01/12/2025 342  ms Final    QTC Calculation(Bazett) 01/12/2025 506  ms Final    R Axis 01/12/2025 -84  degrees Final    T Axis 01/12/2025 66  degrees Final    QRS Count 01/12/2025 21  beats Final    Q Onset 01/12/2025 215  ms Final    T Offset 01/12/2025 386  ms Final    QTC Fredericia 01/12/2025 445  ms Final    Troponin I, High Sensitivity 01/12/2025 16 (H)  0 - 13 ng/L Final    Protime 01/12/2025 17.3 (H)  9.8 - 12.8 seconds Final    INR 01/12/2025 1.5 (H)  0.9 - 1.1 Final    Thyroid Stimulating Hormone 01/12/2025 18.84 (H)  0.44 - 3.98 mIU/L Final    Lactate 01/12/2025 2.8 (H)  0.4 - 2.0 mmol/L Final    Blood Culture 01/12/2025 No growth at  4 days -  FINAL REPORT   Final    Blood Culture 01/12/2025 No growth at 4 days -  FINAL REPORT   Final    Color, Urine 01/12/2025 Dark-Yellow  Light-Yellow, Yellow, Dark-Yellow Final    Appearance, Urine 01/12/2025 Turbid (N)  Clear Final    Specific Gravity, Urine 01/12/2025 1.006  1.005 - 1.035 Final    pH, Urine 01/12/2025 6.0  5.0, 5.5, 6.0, 6.5, 7.0, 7.5, 8.0 Final    Protein, Urine 01/12/2025 NEGATIVE  NEGATIVE, 10 (TRACE), 20 (TRACE) mg/dL Final    Glucose, Urine 01/12/2025 Normal  Normal mg/dL Final    Blood, Urine 01/12/2025 0.06 (1+) (A)  NEGATIVE Final    Ketones, Urine 01/12/2025 NEGATIVE  NEGATIVE mg/dL Final    Bilirubin, Urine 01/12/2025 NEGATIVE  NEGATIVE Final    Urobilinogen, Urine 01/12/2025 Normal  Normal mg/dL Final    Nitrite, Urine 01/12/2025 1+ (A)  NEGATIVE Final    Leukocyte Esterase, Urine 01/12/2025 NEGATIVE  NEGATIVE Final    Extra Tube 01/12/2025 Hold for add-ons.   Final    Auto resulted.    Troponin I, High Sensitivity 01/12/2025 21 (H)  0 - 13 ng/L Final    Lactate 01/12/2025 1.7  0.4 - 2.0 mmol/L Final    Thyroxine, Free 01/12/2025 0.96  0.61 - 1.12 ng/dL Final    WBC, Urine 01/12/2025 1-5  1-5, NONE /HPF Final    RBC, Urine 01/12/2025 3-5  NONE, 1-2, 3-5 /HPF Final    Squamous Epithelial Cells, Urine 01/12/2025 1-9 (SPARSE)  Reference range not established. /HPF Final    Urine Culture 01/12/2025 No growth   Final    WBC 01/13/2025 8.3  4.4 - 11.3 x10*3/uL Final    nRBC 01/13/2025 0.0  0.0 - 0.0 /100 WBCs Final    RBC 01/13/2025 4.03  4.00 - 5.20 x10*6/uL Final    Hemoglobin 01/13/2025 8.7 (L)  12.0 - 16.0 g/dL Final    Hematocrit 01/13/2025 29.5 (L)  36.0 - 46.0 % Final    MCV 01/13/2025 73 (L)  80 - 100 fL Final    MCH 01/13/2025 21.6 (L)  26.0 - 34.0 pg Final    MCHC 01/13/2025 29.5 (L)  32.0 - 36.0 g/dL Final    RDW 01/13/2025 18.6 (H)  11.5 - 14.5 % Final    Platelets 01/13/2025 260  150 - 450 x10*3/uL Final    Glucose 01/13/2025 95  74 - 99 mg/dL Final    Sodium 01/13/2025 139   136 - 145 mmol/L Final    Potassium 01/13/2025 3.9  3.5 - 5.3 mmol/L Final    Chloride 01/13/2025 106  98 - 107 mmol/L Final    Bicarbonate 01/13/2025 28  21 - 32 mmol/L Final    Anion Gap 01/13/2025 9 (L)  10 - 20 mmol/L Final    Urea Nitrogen 01/13/2025 9  6 - 23 mg/dL Final    Creatinine 01/13/2025 0.90  0.50 - 1.05 mg/dL Final    eGFR 01/13/2025 66  >60 mL/min/1.73m*2 Final    Calculations of estimated GFR are performed using the 2021 CKD-EPI Study Refit equation without the race variable for the IDMS-Traceable creatinine methods.  https://jasn.asnjournals.org/content/early/2021/09/22/ASN.3605231819    Calcium 01/13/2025 8.1 (L)  8.6 - 10.3 mg/dL Final    Ventricular Rate 01/13/2025 67  BPM Final    Atrial Rate 01/13/2025 67  BPM Final    VA Interval 01/13/2025 204  ms Final    QRS Duration 01/13/2025 86  ms Final    QT Interval 01/13/2025 446  ms Final    QTC Calculation(Bazett) 01/13/2025 471  ms Final    P Axis 01/13/2025 84  degrees Final    R Axis 01/13/2025 -46  degrees Final    T Axis 01/13/2025 44  degrees Final    QRS Count 01/13/2025 11  beats Final    Q Onset 01/13/2025 212  ms Final    P Onset 01/13/2025 110  ms Final    P Offset 01/13/2025 173  ms Final    T Offset 01/13/2025 435  ms Final    QTC Fredericia 01/13/2025 463  ms Final    Troponin I, High Sensitivity 01/13/2025 43 (H)  0 - 13 ng/L Final    POCT Glucose 01/13/2025 118 (H)  74 - 99 mg/dL Final    POCT Glucose 01/13/2025 103 (H)  74 - 99 mg/dL Final    Magnesium 01/14/2025 2.02  1.60 - 2.40 mg/dL Final    Glucose 01/14/2025 86  74 - 99 mg/dL Final    Sodium 01/14/2025 139  136 - 145 mmol/L Final    Potassium 01/14/2025 4.0  3.5 - 5.3 mmol/L Final    Chloride 01/14/2025 106  98 - 107 mmol/L Final    Bicarbonate 01/14/2025 28  21 - 32 mmol/L Final    Anion Gap 01/14/2025 9 (L)  10 - 20 mmol/L Final    Urea Nitrogen 01/14/2025 8  6 - 23 mg/dL Final    Creatinine 01/14/2025 1.01  0.50 - 1.05 mg/dL Final    eGFR 01/14/2025 58 (L)  >60  mL/min/1.73m*2 Final    Calculations of estimated GFR are performed using the 2021 CKD-EPI Study Refit equation without the race variable for the IDMS-Traceable creatinine methods.  https://jasn.asnjournals.org/content/early/2021/09/22/ASN.5945157287    Calcium 01/14/2025 8.0 (L)  8.6 - 10.3 mg/dL Final    WBC 01/14/2025 6.1  4.4 - 11.3 x10*3/uL Final    nRBC 01/14/2025 0.0  0.0 - 0.0 /100 WBCs Final    RBC 01/14/2025 3.93 (L)  4.00 - 5.20 x10*6/uL Final    Hemoglobin 01/14/2025 8.5 (L)  12.0 - 16.0 g/dL Final    Hematocrit 01/14/2025 28.6 (L)  36.0 - 46.0 % Final    MCV 01/14/2025 73 (L)  80 - 100 fL Final    MCH 01/14/2025 21.6 (L)  26.0 - 34.0 pg Final    MCHC 01/14/2025 29.7 (L)  32.0 - 36.0 g/dL Final    RDW 01/14/2025 18.5 (H)  11.5 - 14.5 % Final    Platelets 01/14/2025 241  150 - 450 x10*3/uL Final    Hemoglobin 01/14/2025 9.1 (L)  12.0 - 16.0 g/dL Final    Hematocrit 01/14/2025 30.8 (L)  36.0 - 46.0 % Final   Admission on 01/09/2025, Discharged on 01/09/2025   Component Date Value Ref Range Status    WBC 01/09/2025 12.1 (H)  4.4 - 11.3 x10*3/uL Final    nRBC 01/09/2025 0.0  0.0 - 0.0 /100 WBCs Final    RBC 01/09/2025 4.85  4.00 - 5.20 x10*6/uL Final    Hemoglobin 01/09/2025 10.2 (L)  12.0 - 16.0 g/dL Final    Hematocrit 01/09/2025 35.4 (L)  36.0 - 46.0 % Final    MCV 01/09/2025 73 (L)  80 - 100 fL Final    MCH 01/09/2025 21.0 (L)  26.0 - 34.0 pg Final    MCHC 01/09/2025 28.8 (L)  32.0 - 36.0 g/dL Final    RDW 01/09/2025 18.9 (H)  11.5 - 14.5 % Final    Platelets 01/09/2025 276  150 - 450 x10*3/uL Final    Neutrophils % 01/09/2025 67.3  40.0 - 80.0 % Final    Immature Granulocytes %, Automated 01/09/2025 0.3  0.0 - 0.9 % Final    Immature Granulocyte Count (IG) includes promyelocytes, myelocytes and metamyelocytes but does not include bands. Percent differential counts (%) should be interpreted in the context of the absolute cell counts (cells/UL).    Lymphocytes % 01/09/2025 23.0  13.0 - 44.0 % Final     Monocytes % 01/09/2025 6.5  2.0 - 10.0 % Final    Eosinophils % 01/09/2025 2.7  0.0 - 6.0 % Final    Basophils % 01/09/2025 0.2  0.0 - 2.0 % Final    Neutrophils Absolute 01/09/2025 8.14 (H)  1.60 - 5.50 x10*3/uL Final    Percent differential counts (%) should be interpreted in the context of the absolute cell counts (cells/uL).    Immature Granulocytes Absolute, Au* 01/09/2025 0.04  0.00 - 0.50 x10*3/uL Final    Lymphocytes Absolute 01/09/2025 2.78  0.80 - 3.00 x10*3/uL Final    Monocytes Absolute 01/09/2025 0.79  0.05 - 0.80 x10*3/uL Final    Eosinophils Absolute 01/09/2025 0.33  0.00 - 0.40 x10*3/uL Final    Basophils Absolute 01/09/2025 0.03  0.00 - 0.10 x10*3/uL Final    Glucose 01/09/2025 103 (H)  74 - 99 mg/dL Final    Sodium 01/09/2025 136  136 - 145 mmol/L Final    Potassium 01/09/2025 4.0  3.5 - 5.3 mmol/L Final    Chloride 01/09/2025 99  98 - 107 mmol/L Final    Bicarbonate 01/09/2025 25  21 - 32 mmol/L Final    Anion Gap 01/09/2025 16  10 - 20 mmol/L Final    Urea Nitrogen 01/09/2025 12  6 - 23 mg/dL Final    Creatinine 01/09/2025 0.96  0.50 - 1.05 mg/dL Final    eGFR 01/09/2025 61  >60 mL/min/1.73m*2 Final    Calculations of estimated GFR are performed using the 2021 CKD-EPI Study Refit equation without the race variable for the IDMS-Traceable creatinine methods.  https://jasn.asnjournals.org/content/early/2021/09/22/ASN.5247170173    Calcium 01/09/2025 9.2  8.6 - 10.3 mg/dL Final    Albumin 01/09/2025 4.1  3.4 - 5.0 g/dL Final    Alkaline Phosphatase 01/09/2025 68  33 - 136 U/L Final    Total Protein 01/09/2025 7.4  6.4 - 8.2 g/dL Final    AST 01/09/2025 11  9 - 39 U/L Final    Bilirubin, Total 01/09/2025 0.3  0.0 - 1.2 mg/dL Final    ALT 01/09/2025 6 (L)  7 - 45 U/L Final    Patients treated with Sulfasalazine may generate falsely decreased results for ALT.    Color, Urine 01/09/2025 Light-Yellow  Light-Yellow, Yellow, Dark-Yellow Final    Appearance, Urine 01/09/2025 Turbid (N)  Clear Final     Specific Gravity, Urine 01/09/2025 1.015  1.005 - 1.035 Final    pH, Urine 01/09/2025 5.5  5.0, 5.5, 6.0, 6.5, 7.0, 7.5, 8.0 Final    Protein, Urine 01/09/2025 10 (TRACE)  NEGATIVE, 10 (TRACE), 20 (TRACE) mg/dL Final    Glucose, Urine 01/09/2025 Normal  Normal mg/dL Final    Blood, Urine 01/09/2025 0.5 (2+) (A)  NEGATIVE Final    Ketones, Urine 01/09/2025 NEGATIVE  NEGATIVE mg/dL Final    Bilirubin, Urine 01/09/2025 NEGATIVE  NEGATIVE Final    Urobilinogen, Urine 01/09/2025 Normal  Normal mg/dL Final    Nitrite, Urine 01/09/2025 2+ (A)  NEGATIVE Final    Leukocyte Esterase, Urine 01/09/2025 500 Uday/uL (A)  NEGATIVE Final    Extra Tube 01/09/2025 Hold for add-ons.   Final    Auto resulted.    WBC, Urine 01/09/2025 >50 (A)  1-5, NONE /HPF Final    WBC Clumps, Urine 01/09/2025 OCCASIONAL  Reference range not established. /HPF Final    RBC, Urine 01/09/2025 >20 (A)  NONE, 1-2, 3-5 /HPF Final    Squamous Epithelial Cells, Urine 01/09/2025 1-9 (SPARSE)  Reference range not established. /HPF Final    Bacteria, Urine 01/09/2025 1+ (A)  NONE SEEN /HPF Final    Mucus, Urine 01/09/2025 FEW  Reference range not established. /LPF Final    Urine Culture 01/09/2025 >=100,000 CFU/mL Escherichia coli (A)   Final    Urine Culture 01/09/2025 >=100,000 CFU/mL Aerococcus urinae (A)   Final   Lab on 01/02/2025   Component Date Value Ref Range Status    Ferritin 01/02/2025 18  8 - 150 ng/mL Final    WBC 01/02/2025 9.4  4.4 - 11.3 x10*3/uL Final    nRBC 01/02/2025 0.0  0.0 - 0.0 /100 WBCs Final    RBC 01/02/2025 4.25  4.00 - 5.20 x10*6/uL Final    Hemoglobin 01/02/2025 9.2 (L)  12.0 - 16.0 g/dL Final    Hematocrit 01/02/2025 31.2 (L)  36.0 - 46.0 % Final    MCV 01/02/2025 73 (L)  80 - 100 fL Final    MCH 01/02/2025 21.6 (L)  26.0 - 34.0 pg Final    MCHC 01/02/2025 29.5 (L)  32.0 - 36.0 g/dL Final    RDW 01/02/2025 19.6 (H)  11.5 - 14.5 % Final    Platelets 01/02/2025 282  150 - 450 x10*3/uL Final    Iron 01/02/2025 16 (L)  35 - 150 ug/dL  Final    UIBC 01/02/2025 398 (H)  110 - 370 ug/dL Final    TIBC 01/02/2025 414  240 - 445 ug/dL Final    % Saturation 01/02/2025 4 (L)  25 - 45 % Final   Office Visit on 11/05/2024   Component Date Value Ref Range Status    POC Color, Urine 11/05/2024 Yellow  Straw, Yellow, Light-Yellow Final    POC Appearance, Urine 11/05/2024 Cloudy (A)  Clear Final    POC Glucose, Urine 11/05/2024 NEGATIVE  NEGATIVE mg/dl Final    POC Bilirubin, Urine 11/05/2024 SMALL (1+) (A)  NEGATIVE Final    POC Ketones, Urine 11/05/2024 TRACE (A)  NEGATIVE mg/dl Final    POC Specific Gravity, Urine 11/05/2024 >=1.030  1.005 - 1.035 Final    POC Blood, Urine 11/05/2024 LARGE (3+) (A)  NEGATIVE Final    POC PH, Urine 11/05/2024 6.0  No Reference Range Established PH Final    POC Protein, Urine 11/05/2024 >=300 (3+) (A)  NEGATIVE, 30 (1+) mg/dl Final    POC Urobilinogen, Urine 11/05/2024 0.2  0.2, 1.0 EU/DL Final    Poc Nitrite, Urine 11/05/2024 NEGATIVE  NEGATIVE Final    POC Leukocytes, Urine 11/05/2024 LARGE (3+) (A)  NEGATIVE Final    Urine Culture 11/05/2024 20,000 - 80,000 Klebsiella pneumoniae/variicola (A)   Final    Urine Culture 11/05/2024 20,000 - 80,000 Aerococcus urinae (A)   Final   Admission on 10/14/2024, Discharged on 10/14/2024   Component Date Value Ref Range Status    POCT Glucose 10/14/2024 107 (H)  74 - 99 mg/dL Final    Case Report 10/14/2024    Final                    Value:Surgical Pathology                                Case: [id number]                                  Authorizing Provider:  Evin Thurman MD         Collected:           10/14/2024 1638              Ordering Location:     [facility] Received:            10/15/2024 0833                                     OR                                                                           Pathologist:           Parmjit Farr MD                                                         Specimen:    BLADDER BIOPSY, BLADDER BX                   "                                               FINAL DIAGNOSIS 10/14/2024    Final                    Value:A. BLADDER, BIOPSY:  -- FRAGMENTS OF KERATINIZING SQUAMOUS MUCOSA, COMPATIBLE WITH KERATINIZING SQUAMOUS METAPLASIA.        10/14/2024    Final                    Value:By the signature on this report, the individual or group listed as making the Final Interpretation/Diagnosis certifies that they have reviewed this case.       Resident Review 10/14/2024    Final                    Value:The gross and/or microscopic findings were reviewed in conjunction with pathology fellow, Cici Max MD.        Clinical History 10/14/2024    Final                    Value:Pre-op diagnosis:  Urge incontinence [N39.41]    Bladder biopsy on 10/27/2015 showed extensively denuded urothelial mucosa with focal hemorrhage, marked chronic inflammation, and reactive epithelial changes.    History of overactive bladder with continence control stimulator inserted in 2020. Symptoms recurred ~2023 with urge incontinence. Underwent stimulator revision on 10/14/2024.    Cystoscopy on 10/14/2024 showed extensive silver fluffy-appearing metaplastic type changes and severe trabeculations. A biopsy from the right bladder wall was taken.      Gross Description 10/14/2024    Final                    Value:Received in formalin, labeled with the patient's name and hospital number and \"1, bladder biopsy\", is 1 fragment of tan, soft tissue aggregating to 0.2 x 0.2 x 0.2 cm. The specimen is submitted in toto in one cassette.  JEK/SBS      There may be more visits with results that are not included.        Review of Systems  Neurological Exam  Physical Exam  Last Recorded Vitals  Blood pressure 136/60, pulse 69, temperature 36 °C (96.8 °F), temperature source Temporal, resp. rate 16, height 1.575 m (5' 2\"), weight 62 kg (136 lb 11 oz), SpO2 100%.      GENERAL APPEARANCE:  No distress, alert and cooperative.          MENTAL STATE:  Orientation was " normal to time, place and person.         CRANIAL NERVES:  Cranial nerves were normal.      CN 2- Visual   fields full to confrontation.      CN 3, 4, 6-  Pupils round, 4 mm in diameter, equally reactive to light. No ptosis. EOMs normal alignment, full range of movement, no nystagmus     CN 5- Facial sensation intact bilaterally. Normal corneal reflexes.      CN 7- Normal and symmetric facial strength. Nasolabial folds symmetric.     CN 8- Hearing intact to finger rub, whisper.      CN 9- Palate elevates symmetrically. Normal gag reflex.      CN 11- Normal strength of shoulder shrug and neck turning      CN 12- Tongue midline, with normal bulk and strength; no fasciculations.     MOTOR:  Motor exam was normal. Muscle bulk and tone were normal in both upper and lower extremities. Muscle strength was 5/5 in distal and proximal muscles in both upper and lower extremities. No fasciculations, tremor or other abnormal movements were present.     REFLEXES:  Right/ Left:  Biceps 2/2, brachioradialis 2/2, triceps 2/2, patellar 2/2, ankle 2/2  Babinski: toes downgoing to plantar stimulation. No clonus, frontal release signs or other pathologic reflexes present.     SENSORY: Sensory exam was intact to light touch, sharp/dull, vibration and position sense in both UE and LE.     COORDINATION: BERTO were intact in upper and lower extremities.  In UE- finger-nose-finger was intact and in LE- heel-to-shin was intact without dysmetria or overshoot.      GAIT:  not tested.       Relevant Results                 Friendly Coma Scale  Best Eye Response: Spontaneous  Best Verbal Response: Oriented  Best Motor Response: Follows commands  Belinda Coma Scale Score: 15                 I have personally reviewed the following imaging results ECG 12 lead    Result Date: 2/11/2025  Normal sinus rhythm Poor R-wave progression ; consider anterior infarct, lead placement, or normal variant Abnormal ECG When compared with ECG of 10-FEB-2025 17:21,  (unconfirmed) Vent. rate has decreased BY  82 BPM Questionable change in QRS axis Nonspecific T wave abnormality now evident in Inferior leads T wave inversion now evident in Anterior leads    ECG 12 lead    Result Date: 2/11/2025  Supraventricular tachycardia Indeterminate axis Possible Anterior infarct , age undetermined Abnormal ECG When compared with ECG of 28-JAN-2025 16:33, Fusion complexes are no longer Present Premature ventricular complexes are no longer Present Questionable change in QRS axis Nonspecific T wave abnormality no longer evident in Inferior leads    CT angio abdomen pelvis w and or wo IV IV contrast    Result Date: 2/10/2025  Interpreted By:  Felipe Driscoll, STUDY: CT ANGIO ABDOMEN PELVIS W AND/OR WO IV IV CONTRAST;  2/10/2025 9:41 pm   INDICATION: Signs/Symptoms:A-fib with RVR, severe abdominal pain not responding to morphine, assess for mesenteric ischemia.   COMPARISON: 01/29/2025   ACCESSION NUMBER(S): [id number]   ORDERING CLINICIAN: GEORGE LOPEZ   TECHNIQUE: Axial non-contrast images of the abdomen, and pelvis.   Axial CT images of the, abdomen and pelvis were obtained after the intravenous administration of iodinated contrast using angiographic technique with coronal and sagittal reformatted images. MIP images and 3D reconstructions were created on an independent workstation and reviewed.   FINDINGS: VASCULATURE:   Mild atherosclerotic calcification of the aorta without aneurysm or dissection.   Celiac artery, SMA, renal arteries are all widely patent. KATHRYN patent. Iliac arteries patent without stenosis.   CT ABDOMEN/PELVIS:   Arterial phase imaging of the liver, adrenals pancreas and spleen are grossly unremarkable. Status post cholecystectomy.   No new hydronephrosis.   Redemonstrated is retroperitoneal adenopathy surrounding the distal aorta/cava and extending into the right iliac chain which appears to be progressing compared to previous. Necrotic appearing lymph node in the right  pelvic sidewall currently measures 2.2 cm in short axis, previously 1.9 cm.   Stable appearance of the bladder which has trabeculated appearance with right-sided posterior diverticulum. There is some irregular right posterior bladder wall thickening. The possibility of underlying bladder malignancy again should be considered. There is some dependent bladder calculi seen.   No significant new free fluid.   No bowel obstruction or acute colitis.   Unchanged left ovarian cyst measuring 3.5 cm in diameter.   There is multilevel moderate to advanced degenerative disc disease seen in the lumbar spine worse at L4-L5 and L5-S1.       1. No abdominal aortic aneurysm or dissection. No evidence of mesenteric ischemia.   2. Compared to previous study there is progressing retroperitoneal adenopathy with some interval enlargement suspected.   3. Irregular masslike thickening right posterior bladder wall near suspected diverticulum. Underlying bladder malignancy may be present. Correlate with patient history/cystoscopy.   MACRO: None.   Signed by: Felipe Driscoll 2/10/2025 11:12 PM Dictation workstation:   [id number]    XR chest 1 view    Result Date: 2/10/2025  Interpreted By:  Wendy Lee, STUDY: XR CHEST 1 VIEW;  2/10/2025 6:19 pm   INDICATION: Signs/Symptoms:Short of breath, vomiting.   COMPARISON: 01/15/2025   ACCESSION NUMBER(S): [id number]   ORDERING CLINICIAN: GEORGE LOPEZ   FINDINGS:     CARDIOMEDIASTINAL SILHOUETTE: Cardiomediastinal silhouette is normal in size and configuration.   LUNGS: No pulmonary consolidation, pleural effusion or pneumothorax.   ABDOMEN: No remarkable upper abdominal findings.   BONES: No acute osseous abnormality.       No acute cardiopulmonary process.   MACRO: None   Signed by: Wendy Lee 2/10/2025 6:31 PM Dictation workstation:   [id number]    ECG 12 lead    Result Date: 2/9/2025  Sinus rhythm with sinus arrhythmia with occasional Premature ventricular complexes Left axis deviation  Abnormal ECG When compared with ECG of 15-ANTONIO-2025 11:23, (unconfirmed) Premature ventricular complexes are now Present See ED provider note for full interpretation and clinical correlation Confirmed by eN Shea ([id number]) on 2/9/2025 3:48:21 PM    ECG 12 lead    Result Date: 2/9/2025  Normal sinus rhythm Left axis deviation Pulmonary disease pattern Abnormal ECG When compared with ECG of 13-JAN-2025 07:22, No significant change was found See ED provider note for full interpretation and clinical correlation Confirmed by Ne Shea ([id number]) on 2/9/2025 3:16:08 PM    Transesophageal Echo (GAVIN)    Result Date: 2/5/2025            [facility] [address] [address]    Tel [phone] Fax [phone] TRANSESOPHAGEAL ECHOCARDIOGRAM REPORT Patient Name:       LISSETTE ZAVALA           Reading Physician:    [id number] Magana MD Study Date:         1/30/2025            Ordering Provider:    [id number] JONATHAN MAGANA MRN/PID:            [id number]             Fellow: Accession#:         [id number]         Nurse:                Zulma Ribeiro RN Date of Birth/Age:  1950 / 74      Sonographer:          Mely sutton Gender Assigned at  F                    Additional Staff:     Emmanuelle Jorge RN Birth: Height:             157.48 cm            Admit Date: Weight:             59.42 kg             Admission Status:     Inpatient -                                                                Routine BSA / BMI:          1.60 m2 / 23.96      Department Location:  [facility]                     kg/m2 Blood Pressure: 174 /72 mmHg Study Type:    TRANSESOPHAGEAL ECHO (GAVIN) Diagnosis/ICD: Shortness of breath-R06.02; Unspecified atrial                fibrillation-I48.91; Abnormal findings on diagnostic imaging of                other specified body  structures-R93.89 Indication:    SOb, Afib CPT Codes:     GAVIN Complete-23244  Study Detail: Agitated saline used as a contrast agent for intraseptal flow               evaluation.  PHYSICIAN INTERPRETATION: GAVIN Details: Agitated saline contrast and color flow Doppler echo was performed to assess for the presence of a patent foramen ovale. GAVIN Medication: Midazolam and Fentanyl was used to sedate the patient for this exam. GAVIN Procedure: The probe was passed without difficulty. Left Ventricle: There are no regional wall motion abnormalities. The left ventricular cavity size is normal. Spectral Doppler shows a Grade I (impaired relaxation pattern) of left ventricular diastolic filling with normal left atrial filling pressure. Left Atrium: The left atrial size is mild to moderately dilated. A bubble study using agitated saline was performed. Bubble study is negative. The left atrial appendage is enlarged. There is no definite left atrial mass present. Right Ventricle: The right ventricle is upper limits of normal in size. There is normal right ventricular global systolic function. Right Atrium: The right atrial size is normal. Aortic Valve: The aortic valve appears structurally normal. There is trace aortic valve regurgitation. Mitral Valve: The mitral valve is normal in structure. There is moderate to severe mitral valve regurgitation. The mitral regurgitant orifice area is 4 mm2. The mitral regurgitant volume is 8.73 ml. Tricuspid Valve: The tricuspid valve is structurally normal. There is trace to mild tricuspid regurgitation. Pulmonic Valve: The pulmonic valve was not assessed. There is trace pulmonic valve regurgitation. Pericardium: No pericardial effusion noted. Aorta: The aortic root is normal.  CONCLUSIONS:  1. Spectral Doppler shows a Grade I (impaired relaxation pattern) of left ventricular diastolic filling with normal left atrial filling pressure.  2. There is normal right ventricular global systolic  function.  3. The left atrial size is mild to moderately dilated.  4. Moderate to severe mitral valve regurgitation.  5. No left atrial mass.  6. No obvious cardiac source of embolization by this study. QUANTITATIVE DATA SUMMARY:  LV SYSTOLIC FUNCTION:                      Normal Ranges: EF-Visual:      60 % LV EF Reported: 60 %  MITRAL INSUFFICIENCY:             Normal Ranges: PISA Radius:          0.3 cm MR VTI:               208.00 cm MR Vmax:              519.00 cm/s MR Alias Elieser:         38.5 cm/s MR Volume:            8.73 ml MR Flow Rt:           21.77 ml/s MR EROA:              4 mm2  [id number] Clement Stevens MD Electronically signed on 2/5/2025 at 5:22:41 AM  ** Final **     Electrocardiogram, 12-lead PRN ACS symptoms    Result Date: 2/1/2025  Supraventricular tachycardia with occasional Premature ventricular complexes and Fusion complexes Right axis deviation Nonspecific ST abnormality Abnormal ECG When compared with ECG of 27-JAN-2025 20:14, (unconfirmed) Fusion complexes are now Present Premature ventricular complexes are now Present QRS axis Shifted right Borderline criteria for Inferior infarct are no longer Present Nonspecific T wave abnormality now evident in Inferior leads Confirmed by LISBETH Agrawal ([id number]) on 2/1/2025 6:38:48 PM    Electrocardiogram, 12-lead PRN ACS symptoms    Result Date: 2/1/2025  Supraventricular tachycardia Low voltage QRS Possible Inferior infarct , age undetermined Abnormal ECG When compared with ECG of 27-JAN-2025 17:29, (unconfirmed) Vent. rate has increased BY  75 BPM QRS axis Shifted right ST now depressed in Anterior leads Confirmed by LISBETH Agrawal ([id number]) on 2/1/2025 6:38:39 PM    Electrocardiogram, 12-lead PRN ACS symptoms    Result Date: 2/1/2025  Normal sinus rhythm Left axis deviation Pulmonary disease pattern Abnormal ECG When compared with ECG of 27-JAN-2025 09:00, (unconfirmed) QRS axis Shifted left Confirmed by LISBETH Agrawal ([id number]) on 2/1/2025 6:38:23 PM    ECG 12 Lead    Result  Date: 1/31/2025  Normal sinus rhythm Normal ECG When compared with ECG of 24-JAN-2025 16:10, Premature ventricular complexes are no longer Present Vent. rate has decreased BY  58 BPM QRS axis Shifted right Borderline criteria for Inferior infarct are no longer Present Nonspecific T wave abnormality now evident in Inferior leads T wave amplitude has decreased in Anterior leads Confirmed by Clement Stevens ([id number]) on 1/31/2025 6:21:28 PM    ECG 12 Lead    Result Date: 1/31/2025  Normal sinus rhythm Left axis deviation Pulmonary disease pattern T wave abnormality, consider anterior ischemia Abnormal ECG When compared with ECG of 24-JAN-2025 16:10, Premature ventricular complexes are no longer Present Vent. rate has decreased BY  67 BPM Nonspecific T wave abnormality now evident in Inferior leads T wave inversion now evident in Anterior leads Confirmed by Clement Stevens ([id number]) on 1/31/2025 6:21:11 PM    MR brain wo IV contrast    Result Date: 1/29/2025  Interpreted By:  Nicolas Sarmiento, STUDY: MR BRAIN WO IV CONTRAST;  1/29/2025 2:34 pm   INDICATION: Signs/Symptoms:aphasia, history of PAF with interruption in anticoagulation.     COMPARISON: CT head and CTA head yesterday.   ACCESSION NUMBER(S): [id number]   ORDERING CLINICIAN: TIGIST KEYS   TECHNIQUE: Patient could only tolerate limited examination. Axial DWI, ADC, sagittal and axial T1 weighted sequences were obtained.   FINDINGS: Multiple small patchy foci of abnormally restricted diffusion are seen in the bilateral cerebral hemispheres predominantly each centrum semiovale and oriented in AP direction with some additional involvement of the anterior right middle frontal gyrus cortex, the posterior right corpus callosum, paramedian right occipital lobe. Questionable additional involvement of left-greater-than-right cerebellar hemispheres.   No definite intracranial hemorrhage.     No extra-axial fluid collection. No intracranial mass.   Mild to moderate white matter  T2 signal increase, most commonly seen with chronic small vessel ischemic change.  Presence of older small infarctions in these areas not excluded.   Degree of ventriculomegaly is commensurate with overall degree of brain parenchymal volume loss, which is mild.       1. Multiple small patchy foci of abnormally restricted diffusion are seen in the bilateral cerebral hemispheres predominantly each centrum semiovale and oriented in AP direction with some additional involvement of the anterior right middle frontal gyrus cortex, the posterior right corpus callosum, paramedian right occipital lobe. Questionable additional involvement of left-greater-than-right cerebellar hemispheres. Findings consistent with acute to subacute infarctions. The AP orientation of the high bilateral cerebral hemispheres could indicate a recent hypotensive event. 2. Mild to moderate white matter T2 signal increase, most commonly seen with chronic small vessel ischemic change.  Presence of older small infarctions in these areas not excluded. 3. Degree of ventriculomegaly is commensurate with overall degree of brain parenchymal volume loss, which is mild.   Note the patient was only able to tolerate limited examination as described above.   MACRO: None   Signed by: Nicolas Sarmiento 1/29/2025 3:12 PM Dictation workstation:   [id number]    CT abdomen pelvis w IV contrast    Result Date: 1/29/2025  Interpreted By:  Evonne Yi, STUDY: CT ABDOMEN PELVIS W IV CONTRAST;  1/29/2025 9:32 am   INDICATION: Signs/Symptoms:abdominal pain with acute encephalopathy.     COMPARISON: CT abdomen pelvis 01/24/2025 CT chest abdomen pelvis 01/28/2024   ACCESSION NUMBER(S): [id number]   ORDERING CLINICIAN: TIGIST KEYS   TECHNIQUE: CT of the abdomen and pelvis was performed.  Standard contiguous axial images were obtained at 3 mm slice thickness through the abdomen and pelvis. Coronal and sagittal reconstructions at 3 mm slice thickness were performed.  75 ML  of Omnipaque 350 was administered intravenously without immediate complication.   FINDINGS: Please note that the evaluation of vessels, lymph nodes and organs is limited without intravenous contrast.   LOWER CHEST: The heart is normal in size without evidence of pericardial effusion. No consolidation or pleural effusion is present. No concerning lung nodule. Small hiatal hernia again noted.   ABDOMEN:   LIVER: The liver is normal in size without evidence of focal liver lesions.   BILE DUCTS: Stable mild intrahepatic and extrahepatic biliary duct dilatation, likely secondary to cholecystectomy.   GALLBLADDER: Cholecystectomy   PANCREAS: The pancreas appears unremarkable without evidence of ductal dilatation or masses.   SPLEEN: The spleen is normal in size without focal lesions.   ADRENAL GLANDS: No adrenal nodularity or thickening.   KIDNEYS AND URETERS: The kidneys are normal in size. Stable nonobstructive punctuate calculus in the right kidney. Stable sub 5 mm hypoattenuating lesions in the bilateral kidneys, which are too small to further characterize. No hydroureteronephrosis or left nephroureterolithiasis is identified.   PELVIS:   BLADDER: The bladder is completely opacified by contrast from prior imaging, which limits evaluation.   REPRODUCTIVE ORGANS: No pelvic masses. A stable 48 mm left adnexal cyst is seen on series 2, image 116. A 21 x 19 mm right adnexal lesion could be a right adnexal cyst or a perivesicular lymph node.   BOWEL: Small hiatal hernia. The small and large bowel are normal in caliber and demonstrate no wall thickening. The appendix is not definitely visualized. There is however no pericecal stranding or fluid.   VESSELS: There is no aneurysmal dilatation of the abdominal aorta. Mild atherosclerotic calcifications of the aortoiliac arteries. The IVC appears normal.   PERITONEUM/RETROPERITONEUM/LYMPH NODES: Retroperitoneal lymphadenopathy with adjacent stranding, stable when compared to  prior CT from 01/24/2025, and new when compared to prior CT from 01/28/2024, for example as seen on series 2: *16 x 10 mm aortocaval lymph node, image 75 *12 x 11 mm retrocaval lymph node, image 91 *16 x 15 mm right common iliac lymph node, image 97 *20 x 16 mm right internal iliac lymph node, image 104 *29 x 20 mm right external iliac lymph node, image 126 *24 x 14 mm right external iliac lymph node, image 135 *21 x 19 mm perivascular lesion, which could be a lymph node or right adnexal cyst, image 131 *20 x 18 mm right pelvic sidewall lesion, image 117   No ascites or fluid collection. No peritoneal nodularity or implant.   ABDOMINAL WALL: The abdominal wall soft tissues appear normal. Sacral spinal stimulator noted.   BONES: No suspicious osseous lesions are identified. Degenerative discogenic disease is noted in the lower thoracic and lumbar spine. Grade 1 anterolisthesis of the L4 and L5 vertebral bodies.       1. Retroperitoneal lymphadenopathy, stable when compared to prior CT from 01/24/2025, and new when compared to prior CT from 01/28/2024. Though the adjacent stranding favors an infectious/inflammatory etiology, underlying malignancy such as lymphoma or bladder carcinoma (given the bladder wall thickening seen on prior CT) can not be excluded. If clinical signs/symptoms favor infectious etiology, short-term follow-up CT abdomen pelvis in 4-6 weeks is recommended. Alternatively, correlation with serum LDH levels, cystoscopy, tissue diagnosis and/or PET-CT are other diagnostic considerations that could be obtained as per clinical need. 2. No significant interval change as compared to prior CT from 01/24/2025.   MACRO: Critical Finding:  There are multiple critical findings. See findings. notification was initiated on 1/29/2025 at 10:55 am by Evonne Yi.  (**-YCF-**)   Signed by: Evonne Yi 1/29/2025 10:56 AM Dictation workstation:   [id number]    CT angio head and neck w and wo IV contrast    Result  Date: 1/28/2025  Interpreted By:  Nicolas Sarmiento, STUDY: CT ANGIO HEAD AND NECK W AND WO IV CONTRAST;  1/28/2025 6:46 pm   INDICATION: Signs/Symptoms:stroke rule out.     COMPARISON: CT head today.   ACCESSION NUMBER(S): [id number]   ORDERING CLINICIAN: TIGIST KEYS   TECHNIQUE: Unenhanced CT images of the head were obtained.  Subsequently,  60 ML of Omnipaque 350 was administered intravenously and axial images of the head and neck were acquired.  Coronal, sagittal, and 3-D reconstructions were provided for review.   FINDINGS:   CTA COW: No major vascular occlusion. Mild atherosclerotic changes through the carotid siphons. No aneurysms.  No vascular malformations.   CTA CAROTID: No aortic aneurysm or dissection. No significant stenoses at the origins of the great vessels from the aortic arch. No significant stenoses of the brachycephalic artery or bilateral subclavian arteries.   Mild atherosclerotic changes at the carotid bifurcations. No significant stenoses bilateral carotid arterial systems. No significant stenoses bilateral vertebral arterial systems.   NONVASCULAR HEAD: No intracranial mass. No intracranial hemorrhage. No evidence of large evolving cortical infarction. Supratentorial white matter hypodensities most likely related to mild chronic small vessel ischemic change.     Bones intact. Mucous retention cyst left maxillary sinus.   NONVASCULAR NECK: No soft tissue mass. No adenopathy. Salivary glands are unremarkable. Thyroid gland unremarkable. Bones intact.       1. No intracranial major vascular occlusion. 2. No flow-limiting stenosis or significant plaque irregularity in the neck.     MACRO: None   Signed by: Nicolas Sarmiento 1/28/2025 7:30 PM Dictation workstation:   [id number]    CT brain attack head wo IV contrast    Result Date: 1/28/2025  Interpreted By:  Howard Millard, STUDY: TIGIST KEYS; 1/28/2025 5:08 pm   INDICATION: Signs/Symptoms:brain attack;   COMPARISON: 01/28/2024    ACCESSION NUMBER(S): [id number]   ORDERING CLINICIAN: TIGIST KEYS   TECHNIQUE: Contiguous axial images were acquired from the vertex through the posterior fossa without IV contrast. All CT examinations are performed with 1 or more of the following dose reduction techniques: Automated exposure control, adjustment of mA and/or kv according to patient's size, or use of iterative reconstruction techniques.   FINDINGS: No focal mass effect or midline shift is identified. The ventricles and sulci are symmetric and appropriate for the patient's age.   The gray white matter differentiation is preserved. Again seen is a moderate degree of nonspecific white matter hypodensity, most consistent with chronic small-vessel ischemic disease, not significantly changed from the prior study.   No acute intracranial hemorrhage is seen. No intra-axial or extra-axial fluid collection is seen.   The visualized paranasal sinuses and mastoid air cells are clear. There is a small-moderate sized mucous retention cysts in the visualized left maxillary sinus.       No CT evidence for acute intracranial pathology.   Moderate degree of nonspecific white matter hypodensity, most consistent with chronic small-vessel ischemic disease, not significantly changed when compared to the prior study.         Findings discussed with the physician on the 2nd floor at 5:20 p.m. on 01/28/2025   Signed by: Howard Millard 1/28/2025 5:39 PM Dictation workstation:   [id number]    ECG 12 lead    Result Date: 1/25/2025  Supraventricular tachycardia Left axis deviation Anterior infarct (cited on or before 17-JAN-2025) Abnormal ECG When compared with ECG of 17-JAN-2025 06:56, RI interval has decreased Vent. rate has increased BY  90 BPM Confirmed by Clement Stevens ([id number]) on 1/25/2025 1:40:18 PM    ECG 12 lead    Result Date: 1/25/2025  Normal sinus rhythm Left axis deviation Poor R-wave progression ; consider anterior infarct, lead placement, or normal variant  Abnormal ECG When compared with ECG of 23-JAN-2025 20:48, (unconfirmed) Vent. rate has decreased BY  59 BPM Confirmed by Clement Stevens ([id number]) on 1/25/2025 1:40:12 PM    Electrocardiogram, 12-lead PRN ACS symptoms    Result Date: 1/25/2025  Supraventricular tachycardia with occasional Premature ventricular complexes Left axis deviation Pulmonary disease pattern Possible Inferior infarct , age undetermined Abnormal ECG When compared with ECG of 23-JAN-2025 21:55, (unconfirmed) Premature ventricular complexes are now Present Vent. rate has increased BY  47 BPM Nonspecific T wave abnormality no longer evident in Anterior leads Confirmed by Clement Stevens ([id number]) on 1/25/2025 1:40:07 PM    Transthoracic Echo (TTE) Complete    Result Date: 1/24/2025            [facility] [id number] [facility] [id number]    Tel [phone] Fax [phone] TRANSTHORACIC ECHOCARDIOGRAM REPORT Patient Name:       LISSETTE ZAVALA         Reading Physician:    [id number] Clement Stevens MD Study Date:         1/24/2025            Ordering Provider:    [id number] TIGIST KEYS MRN/PID:            [id number]             Fellow: Accession#:         [id number]         Nurse: Date of Birth/Age:  1950 / 74      Sonographer:          Flakita Quarles RDCS                     years Gender Assigned at  F                    Additional Staff: Birth: Height:             157.48 cm            Admit Date:           1/23/2025 Weight:             63.96 kg             Admission Status:     Inpatient -                                                                Priority                                                                discharge BSA / BMI:          1.65 m2 / 25.79      Department Location:  [facility] [place]                     kg/m2 Blood Pressure: 168 /70 mmHg Study Type:    TRANSTHORACIC ECHO (TTE) COMPLETE  Diagnosis/ICD: Dyspnea, unspecified-R06.00 Indication:    Dyspnea CPT Codes:     Echo Complete w Full Doppler-[id number] Patient History: Diabetes:          Yes Pertinent History: Dyspnea, Palpitations, CAD, A-Fib, HTN, Hyperlipidemia and                    Previous SVT. GERD; previous echocardiogram 12-. Study Detail: The following Echo studies were performed: M-Mode, 2D, Doppler and               color flow.  PHYSICIAN INTERPRETATION: Left Ventricle: Left ventricular ejection fraction is normal, calculated by Rangel's biplane at 71%. There is mild left ventricular hypertrophy. There are no regional wall motion abnormalities. The left ventricular cavity size is normal. Spectral Doppler shows a Grade I (impaired relaxation pattern) of left ventricular diastolic filling with normal left atrial filling pressure. Left Atrium: The left atrium is mild to moderately dilated. Right Ventricle: The right ventricle is upper limits of normal in size. There is normal right ventricular global systolic function. Right Atrium: The right atrium is mildly dilated. Aortic Valve: The aortic valve appears structurally normal. There is mild aortic valve cusp calcification. There is mild to moderate aortic valve regurgitation. The peak instantaneous gradient of the aortic valve is 8 mmHg. Mitral Valve: The mitral valve is normal in structure. The peak instantaneous gradient of the mitral valve is 5 mmHg. There is mild mitral valve regurgitation. Tricuspid Valve: The tricuspid valve is structurally normal. There is mild tricuspid regurgitation. Pulmonic Valve: The pulmonic valve was not assessed. There is trace to mild pulmonic valve regurgitation. Pericardium: No pericardial effusion noted. Aorta: The aortic root is normal.  CONCLUSIONS:  1. Left ventricular ejection fraction is normal, calculated by Rangel's biplane at 71%.  2. Spectral Doppler shows a Grade I (impaired relaxation pattern) of left ventricular diastolic filling with  normal left atrial filling pressure.  3. There is normal right ventricular global systolic function.  4. The left atrium is mild to moderately dilated.  5. Mild to moderate aortic valve regurgitation. QUANTITATIVE DATA SUMMARY:  2D MEASUREMENTS:            Normal Ranges: LAs:             4.50 cm    (2.7-4.0cm) IVSd:            0.86 cm    (0.6-1.1cm) LVPWd:           1.06 cm    (0.6-1.1cm) LVIDd:           5.63 cm    (3.9-5.9cm) LVIDs:           4.00 cm LV Mass Index:   127.2 g/m2 LV % FS          29.0 %  LA VOLUME:                    Normal Ranges: LA Vol A4C:        55.9 ml    (22+/-6mL/m2) LA Vol A2C:        50.6 ml LA Vol BP:         54.1 ml LA Vol Index A4C:  34.0ml/m2 LA Vol Index A2C:  30.7 ml/m2 LA Vol Index BP:   32.8 ml/m2 LA Area A4C:       20.2 cm2 LA Area A2C:       18.9 cm2 LA Major Axis A4C: 6.2 cm LA Major Axis A2C: 6.0 cm LA Volume Index:   30.9 ml/m2 LA Vol A4C:        53.0 ml LA Vol A2C:        48.0 ml LA Vol Index BSA:  30.7 ml/m2  M-MODE MEASUREMENTS:         Normal Ranges: Ao Root:             3.20 cm (2.0-3.7cm)  AORTA MEASUREMENTS:         Normal Ranges: Ao Sinus, d:        3.00 cm (2.1-3.5cm) Ao STJ, d:          2.60 cm (1.7-3.4cm) Asc Ao, d:          3.30 cm (2.1-3.4cm)  LV SYSTOLIC FUNCTION BY 2D PLANIMETRY (MOD):                      Normal Ranges: EF-A4C View:    69 % (>=55%) EF-A2C View:    75 % EF-Biplane:     71 % LV EF Reported: 71 %  LV DIASTOLIC FUNCTION:             Normal Ranges: MV Peak E:             1.10 m/s    (0.7-1.2 m/s) MV Peak A:             1.10 m/s    (0.42-0.7 m/s) E/A Ratio:             1.00        (1.0-2.2) MV e'                  0.080 m/s   (>8.0) MV lateral e'          0.08 m/s MV medial e'           0.08 m/s E/e' Ratio:            13.76       (<8.0) PulmV Sys Elieser:         49.90 cm/s PulmV Flores Elieser:        35.50 cm/s PulmV S/D Elieser:         1.40 PulmV A Revs Elieser:      26.70 cm/s PulmV A Revs Dur:      148.00 msec  MITRAL VALVE:          Normal Ranges: MV Vmax:       1.12 m/s (<=1.3m/s) MV peak P.0 mmHg (<5mmHg) MV mean P.0 mmHg (<48mmHg) MV DT:        190 msec (150-240msec)  AORTIC VALVE:           Normal Ranges: AoV Vmax:      1.42 m/s (<=1.7m/s) AoV Peak P.1 mmHg (<20mmHg) LVOT Max Elieser:  1.05 m/s (<=1.1m/s) LVOT VTI:      27.40 cm LVOT Diameter: 1.80 cm  (1.8-2.4cm) AoV Area,Vmax: 1.88 cm2 (2.5-4.5cm2)  RIGHT VENTRICLE: RV Basal 2.80 cm RV Mid   2.70 cm RV Major 7.0 cm TAPSE:   22.0 mm RV s'    0.14 m/s  PULMONIC VALVE:          Normal Ranges: PV Accel Time:  95 msec  (>120ms) PV Max Elieser:     0.9 m/s  (0.6-0.9m/s) PV Max P.9 mmHg  Pulmonary Veins: PulmV A Revs Dur: 148.00 msec PulmV A Revs Elieser: 26.70 cm/s PulmV Flores Elieser:   35.50 cm/s PulmV S/D Elieser:    1.40 PulmV Sys Elieser:    49.90 cm/s  [id number] Clement Stevens MD Electronically signed on 2025 at 1:58:10 PM  ** Final **     CT abdomen pelvis wo IV contrast    Result Date: 2025  Interpreted By:  Gerard Maxwell, STUDY: CT ABDOMEN PELVIS WO IV CONTRAST;  2025 3:25 am   INDICATION: Signs/Symptoms:kidney stones. History of overactive bladder with continence control stimulator insert in . Symptoms recurred in  with urge incontinence and underwent stimulator revision on 10/14/2024.     COMPARISON: 2025   ACCESSION NUMBER(S): [id number]   ORDERING CLINICIAN: TIGIST KEYS   TECHNIQUE: Contiguous axial images of the abdomen and pelvis were obtained without intravenous contrast. Coronal and sagittal reformatted images were reconstructed from the axial data.   FINDINGS: Note: The absence of intravenous contrast limits evaluation of the solid organs and vasculature.   LOWER CHEST: No acute abnormality.     ABDOMEN/PELVIS:   ABDOMINAL WALL: There is a bladder control device in the right buttock with a single lead coursing through the right S3 neural foramen.   LIVER: The liver demonstrates a normal noncontrast attenuation.   BILE DUCTS: Prominent intrahepatic and extrahepatic bile  ducts are likely secondary to post-cholecystectomy status.   GALLBLADDER: Surgically absent.   PANCREAS: No significant abnormality.   SPLEEN: No significant abnormality.   ADRENALS: No significant abnormality.   KIDNEYS, URETERS, BLADDER: Non-obstructing 0.2 cm right renal calculus. No left renal calculus. No hydronephrosis. Urinary bladder wall is inflamed with perivesical fat stranding similar to prior study. There is thickening and inflammation of the distal right ureter. There is eccentric wall thickening adjacent to the right uterovesical junction which was seen on prior study as well. There are multiple bladder calculi measuring 0.8 cm and 1.3 cm.   REPRODUCTIVE ORGANS: Surgically absent uterus. There is a simple left ovarian cyst measuring 3.4 cm, slightly smaller compared to prior study.   VESSELS: Mild aortic atherosclerosis without AAA.   RETROPERITONEUM/LYMPH NODES: There is progression of now extensive inflammatory fat stranding within multiple retroperitoneal compartments. This is most severe on the right pelvic sidewall, extends into the right femoral canal, involves the presacral space, and extends superiorly to the aortoiliac bifurcation and along the aorta at the level of the renal veins. This stranding is accompanied by diffuse lymphadenopathy which is also progressed in degree and extent compared to prior study. For example, a 1.3 cm right common iliac node previously measured 0.9 cm. A 1.6 cm right common iliac node previously measured 0.7 cm, 1.3 cm by common iliac ovaries noted 0.8 cm, 2.1 cm by external iliac node previously measured 0.9 cm, 2 cm right external iliac lymph node previously measured 1.6 cm amongst others. There are no enlarged left external iliac lymph nodes. There is only a 0.7 cm left common iliac node that is unchanged. There are also newly enlarged aortocaval and periaortic lymph nodes predominantly measuring between 0.5 cm and 1 cm.   BOWEL/PERITONEUM: There is colonic  diverticulosis. No inflammatory bowel wall thickening or dilatation. Normal appendix.   No ascites, free air, or fluid collection.     MUSCULOSKELETAL: No acute osseous abnormality. No suspicious osseous lesion. Severe L4-L5 disc height loss with grade 1 degenerative anterolisthesis of L4 on L5 and moderate bilateral facet arthropathy. There is not severe central canal stenosis at L4-L5 and severe bilateral L4-5 foraminal stenosis. There are decompressive laminectomies at L2-L4.       1. Significant progression of severe inflammatory changes in the right pelvic sidewall, presacral space, right femoral canal and extending superiorly along the aorta and IVC to the level of the renal veins accompanied by diffuse lymphadenopathy which is also significantly progressed as detailed above. This inflammatory perinodal stranding favors an infectious or inflammatory etiology, although this the extent and degree of change is unusual for typical bladder infection. Recommend timely urological follow-up for further investigation. Correlation with urinalysis recommended.   2. Redemonstration of diffusely thickened inflamed bladder wall with diffuse thickening of the distal right ureter and multiple intraluminal bladder calculi may service continued nidus of continued infection.   3. No hydronephrosis. Nonobstructing 0.2 cm right renal calculus.   MACRO: None.   Signed by: Gerard Maxwell 1/24/2025 3:41 AM Dictation workstation:   [id number]    ECG 12 lead    Result Date: 1/17/2025  Sinus rhythm with 1st degree AV block Left axis deviation Possible Anterior infarct , age undetermined Abnormal ECG When compared with ECG of 16-JAN-2025 06:38, (unconfirmed) Nonspecific T wave abnormality no longer evident in Anterior leads Confirmed by Luigi Chisholm ([id number]) on 1/17/2025 10:17:22 PM    ECG 12 lead    Result Date: 1/17/2025  Normal sinus rhythm Left axis deviation Nonspecific T wave abnormality Abnormal ECG When compared with ECG of  15-ANTONIO-2025 13:47, (unconfirmed) Premature ventricular complexes are no longer Present Nonspecific T wave abnormality, worse in Anterior leads Confirmed by Luigi Chisholm ([id number]) on 1/17/2025 8:14:36 PM    XR chest 1 view    Result Date: 1/15/2025  Interpreted By:  Monique Caballero, STUDY: XR CHEST 1 VIEW;  1/15/2025 11:11 am   INDICATION: Signs/Symptoms:Chest Pain.   COMPARISON: None.   ACCESSION NUMBER(S): [id number]   ORDERING CLINICIAN: ESCOBAR GALLEGOS   FINDINGS: CARDIOMEDIASTINAL SILHOUETTE: Cardiomediastinal silhouette is normal in size and configuration.     LUNGS: Lungs are clear.   ABDOMEN: No remarkable upper abdominal findings.     BONES: No acute osseous changes.       No acute cardiopulmonary process.   MACRO: None   Signed by: Monique Caballero 1/15/2025 11:21 AM Dictation workstation:   [id number]               Assessment & Plan  Intractable pain    Abdominal pain    Bladder mass    S/p  multiple bihemispheric escalera on 01.29.2025 by Atrial fibrillation off anti-coagulation.   The patient is getting Cystoscopy for interstitial cystitis.       I had a prolonged discussion with the the patient and her son explaining to them the risks of having a stroke vs the benefit of doing the procedure.  We routinely prefer 4-6 weeks post stroke waiting time unless patients want to consent for an increased risk towards doing the procedure.  She is likely to go ahead and have it done so far. Case was discussed with Dr. Brown.    If you have any questions please  contact me. The patient needs to resume anticoagulation as soon as possible afterwards  To have the following long term goals;  BP < 120/70 mm/hg  LDL < 70  HgbA1C < 5.6              I spent 45 minutes in the professional and overall care of this patient.      Judah Caballero MD

## 2025-02-13 ENCOUNTER — PHARMACY VISIT (OUTPATIENT)
Dept: PHARMACY | Facility: CLINIC | Age: 75
End: 2025-02-13
Payer: COMMERCIAL

## 2025-02-13 VITALS
HEART RATE: 79 BPM | HEIGHT: 62 IN | OXYGEN SATURATION: 99 % | TEMPERATURE: 97.9 F | RESPIRATION RATE: 18 BRPM | SYSTOLIC BLOOD PRESSURE: 118 MMHG | WEIGHT: 136.69 LBS | BODY MASS INDEX: 25.15 KG/M2 | DIASTOLIC BLOOD PRESSURE: 59 MMHG

## 2025-02-13 LAB
ALBUMIN SERPL BCP-MCNC: 3 G/DL (ref 3.4–5)
ANION GAP SERPL CALC-SCNC: 13 MMOL/L (ref 10–20)
BACTERIA UR CULT: ABNORMAL
BASOPHILS # BLD AUTO: 0.01 X10*3/UL (ref 0–0.1)
BASOPHILS NFR BLD AUTO: 0.1 %
BUN SERPL-MCNC: 11 MG/DL (ref 6–23)
CALCIUM SERPL-MCNC: 8.6 MG/DL (ref 8.6–10.3)
CHLORIDE SERPL-SCNC: 101 MMOL/L (ref 98–107)
CO2 SERPL-SCNC: 24 MMOL/L (ref 21–32)
CREAT SERPL-MCNC: 0.71 MG/DL (ref 0.5–1.05)
EGFRCR SERPLBLD CKD-EPI 2021: 88 ML/MIN/1.73M*2
EOSINOPHIL # BLD AUTO: 0.05 X10*3/UL (ref 0–0.4)
EOSINOPHIL NFR BLD AUTO: 0.5 %
ERYTHROCYTE [DISTWIDTH] IN BLOOD BY AUTOMATED COUNT: 18.5 % (ref 11.5–14.5)
GLUCOSE BLD MANUAL STRIP-MCNC: 106 MG/DL (ref 74–99)
GLUCOSE BLD MANUAL STRIP-MCNC: 120 MG/DL (ref 74–99)
GLUCOSE SERPL-MCNC: 116 MG/DL (ref 74–99)
HCT VFR BLD AUTO: 27.8 % (ref 36–46)
HGB BLD-MCNC: 8.3 G/DL (ref 12–16)
IMM GRANULOCYTES # BLD AUTO: 0.05 X10*3/UL (ref 0–0.5)
IMM GRANULOCYTES NFR BLD AUTO: 0.5 % (ref 0–0.9)
LYMPHOCYTES # BLD AUTO: 1.27 X10*3/UL (ref 0.8–3)
LYMPHOCYTES NFR BLD AUTO: 13.5 %
MAGNESIUM SERPL-MCNC: 1.95 MG/DL (ref 1.6–2.4)
MCH RBC QN AUTO: 21.3 PG (ref 26–34)
MCHC RBC AUTO-ENTMCNC: 29.9 G/DL (ref 32–36)
MCV RBC AUTO: 71 FL (ref 80–100)
MONOCYTES # BLD AUTO: 0.3 X10*3/UL (ref 0.05–0.8)
MONOCYTES NFR BLD AUTO: 3.2 %
NEUTROPHILS # BLD AUTO: 7.7 X10*3/UL (ref 1.6–5.5)
NEUTROPHILS NFR BLD AUTO: 82.2 %
NRBC BLD-RTO: 0 /100 WBCS (ref 0–0)
PHOSPHATE SERPL-MCNC: 4.2 MG/DL (ref 2.5–4.9)
PLATELET # BLD AUTO: 281 X10*3/UL (ref 150–450)
POTASSIUM SERPL-SCNC: 4.3 MMOL/L (ref 3.5–5.3)
RBC # BLD AUTO: 3.9 X10*6/UL (ref 4–5.2)
SODIUM SERPL-SCNC: 134 MMOL/L (ref 136–145)
WBC # BLD AUTO: 9.4 X10*3/UL (ref 4.4–11.3)

## 2025-02-13 PROCEDURE — 2500000001 HC RX 250 WO HCPCS SELF ADMINISTERED DRUGS (ALT 637 FOR MEDICARE OP)

## 2025-02-13 PROCEDURE — 83735 ASSAY OF MAGNESIUM: CPT

## 2025-02-13 PROCEDURE — 2500000002 HC RX 250 W HCPCS SELF ADMINISTERED DRUGS (ALT 637 FOR MEDICARE OP, ALT 636 FOR OP/ED)

## 2025-02-13 PROCEDURE — 99232 SBSQ HOSP IP/OBS MODERATE 35: CPT | Performed by: SPECIALIST

## 2025-02-13 PROCEDURE — 2500000004 HC RX 250 GENERAL PHARMACY W/ HCPCS (ALT 636 FOR OP/ED)

## 2025-02-13 PROCEDURE — 80069 RENAL FUNCTION PANEL: CPT

## 2025-02-13 PROCEDURE — 85025 COMPLETE CBC W/AUTO DIFF WBC: CPT

## 2025-02-13 PROCEDURE — 82947 ASSAY GLUCOSE BLOOD QUANT: CPT

## 2025-02-13 PROCEDURE — 99239 HOSP IP/OBS DSCHRG MGMT >30: CPT

## 2025-02-13 PROCEDURE — RXMED WILLOW AMBULATORY MEDICATION CHARGE

## 2025-02-13 PROCEDURE — 36415 COLL VENOUS BLD VENIPUNCTURE: CPT

## 2025-02-13 RX ORDER — OXYCODONE HYDROCHLORIDE 5 MG/1
5 TABLET ORAL EVERY 6 HOURS PRN
Qty: 20 TABLET | Refills: 0 | Status: ON HOLD | OUTPATIENT
Start: 2025-02-13 | End: 2025-02-23

## 2025-02-13 RX ADMIN — ATORVASTATIN CALCIUM 10 MG: 10 TABLET, FILM COATED ORAL at 08:46

## 2025-02-13 RX ADMIN — Medication 1000 UNITS: at 08:46

## 2025-02-13 RX ADMIN — PANTOPRAZOLE SODIUM 40 MG: 40 TABLET, DELAYED RELEASE ORAL at 05:38

## 2025-02-13 RX ADMIN — LEVOTHYROXINE SODIUM 100 MCG: 0.1 TABLET ORAL at 05:38

## 2025-02-13 RX ADMIN — PANTOPRAZOLE SODIUM 40 MG: 40 TABLET, DELAYED RELEASE ORAL at 15:32

## 2025-02-13 RX ADMIN — METOPROLOL TARTRATE 75 MG: 50 TABLET, FILM COATED ORAL at 08:46

## 2025-02-13 RX ADMIN — IRON SUCROSE 300 MG: 20 INJECTION, SOLUTION INTRAVENOUS at 13:31

## 2025-02-13 ASSESSMENT — COGNITIVE AND FUNCTIONAL STATUS - GENERAL
STANDING UP FROM CHAIR USING ARMS: A LITTLE
TURNING FROM BACK TO SIDE WHILE IN FLAT BAD: A LITTLE
WALKING IN HOSPITAL ROOM: A LITTLE
HELP NEEDED FOR BATHING: A LITTLE
MOVING FROM LYING ON BACK TO SITTING ON SIDE OF FLAT BED WITH BEDRAILS: A LITTLE
DRESSING REGULAR UPPER BODY CLOTHING: A LITTLE
DAILY ACTIVITIY SCORE: 19
PERSONAL GROOMING: A LITTLE
MOVING TO AND FROM BED TO CHAIR: A LITTLE
MOBILITY SCORE: 18
CLIMB 3 TO 5 STEPS WITH RAILING: A LITTLE
DRESSING REGULAR LOWER BODY CLOTHING: A LITTLE
TOILETING: A LITTLE

## 2025-02-13 ASSESSMENT — PAIN SCALES - GENERAL: PAINLEVEL_OUTOF10: 0 - NO PAIN

## 2025-02-13 NOTE — DISCHARGE INSTRUCTIONS
Please call and follow up with your primary care provider (PCP) to review hospital course.  You are admitted to the hospital because of abdominal pain and you underwent a cystoscopy that showed bladder mass suspicious for cancer.  Biopsy were taken and please follow up with your virtual appointment with with [facility] on Wednesday 2/19/2025 with Mely Cruz.  Additionally you were sent with referral to your hematologist for further iron infusion.  A prescription for pain medications was also sent.  A referral for urology was also sent for removal of Vega catheter.    Please take all of your medications as directed.     New medications:   Oxycodone 5 mg Q6 hourly as needed for pain.    If you have any concerning symptoms, or worsening symptoms please call or return, such as chest pain, shortness of breath, new onset confusion, loss of sensation, or fever >103F.    Thank you for allowing us to participate in your health care.    -[facility] [place] [place] Inpatient Medicine Teaching Service.

## 2025-02-13 NOTE — PROGRESS NOTES
"Myrna Rodrigues is a 74 y.o. female on day 2 of admission presenting with Intractable pain.      Subjective   She appears in no acute distress, she has not been complaining any further of bladder pain.  The cystoscopy was concerning of a bladder cancer       Objective     Last Recorded Vitals  Blood pressure 118/59, pulse 79, temperature 36.6 °C (97.9 °F), temperature source Temporal, resp. rate 18, height 1.575 m (5' 2\"), weight 62 kg (136 lb 11 oz), SpO2 99%.    Physical Exam  Neurological Exam  Intact muscle strength bulk and tone, cranial nerves intact   Coordination was normal.  Gait was not tested      [place] Coma Scale  Best Eye Response: Spontaneous  Best Verbal Response: Oriented  Best Motor Response: Follows commands  [place] Coma Scale Score: 15                           Assessment/Plan      Assessment & Plan  Intractable pain    Abdominal pain    Bladder mass    History of bihemispheric strokes with atrial fibrillation on anticoagulation, recent diagnosis of bladder cancer.  I suggest to resume anticoagulation as soon as possible from medical perspective to prevent future strokes with the following long-term goals:    Blood pressure is 120/70 mmHg    Hemoglobin A1c less than 5.6    LDL less than 70  To hold off driving for now           Judah Caballero MD  "

## 2025-02-13 NOTE — CARE PLAN
The patient's goals for the shift include GET SOME REST    The clinical goals for the shift include pt to tolerate a diet and possible d/c home    Pt discharge completed. pt & pt's son voiced understanding.  Iv removed. Meds to bed delivered. Vega care education completed.    Vitals signs done

## 2025-02-13 NOTE — DISCHARGE SUMMARY
Discharge Diagnosis  Bladder mass concerning for urinary bladder cancer    Issues Requiring Follow-Up  Follow-up biopsy results with Urology and [facility]    Discharge Meds     Medication List      ASK your doctor about these medications     acetaminophen 325 mg tablet; Commonly known as: Tylenol; Take 2 tablets   (650 mg) by mouth every 6 hours if needed for mild pain (1 - 3).   atorvastatin 10 mg tablet; Commonly known as: Lipitor; TAKE 1 TABLET BY   MOUTH DAILY   cholecalciferol 25 MCG (1000 UT) tablet; Commonly known as: Vitamin D-3   ciprofloxacin 500 mg tablet; Commonly known as: Cipro; Take 1 tablet   (500 mg) by mouth every 12 hours for 4 days.   diphenoxylate-atropine 2.5-0.025 mg tablet; Commonly known as: Lomotil;   Take 1 tablet by mouth 4 times a day as needed for diarrhea.   Eliquis 5 mg tablet; Generic drug: apixaban; TAKE 1 TABLET BY MOUTH   TWICE  DAILY   estradiol 0.01 % (0.1 mg/gram) vaginal cream; Commonly known as:   Estrace; Apply pea size amount 0.5 gram to vaginal opening with finger   daily for 2 weeks, then 2-3 times per week.   levothyroxine 100 mcg tablet; Commonly known as: Synthroid, Levoxyl   losartan 50 mg tablet; Commonly known as: Cozaar; TAKE 1 TABLET BY MOUTH   EVERY DAY   magnesium oxide 400 mg (241.3 mg magnesium) tablet; Commonly known as:   Mag-Ox; Take 1 tablet (400 mg) by mouth once daily.   metFORMIN  mg 24 hr tablet; Commonly known as: Glucophage-XR   metoprolol tartrate 75 mg tablet; Commonly known as: Lopressor; Take 1   tablet (75 mg) by mouth 2 times a day.   montelukast 10 mg tablet; Commonly known as: Singulair; TAKE 1 TABLET BY   MOUTH AT  BEDTIME   omeprazole 40 mg DR capsule; Commonly known as: PriLOSEC; TAKE 1 CAPSULE   BY MOUTH TWICE  DAILY   oxyCODONE 5 mg immediate release tablet; Commonly known as: Roxicodone;   Take 1 tablet (5 mg) by mouth every 4 hours if needed for moderate pain (4   - 6) for up to 3 days.   potassium gluconate 595  mg (99 mg) tablet   tiotropium 2.5 mcg/actuation inhaler; Commonly known as: Spiriva   Respimat   traZODone 50 mg tablet; Commonly known as: Desyrel   Ventolin HFA 90 mcg/actuation inhaler; Generic drug: albuterol; USE 2   INHALATIONS BY MOUTH EVERY 4 HOURS AS NEEDED       Test Results Pending At Discharge  Pending Labs       Order Current Status    Surgical Pathology Exam In process          Hospital Course  Mrs. Rodrigues was admitted for intractable abdominal pain with a known bladder mass most likely malignancy. She received an inpatient cystoscopy with biopsy in which they found multiple bladder stones as well as multiple bladder tumors. They also placed a thompson catheter following the procedure to be removed at her outpatient follow-up with urology. See op note for further details. During her stay she had an episode of SVT that was relieved with vagal maneuvers and her home metoprolol. She was discontinued on her Eliquis for the procedure, bridged with Heparin, and then restarted on Eliquis before discharge. She also received IV iron given her iron deficiency anemia and will be discharged home on PO iron. She is to follow-up with her PCP following this hospital stay as well as urology and the [facility] for a referral to a bladder cancer specialist. She will likely see Dr. Juice Reyes.  She is a scheduled an appointment with [facility] on 2/19/2025.     Imaging:  CXR: No acute cardiopulmonary process.     CTA Abdomen/Pelvis:  1. No abdominal aortic aneurysm or dissection. No evidence of  mesenteric ischemia.      2. Compared to previous study there is progressing retroperitoneal  adenopathy with some interval enlargement suspected.      3. Irregular masslike thickening right posterior bladder wall near  suspected diverticulum. Underlying bladder malignancy may be present.  Correlate with patient history/cystoscopy.     Pertinent Physical Exam At Time of Discharge  Physical  Exam  Constitutional:       Appearance: Normal appearance.   HENT:      Head: Normocephalic.      Right Ear: External ear normal.      Left Ear: External ear normal.      Nose: Nose normal.      Mouth/Throat:      Mouth: Mucous membranes are moist.   Eyes:      Extraocular Movements: Extraocular movements intact.      Pupils: Pupils are equal, round, and reactive to light.   Cardiovascular:      Rate and Rhythm: Normal rate and regular rhythm.   Pulmonary:      Effort: Pulmonary effort is normal.      Breath sounds: Normal breath sounds.   Abdominal:      General: Bowel sounds are normal.      Tenderness: There is no abdominal tenderness.   Musculoskeletal:         General: No swelling.   Neurological:      General: No focal deficit present.      Mental Status: She is alert.      Motor: No weakness.   Psychiatric:         Mood and Affect: Mood normal.         Behavior: Behavior normal.     Outpatient Follow-Up  Future Appointments   Date Time Provider Department [place]   2/19/2025 10:00 AM ANA Bejarano-CNP [id number] [facility]   2/27/2025  1:15 PM Shashank Proctor MD [id number] [place]   3/25/2025  2:00 PM Lesia Degroot MD [id number] [place]   4/9/2025 11:20 AM Major Jurado MD [id number] [place]   4/30/2025 11:20 AM Calvin Baker MD [id number] [place]   7/16/2025  2:15 PM Shashank Proctor MD [id number] [place]   8/28/2025 11:10 AM Calvin Baker MD [id number] [place]   10/16/2025 11:00 AM Evin Thurman MD [id number] [facility], MS3    Patient seen and examined independently from above.  The note as shown as above has been edited to reflect my input.     Assessment and plan discussed with my attending.   Charles Fonseca MD   Internal Medicine, PGY-2 .

## 2025-02-14 ENCOUNTER — DOCUMENTATION (OUTPATIENT)
Dept: HEMATOLOGY/ONCOLOGY | Facility: HOSPITAL | Age: 75
End: 2025-02-14
Payer: MEDICARE

## 2025-02-14 NOTE — PROGRESS NOTES
Referral placed to [facility] by Dr. Avani Collins, [facility], for hospital follow-up for path review of bladder mass biopsy. Virtual visit scheduled on Wednesday, 2/19 & inpatient team notified.  ANA Pryor.CNP  [facility]

## 2025-02-16 ASSESSMENT — PAIN SCALES - GENERAL: PAIN_LEVEL: 0

## 2025-02-16 NOTE — ANESTHESIA POSTPROCEDURE EVALUATION
Patient: Myrna Rodrigues    Procedure Summary       Date: 02/12/25 Room / Location: [facility] OR 08 / Virtual [facility] OR    Anesthesia Start: 1719 Anesthesia Stop: 1855    Procedure: CYSTOSCOPY, WITH BLADDER LESION ABLATION, CYSTOLITHALOPAXY COMPLEX, TURBT LARGE Diagnosis:       Intractable pain      Bladder mass      (Intractable pain [R52])      (Bladder mass [N32.89])    Surgeons: Andrea Carranza MD Responsible Provider: Leon Flowers DO    Anesthesia Type: general ASA Status: 4 - Emergent            Anesthesia Type: general    Vitals Value Taken Time   /68 02/12/25 1930   Temp 36.4 °C (97.5 °F) 02/12/25 1930   Pulse 81 02/12/25 1930   Resp 14 02/12/25 1930   SpO2 94 % 02/12/25 1930       Anesthesia Post Evaluation    Patient location during evaluation: PACU  Patient participation: complete - patient participated  Level of consciousness: awake  Pain score: 0  Pain management: adequate  Multimodal analgesia pain management approach  Airway patency: patent  Two or more strategies used to mitigate risk of obstructive sleep apnea  Cardiovascular status: acceptable  Respiratory status: acceptable  Hydration status: acceptable  Postoperative Nausea and Vomiting: none        No notable events documented.

## 2025-02-17 ENCOUNTER — APPOINTMENT (OUTPATIENT)
Dept: UROLOGY | Facility: CLINIC | Age: 75
End: 2025-02-17
Payer: MEDICARE

## 2025-02-17 VITALS — HEART RATE: 71 BPM | SYSTOLIC BLOOD PRESSURE: 104 MMHG | DIASTOLIC BLOOD PRESSURE: 64 MMHG

## 2025-02-17 DIAGNOSIS — N32.89 BLADDER MASS: ICD-10-CM

## 2025-02-17 DIAGNOSIS — N39.41 URGE INCONTINENCE OF URINE: ICD-10-CM

## 2025-02-17 PROCEDURE — 99024 POSTOP FOLLOW-UP VISIT: CPT | Performed by: NURSE PRACTITIONER

## 2025-02-18 LAB
LAB AP BLOCK FOR ADDITIONAL STUDIES: NORMAL
LABORATORY COMMENT REPORT: NORMAL
PATH REPORT.FINAL DX SPEC: NORMAL
PATH REPORT.GROSS SPEC: NORMAL
PATH REPORT.RELEVANT HX SPEC: NORMAL
PATH REPORT.TOTAL CANCER: NORMAL

## 2025-02-18 NOTE — PROGRESS NOTES
[facility]  Diagnostic Clinic  New Patient Virtual Visit    Date of Service: 25      Patient Name: Myrna Rodrigues   : 1950  MRN: [id number]   PCP: Calvin Baker MD   Referred by: Dr. Avani Collins, [facility]    Problem: Bladder tumor    HPI: Myrna Rodrigues is a 74 y.o. year old female w/ PMH atrial fibrillation on apixaban, SVT, recent cardioembolic stroke 2025, hypertension, CAD, diabetes, bladder mass and pelvic lymphadenopathy, who was recently hospitalized at [facility] for intractable pain 2/2 bladder tumor & is now s/p bladder tumor resection 25, who presents to [facility] for hospital follow-up & review of surgical path results, referral placed by Dr. Avani Collins, [facility] inpt team.     Ms. Rodrigues initially presented to [facility] ED multiple times during the month of January () w/ abdominal pain in setting of bladder tumor. She re-presented to ED on 25 w/ abdominal pain and was admitted for planned cystoscopy. Whilst holding Eliquis for the procedure, she experienced cardioembolic stroke, and the procedure was deferred to the outpt setting.    She then re-presented to [facility] ED on 2/10/25 w/ recurrent abdominal pain. Her CT A/P demonstrated worsening RP adenopathy & she was admitted for cystoscopy. She underwent cysto w/ bladder lesion ablation, cystolithalopaxy (3 cm in aggregate), and TURBT on 25 w/ Dr. Carranza.    Per the op note:   Findings: Cystolithalopaxy of bladder stones 3cm in aggregate. 5.5cm wide right posterio-lateral bladder tumor incompletely resected due to size. Presumed diverticulum full of tumor-like debris unroofed with thick white debris drained.      Her Eliquis was resumed, and she was discharged home on 25 to follow-up in the diagnostic clinic for path review. She also met w/ Urology nurse yesterday for voiding trials & her thompson catheter was removed.    She presents virtually via telephone to clinic today, w/ her son Eriberto. She reports  persistent lower abdominal pain, radiating to her back. Notes mild relief w/ PRN Oxycodone, which she is primarily taking at bedtime. She is taking 1g APAP PRN during the day. Reports that she was very active and independent w/ ADL's until 5-6 wks ago, when she developed debilitating lower abdominal pain. Since then, her oral intake has decreased, developed generalized weakness and persistent pain, requiring sig assistance w/ ADL's.    History of tobacco use:   Never.    PATHOLOGY:  2/12/25:  FINAL DIAGNOSIS   A. 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 See note.     Note: No definitive conventional papillary urothelial carcinoma or surface urothelial carcinoma in situ is noted. The differentials include a pure squamous cell carcinoma versus a urothelial carcinoma with extensive squamous differentiation. Involvement by a cervical/mullerian primary cannot be entirely excluded based on the morphology and further clinical and radiological investigations are recommended.      : Dr. Alejandro Gonzalez.      Electronically signed by Floridalma Louise MD on 2/18/2025 at 1304        IMAGING:  === 01/23/25 ===    MR BRAIN WO CONTRAST    - Impression -  1. Multiple small patchy foci of abnormally restricted diffusion are  seen in the bilateral cerebral hemispheres predominantly each centrum  semiovale and oriented in AP direction with some additional  involvement of the anterior right middle frontal gyrus cortex, the  posterior right corpus callosum, paramedian right occipital lobe.  Questionable additional involvement of left-greater-than-right  cerebellar hemispheres. Findings consistent with acute to subacute  infarctions. The AP orientation of the high bilateral cerebral  hemispheres could indicate a recent hypotensive event.  2. Mild to moderate white matter T2  signal increase, most commonly  seen with chronic small vessel ischemic change.  Presence of older  small infarctions in these areas not excluded.  3. Degree of ventriculomegaly is commensurate with overall degree of  brain parenchymal volume loss, which is mild.    Note the patient was only able to tolerate limited examination as  described above.    MACRO:  None    Signed by: Nicolas Sarmiento 1/29/2025 3:12 PM  Dictation workstation:   [id number]      === 02/10/25 ===    CT ABDOMEN PELVIS ANGIOGRAM W AND/OR WO IV CONTRAST    - Impression -  1. No abdominal aortic aneurysm or dissection. No evidence of  mesenteric ischemia.    2. Compared to previous study there is progressing retroperitoneal  adenopathy with some interval enlargement suspected.    3. Irregular masslike thickening right posterior bladder wall near  suspected diverticulum. Underlying bladder malignancy may be present.  Correlate with patient history/cystoscopy.    MACRO:  None.    Signed by: Felipe Driscoll 2/10/2025 11:12 PM  Dictation workstation:   [id number]      === 02/10/25 ===    XR CHEST 1 VIEW    - Impression -  No acute cardiopulmonary process.    MACRO:  None    Signed by: Wendy Lee 2/10/2025 6:31 PM  Dictation workstation:   [id number]    LABS:  2/12-2/13 Lab work reviewed.    PAST MEDICAL HISTORY:  Past Medical History:   Diagnosis Date    Anxiety and depression     Arrhythmia     Asthma     Cancer (Multi)     Coronary artery disease     Diabetes mellitus (Multi)     Disease of thyroid gland     GERD (gastroesophageal reflux disease)     Heart disease     Hyperlipidemia     Hypertension     Irregular heart beat     Occipital neuralgia 06/10/2022    Occipital neuralgia of right side    Personal history of malignant neoplasm, unspecified 10/04/2021    History of malignant neoplasm    Personal history of other diseases of the circulatory system     History of hypertension    Personal history of other diseases of the circulatory system  10/04/2021    History of essential hypertension    Personal history of other diseases of the circulatory system     History of coronary artery disease    Personal history of other diseases of the digestive system 03/11/2020    History of chronic constipation    Personal history of other diseases of the musculoskeletal system and connective tissue     History of arthritis    Personal history of other diseases of the nervous system and sense organs 10/04/2021    History of eye problem    Personal history of other diseases of the respiratory system     History of asthma    Personal history of other specified conditions 10/04/2021    History of chest pain    PONV (postoperative nausea and vomiting)     Stool incontinence     Thyroid cancer (Multi)     Urinary incontinence        PAST SURGICAL HISTORY:  Past Surgical History:   Procedure Laterality Date    APPENDECTOMY  08/13/2015    Appendectomy    BREAST LUMPECTOMY  08/13/2015    Breast Surgery Lumpectomy    CARPAL TUNNEL RELEASE  08/13/2015    Neuroplasty Decompression Median Nerve At Carpal Tunnel    GALLBLADDER SURGERY  08/13/2015    Gallbladder Surgery    HYSTERECTOMY  08/13/2015    Hysterectomy    KNEE ARTHROSCOPY W/ MENISCAL REPAIR  08/13/2015    Knee Arthroscopy With Medial Meniscus Repair    KNEE SURGERY  08/13/2015    Knee Surgery    MR HEAD ANGIO WO IV CONTRAST  09/05/2021    MR HEAD ANGIO WO IV CONTRAST 9/5/2021 [facility]    OTHER SURGICAL HISTORY  08/13/2015    Shoulder Surgery Left    OTHER SURGICAL HISTORY  08/13/2015    Repair Of Bladder Reconstruction    OTHER SURGICAL HISTORY  09/20/2021    Breast surgery    OTHER SURGICAL HISTORY  09/20/2021    Bladder surgery    OTHER SURGICAL HISTORY  09/20/2021    Complete colonoscopy    OTHER SURGICAL HISTORY  09/20/2021    Shoulder surgery    OTHER SURGICAL HISTORY  09/20/2021    Foot surgery    OTHER SURGICAL HISTORY  09/20/2021    Percutaneous transluminal coronary angioplasty    OTHER SURGICAL  HISTORY  10/11/2021    Thyroidectomy total    THYROIDECTOMY      TOTAL KNEE ARTHROPLASTY  08/13/2015    Knee Replacement       SOCIAL HISTORY:  Social Connections: Unknown (2/11/2025)    Social Connection and Isolation Panel [NHANES]     Frequency of Communication with Friends and Family: Patient declined     Frequency of Social Gatherings with Friends and Family: Patient declined     Attends Anabaptism Services: Never     Active Member of Clubs or Organizations: No     Attends Club or Organization Meetings: Never     Marital Status:        FAMILY HISTORY:  Family History   Problem Relation Name Age of Onset    Lung cancer Mother      Lymphoma Mother      Bone cancer Mother      Heart attack Father      Liver cancer Brother      Lung cancer Brother         MEDICATIONS:  Current Outpatient Medications on File Prior to Visit   Medication Sig Dispense Refill    acetaminophen (Tylenol) 325 mg tablet Take 2 tablets (650 mg) by mouth every 6 hours if needed for mild pain (1 - 3). (Patient not taking: Reported on 2/11/2025)      atorvastatin (Lipitor) 10 mg tablet TAKE 1 TABLET BY MOUTH DAILY 90 tablet 3    cholecalciferol (Vitamin D-3) 25 MCG (1000 UT) tablet Take 1 tablet (1,000 Units) by mouth once daily.      ciprofloxacin (Cipro) 500 mg tablet Take 1 tablet (500 mg) by mouth every 12 hours for 4 days. (Patient not taking: Reported on 2/11/2025) 8 tablet 0    diphenoxylate-atropine (Lomotil) 2.5-0.025 mg tablet Take 1 tablet by mouth 4 times a day as needed for diarrhea. 24 tablet 0    Eliquis 5 mg tablet TAKE 1 TABLET BY MOUTH TWICE  DAILY 180 tablet 3    estradiol (Estrace) 0.01 % (0.1 mg/gram) vaginal cream Apply pea size amount 0.5 gram to vaginal opening with finger daily for 2 weeks, then 2-3 times per week. 42.5 g 5    levothyroxine (Synthroid, Levoxyl) 100 mcg tablet Take 1 tablet (100 mcg) by mouth early in the morning..      losartan (Cozaar) 50 mg tablet TAKE 1 TABLET BY MOUTH EVERY DAY 90 tablet 0     magnesium oxide (Mag-Ox) 400 mg (241.3 mg magnesium) tablet Take 1 tablet (400 mg) by mouth once daily. 30 tablet 3    metFORMIN XR (Glucophage-XR) 500 mg 24 hr tablet Take 1 tablet (500 mg) by mouth 2 times a day.      metoprolol tartrate (Lopressor) 75 mg tablet Take 1 tablet (75 mg) by mouth 2 times a day. 60 tablet 11    montelukast (Singulair) 10 mg tablet TAKE 1 TABLET BY MOUTH AT  BEDTIME 90 tablet 3    omeprazole (PriLOSEC) 40 mg DR capsule TAKE 1 CAPSULE BY MOUTH TWICE  DAILY 180 capsule 3    oxyCODONE (Roxicodone) 5 mg immediate release tablet Take 1 tablet (5 mg) by mouth every 6 hours if needed for severe pain (7 - 10) or moderate pain (4 - 6) for up to 10 days. 20 tablet 0    potassium gluconate 595 mg (99 mg) tablet Take 1 tablet by mouth once daily.      tiotropium (Spiriva Respimat) 2.5 mcg/actuation inhaler Inhale 2 puffs once daily.      traZODone (Desyrel) 50 mg tablet Take 1-3 tablets ( mg) by mouth as needed at bedtime for sleep.      Ventolin HFA 90 mcg/actuation inhaler USE 2 INHALATIONS BY MOUTH EVERY 4 HOURS AS NEEDED 108 g 3    [DISCONTINUED] oxyCODONE (Roxicodone) 5 mg immediate release tablet Take 1 tablet (5 mg) by mouth every 4 hours if needed for moderate pain (4 - 6) for up to 3 days. (Patient not taking: Reported on 2/11/2025)       No current facility-administered medications on file prior to visit.         REVIEW OF SYSTEMS:  Review of Systems   Constitutional:  Positive for appetite change, fatigue and unexpected weight change.        +ELSIE, +unintentional wt loss, +increased fatigue   Gastrointestinal:  Positive for abdominal pain. Negative for constipation and vomiting.        +persistent lower abdominal pain, severe at times, minimal relief since large TURBT   Genitourinary:  Negative for difficulty urinating.    Musculoskeletal:  Positive for back pain.        +at site of SNS, for past several days, radiating from abd   Neurological:  Positive for extremity weakness.        OBJECTIVE:  There were no vitals taken for this visit.  General: Alert, in no acute distress, pleasant, conversant.  Remainder of exam deferred due to virtual video visit.     ASSESSMENT:  Myrna Rodrigues is a 74 y.o. year old female w/ PMH atrial fibrillation on apixaban, SVT, recent cardioembolic stroke 1/29/2025, hypertension, CAD, diabetes, bladder mass and pelvic lymphadenopathy, who was recently hospitalized at [facility] for intractable pain 2/2 bladder tumor & is now s/p bladder tumor resection 2/12/25, who presents to [facility] for hospital follow-up & review of surgical path results c/w invasive carcinoma.    PLAN:  1. Bladder carcinoma (Multi) (Primary)  2. Retroperitoneal lymphadenopathy  3. Bladder mass  -- Discussed path results w/ pt and her son - invasive carcinoma w/ extensive squamous differentiation w/ muscle involvement; SCC vs urothelial carcinoma w/ squamous diff, less likely GYN primary.  -- Med onc appt arranged w/ Dr. Reyes on 2/26.  -- CT chest for staging.  -- Supportive Oncology referral placed for pain mgmt.   -- Follow-up w/ Urology (Dr Carranza) on 3/3.  -- All patient and family questions answered. I provided the patient and son w/ the contact information for the diagnostic clinic; advised them to call in the interim with any questions/issues.     ANA Pryor.CNP  [facility]         Virtual or Telephone Consent    An interactive audio and video telecommunication system which permits real time communications between the patient (at the originating site) and provider (at the distant site) was utilized to provide this telehealth service.   Verbal consent was requested and obtained from Myrna Rodrigues on this date, 02/19/25 for a telehealth visit.

## 2025-02-19 ENCOUNTER — TELEMEDICINE (OUTPATIENT)
Dept: HEMATOLOGY/ONCOLOGY | Facility: HOSPITAL | Age: 75
End: 2025-02-19
Payer: MEDICARE

## 2025-02-19 DIAGNOSIS — C67.9 BLADDER CARCINOMA (MULTI): Primary | ICD-10-CM

## 2025-02-19 DIAGNOSIS — N32.89 BLADDER MASS: ICD-10-CM

## 2025-02-19 DIAGNOSIS — R59.0 RETROPERITONEAL LYMPHADENOPATHY: ICD-10-CM

## 2025-02-19 LAB
ATRIAL RATE: 166 BPM
ATRIAL RATE: 73 BPM
P AXIS: -23 DEGREES
P OFFSET: 152 MS
P ONSET: 125 MS
PR INTERVAL: 178 MS
Q ONSET: 214 MS
Q ONSET: 215 MS
QRS COUNT: 12 BEATS
QRS COUNT: 26 BEATS
QRS DURATION: 78 MS
QRS DURATION: 82 MS
QT INTERVAL: 300 MS
QT INTERVAL: 368 MS
QTC CALCULATION(BAZETT): 405 MS
QTC CALCULATION(BAZETT): 482 MS
QTC FREDERICIA: 393 MS
QTC FREDERICIA: 411 MS
R AXIS: -66 DEGREES
R AXIS: 81 DEGREES
T AXIS: 4 DEGREES
T AXIS: 89 DEGREES
T OFFSET: 365 MS
T OFFSET: 398 MS
VENTRICULAR RATE: 155 BPM
VENTRICULAR RATE: 73 BPM

## 2025-02-19 PROCEDURE — 1123F ACP DISCUSS/DSCN MKR DOCD: CPT | Performed by: NURSE PRACTITIONER

## 2025-02-19 PROCEDURE — 1159F MED LIST DOCD IN RCRD: CPT | Performed by: NURSE PRACTITIONER

## 2025-02-19 PROCEDURE — 1036F TOBACCO NON-USER: CPT | Performed by: NURSE PRACTITIONER

## 2025-02-19 PROCEDURE — 1111F DSCHRG MED/CURRENT MED MERGE: CPT | Performed by: NURSE PRACTITIONER

## 2025-02-19 PROCEDURE — 99205 OFFICE O/P NEW HI 60 MIN: CPT | Performed by: NURSE PRACTITIONER

## 2025-02-19 PROCEDURE — 1160F RVW MEDS BY RX/DR IN RCRD: CPT | Performed by: NURSE PRACTITIONER

## 2025-02-19 NOTE — DOCUMENTATION CLARIFICATION NOTE
"    PAT[facility]               [id number]  MRN:                       [id number]  :                       1950  ADMIT DATE:       2/10/2025 5:24 PM  DISCH DATE:        2025 5:03 PM  RESPONDING PROVIDER #:        [id number]          PROVIDER RESPONSE TEXT:    I concur with the pathology report findings and they are clinically significant    CDI QUERY TEXT:    Clarification    Instruction:    Based on your assessment of the patient and the clinical information, please provide the requested documentation by clicking on the appropriate radio button and enter any additional information if prompted.    Question: Please document whether you concur or do not concur with the pathology report findings    When answering this query, please exercise your independent professional judgment. The fact that a question is being asked, does not imply that any particular answer is desired or expected.    The patient's clinical indicators include:  Clinical Information: 73 y/o F admitted with bladder mass    Clinical Indicators and Pathology Findings: 25    \"FINAL DIAGNOSIS  A. 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 See note.  Note: No definitive conventional papillary urothelial carcinoma or surface urothelial carcinoma in situ is noted. The differentials include a pure squamous cell carcinoma versus a urothelial carcinoma with extensive squamous differentiation. Involvement by a cervical/mullerian primary cannot be entirely excluded based on the morphology and further clinical and radiological investigations are recommended. \"    Treatment: urology consult, cystoscopy with biopsy    Risk Factors: Bladder mass concerning for urinary bladder cancer  Options provided:  -- I concur with the pathology report findings and they are clinically " significant  -- I do not concur with the pathology report findings  -- Other - I will add my own diagnosis  -- Refer to Clinical Documentation Reviewer    Query created by: Zoraida Barry on 2/19/2025 12:05 PM      Electronically signed by:  JIN BLANDON MD 2/19/2025 1:49 PM

## 2025-02-20 ENCOUNTER — APPOINTMENT (OUTPATIENT)
Dept: PULMONOLOGY | Facility: CLINIC | Age: 75
End: 2025-02-20
Payer: MEDICARE

## 2025-02-20 ENCOUNTER — PATIENT OUTREACH (OUTPATIENT)
Dept: HEMATOLOGY/ONCOLOGY | Facility: HOSPITAL | Age: 75
End: 2025-02-20

## 2025-02-20 NOTE — PROGRESS NOTES
Patient is scheduled for a New patient visit with Dr. Juice Reyes at 3 pm on February 26, 2025  at the [facility] location.  Patient currently admitted at [facility]. Patient to see inpatient oncology while there.   Mely Elias RN 02/21/25 3:29 PM

## 2025-02-20 NOTE — PROGRESS NOTES
"2/12/2025: CYSTOSCOPY, WITH BLADDER LESION ABLATION   [id number]  FINAL DIAGNOSIS   A. 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     Patient seen in Diagnostic clinic by Corina Cruz CNP \"Discussed path results w/ pt and her son - invasive carcinoma w/ extensive squamous differentiation w/ muscle involvement; SCC vs urothelial carcinoma w/ squamous diff, less likely GYN primary.\" who referred patient to medical oncology for bladder carcinoma.   Mely Elias RN 02/21/25 3:27 PM     "

## 2025-02-21 ENCOUNTER — APPOINTMENT (OUTPATIENT)
Dept: RADIOLOGY | Facility: HOSPITAL | Age: 75
End: 2025-02-21
Payer: MEDICARE

## 2025-02-21 ENCOUNTER — HOSPITAL ENCOUNTER (INPATIENT)
Facility: HOSPITAL | Age: 75
DRG: 948 | End: 2025-02-21
Attending: STUDENT IN AN ORGANIZED HEALTH CARE EDUCATION/TRAINING PROGRAM | Admitting: STUDENT IN AN ORGANIZED HEALTH CARE EDUCATION/TRAINING PROGRAM
Payer: MEDICARE

## 2025-02-21 ENCOUNTER — APPOINTMENT (OUTPATIENT)
Dept: CARDIOLOGY | Facility: HOSPITAL | Age: 75
End: 2025-02-21
Payer: MEDICARE

## 2025-02-21 DIAGNOSIS — N39.0 URINARY TRACT INFECTION WITHOUT HEMATURIA, SITE UNSPECIFIED: ICD-10-CM

## 2025-02-21 DIAGNOSIS — R10.84 GENERALIZED ABDOMINAL PAIN: Primary | ICD-10-CM

## 2025-02-21 DIAGNOSIS — N32.89 BLADDER MASS: ICD-10-CM

## 2025-02-21 LAB
ACANTHOCYTES BLD QL SMEAR: ABNORMAL
ALBUMIN SERPL BCP-MCNC: 3.3 G/DL (ref 3.4–5)
ALP SERPL-CCNC: 62 U/L (ref 33–136)
ALT SERPL W P-5'-P-CCNC: 5 U/L (ref 7–45)
ANION GAP SERPL CALC-SCNC: 11 MMOL/L (ref 10–20)
APPEARANCE UR: ABNORMAL
AST SERPL W P-5'-P-CCNC: 9 U/L (ref 9–39)
BACTERIA #/AREA URNS AUTO: ABNORMAL /HPF
BASOPHILS # BLD MANUAL: 0 X10*3/UL (ref 0–0.1)
BASOPHILS NFR BLD MANUAL: 0 %
BILIRUB SERPL-MCNC: 0.5 MG/DL (ref 0–1.2)
BILIRUB UR STRIP.AUTO-MCNC: NEGATIVE MG/DL
BUN SERPL-MCNC: 10 MG/DL (ref 6–23)
BURR CELLS BLD QL SMEAR: ABNORMAL
CALCIUM SERPL-MCNC: 9.9 MG/DL (ref 8.6–10.3)
CHLORIDE SERPL-SCNC: 97 MMOL/L (ref 98–107)
CO2 SERPL-SCNC: 29 MMOL/L (ref 21–32)
COLOR UR: ABNORMAL
CREAT SERPL-MCNC: 0.81 MG/DL (ref 0.5–1.05)
EGFRCR SERPLBLD CKD-EPI 2021: 75 ML/MIN/1.73M*2
EOSINOPHIL # BLD MANUAL: 0.53 X10*3/UL (ref 0–0.4)
EOSINOPHIL NFR BLD MANUAL: 4 %
ERYTHROCYTE [DISTWIDTH] IN BLOOD BY AUTOMATED COUNT: 20.1 % (ref 11.5–14.5)
GLUCOSE BLD MANUAL STRIP-MCNC: 114 MG/DL (ref 74–99)
GLUCOSE BLD MANUAL STRIP-MCNC: 92 MG/DL (ref 74–99)
GLUCOSE SERPL-MCNC: 128 MG/DL (ref 74–99)
GLUCOSE UR STRIP.AUTO-MCNC: NORMAL MG/DL
HCT VFR BLD AUTO: 30.9 % (ref 36–46)
HGB BLD-MCNC: 9.3 G/DL (ref 12–16)
HOLD SPECIMEN: NORMAL
IMM GRANULOCYTES # BLD AUTO: 0.06 X10*3/UL (ref 0–0.5)
IMM GRANULOCYTES NFR BLD AUTO: 0.5 % (ref 0–0.9)
KETONES UR STRIP.AUTO-MCNC: NEGATIVE MG/DL
LACTATE SERPL-SCNC: 1.2 MMOL/L (ref 0.4–2)
LEUKOCYTE ESTERASE UR QL STRIP.AUTO: ABNORMAL
LIPASE SERPL-CCNC: 4 U/L (ref 9–82)
LYMPHOCYTES # BLD MANUAL: 0.8 X10*3/UL (ref 0.8–3)
LYMPHOCYTES NFR BLD MANUAL: 6 %
MAGNESIUM SERPL-MCNC: 1.62 MG/DL (ref 1.6–2.4)
MCH RBC QN AUTO: 21.8 PG (ref 26–34)
MCHC RBC AUTO-ENTMCNC: 30.1 G/DL (ref 32–36)
MCV RBC AUTO: 72 FL (ref 80–100)
MONOCYTES # BLD MANUAL: 0.8 X10*3/UL (ref 0.05–0.8)
MONOCYTES NFR BLD MANUAL: 6 %
NEUTROPHILS # BLD MANUAL: 11.17 X10*3/UL (ref 1.6–5.5)
NEUTS BAND # BLD MANUAL: 0.13 X10*3/UL (ref 0–0.5)
NEUTS BAND NFR BLD MANUAL: 1 %
NEUTS SEG # BLD MANUAL: 11.04 X10*3/UL (ref 1.6–5)
NEUTS SEG NFR BLD MANUAL: 83 %
NITRITE UR QL STRIP.AUTO: ABNORMAL
NRBC BLD-RTO: 0 /100 WBCS (ref 0–0)
OVALOCYTES BLD QL SMEAR: ABNORMAL
PH UR STRIP.AUTO: 7.5 [PH]
PLATELET # BLD AUTO: 203 X10*3/UL (ref 150–450)
POLYCHROMASIA BLD QL SMEAR: ABNORMAL
POTASSIUM SERPL-SCNC: 3.9 MMOL/L (ref 3.5–5.3)
PROT SERPL-MCNC: 6.9 G/DL (ref 6.4–8.2)
PROT UR STRIP.AUTO-MCNC: ABNORMAL MG/DL
RBC # BLD AUTO: 4.27 X10*6/UL (ref 4–5.2)
RBC # UR STRIP.AUTO: ABNORMAL MG/DL
RBC #/AREA URNS AUTO: ABNORMAL /HPF
RBC MORPH BLD: ABNORMAL
SCHISTOCYTES BLD QL SMEAR: ABNORMAL
SODIUM SERPL-SCNC: 133 MMOL/L (ref 136–145)
SP GR UR STRIP.AUTO: 1.01
SQUAMOUS #/AREA URNS AUTO: ABNORMAL /HPF
TOTAL CELLS COUNTED BLD: 100
UROBILINOGEN UR STRIP.AUTO-MCNC: NORMAL MG/DL
WBC # BLD AUTO: 13.3 X10*3/UL (ref 4.4–11.3)
WBC #/AREA URNS AUTO: ABNORMAL /HPF

## 2025-02-21 PROCEDURE — 99223 1ST HOSP IP/OBS HIGH 75: CPT

## 2025-02-21 PROCEDURE — 2550000001 HC RX 255 CONTRASTS: Performed by: STUDENT IN AN ORGANIZED HEALTH CARE EDUCATION/TRAINING PROGRAM

## 2025-02-21 PROCEDURE — 93005 ELECTROCARDIOGRAM TRACING: CPT

## 2025-02-21 PROCEDURE — 2500000001 HC RX 250 WO HCPCS SELF ADMINISTERED DRUGS (ALT 637 FOR MEDICARE OP)

## 2025-02-21 PROCEDURE — 36415 COLL VENOUS BLD VENIPUNCTURE: CPT | Performed by: STUDENT IN AN ORGANIZED HEALTH CARE EDUCATION/TRAINING PROGRAM

## 2025-02-21 PROCEDURE — 87040 BLOOD CULTURE FOR BACTERIA: CPT | Mod: STJLAB | Performed by: EMERGENCY MEDICINE

## 2025-02-21 PROCEDURE — 80053 COMPREHEN METABOLIC PANEL: CPT | Performed by: STUDENT IN AN ORGANIZED HEALTH CARE EDUCATION/TRAINING PROGRAM

## 2025-02-21 PROCEDURE — 96367 TX/PROPH/DG ADDL SEQ IV INF: CPT

## 2025-02-21 PROCEDURE — 96376 TX/PRO/DX INJ SAME DRUG ADON: CPT

## 2025-02-21 PROCEDURE — 96375 TX/PRO/DX INJ NEW DRUG ADDON: CPT

## 2025-02-21 PROCEDURE — 2500000004 HC RX 250 GENERAL PHARMACY W/ HCPCS (ALT 636 FOR OP/ED): Mod: JZ

## 2025-02-21 PROCEDURE — 1200000002 HC GENERAL ROOM WITH TELEMETRY DAILY

## 2025-02-21 PROCEDURE — 83690 ASSAY OF LIPASE: CPT | Performed by: STUDENT IN AN ORGANIZED HEALTH CARE EDUCATION/TRAINING PROGRAM

## 2025-02-21 PROCEDURE — 2500000004 HC RX 250 GENERAL PHARMACY W/ HCPCS (ALT 636 FOR OP/ED): Performed by: EMERGENCY MEDICINE

## 2025-02-21 PROCEDURE — 99222 1ST HOSP IP/OBS MODERATE 55: CPT | Performed by: INTERNAL MEDICINE

## 2025-02-21 PROCEDURE — 74177 CT ABD & PELVIS W/CONTRAST: CPT

## 2025-02-21 PROCEDURE — 85007 BL SMEAR W/DIFF WBC COUNT: CPT | Performed by: STUDENT IN AN ORGANIZED HEALTH CARE EDUCATION/TRAINING PROGRAM

## 2025-02-21 PROCEDURE — 96365 THER/PROPH/DIAG IV INF INIT: CPT

## 2025-02-21 PROCEDURE — 83605 ASSAY OF LACTIC ACID: CPT | Performed by: EMERGENCY MEDICINE

## 2025-02-21 PROCEDURE — 99285 EMERGENCY DEPT VISIT HI MDM: CPT | Mod: 25 | Performed by: STUDENT IN AN ORGANIZED HEALTH CARE EDUCATION/TRAINING PROGRAM

## 2025-02-21 PROCEDURE — 82947 ASSAY GLUCOSE BLOOD QUANT: CPT

## 2025-02-21 PROCEDURE — 2500000004 HC RX 250 GENERAL PHARMACY W/ HCPCS (ALT 636 FOR OP/ED): Performed by: STUDENT IN AN ORGANIZED HEALTH CARE EDUCATION/TRAINING PROGRAM

## 2025-02-21 PROCEDURE — 36415 COLL VENOUS BLD VENIPUNCTURE: CPT | Performed by: EMERGENCY MEDICINE

## 2025-02-21 PROCEDURE — 2500000002 HC RX 250 W HCPCS SELF ADMINISTERED DRUGS (ALT 637 FOR MEDICARE OP, ALT 636 FOR OP/ED)

## 2025-02-21 PROCEDURE — 85027 COMPLETE CBC AUTOMATED: CPT | Performed by: STUDENT IN AN ORGANIZED HEALTH CARE EDUCATION/TRAINING PROGRAM

## 2025-02-21 PROCEDURE — 87086 URINE CULTURE/COLONY COUNT: CPT | Mod: STJLAB | Performed by: STUDENT IN AN ORGANIZED HEALTH CARE EDUCATION/TRAINING PROGRAM

## 2025-02-21 PROCEDURE — 81001 URINALYSIS AUTO W/SCOPE: CPT | Performed by: STUDENT IN AN ORGANIZED HEALTH CARE EDUCATION/TRAINING PROGRAM

## 2025-02-21 PROCEDURE — 83735 ASSAY OF MAGNESIUM: CPT

## 2025-02-21 RX ORDER — DEXTROSE 50 % IN WATER (D50W) INTRAVENOUS SYRINGE
12.5
Status: DISCONTINUED | OUTPATIENT
Start: 2025-02-21 | End: 2025-02-27 | Stop reason: HOSPADM

## 2025-02-21 RX ORDER — DIPHENOXYLATE HYDROCHLORIDE AND ATROPINE SULFATE 2.5; .025 MG/1; MG/1
1 TABLET ORAL 4 TIMES DAILY PRN
Status: DISCONTINUED | OUTPATIENT
Start: 2025-02-21 | End: 2025-02-27 | Stop reason: HOSPADM

## 2025-02-21 RX ORDER — DEXTROSE 50 % IN WATER (D50W) INTRAVENOUS SYRINGE
25
Status: DISCONTINUED | OUTPATIENT
Start: 2025-02-21 | End: 2025-02-27 | Stop reason: HOSPADM

## 2025-02-21 RX ORDER — NALOXONE HYDROCHLORIDE 0.4 MG/ML
0.2 INJECTION, SOLUTION INTRAMUSCULAR; INTRAVENOUS; SUBCUTANEOUS EVERY 5 MIN PRN
Status: DISCONTINUED | OUTPATIENT
Start: 2025-02-21 | End: 2025-02-27 | Stop reason: HOSPADM

## 2025-02-21 RX ORDER — LOSARTAN POTASSIUM 50 MG/1
50 TABLET ORAL DAILY
Status: DISCONTINUED | OUTPATIENT
Start: 2025-02-21 | End: 2025-02-27 | Stop reason: HOSPADM

## 2025-02-21 RX ORDER — CHOLECALCIFEROL (VITAMIN D3) 25 MCG
1000 TABLET ORAL DAILY
Status: DISCONTINUED | OUTPATIENT
Start: 2025-02-21 | End: 2025-02-27 | Stop reason: HOSPADM

## 2025-02-21 RX ORDER — MORPHINE SULFATE 4 MG/ML
4 INJECTION, SOLUTION INTRAMUSCULAR; INTRAVENOUS ONCE
Status: COMPLETED | OUTPATIENT
Start: 2025-02-21 | End: 2025-02-21

## 2025-02-21 RX ORDER — ATORVASTATIN CALCIUM 10 MG/1
10 TABLET, FILM COATED ORAL DAILY
Status: DISCONTINUED | OUTPATIENT
Start: 2025-02-21 | End: 2025-02-27 | Stop reason: HOSPADM

## 2025-02-21 RX ORDER — CEFTRIAXONE 1 G/50ML
1 INJECTION, SOLUTION INTRAVENOUS EVERY 24 HOURS
Status: DISCONTINUED | OUTPATIENT
Start: 2025-02-22 | End: 2025-02-23

## 2025-02-21 RX ORDER — HYDROMORPHONE HYDROCHLORIDE 1 MG/ML
1 INJECTION, SOLUTION INTRAMUSCULAR; INTRAVENOUS; SUBCUTANEOUS EVERY 4 HOURS PRN
Status: DISCONTINUED | OUTPATIENT
Start: 2025-02-21 | End: 2025-02-24

## 2025-02-21 RX ORDER — TRAZODONE HYDROCHLORIDE 100 MG/1
100 TABLET ORAL NIGHTLY
Status: DISCONTINUED | OUTPATIENT
Start: 2025-02-21 | End: 2025-02-27 | Stop reason: HOSPADM

## 2025-02-21 RX ORDER — POTASSIUM CHLORIDE 20 MEQ/1
20 TABLET, EXTENDED RELEASE ORAL ONCE
Status: COMPLETED | OUTPATIENT
Start: 2025-02-21 | End: 2025-02-21

## 2025-02-21 RX ORDER — INSULIN LISPRO 100 [IU]/ML
0-5 INJECTION, SOLUTION INTRAVENOUS; SUBCUTANEOUS
Status: DISCONTINUED | OUTPATIENT
Start: 2025-02-22 | End: 2025-02-25

## 2025-02-21 RX ORDER — FOLIC ACID 0.8 MG
0.8 TABLET ORAL DAILY
COMMUNITY
End: 2025-02-26 | Stop reason: HOSPADM

## 2025-02-21 RX ORDER — LEVOTHYROXINE SODIUM 100 UG/1
100 TABLET ORAL DAILY
Status: DISCONTINUED | OUTPATIENT
Start: 2025-02-22 | End: 2025-02-27 | Stop reason: HOSPADM

## 2025-02-21 RX ORDER — ONDANSETRON HYDROCHLORIDE 2 MG/ML
4 INJECTION, SOLUTION INTRAVENOUS EVERY 4 HOURS PRN
Status: DISCONTINUED | OUTPATIENT
Start: 2025-02-21 | End: 2025-02-27 | Stop reason: HOSPADM

## 2025-02-21 RX ORDER — MORPHINE SULFATE 15 MG/1
15 TABLET, FILM COATED, EXTENDED RELEASE ORAL EVERY 12 HOURS SCHEDULED
Status: DISCONTINUED | OUTPATIENT
Start: 2025-02-21 | End: 2025-02-23

## 2025-02-21 RX ORDER — LANOLIN ALCOHOL/MO/W.PET/CERES
400 CREAM (GRAM) TOPICAL DAILY
Status: DISCONTINUED | OUTPATIENT
Start: 2025-02-21 | End: 2025-02-27 | Stop reason: HOSPADM

## 2025-02-21 RX ORDER — FOLIC ACID 1 MG/1
1 TABLET ORAL DAILY
Status: DISCONTINUED | OUTPATIENT
Start: 2025-02-21 | End: 2025-02-27 | Stop reason: HOSPADM

## 2025-02-21 RX ADMIN — TRAZODONE HYDROCHLORIDE 100 MG: 100 TABLET ORAL at 20:17

## 2025-02-21 RX ADMIN — SODIUM CHLORIDE 12.5 MG: 9 INJECTION, SOLUTION INTRAVENOUS at 07:49

## 2025-02-21 RX ADMIN — HYDROMORPHONE HYDROCHLORIDE 1 MG: 1 INJECTION, SOLUTION INTRAMUSCULAR; INTRAVENOUS; SUBCUTANEOUS at 23:00

## 2025-02-21 RX ADMIN — HYDROMORPHONE HYDROCHLORIDE 1 MG: 1 INJECTION, SOLUTION INTRAMUSCULAR; INTRAVENOUS; SUBCUTANEOUS at 15:54

## 2025-02-21 RX ADMIN — MORPHINE SULFATE 15 MG: 15 TABLET, EXTENDED RELEASE ORAL at 20:17

## 2025-02-21 RX ADMIN — Medication 1000 UNITS: at 15:16

## 2025-02-21 RX ADMIN — MORPHINE SULFATE 4 MG: 4 INJECTION, SOLUTION INTRAMUSCULAR; INTRAVENOUS at 06:51

## 2025-02-21 RX ADMIN — PIPERACILLIN SODIUM AND TAZOBACTAM SODIUM 3.38 G: 3; .375 INJECTION, SOLUTION INTRAVENOUS at 15:55

## 2025-02-21 RX ADMIN — HYDROMORPHONE HYDROCHLORIDE 0.5 MG: 1 INJECTION, SOLUTION INTRAMUSCULAR; INTRAVENOUS; SUBCUTANEOUS at 08:45

## 2025-02-21 RX ADMIN — FOLIC ACID 1 MG: 1 TABLET ORAL at 15:16

## 2025-02-21 RX ADMIN — POTASSIUM CHLORIDE 20 MEQ: 1500 TABLET, EXTENDED RELEASE ORAL at 20:17

## 2025-02-21 RX ADMIN — METOPROLOL TARTRATE 75 MG: 50 TABLET, FILM COATED ORAL at 20:16

## 2025-02-21 RX ADMIN — HYDROMORPHONE HYDROCHLORIDE 0.5 MG: 1 INJECTION, SOLUTION INTRAMUSCULAR; INTRAVENOUS; SUBCUTANEOUS at 11:42

## 2025-02-21 RX ADMIN — ATORVASTATIN CALCIUM 10 MG: 10 TABLET, FILM COATED ORAL at 20:22

## 2025-02-21 RX ADMIN — CEFTRIAXONE SODIUM 1 G: 1 INJECTION, SOLUTION INTRAVENOUS at 23:00

## 2025-02-21 RX ADMIN — LOSARTAN POTASSIUM 50 MG: 50 TABLET, FILM COATED ORAL at 15:16

## 2025-02-21 RX ADMIN — IOHEXOL 75 ML: 350 INJECTION, SOLUTION INTRAVENOUS at 09:34

## 2025-02-21 RX ADMIN — APIXABAN 5 MG: 5 TABLET, FILM COATED ORAL at 20:17

## 2025-02-21 RX ADMIN — SODIUM CHLORIDE, POTASSIUM CHLORIDE, SODIUM LACTATE AND CALCIUM CHLORIDE 1000 ML: 600; 310; 30; 20 INJECTION, SOLUTION INTRAVENOUS at 08:34

## 2025-02-21 RX ADMIN — Medication 400 MG: at 15:16

## 2025-02-21 RX ADMIN — PIPERACILLIN SODIUM AND TAZOBACTAM SODIUM 4.5 G: 4; .5 INJECTION, SOLUTION INTRAVENOUS at 08:34

## 2025-02-21 SDOH — SOCIAL STABILITY: SOCIAL INSECURITY: WITHIN THE LAST YEAR, HAVE YOU BEEN AFRAID OF YOUR PARTNER OR EX-PARTNER?: NO

## 2025-02-21 SDOH — ECONOMIC STABILITY: FOOD INSECURITY: WITHIN THE PAST 12 MONTHS, THE FOOD YOU BOUGHT JUST DIDN'T LAST AND YOU DIDN'T HAVE MONEY TO GET MORE.: NEVER TRUE

## 2025-02-21 SDOH — ECONOMIC STABILITY: INCOME INSECURITY: IN THE PAST 12 MONTHS HAS THE ELECTRIC, GAS, OIL, OR WATER COMPANY THREATENED TO SHUT OFF SERVICES IN YOUR HOME?: NO

## 2025-02-21 SDOH — SOCIAL STABILITY: SOCIAL INSECURITY: ARE THERE ANY APPARENT SIGNS OF INJURIES/BEHAVIORS THAT COULD BE RELATED TO ABUSE/NEGLECT?: NO

## 2025-02-21 SDOH — SOCIAL STABILITY: SOCIAL INSECURITY: WITHIN THE LAST YEAR, HAVE YOU BEEN HUMILIATED OR EMOTIONALLY ABUSED IN OTHER WAYS BY YOUR PARTNER OR EX-PARTNER?: NO

## 2025-02-21 SDOH — SOCIAL STABILITY: SOCIAL INSECURITY: ABUSE: ADULT

## 2025-02-21 SDOH — SOCIAL STABILITY: SOCIAL INSECURITY: WERE YOU ABLE TO COMPLETE ALL THE BEHAVIORAL HEALTH SCREENINGS?: YES

## 2025-02-21 SDOH — SOCIAL STABILITY: SOCIAL INSECURITY: HAVE YOU HAD THOUGHTS OF HARMING ANYONE ELSE?: NO

## 2025-02-21 SDOH — SOCIAL STABILITY: SOCIAL INSECURITY: HAVE YOU HAD ANY THOUGHTS OF HARMING ANYONE ELSE?: NO

## 2025-02-21 SDOH — SOCIAL STABILITY: SOCIAL INSECURITY: DO YOU FEEL ANYONE HAS EXPLOITED OR TAKEN ADVANTAGE OF YOU FINANCIALLY OR OF YOUR PERSONAL PROPERTY?: NO

## 2025-02-21 SDOH — SOCIAL STABILITY: SOCIAL INSECURITY: DOES ANYONE TRY TO KEEP YOU FROM HAVING/CONTACTING OTHER FRIENDS OR DOING THINGS OUTSIDE YOUR HOME?: NO

## 2025-02-21 SDOH — SOCIAL STABILITY: SOCIAL INSECURITY: DO YOU FEEL UNSAFE GOING BACK TO THE PLACE WHERE YOU ARE LIVING?: NO

## 2025-02-21 SDOH — SOCIAL STABILITY: SOCIAL INSECURITY: ARE YOU OR HAVE YOU BEEN THREATENED OR ABUSED PHYSICALLY, EMOTIONALLY, OR SEXUALLY BY ANYONE?: NO

## 2025-02-21 SDOH — ECONOMIC STABILITY: FOOD INSECURITY: WITHIN THE PAST 12 MONTHS, YOU WORRIED THAT YOUR FOOD WOULD RUN OUT BEFORE YOU GOT THE MONEY TO BUY MORE.: NEVER TRUE

## 2025-02-21 SDOH — SOCIAL STABILITY: SOCIAL INSECURITY: HAS ANYONE EVER THREATENED TO HURT YOUR FAMILY OR YOUR PETS?: NO

## 2025-02-21 ASSESSMENT — ENCOUNTER SYMPTOMS
APPETITE CHANGE: 1
FATIGUE: 1
UNEXPECTED WEIGHT CHANGE: 1
EXTREMITY WEAKNESS: 1
BACK PAIN: 1
CONSTIPATION: 0
VOMITING: 0
ABDOMINAL PAIN: 1
DIFFICULTY URINATING: 0

## 2025-02-21 ASSESSMENT — LIFESTYLE VARIABLES
HOW OFTEN DO YOU HAVE A DRINK CONTAINING ALCOHOL: NEVER
AUDIT-C TOTAL SCORE: 0
HOW OFTEN DO YOU HAVE 6 OR MORE DRINKS ON ONE OCCASION: NEVER
SKIP TO QUESTIONS 9-10: 1
AUDIT-C TOTAL SCORE: 0
HOW MANY STANDARD DRINKS CONTAINING ALCOHOL DO YOU HAVE ON A TYPICAL DAY: PATIENT DOES NOT DRINK

## 2025-02-21 ASSESSMENT — COGNITIVE AND FUNCTIONAL STATUS - GENERAL
MOBILITY SCORE: 24
MOBILITY SCORE: 24
PATIENT BASELINE BEDBOUND: NO
DAILY ACTIVITIY SCORE: 24
DAILY ACTIVITIY SCORE: 24

## 2025-02-21 ASSESSMENT — PAIN SCALES - GENERAL
PAINLEVEL_OUTOF10: 10 - WORST POSSIBLE PAIN
PAINLEVEL_OUTOF10: 8
PAINLEVEL_OUTOF10: 9
PAINLEVEL_OUTOF10: 10 - WORST POSSIBLE PAIN
PAINLEVEL_OUTOF10: 9
PAINLEVEL_OUTOF10: 6
PAINLEVEL_OUTOF10: 6

## 2025-02-21 ASSESSMENT — PAIN DESCRIPTION - PAIN TYPE
TYPE: ACUTE PAIN

## 2025-02-21 ASSESSMENT — PATIENT HEALTH QUESTIONNAIRE - PHQ9
2. FEELING DOWN, DEPRESSED OR HOPELESS: NOT AT ALL
1. LITTLE INTEREST OR PLEASURE IN DOING THINGS: NOT AT ALL
SUM OF ALL RESPONSES TO PHQ9 QUESTIONS 1 & 2: 0

## 2025-02-21 ASSESSMENT — ACTIVITIES OF DAILY LIVING (ADL)
GROOMING: INDEPENDENT
HEARING - RIGHT EAR: FUNCTIONAL
DRESSING YOURSELF: INDEPENDENT
PATIENT'S MEMORY ADEQUATE TO SAFELY COMPLETE DAILY ACTIVITIES?: YES
WALKS IN HOME: INDEPENDENT
LACK_OF_TRANSPORTATION: NO
HEARING - LEFT EAR: FUNCTIONAL
FEEDING YOURSELF: INDEPENDENT
TOILETING: INDEPENDENT
ADEQUATE_TO_COMPLETE_ADL: YES
ASSISTIVE_DEVICE: EYEGLASSES;DENTURES UPPER;DENTURES LOWER
JUDGMENT_ADEQUATE_SAFELY_COMPLETE_DAILY_ACTIVITIES: YES
BATHING: INDEPENDENT

## 2025-02-21 ASSESSMENT — COLUMBIA-SUICIDE SEVERITY RATING SCALE - C-SSRS
6. HAVE YOU EVER DONE ANYTHING, STARTED TO DO ANYTHING, OR PREPARED TO DO ANYTHING TO END YOUR LIFE?: NO
1. IN THE PAST MONTH, HAVE YOU WISHED YOU WERE DEAD OR WISHED YOU COULD GO TO SLEEP AND NOT WAKE UP?: NO
2. HAVE YOU ACTUALLY HAD ANY THOUGHTS OF KILLING YOURSELF?: NO

## 2025-02-21 ASSESSMENT — PAIN DESCRIPTION - DESCRIPTORS: DESCRIPTORS: THROBBING

## 2025-02-21 ASSESSMENT — PAIN DESCRIPTION - LOCATION
LOCATION: ABDOMEN
LOCATION: PELVIS
LOCATION: ABDOMEN

## 2025-02-21 ASSESSMENT — PAIN - FUNCTIONAL ASSESSMENT
PAIN_FUNCTIONAL_ASSESSMENT: 0-10

## 2025-02-21 NOTE — CARE PLAN
Problem: Pain - Adult  Goal: Verbalizes/displays adequate comfort level or baseline comfort level  Outcome: Progressing     Problem: Discharge Planning  Goal: Discharge to home or other facility with appropriate resources  Outcome: Progressing     Problem: Chronic Conditions and Co-morbidities  Goal: Patient's chronic conditions and co-morbidity symptoms are monitored and maintained or improved  Outcome: Progressing     Problem: Nutrition  Goal: Nutrient intake appropriate for maintaining nutritional needs  Outcome: Progressing     Problem: Pain  Goal: Turns in bed with improved pain control throughout the shift  Outcome: Progressing     Problem: Fall/Injury  Goal: Pace activities to prevent fatigue by end of the shift  Outcome: Progressing     Problem: Safety - Adult  Goal: Free from fall injury  Outcome: Met     Problem: Pain  Goal: Free from acute confusion related to pain meds throughout the shift  Outcome: Met     Problem: Fall/Injury  Goal: Not fall by end of shift  Outcome: Met  Goal: Be free from injury by end of the shift  Outcome: Met   The clinical goals for the shift include see care plan

## 2025-02-21 NOTE — CONSULTS
Reason For Consult  Outpatient pain management for Cancer    History Of Present Illness  Myrna Rodrigues is a 74 y.o. female presenting with abdominal pain. She was recently diagnosed with bladder cancer and had cystoscopy and tumor resection a week and a half ago. She has had increased pain over the last 48 hours.   She was started on ketamine infusion in ED.   Patient is a full code.      Past Medical History  She has a past medical history of Anxiety and depression, Arrhythmia, Asthma, Cancer (Multi), Coronary artery disease, Diabetes mellitus (Multi), Disease of thyroid gland, GERD (gastroesophageal reflux disease), Heart disease, Hyperlipidemia, Hypertension, Irregular heart beat, Occipital neuralgia (06/10/2022), Personal history of malignant neoplasm, unspecified (10/04/2021), Personal history of other diseases of the circulatory system, Personal history of other diseases of the circulatory system (10/04/2021), Personal history of other diseases of the circulatory system, Personal history of other diseases of the digestive system (03/11/2020), Personal history of other diseases of the musculoskeletal system and connective tissue, Personal history of other diseases of the nervous system and sense organs (10/04/2021), Personal history of other diseases of the respiratory system, Personal history of other specified conditions (10/04/2021), PONV (postoperative nausea and vomiting), Stool incontinence, Thyroid cancer (Multi), and Urinary incontinence.       Assessment/Plan   Palliative consulted for outpatient pain management for patient's bladder cancer. She was recently diagnosed and had cysto and tumor resection a few weeks ago. Her first appointment with Oncology is 2/27 at [facility].   Patient lives home alone and has help from her son if needed. Per ED physician, they plan to admit patient.     1130  This worker and palliative RN met with patient and her son Eriberto in the ED. Patient expressed her pain was doing  better but now she is having 8/10 pain again. RN presented and gave patient more pain meds.     Discussed possible Oncology consult while she is admitted inpatient. Her son, Eriberto, would like to be called if he is not here. His contact number is [phone].   Patient and son agreeable to palliative following while patient is admitted to provide support and assist with pain management at discharge.   Palliative will continue to follow.     Message sent to admitting team regarding pain control and consulting oncology. They did place oncology consult.

## 2025-02-21 NOTE — NURSING NOTE
1542 - received patient from ER    1840 - patient resting comfortably at this time.  She was medicated with dilaudid for pain on arrival to the floor with good relief.  IV antibiotics started.  Patient's son at the bedside and updated with the plan of care.  Patient instructed to call for assistance and bed alarm in use for patient safety.

## 2025-02-21 NOTE — PROGRESS NOTES
ED Course as of 02/21/25 1530   Fri Feb 21, 2025   0720 Patient signed out to Dr. Zapata at this time. [TL]   0751 I received signout from the outgoing ED provider.  Patient is a 74-year-old female presenting with lower abdominal pain.  She was recently diagnosed with bladder cancer, had cystoscopy and tumor resection performed about a week and a half ago with Dr. Brown.  She been having persistent pain, over the last 24 to 48 hours pain has intensified.  She denies any fevers or chills.  She has some nausea without vomiting.  No change in bowel movements.  On exam she is afebrile, initially was hemodynamically stable, heart rate now in the 140s.      An EKG was performed which does show supraventricular tachycardia with ventricular rate 147 bpm.  Normal axis.  No acute ischemic changes or injury pattern present.  I do think that this is atrial flutter looking at the underlying rhythm.    Patient was started on ketamine infusion by the outgoing ED provider.  Patient will have CT scan of the abdomen pelvis to evaluate for evidence of intra-abdominal process or complication from the recent procedure.  Will see how her heart rate responds to better pain control.  May need to be treated with AV zana blocker.  She remains normotensive, no chest pain or shortness of breath or symptoms of unstable dysrhythmia. [JJ]   0801 With the patient's heart rate and leukocytosis, she is meeting SIRS criteria, however, I do not suspect sepsis.  Patient had similar elevations in her heart rate during her last presentation when her pain would get out of control.  [JJ]   0814 After being started on ketamine infusion, heart rate improved to the 80s. Telemetry appears to be sinus. [JJ]   1115 Patient was reevaluated.  She is still having significant pain.  Additional dose of pain medication was ordered.  Given the patient's intractable pain and urinary infection decision made to admit the patient to the hospital for further evaluation  management. [JJ]      ED Course User Index  [JJ] Angelo Zapata DO  [TL] Arturo Srivastava DO         Diagnoses as of 02/21/25 1530   Generalized abdominal pain   Urinary tract infection without hematuria, site unspecified     CAT scan reviewed.  Does not appear to be any procedural complication.  She does have worsening lymphadenopathy concerning for worsening metastatic disease.  She continues to have persistent pain despite morphine, ketamine infusion and Dilaudid.  She is given a second dose of IV Dilaudid.  Given her persistent pain she will be admitted to the hospital for further evaluation management.    After discussion with the inpatient provider who cared for her during her last stay, when her pain would get out of control her heart rate would go up and she developed SVT very similar to what happened today.  Her heart rate improved today without any intervention.  Patient does have urinary tract infection.  However, I do not feel that she is septic.  Antibiotics started.

## 2025-02-21 NOTE — CONSULTS
Reason For Consult  Bladder cancer    History Of Present Illness  Myrna Rodrigues is a 74 y.o. female presenting with lower abdominal pain.  She was recently diagnosed with bladder cancer and had cystoscopy and tumor resection about a week and half ago with Dr. Brown on 2/12/2025.  She had intense pain since then and presented to the emergency room.  She was started on an ongoing ketamine infusion in the emergency room.  CT of the abdomen pelvis shows abdominal pelvic lymph nodes have worsened since prior with decreased but persistent bladder wall thickening.  No suspicious osseous lesions. Oncology consulted re new diagnosis, does have oncology appointment on 2/26/25 with Dr Reyes. Path from surgery revealed:     BLADDER TUMOR, TRANSURETHRAL RESECTION:   -- Invasive carcinoma with extensive keratinizing squamous differentiation (greater than 90%), invading into the detrusor muscle (muscularis propria). See note.  Note: No definitive conventional papillary urothelial carcinoma or surface urothelial carcinoma in situ is noted. The differentials include a pure squamous cell carcinoma versus a urothelial carcinoma with extensive squamous differentiation. Involvement by a cervical/mullerian primary cannot be entirely excluded based on the morphology and further clinical and radiological investigations are recommended.      Past Medical History  She has a past medical history of Anxiety and depression, Arrhythmia, Asthma, Cancer (Multi), Coronary artery disease, Diabetes mellitus (Multi), Disease of thyroid gland, GERD (gastroesophageal reflux disease), Heart disease, Hyperlipidemia, Hypertension, Irregular heart beat, Occipital neuralgia (06/10/2022), Personal history of malignant neoplasm, unspecified (10/04/2021), Personal history of other diseases of the circulatory system, Personal history of other diseases of the circulatory system (10/04/2021), Personal history of other diseases of the circulatory system, Personal  history of other diseases of the digestive system (03/11/2020), Personal history of other diseases of the musculoskeletal system and connective tissue, Personal history of other diseases of the nervous system and sense organs (10/04/2021), Personal history of other diseases of the respiratory system, Personal history of other specified conditions (10/04/2021), PONV (postoperative nausea and vomiting), Stool incontinence, Thyroid cancer (Multi), and Urinary incontinence.    Surgical History  She has a past surgical history that includes Hysterectomy (08/13/2015); Knee arthroscopy w/ meniscal repair (08/13/2015); Total knee arthroplasty (08/13/2015); Other surgical history (08/13/2015); Gallbladder surgery (08/13/2015); Knee surgery (08/13/2015); Appendectomy (08/13/2015); Other surgical history (08/13/2015); Carpal tunnel release (08/13/2015); Breast lumpectomy (08/13/2015); Other surgical history (09/20/2021); Other surgical history (09/20/2021); Other surgical history (09/20/2021); Other surgical history (09/20/2021); Other surgical history (09/20/2021); Other surgical history (09/20/2021); Other surgical history (10/11/2021); MR angio head wo IV contrast (09/05/2021); and Thyroidectomy.     Social History  She reports that she has never smoked. She has never used smokeless tobacco. She reports that she does not currently use alcohol. She reports that she does not use drugs.    Family History  Family History   Problem Relation Name Age of Onset    Lung cancer Mother      Lymphoma Mother      Bone cancer Mother      Heart attack Father      Liver cancer Brother      Lung cancer Brother          Allergies  Patient has no known allergies.    Review of Systems  Negative outside of HPI     Physical Exam  Gen: appears critically ill  HEENT: atraumatic head, normocephalic, EOMI, conjuctiva normal  LUNG: no increased WOB  CV: No JVD. RRR  GI: soft, NT, ND  LE: no LE edema  Skin: no obvious rashes or lesions  Neuro:  "interactive, no focal deficits, limited in setting of acute illness  Psych: difficult to participate, normal affect        Last Recorded Vitals  Blood pressure 162/70, pulse 81, temperature 36.3 °C (97.3 °F), temperature source Temporal, resp. rate 14, height 1.575 m (5' 2\"), weight 63.5 kg (139 lb 15.9 oz), SpO2 97%.    Relevant Results  Results for orders placed or performed during the hospital encounter of 02/21/25 (from the past 24 hours)   CBC and Auto Differential   Result Value Ref Range    WBC 13.3 (H) 4.4 - 11.3 x10*3/uL    nRBC 0.0 0.0 - 0.0 /100 WBCs    RBC 4.27 4.00 - 5.20 x10*6/uL    Hemoglobin 9.3 (L) 12.0 - 16.0 g/dL    Hematocrit 30.9 (L) 36.0 - 46.0 %    MCV 72 (L) 80 - 100 fL    MCH 21.8 (L) 26.0 - 34.0 pg    MCHC 30.1 (L) 32.0 - 36.0 g/dL    RDW 20.1 (H) 11.5 - 14.5 %    Platelets 203 150 - 450 x10*3/uL    Immature Granulocytes %, Automated 0.5 0.0 - 0.9 %    Immature Granulocytes Absolute, Automated 0.06 0.00 - 0.50 x10*3/uL   Comprehensive metabolic panel   Result Value Ref Range    Glucose 128 (H) 74 - 99 mg/dL    Sodium 133 (L) 136 - 145 mmol/L    Potassium 3.9 3.5 - 5.3 mmol/L    Chloride 97 (L) 98 - 107 mmol/L    Bicarbonate 29 21 - 32 mmol/L    Anion Gap 11 10 - 20 mmol/L    Urea Nitrogen 10 6 - 23 mg/dL    Creatinine 0.81 0.50 - 1.05 mg/dL    eGFR 75 >60 mL/min/1.73m*2    Calcium 9.9 8.6 - 10.3 mg/dL    Albumin 3.3 (L) 3.4 - 5.0 g/dL    Alkaline Phosphatase 62 33 - 136 U/L    Total Protein 6.9 6.4 - 8.2 g/dL    AST 9 9 - 39 U/L    Bilirubin, Total 0.5 0.0 - 1.2 mg/dL    ALT 5 (L) 7 - 45 U/L   Lipase   Result Value Ref Range    Lipase 4 (L) 9 - 82 U/L   Manual Differential   Result Value Ref Range    Neutrophils %, Manual 83.0 40.0 - 80.0 %    Bands %, Manual 1.0 0.0 - 5.0 %    Lymphocytes %, Manual 6.0 13.0 - 44.0 %    Monocytes %, Manual 6.0 2.0 - 10.0 %    Eosinophils %, Manual 4.0 0.0 - 6.0 %    Basophils %, Manual 0.0 0.0 - 2.0 %    Seg Neutrophils Absolute, Manual 11.04 (H) 1.60 - " 5.00 x10*3/uL    Bands Absolute, Manual 0.13 0.00 - 0.50 x10*3/uL    Lymphocytes Absolute, Manual 0.80 0.80 - 3.00 x10*3/uL    Monocytes Absolute, Manual 0.80 0.05 - 0.80 x10*3/uL    Eosinophils Absolute, Manual 0.53 (H) 0.00 - 0.40 x10*3/uL    Basophils Absolute, Manual 0.00 0.00 - 0.10 x10*3/uL    Total Cells Counted 100     Neutrophils Absolute, Manual 11.17 (H) 1.60 - 5.50 x10*3/uL    RBC Morphology See Below     Polychromasia Mild     RBC Fragments Few     Ovalocytes Few     Danyell Cells Few     Acanthocytes Few    Urinalysis with Reflex Culture and Microscopic   Result Value Ref Range    Color, Urine Light-Yellow Light-Yellow, Yellow, Dark-Yellow    Appearance, Urine Turbid (N) Clear    Specific Gravity, Urine 1.010 1.005 - 1.035    pH, Urine 7.5 5.0, 5.5, 6.0, 6.5, 7.0, 7.5, 8.0    Protein, Urine 20 (TRACE) NEGATIVE, 10 (TRACE), 20 (TRACE) mg/dL    Glucose, Urine Normal Normal mg/dL    Blood, Urine 0.06 (1+) (A) NEGATIVE mg/dL    Ketones, Urine NEGATIVE NEGATIVE mg/dL    Bilirubin, Urine NEGATIVE NEGATIVE mg/dL    Urobilinogen, Urine Normal Normal mg/dL    Nitrite, Urine 2+ (A) NEGATIVE    Leukocyte Esterase, Urine 500 Uday/uL (A) NEGATIVE   Microscopic Only, Urine   Result Value Ref Range    WBC, Urine 6-10 (A) 1-5, NONE /HPF    RBC, Urine 1-2 NONE, 1-2, 3-5 /HPF    Squamous Epithelial Cells, Urine 1-9 (SPARSE) Reference range not established. /HPF    Bacteria, Urine 1+ (A) NONE SEEN /HPF   Lactate   Result Value Ref Range    Lactate 1.2 0.4 - 2.0 mmol/L   Blood Culture    Specimen: Peripheral Venipuncture; Blood culture   Result Value Ref Range    Blood Culture Loaded on Instrument - Culture in progress    Blood Culture    Specimen: Peripheral Venipuncture; Blood culture   Result Value Ref Range    Blood Culture Loaded on Instrument - Culture in progress      *Note: Due to a large number of results and/or encounters for the requested time period, some results have not been displayed. A complete set of results  can be found in Results Review.          Assessment/Plan     Bladder invasive carcinoma with extensive keratinizing squamous differentiation, invasion into MP  -  The differentials on path include a pure squamous cell carcinoma versus a urothelial carcinoma with extensive squamous differentiation. Involvement by a cervical/mullerian primary cannot be entirely excluded based on the morphology  - pelvis US on 1/9/25 unremarkable  -  ordered to help confirm against primary gyn origin  - recommend CT chest to complete staging  - patient is interested in pursuing treatment  - discussed treatment options could include combination immunotherapy vs chemotherapy and will be discussed further during her appointment   - discussed there are treatment options for palliative intent and her age does not exclude her from treatment as patient was concerned she only had hospice as an option, she would like to at least meet with Dr Reyes on 2/26 as scheduled    I spent 60 minutes in the professional and overall care of this patient.      Janet Sandra MD

## 2025-02-21 NOTE — H&P
INTERNAL MEDICINE HISTORY AND PHYSICAL  TEACHING SERVICE     Subjective   Myrna Rodrigues is a 74-year-old female with a PMHx of recently diagnosed bladder cancer, acute ischemic stroke with no residual weakness, afib s/p ablation on Eliquis, SVT, HTN, CAD, HLD, GERD, DM, hypothyroidism, hysterectomy, appendectomy, and left oophorectomy presented to the ED with worsening right lower quadrant pain and intermittent palpitations.    She stated that her abdominal pain had been progressively worsening over the past 24 hours. The pain was persistent, sharp, non-radiating, and had no identifiable aggravating or relieving factors. She attempted pain relief with oxycodone, but it was ineffective. She also experienced nausea without vomiting and was unable to determine whether she had any urinary symptoms due to the severity of her pain. She denied changes in bowel movements.    The patient had multiple recent admissions for similar complaints. Most recently, she was hospitalized at [facility] from 2/11 to 2/13, during which she underwent inpatient cystoscopy with biopsy by Dr. Brown. She was discharged with a Vega catheter and instructed to follow up with urology and a [facility] oncology appointment. After the appointment, she was informed that she likely had bladder cancer and was scheduled for further oncology follow-up on the upcoming Wednesday.    On arrival, her vitals were T 36.3°C, HR 85, RR 16, /64, SpO2 100% on RA.    Labs revealed leukocytosis (WBC 15.3) with neutrophil predominance, H&H 9.3/32.3, and normal platelets. CMP showed hyponatremia (Na 133), Cl 97, albumin 3.3, and negative lipase. Initial lactate was negative. UA showed +2 hematuria, positive nitrites, positive leukocyte esterase, 6-10 WBCs, and +1 bacteria.    EKG showed SVT with a ventricular rate of 147 bpm, normal axis, and no acute ischemic changes or injury pattern.    CT A/P with IV contrast demonstrated worsening abdominopelvic  lymphadenopathy with rapid progression of the urinary bladder mass compared to the prior scan from 2/10/25. Persistent bladder wall thickening was also noted, though slightly decreased.    In the ED, she received IV fluids, multiple doses of Dilaudid, ketamine 12.5 mg, and morphine for pain control. She was started on broad-spectrum antibiotics and admitted to the teaching service for further evaluation and management of her pain crisis.    Code status: DNR/DNI and she is agreeable to ICU transfer if needed.    Past Medical History:   Diagnosis Date    Anxiety and depression     Arrhythmia     Asthma     Cancer (Multi)     Coronary artery disease     Diabetes mellitus (Multi)     Disease of thyroid gland     GERD (gastroesophageal reflux disease)     Heart disease     Hyperlipidemia     Hypertension     Irregular heart beat     Occipital neuralgia 06/10/2022    Occipital neuralgia of right side    Personal history of malignant neoplasm, unspecified 10/04/2021    History of malignant neoplasm    Personal history of other diseases of the circulatory system     History of hypertension    Personal history of other diseases of the circulatory system 10/04/2021    History of essential hypertension    Personal history of other diseases of the circulatory system     History of coronary artery disease    Personal history of other diseases of the digestive system 03/11/2020    History of chronic constipation    Personal history of other diseases of the musculoskeletal system and connective tissue     History of arthritis    Personal history of other diseases of the nervous system and sense organs 10/04/2021    History of eye problem    Personal history of other diseases of the respiratory system     History of asthma    Personal history of other specified conditions 10/04/2021    History of chest pain    PONV (postoperative nausea and vomiting)     Stool incontinence     Thyroid cancer (Multi)     Urinary incontinence       Past Surgical History:   Procedure Laterality Date    APPENDECTOMY  08/13/2015    Appendectomy    BREAST LUMPECTOMY  08/13/2015    Breast Surgery Lumpectomy    CARPAL TUNNEL RELEASE  08/13/2015    Neuroplasty Decompression Median Nerve At Carpal Tunnel    GALLBLADDER SURGERY  08/13/2015    Gallbladder Surgery    HYSTERECTOMY  08/13/2015    Hysterectomy    KNEE ARTHROSCOPY W/ MENISCAL REPAIR  08/13/2015    Knee Arthroscopy With Medial Meniscus Repair    KNEE SURGERY  08/13/2015    Knee Surgery    MR HEAD ANGIO WO IV CONTRAST  09/05/2021    MR HEAD ANGIO WO IV CONTRAST 9/5/2021 [facility]    OTHER SURGICAL HISTORY  08/13/2015    Shoulder Surgery Left    OTHER SURGICAL HISTORY  08/13/2015    Repair Of Bladder Reconstruction    OTHER SURGICAL HISTORY  09/20/2021    Breast surgery    OTHER SURGICAL HISTORY  09/20/2021    Bladder surgery    OTHER SURGICAL HISTORY  09/20/2021    Complete colonoscopy    OTHER SURGICAL HISTORY  09/20/2021    Shoulder surgery    OTHER SURGICAL HISTORY  09/20/2021    Foot surgery    OTHER SURGICAL HISTORY  09/20/2021    Percutaneous transluminal coronary angioplasty    OTHER SURGICAL HISTORY  10/11/2021    Thyroidectomy total    THYROIDECTOMY      TOTAL KNEE ARTHROPLASTY  08/13/2015    Knee Replacement     (Not in a hospital admission)    Patient has no known allergies.  Social History     Tobacco Use    Smoking status: Never    Smokeless tobacco: Never   Vaping Use    Vaping status: Never Used   Substance Use Topics    Alcohol use: Not Currently    Drug use: Never     Family History   Problem Relation Name Age of Onset    Lung cancer Mother      Lymphoma Mother      Bone cancer Mother      Heart attack Father      Liver cancer Brother      Lung cancer Brother         Review of Systems:  Review of systems was completed and unless documented above, all other systems were negative for major complaints.    Objective   Vitals:  Most Recent:  Vitals:    02/21/25 1200   BP: 128/60    Pulse: 80   Resp: 17   Temp:    SpO2: 96%       24hr Min/Max:  Temp  Min: 36.3 °C (97.3 °F)  Max: 36.3 °C (97.3 °F)  Pulse  Min: 80  Max: 146  BP  Min: 126/60  Max: 161/65  Resp  Min: 16  Max: 20  SpO2  Min: 95 %  Max: 100 %    Hemodynamic parameters for last 24 hours:       Lab/Radiology/Diagnostic Review:  Results for orders placed or performed during the hospital encounter of 02/21/25 (from the past 24 hours)   CBC and Auto Differential   Result Value Ref Range    WBC 13.3 (H) 4.4 - 11.3 x10*3/uL    nRBC 0.0 0.0 - 0.0 /100 WBCs    RBC 4.27 4.00 - 5.20 x10*6/uL    Hemoglobin 9.3 (L) 12.0 - 16.0 g/dL    Hematocrit 30.9 (L) 36.0 - 46.0 %    MCV 72 (L) 80 - 100 fL    MCH 21.8 (L) 26.0 - 34.0 pg    MCHC 30.1 (L) 32.0 - 36.0 g/dL    RDW 20.1 (H) 11.5 - 14.5 %    Platelets 203 150 - 450 x10*3/uL    Immature Granulocytes %, Automated 0.5 0.0 - 0.9 %    Immature Granulocytes Absolute, Automated 0.06 0.00 - 0.50 x10*3/uL   Comprehensive metabolic panel   Result Value Ref Range    Glucose 128 (H) 74 - 99 mg/dL    Sodium 133 (L) 136 - 145 mmol/L    Potassium 3.9 3.5 - 5.3 mmol/L    Chloride 97 (L) 98 - 107 mmol/L    Bicarbonate 29 21 - 32 mmol/L    Anion Gap 11 10 - 20 mmol/L    Urea Nitrogen 10 6 - 23 mg/dL    Creatinine 0.81 0.50 - 1.05 mg/dL    eGFR 75 >60 mL/min/1.73m*2    Calcium 9.9 8.6 - 10.3 mg/dL    Albumin 3.3 (L) 3.4 - 5.0 g/dL    Alkaline Phosphatase 62 33 - 136 U/L    Total Protein 6.9 6.4 - 8.2 g/dL    AST 9 9 - 39 U/L    Bilirubin, Total 0.5 0.0 - 1.2 mg/dL    ALT 5 (L) 7 - 45 U/L   Lipase   Result Value Ref Range    Lipase 4 (L) 9 - 82 U/L   Manual Differential   Result Value Ref Range    Neutrophils %, Manual 83.0 40.0 - 80.0 %    Bands %, Manual 1.0 0.0 - 5.0 %    Lymphocytes %, Manual 6.0 13.0 - 44.0 %    Monocytes %, Manual 6.0 2.0 - 10.0 %    Eosinophils %, Manual 4.0 0.0 - 6.0 %    Basophils %, Manual 0.0 0.0 - 2.0 %    Seg Neutrophils Absolute, Manual 11.04 (H) 1.60 - 5.00 x10*3/uL    Bands Absolute,  Manual 0.13 0.00 - 0.50 x10*3/uL    Lymphocytes Absolute, Manual 0.80 0.80 - 3.00 x10*3/uL    Monocytes Absolute, Manual 0.80 0.05 - 0.80 x10*3/uL    Eosinophils Absolute, Manual 0.53 (H) 0.00 - 0.40 x10*3/uL    Basophils Absolute, Manual 0.00 0.00 - 0.10 x10*3/uL    Total Cells Counted 100     Neutrophils Absolute, Manual 11.17 (H) 1.60 - 5.50 x10*3/uL    RBC Morphology See Below     Polychromasia Mild     RBC Fragments Few     Ovalocytes Few     Kirk Cells Few     Acanthocytes Few    Urinalysis with Reflex Culture and Microscopic   Result Value Ref Range    Color, Urine Light-Yellow Light-Yellow, Yellow, Dark-Yellow    Appearance, Urine Turbid (N) Clear    Specific Gravity, Urine 1.010 1.005 - 1.035    pH, Urine 7.5 5.0, 5.5, 6.0, 6.5, 7.0, 7.5, 8.0    Protein, Urine 20 (TRACE) NEGATIVE, 10 (TRACE), 20 (TRACE) mg/dL    Glucose, Urine Normal Normal mg/dL    Blood, Urine 0.06 (1+) (A) NEGATIVE mg/dL    Ketones, Urine NEGATIVE NEGATIVE mg/dL    Bilirubin, Urine NEGATIVE NEGATIVE mg/dL    Urobilinogen, Urine Normal Normal mg/dL    Nitrite, Urine 2+ (A) NEGATIVE    Leukocyte Esterase, Urine 500 Uday/uL (A) NEGATIVE   Microscopic Only, Urine   Result Value Ref Range    WBC, Urine 6-10 (A) 1-5, NONE /HPF    RBC, Urine 1-2 NONE, 1-2, 3-5 /HPF    Squamous Epithelial Cells, Urine 1-9 (SPARSE) Reference range not established. /HPF    Bacteria, Urine 1+ (A) NONE SEEN /HPF   Lactate   Result Value Ref Range    Lactate 1.2 0.4 - 2.0 mmol/L   Blood Culture    Specimen: Peripheral Venipuncture; Blood culture   Result Value Ref Range    Blood Culture Loaded on Instrument - Culture in progress      CT abdomen pelvis w IV contrast    Result Date: 2/21/2025  Interpreted By:  Evonne Yi, STUDY: CT ABDOMEN PELVIS W IV CONTRAST;  2/21/2025 9:45 am   INDICATION: Signs/Symptoms:worsening lower abdominal pain in the setting of recently diagnosed bladder cancer.     COMPARISON: CT angio abdomen pelvis 02/10/2025   ACCESSION NUMBER(S):  [id number]   ORDERING CLINICIAN: KRISHNA SALAZAR   TECHNIQUE: CT of the abdomen and pelvis was performed.  Standard contiguous axial images were obtained at 3 mm slice thickness through the abdomen and pelvis. Coronal and sagittal reconstructions at 3 mm slice thickness were performed.  75 ML of Omnipaque 350 was administered intravenously without immediate complication.   FINDINGS: LOWER CHEST: The heart is normal in size without evidence of pericardial effusion. No consolidation or pleural effusion. No concerning lung nodule. Small hiatal hernia.   ABDOMEN:   LIVER: The liver is normal in size. No suspicious liver lesion.   BILE DUCTS: No mild intrahepatic and extrahepatic biliary duct dilatation, likely secondary to cholecystectomy..   GALLBLADDER: Cholecystectomy   PANCREAS: Fatty infiltration of the pancreas, most predominant in the head and uncinate process. The pancreas appears otherwise unremarkable without evidence of ductal dilatation or masses.   SPLEEN: The spleen is normal in size.   ADRENAL GLANDS: No adrenal nodularity or thickening.   KIDNEYS AND URETERS: There is persistent urothelial thickening of the right distal ureter/ureterovesicular junction. The kidneys are normal in size. No left hydroureteronephrosis or nephroureterolithiasis is identified.   PELVIS:   BLADDER: Locules of air are seen in the nondependent portion of the bladder, compatible with recent bladder procedure. Numerous bladder wall diverticuli are noted. There is decreased but persistent bladder wall thickening. The previously noted bladder stones are no longer evident.   REPRODUCTIVE ORGANS: Hysterectomy   BOWEL: Small hiatal hernia. The small and large bowel are normal in caliber and demonstrate no wall thickening. The appendix is not definitely visualized. There is however no pericecal stranding or fluid. Colonic diverticulosis without acute diverticulitis.   VESSELS: Atherosclerotic calcifications of the aortoiliac arteries. No  aneurysmal dilatation of the abdominal aorta. The IVC appears normal.   PERITONEUM/RETROPERITONEUM/LYMPH NODES: Interval worsening of numerous enlarged abdominopelvic lymph nodes, for example as seen on series 2 measured in the short axis: *16 mm retrocaval lymph node, image 50, previously 14 mm *17 mm para-aortic lymph node, image 59, previously 9 mm *17 mm aortocaval lymph node, image 72, previously 14 mm *Stable 16 mm right common iliac lymph node, image 97 *30 mm right external iliac/right pelvic sidewall lymph node, image 132, previously 22 mm *13 mm right pelvic sidewall lymph node, image 144, previously 12 mm *16 mm right external iliac lymph node, image 127, previously 13 mm *Stable 16 mm right internal iliac lymph node, image 102   Equivocal peritoneal nodular thickening is again seen, for example in the pelvis adjacent to the bladder on series 2, images 132 and 131 Presacral and mesenteric edema without ascites or walled fluid collection. No pneumoperitoneum, as questioned by the AI software.   ABDOMINAL WALL: The abdominal wall soft tissues appear normal.   BONES: No acute fracture or malalignment. No suspicious osseous lesions. Discogenic degenerative changes in the lower thoracic and lumbar spine. Unchanged grade 1 anterolisthesis of the L3 on L4 and L4 on L5 vertebral bodies.       1. Abdominopelvic lymph nodes have worsened as compared to prior exam from 02/10/2025, concerning for rapid disease progression given the short interval time frame of imaging. 2. Decreased but persistent bladder wall thickening, consistent with history of partial resection of patient's known malignancy.There is persistent urothelial thickening of the right distal ureter/ureterovesicular junction, for which disease involvement is not excluded. 3. Equivocal peritoneal nodular thickening is seen, for which short-term follow-up CT abdomen pelvis is recommended in 8-12 weeks to evaluate for early peritoneal carcinomatosis. 4.  Additional chronic and incidental findings as detailed above.     MACRO: None   Signed by: Evonne Yi 2/21/2025 10:14 AM Dictation workstation:   [id number]    ECG 12 lead    Result Date: 2/19/2025  Normal sinus rhythm Poor R-wave progression ; consider anterior infarct, lead placement, or normal variant Abnormal ECG When compared with ECG of 10-FEB-2025 17:21, (unconfirmed) Vent. rate has decreased BY  82 BPM Questionable change in QRS axis Nonspecific T wave abnormality now evident in Inferior leads T wave inversion now evident in Anterior leads Confirmed by Bertram Hernandez ([id number]) on 2/19/2025 1:37:40 PM    ECG 12 lead    Result Date: 2/19/2025  Supraventricular tachycardia Abnormal ECG When compared with ECG of 28-JAN-2025 16:33, Confirmed by Bertram Hernandez ([id number]) on 2/19/2025 1:37:31 PM    ECG 12 lead    Result Date: 2/12/2025  Normal sinus rhythm Normal ECG When compared with ECG of 12-FEB-2025 09:12, (unconfirmed) Premature ventricular complexes are no longer Present Vent. rate has decreased BY  70 BPM Borderline criteria for Inferior infarct are no longer Present ST no longer depressed in Anterior leads    ECG 12 lead    Result Date: 2/12/2025   Poor data quality, interpretation may be adversely affected Supraventricular tachycardia with Fusion complexes Left axis deviation Pulmonary disease pattern Nonspecific ST and T wave abnormality Abnormal ECG When compared with ECG of 10-FEB-2025 19:04, (unconfirmed) Significant changes have occurred    ECG 12 lead    Result Date: 2/12/2025  Supraventricular tachycardia with occasional Premature ventricular complexes Left axis deviation Pulmonary disease pattern Possible Inferior infarct , age undetermined Abnormal ECG When compared with ECG of 12-FEB-2025 09:06, (unconfirmed) Fusion complexes are no longer Present Premature ventricular complexes are now Present    CT angio abdomen pelvis w and or wo IV IV contrast    Result Date: 2/10/2025  Interpreted By:  Yamila  Felipe, STUDY: CT ANGIO ABDOMEN PELVIS W AND/OR WO IV IV CONTRAST;  2/10/2025 9:41 pm   INDICATION: Signs/Symptoms:A-fib with RVR, severe abdominal pain not responding to morphine, assess for mesenteric ischemia.   COMPARISON: 01/29/2025   ACCESSION NUMBER(S): [id number]   ORDERING CLINICIAN: GEORGE LOPEZ   TECHNIQUE: Axial non-contrast images of the abdomen, and pelvis.   Axial CT images of the, abdomen and pelvis were obtained after the intravenous administration of iodinated contrast using angiographic technique with coronal and sagittal reformatted images. MIP images and 3D reconstructions were created on an independent workstation and reviewed.   FINDINGS: VASCULATURE:   Mild atherosclerotic calcification of the aorta without aneurysm or dissection.   Celiac artery, SMA, renal arteries are all widely patent. KATHRYN patent. Iliac arteries patent without stenosis.   CT ABDOMEN/PELVIS:   Arterial phase imaging of the liver, adrenals pancreas and spleen are grossly unremarkable. Status post cholecystectomy.   No new hydronephrosis.   Redemonstrated is retroperitoneal adenopathy surrounding the distal aorta/cava and extending into the right iliac chain which appears to be progressing compared to previous. Necrotic appearing lymph node in the right pelvic sidewall currently measures 2.2 cm in short axis, previously 1.9 cm.   Stable appearance of the bladder which has trabeculated appearance with right-sided posterior diverticulum. There is some irregular right posterior bladder wall thickening. The possibility of underlying bladder malignancy again should be considered. There is some dependent bladder calculi seen.   No significant new free fluid.   No bowel obstruction or acute colitis.   Unchanged left ovarian cyst measuring 3.5 cm in diameter.   There is multilevel moderate to advanced degenerative disc disease seen in the lumbar spine worse at L4-L5 and L5-S1.       1. No abdominal aortic aneurysm or dissection. No  evidence of mesenteric ischemia.   2. Compared to previous study there is progressing retroperitoneal adenopathy with some interval enlargement suspected.   3. Irregular masslike thickening right posterior bladder wall near suspected diverticulum. Underlying bladder malignancy may be present. Correlate with patient history/cystoscopy.   MACRO: None.   Signed by: Felipe Driscoll 2/10/2025 11:12 PM Dictation workstation:   [id number]    XR chest 1 view    Result Date: 2/10/2025  Interpreted By:  Wendy Lee, STUDY: XR CHEST 1 VIEW;  2/10/2025 6:19 pm   INDICATION: Signs/Symptoms:Short of breath, vomiting.   COMPARISON: 01/15/2025   ACCESSION NUMBER(S): [id number]   ORDERING CLINICIAN: GEORGE LOPEZ   FINDINGS:     CARDIOMEDIASTINAL SILHOUETTE: Cardiomediastinal silhouette is normal in size and configuration.   LUNGS: No pulmonary consolidation, pleural effusion or pneumothorax.   ABDOMEN: No remarkable upper abdominal findings.   BONES: No acute osseous abnormality.       No acute cardiopulmonary process.   MACRO: None   Signed by: Wendy Lee 2/10/2025 6:31 PM Dictation workstation:   [id number]    Transesophageal Echo (GAVIN)    Result Date: 2/5/2025            [facility] [address] [address]    Tel [phone] Fax [phone] TRANSESOPHAGEAL ECHOCARDIOGRAM REPORT Patient Name:       LISSETTE ZAVALA           [place] Physician:    [id number] Clement Magana MD Study Date:         1/30/2025            Ordering Provider:    [id number] CLEMENT MAGANA MRN/PID:            [id number]             Fellow: Accession#:         [id number]         Nurse:                Zulma Ribeiro RN Date of Birth/Age:  1950 / 74      Sonographer:          Mely sutton Gender Assigned at  F                    Additional Staff:     Emmanuelle Jorge RN Birth: Height:              157.48 cm            Admit Date: Weight:             59.42 kg             Admission Status:     Inpatient -                                                                Routine BSA / BMI:          1.60 m2 / 23.96      Department Location:  [facility] Cath Lab                     kg/m2 Blood Pressure: 174 /72 mmHg Study Type:    TRANSESOPHAGEAL ECHO (GAVIN) Diagnosis/ICD: Shortness of breath-R06.02; Unspecified atrial                fibrillation-I48.91; Abnormal findings on diagnostic imaging of                other specified body structures-R93.89 Indication:    SOb, Afib CPT Codes:     GAVIN Complete-27614  Study Detail: Agitated saline used as a contrast agent for intraseptal flow               evaluation.  PHYSICIAN INTERPRETATION: GAVIN Details: Agitated saline contrast and color flow Doppler echo was performed to assess for the presence of a patent foramen ovale. GAVIN Medication: Midazolam and Fentanyl was used to sedate the patient for this exam. GAVIN Procedure: The probe was passed without difficulty. Left Ventricle: There are no regional wall motion abnormalities. The left ventricular cavity size is normal. Spectral Doppler shows a Grade I (impaired relaxation pattern) of left ventricular diastolic filling with normal left atrial filling pressure. Left Atrium: The left atrial size is mild to moderately dilated. A bubble study using agitated saline was performed. Bubble study is negative. The left atrial appendage is enlarged. There is no definite left atrial mass present. Right Ventricle: The right ventricle is upper limits of normal in size. There is normal right ventricular global systolic function. Right Atrium: The right atrial size is normal. Aortic Valve: The aortic valve appears structurally normal. There is trace aortic valve regurgitation. Mitral Valve: The mitral valve is normal in structure. There is moderate to severe mitral valve regurgitation. The mitral regurgitant orifice area is 4 mm2. The mitral  regurgitant volume is 8.73 ml. Tricuspid Valve: The tricuspid valve is structurally normal. There is trace to mild tricuspid regurgitation. Pulmonic Valve: The pulmonic valve was not assessed. There is trace pulmonic valve regurgitation. Pericardium: No pericardial effusion noted. Aorta: The aortic root is normal.  CONCLUSIONS:  1. Spectral Doppler shows a Grade I (impaired relaxation pattern) of left ventricular diastolic filling with normal left atrial filling pressure.  2. There is normal right ventricular global systolic function.  3. The left atrial size is mild to moderately dilated.  4. Moderate to severe mitral valve regurgitation.  5. No left atrial mass.  6. No obvious cardiac source of embolization by this study. QUANTITATIVE DATA SUMMARY:  LV SYSTOLIC FUNCTION:                      Normal Ranges: EF-Visual:      60 % LV EF Reported: 60 %  MITRAL INSUFFICIENCY:             Normal Ranges: PISA Radius:          0.3 cm MR VTI:               208.00 cm MR Vmax:              519.00 cm/s MR Alias Elieser:         38.5 cm/s MR Volume:            8.73 ml MR Flow Rt:           21.77 ml/s MR EROA:              4 mm2  [id number] Clement Stevens MD Electronically signed on 2/5/2025 at 5:22:41 AM  ** Final **     Electrocardiogram, 12-lead PRN ACS symptoms    Result Date: 2/1/2025  Supraventricular tachycardia with occasional Premature ventricular complexes and Fusion complexes Right axis deviation Nonspecific ST abnormality Abnormal ECG When compared with ECG of 27-JAN-2025 20:14, (unconfirmed) Fusion complexes are now Present Premature ventricular complexes are now Present QRS axis Shifted right Borderline criteria for Inferior infarct are no longer Present Nonspecific T wave abnormality now evident in Inferior leads Confirmed by LISBETH Agrawal ([id number]) on 2/1/2025 6:38:48 PM    Electrocardiogram, 12-lead PRN ACS symptoms    Result Date: 2/1/2025  Supraventricular tachycardia Low voltage QRS Possible Inferior infarct , age undetermined  Abnormal ECG When compared with ECG of 27-JAN-2025 17:29, (unconfirmed) Vent. rate has increased BY  75 BPM QRS axis Shifted right ST now depressed in Anterior leads Confirmed by LISBETH Agrawal ([id number]) on 2/1/2025 6:38:39 PM    Electrocardiogram, 12-lead PRN ACS symptoms    Result Date: 2/1/2025  Normal sinus rhythm Left axis deviation Pulmonary disease pattern Abnormal ECG When compared with ECG of 27-JAN-2025 09:00, (unconfirmed) QRS axis Shifted left Confirmed by LISBETH Agrawal ([id number]) on 2/1/2025 6:38:23 PM    ECG 12 Lead    Result Date: 1/31/2025  Normal sinus rhythm Normal ECG When compared with ECG of 24-JAN-2025 16:10, Premature ventricular complexes are no longer Present Vent. rate has decreased BY  58 BPM QRS axis Shifted right Borderline criteria for Inferior infarct are no longer Present Nonspecific T wave abnormality now evident in Inferior leads T wave amplitude has decreased in Anterior leads Confirmed by Clement Stevens ([id number]) on 1/31/2025 6:21:28 PM    ECG 12 Lead    Result Date: 1/31/2025  Normal sinus rhythm Left axis deviation Pulmonary disease pattern T wave abnormality, consider anterior ischemia Abnormal ECG When compared with ECG of 24-JAN-2025 16:10, Premature ventricular complexes are no longer Present Vent. rate has decreased BY  67 BPM Nonspecific T wave abnormality now evident in Inferior leads T wave inversion now evident in Anterior leads Confirmed by Clement Stevens ([id number]) on 1/31/2025 6:21:11 PM    MR brain wo IV contrast    Result Date: 1/29/2025  Interpreted By:  Nicolas Sarmiento, STUDY: MR BRAIN WO IV CONTRAST;  1/29/2025 2:34 pm   INDICATION: Signs/Symptoms:aphasia, history of PAF with interruption in anticoagulation.     COMPARISON: CT head and CTA head yesterday.   ACCESSION NUMBER(S): [id number]   ORDERING CLINICIAN: TIGIST KEYS   TECHNIQUE: Patient could only tolerate limited examination. Axial DWI, ADC, sagittal and axial T1 weighted sequences were obtained.   FINDINGS: Multiple small  patchy foci of abnormally restricted diffusion are seen in the bilateral cerebral hemispheres predominantly each centrum semiovale and oriented in AP direction with some additional involvement of the anterior right middle frontal gyrus cortex, the posterior right corpus callosum, paramedian right occipital lobe. Questionable additional involvement of left-greater-than-right cerebellar hemispheres.   No definite intracranial hemorrhage.     No extra-axial fluid collection. No intracranial mass.   Mild to moderate white matter T2 signal increase, most commonly seen with chronic small vessel ischemic change.  Presence of older small infarctions in these areas not excluded.   Degree of ventriculomegaly is commensurate with overall degree of brain parenchymal volume loss, which is mild.       1. Multiple small patchy foci of abnormally restricted diffusion are seen in the bilateral cerebral hemispheres predominantly each centrum semiovale and oriented in AP direction with some additional involvement of the anterior right middle frontal gyrus cortex, the posterior right corpus callosum, paramedian right occipital lobe. Questionable additional involvement of left-greater-than-right cerebellar hemispheres. Findings consistent with acute to subacute infarctions. The AP orientation of the high bilateral cerebral hemispheres could indicate a recent hypotensive event. 2. Mild to moderate white matter T2 signal increase, most commonly seen with chronic small vessel ischemic change.  Presence of older small infarctions in these areas not excluded. 3. Degree of ventriculomegaly is commensurate with overall degree of brain parenchymal volume loss, which is mild.   Note the patient was only able to tolerate limited examination as described above.   MACRO: None   Signed by: Nicolas Sarmiento 1/29/2025 3:12 PM Dictation workstation:   [id number]    CT abdomen pelvis w IV contrast    Result Date: 1/29/2025  Interpreted By:  Kassidy  Evonne, STUDY: CT ABDOMEN PELVIS W IV CONTRAST;  1/29/2025 9:32 am   INDICATION: Signs/Symptoms:abdominal pain with acute encephalopathy.     COMPARISON: CT abdomen pelvis 01/24/2025 CT chest abdomen pelvis 01/28/2024   ACCESSION NUMBER(S): [id number]   ORDERING CLINICIAN: TIGIST KEYS   TECHNIQUE: CT of the abdomen and pelvis was performed.  Standard contiguous axial images were obtained at 3 mm slice thickness through the abdomen and pelvis. Coronal and sagittal reconstructions at 3 mm slice thickness were performed.  75 ML of Omnipaque 350 was administered intravenously without immediate complication.   FINDINGS: Please note that the evaluation of vessels, lymph nodes and organs is limited without intravenous contrast.   LOWER CHEST: The heart is normal in size without evidence of pericardial effusion. No consolidation or pleural effusion is present. No concerning lung nodule. Small hiatal hernia again noted.   ABDOMEN:   LIVER: The liver is normal in size without evidence of focal liver lesions.   BILE DUCTS: Stable mild intrahepatic and extrahepatic biliary duct dilatation, likely secondary to cholecystectomy.   GALLBLADDER: Cholecystectomy   PANCREAS: The pancreas appears unremarkable without evidence of ductal dilatation or masses.   SPLEEN: The spleen is normal in size without focal lesions.   ADRENAL GLANDS: No adrenal nodularity or thickening.   KIDNEYS AND URETERS: The kidneys are normal in size. Stable nonobstructive punctuate calculus in the right kidney. Stable sub 5 mm hypoattenuating lesions in the bilateral kidneys, which are too small to further characterize. No hydroureteronephrosis or left nephroureterolithiasis is identified.   PELVIS:   BLADDER: The bladder is completely opacified by contrast from prior imaging, which limits evaluation.   REPRODUCTIVE ORGANS: No pelvic masses. A stable 48 mm left adnexal cyst is seen on series 2, image 116. A 21 x 19 mm right adnexal lesion could be a right  adnexal cyst or a perivesicular lymph node.   BOWEL: Small hiatal hernia. The small and large bowel are normal in caliber and demonstrate no wall thickening. The appendix is not definitely visualized. There is however no pericecal stranding or fluid.   VESSELS: There is no aneurysmal dilatation of the abdominal aorta. Mild atherosclerotic calcifications of the aortoiliac arteries. The IVC appears normal.   PERITONEUM/RETROPERITONEUM/LYMPH NODES: Retroperitoneal lymphadenopathy with adjacent stranding, stable when compared to prior CT from 01/24/2025, and new when compared to prior CT from 01/28/2024, for example as seen on series 2: *16 x 10 mm aortocaval lymph node, image 75 *12 x 11 mm retrocaval lymph node, image 91 *16 x 15 mm right common iliac lymph node, image 97 *20 x 16 mm right internal iliac lymph node, image 104 *29 x 20 mm right external iliac lymph node, image 126 *24 x 14 mm right external iliac lymph node, image 135 *21 x 19 mm perivascular lesion, which could be a lymph node or right adnexal cyst, image 131 *20 x 18 mm right pelvic sidewall lesion, image 117   No ascites or fluid collection. No peritoneal nodularity or implant.   ABDOMINAL WALL: The abdominal wall soft tissues appear normal. Sacral spinal stimulator noted.   BONES: No suspicious osseous lesions are identified. Degenerative discogenic disease is noted in the lower thoracic and lumbar spine. Grade 1 anterolisthesis of the L4 and L5 vertebral bodies.       1. Retroperitoneal lymphadenopathy, stable when compared to prior CT from 01/24/2025, and new when compared to prior CT from 01/28/2024. Though the adjacent stranding favors an infectious/inflammatory etiology, underlying malignancy such as lymphoma or bladder carcinoma (given the bladder wall thickening seen on prior CT) can not be excluded. If clinical signs/symptoms favor infectious etiology, short-term follow-up CT abdomen pelvis in 4-6 weeks is recommended. Alternatively,  correlation with serum LDH levels, cystoscopy, tissue diagnosis and/or PET-CT are other diagnostic considerations that could be obtained as per clinical need. 2. No significant interval change as compared to prior CT from 01/24/2025.   MACRO: Critical Finding:  There are multiple critical findings. See findings. notification was initiated on 1/29/2025 at 10:55 am by Evonne Yi.  (**-YCF-**)   Signed by: Evonne Yi 1/29/2025 10:56 AM Dictation workstation:   [id number]    CT angio head and neck w and wo IV contrast    Result Date: 1/28/2025  Interpreted By:  Nicolas Sarmiento, STUDY: CT ANGIO HEAD AND NECK W AND WO IV CONTRAST;  1/28/2025 6:46 pm   INDICATION: Signs/Symptoms:stroke rule out.     COMPARISON: CT head today.   ACCESSION NUMBER(S): [id number]   ORDERING CLINICIAN: TIGIST KEYS   TECHNIQUE: Unenhanced CT images of the head were obtained.  Subsequently,  60 ML of Omnipaque 350 was administered intravenously and axial images of the head and neck were acquired.  Coronal, sagittal, and 3-D reconstructions were provided for review.   FINDINGS:   CTA COW: No major vascular occlusion. Mild atherosclerotic changes through the carotid siphons. No aneurysms.  No vascular malformations.   CTA CAROTID: No aortic aneurysm or dissection. No significant stenoses at the origins of the great vessels from the aortic arch. No significant stenoses of the brachycephalic artery or bilateral subclavian arteries.   Mild atherosclerotic changes at the carotid bifurcations. No significant stenoses bilateral carotid arterial systems. No significant stenoses bilateral vertebral arterial systems.   NONVASCULAR HEAD: No intracranial mass. No intracranial hemorrhage. No evidence of large evolving cortical infarction. Supratentorial white matter hypodensities most likely related to mild chronic small vessel ischemic change.     Bones intact. Mucous retention cyst left maxillary sinus.   NONVASCULAR NECK: No soft tissue mass.  No adenopathy. Salivary glands are unremarkable. Thyroid gland unremarkable. Bones intact.       1. No intracranial major vascular occlusion. 2. No flow-limiting stenosis or significant plaque irregularity in the neck.     MACRO: None   Signed by: Nicolas Sarmiento 1/28/2025 7:30 PM Dictation workstation:   [id number]    CT brain attack head wo IV contrast    Result Date: 1/28/2025  Interpreted By:  Howard Millard, STUDY: TIGIST KEYS; 1/28/2025 5:08 pm   INDICATION: Signs/Symptoms:brain attack;   COMPARISON: 01/28/2024   ACCESSION NUMBER(S): [id number]   ORDERING CLINICIAN: TIGIST KEYS   TECHNIQUE: Contiguous axial images were acquired from the vertex through the posterior fossa without IV contrast. All CT examinations are performed with 1 or more of the following dose reduction techniques: Automated exposure control, adjustment of mA and/or kv according to patient's size, or use of iterative reconstruction techniques.   FINDINGS: No focal mass effect or midline shift is identified. The ventricles and sulci are symmetric and appropriate for the patient's age.   The gray white matter differentiation is preserved. Again seen is a moderate degree of nonspecific white matter hypodensity, most consistent with chronic small-vessel ischemic disease, not significantly changed from the prior study.   No acute intracranial hemorrhage is seen. No intra-axial or extra-axial fluid collection is seen.   The visualized paranasal sinuses and mastoid air cells are clear. There is a small-moderate sized mucous retention cysts in the visualized left maxillary sinus.       No CT evidence for acute intracranial pathology.   Moderate degree of nonspecific white matter hypodensity, most consistent with chronic small-vessel ischemic disease, not significantly changed when compared to the prior study.         Findings discussed with the physician on the 2nd floor at 5:20 p.m. on 01/28/2025   Signed by: Howard Millard 1/28/2025  5:39 PM Dictation workstation:   [id number]    ECG 12 lead    Result Date: 1/25/2025  Supraventricular tachycardia Left axis deviation Anterior infarct (cited on or before 17-JAN-2025) Abnormal ECG When compared with ECG of 17-JAN-2025 06:56, OH interval has decreased Vent. rate has increased BY  90 BPM Confirmed by Clement Stevens ([id number]) on 1/25/2025 1:40:18 PM    ECG 12 lead    Result Date: 1/25/2025  Normal sinus rhythm Left axis deviation Poor R-wave progression ; consider anterior infarct, lead placement, or normal variant Abnormal ECG When compared with ECG of 23-JAN-2025 20:48, (unconfirmed) Vent. rate has decreased BY  59 BPM Confirmed by Clement Stevens ([id number]) on 1/25/2025 1:40:12 PM    Electrocardiogram, 12-lead PRN ACS symptoms    Result Date: 1/25/2025  Supraventricular tachycardia with occasional Premature ventricular complexes Left axis deviation Pulmonary disease pattern Possible Inferior infarct , age undetermined Abnormal ECG When compared with ECG of 23-JAN-2025 21:55, (unconfirmed) Premature ventricular complexes are now Present Vent. rate has increased BY  47 BPM Nonspecific T wave abnormality no longer evident in Anterior leads Confirmed by Clement Stevens ([id number]) on 1/25/2025 1:40:07 PM    Transthoracic Echo (TTE) Complete    Result Date: 1/24/2025            [facility] [place] [address] [facility], [facility] [address]    Tel [phone] Fax [phone] TRANSTHORACIC ECHOCARDIOGRAM REPORT Patient Name:       LISSETTE Palma Physician:    [id number] Clement Stevens MD Study Date:         1/24/2025            Ordering Provider:    [id number] TIGIST KEYS MRN/PID:            [id number]             Fellow: Accession#:         [id number]         Nurse: Date of Birth/Age:  1950 / 74      Sonographer:          Flakita Quarles RDCS                     years Gender Assigned  at  F                    Additional Staff: Birth: Height:             157.48 cm            Admit Date:           1/23/2025 Weight:             63.96 kg             Admission Status:     Inpatient -                                                                Priority                                                                discharge BSA / BMI:          1.65 m2 / 25.79      Department Location:  [facility] CCU SD                     kg/m2 Blood Pressure: 168 /70 mmHg Study Type:    TRANSTHORACIC ECHO (TTE) COMPLETE Diagnosis/ICD: Dyspnea, unspecified-R06.00 Indication:    Dyspnea CPT Codes:     Echo Complete w Full Doppler-[id number] Patient History: Diabetes:          Yes Pertinent History: Dyspnea, Palpitations, CAD, A-Fib, HTN, Hyperlipidemia and                    Previous SVT. GERD; previous echocardiogram 12-. Study Detail: The following Echo studies were performed: M-Mode, 2D, Doppler and               color flow.  PHYSICIAN INTERPRETATION: Left Ventricle: Left ventricular ejection fraction is normal, calculated by Rangel's biplane at 71%. There is mild left ventricular hypertrophy. There are no regional wall motion abnormalities. The left ventricular cavity size is normal. Spectral Doppler shows a Grade I (impaired relaxation pattern) of left ventricular diastolic filling with normal left atrial filling pressure. Left Atrium: The left atrium is mild to moderately dilated. Right Ventricle: The right ventricle is upper limits of normal in size. There is normal right ventricular global systolic function. Right Atrium: The right atrium is mildly dilated. Aortic Valve: The aortic valve appears structurally normal. There is mild aortic valve cusp calcification. There is mild to moderate aortic valve regurgitation. The peak instantaneous gradient of the aortic valve is 8 mmHg. Mitral Valve: The mitral valve is normal in structure. The peak instantaneous gradient of the mitral valve is 5 mmHg. There is mild  mitral valve regurgitation. Tricuspid Valve: The tricuspid valve is structurally normal. There is mild tricuspid regurgitation. Pulmonic Valve: The pulmonic valve was not assessed. There is trace to mild pulmonic valve regurgitation. Pericardium: No pericardial effusion noted. Aorta: The aortic root is normal.  CONCLUSIONS:  1. Left ventricular ejection fraction is normal, calculated by Rangel's biplane at 71%.  2. Spectral Doppler shows a Grade I (impaired relaxation pattern) of left ventricular diastolic filling with normal left atrial filling pressure.  3. There is normal right ventricular global systolic function.  4. The left atrium is mild to moderately dilated.  5. Mild to moderate aortic valve regurgitation. QUANTITATIVE DATA SUMMARY:  2D MEASUREMENTS:            Normal Ranges: LAs:             4.50 cm    (2.7-4.0cm) IVSd:            0.86 cm    (0.6-1.1cm) LVPWd:           1.06 cm    (0.6-1.1cm) LVIDd:           5.63 cm    (3.9-5.9cm) LVIDs:           4.00 cm LV Mass Index:   127.2 g/m2 LV % FS          29.0 %  LA VOLUME:                    Normal Ranges: LA Vol A4C:        55.9 ml    (22+/-6mL/m2) LA Vol A2C:        50.6 ml LA Vol BP:         54.1 ml LA Vol Index A4C:  34.0ml/m2 LA Vol Index A2C:  30.7 ml/m2 LA Vol Index BP:   32.8 ml/m2 LA Area A4C:       20.2 cm2 LA Area A2C:       18.9 cm2 LA Major Axis A4C: 6.2 cm LA Major Axis A2C: 6.0 cm LA Volume Index:   30.9 ml/m2 LA Vol A4C:        53.0 ml LA Vol A2C:        48.0 ml LA Vol Index BSA:  30.7 ml/m2  M-MODE MEASUREMENTS:         Normal Ranges: Ao Root:             3.20 cm (2.0-3.7cm)  AORTA MEASUREMENTS:         Normal Ranges: Ao Sinus, d:        3.00 cm (2.1-3.5cm) Ao STJ, d:          2.60 cm (1.7-3.4cm) Asc Ao, d:          3.30 cm (2.1-3.4cm)  LV SYSTOLIC FUNCTION BY 2D PLANIMETRY (MOD):                      Normal Ranges: EF-A4C View:    69 % (>=55%) EF-A2C View:    75 % EF-Biplane:     71 % LV EF Reported: 71 %  LV DIASTOLIC FUNCTION:              Normal Ranges: MV Peak E:             1.10 m/s    (0.7-1.2 m/s) MV Peak A:             1.10 m/s    (0.42-0.7 m/s) E/A Ratio:             1.00        (1.0-2.2) MV e'                  0.080 m/s   (>8.0) MV lateral e'          0.08 m/s MV medial e'           0.08 m/s E/e' Ratio:            13.76       (<8.0) PulmV Sys Elieser:         49.90 cm/s PulmV Flores Elieser:        35.50 cm/s PulmV S/D Elieser:         1.40 PulmV A Revs Elieser:      26.70 cm/s PulmV A Revs Dur:      148.00 msec  MITRAL VALVE:          Normal Ranges: MV Vmax:      1.12 m/s (<=1.3m/s) MV peak P.0 mmHg (<5mmHg) MV mean P.0 mmHg (<48mmHg) MV DT:        190 msec (150-240msec)  AORTIC VALVE:           Normal Ranges: AoV Vmax:      1.42 m/s (<=1.7m/s) AoV Peak P.1 mmHg (<20mmHg) LVOT Max Elieser:  1.05 m/s (<=1.1m/s) LVOT VTI:      27.40 cm LVOT Diameter: 1.80 cm  (1.8-2.4cm) AoV Area,Vmax: 1.88 cm2 (2.5-4.5cm2)  RIGHT VENTRICLE: RV Basal 2.80 cm RV Mid   2.70 cm RV Major 7.0 cm TAPSE:   22.0 mm RV s'    0.14 m/s  PULMONIC VALVE:          Normal Ranges: PV Accel Time:  95 msec  (>120ms) PV Max Elieser:     0.9 m/s  (0.6-0.9m/s) PV Max P.9 mmHg  Pulmonary Veins: PulmV A Revs Dur: 148.00 msec PulmV A Revs Elieser: 26.70 cm/s PulmV Flores Elieser:   35.50 cm/s PulmV S/D Elieser:    1.40 PulmV Sys Elieser:    49.90 cm/s  [id number] Clement Stevens MD Electronically signed on 2025 at 1:58:10 PM  ** Final **     CT abdomen pelvis wo IV contrast    Result Date: 2025  Interpreted By:  Gerard Maxwell, STUDY: CT ABDOMEN PELVIS WO IV CONTRAST;  2025 3:25 am   INDICATION: Signs/Symptoms:kidney stones. History of overactive bladder with continence control stimulator insert in . Symptoms recurred in  with urge incontinence and underwent stimulator revision on 10/14/2024.     COMPARISON: 2025   ACCESSION NUMBER(S): [id number]   ORDERING CLINICIAN: TIGIST KEYS   TECHNIQUE: Contiguous axial images of the abdomen and pelvis were obtained without  intravenous contrast. Coronal and sagittal reformatted images were reconstructed from the axial data.   FINDINGS: Note: The absence of intravenous contrast limits evaluation of the solid organs and vasculature.   LOWER CHEST: No acute abnormality.     ABDOMEN/PELVIS:   ABDOMINAL WALL: There is a bladder control device in the right buttock with a single lead coursing through the right S3 neural foramen.   LIVER: The liver demonstrates a normal noncontrast attenuation.   BILE DUCTS: Prominent intrahepatic and extrahepatic bile ducts are likely secondary to post-cholecystectomy status.   GALLBLADDER: Surgically absent.   PANCREAS: No significant abnormality.   SPLEEN: No significant abnormality.   ADRENALS: No significant abnormality.   KIDNEYS, URETERS, BLADDER: Non-obstructing 0.2 cm right renal calculus. No left renal calculus. No hydronephrosis. Urinary bladder wall is inflamed with perivesical fat stranding similar to prior study. There is thickening and inflammation of the distal right ureter. There is eccentric wall thickening adjacent to the right uterovesical junction which was seen on prior study as well. There are multiple bladder calculi measuring 0.8 cm and 1.3 cm.   REPRODUCTIVE ORGANS: Surgically absent uterus. There is a simple left ovarian cyst measuring 3.4 cm, slightly smaller compared to prior study.   VESSELS: Mild aortic atherosclerosis without AAA.   RETROPERITONEUM/LYMPH NODES: There is progression of now extensive inflammatory fat stranding within multiple retroperitoneal compartments. This is most severe on the right pelvic sidewall, extends into the right femoral canal, involves the presacral space, and extends superiorly to the aortoiliac bifurcation and along the aorta at the level of the renal veins. This stranding is accompanied by diffuse lymphadenopathy which is also progressed in degree and extent compared to prior study. For example, a 1.3 cm right common iliac node previously  measured 0.9 cm. A 1.6 cm right common iliac node previously measured 0.7 cm, 1.3 cm by common iliac ovaries noted 0.8 cm, 2.1 cm by external iliac node previously measured 0.9 cm, 2 cm right external iliac lymph node previously measured 1.6 cm amongst others. There are no enlarged left external iliac lymph nodes. There is only a 0.7 cm left common iliac node that is unchanged. There are also newly enlarged aortocaval and periaortic lymph nodes predominantly measuring between 0.5 cm and 1 cm.   BOWEL/PERITONEUM: There is colonic diverticulosis. No inflammatory bowel wall thickening or dilatation. Normal appendix.   No ascites, free air, or fluid collection.     MUSCULOSKELETAL: No acute osseous abnormality. No suspicious osseous lesion. Severe L4-L5 disc height loss with grade 1 degenerative anterolisthesis of L4 on L5 and moderate bilateral facet arthropathy. There is not severe central canal stenosis at L4-L5 and severe bilateral L4-5 foraminal stenosis. There are decompressive laminectomies at L2-L4.       1. Significant progression of severe inflammatory changes in the right pelvic sidewall, presacral space, right femoral canal and extending superiorly along the aorta and IVC to the level of the renal veins accompanied by diffuse lymphadenopathy which is also significantly progressed as detailed above. This inflammatory perinodal stranding favors an infectious or inflammatory etiology, although this the extent and degree of change is unusual for typical bladder infection. Recommend timely urological follow-up for further investigation. Correlation with urinalysis recommended.   2. Redemonstration of diffusely thickened inflamed bladder wall with diffuse thickening of the distal right ureter and multiple intraluminal bladder calculi may service continued nidus of continued infection.   3. No hydronephrosis. Nonobstructing 0.2 cm right renal calculus.   MACRO: None.   Signed by: Gerard Maxwell 1/24/2025 3:41 AM  Dictation workstation:   [id number]       Intake/Output:    Intake/Output Summary (Last 24 hours) at 2/21/2025 5101  Last data filed at 2/21/2025 0824  Gross per 24 hour   Intake --   Output 100 ml   Net -100 ml       Physical exam:    Physical Exam  Constitutional:       General: She is in acute distress.      Appearance: Normal appearance.   Cardiovascular:      Rate and Rhythm: Normal rate and regular rhythm.      Pulses: Normal pulses.      Heart sounds: No murmur heard.  Pulmonary:      Effort: Pulmonary effort is normal. No respiratory distress.      Breath sounds: Normal breath sounds.   Abdominal:      General: Abdomen is flat. Bowel sounds are normal.      Palpations: Abdomen is soft. There is mass.      Tenderness: There is abdominal tenderness.      Comments: Right lower quadrant tenderness with ill-defined mass and suprapubic area.   Musculoskeletal:      Cervical back: Normal range of motion.      Right lower leg: No edema.      Left lower leg: No edema.   Skin:     General: Skin is warm.      Capillary Refill: Capillary refill takes less than 2 seconds.      Findings: No lesion or rash.   Neurological:      General: No focal deficit present.      Mental Status: She is alert and oriented to person, place, and time. Mental status is at baseline.        Assessment /Plan    Assessment & Plan  Generalized abdominal pain      Assessment/Plan:  This is 74 y.o. female with PMHx of recently diagnosed bladder cancer, prior stroke without residual deficits, afib s/p ablation on Eliquis, SVT, HTN, CAD, HLD, GERD, DM, and hypothyroidism presented with worsening RLQ abdominal pain and intermittent palpitations. Given her leukocytosis, hematuria, and UA findings, concern for UTI vs. worsening malignancy-related complications. CT A/P showed progression of bladder mass and worsening lymphadenopathy. EKG showed SVT ( bpm). Managed with IV fluids, analgesia (Dilaudid, ketamine, morphine), and broad-spectrum  antibiotics. Admitted for pain control, further infectious workup, and oncology evaluation.    #Intractable abdominal pain   #Newly diagnosed invasive bladder cancer  #?Urinary tract infection  #Leukocytosis  She underwent inpatient cystoscopy with biopsy that showed Invasive carcinoma with extensive keratinizing squamous differentiation (greater than 90%), invading into the detrusor muscle (muscularis propria).  Additionally UA showed +2 hematuria, positive nitrites, positive leukocyte esterase, 6-10 WBCs, and +1 bacteria.  Reviewed previous culture data showed multiple organism and Klebsiella pneumoniae with resistant ampicillin and azithromycin and cefazolin.     Plan  -Pain control:  -Dilaudid 1 mg as needed for moderate and severe pain.  -Morphine MS Contin 15 mg twice daily  -Naloxone as needed  -S/p Zosyn in the ED, will continue with Rocephin daily pending urine culture results.  -Zofran as needed for nausea  -Blood culture and urine culture collected.  -Oncology consulted, appreciate recommendation  -Pain and palliative consulted, appreciate recommendation  -PT OT    #Paroxysmal SVT   #A-fib status s/p ablation on Eliquis   #Hx of Afib RVR   #Hx of multifocal stroke  [id number] score 6   -She was intermittently tachycardic in the ED, most likely due to worsening abdominal pain.  -Resume home medication with metoprolol and anticoagulation with Eliquis  -Will medically optimize pain control  -Daily Electrolytes and Mg. Maintain K > 4 and Mg > 2, replace as needed.    #Chronic microcytic anemia   -Initial H/H 5.3/20 around her baseline  -Likely due to anemia of chronic disease  -No active bleeding, she denies hematuria, hematemesis or hemtochezia.  - Monitor vital signs for fever  - Daily CBC   - Outpatient work up for anemia     Chronic problem  #Hypothyroidism  #T2DM - SSI  #HTN  #HLD  #GERD  -Continue home meds as appropriate    Consultants: oncology, palliative  Diet: Regular diet  Code Status: DNR DNI  with ICU transfer if needed  DVT Prophylaxis: Eliquis  Disposition: Anticipate 1-2 midnight pending oncology assessment and pain control    Assessment and plan discussed with my attending.   Charles Fonseca MD   Internal Medicine, PGY-2 .

## 2025-02-22 ENCOUNTER — APPOINTMENT (OUTPATIENT)
Dept: RADIOLOGY | Facility: HOSPITAL | Age: 75
End: 2025-02-22
Payer: MEDICARE

## 2025-02-22 LAB
ALBUMIN SERPL BCP-MCNC: 2.9 G/DL (ref 3.4–5)
ALP SERPL-CCNC: 60 U/L (ref 33–136)
ALT SERPL W P-5'-P-CCNC: 4 U/L (ref 7–45)
ANION GAP SERPL CALC-SCNC: 13 MMOL/L (ref 10–20)
AST SERPL W P-5'-P-CCNC: 10 U/L (ref 9–39)
BACTERIA UR CULT: NORMAL
BILIRUB SERPL-MCNC: 0.4 MG/DL (ref 0–1.2)
BUN SERPL-MCNC: 8 MG/DL (ref 6–23)
CALCIUM SERPL-MCNC: 9.3 MG/DL (ref 8.6–10.3)
CANCER AG125 SERPL-ACNC: 14.8 U/ML (ref 0–30.2)
CHLORIDE SERPL-SCNC: 98 MMOL/L (ref 98–107)
CO2 SERPL-SCNC: 27 MMOL/L (ref 21–32)
CREAT SERPL-MCNC: 0.74 MG/DL (ref 0.5–1.05)
EGFRCR SERPLBLD CKD-EPI 2021: 84 ML/MIN/1.73M*2
ERYTHROCYTE [DISTWIDTH] IN BLOOD BY AUTOMATED COUNT: 20.6 % (ref 11.5–14.5)
GLUCOSE BLD MANUAL STRIP-MCNC: 100 MG/DL (ref 74–99)
GLUCOSE BLD MANUAL STRIP-MCNC: 134 MG/DL (ref 74–99)
GLUCOSE BLD MANUAL STRIP-MCNC: 143 MG/DL (ref 74–99)
GLUCOSE BLD MANUAL STRIP-MCNC: 99 MG/DL (ref 74–99)
GLUCOSE SERPL-MCNC: 98 MG/DL (ref 74–99)
HCT VFR BLD AUTO: 32 % (ref 36–46)
HGB BLD-MCNC: 9.1 G/DL (ref 12–16)
MAGNESIUM SERPL-MCNC: 1.87 MG/DL (ref 1.6–2.4)
MCH RBC QN AUTO: 21.4 PG (ref 26–34)
MCHC RBC AUTO-ENTMCNC: 28.4 G/DL (ref 32–36)
MCV RBC AUTO: 75 FL (ref 80–100)
NRBC BLD-RTO: 0 /100 WBCS (ref 0–0)
PLATELET # BLD AUTO: 197 X10*3/UL (ref 150–450)
POTASSIUM SERPL-SCNC: 4.5 MMOL/L (ref 3.5–5.3)
PROT SERPL-MCNC: 6 G/DL (ref 6.4–8.2)
Q ONSET: 220 MS
QRS COUNT: 25 BEATS
QRS DURATION: 76 MS
QT INTERVAL: 318 MS
QTC CALCULATION(BAZETT): 497 MS
QTC FREDERICIA: 428 MS
R AXIS: -27 DEGREES
RBC # BLD AUTO: 4.25 X10*6/UL (ref 4–5.2)
SODIUM SERPL-SCNC: 133 MMOL/L (ref 136–145)
T AXIS: 54 DEGREES
T OFFSET: 379 MS
VENTRICULAR RATE: 147 BPM
WBC # BLD AUTO: 13.6 X10*3/UL (ref 4.4–11.3)

## 2025-02-22 PROCEDURE — 2500000004 HC RX 250 GENERAL PHARMACY W/ HCPCS (ALT 636 FOR OP/ED): Mod: JZ

## 2025-02-22 PROCEDURE — 2500000002 HC RX 250 W HCPCS SELF ADMINISTERED DRUGS (ALT 637 FOR MEDICARE OP, ALT 636 FOR OP/ED)

## 2025-02-22 PROCEDURE — 82947 ASSAY GLUCOSE BLOOD QUANT: CPT

## 2025-02-22 PROCEDURE — 71260 CT THORAX DX C+: CPT | Performed by: RADIOLOGY

## 2025-02-22 PROCEDURE — 80053 COMPREHEN METABOLIC PANEL: CPT

## 2025-02-22 PROCEDURE — 99233 SBSQ HOSP IP/OBS HIGH 50: CPT

## 2025-02-22 PROCEDURE — 86304 IMMUNOASSAY TUMOR CA 125: CPT | Mod: STJLAB | Performed by: INTERNAL MEDICINE

## 2025-02-22 PROCEDURE — 2500000001 HC RX 250 WO HCPCS SELF ADMINISTERED DRUGS (ALT 637 FOR MEDICARE OP)

## 2025-02-22 PROCEDURE — 1200000002 HC GENERAL ROOM WITH TELEMETRY DAILY

## 2025-02-22 PROCEDURE — 85027 COMPLETE CBC AUTOMATED: CPT

## 2025-02-22 PROCEDURE — 2550000001 HC RX 255 CONTRASTS: Performed by: STUDENT IN AN ORGANIZED HEALTH CARE EDUCATION/TRAINING PROGRAM

## 2025-02-22 PROCEDURE — 36415 COLL VENOUS BLD VENIPUNCTURE: CPT

## 2025-02-22 PROCEDURE — 83735 ASSAY OF MAGNESIUM: CPT

## 2025-02-22 PROCEDURE — 71260 CT THORAX DX C+: CPT

## 2025-02-22 RX ORDER — HYDROMORPHONE HYDROCHLORIDE 0.2 MG/ML
0.2 INJECTION INTRAMUSCULAR; INTRAVENOUS; SUBCUTANEOUS ONCE
Status: DISCONTINUED | OUTPATIENT
Start: 2025-02-22 | End: 2025-02-24

## 2025-02-22 RX ADMIN — TRAZODONE HYDROCHLORIDE 100 MG: 100 TABLET ORAL at 20:04

## 2025-02-22 RX ADMIN — METOPROLOL TARTRATE 75 MG: 50 TABLET, FILM COATED ORAL at 20:04

## 2025-02-22 RX ADMIN — ATORVASTATIN CALCIUM 10 MG: 10 TABLET, FILM COATED ORAL at 08:31

## 2025-02-22 RX ADMIN — APIXABAN 5 MG: 5 TABLET, FILM COATED ORAL at 20:04

## 2025-02-22 RX ADMIN — HYDROMORPHONE HYDROCHLORIDE 1 MG: 1 INJECTION, SOLUTION INTRAMUSCULAR; INTRAVENOUS; SUBCUTANEOUS at 09:34

## 2025-02-22 RX ADMIN — LEVOTHYROXINE SODIUM 100 MCG: 0.1 TABLET ORAL at 05:34

## 2025-02-22 RX ADMIN — IOHEXOL 50 ML: 350 INJECTION, SOLUTION INTRAVENOUS at 09:55

## 2025-02-22 RX ADMIN — APIXABAN 5 MG: 5 TABLET, FILM COATED ORAL at 08:31

## 2025-02-22 RX ADMIN — METOPROLOL TARTRATE 75 MG: 50 TABLET, FILM COATED ORAL at 08:31

## 2025-02-22 RX ADMIN — FOLIC ACID 1 MG: 1 TABLET ORAL at 08:31

## 2025-02-22 RX ADMIN — ONDANSETRON 4 MG: 2 INJECTION INTRAMUSCULAR; INTRAVENOUS at 18:43

## 2025-02-22 RX ADMIN — MORPHINE SULFATE 15 MG: 15 TABLET, EXTENDED RELEASE ORAL at 20:04

## 2025-02-22 RX ADMIN — HYDROMORPHONE HYDROCHLORIDE 1 MG: 1 INJECTION, SOLUTION INTRAMUSCULAR; INTRAVENOUS; SUBCUTANEOUS at 04:37

## 2025-02-22 RX ADMIN — Medication 1000 UNITS: at 08:31

## 2025-02-22 RX ADMIN — Medication 400 MG: at 08:31

## 2025-02-22 RX ADMIN — LOSARTAN POTASSIUM 50 MG: 50 TABLET, FILM COATED ORAL at 08:31

## 2025-02-22 RX ADMIN — HYDROMORPHONE HYDROCHLORIDE 1 MG: 1 INJECTION, SOLUTION INTRAMUSCULAR; INTRAVENOUS; SUBCUTANEOUS at 20:45

## 2025-02-22 RX ADMIN — HYDROMORPHONE HYDROCHLORIDE 1 MG: 1 INJECTION, SOLUTION INTRAMUSCULAR; INTRAVENOUS; SUBCUTANEOUS at 15:14

## 2025-02-22 RX ADMIN — MORPHINE SULFATE 15 MG: 15 TABLET, EXTENDED RELEASE ORAL at 08:31

## 2025-02-22 ASSESSMENT — PAIN SCALES - GENERAL
PAINLEVEL_OUTOF10: 1
PAINLEVEL_OUTOF10: 4
PAINLEVEL_OUTOF10: 6
PAINLEVEL_OUTOF10: 2
PAINLEVEL_OUTOF10: 8
PAINLEVEL_OUTOF10: 10 - WORST POSSIBLE PAIN
PAINLEVEL_OUTOF10: 6
PAINLEVEL_OUTOF10: 4

## 2025-02-22 ASSESSMENT — COGNITIVE AND FUNCTIONAL STATUS - GENERAL
DAILY ACTIVITIY SCORE: 24
MOBILITY SCORE: 24

## 2025-02-22 ASSESSMENT — PAIN DESCRIPTION - LOCATION
LOCATION: ABDOMEN

## 2025-02-22 ASSESSMENT — PAIN - FUNCTIONAL ASSESSMENT
PAIN_FUNCTIONAL_ASSESSMENT: 0-10
PAIN_FUNCTIONAL_ASSESSMENT: 0-10

## 2025-02-22 ASSESSMENT — PAIN DESCRIPTION - DESCRIPTORS
DESCRIPTORS: DISCOMFORT
DESCRIPTORS: DISCOMFORT
DESCRIPTORS: THROBBING

## 2025-02-22 NOTE — NURSING NOTE
EOS:  Slept well through the night.  Pain fairly controlled with pain level ranging from four to nine, medicated with Dilaudid twice with good results.  VSS, external catheter remains in place for urinary incontinence.

## 2025-02-22 NOTE — CARE PLAN
The patient's goals for the shift include pain relieved    The clinical goals for the shift include see care plan

## 2025-02-22 NOTE — CARE PLAN
Problem: Pain - Adult  Goal: Verbalizes/displays adequate comfort level or baseline comfort level  2/22/2025 1015 by Mely Beck RN  Outcome: Progressing  2/22/2025 1015 by Mely Beck RN  Outcome: Progressing     Problem: Discharge Planning  Goal: Discharge to home or other facility with appropriate resources  2/22/2025 1015 by Mely Beck RN  Outcome: Progressing  2/22/2025 1015 by Mely Beck RN  Outcome: Progressing     Problem: Chronic Conditions and Co-morbidities  Goal: Patient's chronic conditions and co-morbidity symptoms are monitored and maintained or improved  2/22/2025 1015 by Mely Beck RN  Outcome: Progressing  2/22/2025 1015 by Mely Beck RN  Outcome: Progressing     Problem: Nutrition  Goal: Nutrient intake appropriate for maintaining nutritional needs  2/22/2025 1015 by Mely Beck RN  Outcome: Progressing  2/22/2025 1015 by Mely Beck RN  Outcome: Progressing     Problem: Pain  Goal: Takes deep breaths with improved pain control throughout the shift  2/22/2025 1015 by Mely Beck RN  Outcome: Progressing  2/22/2025 1015 by Mely Beck RN  Outcome: Progressing  Goal: Turns in bed with improved pain control throughout the shift  2/22/2025 1015 by Mely Beck RN  Outcome: Progressing  2/22/2025 1015 by Mely Beck RN  Outcome: Progressing  Goal: Walks with improved pain control throughout the shift  2/22/2025 1015 by Mely Beck RN  Outcome: Progressing  2/22/2025 1015 by Mely Beck RN  Outcome: Progressing  Goal: Performs ADL's with improved pain control throughout shift  2/22/2025 1015 by Mely Beck RN  Outcome: Progressing  2/22/2025 1015 by Mely Beck RN  Outcome: Progressing  Goal: Participates in PT with improved pain control throughout the shift  2/22/2025 1015 by Mely Beck RN  Outcome: Progressing  2/22/2025 1015 by Mely Beck RN  Outcome: Progressing  Goal: Free from opioid side effects throughout the  shift  2/22/2025 1015 by Mely Beck RN  Outcome: Progressing  2/22/2025 1015 by Mely Beck RN  Outcome: Progressing     Problem: Diabetes  Goal: Achieve decreasing blood glucose levels by end of shift  2/22/2025 1015 by Mely Beck RN  Outcome: Progressing  2/22/2025 1015 by Mely Beck RN  Outcome: Progressing  Goal: Increase stability of blood glucose readings by end of shift  2/22/2025 1015 by Mely Beck RN  Outcome: Progressing  2/22/2025 1015 by Mely Beck RN  Outcome: Progressing  Goal: Maintain electrolyte levels within acceptable range throughout shift  2/22/2025 1015 by Mely Beck RN  Outcome: Progressing  2/22/2025 1015 by Mely Beck RN  Outcome: Progressing  Goal: Maintain glucose levels >70mg/dl to <250mg/dl throughout shift  2/22/2025 1015 by Mely Beck RN  Outcome: Progressing  2/22/2025 1015 by Mely Beck RN  Outcome: Progressing  Goal: Learn about and adhere to nutrition recommendations by end of shift  2/22/2025 1015 by Mely Beck RN  Outcome: Progressing  2/22/2025 1015 by Mely Beck RN  Outcome: Progressing  Goal: Vital signs within normal range for age by end of shift  2/22/2025 1015 by Meyl Beck RN  Outcome: Progressing  2/22/2025 1015 by Mely Beck RN  Outcome: Progressing  Goal: Increase self care and/or family involovement by end of shift  2/22/2025 1015 by Mely Beck RN  Outcome: Progressing  2/22/2025 1015 by Mely Beck RN  Outcome: Progressing  Goal: Receive DSME education by end of shift  2/22/2025 1015 by Mely Beck RN  Outcome: Progressing  2/22/2025 1015 by Mely Beck RN  Outcome: Progressing     Problem: Fall/Injury  Goal: Verbalize understanding of personal risk factors for fall in the hospital  2/22/2025 1015 by Mely Beck RN  Outcome: Progressing  2/22/2025 1015 by Mely Beck RN  Outcome: Progressing  Goal: Verbalize understanding of risk factor reduction measures to prevent injury  from fall in the home  2/22/2025 1015 by Mely Beck RN  Outcome: Progressing  2/22/2025 1015 by Mely Beck RN  Outcome: Progressing  Goal: Use assistive devices by end of the shift  2/22/2025 1015 by Mely Beck RN  Outcome: Progressing  2/22/2025 1015 by Mely Beck RN  Outcome: Progressing  Goal: Pace activities to prevent fatigue by end of the shift  2/22/2025 1015 by Mely Beck RN  Outcome: Progressing  2/22/2025 1015 by Mely Beck RN  Outcome: Progressing

## 2025-02-22 NOTE — PROGRESS NOTES
Myrna Rodrigues is a 74 y.o. female on day 1 of admission presenting with Generalized abdominal pain.      Subjective   Patient was seen and examined today in the morning at bedside.  No acute events overnight.  In the morning she was evaluated and endorses that her pain is improved since yesterday but started to become back.  On further questioning about her urinary habit she stated that she has no pain r  Objective     Last Recorded Vitals  /62   Pulse 91   Temp 36.1 °C (97 °F) (Temporal)   Resp 16   Wt 63.5 kg (139 lb 15.9 oz)   SpO2 98%   Intake/Output last 3 Shifts:    Intake/Output Summary (Last 24 hours) at 2/22/2025 1301  Last data filed at 2/22/2025 1020  Gross per 24 hour   Intake 320.25 ml   Output 500 ml   Net -179.75 ml       Admission Weight  Weight: 61.2 kg (135 lb) (02/21/25 0622)    Daily Weight  02/21/25 : 63.5 kg (139 lb 15.9 oz)      Physical Exam:  General: Not in acute distress, alert  HEENT: PERRLA, head intact and normocephalic  Neck: Normal to inspection  Lungs: Clear to auscultation, work of breathing within normal limit  Cardiac: Regular rate and rhythm  Abdomen: Right lower quadrant tenderness with ill-defined mass and suprapubic area.   : Exam deferred  Skin: Intact  Hematology: No petechia or excessive ecchymosis  Musculoskeletal: Without significant trauma  Neurological: Alert awake oriented, no focal deficit, cranial nerves grossly intact  Psych: No suicidal ideation or homicidal ideation    Relevant Results  Scheduled medications  apixaban, 5 mg, oral, BID  atorvastatin, 10 mg, oral, Daily  cefTRIAXone, 1 g, intravenous, q24h  cholecalciferol, 1,000 Units, oral, Daily  folic acid, 1 mg, oral, Daily  HYDROmorphone, 0.2 mg, intravenous, Once  insulin lispro, 0-5 Units, subcutaneous, TID AC  levothyroxine, 100 mcg, oral, Daily  losartan, 50 mg, oral, Daily  magnesium oxide, 400 mg, oral, Daily  metoprolol tartrate, 75 mg, oral, BID  morphine CR, 15 mg, oral, q12h JANINE  traZODone,  100 mg, oral, Nightly      Continuous medications     PRN medications  PRN medications: dextrose, dextrose, diphenoxylate-atropine, glucagon, glucagon, HYDROmorphone, naloxone, ondansetron     Results for orders placed or performed during the hospital encounter of 02/21/25 (from the past 24 hours)   POCT GLUCOSE   Result Value Ref Range    POCT Glucose 114 (H) 74 - 99 mg/dL   POCT GLUCOSE   Result Value Ref Range    POCT Glucose 92 74 - 99 mg/dL   CBC   Result Value Ref Range    WBC 13.6 (H) 4.4 - 11.3 x10*3/uL    nRBC 0.0 0.0 - 0.0 /100 WBCs    RBC 4.25 4.00 - 5.20 x10*6/uL    Hemoglobin 9.1 (L) 12.0 - 16.0 g/dL    Hematocrit 32.0 (L) 36.0 - 46.0 %    MCV 75 (L) 80 - 100 fL    MCH 21.4 (L) 26.0 - 34.0 pg    MCHC 28.4 (L) 32.0 - 36.0 g/dL    RDW 20.6 (H) 11.5 - 14.5 %    Platelets 197 150 - 450 x10*3/uL   Comprehensive metabolic panel   Result Value Ref Range    Glucose 98 74 - 99 mg/dL    Sodium 133 (L) 136 - 145 mmol/L    Potassium 4.5 3.5 - 5.3 mmol/L    Chloride 98 98 - 107 mmol/L    Bicarbonate 27 21 - 32 mmol/L    Anion Gap 13 10 - 20 mmol/L    Urea Nitrogen 8 6 - 23 mg/dL    Creatinine 0.74 0.50 - 1.05 mg/dL    eGFR 84 >60 mL/min/1.73m*2    Calcium 9.3 8.6 - 10.3 mg/dL    Albumin 2.9 (L) 3.4 - 5.0 g/dL    Alkaline Phosphatase 60 33 - 136 U/L    Total Protein 6.0 (L) 6.4 - 8.2 g/dL    AST 10 9 - 39 U/L    Bilirubin, Total 0.4 0.0 - 1.2 mg/dL    ALT 4 (L) 7 - 45 U/L   Magnesium   Result Value Ref Range    Magnesium 1.87 1.60 - 2.40 mg/dL   POCT GLUCOSE   Result Value Ref Range    POCT Glucose 99 74 - 99 mg/dL   POCT GLUCOSE   Result Value Ref Range    POCT Glucose 100 (H) 74 - 99 mg/dL     *Note: Due to a large number of results and/or encounters for the requested time period, some results have not been displayed. A complete set of results can be found in Results Review.             CT chest w IV contrast    Result Date: 2/22/2025  Interpreted By:  Main Philippe, STUDY: CT CHEST W IV CONTRAST;  2/22/2025  10:06 am   INDICATION: Signs/Symptoms:Squamous cell carcinoma of the bladder, for staging purposes..   COMPARISON: 01/28/2024   ACCESSION NUMBER(S): [id number]   ORDERING CLINICIAN: SHAKILA GLASS   TECHNIQUE: Helical data acquisition of the chest was obtained with IV contrast material. 50 mL of Omnipaque 350. Images were reformatted in axial, coronal, and sagittal planes.   FINDINGS: Lungs and Pleura: Scattered streaky atelectasis. Small bilateral pleural effusions with adjacent compressive atelectasis. No pleural effusion. No pneumothorax.   Mediastinum and axilla: No adenopathy by CT size criteria. No cardiomegaly or pericardial effusion. No thoracic aortic aneurysm. Small hiatal hernia. Atherosclerosis. Coronary artery calcifications.   Visualized Upper Abdomen: Upper abdominal lymphadenopathy, biliary ductal dilatation, and other findings detail to better extent on CT abdomen and pelvis dated 02/21/2025.   MSK/Chest Wall: No aggressive bony lesion identified. Scattered degenerative change in the spine.       Small bilateral pleural effusions with adjacent atelectasis. Scattered streaky subsegmental atelectasis.   No evidence of thoracic metastatic disease.   Signed by: Main Philippe 2/22/2025 11:56 AM Dictation workstation:   [id number]    Electrocardiogram, 12-lead PRN ACS symptoms    Result Date: 2/22/2025  Supraventricular tachycardia Low voltage QRS Possible Inferior infarct , age undetermined Abnormal ECG When compared with ECG of 12-FEB-2025 09:30, (unconfirmed) Vent. rate has increased BY  73 BPM Borderline criteria for Inferior infarct are now Present    CT abdomen pelvis w IV contrast    Result Date: 2/21/2025  Interpreted By:  Evonne Yi, STUDY: CT ABDOMEN PELVIS W IV CONTRAST;  2/21/2025 9:45 am   INDICATION: Signs/Symptoms:worsening lower abdominal pain in the setting of recently diagnosed bladder cancer.     COMPARISON: CT angio abdomen pelvis 02/10/2025   ACCESSION NUMBER(S):  [id number]   ORDERING CLINICIAN: KRISHNA SALAZAR   TECHNIQUE: CT of the abdomen and pelvis was performed.  Standard contiguous axial images were obtained at 3 mm slice thickness through the abdomen and pelvis. Coronal and sagittal reconstructions at 3 mm slice thickness were performed.  75 ML of Omnipaque 350 was administered intravenously without immediate complication.   FINDINGS: LOWER CHEST: The heart is normal in size without evidence of pericardial effusion. No consolidation or pleural effusion. No concerning lung nodule. Small hiatal hernia.   ABDOMEN:   LIVER: The liver is normal in size. No suspicious liver lesion.   BILE DUCTS: No mild intrahepatic and extrahepatic biliary duct dilatation, likely secondary to cholecystectomy..   GALLBLADDER: Cholecystectomy   PANCREAS: Fatty infiltration of the pancreas, most predominant in the head and uncinate process. The pancreas appears otherwise unremarkable without evidence of ductal dilatation or masses.   SPLEEN: The spleen is normal in size.   ADRENAL GLANDS: No adrenal nodularity or thickening.   KIDNEYS AND URETERS: There is persistent urothelial thickening of the right distal ureter/ureterovesicular junction. The kidneys are normal in size. No left hydroureteronephrosis or nephroureterolithiasis is identified.   PELVIS:   BLADDER: Locules of air are seen in the nondependent portion of the bladder, compatible with recent bladder procedure. Numerous bladder wall diverticuli are noted. There is decreased but persistent bladder wall thickening. The previously noted bladder stones are no longer evident.   REPRODUCTIVE ORGANS: Hysterectomy   BOWEL: Small hiatal hernia. The small and large bowel are normal in caliber and demonstrate no wall thickening. The appendix is not definitely visualized. There is however no pericecal stranding or fluid. Colonic diverticulosis without acute diverticulitis.   VESSELS: Atherosclerotic calcifications of the aortoiliac arteries. No  aneurysmal dilatation of the abdominal aorta. The IVC appears normal.   PERITONEUM/RETROPERITONEUM/LYMPH NODES: Interval worsening of numerous enlarged abdominopelvic lymph nodes, for example as seen on series 2 measured in the short axis: *16 mm retrocaval lymph node, image 50, previously 14 mm *17 mm para-aortic lymph node, image 59, previously 9 mm *17 mm aortocaval lymph node, image 72, previously 14 mm *Stable 16 mm right common iliac lymph node, image 97 *30 mm right external iliac/right pelvic sidewall lymph node, image 132, previously 22 mm *13 mm right pelvic sidewall lymph node, image 144, previously 12 mm *16 mm right external iliac lymph node, image 127, previously 13 mm *Stable 16 mm right internal iliac lymph node, image 102   Equivocal peritoneal nodular thickening is again seen, for example in the pelvis adjacent to the bladder on series 2, images 132 and 131 Presacral and mesenteric edema without ascites or walled fluid collection. No pneumoperitoneum, as questioned by the AI software.   ABDOMINAL WALL: The abdominal wall soft tissues appear normal.   BONES: No acute fracture or malalignment. No suspicious osseous lesions. Discogenic degenerative changes in the lower thoracic and lumbar spine. Unchanged grade 1 anterolisthesis of the L3 on L4 and L4 on L5 vertebral bodies.       1. Abdominopelvic lymph nodes have worsened as compared to prior exam from 02/10/2025, concerning for rapid disease progression given the short interval time frame of imaging. 2. Decreased but persistent bladder wall thickening, consistent with history of partial resection of patient's known malignancy.There is persistent urothelial thickening of the right distal ureter/ureterovesicular junction, for which disease involvement is not excluded. 3. Equivocal peritoneal nodular thickening is seen, for which short-term follow-up CT abdomen pelvis is recommended in 8-12 weeks to evaluate for early peritoneal carcinomatosis. 4.  Additional chronic and incidental findings as detailed above.     MACRO: None   Signed by: Evonne Yi 2/21/2025 10:14 AM Dictation workstation:   [id number]    ECG 12 lead    Result Date: 2/19/2025  Normal sinus rhythm Poor R-wave progression ; consider anterior infarct, lead placement, or normal variant Abnormal ECG When compared with ECG of 10-FEB-2025 17:21, (unconfirmed) Vent. rate has decreased BY  82 BPM Questionable change in QRS axis Nonspecific T wave abnormality now evident in Inferior leads T wave inversion now evident in Anterior leads Confirmed by Bertram Hernandez ([id number]) on 2/19/2025 1:37:40 PM    ECG 12 lead    Result Date: 2/19/2025  Supraventricular tachycardia Abnormal ECG When compared with ECG of 28-JAN-2025 16:33, Confirmed by Bertram Hernandez ([id number]) on 2/19/2025 1:37:31 PM    ECG 12 lead    Result Date: 2/12/2025  Normal sinus rhythm Normal ECG When compared with ECG of 12-FEB-2025 09:12, (unconfirmed) Premature ventricular complexes are no longer Present Vent. rate has decreased BY  70 BPM Borderline criteria for Inferior infarct are no longer Present ST no longer depressed in Anterior leads    ECG 12 lead    Result Date: 2/12/2025   Poor data quality, interpretation may be adversely affected Supraventricular tachycardia with Fusion complexes Left axis deviation Pulmonary disease pattern Nonspecific ST and T wave abnormality Abnormal ECG When compared with ECG of 10-FEB-2025 19:04, (unconfirmed) Significant changes have occurred    ECG 12 lead    Result Date: 2/12/2025  Supraventricular tachycardia with occasional Premature ventricular complexes Left axis deviation Pulmonary disease pattern Possible Inferior infarct , age undetermined Abnormal ECG When compared with ECG of 12-FEB-2025 09:06, (unconfirmed) Fusion complexes are no longer Present Premature ventricular complexes are now Present    CT angio abdomen pelvis w and or wo IV IV contrast    Result Date: 2/10/2025  Interpreted By:  Yamila  Felipe, STUDY: CT ANGIO ABDOMEN PELVIS W AND/OR WO IV IV CONTRAST;  2/10/2025 9:41 pm   INDICATION: Signs/Symptoms:A-fib with RVR, severe abdominal pain not responding to morphine, assess for mesenteric ischemia.   COMPARISON: 01/29/2025   ACCESSION NUMBER(S): [id number]   ORDERING CLINICIAN: GEORGE LOPEZ   TECHNIQUE: Axial non-contrast images of the abdomen, and pelvis.   Axial CT images of the, abdomen and pelvis were obtained after the intravenous administration of iodinated contrast using angiographic technique with coronal and sagittal reformatted images. MIP images and 3D reconstructions were created on an independent workstation and reviewed.   FINDINGS: VASCULATURE:   Mild atherosclerotic calcification of the aorta without aneurysm or dissection.   Celiac artery, SMA, renal arteries are all widely patent. KATHRYN patent. Iliac arteries patent without stenosis.   CT ABDOMEN/PELVIS:   Arterial phase imaging of the liver, adrenals pancreas and spleen are grossly unremarkable. Status post cholecystectomy.   No new hydronephrosis.   Redemonstrated is retroperitoneal adenopathy surrounding the distal aorta/cava and extending into the right iliac chain which appears to be progressing compared to previous. Necrotic appearing lymph node in the right pelvic sidewall currently measures 2.2 cm in short axis, previously 1.9 cm.   Stable appearance of the bladder which has trabeculated appearance with right-sided posterior diverticulum. There is some irregular right posterior bladder wall thickening. The possibility of underlying bladder malignancy again should be considered. There is some dependent bladder calculi seen.   No significant new free fluid.   No bowel obstruction or acute colitis.   Unchanged left ovarian cyst measuring 3.5 cm in diameter.   There is multilevel moderate to advanced degenerative disc disease seen in the lumbar spine worse at L4-L5 and L5-S1.       1. No abdominal aortic aneurysm or dissection. No  evidence of mesenteric ischemia.   2. Compared to previous study there is progressing retroperitoneal adenopathy with some interval enlargement suspected.   3. Irregular masslike thickening right posterior bladder wall near suspected diverticulum. Underlying bladder malignancy may be present. Correlate with patient history/cystoscopy.   MACRO: None.   Signed by: Felipe Driscoll 2/10/2025 11:12 PM Dictation workstation:   [id number]    XR chest 1 view    Result Date: 2/10/2025  Interpreted By:  Wendy Lee, STUDY: XR CHEST 1 VIEW;  2/10/2025 6:19 pm   INDICATION: Signs/Symptoms:Short of breath, vomiting.   COMPARISON: 01/15/2025   ACCESSION NUMBER(S): [id number]   ORDERING CLINICIAN: GEORGE LOPEZ   FINDINGS:     CARDIOMEDIASTINAL SILHOUETTE: Cardiomediastinal silhouette is normal in size and configuration.   LUNGS: No pulmonary consolidation, pleural effusion or pneumothorax.   ABDOMEN: No remarkable upper abdominal findings.   BONES: No acute osseous abnormality.       No acute cardiopulmonary process.   MACRO: None   Signed by: Wendy Lee 2/10/2025 6:31 PM Dictation workstation:   [id number]    Transesophageal Echo (GAVIN)    Result Date: 2/5/2025            [facility] [place] [address] [address]    Tel [phone] Fax [phone] TRANSESOPHAGEAL ECHOCARDIOGRAM REPORT Patient Name:       LISSETTE ZAVALA           [place] Physician:    [id number] Clement Magana MD Study Date:         1/30/2025            Ordering Provider:    [id number] CLEMENT MAGANA MRN/PID:            [id number]             Fellow: Accession#:         [id number]         Nurse:                Zulma Ribeiro RN Date of Birth/Age:  1950 / 74      Sonographer:          Mely sutton Gender Assigned at  F                    Additional Staff:     Emmanuelle Jorge RN Birth: Height:              157.48 cm            Admit Date: Weight:             59.42 kg             Admission Status:     Inpatient -                                                                Routine BSA / BMI:          1.60 m2 / 23.96      Department Location:  [facility] Cath Lab                     kg/m2 Blood Pressure: 174 /72 mmHg Study Type:    TRANSESOPHAGEAL ECHO (GAVIN) Diagnosis/ICD: Shortness of breath-R06.02; Unspecified atrial                fibrillation-I48.91; Abnormal findings on diagnostic imaging of                other specified body structures-R93.89 Indication:    SOb, Afib CPT Codes:     GAVIN Complete-71074  Study Detail: Agitated saline used as a contrast agent for intraseptal flow               evaluation.  PHYSICIAN INTERPRETATION: GAVIN Details: Agitated saline contrast and color flow Doppler echo was performed to assess for the presence of a patent foramen ovale. GAVIN Medication: Midazolam and Fentanyl was used to sedate the patient for this exam. GAVIN Procedure: The probe was passed without difficulty. Left Ventricle: There are no regional wall motion abnormalities. The left ventricular cavity size is normal. Spectral Doppler shows a Grade I (impaired relaxation pattern) of left ventricular diastolic filling with normal left atrial filling pressure. Left Atrium: The left atrial size is mild to moderately dilated. A bubble study using agitated saline was performed. Bubble study is negative. The left atrial appendage is enlarged. There is no definite left atrial mass present. Right Ventricle: The right ventricle is upper limits of normal in size. There is normal right ventricular global systolic function. Right Atrium: The right atrial size is normal. Aortic Valve: The aortic valve appears structurally normal. There is trace aortic valve regurgitation. Mitral Valve: The mitral valve is normal in structure. There is moderate to severe mitral valve regurgitation. The mitral regurgitant orifice area is 4 mm2. The mitral  regurgitant volume is 8.73 ml. Tricuspid Valve: The tricuspid valve is structurally normal. There is trace to mild tricuspid regurgitation. Pulmonic Valve: The pulmonic valve was not assessed. There is trace pulmonic valve regurgitation. Pericardium: No pericardial effusion noted. Aorta: The aortic root is normal.  CONCLUSIONS:  1. Spectral Doppler shows a Grade I (impaired relaxation pattern) of left ventricular diastolic filling with normal left atrial filling pressure.  2. There is normal right ventricular global systolic function.  3. The left atrial size is mild to moderately dilated.  4. Moderate to severe mitral valve regurgitation.  5. No left atrial mass.  6. No obvious cardiac source of embolization by this study. QUANTITATIVE DATA SUMMARY:  LV SYSTOLIC FUNCTION:                      Normal Ranges: EF-Visual:      60 % LV EF Reported: 60 %  MITRAL INSUFFICIENCY:             Normal Ranges: PISA Radius:          0.3 cm MR VTI:               208.00 cm MR Vmax:              519.00 cm/s MR Alias Elieser:         38.5 cm/s MR Volume:            8.73 ml MR Flow Rt:           21.77 ml/s MR EROA:              4 mm2  [id number] Clement Stevens MD Electronically signed on 2/5/2025 at 5:22:41 AM  ** Final **     Electrocardiogram, 12-lead PRN ACS symptoms    Result Date: 2/1/2025  Supraventricular tachycardia with occasional Premature ventricular complexes and Fusion complexes Right axis deviation Nonspecific ST abnormality Abnormal ECG When compared with ECG of 27-JAN-2025 20:14, (unconfirmed) Fusion complexes are now Present Premature ventricular complexes are now Present QRS axis Shifted right Borderline criteria for Inferior infarct are no longer Present Nonspecific T wave abnormality now evident in Inferior leads Confirmed by LISBETH Agrawal ([id number]) on 2/1/2025 6:38:48 PM    Electrocardiogram, 12-lead PRN ACS symptoms    Result Date: 2/1/2025  Supraventricular tachycardia Low voltage QRS Possible Inferior infarct , age undetermined  Abnormal ECG When compared with ECG of 27-JAN-2025 17:29, (unconfirmed) Vent. rate has increased BY  75 BPM QRS axis Shifted right ST now depressed in Anterior leads Confirmed by LISBETH Agrawal ([id number]) on 2/1/2025 6:38:39 PM    Electrocardiogram, 12-lead PRN ACS symptoms    Result Date: 2/1/2025  Normal sinus rhythm Left axis deviation Pulmonary disease pattern Abnormal ECG When compared with ECG of 27-JAN-2025 09:00, (unconfirmed) QRS axis Shifted left Confirmed by LISBETH Agrawal ([id number]) on 2/1/2025 6:38:23 PM    ECG 12 Lead    Result Date: 1/31/2025  Normal sinus rhythm Normal ECG When compared with ECG of 24-JAN-2025 16:10, Premature ventricular complexes are no longer Present Vent. rate has decreased BY  58 BPM QRS axis Shifted right Borderline criteria for Inferior infarct are no longer Present Nonspecific T wave abnormality now evident in Inferior leads T wave amplitude has decreased in Anterior leads Confirmed by Clement Stevens ([id number]) on 1/31/2025 6:21:28 PM    ECG 12 Lead    Result Date: 1/31/2025  Normal sinus rhythm Left axis deviation Pulmonary disease pattern T wave abnormality, consider anterior ischemia Abnormal ECG When compared with ECG of 24-JAN-2025 16:10, Premature ventricular complexes are no longer Present Vent. rate has decreased BY  67 BPM Nonspecific T wave abnormality now evident in Inferior leads T wave inversion now evident in Anterior leads Confirmed by Clement Stevens ([id number]) on 1/31/2025 6:21:11 PM    MR brain wo IV contrast    Result Date: 1/29/2025  Interpreted By:  Nicolas Sarmiento, STUDY: MR BRAIN WO IV CONTRAST;  1/29/2025 2:34 pm   INDICATION: Signs/Symptoms:aphasia, history of PAF with interruption in anticoagulation.     COMPARISON: CT head and CTA head yesterday.   ACCESSION NUMBER(S): [id number]   ORDERING CLINICIAN: TIGIST KEYS   TECHNIQUE: Patient could only tolerate limited examination. Axial DWI, ADC, sagittal and axial T1 weighted sequences were obtained.   FINDINGS: Multiple small  patchy foci of abnormally restricted diffusion are seen in the bilateral cerebral hemispheres predominantly each centrum semiovale and oriented in AP direction with some additional involvement of the anterior right middle frontal gyrus cortex, the posterior right corpus callosum, paramedian right occipital lobe. Questionable additional involvement of left-greater-than-right cerebellar hemispheres.   No definite intracranial hemorrhage.     No extra-axial fluid collection. No intracranial mass.   Mild to moderate white matter T2 signal increase, most commonly seen with chronic small vessel ischemic change.  Presence of older small infarctions in these areas not excluded.   Degree of ventriculomegaly is commensurate with overall degree of brain parenchymal volume loss, which is mild.       1. Multiple small patchy foci of abnormally restricted diffusion are seen in the bilateral cerebral hemispheres predominantly each centrum semiovale and oriented in AP direction with some additional involvement of the anterior right middle frontal gyrus cortex, the posterior right corpus callosum, paramedian right occipital lobe. Questionable additional involvement of left-greater-than-right cerebellar hemispheres. Findings consistent with acute to subacute infarctions. The AP orientation of the high bilateral cerebral hemispheres could indicate a recent hypotensive event. 2. Mild to moderate white matter T2 signal increase, most commonly seen with chronic small vessel ischemic change.  Presence of older small infarctions in these areas not excluded. 3. Degree of ventriculomegaly is commensurate with overall degree of brain parenchymal volume loss, which is mild.   Note the patient was only able to tolerate limited examination as described above.   MACRO: None   Signed by: Nicolas Sarmiento 1/29/2025 3:12 PM Dictation workstation:   [id number]    CT abdomen pelvis w IV contrast    Result Date: 1/29/2025  Interpreted By:  Kassidy  Evonne, STUDY: CT ABDOMEN PELVIS W IV CONTRAST;  1/29/2025 9:32 am   INDICATION: Signs/Symptoms:abdominal pain with acute encephalopathy.     COMPARISON: CT abdomen pelvis 01/24/2025 CT chest abdomen pelvis 01/28/2024   ACCESSION NUMBER(S): [id number]   ORDERING CLINICIAN: TIGIST KEYS   TECHNIQUE: CT of the abdomen and pelvis was performed.  Standard contiguous axial images were obtained at 3 mm slice thickness through the abdomen and pelvis. Coronal and sagittal reconstructions at 3 mm slice thickness were performed.  75 ML of Omnipaque 350 was administered intravenously without immediate complication.   FINDINGS: Please note that the evaluation of vessels, lymph nodes and organs is limited without intravenous contrast.   LOWER CHEST: The heart is normal in size without evidence of pericardial effusion. No consolidation or pleural effusion is present. No concerning lung nodule. Small hiatal hernia again noted.   ABDOMEN:   LIVER: The liver is normal in size without evidence of focal liver lesions.   BILE DUCTS: Stable mild intrahepatic and extrahepatic biliary duct dilatation, likely secondary to cholecystectomy.   GALLBLADDER: Cholecystectomy   PANCREAS: The pancreas appears unremarkable without evidence of ductal dilatation or masses.   SPLEEN: The spleen is normal in size without focal lesions.   ADRENAL GLANDS: No adrenal nodularity or thickening.   KIDNEYS AND URETERS: The kidneys are normal in size. Stable nonobstructive punctuate calculus in the right kidney. Stable sub 5 mm hypoattenuating lesions in the bilateral kidneys, which are too small to further characterize. No hydroureteronephrosis or left nephroureterolithiasis is identified.   PELVIS:   BLADDER: The bladder is completely opacified by contrast from prior imaging, which limits evaluation.   REPRODUCTIVE ORGANS: No pelvic masses. A stable 48 mm left adnexal cyst is seen on series 2, image 116. A 21 x 19 mm right adnexal lesion could be a right  adnexal cyst or a perivesicular lymph node.   BOWEL: Small hiatal hernia. The small and large bowel are normal in caliber and demonstrate no wall thickening. The appendix is not definitely visualized. There is however no pericecal stranding or fluid.   VESSELS: There is no aneurysmal dilatation of the abdominal aorta. Mild atherosclerotic calcifications of the aortoiliac arteries. The IVC appears normal.   PERITONEUM/RETROPERITONEUM/LYMPH NODES: Retroperitoneal lymphadenopathy with adjacent stranding, stable when compared to prior CT from 01/24/2025, and new when compared to prior CT from 01/28/2024, for example as seen on series 2: *16 x 10 mm aortocaval lymph node, image 75 *12 x 11 mm retrocaval lymph node, image 91 *16 x 15 mm right common iliac lymph node, image 97 *20 x 16 mm right internal iliac lymph node, image 104 *29 x 20 mm right external iliac lymph node, image 126 *24 x 14 mm right external iliac lymph node, image 135 *21 x 19 mm perivascular lesion, which could be a lymph node or right adnexal cyst, image 131 *20 x 18 mm right pelvic sidewall lesion, image 117   No ascites or fluid collection. No peritoneal nodularity or implant.   ABDOMINAL WALL: The abdominal wall soft tissues appear normal. Sacral spinal stimulator noted.   BONES: No suspicious osseous lesions are identified. Degenerative discogenic disease is noted in the lower thoracic and lumbar spine. Grade 1 anterolisthesis of the L4 and L5 vertebral bodies.       1. Retroperitoneal lymphadenopathy, stable when compared to prior CT from 01/24/2025, and new when compared to prior CT from 01/28/2024. Though the adjacent stranding favors an infectious/inflammatory etiology, underlying malignancy such as lymphoma or bladder carcinoma (given the bladder wall thickening seen on prior CT) can not be excluded. If clinical signs/symptoms favor infectious etiology, short-term follow-up CT abdomen pelvis in 4-6 weeks is recommended. Alternatively,  correlation with serum LDH levels, cystoscopy, tissue diagnosis and/or PET-CT are other diagnostic considerations that could be obtained as per clinical need. 2. No significant interval change as compared to prior CT from 01/24/2025.   MACRO: Critical Finding:  There are multiple critical findings. See findings. notification was initiated on 1/29/2025 at 10:55 am by Evonne Yi.  (**-YCF-**)   Signed by: Evonne Yi 1/29/2025 10:56 AM Dictation workstation:   [id number]    CT angio head and neck w and wo IV contrast    Result Date: 1/28/2025  Interpreted By:  Nicolas Sarmiento, STUDY: CT ANGIO HEAD AND NECK W AND WO IV CONTRAST;  1/28/2025 6:46 pm   INDICATION: Signs/Symptoms:stroke rule out.     COMPARISON: CT head today.   ACCESSION NUMBER(S): [id number]   ORDERING CLINICIAN: TIGIST KEYS   TECHNIQUE: Unenhanced CT images of the head were obtained.  Subsequently,  60 ML of Omnipaque 350 was administered intravenously and axial images of the head and neck were acquired.  Coronal, sagittal, and 3-D reconstructions were provided for review.   FINDINGS:   CTA COW: No major vascular occlusion. Mild atherosclerotic changes through the carotid siphons. No aneurysms.  No vascular malformations.   CTA CAROTID: No aortic aneurysm or dissection. No significant stenoses at the origins of the great vessels from the aortic arch. No significant stenoses of the brachycephalic artery or bilateral subclavian arteries.   Mild atherosclerotic changes at the carotid bifurcations. No significant stenoses bilateral carotid arterial systems. No significant stenoses bilateral vertebral arterial systems.   NONVASCULAR HEAD: No intracranial mass. No intracranial hemorrhage. No evidence of large evolving cortical infarction. Supratentorial white matter hypodensities most likely related to mild chronic small vessel ischemic change.     Bones intact. Mucous retention cyst left maxillary sinus.   NONVASCULAR NECK: No soft tissue mass.  No adenopathy. Salivary glands are unremarkable. Thyroid gland unremarkable. Bones intact.       1. No intracranial major vascular occlusion. 2. No flow-limiting stenosis or significant plaque irregularity in the neck.     MACRO: None   Signed by: Nicolas Sarmiento 1/28/2025 7:30 PM Dictation workstation:   [id number]    CT brain attack head wo IV contrast    Result Date: 1/28/2025  Interpreted By:  Howard Millard, STUDY: TIGIST KEYS; 1/28/2025 5:08 pm   INDICATION: Signs/Symptoms:brain attack;   COMPARISON: 01/28/2024   ACCESSION NUMBER(S): [id number]   ORDERING CLINICIAN: TIGIST KEYS   TECHNIQUE: Contiguous axial images were acquired from the vertex through the posterior fossa without IV contrast. All CT examinations are performed with 1 or more of the following dose reduction techniques: Automated exposure control, adjustment of mA and/or kv according to patient's size, or use of iterative reconstruction techniques.   FINDINGS: No focal mass effect or midline shift is identified. The ventricles and sulci are symmetric and appropriate for the patient's age.   The gray white matter differentiation is preserved. Again seen is a moderate degree of nonspecific white matter hypodensity, most consistent with chronic small-vessel ischemic disease, not significantly changed from the prior study.   No acute intracranial hemorrhage is seen. No intra-axial or extra-axial fluid collection is seen.   The visualized paranasal sinuses and mastoid air cells are clear. There is a small-moderate sized mucous retention cysts in the visualized left maxillary sinus.       No CT evidence for acute intracranial pathology.   Moderate degree of nonspecific white matter hypodensity, most consistent with chronic small-vessel ischemic disease, not significantly changed when compared to the prior study.         Findings discussed with the physician on the 2nd floor at 5:20 p.m. on 01/28/2025   Signed by: Howard Millard 1/28/2025  5:39 PM Dictation workstation:   [id number]    ECG 12 lead    Result Date: 1/25/2025  Supraventricular tachycardia Left axis deviation Anterior infarct (cited on or before 17-JAN-2025) Abnormal ECG When compared with ECG of 17-JAN-2025 06:56, PA interval has decreased Vent. rate has increased BY  90 BPM Confirmed by Clement Stevens ([id number]) on 1/25/2025 1:40:18 PM    ECG 12 lead    Result Date: 1/25/2025  Normal sinus rhythm Left axis deviation Poor R-wave progression ; consider anterior infarct, lead placement, or normal variant Abnormal ECG When compared with ECG of 23-JAN-2025 20:48, (unconfirmed) Vent. rate has decreased BY  59 BPM Confirmed by Clement Stevens ([id number]) on 1/25/2025 1:40:12 PM    Electrocardiogram, 12-lead PRN ACS symptoms    Result Date: 1/25/2025  Supraventricular tachycardia with occasional Premature ventricular complexes Left axis deviation Pulmonary disease pattern Possible Inferior infarct , age undetermined Abnormal ECG When compared with ECG of 23-JAN-2025 21:55, (unconfirmed) Premature ventricular complexes are now Present Vent. rate has increased BY  47 BPM Nonspecific T wave abnormality no longer evident in Anterior leads Confirmed by Clement Stevens ([id number]) on 1/25/2025 1:40:07 PM    Transthoracic Echo (TTE) Complete    Result Date: 1/24/2025            [facility] [place] [address] [facility], [facility] [address]    Tel [phone] Fax [phone] TRANSTHORACIC ECHOCARDIOGRAM REPORT Patient Name:       LISSETTE Palma Physician:    [id number] Clement Stevens MD Study Date:         1/24/2025            Ordering Provider:    [id number] TIGIST KEYS MRN/PID:            [id number]             Fellow: Accession#:         [id number]         Nurse: Date of Birth/Age:  1950 / 74      Sonographer:          Flakita Quarles RDCS                     years Gender Assigned  at  F                    Additional Staff: Birth: Height:             157.48 cm            Admit Date:           1/23/2025 Weight:             63.96 kg             Admission Status:     Inpatient -                                                                Priority                                                                discharge BSA / BMI:          1.65 m2 / 25.79      Department Location:  [facility] [place]                     kg/m2 Blood Pressure: 168 /70 mmHg Study Type:    TRANSTHORACIC ECHO (TTE) COMPLETE Diagnosis/ICD: Dyspnea, unspecified-R06.00 Indication:    Dyspnea CPT Codes:     Echo Complete w Full Doppler-[id number] Patient History: Diabetes:          Yes Pertinent History: Dyspnea, Palpitations, CAD, A-Fib, HTN, Hyperlipidemia and                    Previous SVT. GERD; previous echocardiogram 12-. Study Detail: The following Echo studies were performed: M-Mode, 2D, Doppler and               color flow.  PHYSICIAN INTERPRETATION: Left Ventricle: Left ventricular ejection fraction is normal, calculated by Rangel's biplane at 71%. There is mild left ventricular hypertrophy. There are no regional wall motion abnormalities. The left ventricular cavity size is normal. Spectral Doppler shows a Grade I (impaired relaxation pattern) of left ventricular diastolic filling with normal left atrial filling pressure. Left Atrium: The left atrium is mild to moderately dilated. Right Ventricle: The right ventricle is upper limits of normal in size. There is normal right ventricular global systolic function. Right Atrium: The right atrium is mildly dilated. Aortic Valve: The aortic valve appears structurally normal. There is mild aortic valve cusp calcification. There is mild to moderate aortic valve regurgitation. The peak instantaneous gradient of the aortic valve is 8 mmHg. Mitral Valve: The mitral valve is normal in structure. The peak instantaneous gradient of the mitral valve is 5 mmHg. There is mild  mitral valve regurgitation. Tricuspid Valve: The tricuspid valve is structurally normal. There is mild tricuspid regurgitation. Pulmonic Valve: The pulmonic valve was not assessed. There is trace to mild pulmonic valve regurgitation. Pericardium: No pericardial effusion noted. Aorta: The aortic root is normal.  CONCLUSIONS:  1. Left ventricular ejection fraction is normal, calculated by Rangel's biplane at 71%.  2. Spectral Doppler shows a Grade I (impaired relaxation pattern) of left ventricular diastolic filling with normal left atrial filling pressure.  3. There is normal right ventricular global systolic function.  4. The left atrium is mild to moderately dilated.  5. Mild to moderate aortic valve regurgitation. QUANTITATIVE DATA SUMMARY:  2D MEASUREMENTS:            Normal Ranges: LAs:             4.50 cm    (2.7-4.0cm) IVSd:            0.86 cm    (0.6-1.1cm) LVPWd:           1.06 cm    (0.6-1.1cm) LVIDd:           5.63 cm    (3.9-5.9cm) LVIDs:           4.00 cm LV Mass Index:   127.2 g/m2 LV % FS          29.0 %  LA VOLUME:                    Normal Ranges: LA Vol A4C:        55.9 ml    (22+/-6mL/m2) LA Vol A2C:        50.6 ml LA Vol BP:         54.1 ml LA Vol Index A4C:  34.0ml/m2 LA Vol Index A2C:  30.7 ml/m2 LA Vol Index BP:   32.8 ml/m2 LA Area A4C:       20.2 cm2 LA Area A2C:       18.9 cm2 LA Major Axis A4C: 6.2 cm LA Major Axis A2C: 6.0 cm LA Volume Index:   30.9 ml/m2 LA Vol A4C:        53.0 ml LA Vol A2C:        48.0 ml LA Vol Index BSA:  30.7 ml/m2  M-MODE MEASUREMENTS:         Normal Ranges: Ao Root:             3.20 cm (2.0-3.7cm)  AORTA MEASUREMENTS:         Normal Ranges: Ao Sinus, d:        3.00 cm (2.1-3.5cm) Ao STJ, d:          2.60 cm (1.7-3.4cm) Asc Ao, d:          3.30 cm (2.1-3.4cm)  LV SYSTOLIC FUNCTION BY 2D PLANIMETRY (MOD):                      Normal Ranges: EF-A4C View:    69 % (>=55%) EF-A2C View:    75 % EF-Biplane:     71 % LV EF Reported: 71 %  LV DIASTOLIC FUNCTION:              Normal Ranges: MV Peak E:             1.10 m/s    (0.7-1.2 m/s) MV Peak A:             1.10 m/s    (0.42-0.7 m/s) E/A Ratio:             1.00        (1.0-2.2) MV e'                  0.080 m/s   (>8.0) MV lateral e'          0.08 m/s MV medial e'           0.08 m/s E/e' Ratio:            13.76       (<8.0) PulmV Sys Elieser:         49.90 cm/s PulmV Flores Elieser:        35.50 cm/s PulmV S/D Elieser:         1.40 PulmV A Revs Elieser:      26.70 cm/s PulmV A Revs Dur:      148.00 msec  MITRAL VALVE:          Normal Ranges: MV Vmax:      1.12 m/s (<=1.3m/s) MV peak P.0 mmHg (<5mmHg) MV mean P.0 mmHg (<48mmHg) MV DT:        190 msec (150-240msec)  AORTIC VALVE:           Normal Ranges: AoV Vmax:      1.42 m/s (<=1.7m/s) AoV Peak P.1 mmHg (<20mmHg) LVOT Max Elieser:  1.05 m/s (<=1.1m/s) LVOT VTI:      27.40 cm LVOT Diameter: 1.80 cm  (1.8-2.4cm) AoV Area,Vmax: 1.88 cm2 (2.5-4.5cm2)  RIGHT VENTRICLE: RV Basal 2.80 cm RV Mid   2.70 cm RV Major 7.0 cm TAPSE:   22.0 mm RV s'    0.14 m/s  PULMONIC VALVE:          Normal Ranges: PV Accel Time:  95 msec  (>120ms) PV Max Elieser:     0.9 m/s  (0.6-0.9m/s) PV Max P.9 mmHg  Pulmonary Veins: PulmV A Revs Dur: 148.00 msec PulmV A Revs Elieser: 26.70 cm/s PulmV Flores Elieser:   35.50 cm/s PulmV S/D Elieser:    1.40 PulmV Sys Elieser:    49.90 cm/s  [id number] Clement Stevens MD Electronically signed on 2025 at 1:58:10 PM  ** Final **     CT abdomen pelvis wo IV contrast    Result Date: 2025  Interpreted By:  Gerard Maxwell, STUDY: CT ABDOMEN PELVIS WO IV CONTRAST;  2025 3:25 am   INDICATION: Signs/Symptoms:kidney stones. History of overactive bladder with continence control stimulator insert in . Symptoms recurred in  with urge incontinence and underwent stimulator revision on 10/14/2024.     COMPARISON: 2025   ACCESSION NUMBER(S): [id number]   ORDERING CLINICIAN: TIGIST KEYS   TECHNIQUE: Contiguous axial images of the abdomen and pelvis were obtained without  intravenous contrast. Coronal and sagittal reformatted images were reconstructed from the axial data.   FINDINGS: Note: The absence of intravenous contrast limits evaluation of the solid organs and vasculature.   LOWER CHEST: No acute abnormality.     ABDOMEN/PELVIS:   ABDOMINAL WALL: There is a bladder control device in the right buttock with a single lead coursing through the right S3 neural foramen.   LIVER: The liver demonstrates a normal noncontrast attenuation.   BILE DUCTS: Prominent intrahepatic and extrahepatic bile ducts are likely secondary to post-cholecystectomy status.   GALLBLADDER: Surgically absent.   PANCREAS: No significant abnormality.   SPLEEN: No significant abnormality.   ADRENALS: No significant abnormality.   KIDNEYS, URETERS, BLADDER: Non-obstructing 0.2 cm right renal calculus. No left renal calculus. No hydronephrosis. Urinary bladder wall is inflamed with perivesical fat stranding similar to prior study. There is thickening and inflammation of the distal right ureter. There is eccentric wall thickening adjacent to the right uterovesical junction which was seen on prior study as well. There are multiple bladder calculi measuring 0.8 cm and 1.3 cm.   REPRODUCTIVE ORGANS: Surgically absent uterus. There is a simple left ovarian cyst measuring 3.4 cm, slightly smaller compared to prior study.   VESSELS: Mild aortic atherosclerosis without AAA.   RETROPERITONEUM/LYMPH NODES: There is progression of now extensive inflammatory fat stranding within multiple retroperitoneal compartments. This is most severe on the right pelvic sidewall, extends into the right femoral canal, involves the presacral space, and extends superiorly to the aortoiliac bifurcation and along the aorta at the level of the renal veins. This stranding is accompanied by diffuse lymphadenopathy which is also progressed in degree and extent compared to prior study. For example, a 1.3 cm right common iliac node previously  measured 0.9 cm. A 1.6 cm right common iliac node previously measured 0.7 cm, 1.3 cm by common iliac ovaries noted 0.8 cm, 2.1 cm by external iliac node previously measured 0.9 cm, 2 cm right external iliac lymph node previously measured 1.6 cm amongst others. There are no enlarged left external iliac lymph nodes. There is only a 0.7 cm left common iliac node that is unchanged. There are also newly enlarged aortocaval and periaortic lymph nodes predominantly measuring between 0.5 cm and 1 cm.   BOWEL/PERITONEUM: There is colonic diverticulosis. No inflammatory bowel wall thickening or dilatation. Normal appendix.   No ascites, free air, or fluid collection.     MUSCULOSKELETAL: No acute osseous abnormality. No suspicious osseous lesion. Severe L4-L5 disc height loss with grade 1 degenerative anterolisthesis of L4 on L5 and moderate bilateral facet arthropathy. There is not severe central canal stenosis at L4-L5 and severe bilateral L4-5 foraminal stenosis. There are decompressive laminectomies at L2-L4.       1. Significant progression of severe inflammatory changes in the right pelvic sidewall, presacral space, right femoral canal and extending superiorly along the aorta and IVC to the level of the renal veins accompanied by diffuse lymphadenopathy which is also significantly progressed as detailed above. This inflammatory perinodal stranding favors an infectious or inflammatory etiology, although this the extent and degree of change is unusual for typical bladder infection. Recommend timely urological follow-up for further investigation. Correlation with urinalysis recommended.   2. Redemonstration of diffusely thickened inflamed bladder wall with diffuse thickening of the distal right ureter and multiple intraluminal bladder calculi may service continued nidus of continued infection.   3. No hydronephrosis. Nonobstructing 0.2 cm right renal calculus.   MACRO: None.   Signed by: Gerard Maxwell 1/24/2025 3:41 AM  Dictation workstation:   [id number]       Myrna Rodrigues is a 74 y.o. female on day 1 of admission presenting with Generalized abdominal pain.  Assessment/Plan      Assessment/Plan:  This is 74 y.o. female with PMHx of recently diagnosed bladder cancer, prior stroke without residual deficits, afib s/p ablation on Eliquis, SVT, HTN, CAD, HLD, GERD, DM, and hypothyroidism presented with worsening RLQ abdominal pain and intermittent palpitations. Given her leukocytosis, hematuria, and UA findings, concern for UTI vs. worsening malignancy-related complications. CT A/P showed progression of bladder mass and worsening lymphadenopathy. EKG showed SVT ( bpm). Managed with IV fluids, analgesia (Dilaudid, ketamine, morphine), and broad-spectrum antibiotics. Admitted for pain control, further infectious workup, and oncology evaluation.     #Intractable abdominal pain   #Newly diagnosed invasive bladder cancer  #?Urinary tract infection  #Leukocytosis   She underwent inpatient cystoscopy with biopsy that showed Invasive carcinoma with extensive keratinizing squamous differentiation (greater than 90%), invading into the detrusor muscle (muscularis propria).  Additionally UA showed +2 hematuria, positive nitrites, positive leukocyte esterase, 6-10 WBCs, and +1 bacteria.  Reviewed previous culture data showed multiple organism and Klebsiella pneumoniae with resistant ampicillin and azithromycin and cefazolin.      Plan  -Pain control:  -Dilaudid 1 mg as needed for moderate and severe pain.  -Morphine MS Contin 15 mg twice daily  -Naloxone as needed  -S/p Zosyn in the ED, will continue with Rocephin daily pending urine culture results.  -Zofran as needed for nausea  -Blood culture and urine culture collected.  -Oncology consulted, appreciate recommendation  -Per oncology staging CT scan small bilateral pleural effusions with adjacent atelectasis with scattered subsegmental atelectasis.  No evidence of thoracic metastatic  disease.  -Tumor marker  ordered to screen for the primary origin.  -Pain and palliative consulted, appreciate recommendation  -PT OT     #Paroxysmal SVT   #A-fib status s/p ablation on Eliquis   #Hx of Afib RVR   #Hx of multifocal stroke  [id number] score 6   -She was intermittently tachycardic in the ED, most likely due to worsening abdominal pain.  -Resume home medication with metoprolol and anticoagulation with Eliquis  -Will medically optimize pain control  -Daily Electrolytes and Mg. Maintain K > 4 and Mg > 2, replace as needed.     #Chronic microcytic anemia   -Initial H/H 5.3/20 around her baseline  -Likely due to anemia of chronic disease  -No active bleeding, she denies hematuria, hematemesis or hemtochezia.  - Monitor vital signs for fever  - Daily CBC   - Outpatient work up for anemia     Chronic problem  #Hypothyroidism  #T2DM - SSI  #HTN  #HLD  #GERD  -Continue home meds as appropriate     Consultants: oncology, palliative  Diet: Regular diet  Code Status: DNR DNI with ICU transfer if needed  DVT Prophylaxis: Eliquis  Disposition: Anticipate 1-2 midnight pending oncology assessment and pain control     Assessment and plan discussed with my attending.   Charles Fonseca MD   Internal Medicine, PGY-2 .

## 2025-02-23 VITALS
TEMPERATURE: 97.2 F | DIASTOLIC BLOOD PRESSURE: 55 MMHG | RESPIRATION RATE: 16 BRPM | OXYGEN SATURATION: 97 % | HEART RATE: 64 BPM | SYSTOLIC BLOOD PRESSURE: 100 MMHG | HEIGHT: 62 IN | WEIGHT: 139.99 LBS | BODY MASS INDEX: 25.76 KG/M2

## 2025-02-23 LAB
ANION GAP SERPL CALC-SCNC: 10 MMOL/L (ref 10–20)
BACTERIA BLD CULT: NORMAL
BACTERIA BLD CULT: NORMAL
BUN SERPL-MCNC: 12 MG/DL (ref 6–23)
CALCIUM SERPL-MCNC: 9.1 MG/DL (ref 8.6–10.3)
CHLORIDE SERPL-SCNC: 96 MMOL/L (ref 98–107)
CO2 SERPL-SCNC: 28 MMOL/L (ref 21–32)
CREAT SERPL-MCNC: 0.72 MG/DL (ref 0.5–1.05)
EGFRCR SERPLBLD CKD-EPI 2021: 87 ML/MIN/1.73M*2
ERYTHROCYTE [DISTWIDTH] IN BLOOD BY AUTOMATED COUNT: 20.3 % (ref 11.5–14.5)
GLUCOSE BLD MANUAL STRIP-MCNC: 113 MG/DL (ref 74–99)
GLUCOSE BLD MANUAL STRIP-MCNC: 114 MG/DL (ref 74–99)
GLUCOSE BLD MANUAL STRIP-MCNC: 125 MG/DL (ref 74–99)
GLUCOSE BLD MANUAL STRIP-MCNC: 141 MG/DL (ref 74–99)
GLUCOSE SERPL-MCNC: 122 MG/DL (ref 74–99)
HCT VFR BLD AUTO: 31 % (ref 36–46)
HGB BLD-MCNC: 9.1 G/DL (ref 12–16)
MAGNESIUM SERPL-MCNC: 1.92 MG/DL (ref 1.6–2.4)
MCH RBC QN AUTO: 21.9 PG (ref 26–34)
MCHC RBC AUTO-ENTMCNC: 29.4 G/DL (ref 32–36)
MCV RBC AUTO: 75 FL (ref 80–100)
NRBC BLD-RTO: 0 /100 WBCS (ref 0–0)
PLATELET # BLD AUTO: 177 X10*3/UL (ref 150–450)
POTASSIUM SERPL-SCNC: 4.3 MMOL/L (ref 3.5–5.3)
RBC # BLD AUTO: 4.16 X10*6/UL (ref 4–5.2)
SODIUM SERPL-SCNC: 130 MMOL/L (ref 136–145)
WBC # BLD AUTO: 12.9 X10*3/UL (ref 4.4–11.3)

## 2025-02-23 PROCEDURE — 36415 COLL VENOUS BLD VENIPUNCTURE: CPT | Performed by: STUDENT IN AN ORGANIZED HEALTH CARE EDUCATION/TRAINING PROGRAM

## 2025-02-23 PROCEDURE — 97165 OT EVAL LOW COMPLEX 30 MIN: CPT | Mod: GO

## 2025-02-23 PROCEDURE — 2500000001 HC RX 250 WO HCPCS SELF ADMINISTERED DRUGS (ALT 637 FOR MEDICARE OP)

## 2025-02-23 PROCEDURE — 82947 ASSAY GLUCOSE BLOOD QUANT: CPT

## 2025-02-23 PROCEDURE — 99233 SBSQ HOSP IP/OBS HIGH 50: CPT

## 2025-02-23 PROCEDURE — 85027 COMPLETE CBC AUTOMATED: CPT | Performed by: STUDENT IN AN ORGANIZED HEALTH CARE EDUCATION/TRAINING PROGRAM

## 2025-02-23 PROCEDURE — 97161 PT EVAL LOW COMPLEX 20 MIN: CPT | Mod: GP | Performed by: PHYSICAL THERAPIST

## 2025-02-23 PROCEDURE — 2500000004 HC RX 250 GENERAL PHARMACY W/ HCPCS (ALT 636 FOR OP/ED): Mod: JZ

## 2025-02-23 PROCEDURE — 80051 ELECTROLYTE PANEL: CPT | Performed by: STUDENT IN AN ORGANIZED HEALTH CARE EDUCATION/TRAINING PROGRAM

## 2025-02-23 PROCEDURE — 83735 ASSAY OF MAGNESIUM: CPT | Performed by: STUDENT IN AN ORGANIZED HEALTH CARE EDUCATION/TRAINING PROGRAM

## 2025-02-23 PROCEDURE — 2500000002 HC RX 250 W HCPCS SELF ADMINISTERED DRUGS (ALT 637 FOR MEDICARE OP, ALT 636 FOR OP/ED)

## 2025-02-23 PROCEDURE — 1200000002 HC GENERAL ROOM WITH TELEMETRY DAILY

## 2025-02-23 RX ORDER — MIRTAZAPINE 15 MG/1
7.5 TABLET, FILM COATED ORAL NIGHTLY
Status: DISCONTINUED | OUTPATIENT
Start: 2025-02-23 | End: 2025-02-27 | Stop reason: HOSPADM

## 2025-02-23 RX ORDER — KETOROLAC TROMETHAMINE 15 MG/ML
15 INJECTION, SOLUTION INTRAMUSCULAR; INTRAVENOUS ONCE
Status: COMPLETED | OUTPATIENT
Start: 2025-02-23 | End: 2025-02-23

## 2025-02-23 RX ORDER — MORPHINE SULFATE 30 MG/1
30 TABLET, FILM COATED, EXTENDED RELEASE ORAL EVERY 12 HOURS SCHEDULED
Status: DISCONTINUED | OUTPATIENT
Start: 2025-02-23 | End: 2025-02-25

## 2025-02-23 RX ADMIN — Medication 400 MG: at 09:23

## 2025-02-23 RX ADMIN — FOLIC ACID 1 MG: 1 TABLET ORAL at 09:23

## 2025-02-23 RX ADMIN — LEVOTHYROXINE SODIUM 100 MCG: 0.1 TABLET ORAL at 05:02

## 2025-02-23 RX ADMIN — LOSARTAN POTASSIUM 50 MG: 50 TABLET, FILM COATED ORAL at 09:23

## 2025-02-23 RX ADMIN — HYDROMORPHONE HYDROCHLORIDE 1 MG: 1 INJECTION, SOLUTION INTRAMUSCULAR; INTRAVENOUS; SUBCUTANEOUS at 00:50

## 2025-02-23 RX ADMIN — TRAZODONE HYDROCHLORIDE 100 MG: 100 TABLET ORAL at 20:04

## 2025-02-23 RX ADMIN — APIXABAN 5 MG: 5 TABLET, FILM COATED ORAL at 09:23

## 2025-02-23 RX ADMIN — HYDROMORPHONE HYDROCHLORIDE 1 MG: 1 INJECTION, SOLUTION INTRAMUSCULAR; INTRAVENOUS; SUBCUTANEOUS at 13:02

## 2025-02-23 RX ADMIN — APIXABAN 5 MG: 5 TABLET, FILM COATED ORAL at 20:05

## 2025-02-23 RX ADMIN — HYDROMORPHONE HYDROCHLORIDE 1 MG: 1 INJECTION, SOLUTION INTRAMUSCULAR; INTRAVENOUS; SUBCUTANEOUS at 09:30

## 2025-02-23 RX ADMIN — MORPHINE SULFATE 30 MG: 30 TABLET, EXTENDED RELEASE ORAL at 20:04

## 2025-02-23 RX ADMIN — HYDROMORPHONE HYDROCHLORIDE 1 MG: 1 INJECTION, SOLUTION INTRAMUSCULAR; INTRAVENOUS; SUBCUTANEOUS at 04:58

## 2025-02-23 RX ADMIN — ATORVASTATIN CALCIUM 10 MG: 10 TABLET, FILM COATED ORAL at 09:23

## 2025-02-23 RX ADMIN — MIRTAZAPINE 7.5 MG: 15 TABLET, FILM COATED ORAL at 20:05

## 2025-02-23 RX ADMIN — Medication 1000 UNITS: at 09:23

## 2025-02-23 RX ADMIN — MORPHINE SULFATE 15 MG: 15 TABLET, EXTENDED RELEASE ORAL at 09:23

## 2025-02-23 RX ADMIN — METOPROLOL TARTRATE 75 MG: 50 TABLET, FILM COATED ORAL at 09:23

## 2025-02-23 RX ADMIN — METOPROLOL TARTRATE 75 MG: 50 TABLET, FILM COATED ORAL at 20:04

## 2025-02-23 RX ADMIN — CEFTRIAXONE SODIUM 1 G: 1 INJECTION, SOLUTION INTRAVENOUS at 00:49

## 2025-02-23 RX ADMIN — KETOROLAC TROMETHAMINE 15 MG: 15 INJECTION, SOLUTION INTRAMUSCULAR; INTRAVENOUS at 17:21

## 2025-02-23 ASSESSMENT — PAIN SCALES - GENERAL
PAINLEVEL_OUTOF10: 7
PAINLEVEL_OUTOF10: 6
PAINLEVEL_OUTOF10: 4
PAINLEVEL_OUTOF10: 9
PAINLEVEL_OUTOF10: 10 - WORST POSSIBLE PAIN
PAINLEVEL_OUTOF10: 7
PAINLEVEL_OUTOF10: 0 - NO PAIN
PAINLEVEL_OUTOF10: 7
PAINLEVEL_OUTOF10: 4
PAINLEVEL_OUTOF10: 7
PAINLEVEL_OUTOF10: 6
PAINLEVEL_OUTOF10: 10 - WORST POSSIBLE PAIN

## 2025-02-23 ASSESSMENT — COGNITIVE AND FUNCTIONAL STATUS - GENERAL
TOILETING: A LITTLE
HELP NEEDED FOR BATHING: A LITTLE
CLIMB 3 TO 5 STEPS WITH RAILING: A LITTLE
MOBILITY SCORE: 20
PERSONAL GROOMING: A LITTLE
STANDING UP FROM CHAIR USING ARMS: A LITTLE
HELP NEEDED FOR BATHING: A LITTLE
DAILY ACTIVITIY SCORE: 21
MOBILITY SCORE: 24
WALKING IN HOSPITAL ROOM: A LITTLE
MOVING TO AND FROM BED TO CHAIR: A LITTLE
TURNING FROM BACK TO SIDE WHILE IN FLAT BAD: A LITTLE
CLIMB 3 TO 5 STEPS WITH RAILING: A LITTLE
MOVING TO AND FROM BED TO CHAIR: A LITTLE
MOBILITY SCORE: 19
WALKING IN HOSPITAL ROOM: A LITTLE
DRESSING REGULAR UPPER BODY CLOTHING: A LITTLE
DAILY ACTIVITIY SCORE: 19
DRESSING REGULAR LOWER BODY CLOTHING: A LITTLE
DAILY ACTIVITIY SCORE: 24
TOILETING: A LITTLE
STANDING UP FROM CHAIR USING ARMS: A LITTLE
DRESSING REGULAR LOWER BODY CLOTHING: A LITTLE

## 2025-02-23 ASSESSMENT — PAIN DESCRIPTION - LOCATION
LOCATION: ABDOMEN
LOCATION: ABDOMEN

## 2025-02-23 ASSESSMENT — PAIN - FUNCTIONAL ASSESSMENT
PAIN_FUNCTIONAL_ASSESSMENT: 0-10

## 2025-02-23 ASSESSMENT — ACTIVITIES OF DAILY LIVING (ADL): ADL_ASSISTANCE: INDEPENDENT

## 2025-02-23 NOTE — PROGRESS NOTES
Occupational Therapy    Occupational Therapy    Evaluation    Patient Name: Myrna Rodrigues  MRN: [id number]  Today's Date: 2/23/2025  Time Calculation  Start Time: 0811  Stop Time: 0828  Time Calculation (min): 17 min  3034/3034-A    Assessment  IP OT Assessment  OT Assessment:  (Pt. presents with decreased balance and endurance impacting pt. ability to complete ADLs and mobility independently)  Prognosis: Good  Barriers to Discharge Home: No anticipated barriers  Evaluation/Treatment Tolerance: Patient limited by fatigue, Patient limited by pain  End of Session Communication: Bedside nurse  End of Session Patient Position: Up in chair, Alarm on    Plan:  Treatment Interventions: ADL retraining, Functional transfer training, Endurance training  OT Frequency: 4 times per week  OT Discharge Recommendations: Low intensity level of continued care  Equipment Recommended upon Discharge: Wheeled walker  OT Recommended Transfer Status:  (CGA)  OT - OK to Discharge: Yes (Next level of care when cleared by medical team)    Subjective Per EMR:   Myrna Rodrigues is a 74-year-old female with a PMHx of recently diagnosed bladder cancer, acute ischemic stroke with no residual weakness, afib s/p ablation on Eliquis, SVT, HTN, CAD, HLD, GERD, DM, hypothyroidism, hysterectomy, appendectomy, and left oophorectomy presented to the ED with worsening right lower quadrant pain and intermittent palpitations.    She stated that her abdominal pain had been progressively worsening over the past 24 hours. The pain was persistent, sharp, non-radiating, and had no identifiable aggravating or relieving factors. She attempted pain relief with oxycodone, but it was ineffective. She also experienced nausea without vomiting and was unable to determine whether she had any urinary symptoms due to the severity of her pain. She denied changes in bowel movements.    The patient had multiple recent admissions for similar complaints. Most recently, she was  hospitalized at [facility] from 2/11 to 2/13, during which she underwent inpatient cystoscopy with biopsy by Dr. Brown. She was discharged with a Vega catheter and instructed to follow up with urology and a [facility] oncology appointment. After the appointment, she was informed that she likely had bladder cancer and was scheduled for further oncology follow-up on the upcoming Wednesday.   Current Problem:  1. Generalized abdominal pain        2. Urinary tract infection without hematuria, site unspecified            General:  General  Reason for Referral: ADLs, discharge planning  Referred By: Dr. Godinez  Past Medical History Relevant to Rehab: Bladder CA  Family/Caregiver Present:  (Son present)  Co-Treatment: PT  Co-Treatment Reason: Safety  Prior to Session Communication: Bedside nurse  Patient Position Received: Bed, 3 rail up, Alarm on    Precautions:  Medical Precautions: Fall precautions        Pain:  Pain Assessment  Pain Assessment: 0-10  0-10 (Numeric) Pain Score: 7  Pain Type: Acute pain  Pain Location: Abdomen (and back)    Objective     Cognition:  Overall Cognitive Status: Within Functional Limits  Attention: Within Functional Limits  Safety/Judgement: Within Functional Limits  Insight: Within function limits             Home Living:  Home Living Comments:  (Lives alone in home with bedroom and bathroom on first floor with tub shower has grab bars and seat. PLOF independent. Owns ww and cane. Son has been staying with her to assist.)     Prior Function:  Level of [place]: Independent with ADLs and functional transfers  Receives Help From: Family  ADL Assistance: Independent  Homemaking Assistance: Independent  Ambulatory Assistance: Independent      ADL:  Grooming Assistance: Stand by  LE Dressing Assistance: Stand by    Activity Tolerance:  Endurance: Tolerates less than 10 min exercise, no significant change in vital signs    Bed Mobility/Transfers:   Bed Mobility  Bed Mobility:  (supine  to sit SUP)  Transfers  Transfer:  (sit<>stand SBA)    Ambulation/Gait Training:  Functional Mobility  Functional Mobility Performed:  (Completes functional mobility with ww and gait belt  CGA)    Sitting Balance:  Static Sitting Balance  Static Sitting-Balance Support: No upper extremity supported  Static Sitting-Level of Assistance: Independent    Standing Balance:  Static Standing Balance  Static Standing-Balance Support: Bilateral upper extremity supported  Static Standing-Level of Assistance: Contact guard      Sensation:  Sensation Comment: Denies numbness        Hand Function:  Hand Function  Gross Grasp: Functional  Coordination: Functional    Extremities: RUE   RUE : Within Functional Limits and LUE   LUE: Within Functional Limits    Outcome Measures: [facility] Daily Activity  Putting on and taking off regular lower body clothing: A little  Bathing (including washing, rinsing, drying): A little  Putting on and taking off regular upper body clothing: None  Toileting, which includes using toilet, bedpan or urinal: A little  Taking care of personal grooming such as brushing teeth: None  Eating Meals: None  Daily Activity - Total Score: 21                       EDUCATION:  Education  Individual(s) Educated: Patient  Education Provided: Fall precautons, Risk and benefits of OT discussed with patient or other, POC discussed and agreed upon  Patient Response to Education: Patient/Caregiver Verbalized Understanding of Information      Goals:   Encounter Problems       Encounter Problems (Active)       Dressings Lower Extremities       STG - Patient to complete lower body dressing MOD I       Start:  02/23/25    Expected End:  03/10/25               Functional Balance       LTG - Patient will maintain standing and sitting balance to allow for completion of daily activities       Start:  02/23/25    Expected End:  03/10/25               Grooming       STG - Patient completes grooming MOD I       Start:  02/23/25     Expected End:  03/10/25               OT Transfers       STG - Patient will perform toilet transfer MOD I       Start:  02/23/25    Expected End:  03/10/25

## 2025-02-23 NOTE — NURSING NOTE
EOS:  Slept well, when awake complains of pain rated a 9, medicated with Dilaudid three times with fair results.  Remains on RA with VSS.  External catheter remains in place for urinary incontinence.  Sat at edge of bed for a short time early in the shift, tolerated well.

## 2025-02-23 NOTE — PROGRESS NOTES
Myrna Rodrigues is a 74 y.o. female on day 2 of admission presenting with Generalized abdominal pain.      Subjective   Patient was seen and examined today in the morning at bedside.  No acute events overnight.  She stated that her pain is still uncontrolled with no appetite.  She felt nauseated but no vomiting.  No changes to her urinary habits or bowel habit.    Objective     Last Recorded Vitals  /66   Pulse 91   Temp 36.4 °C (97.5 °F) (Temporal)   Resp 16   Wt 63.5 kg (139 lb 15.9 oz)   SpO2 96%   Intake/Output last 3 Shifts:    Intake/Output Summary (Last 24 hours) at 2/23/2025 4649  Last data filed at 2/23/2025 0556  Gross per 24 hour   Intake 50 ml   Output 400 ml   Net -350 ml       Admission Weight  Weight: 61.2 kg (135 lb) (02/21/25 0622)    Daily Weight  02/21/25 : 63.5 kg (139 lb 15.9 oz)      Physical Exam:  General: ANO x 4, with mild distress  HEENT: PERRLA, head intact and normocephalic  Neck: Normal to inspection  Lungs: Clear to auscultation, work of breathing within normal limit  Cardiac: Regular rate and rhythm  Abdomen: Right lower quadrant tenderness with ill-defined mass and suprapubic area.   : Exam deferred  Skin: Intact  Hematology: No petechia or excessive ecchymosis  Musculoskeletal: Without significant trauma  Neurological: Alert awake oriented, no focal deficit, cranial nerves grossly intact  Psych: No suicidal ideation or homicidal ideation    Relevant Results  Scheduled medications  apixaban, 5 mg, oral, BID  atorvastatin, 10 mg, oral, Daily  cholecalciferol, 1,000 Units, oral, Daily  folic acid, 1 mg, oral, Daily  HYDROmorphone, 0.2 mg, intravenous, Once  insulin lispro, 0-5 Units, subcutaneous, TID AC  levothyroxine, 100 mcg, oral, Daily  losartan, 50 mg, oral, Daily  magnesium oxide, 400 mg, oral, Daily  metoprolol tartrate, 75 mg, oral, BID  mirtazapine, 7.5 mg, oral, Nightly  morphine CR, 30 mg, oral, q12h JANINE  traZODone, 100 mg, oral, Nightly      Continuous  medications     PRN medications  PRN medications: dextrose, dextrose, diphenoxylate-atropine, glucagon, glucagon, HYDROmorphone, naloxone, ondansetron     Results for orders placed or performed during the hospital encounter of 02/21/25 (from the past 24 hours)   POCT GLUCOSE   Result Value Ref Range    POCT Glucose 143 (H) 74 - 99 mg/dL   CBC   Result Value Ref Range    WBC 12.9 (H) 4.4 - 11.3 x10*3/uL    nRBC 0.0 0.0 - 0.0 /100 WBCs    RBC 4.16 4.00 - 5.20 x10*6/uL    Hemoglobin 9.1 (L) 12.0 - 16.0 g/dL    Hematocrit 31.0 (L) 36.0 - 46.0 %    MCV 75 (L) 80 - 100 fL    MCH 21.9 (L) 26.0 - 34.0 pg    MCHC 29.4 (L) 32.0 - 36.0 g/dL    RDW 20.3 (H) 11.5 - 14.5 %    Platelets 177 150 - 450 x10*3/uL   Magnesium   Result Value Ref Range    Magnesium 1.92 1.60 - 2.40 mg/dL   Basic Metabolic Panel   Result Value Ref Range    Glucose 122 (H) 74 - 99 mg/dL    Sodium 130 (L) 136 - 145 mmol/L    Potassium 4.3 3.5 - 5.3 mmol/L    Chloride 96 (L) 98 - 107 mmol/L    Bicarbonate 28 21 - 32 mmol/L    Anion Gap 10 10 - 20 mmol/L    Urea Nitrogen 12 6 - 23 mg/dL    Creatinine 0.72 0.50 - 1.05 mg/dL    eGFR 87 >60 mL/min/1.73m*2    Calcium 9.1 8.6 - 10.3 mg/dL   POCT GLUCOSE   Result Value Ref Range    POCT Glucose 113 (H) 74 - 99 mg/dL   POCT GLUCOSE   Result Value Ref Range    POCT Glucose 141 (H) 74 - 99 mg/dL   POCT GLUCOSE   Result Value Ref Range    POCT Glucose 114 (H) 74 - 99 mg/dL     *Note: Due to a large number of results and/or encounters for the requested time period, some results have not been displayed. A complete set of results can be found in Results Review.             CT chest w IV contrast    Result Date: 2/22/2025  Interpreted By:  Main Philippe, STUDY: CT CHEST W IV CONTRAST;  2/22/2025 10:06 am   INDICATION: Signs/Symptoms:Squamous cell carcinoma of the bladder, for staging purposes..   COMPARISON: 01/28/2024   ACCESSION NUMBER(S): [id number]   ORDERING CLINICIAN: SHAKILA GLASS   TECHNIQUE: Helical data  acquisition of the chest was obtained with IV contrast material. 50 mL of Omnipaque 350. Images were reformatted in axial, coronal, and sagittal planes.   FINDINGS: Lungs and Pleura: Scattered streaky atelectasis. Small bilateral pleural effusions with adjacent compressive atelectasis. No pleural effusion. No pneumothorax.   Mediastinum and axilla: No adenopathy by CT size criteria. No cardiomegaly or pericardial effusion. No thoracic aortic aneurysm. Small hiatal hernia. Atherosclerosis. Coronary artery calcifications.   Visualized Upper Abdomen: Upper abdominal lymphadenopathy, biliary ductal dilatation, and other findings detail to better extent on CT abdomen and pelvis dated 02/21/2025.   MSK/Chest Wall: No aggressive bony lesion identified. Scattered degenerative change in the spine.       Small bilateral pleural effusions with adjacent atelectasis. Scattered streaky subsegmental atelectasis.   No evidence of thoracic metastatic disease.   Signed by: Main Philippe 2/22/2025 11:56 AM Dictation workstation:   [id number]    Electrocardiogram, 12-lead PRN ACS symptoms    Result Date: 2/22/2025  Supraventricular tachycardia Low voltage QRS Possible Inferior infarct , age undetermined Abnormal ECG When compared with ECG of 12-FEB-2025 09:30, (unconfirmed) Vent. rate has increased BY  73 BPM Borderline criteria for Inferior infarct are now Present    CT abdomen pelvis w IV contrast    Result Date: 2/21/2025  Interpreted By:  Evonne Yi, STUDY: CT ABDOMEN PELVIS W IV CONTRAST;  2/21/2025 9:45 am   INDICATION: Signs/Symptoms:worsening lower abdominal pain in the setting of recently diagnosed bladder cancer.     COMPARISON: CT angio abdomen pelvis 02/10/2025   ACCESSION NUMBER(S): [id number]   ORDERING CLINICIAN: KRISHNA SALAZAR   TECHNIQUE: CT of the abdomen and pelvis was performed.  Standard contiguous axial images were obtained at 3 mm slice thickness through the abdomen and pelvis. Coronal and sagittal  reconstructions at 3 mm slice thickness were performed.  75 ML of Omnipaque 350 was administered intravenously without immediate complication.   FINDINGS: LOWER CHEST: The heart is normal in size without evidence of pericardial effusion. No consolidation or pleural effusion. No concerning lung nodule. Small hiatal hernia.   ABDOMEN:   LIVER: The liver is normal in size. No suspicious liver lesion.   BILE DUCTS: No mild intrahepatic and extrahepatic biliary duct dilatation, likely secondary to cholecystectomy..   GALLBLADDER: Cholecystectomy   PANCREAS: Fatty infiltration of the pancreas, most predominant in the head and uncinate process. The pancreas appears otherwise unremarkable without evidence of ductal dilatation or masses.   SPLEEN: The spleen is normal in size.   ADRENAL GLANDS: No adrenal nodularity or thickening.   KIDNEYS AND URETERS: There is persistent urothelial thickening of the right distal ureter/ureterovesicular junction. The kidneys are normal in size. No left hydroureteronephrosis or nephroureterolithiasis is identified.   PELVIS:   BLADDER: Locules of air are seen in the nondependent portion of the bladder, compatible with recent bladder procedure. Numerous bladder wall diverticuli are noted. There is decreased but persistent bladder wall thickening. The previously noted bladder stones are no longer evident.   REPRODUCTIVE ORGANS: Hysterectomy   BOWEL: Small hiatal hernia. The small and large bowel are normal in caliber and demonstrate no wall thickening. The appendix is not definitely visualized. There is however no pericecal stranding or fluid. Colonic diverticulosis without acute diverticulitis.   VESSELS: Atherosclerotic calcifications of the aortoiliac arteries. No aneurysmal dilatation of the abdominal aorta. The IVC appears normal.   PERITONEUM/RETROPERITONEUM/LYMPH NODES: Interval worsening of numerous enlarged abdominopelvic lymph nodes, for example as seen on series 2 measured in the  short axis: *16 mm retrocaval lymph node, image 50, previously 14 mm *17 mm para-aortic lymph node, image 59, previously 9 mm *17 mm aortocaval lymph node, image 72, previously 14 mm *Stable 16 mm right common iliac lymph node, image 97 *30 mm right external iliac/right pelvic sidewall lymph node, image 132, previously 22 mm *13 mm right pelvic sidewall lymph node, image 144, previously 12 mm *16 mm right external iliac lymph node, image 127, previously 13 mm *Stable 16 mm right internal iliac lymph node, image 102   Equivocal peritoneal nodular thickening is again seen, for example in the pelvis adjacent to the bladder on series 2, images 132 and 131 Presacral and mesenteric edema without ascites or walled fluid collection. No pneumoperitoneum, as questioned by the AI software.   ABDOMINAL WALL: The abdominal wall soft tissues appear normal.   BONES: No acute fracture or malalignment. No suspicious osseous lesions. Discogenic degenerative changes in the lower thoracic and lumbar spine. Unchanged grade 1 anterolisthesis of the L3 on L4 and L4 on L5 vertebral bodies.       1. Abdominopelvic lymph nodes have worsened as compared to prior exam from 02/10/2025, concerning for rapid disease progression given the short interval time frame of imaging. 2. Decreased but persistent bladder wall thickening, consistent with history of partial resection of patient's known malignancy.There is persistent urothelial thickening of the right distal ureter/ureterovesicular junction, for which disease involvement is not excluded. 3. Equivocal peritoneal nodular thickening is seen, for which short-term follow-up CT abdomen pelvis is recommended in 8-12 weeks to evaluate for early peritoneal carcinomatosis. 4. Additional chronic and incidental findings as detailed above.     MACRO: None   Signed by: Evonne Yi 2/21/2025 10:14 AM Dictation workstation:   [id number]    ECG 12 lead    Result Date: 2/19/2025  Normal sinus rhythm Poor  R-wave progression ; consider anterior infarct, lead placement, or normal variant Abnormal ECG When compared with ECG of 10-FEB-2025 17:21, (unconfirmed) Vent. rate has decreased BY  82 BPM Questionable change in QRS axis Nonspecific T wave abnormality now evident in Inferior leads T wave inversion now evident in Anterior leads Confirmed by Bertram Hernandez ([id number]) on 2/19/2025 1:37:40 PM    ECG 12 lead    Result Date: 2/19/2025  Supraventricular tachycardia Abnormal ECG When compared with ECG of 28-JAN-2025 16:33, Confirmed by Bertram Hernandez ([id number]) on 2/19/2025 1:37:31 PM    ECG 12 lead    Result Date: 2/12/2025  Normal sinus rhythm Normal ECG When compared with ECG of 12-FEB-2025 09:12, (unconfirmed) Premature ventricular complexes are no longer Present Vent. rate has decreased BY  70 BPM Borderline criteria for Inferior infarct are no longer Present ST no longer depressed in Anterior leads    ECG 12 lead    Result Date: 2/12/2025   Poor data quality, interpretation may be adversely affected Supraventricular tachycardia with Fusion complexes Left axis deviation Pulmonary disease pattern Nonspecific ST and T wave abnormality Abnormal ECG When compared with ECG of 10-FEB-2025 19:04, (unconfirmed) Significant changes have occurred    ECG 12 lead    Result Date: 2/12/2025  Supraventricular tachycardia with occasional Premature ventricular complexes Left axis deviation Pulmonary disease pattern Possible Inferior infarct , age undetermined Abnormal ECG When compared with ECG of 12-FEB-2025 09:06, (unconfirmed) Fusion complexes are no longer Present Premature ventricular complexes are now Present    CT angio abdomen pelvis w and or wo IV IV contrast    Result Date: 2/10/2025  Interpreted By:  Felipe Driscoll, STUDY: CT ANGIO ABDOMEN PELVIS W AND/OR WO IV IV CONTRAST;  2/10/2025 9:41 pm   INDICATION: Signs/Symptoms:A-fib with RVR, severe abdominal pain not responding to morphine, assess for mesenteric ischemia.   COMPARISON:  01/29/2025   ACCESSION NUMBER(S): [id number]   ORDERING CLINICIAN: GEORGE LOPEZ   TECHNIQUE: Axial non-contrast images of the abdomen, and pelvis.   Axial CT images of the, abdomen and pelvis were obtained after the intravenous administration of iodinated contrast using angiographic technique with coronal and sagittal reformatted images. MIP images and 3D reconstructions were created on an independent workstation and reviewed.   FINDINGS: VASCULATURE:   Mild atherosclerotic calcification of the aorta without aneurysm or dissection.   Celiac artery, SMA, renal arteries are all widely patent. KATHRYN patent. Iliac arteries patent without stenosis.   CT ABDOMEN/PELVIS:   Arterial phase imaging of the liver, adrenals pancreas and spleen are grossly unremarkable. Status post cholecystectomy.   No new hydronephrosis.   Redemonstrated is retroperitoneal adenopathy surrounding the distal aorta/cava and extending into the right iliac chain which appears to be progressing compared to previous. Necrotic appearing lymph node in the right pelvic sidewall currently measures 2.2 cm in short axis, previously 1.9 cm.   Stable appearance of the bladder which has trabeculated appearance with right-sided posterior diverticulum. There is some irregular right posterior bladder wall thickening. The possibility of underlying bladder malignancy again should be considered. There is some dependent bladder calculi seen.   No significant new free fluid.   No bowel obstruction or acute colitis.   Unchanged left ovarian cyst measuring 3.5 cm in diameter.   There is multilevel moderate to advanced degenerative disc disease seen in the lumbar spine worse at L4-L5 and L5-S1.       1. No abdominal aortic aneurysm or dissection. No evidence of mesenteric ischemia.   2. Compared to previous study there is progressing retroperitoneal adenopathy with some interval enlargement suspected.   3. Irregular masslike thickening right posterior bladder wall near  suspected diverticulum. Underlying bladder malignancy may be present. Correlate with patient history/cystoscopy.   MACRO: None.   Signed by: Felipe Driscoll 2/10/2025 11:12 PM Dictation workstation:   [id number]    XR chest 1 view    Result Date: 2/10/2025  Interpreted By:  Wendy Lee, STUDY: XR CHEST 1 VIEW;  2/10/2025 6:19 pm   INDICATION: Signs/Symptoms:Short of breath, vomiting.   COMPARISON: 01/15/2025   ACCESSION NUMBER(S): [id number]   ORDERING CLINICIAN: GEORGE LOPEZ   FINDINGS:     CARDIOMEDIASTINAL SILHOUETTE: Cardiomediastinal silhouette is normal in size and configuration.   LUNGS: No pulmonary consolidation, pleural effusion or pneumothorax.   ABDOMEN: No remarkable upper abdominal findings.   BONES: No acute osseous abnormality.       No acute cardiopulmonary process.   MACRO: None   Signed by: Wendy Lee 2/10/2025 6:31 PM Dictation workstation:   [id number]    Transesophageal Echo (GAVIN)    Result Date: 2/5/2025            [facility] [address] [address]    Tel [phone] Fax [phone] TRANSESOPHAGEAL ECHOCARDIOGRAM REPORT Patient Name:       LISSETTE ZAVALA           Reading Physician:    [id number] Clement Magana MD Study Date:         1/30/2025            Ordering Provider:    [id number] CLEMENT MAGANA MRN/PID:            [id number]             Fellow: Accession#:         [id number]         Nurse:                Zulma Ribeiro RN Date of Birth/Age:  1950 / 74      Sonographer:          Mely sutton Gender Assigned at  F                    Additional Staff:     Emmanuelle Jorge RN Birth: Height:             157.48 cm            Admit Date: Weight:             59.42 kg             Admission Status:     Inpatient -                                                                Routine BSA / BMI:          1.60 m2 / 23.96       Department Location:  [facility]                     kg/m2 Blood Pressure: 174 /72 mmHg Study Type:    TRANSESOPHAGEAL ECHO (GAVIN) Diagnosis/ICD: Shortness of breath-R06.02; Unspecified atrial                fibrillation-I48.91; Abnormal findings on diagnostic imaging of                other specified body structures-R93.89 Indication:    SOb, Afib CPT Codes:     GAVIN Complete-[id number]  Study Detail: Agitated saline used as a contrast agent for intraseptal flow               evaluation.  PHYSICIAN INTERPRETATION: GAVIN Details: Agitated saline contrast and color flow Doppler echo was performed to assess for the presence of a patent foramen ovale. GAVIN Medication: Midazolam and Fentanyl was used to sedate the patient for this exam. GAVIN Procedure: The probe was passed without difficulty. Left Ventricle: There are no regional wall motion abnormalities. The left ventricular cavity size is normal. Spectral Doppler shows a Grade I (impaired relaxation pattern) of left ventricular diastolic filling with normal left atrial filling pressure. Left Atrium: The left atrial size is mild to moderately dilated. A bubble study using agitated saline was performed. Bubble study is negative. The left atrial appendage is enlarged. There is no definite left atrial mass present. Right Ventricle: The right ventricle is upper limits of normal in size. There is normal right ventricular global systolic function. Right Atrium: The right atrial size is normal. Aortic Valve: The aortic valve appears structurally normal. There is trace aortic valve regurgitation. Mitral Valve: The mitral valve is normal in structure. There is moderate to severe mitral valve regurgitation. The mitral regurgitant orifice area is 4 mm2. The mitral regurgitant volume is 8.73 ml. Tricuspid Valve: The tricuspid valve is structurally normal. There is trace to mild tricuspid regurgitation. Pulmonic Valve: The pulmonic valve was not assessed. There is trace pulmonic valve  regurgitation. Pericardium: No pericardial effusion noted. Aorta: The aortic root is normal.  CONCLUSIONS:  1. Spectral Doppler shows a Grade I (impaired relaxation pattern) of left ventricular diastolic filling with normal left atrial filling pressure.  2. There is normal right ventricular global systolic function.  3. The left atrial size is mild to moderately dilated.  4. Moderate to severe mitral valve regurgitation.  5. No left atrial mass.  6. No obvious cardiac source of embolization by this study. QUANTITATIVE DATA SUMMARY:  LV SYSTOLIC FUNCTION:                      Normal Ranges: EF-Visual:      60 % LV EF Reported: 60 %  MITRAL INSUFFICIENCY:             Normal Ranges: PISA Radius:          0.3 cm MR VTI:               208.00 cm MR Vmax:              519.00 cm/s MR Alias Elieser:         38.5 cm/s MR Volume:            8.73 ml MR Flow Rt:           21.77 ml/s MR EROA:              4 mm2  [id number] Clement Stevens MD Electronically signed on 2/5/2025 at 5:22:41 AM  ** Final **     Electrocardiogram, 12-lead PRN ACS symptoms    Result Date: 2/1/2025  Supraventricular tachycardia with occasional Premature ventricular complexes and Fusion complexes Right axis deviation Nonspecific ST abnormality Abnormal ECG When compared with ECG of 27-JAN-2025 20:14, (unconfirmed) Fusion complexes are now Present Premature ventricular complexes are now Present QRS axis Shifted right Borderline criteria for Inferior infarct are no longer Present Nonspecific T wave abnormality now evident in Inferior leads Confirmed by LISBETH Agrawal ([id number]) on 2/1/2025 6:38:48 PM    Electrocardiogram, 12-lead PRN ACS symptoms    Result Date: 2/1/2025  Supraventricular tachycardia Low voltage QRS Possible Inferior infarct , age undetermined Abnormal ECG When compared with ECG of 27-JAN-2025 17:29, (unconfirmed) Vent. rate has increased BY  75 BPM QRS axis Shifted right ST now depressed in Anterior leads Confirmed by LISBETH Agrawal ([id number]) on 2/1/2025 6:38:39  PM    Electrocardiogram, 12-lead PRN ACS symptoms    Result Date: 2/1/2025  Normal sinus rhythm Left axis deviation Pulmonary disease pattern Abnormal ECG When compared with ECG of 27-JAN-2025 09:00, (unconfirmed) QRS axis Shifted left Confirmed by LISBETH Agrawal ([id number]) on 2/1/2025 6:38:23 PM    ECG 12 Lead    Result Date: 1/31/2025  Normal sinus rhythm Normal ECG When compared with ECG of 24-JAN-2025 16:10, Premature ventricular complexes are no longer Present Vent. rate has decreased BY  58 BPM QRS axis Shifted right Borderline criteria for Inferior infarct are no longer Present Nonspecific T wave abnormality now evident in Inferior leads T wave amplitude has decreased in Anterior leads Confirmed by Clement Stevens ([id number]) on 1/31/2025 6:21:28 PM    ECG 12 Lead    Result Date: 1/31/2025  Normal sinus rhythm Left axis deviation Pulmonary disease pattern T wave abnormality, consider anterior ischemia Abnormal ECG When compared with ECG of 24-JAN-2025 16:10, Premature ventricular complexes are no longer Present Vent. rate has decreased BY  67 BPM Nonspecific T wave abnormality now evident in Inferior leads T wave inversion now evident in Anterior leads Confirmed by Clement Stevens ([id number]) on 1/31/2025 6:21:11 PM    MR brain wo IV contrast    Result Date: 1/29/2025  Interpreted By:  Nicolas Sarmiento, STUDY: MR BRAIN WO IV CONTRAST;  1/29/2025 2:34 pm   INDICATION: Signs/Symptoms:aphasia, history of PAF with interruption in anticoagulation.     COMPARISON: CT head and CTA head yesterday.   ACCESSION NUMBER(S): [id number]   ORDERING CLINICIAN: TIGIST KEYS   TECHNIQUE: Patient could only tolerate limited examination. Axial DWI, ADC, sagittal and axial T1 weighted sequences were obtained.   FINDINGS: Multiple small patchy foci of abnormally restricted diffusion are seen in the bilateral cerebral hemispheres predominantly each centrum semiovale and oriented in AP direction with some additional involvement of the anterior right  middle frontal gyrus cortex, the posterior right corpus callosum, paramedian right occipital lobe. Questionable additional involvement of left-greater-than-right cerebellar hemispheres.   No definite intracranial hemorrhage.     No extra-axial fluid collection. No intracranial mass.   Mild to moderate white matter T2 signal increase, most commonly seen with chronic small vessel ischemic change.  Presence of older small infarctions in these areas not excluded.   Degree of ventriculomegaly is commensurate with overall degree of brain parenchymal volume loss, which is mild.       1. Multiple small patchy foci of abnormally restricted diffusion are seen in the bilateral cerebral hemispheres predominantly each centrum semiovale and oriented in AP direction with some additional involvement of the anterior right middle frontal gyrus cortex, the posterior right corpus callosum, paramedian right occipital lobe. Questionable additional involvement of left-greater-than-right cerebellar hemispheres. Findings consistent with acute to subacute infarctions. The AP orientation of the high bilateral cerebral hemispheres could indicate a recent hypotensive event. 2. Mild to moderate white matter T2 signal increase, most commonly seen with chronic small vessel ischemic change.  Presence of older small infarctions in these areas not excluded. 3. Degree of ventriculomegaly is commensurate with overall degree of brain parenchymal volume loss, which is mild.   Note the patient was only able to tolerate limited examination as described above.   MACRO: None   Signed by: Nicolas Sarmiento 1/29/2025 3:12 PM Dictation workstation:   [id number]    CT abdomen pelvis w IV contrast    Result Date: 1/29/2025  Interpreted By:  Evonne Yi, STUDY: CT ABDOMEN PELVIS W IV CONTRAST;  1/29/2025 9:32 am   INDICATION: Signs/Symptoms:abdominal pain with acute encephalopathy.     COMPARISON: CT abdomen pelvis 01/24/2025 CT chest abdomen pelvis 01/28/2024    ACCESSION NUMBER(S): [id number]   ORDERING CLINICIAN: TIGIST KEYS   TECHNIQUE: CT of the abdomen and pelvis was performed.  Standard contiguous axial images were obtained at 3 mm slice thickness through the abdomen and pelvis. Coronal and sagittal reconstructions at 3 mm slice thickness were performed.  75 ML of Omnipaque 350 was administered intravenously without immediate complication.   FINDINGS: Please note that the evaluation of vessels, lymph nodes and organs is limited without intravenous contrast.   LOWER CHEST: The heart is normal in size without evidence of pericardial effusion. No consolidation or pleural effusion is present. No concerning lung nodule. Small hiatal hernia again noted.   ABDOMEN:   LIVER: The liver is normal in size without evidence of focal liver lesions.   BILE DUCTS: Stable mild intrahepatic and extrahepatic biliary duct dilatation, likely secondary to cholecystectomy.   GALLBLADDER: Cholecystectomy   PANCREAS: The pancreas appears unremarkable without evidence of ductal dilatation or masses.   SPLEEN: The spleen is normal in size without focal lesions.   ADRENAL GLANDS: No adrenal nodularity or thickening.   KIDNEYS AND URETERS: The kidneys are normal in size. Stable nonobstructive punctuate calculus in the right kidney. Stable sub 5 mm hypoattenuating lesions in the bilateral kidneys, which are too small to further characterize. No hydroureteronephrosis or left nephroureterolithiasis is identified.   PELVIS:   BLADDER: The bladder is completely opacified by contrast from prior imaging, which limits evaluation.   REPRODUCTIVE ORGANS: No pelvic masses. A stable 48 mm left adnexal cyst is seen on series 2, image 116. A 21 x 19 mm right adnexal lesion could be a right adnexal cyst or a perivesicular lymph node.   BOWEL: Small hiatal hernia. The small and large bowel are normal in caliber and demonstrate no wall thickening. The appendix is not definitely visualized. There is however  no pericecal stranding or fluid.   VESSELS: There is no aneurysmal dilatation of the abdominal aorta. Mild atherosclerotic calcifications of the aortoiliac arteries. The IVC appears normal.   PERITONEUM/RETROPERITONEUM/LYMPH NODES: Retroperitoneal lymphadenopathy with adjacent stranding, stable when compared to prior CT from 01/24/2025, and new when compared to prior CT from 01/28/2024, for example as seen on series 2: *16 x 10 mm aortocaval lymph node, image 75 *12 x 11 mm retrocaval lymph node, image 91 *16 x 15 mm right common iliac lymph node, image 97 *20 x 16 mm right internal iliac lymph node, image 104 *29 x 20 mm right external iliac lymph node, image 126 *24 x 14 mm right external iliac lymph node, image 135 *21 x 19 mm perivascular lesion, which could be a lymph node or right adnexal cyst, image 131 *20 x 18 mm right pelvic sidewall lesion, image 117   No ascites or fluid collection. No peritoneal nodularity or implant.   ABDOMINAL WALL: The abdominal wall soft tissues appear normal. Sacral spinal stimulator noted.   BONES: No suspicious osseous lesions are identified. Degenerative discogenic disease is noted in the lower thoracic and lumbar spine. Grade 1 anterolisthesis of the L4 and L5 vertebral bodies.       1. Retroperitoneal lymphadenopathy, stable when compared to prior CT from 01/24/2025, and new when compared to prior CT from 01/28/2024. Though the adjacent stranding favors an infectious/inflammatory etiology, underlying malignancy such as lymphoma or bladder carcinoma (given the bladder wall thickening seen on prior CT) can not be excluded. If clinical signs/symptoms favor infectious etiology, short-term follow-up CT abdomen pelvis in 4-6 weeks is recommended. Alternatively, correlation with serum LDH levels, cystoscopy, tissue diagnosis and/or PET-CT are other diagnostic considerations that could be obtained as per clinical need. 2. No significant interval change as compared to prior CT from  01/24/2025.   MACRO: Critical Finding:  There are multiple critical findings. See findings. notification was initiated on 1/29/2025 at 10:55 am by Evonne Yi.  (**-YCF-**)   Signed by: Eovnne Yi 1/29/2025 10:56 AM Dictation workstation:   [id number]    CT angio head and neck w and wo IV contrast    Result Date: 1/28/2025  Interpreted By:  Nicolas Sarmiento, STUDY: CT ANGIO HEAD AND NECK W AND WO IV CONTRAST;  1/28/2025 6:46 pm   INDICATION: Signs/Symptoms:stroke rule out.     COMPARISON: CT head today.   ACCESSION NUMBER(S): [id number]   ORDERING CLINICIAN: TIGIST KEYS   TECHNIQUE: Unenhanced CT images of the head were obtained.  Subsequently,  60 ML of Omnipaque 350 was administered intravenously and axial images of the head and neck were acquired.  Coronal, sagittal, and 3-D reconstructions were provided for review.   FINDINGS:   CTA COW: No major vascular occlusion. Mild atherosclerotic changes through the carotid siphons. No aneurysms.  No vascular malformations.   CTA CAROTID: No aortic aneurysm or dissection. No significant stenoses at the origins of the great vessels from the aortic arch. No significant stenoses of the brachycephalic artery or bilateral subclavian arteries.   Mild atherosclerotic changes at the carotid bifurcations. No significant stenoses bilateral carotid arterial systems. No significant stenoses bilateral vertebral arterial systems.   NONVASCULAR HEAD: No intracranial mass. No intracranial hemorrhage. No evidence of large evolving cortical infarction. Supratentorial white matter hypodensities most likely related to mild chronic small vessel ischemic change.     Bones intact. Mucous retention cyst left maxillary sinus.   NONVASCULAR NECK: No soft tissue mass. No adenopathy. Salivary glands are unremarkable. Thyroid gland unremarkable. Bones intact.       1. No intracranial major vascular occlusion. 2. No flow-limiting stenosis or significant plaque irregularity in the neck.      MACRO: None   Signed by: Nicolas Sarmiento 1/28/2025 7:30 PM Dictation workstation:   [id number]    CT brain attack head wo IV contrast    Result Date: 1/28/2025  Interpreted By:  Howard Millard, STUDY: TIGIST KEYS; 1/28/2025 5:08 pm   INDICATION: Signs/Symptoms:brain attack;   COMPARISON: 01/28/2024   ACCESSION NUMBER(S): [id number]   ORDERING CLINICIAN: TIGIST KEYS   TECHNIQUE: Contiguous axial images were acquired from the vertex through the posterior fossa without IV contrast. All CT examinations are performed with 1 or more of the following dose reduction techniques: Automated exposure control, adjustment of mA and/or kv according to patient's size, or use of iterative reconstruction techniques.   FINDINGS: No focal mass effect or midline shift is identified. The ventricles and sulci are symmetric and appropriate for the patient's age.   The gray white matter differentiation is preserved. Again seen is a moderate degree of nonspecific white matter hypodensity, most consistent with chronic small-vessel ischemic disease, not significantly changed from the prior study.   No acute intracranial hemorrhage is seen. No intra-axial or extra-axial fluid collection is seen.   The visualized paranasal sinuses and mastoid air cells are clear. There is a small-moderate sized mucous retention cysts in the visualized left maxillary sinus.       No CT evidence for acute intracranial pathology.   Moderate degree of nonspecific white matter hypodensity, most consistent with chronic small-vessel ischemic disease, not significantly changed when compared to the prior study.         Findings discussed with the physician on the 2nd floor at 5:20 p.m. on 01/28/2025   Signed by: Howard Millard 1/28/2025 5:39 PM Dictation workstation:   [id number]    ECG 12 lead    Result Date: 1/25/2025  Supraventricular tachycardia Left axis deviation Anterior infarct (cited on or before 17-JAN-2025) Abnormal ECG When compared with ECG  of 17-JAN-2025 06:56, GA interval has decreased Vent. rate has increased BY  90 BPM Confirmed by Clement Stevens ([id number]) on 1/25/2025 1:40:18 PM    ECG 12 lead    Result Date: 1/25/2025  Normal sinus rhythm Left axis deviation Poor R-wave progression ; consider anterior infarct, lead placement, or normal variant Abnormal ECG When compared with ECG of 23-JAN-2025 20:48, (unconfirmed) Vent. rate has decreased BY  59 BPM Confirmed by Clement Stevens ([id number]) on 1/25/2025 1:40:12 PM    Electrocardiogram, 12-lead PRN ACS symptoms    Result Date: 1/25/2025  Supraventricular tachycardia with occasional Premature ventricular complexes Left axis deviation Pulmonary disease pattern Possible Inferior infarct , age undetermined Abnormal ECG When compared with ECG of 23-JAN-2025 21:55, (unconfirmed) Premature ventricular complexes are now Present Vent. rate has increased BY  47 BPM Nonspecific T wave abnormality no longer evident in Anterior leads Confirmed by Clement Stevens ([id number]) on 1/25/2025 1:40:07 PM    Transthoracic Echo (TTE) Complete    Result Date: 1/24/2025            [facility] [address] [address]    Tel [phone] Fax [phone] TRANSTHORACIC ECHOCARDIOGRAM REPORT Patient Name:       LISSETTE Palma Physician:    [id number] Clement Stevens MD Study Date:         1/24/2025            Ordering Provider:    [id number] TIGIST KEYS MRN/PID:            [id number]             Fellow: Accession#:         [id number]         Nurse: Date of Birth/Age:  1950 / 74      Sonographer:          Flakita Quarles RDCS                     years Gender Assigned at  F                    Additional Staff: Birth: Height:             157.48 cm            Admit Date:           1/23/2025 Weight:             63.96 kg             Admission Status:     Inpatient -                                                                 Priority                                                                discharge BSA / BMI:          1.65 m2 / 25.79      Department Location:  [facility] [place]                     kg/m2 Blood Pressure: 168 /70 mmHg Study Type:    TRANSTHORACIC ECHO (TTE) COMPLETE Diagnosis/ICD: Dyspnea, unspecified-R06.00 Indication:    Dyspnea CPT Codes:     Echo Complete w Full Doppler-[id number] Patient History: Diabetes:          Yes Pertinent History: Dyspnea, Palpitations, CAD, A-Fib, HTN, Hyperlipidemia and                    Previous SVT. GERD; previous echocardiogram 12-. Study Detail: The following Echo studies were performed: M-Mode, 2D, Doppler and               color flow.  PHYSICIAN INTERPRETATION: Left Ventricle: Left ventricular ejection fraction is normal, calculated by Rangel's biplane at 71%. There is mild left ventricular hypertrophy. There are no regional wall motion abnormalities. The left ventricular cavity size is normal. Spectral Doppler shows a Grade I (impaired relaxation pattern) of left ventricular diastolic filling with normal left atrial filling pressure. Left Atrium: The left atrium is mild to moderately dilated. Right Ventricle: The right ventricle is upper limits of normal in size. There is normal right ventricular global systolic function. Right Atrium: The right atrium is mildly dilated. Aortic Valve: The aortic valve appears structurally normal. There is mild aortic valve cusp calcification. There is mild to moderate aortic valve regurgitation. The peak instantaneous gradient of the aortic valve is 8 mmHg. Mitral Valve: The mitral valve is normal in structure. The peak instantaneous gradient of the mitral valve is 5 mmHg. There is mild mitral valve regurgitation. Tricuspid Valve: The tricuspid valve is structurally normal. There is mild tricuspid regurgitation. Pulmonic Valve: The pulmonic valve was not assessed. There is trace to mild pulmonic valve  regurgitation. Pericardium: No pericardial effusion noted. Aorta: The aortic root is normal.  CONCLUSIONS:  1. Left ventricular ejection fraction is normal, calculated by Rangel's biplane at 71%.  2. Spectral Doppler shows a Grade I (impaired relaxation pattern) of left ventricular diastolic filling with normal left atrial filling pressure.  3. There is normal right ventricular global systolic function.  4. The left atrium is mild to moderately dilated.  5. Mild to moderate aortic valve regurgitation. QUANTITATIVE DATA SUMMARY:  2D MEASUREMENTS:            Normal Ranges: LAs:             4.50 cm    (2.7-4.0cm) IVSd:            0.86 cm    (0.6-1.1cm) LVPWd:           1.06 cm    (0.6-1.1cm) LVIDd:           5.63 cm    (3.9-5.9cm) LVIDs:           4.00 cm LV Mass Index:   127.2 g/m2 LV % FS          29.0 %  LA VOLUME:                    Normal Ranges: LA Vol A4C:        55.9 ml    (22+/-6mL/m2) LA Vol A2C:        50.6 ml LA Vol BP:         54.1 ml LA Vol Index A4C:  34.0ml/m2 LA Vol Index A2C:  30.7 ml/m2 LA Vol Index BP:   32.8 ml/m2 LA Area A4C:       20.2 cm2 LA Area A2C:       18.9 cm2 LA Major Axis A4C: 6.2 cm LA Major Axis A2C: 6.0 cm LA Volume Index:   30.9 ml/m2 LA Vol A4C:        53.0 ml LA Vol A2C:        48.0 ml LA Vol Index BSA:  30.7 ml/m2  M-MODE MEASUREMENTS:         Normal Ranges: Ao Root:             3.20 cm (2.0-3.7cm)  AORTA MEASUREMENTS:         Normal Ranges: Ao Sinus, d:        3.00 cm (2.1-3.5cm) Ao STJ, d:          2.60 cm (1.7-3.4cm) Asc Ao, d:          3.30 cm (2.1-3.4cm)  LV SYSTOLIC FUNCTION BY 2D PLANIMETRY (MOD):                      Normal Ranges: EF-A4C View:    69 % (>=55%) EF-A2C View:    75 % EF-Biplane:     71 % LV EF Reported: 71 %  LV DIASTOLIC FUNCTION:             Normal Ranges: MV Peak E:             1.10 m/s    (0.7-1.2 m/s) MV Peak A:             1.10 m/s    (0.42-0.7 m/s) E/A Ratio:             1.00        (1.0-2.2) MV e'                  0.080 m/s   (>8.0) MV lateral e'           0.08 m/s MV medial e'           0.08 m/s E/e' Ratio:            13.76       (<8.0) PulmV Sys Elieser:         49.90 cm/s PulmV Flores Elieser:        35.50 cm/s PulmV S/D Elieser:         1.40 PulmV A Revs Elieser:      26.70 cm/s PulmV A Revs Dur:      148.00 msec  MITRAL VALVE:          Normal Ranges: MV Vmax:      1.12 m/s (<=1.3m/s) MV peak P.0 mmHg (<5mmHg) MV mean P.0 mmHg (<48mmHg) MV DT:        190 msec (150-240msec)  AORTIC VALVE:           Normal Ranges: AoV Vmax:      1.42 m/s (<=1.7m/s) AoV Peak P.1 mmHg (<20mmHg) LVOT Max Elieser:  1.05 m/s (<=1.1m/s) LVOT VTI:      27.40 cm LVOT Diameter: 1.80 cm  (1.8-2.4cm) AoV Area,Vmax: 1.88 cm2 (2.5-4.5cm2)  RIGHT VENTRICLE: RV Basal 2.80 cm RV Mid   2.70 cm RV Major 7.0 cm TAPSE:   22.0 mm RV s'    0.14 m/s  PULMONIC VALVE:          Normal Ranges: PV Accel Time:  95 msec  (>120ms) PV Max Elieser:     0.9 m/s  (0.6-0.9m/s) PV Max P.9 mmHg  Pulmonary Veins: PulmV A Revs Dur: 148.00 msec PulmV A Revs Elieser: 26.70 cm/s PulmV Flores Elieser:   35.50 cm/s PulmV S/D Elieser:    1.40 PulmV Sys Elieser:    49.90 cm/s  [id number] Clement Stevens MD Electronically signed on 2025 at 1:58:10 PM  ** Final **     CT abdomen pelvis wo IV contrast    Result Date: 2025  Interpreted By:  Gerard Maxwell, STUDY: CT ABDOMEN PELVIS WO IV CONTRAST;  2025 3:25 am   INDICATION: Signs/Symptoms:kidney stones. History of overactive bladder with continence control stimulator insert in . Symptoms recurred in  with urge incontinence and underwent stimulator revision on 10/14/2024.     COMPARISON: 2025   ACCESSION NUMBER(S): [id number]   ORDERING CLINICIAN: TIGIST KEYS   TECHNIQUE: Contiguous axial images of the abdomen and pelvis were obtained without intravenous contrast. Coronal and sagittal reformatted images were reconstructed from the axial data.   FINDINGS: Note: The absence of intravenous contrast limits evaluation of the solid organs and vasculature.   LOWER CHEST: No  acute abnormality.     ABDOMEN/PELVIS:   ABDOMINAL WALL: There is a bladder control device in the right buttock with a single lead coursing through the right S3 neural foramen.   LIVER: The liver demonstrates a normal noncontrast attenuation.   BILE DUCTS: Prominent intrahepatic and extrahepatic bile ducts are likely secondary to post-cholecystectomy status.   GALLBLADDER: Surgically absent.   PANCREAS: No significant abnormality.   SPLEEN: No significant abnormality.   ADRENALS: No significant abnormality.   KIDNEYS, URETERS, BLADDER: Non-obstructing 0.2 cm right renal calculus. No left renal calculus. No hydronephrosis. Urinary bladder wall is inflamed with perivesical fat stranding similar to prior study. There is thickening and inflammation of the distal right ureter. There is eccentric wall thickening adjacent to the right uterovesical junction which was seen on prior study as well. There are multiple bladder calculi measuring 0.8 cm and 1.3 cm.   REPRODUCTIVE ORGANS: Surgically absent uterus. There is a simple left ovarian cyst measuring 3.4 cm, slightly smaller compared to prior study.   VESSELS: Mild aortic atherosclerosis without AAA.   RETROPERITONEUM/LYMPH NODES: There is progression of now extensive inflammatory fat stranding within multiple retroperitoneal compartments. This is most severe on the right pelvic sidewall, extends into the right femoral canal, involves the presacral space, and extends superiorly to the aortoiliac bifurcation and along the aorta at the level of the renal veins. This stranding is accompanied by diffuse lymphadenopathy which is also progressed in degree and extent compared to prior study. For example, a 1.3 cm right common iliac node previously measured 0.9 cm. A 1.6 cm right common iliac node previously measured 0.7 cm, 1.3 cm by common iliac ovaries noted 0.8 cm, 2.1 cm by external iliac node previously measured 0.9 cm, 2 cm right external iliac lymph node previously  measured 1.6 cm amongst others. There are no enlarged left external iliac lymph nodes. There is only a 0.7 cm left common iliac node that is unchanged. There are also newly enlarged aortocaval and periaortic lymph nodes predominantly measuring between 0.5 cm and 1 cm.   BOWEL/PERITONEUM: There is colonic diverticulosis. No inflammatory bowel wall thickening or dilatation. Normal appendix.   No ascites, free air, or fluid collection.     MUSCULOSKELETAL: No acute osseous abnormality. No suspicious osseous lesion. Severe L4-L5 disc height loss with grade 1 degenerative anterolisthesis of L4 on L5 and moderate bilateral facet arthropathy. There is not severe central canal stenosis at L4-L5 and severe bilateral L4-5 foraminal stenosis. There are decompressive laminectomies at L2-L4.       1. Significant progression of severe inflammatory changes in the right pelvic sidewall, presacral space, right femoral canal and extending superiorly along the aorta and IVC to the level of the renal veins accompanied by diffuse lymphadenopathy which is also significantly progressed as detailed above. This inflammatory perinodal stranding favors an infectious or inflammatory etiology, although this the extent and degree of change is unusual for typical bladder infection. Recommend timely urological follow-up for further investigation. Correlation with urinalysis recommended.   2. Redemonstration of diffusely thickened inflamed bladder wall with diffuse thickening of the distal right ureter and multiple intraluminal bladder calculi may service continued nidus of continued infection.   3. No hydronephrosis. Nonobstructing 0.2 cm right renal calculus.   MACRO: None.   Signed by: Gerard Maxwell 1/24/2025 3:41 AM Dictation workstation:   [id number]       Myrna Rodrigues is a 74 y.o. female on day 2 of admission presenting with Generalized abdominal pain.  Assessment/Plan      Assessment/Plan:  This is 74 y.o. female with PMHx of recently  diagnosed bladder cancer, prior stroke without residual deficits, afib s/p ablation on Eliquis, SVT, HTN, CAD, HLD, GERD, DM, and hypothyroidism presented with worsening RLQ abdominal pain and intermittent palpitations. Given her leukocytosis, hematuria, and UA findings, concern for UTI vs. worsening malignancy-related complications. CT A/P showed progression of bladder mass and worsening lymphadenopathy. EKG showed SVT ( bpm). Managed with IV fluids, analgesia (Dilaudid, ketamine, morphine), and broad-spectrum antibiotics. Admitted for pain control, further infectious workup, and oncology evaluation.     #Intractable abdominal pain   #Newly diagnosed invasive bladder cancer  #?Urinary tract infection  #Leukocytosis   She underwent inpatient cystoscopy with biopsy that showed Invasive carcinoma with extensive keratinizing squamous differentiation (greater than 90%), invading into the detrusor muscle (muscularis propria).  Additionally UA showed +2 hematuria, positive nitrites, positive leukocyte esterase, 6-10 WBCs, and +1 bacteria.  Reviewed previous culture data showed multiple organism and Klebsiella pneumoniae with resistant ampicillin and azithromycin and cefazolin.      Plan  -Pain control:  -Dilaudid 1 mg as needed for moderate and severe pain.  -2/23 increase morphine MS Contin 30 mg twice daily.  -Naloxone as needed  -Will reach pain and palliative regarding alternative of p.o. pain medication such as p.o. Dilaudid/fentanyl patch.  Only Dilaudid seems to control the patient's symptoms.  -S/p Zosyn in the ED, urine culture showing contamination.  Will discontinue Rocephin  -Zofran as needed for nausea  -Start mirtazapine 7.5 mg daily to assist with appetite increase.  -Blood culture negative at day 2.  Urine culture is indicate contamination.  -Oncology consulted, appreciate recommendation  -Per oncology staging CT scan small bilateral pleural effusions with adjacent atelectasis with scattered  subsegmental atelectasis.  No evidence of thoracic metastatic disease.  -Normal   -Pain and palliative consulted, appreciate recommendation  -Dietitian consult for nutritional assessment.  -PT OT     #Paroxysmal SVT   #A-fib status s/p ablation on Eliquis   #Hx of Afib RVR   #Hx of multifocal stroke  [id number] score 6   -She was intermittently tachycardic in the ED, most likely due to worsening abdominal pain.  -Resume home medication with metoprolol and anticoagulation with Eliquis  -Will medically optimize pain control  -Daily Electrolytes and Mg. Maintain K > 4 and Mg > 2, replace as needed.     #Chronic microcytic anemia   -Initial H/H 5.3/20 around her baseline  -Likely due to anemia of chronic disease  -No active bleeding, she denies hematuria, hematemesis or hemtochezia.  -Monitor vital signs for fever  -Daily CBC   -Outpatient work up for anemia     Chronic problem  #Hypothyroidism  #T2DM - SSI  #HTN  #HLD  #GERD  -Continue home meds as appropriate     Consultants: oncology, palliative  Diet: Regular diet  Code Status: DNR DNI with ICU transfer if needed  DVT Prophylaxis: Eliquis  Disposition: Anticipate 1-2 midnight pending oncology assessment and pain control     Assessment and plan discussed with my attending.   Charles Fonseca MD   Internal Medicine, PGY-2 .

## 2025-02-23 NOTE — PROGRESS NOTES
Physical Therapy    Physical Therapy    Physical Therapy Evaluation    Patient Name: Myrna Rodrigues  MRN: [id number]  Today's Date: 2/23/2025   Time Calculation  Start Time: 0812  Stop Time: 0828  Time Calculation (min): 16 min  3034/3034-A    Assessment/Plan   PT Assessment  PT Assessment Results: Decreased strength, Decreased endurance, Impaired balance, Decreased mobility  Rehab Prognosis: Good  Evaluation/Treatment Tolerance: Patient limited by fatigue, Patient limited by pain  End of Session Communication: Bedside nurse  Assessment Comment: Pt presents with mild balance, strength, and endurance impairments, complicated by pain. Is currently functioning at SBA/CGA level. Son is with pt all day (leaves at night for work). Would rec: cont'd assist from son as well as home PT to ensure maximal safety and IND. Would rec: use of RW upon DC home.  End of Session Patient Position: Up in chair, Alarm on  IP OR SWING BED PT PLAN  Inpatient or Swing Bed: Inpatient  PT Plan  Treatment/Interventions: Bed mobility, Transfer training, Gait training, Balance training, Strengthening, Endurance training, Therapeutic exercise, Therapeutic activity  PT Plan: Ongoing PT  PT Frequency: 4 times per week  PT Discharge Recommendations: Low intensity level of continued care  Equipment Recommended upon Discharge: Wheeled walker  PT Recommended Transfer Status: Stand by assist  PT - OK to Discharge: Yes. , to recommended next level of care as indicated in PT eval, once cleared by medical team.      Subjective     Current Problem:  1. Generalized abdominal pain        2. Urinary tract infection without hematuria, site unspecified          Patient Active Problem List   Diagnosis    CAD S/P percutaneous coronary angioplasty    Chronic obstructive pulmonary disease (Multi)    Major depression    Moderate persistent asthma    Abnormal findings on diagnostic imaging of lung    Angina, class III (CMS-HCC)    Asthma    Cellulitis    Chronic diarrhea     Chronic pain of toe of left foot    Chronic pain of toe of right foot    Controlled type 2 diabetes mellitus without complication, without long-term current use of insulin (Multi)    Diabetes mellitus (Multi)    Edema    External hemorrhoids    Fracture of ankle    Gastroesophageal reflux disease    Ground glass opacity present on imaging of lung    H/O unstable angina    Hallux valgus, acquired    Hemangioma of skin and subcutaneous tissue    Hematuria    History of diabetes mellitus    History of malignant neoplasm    Hypertension, essential, benign    Hypertensive retinopathy    Lateral malleolar fracture    Mixed hyperlipidemia    Nail fungus    Tinea pedis of both feet    Obesity    Other melanin hyperpigmentation    Other seborrheic keratosis    Overweight with body mass index (BMI) 25.0-29.9    Overweight    Postsurgical hypothyroidism    Psoriasis vulgaris    PVD (posterior vitreous detachment)    Rectal prolapse    Renal insufficiency    Senile nuclear sclerosis    Sensorineural hearing loss (SNHL) of both ears    Sensorineural hearing loss, bilateral    Left lumbar radiculopathy    Spinal stenosis of lumbar region without neurogenic claudication    Thyroid nodule    Mixed stress and urge urinary incontinence    Urge incontinence of urine    Dysuria    Urinary incontinence    Vaginal atrophy    Vaginitis    Vitamin D deficiency    Vitreous degeneration    UTI (urinary tract infection)    Depression    Major depressive disorder    Ventricular tachycardia (Multi)    Cardiac dysrhythmia    Mood disorder (CMS-[facility])    Unspecified complication of internal orthopedic prosthetic device, implant and graft, initial encounter (CMS-HCC)    Unilateral primary osteoarthritis, unspecified knee    Presence of left artificial knee joint    Personal history of other diseases of the nervous system and sense organs    Mechanical loosening of unspecified internal prosthetic joint, initial encounter (CMS-[facility])    Mechanical  loosening of internal left knee prosthetic joint    Long term (current) use of aspirin    Other specified anemias    Esophageal disorder    Elevated white blood cell count, unspecified    CKD (chronic kidney disease)    Asymptomatic menopause    BMI 27.0-27.9,adult    Mild vitamin D deficiency    Never smoked any substance    Personal history of calcium pyrophosphate deposition disease (CPPD)    Medicare annual wellness visit, subsequent    Shortness of breath    Overactive bladder    Difficulty walking    Syncope and collapse    Penicillin allergy    Allergy to sulfa drugs    Nitrofurantoin adverse reaction    Cyst of left ovary    Encounter for medication review and counseling    Encounter to discuss treatment options    Urge incontinence    Iron deficiency anemia due to chronic blood loss    Type 2 diabetes mellitus with other specified complication, without long-term current use of insulin    Palpitations    Bladder wall thickening    Pelvic lymphadenopathy    Irritable bowel syndrome with both constipation and diarrhea    Intractable pain    Abdominal pain    Bladder mass    Generalized abdominal pain       General Visit Information:  General  Reason for Referral: impaired mobility, gait training  Referred By: Dr. Fonseca  Past Medical History Relevant to Rehab: PMH includes recent dx of bladder CA s/p cystoscopy and tumor resection (1.5 wks ago), anxiety, depression, arrhythmia, asthma, CAD, DM, HTN, CA of thyroid and bladder, CVA without residual deficits, a-fib s/p ablation, s/p lami.  Family/Caregiver Present: Yes (son)  Co-Treatment: OT  Co-Treatment Reason: safety  Prior to Session Communication: Bedside nurse  Patient Position Received: Bed, 3 rail up, Alarm on  General Comment: Pt admitted with worsening abdominal pain. CT shows progressive growth of abdominopelvic lymph nodes, indicating progressive pathology. CT head WFL. + small B pleural effusions with atelectasis.    Home Living:  Home Living  Home  Living Comments: Lives alone in one story home with 1 small threshold step to enter. Has tub/shower combo with seat and grab bars. Owns RW and cane but does not use.    Prior Level of Function:  Prior Function Per Pt/Caregiver Report  Prior Function Comments: IND AMB without device, IND ADLs. Denies recent falls. Owns RW and cane but does not use. Son stay with pt every day but leaves to work night shift.    Precautions:  Precautions  Medical Precautions: Fall precautions    Vital Signs:     Objective     Pain:  Pain Assessment  Pain Assessment: 0-10  0-10 (Numeric) Pain Score: 7  Pain Type: Acute pain  Pain Location: Abdomen  Multiple Pain Sites: Two  Pain 2  Pain Score 2: 7  Pain Type 2: Acute pain  Pain Location 2: Back    Cognition:  Cognition  Overall Cognitive Status: Within Functional Limits  Safety/Judgement: Within Functional Limits  Insight: Within function limits    General Assessments:      Activity Tolerance  Endurance: Tolerates less than 10 min exercise, no significant change in vital signs (Mildly light-headed upon standing up)  Sensation  Light Touch: No apparent deficits        Coordination  Movements are Fluid and Coordinated: Yes     Static Sitting Balance  Static Sitting-Level of Assistance: Independent  Static Standing Balance  Static Standing-Level of Assistance: Close supervision  Static Standing-Comment/Number of Minutes: Fair+    Functional Assessments:     Bed Mobility  Bed Mobility: Yes  Bed Mobility 1  Bed Mobility 1: Supine to sitting  Level of Assistance 1: Distant supervision (HOB elevated to approx 45 degrees)  Transfers  Transfer: Yes  Transfer 1  Transfer From 1: Sit to  Transfer to 1: Stand  Transfer Device 1: Gait belt  Transfer Level of Assistance 1:  (SBA)  Ambulation/Gait Training  Ambulation/Gait Training Performed: Yes  Ambulation/Gait Training 1  Surface 1: Level tile  Device 1: Rolling walker  Gait Support Devices: Gait belt  Assistance 1: Contact guard  Quality of Gait 1:  Wide base of support, Decreased step length  Comments/Distance (ft) 1: 8          Extremity/Trunk Assessments:  RUE   RUE : Exceptions to WFL  RUE AROM (degrees)  RUE AROM Comment: 90  LUE   LUE: Exceptions to WFL  LUE AROM (degrees)  LUE AROM Comment: 90  RLE   RLE : Exceptions to WFL  Strength RLE  RLE Overall Strength:  (4-/5)  LLE   LLE : Exceptions to WFL  Strength LLE  LLE Overall Strength:  (4-/5)    Outcome Measures:     [facility] Basic Mobility  Turning from your back to your side while in a flat bed without using bedrails: None  Moving from lying on your back to sitting on the side of a flat bed without using bedrails: A little  Moving to and from bed to chair (including a wheelchair): A little  Standing up from a chair using your arms (e.g. wheelchair or bedside chair): A little  To walk in hospital room: A little  Climbing 3-5 steps with railing: A little  Basic Mobility - Total Score: 19                                                             Goals:  Encounter Problems       Encounter Problems (Active)       PT Problem       Bed mobility       Start:  02/23/25    Expected End:  03/09/25       Pt transfer sup<>sit with IND             Transfers       Start:  02/23/25    Expected End:  03/09/25       Pt will transfer sit<>stand with IND.           Gait       Start:  02/23/25    Expected End:  03/09/25       Pt will AMB 75 Feet with RW And mod I.           Balance       Start:  02/23/25    Expected End:  03/09/25       Pt will improve static/dynamic standing balance to Good in order to improve safety with functional tasks.           Stair negotation       Start:  02/23/25    Expected End:  03/09/25       Pt will ascend/descend 1 step without handrail and RW, SBA.              Pain - Adult            Education Documentation  Precautions, taught by Linda Hayes, PT at 2/23/2025 10:43 AM.  Learner: Family, Patient  Readiness: Acceptance  Method: Explanation, Demonstration  Response: Verbalizes  Understanding, Demonstrated Understanding    Mobility Training, taught by Linda Hayes, PT at 2/23/2025 10:43 AM.  Learner: Family, Patient  Readiness: Acceptance  Method: Explanation, Demonstration  Response: Verbalizes Understanding, Demonstrated Understanding    Education Comments  No comments found.

## 2025-02-23 NOTE — CARE PLAN
The patient's goals for the shift include pain relief    The clinical goals for the shift include See plan of care

## 2025-02-23 NOTE — CARE PLAN
"The patient's goals for the shift include      The clinical goals for the shift include see POC     The patient was initially noted to be in NSR, although earlier they had been experiencing intermittent SVT with heart rates in the 130s--peaking at 150 around 1100. By 11:51 AM, the rhythm had returned to NSR, with intermittent SVT still noted by 11:55 AM. The patient remains stable, with vital signs within normal limits, and is sleeping without distress. Despite receiving all medications as ordered, including continuous IV Dilaudid--which provides some relief--the patient continues to report pain at 10/10. The night RN also noted that the patient stated that Morphine does not seem effective. In response, it was previously decided to increase the Morphine dose to 20 and consult with pain and palliative care regarding additional management options.    The patient is complaining of tingling and a sensation of \"floating.\" Bedside neurological assessment was negative, and she has no chest pain or radiation down her arm. Telemetry shows normal sinus rhythm. The patient's vital signs are stable: 36.2°C, HR 73, RR 16, and /59. The last dose of Dilaudid was given earlier. At 16:03, Dr. Charles Fonseca inquired if the patient appeared confused, but the nurse clarified that the patient didn't seem confused, only that she felt \"high.\" Dr. Fonseca suggested keeping an eye on her and holding the next dose of Dilaudid. The patient had been requesting the medication with a reported pain level of 15/10 previously. However, she was not asking for pain relief at that moment, although she had requested it for the previous doses. Dr. Fonseca suggested consulting palliative care for outpatient alternatives. By 16:09, the patient reported her pain as 7/10, and Dr. Fonseca decided to hold the medication until the patient's mental state improved. By 16:18, the patient was alert and oriented x 4, and she reported no longer feeling \"high.\" " The nurse expressed reluctance to administer Dilaudid, and Dr. Fonseca recommended trying Toradol instead.

## 2025-02-24 LAB
ALBUMIN SERPL BCP-MCNC: 2.5 G/DL (ref 3.4–5)
ANION GAP SERPL CALC-SCNC: 9 MMOL/L (ref 10–20)
BUN SERPL-MCNC: 15 MG/DL (ref 6–23)
CALCIUM SERPL-MCNC: 9.6 MG/DL (ref 8.6–10.3)
CHLORIDE SERPL-SCNC: 93 MMOL/L (ref 98–107)
CO2 SERPL-SCNC: 26 MMOL/L (ref 21–32)
CREAT SERPL-MCNC: 0.81 MG/DL (ref 0.5–1.05)
EGFRCR SERPLBLD CKD-EPI 2021: 75 ML/MIN/1.73M*2
ERYTHROCYTE [DISTWIDTH] IN BLOOD BY AUTOMATED COUNT: 20.2 % (ref 11.5–14.5)
GLUCOSE BLD MANUAL STRIP-MCNC: 104 MG/DL (ref 74–99)
GLUCOSE BLD MANUAL STRIP-MCNC: 109 MG/DL (ref 74–99)
GLUCOSE BLD MANUAL STRIP-MCNC: 113 MG/DL (ref 74–99)
GLUCOSE BLD MANUAL STRIP-MCNC: 74 MG/DL (ref 74–99)
GLUCOSE SERPL-MCNC: 104 MG/DL (ref 74–99)
HCT VFR BLD AUTO: 32.8 % (ref 36–46)
HGB BLD-MCNC: 9.4 G/DL (ref 12–16)
MAGNESIUM SERPL-MCNC: 2.21 MG/DL (ref 1.6–2.4)
MCH RBC QN AUTO: 21.4 PG (ref 26–34)
MCHC RBC AUTO-ENTMCNC: 28.7 G/DL (ref 32–36)
MCV RBC AUTO: 75 FL (ref 80–100)
NRBC BLD-RTO: 0 /100 WBCS (ref 0–0)
PHOSPHATE SERPL-MCNC: 2.7 MG/DL (ref 2.5–4.9)
PLATELET # BLD AUTO: 200 X10*3/UL (ref 150–450)
POTASSIUM SERPL-SCNC: 4.2 MMOL/L (ref 3.5–5.3)
RBC # BLD AUTO: 4.39 X10*6/UL (ref 4–5.2)
SODIUM SERPL-SCNC: 124 MMOL/L (ref 136–145)
WBC # BLD AUTO: 14.9 X10*3/UL (ref 4.4–11.3)

## 2025-02-24 PROCEDURE — 99233 SBSQ HOSP IP/OBS HIGH 50: CPT | Performed by: STUDENT IN AN ORGANIZED HEALTH CARE EDUCATION/TRAINING PROGRAM

## 2025-02-24 PROCEDURE — 36415 COLL VENOUS BLD VENIPUNCTURE: CPT | Performed by: STUDENT IN AN ORGANIZED HEALTH CARE EDUCATION/TRAINING PROGRAM

## 2025-02-24 PROCEDURE — 83735 ASSAY OF MAGNESIUM: CPT | Performed by: STUDENT IN AN ORGANIZED HEALTH CARE EDUCATION/TRAINING PROGRAM

## 2025-02-24 PROCEDURE — 85027 COMPLETE CBC AUTOMATED: CPT | Performed by: STUDENT IN AN ORGANIZED HEALTH CARE EDUCATION/TRAINING PROGRAM

## 2025-02-24 PROCEDURE — 2500000004 HC RX 250 GENERAL PHARMACY W/ HCPCS (ALT 636 FOR OP/ED)

## 2025-02-24 PROCEDURE — 80069 RENAL FUNCTION PANEL: CPT

## 2025-02-24 PROCEDURE — 2500000002 HC RX 250 W HCPCS SELF ADMINISTERED DRUGS (ALT 637 FOR MEDICARE OP, ALT 636 FOR OP/ED)

## 2025-02-24 PROCEDURE — 2500000001 HC RX 250 WO HCPCS SELF ADMINISTERED DRUGS (ALT 637 FOR MEDICARE OP)

## 2025-02-24 PROCEDURE — 1100000001 HC PRIVATE ROOM DAILY

## 2025-02-24 PROCEDURE — 82947 ASSAY GLUCOSE BLOOD QUANT: CPT

## 2025-02-24 RX ORDER — DOCUSATE SODIUM 100 MG/1
100 CAPSULE, LIQUID FILLED ORAL 2 TIMES DAILY
Status: DISCONTINUED | OUTPATIENT
Start: 2025-02-24 | End: 2025-02-27 | Stop reason: HOSPADM

## 2025-02-24 RX ORDER — OXYBUTYNIN CHLORIDE 5 MG/1
5 TABLET ORAL 2 TIMES DAILY
Status: DISCONTINUED | OUTPATIENT
Start: 2025-02-24 | End: 2025-02-27 | Stop reason: HOSPADM

## 2025-02-24 RX ORDER — OXYCODONE HYDROCHLORIDE 5 MG/1
5 TABLET ORAL EVERY 4 HOURS PRN
Status: DISCONTINUED | OUTPATIENT
Start: 2025-02-24 | End: 2025-02-27 | Stop reason: HOSPADM

## 2025-02-24 RX ORDER — OXYCODONE HYDROCHLORIDE 5 MG/1
7.5 TABLET ORAL EVERY 4 HOURS PRN
Status: DISCONTINUED | OUTPATIENT
Start: 2025-02-24 | End: 2025-02-27 | Stop reason: HOSPADM

## 2025-02-24 RX ORDER — OXYCODONE HYDROCHLORIDE 10 MG/1
10 TABLET ORAL EVERY 4 HOURS PRN
Status: DISCONTINUED | OUTPATIENT
Start: 2025-02-24 | End: 2025-02-24

## 2025-02-24 RX ADMIN — MORPHINE SULFATE 30 MG: 30 TABLET, EXTENDED RELEASE ORAL at 09:02

## 2025-02-24 RX ADMIN — Medication 400 MG: at 09:02

## 2025-02-24 RX ADMIN — METOPROLOL TARTRATE 75 MG: 50 TABLET, FILM COATED ORAL at 09:02

## 2025-02-24 RX ADMIN — OXYCODONE HYDROCHLORIDE 5 MG: 5 TABLET ORAL at 18:10

## 2025-02-24 RX ADMIN — LEVOTHYROXINE SODIUM 100 MCG: 0.1 TABLET ORAL at 05:38

## 2025-02-24 RX ADMIN — LOSARTAN POTASSIUM 50 MG: 50 TABLET, FILM COATED ORAL at 09:02

## 2025-02-24 RX ADMIN — APIXABAN 5 MG: 5 TABLET, FILM COATED ORAL at 21:35

## 2025-02-24 RX ADMIN — Medication 1000 UNITS: at 09:02

## 2025-02-24 RX ADMIN — APIXABAN 5 MG: 5 TABLET, FILM COATED ORAL at 09:02

## 2025-02-24 RX ADMIN — SODIUM CHLORIDE 500 ML: 9 INJECTION, SOLUTION INTRAVENOUS at 11:14

## 2025-02-24 RX ADMIN — FOLIC ACID 1 MG: 1 TABLET ORAL at 09:02

## 2025-02-24 RX ADMIN — HYDROMORPHONE HYDROCHLORIDE 1 MG: 1 INJECTION, SOLUTION INTRAMUSCULAR; INTRAVENOUS; SUBCUTANEOUS at 09:01

## 2025-02-24 RX ADMIN — ATORVASTATIN CALCIUM 10 MG: 10 TABLET, FILM COATED ORAL at 09:02

## 2025-02-24 ASSESSMENT — COGNITIVE AND FUNCTIONAL STATUS - GENERAL
MOBILITY SCORE: 20
STANDING UP FROM CHAIR USING ARMS: A LITTLE
MOVING TO AND FROM BED TO CHAIR: A LITTLE
CLIMB 3 TO 5 STEPS WITH RAILING: A LITTLE
WALKING IN HOSPITAL ROOM: A LITTLE

## 2025-02-24 ASSESSMENT — PAIN - FUNCTIONAL ASSESSMENT
PAIN_FUNCTIONAL_ASSESSMENT: 0-10

## 2025-02-24 ASSESSMENT — PAIN SCALES - GENERAL
PAINLEVEL_OUTOF10: 10 - WORST POSSIBLE PAIN
PAINLEVEL_OUTOF10: 0 - NO PAIN
PAINLEVEL_OUTOF10: 5 - MODERATE PAIN

## 2025-02-24 ASSESSMENT — PAIN DESCRIPTION - LOCATION: LOCATION: BACK

## 2025-02-24 NOTE — NURSING NOTE
Patient c/o pain this morning and was medicated with dilaudid 1mg.  Patient has been very lethargic all shift - she will wake up when spoken to but then falls asleep .  She has had very poor oral intake today and has had little urine output despite a 500ml fluid bolus.  She was bladder scanned for 340 and then was incontinent when being bathed.  Her son has been at the bedside this afternoon and met with palliative care.  A hospice consult was ordered.  Pain medication regimen was re-evaluated and changed today.  She was c/o pain to her son this evening but was unable to state where her pain was and how bad it was - she remains sleepy but is more arousable.  I spoke with the patient's son and he is agreeable that she doesn't appear to be in severe pain and is ok with trying a small dose of oral oxy and she was medicated with 5mg around 1810.

## 2025-02-24 NOTE — NURSING NOTE
EOS:  Slept well, pain controlled, did not require any breakthrough medication.  Remains oriented and pleasant.  External catheter remains in place for urinary incontinence.  VSS, tolerating p.o. well.

## 2025-02-24 NOTE — CARE PLAN
The patient's goals for the shift include to be pain free    The clinical goals for the shift include see POC

## 2025-02-24 NOTE — PROGRESS NOTES
Physical Therapy                 Therapy Communication Note    Patient Name: Myrna Rodrigues  MRN: [id number]  Department: [facility] 3 N  Room: [facility]3034-A  Today's Date: 2/24/2025     Discipline: Physical Therapy          Missed Visit Reason: Missed Visit Reason: Patient refused    Missed Time: Attempt    Comment: Pt politely refused at this time. Nurse stated that pt is groggy from medication. Will continue to see per POC.

## 2025-02-24 NOTE — CARE PLAN
Problem: Nutrition  Goal: Nutrient intake appropriate for maintaining nutritional needs  Outcome: Not Progressing     Problem: Pain - Adult  Goal: Verbalizes/displays adequate comfort level or baseline comfort level  Outcome: Progressing     Problem: Discharge Planning  Goal: Discharge to home or other facility with appropriate resources  Outcome: Progressing     Problem: Chronic Conditions and Co-morbidities  Goal: Patient's chronic conditions and co-morbidity symptoms are monitored and maintained or improved  Outcome: Progressing     Problem: Pain  Goal: Turns in bed with improved pain control throughout the shift  Outcome: Progressing  Goal: Walks with improved pain control throughout the shift  Outcome: Progressing  Goal: Free from opioid side effects throughout the shift  Outcome: Progressing     Problem: Fall/Injury  Goal: Pace activities to prevent fatigue by end of the shift  Outcome: Progressing     Problem: Diabetes  Goal: Maintain glucose levels >70mg/dl to <250mg/dl throughout shift  Outcome: Met     Problem: Fall/Injury  Goal: Verbalize understanding of personal risk factors for fall in the hospital  Outcome: Met   The patient's goals for the shift include      The clinical goals for the shift include see care plan    Over the shift, the patient did not make progress toward the following goals. Barriers to progression include poor appetite. Recommendations to address these barriers include encourage small frequent meals.    Problem: Nutrition  Goal: Nutrient intake appropriate for maintaining nutritional needs  Outcome: Not Progressing

## 2025-02-24 NOTE — NURSING NOTE
Patient has been sitting up in the chair all shift.  He declines getting into bed when offered.  He needs much encouragement to keep his legs elevated - he frequently puts the leg rest down. A pillow has been placed under the left arm to help reduce the edema in his hand that he removes.  Spoke with the patient's daughter today to update her on the patient's status.  Chair alarm in use for patient safety. Call light in reach.

## 2025-02-24 NOTE — PROGRESS NOTES
02/24/25 0916   Discharge Planning   Living Arrangements Alone   Support Systems Children   Home or Post Acute Services In home services   Type of Home Care Services Home OT;Home PT;Home nursing visits   Expected Discharge Disposition Home   ([facility])   Patient Choice   Patient / Family choosing to utilize agency / facility established prior to hospitalization Yes   Intensity of Service   Intensity of Service 0-30 min     Met with patient and family at bedside. Patient lives at home alone in [place], but her son Eriberto has been staying with her for the past few weeks. Patient states she is independent with ADLs, does not use DME to ambulate (but has a walker and a cane at home). PCP is Calvin Baker. Therapy team is recommending low intensity rehab at d/c. Discussed HHC with pt/family and they are agreeable. AOC is [facility]- will need internal referral. Family suggests having [facility] call pt's son Eriberto to schedule start of care- number is in EMR. Palliative team is also following.

## 2025-02-24 NOTE — PROGRESS NOTES
"Nutrition Initial Assessment:   Nutrition Assessment    Reason for Assessment: Admission nursing screening (MST 1, poor appetite)    Patient is a 74 y.o. female presenting with right lower quadrant abdominal pain.   Pt with similar complaints during recent admissions to [facility] and [facility].  Hospice consult ordered.    [facility] 2/11 to 2/13, underwent inpatient cystoscopy with biopsy, recent dx of bladder cancer    PMH: recently diagnosed bladder cancer, acute ischemic stroke with no residual weakness, afib s/p ablation on Eliquis, SVT, HTN, CAD, HLD, GERD, DM, hypothyroidism, hysterectomy, appendectomy, and left oophorectomy     Nutrition History:  Food and Nutrient History: Pt familiar to nutrition services from previous admissions.  During last admission 3 weeks ago pt had complaints of intermittent diarrhea and constipation along with nausea.  Continues to have nausea and notes poor appetite.  Flow sheets indicating poor intakes since admit.  She states she is drinking Ensures ordered (likes strawberry) but outside of this she has not been consuming much PO.       Anthropometrics:  Height: 157.5 cm (5' 2\")   Weight: 63.5 kg (139 lb 15.9 oz)   BMI (Calculated): 25.6             Weight History:   01/28/25 63.3 kg (139 lb 8.8 oz)   01/23/25 64.4 kg (142 lb)   01/17/25 65.4 kg (144 lb 2.9 oz)   01/12/25 62.6 kg (138 lb)   01/09/25 65.8 kg (145 lb)   01/06/25 67.1 kg (148 lb)   12/13/24 65.8 kg (145 lb)   11/05/24 65.8 kg (145 lb)   10/14/24 66.7 kg (147 lb)   10/09/24 66.9 kg (147 lb 7.8 oz)     Weight Change %:  True weight loss likely masked by edema    Nutrition Focused Physical Exam Findings:    Subcutaneous Fat Loss:   Orbital Fat Pads: Mild-Moderate (slight dark circles and slight hollowing)  Buccal Fat Pads: Mild-Moderate (flat cheeks, minimal bounce)  Triceps: Defer  Ribs: Defer  Muscle Wasting:  Temporalis: Mild-Moderate (slight depression)  Pectoralis (Clavicular Region): Mild-Moderate (some protrusion of " clavicle)  Deltoid/Trapezius: Mild-Moderate (slight protrusion of acromion process)  Interosseous: Defer  Trapezius/Infraspinatus/Supraspinatus (Scapular Region): Defer  Quadriceps: Defer  Gastrocnemius: Defer  Edema:  Edema: +2 mild  Edema Location: BLE  Physical Findings:  Hair: Negative  Eyes: Negative  Nails: Negative  Skin: Positive (pale in appearance)  Digestive System Findings: Nausea    Nutrition Significant Labs:  BMP Trend:   Results from last 7 days   Lab Units 02/24/25  0742 02/23/25  0557 02/22/25  0612 02/21/25  0655   GLUCOSE mg/dL 104* 122* 98 128*   CALCIUM mg/dL 9.6 9.1 9.3 9.9   SODIUM mmol/L 124* 130* 133* 133*   POTASSIUM mmol/L 4.2 4.3 4.5 3.9   CO2 mmol/L 26 28 27 29   CHLORIDE mmol/L 93* 96* 98 97*   BUN mg/dL 15 12 8 10   CREATININE mg/dL 0.81 0.72 0.74 0.81    , A1C:  Lab Results   Component Value Date    HGBA1C 5.9 (H) 01/02/2025       Nutrition Specific Medications:  apixaban, 5 mg, oral, BID  atorvastatin, 10 mg, oral, Daily  cholecalciferol, 1,000 Units, oral, Daily  docusate sodium, 100 mg, oral, BID  folic acid, 1 mg, oral, Daily  insulin lispro, 0-5 Units, subcutaneous, TID AC  levothyroxine, 100 mcg, oral, Daily  lidocaine, 1 patch, transdermal, Daily  losartan, 50 mg, oral, Daily  magnesium oxide, 400 mg, oral, Daily  metoprolol tartrate, 75 mg, oral, BID  mirtazapine, 7.5 mg, oral, Nightly  morphine CR, 30 mg, oral, q12h JANINE  oxybutynin, 5 mg, oral, BID  traZODone, 100 mg, oral, Nightly      I/O:   Last BM Date: 02/18/25;      Dietary Orders (From admission, onward)       Start     Ordered    02/22/25 1210  Oral nutritional supplements  Until discontinued        Question Answer Comment   Deliver with All meals    Select supplement: Ensure Plus High Protein        02/22/25 1209    02/21/25 1613  May Participate in Room Service  ( ROOM SERVICE MAY PARTICIPATE)  Once        Question:  .  Answer:  Yes    02/21/25 1612    02/21/25 1300  Adult diet Regular  Diet effective now         Question:  Diet type  Answer:  Regular    02/21/25 1301                     Estimated Needs:      Method for Estimating Needs: 5225-8043 kcal (25-30 kcal/kg)     Method for Estimating 24 Hour Protein Needs: 64-77g protein (1.0-1.2g/kg)     Method for Estimating 24 Hour Fluid Needs: 1ml/kcal or per MD pina        Nutrition Diagnosis   Malnutrition Diagnosis  Patient has Malnutrition Diagnosis: Yes  Diagnosis Status: Active  Malnutrition Diagnosis: Moderate malnutrition related to acute disease or injury  Related to: acute on chronic nutritional stress in the setting of bladder cancer.  As Evidenced by: <75% of estimated energy requirements for > 7 days and NFPE showing signs of moderate muscle wasting/fat loss.            Nutrition Interventions/Recommendations        Nutrition Recommendations:  Individualized Nutrition Prescription Provided for : Recommend continue regular diet at this time for liberalized approach to promote protein/calorie intakes.    Nutrition Interventions/Goals:   Interventions: Meals and snacks, Medical food supplement  Meals and Snacks: General healthful diet  Goal: offer food/drink every 2-3 hours throughout the day  Medical Food Supplement: Commercial beverage medical food supplement therapy  Goal: Continue Ensure Plus High Protein (350kcal, 20g pro) TID with meals  Coordination of Care with Providers: Nursing       Nutrition Monitoring and Evaluation   Food/Nutrient Related History Monitoring  Monitoring and Evaluation Plan: Intake / amount of food  Intake / Amount of food: Consumes at least 75% or more of meals/snacks/supplements    Anthropometric Measurements  Monitoring and Evaluation Plan: Body weight  Body Weight: Body weight - Maintain stable weight    Biochemical Data, Medical Tests and Procedures  Monitoring and Evaluation Plan: Electrolyte/renal panel  Electrolyte and Renal Panel: Electrolytes within normal limits  Criteria: maintain wnl         Goal Status: New goal(s)  identified    Time Spent (min): 45 minutes

## 2025-02-24 NOTE — PROGRESS NOTES
Myrna Rodrigues is a 74 y.o. female on day 3 of admission presenting with intractable abdominal pain in the setting of known bladder malignancy.    Subjective   There were no acute events overnight. This morning she reports her pain is a 10/10. She reports associated nausea as well and states that she has not ate or had a BM since prior to admission. She denies any headache, CP, or SOB. She is unclear    Objective   Last Recorded Vitals  /64 (BP Location: Left arm, Patient Position: Lying)   Pulse 84   Temp 36.4 °C (97.5 °F) (Temporal)   Resp 18   Wt 63.5 kg (139 lb 15.9 oz)   SpO2 97%   Intake/Output last 3 Shifts:    Intake/Output Summary (Last 24 hours) at 2/24/2025 1130  Last data filed at 2/24/2025 0911  Gross per 24 hour   Intake 510 ml   Output 300 ml   Net 210 ml   Admission Weight  Weight: 61.2 kg (135 lb) (02/21/25 0622)  Daily Weight  02/21/25 : 63.5 kg (139 lb 15.9 oz)  Image Results  CT chest w IV contrast  Narrative: Interpreted By:  Main Philippe,   STUDY:  CT CHEST W IV CONTRAST;  2/22/2025 10:06 am      INDICATION:  Signs/Symptoms:Squamous cell carcinoma of the bladder, for staging  purposes..      COMPARISON:  01/28/2024      ACCESSION NUMBER(S):  [id number]      ORDERING CLINICIAN:  SHAKILA GLASS      TECHNIQUE:  Helical data acquisition of the chest was obtained with IV contrast  material. 50 mL of Omnipaque 350. Images were reformatted in axial,  coronal, and sagittal planes.      FINDINGS:  Lungs and Pleura: Scattered streaky atelectasis. Small bilateral  pleural effusions with adjacent compressive atelectasis. No pleural  effusion. No pneumothorax.      Mediastinum and axilla: No adenopathy by CT size criteria. No  cardiomegaly or pericardial effusion. No thoracic aortic aneurysm.  Small hiatal hernia. Atherosclerosis. Coronary artery calcifications.      Visualized Upper Abdomen: Upper abdominal lymphadenopathy, biliary  ductal dilatation, and other findings detail to better  extent on CT  abdomen and pelvis dated 02/21/2025.      MSK/Chest Wall: No aggressive bony lesion identified. Scattered  degenerative change in the spine.      Impression: Small bilateral pleural effusions with adjacent atelectasis.  Scattered streaky subsegmental atelectasis.      No evidence of thoracic metastatic disease.      Signed by: Main Philippe 2/22/2025 11:56 AM  Dictation workstation:   [id number]  Electrocardiogram, 12-lead PRN ACS symptoms  Supraventricular tachycardia  Low voltage QRS  Possible Inferior infarct , age undetermined  Abnormal ECG  When compared with ECG of 12-FEB-2025 09:30, (unconfirmed)  Vent. rate has increased BY  73 BPM  Borderline criteria for Inferior infarct are now Present  Physical Exam  Constitutional:       Comments: Uncomfortable, tossing and turning in significant pain   HENT:      Head: Normocephalic.      Right Ear: External ear normal.      Left Ear: External ear normal.      Nose: Nose normal.      Mouth/Throat:      Mouth: Mucous membranes are dry.   Eyes:      Extraocular Movements: Extraocular movements intact.   Cardiovascular:      Rate and Rhythm: Normal rate and regular rhythm.   Pulmonary:      Effort: Pulmonary effort is normal.      Breath sounds: Normal breath sounds.   Abdominal:      Tenderness: There is abdominal tenderness.      Comments: Severe tenderness in RLQ and suprapubic area   Musculoskeletal:         General: Swelling present.      Comments: RLE swelling, at her baseline   Skin:     General: Skin is warm.   Neurological:      Mental Status: She is oriented to person, place, and time.      Comments: Endorses confusion, still A&O x4, FTN normal, no facial droop   Psychiatric:         Mood and Affect: Mood normal.         Behavior: Behavior normal.     Relevant Results  Scheduled medications  apixaban, 5 mg, oral, BID  atorvastatin, 10 mg, oral, Daily  cholecalciferol, 1,000 Units, oral, Daily  folic acid, 1 mg, oral, Daily  insulin lispro, 0-5  Units, subcutaneous, TID AC  levothyroxine, 100 mcg, oral, Daily  losartan, 50 mg, oral, Daily  magnesium oxide, 400 mg, oral, Daily  metoprolol tartrate, 75 mg, oral, BID  mirtazapine, 7.5 mg, oral, Nightly  morphine CR, 30 mg, oral, q12h JANINE  sodium chloride, 500 mL, intravenous, Once  traZODone, 100 mg, oral, Nightly      Continuous medications     PRN medications  PRN medications: dextrose, dextrose, diphenoxylate-atropine, glucagon, glucagon, HYDROmorphone, naloxone, ondansetron, oxyCODONE, oxyCODONE   Results for orders placed or performed during the hospital encounter of 02/21/25 (from the past 24 hours)   POCT GLUCOSE   Result Value Ref Range    POCT Glucose 141 (H) 74 - 99 mg/dL   POCT GLUCOSE   Result Value Ref Range    POCT Glucose 114 (H) 74 - 99 mg/dL   POCT GLUCOSE   Result Value Ref Range    POCT Glucose 125 (H) 74 - 99 mg/dL   Magnesium   Result Value Ref Range    Magnesium 2.21 1.60 - 2.40 mg/dL   Renal Function Panel   Result Value Ref Range    Glucose 104 (H) 74 - 99 mg/dL    Sodium 124 (L) 136 - 145 mmol/L    Potassium 4.2 3.5 - 5.3 mmol/L    Chloride 93 (L) 98 - 107 mmol/L    Bicarbonate 26 21 - 32 mmol/L    Anion Gap 9 (L) 10 - 20 mmol/L    Urea Nitrogen 15 6 - 23 mg/dL    Creatinine 0.81 0.50 - 1.05 mg/dL    eGFR 75 >60 mL/min/1.73m*2    Calcium 9.6 8.6 - 10.3 mg/dL    Phosphorus 2.7 2.5 - 4.9 mg/dL    Albumin 2.5 (L) 3.4 - 5.0 g/dL   POCT GLUCOSE   Result Value Ref Range    POCT Glucose 74 74 - 99 mg/dL   CBC   Result Value Ref Range    WBC 14.9 (H) 4.4 - 11.3 x10*3/uL    nRBC 0.0 0.0 - 0.0 /100 WBCs    RBC 4.39 4.00 - 5.20 x10*6/uL    Hemoglobin 9.4 (L) 12.0 - 16.0 g/dL    Hematocrit 32.8 (L) 36.0 - 46.0 %    MCV 75 (L) 80 - 100 fL    MCH 21.4 (L) 26.0 - 34.0 pg    MCHC 28.7 (L) 32.0 - 36.0 g/dL    RDW 20.2 (H) 11.5 - 14.5 %    Platelets 200 150 - 450 x10*3/uL   POCT GLUCOSE   Result Value Ref Range    POCT Glucose 109 (H) 74 - 99 mg/dL     *Note: Due to a large number of results and/or  encounters for the requested time period, some results have not been displayed. A complete set of results can be found in Results Review.   CT chest w IV contrast    Result Date: 2/22/2025  Interpreted By:  Main Philippe, STUDY: CT CHEST W IV CONTRAST;  2/22/2025 10:06 am   INDICATION: Signs/Symptoms:Squamous cell carcinoma of the bladder, for staging purposes..   COMPARISON: 01/28/2024   ACCESSION NUMBER(S): [id number]   ORDERING CLINICIAN: SHAKILA GLASS   TECHNIQUE: Helical data acquisition of the chest was obtained with IV contrast material. 50 mL of Omnipaque 350. Images were reformatted in axial, coronal, and sagittal planes.   FINDINGS: Lungs and Pleura: Scattered streaky atelectasis. Small bilateral pleural effusions with adjacent compressive atelectasis. No pleural effusion. No pneumothorax.   Mediastinum and axilla: No adenopathy by CT size criteria. No cardiomegaly or pericardial effusion. No thoracic aortic aneurysm. Small hiatal hernia. Atherosclerosis. Coronary artery calcifications.   Visualized Upper Abdomen: Upper abdominal lymphadenopathy, biliary ductal dilatation, and other findings detail to better extent on CT abdomen and pelvis dated 02/21/2025.   MSK/Chest Wall: No aggressive bony lesion identified. Scattered degenerative change in the spine.       Small bilateral pleural effusions with adjacent atelectasis. Scattered streaky subsegmental atelectasis.   No evidence of thoracic metastatic disease.   Signed by: Main Philippe 2/22/2025 11:56 AM Dictation workstation:   [id number]    Electrocardiogram, 12-lead PRN ACS symptoms    Result Date: 2/22/2025  Supraventricular tachycardia Low voltage QRS Possible Inferior infarct , age undetermined Abnormal ECG When compared with ECG of 12-FEB-2025 09:30, (unconfirmed) Vent. rate has increased BY  73 BPM Borderline criteria for Inferior infarct are now Present    CT abdomen pelvis w IV contrast    Result Date: 2/21/2025  Interpreted By:   Evonne Yi, STUDY: CT ABDOMEN PELVIS W IV CONTRAST;  2/21/2025 9:45 am   INDICATION: Signs/Symptoms:worsening lower abdominal pain in the setting of recently diagnosed bladder cancer.     COMPARISON: CT angio abdomen pelvis 02/10/2025   ACCESSION NUMBER(S): [id number]   ORDERING CLINICIAN: KRISHNA SALAZAR   TECHNIQUE: CT of the abdomen and pelvis was performed.  Standard contiguous axial images were obtained at 3 mm slice thickness through the abdomen and pelvis. Coronal and sagittal reconstructions at 3 mm slice thickness were performed.  75 ML of Omnipaque 350 was administered intravenously without immediate complication.   FINDINGS: LOWER CHEST: The heart is normal in size without evidence of pericardial effusion. No consolidation or pleural effusion. No concerning lung nodule. Small hiatal hernia.   ABDOMEN:   LIVER: The liver is normal in size. No suspicious liver lesion.   BILE DUCTS: No mild intrahepatic and extrahepatic biliary duct dilatation, likely secondary to cholecystectomy..   GALLBLADDER: Cholecystectomy   PANCREAS: Fatty infiltration of the pancreas, most predominant in the head and uncinate process. The pancreas appears otherwise unremarkable without evidence of ductal dilatation or masses.   SPLEEN: The spleen is normal in size.   ADRENAL GLANDS: No adrenal nodularity or thickening.   KIDNEYS AND URETERS: There is persistent urothelial thickening of the right distal ureter/ureterovesicular junction. The kidneys are normal in size. No left hydroureteronephrosis or nephroureterolithiasis is identified.   PELVIS:   BLADDER: Locules of air are seen in the nondependent portion of the bladder, compatible with recent bladder procedure. Numerous bladder wall diverticuli are noted. There is decreased but persistent bladder wall thickening. The previously noted bladder stones are no longer evident.   REPRODUCTIVE ORGANS: Hysterectomy   BOWEL: Small hiatal hernia. The small and large bowel are normal in  caliber and demonstrate no wall thickening. The appendix is not definitely visualized. There is however no pericecal stranding or fluid. Colonic diverticulosis without acute diverticulitis.   VESSELS: Atherosclerotic calcifications of the aortoiliac arteries. No aneurysmal dilatation of the abdominal aorta. The IVC appears normal.   PERITONEUM/RETROPERITONEUM/LYMPH NODES: Interval worsening of numerous enlarged abdominopelvic lymph nodes, for example as seen on series 2 measured in the short axis: *16 mm retrocaval lymph node, image 50, previously 14 mm *17 mm para-aortic lymph node, image 59, previously 9 mm *17 mm aortocaval lymph node, image 72, previously 14 mm *Stable 16 mm right common iliac lymph node, image 97 *30 mm right external iliac/right pelvic sidewall lymph node, image 132, previously 22 mm *13 mm right pelvic sidewall lymph node, image 144, previously 12 mm *16 mm right external iliac lymph node, image 127, previously 13 mm *Stable 16 mm right internal iliac lymph node, image 102   Equivocal peritoneal nodular thickening is again seen, for example in the pelvis adjacent to the bladder on series 2, images 132 and 131 Presacral and mesenteric edema without ascites or walled fluid collection. No pneumoperitoneum, as questioned by the AI software.   ABDOMINAL WALL: The abdominal wall soft tissues appear normal.   BONES: No acute fracture or malalignment. No suspicious osseous lesions. Discogenic degenerative changes in the lower thoracic and lumbar spine. Unchanged grade 1 anterolisthesis of the L3 on L4 and L4 on L5 vertebral bodies.       1. Abdominopelvic lymph nodes have worsened as compared to prior exam from 02/10/2025, concerning for rapid disease progression given the short interval time frame of imaging. 2. Decreased but persistent bladder wall thickening, consistent with history of partial resection of patient's known malignancy.There is persistent urothelial thickening of the right distal  ureter/ureterovesicular junction, for which disease involvement is not excluded. 3. Equivocal peritoneal nodular thickening is seen, for which short-term follow-up CT abdomen pelvis is recommended in 8-12 weeks to evaluate for early peritoneal carcinomatosis. 4. Additional chronic and incidental findings as detailed above.     MACRO: None   Signed by: Evonne Yi 2/21/2025 10:14 AM Dictation workstation:   [id number]    ECG 12 lead    Result Date: 2/19/2025  Normal sinus rhythm Poor R-wave progression ; consider anterior infarct, lead placement, or normal variant Abnormal ECG When compared with ECG of 10-FEB-2025 17:21, (unconfirmed) Vent. rate has decreased BY  82 BPM Questionable change in QRS axis Nonspecific T wave abnormality now evident in Inferior leads T wave inversion now evident in Anterior leads Confirmed by Bertram Hernandez ([id number]) on 2/19/2025 1:37:40 PM    ECG 12 lead    Result Date: 2/19/2025  Supraventricular tachycardia Abnormal ECG When compared with ECG of 28-JAN-2025 16:33, Confirmed by Bertram Hernandez ([id number]) on 2/19/2025 1:37:31 PM    ECG 12 lead    Result Date: 2/12/2025  Normal sinus rhythm Normal ECG When compared with ECG of 12-FEB-2025 09:12, (unconfirmed) Premature ventricular complexes are no longer Present Vent. rate has decreased BY  70 BPM Borderline criteria for Inferior infarct are no longer Present ST no longer depressed in Anterior leads    ECG 12 lead    Result Date: 2/12/2025  Poor data quality, interpretation may be adversely affected Supraventricular tachycardia with Fusion complexes Left axis deviation Pulmonary disease pattern Nonspecific ST and T wave abnormality Abnormal ECG When compared with ECG of 10-FEB-2025 19:04, (unconfirmed) Significant changes have occurred    ECG 12 lead    Result Date: 2/12/2025  Supraventricular tachycardia with occasional Premature ventricular complexes Left axis deviation Pulmonary disease pattern Possible Inferior infarct , age undetermined  Abnormal ECG When compared with ECG of 12-FEB-2025 09:06, (unconfirmed) Fusion complexes are no longer Present Premature ventricular complexes are now Present    CT angio abdomen pelvis w and or wo IV IV contrast    Result Date: 2/10/2025  Interpreted By:  Felipe Driscoll, STUDY: CT ANGIO ABDOMEN PELVIS W AND/OR WO IV IV CONTRAST;  2/10/2025 9:41 pm   INDICATION: Signs/Symptoms:A-fib with RVR, severe abdominal pain not responding to morphine, assess for mesenteric ischemia.   COMPARISON: 01/29/2025   ACCESSION NUMBER(S): [id number]   ORDERING CLINICIAN: GEORGE LOPEZ   TECHNIQUE: Axial non-contrast images of the abdomen, and pelvis.   Axial CT images of the, abdomen and pelvis were obtained after the intravenous administration of iodinated contrast using angiographic technique with coronal and sagittal reformatted images. MIP images and 3D reconstructions were created on an independent workstation and reviewed.   FINDINGS: VASCULATURE:   Mild atherosclerotic calcification of the aorta without aneurysm or dissection.   Celiac artery, SMA, renal arteries are all widely patent. KATHRYN patent. Iliac arteries patent without stenosis.   CT ABDOMEN/PELVIS:   Arterial phase imaging of the liver, adrenals pancreas and spleen are grossly unremarkable. Status post cholecystectomy.   No new hydronephrosis.   Redemonstrated is retroperitoneal adenopathy surrounding the distal aorta/cava and extending into the right iliac chain which appears to be progressing compared to previous. Necrotic appearing lymph node in the right pelvic sidewall currently measures 2.2 cm in short axis, previously 1.9 cm.   Stable appearance of the bladder which has trabeculated appearance with right-sided posterior diverticulum. There is some irregular right posterior bladder wall thickening. The possibility of underlying bladder malignancy again should be considered. There is some dependent bladder calculi seen.   No significant new free fluid.   No bowel  obstruction or acute colitis.   Unchanged left ovarian cyst measuring 3.5 cm in diameter.   There is multilevel moderate to advanced degenerative disc disease seen in the lumbar spine worse at L4-L5 and L5-S1.       1. No abdominal aortic aneurysm or dissection. No evidence of mesenteric ischemia.   2. Compared to previous study there is progressing retroperitoneal adenopathy with some interval enlargement suspected.   3. Irregular masslike thickening right posterior bladder wall near suspected diverticulum. Underlying bladder malignancy may be present. Correlate with patient history/cystoscopy.   MACRO: None.   Signed by: Felipe Driscoll 2/10/2025 11:12 PM Dictation workstation:   [id number]    XR chest 1 view    Result Date: 2/10/2025  Interpreted By:  Wendy Lee, STUDY: XR CHEST 1 VIEW;  2/10/2025 6:19 pm   INDICATION: Signs/Symptoms:Short of breath, vomiting.   COMPARISON: 01/15/2025   ACCESSION NUMBER(S): [id number]   ORDERING CLINICIAN: GEORGE LOPEZ   FINDINGS:     CARDIOMEDIASTINAL SILHOUETTE: Cardiomediastinal silhouette is normal in size and configuration.   LUNGS: No pulmonary consolidation, pleural effusion or pneumothorax.   ABDOMEN: No remarkable upper abdominal findings.   BONES: No acute osseous abnormality.       No acute cardiopulmonary process.   MACRO: None   Signed by: Wendy Lee 2/10/2025 6:31 PM Dictation workstation:   [id number]    Transesophageal Echo (GAVIN)    Result Date: 2/5/2025            [facility] [id number] [address] [id number]    Tel [phone] Fax [phone] TRANSESOPHAGEAL ECHOCARDIOGRAM REPORT Patient Name:       LISSETTE Palma Physician:    [id number] Magana MD Study Date:         1/30/2025            Ordering Provider:    [id number] JONATHAN MAGANA MRN/PID:            [id number]             Fellow: Accession#:          [id number]         Nurse:                Zulma Ribeiro RN Date of Birth/Age:  1950 / 74      Sonographer:          Mely Christiana                     lesley Gender Assigned at  F                    Additional Staff:     Emmanuelle Jorge RN Birth: Height:             157.48 cm            Admit Date: Weight:             59.42 kg             Admission Status:     Inpatient -                                                                Routine BSA / BMI:          1.60 m2 / 23.96      Department Location:  [facility] Cath Lab                     kg/m2 Blood Pressure: 174 /72 mmHg Study Type:    TRANSESOPHAGEAL ECHO (GAVIN) Diagnosis/ICD: Shortness of breath-R06.02; Unspecified atrial                fibrillation-I48.91; Abnormal findings on diagnostic imaging of                other specified body structures-R93.89 Indication:    SOb, Afib CPT Codes:     GAVIN Complete-07140  Study Detail: Agitated saline used as a contrast agent for intraseptal flow               evaluation.  PHYSICIAN INTERPRETATION: GAVIN Details: Agitated saline contrast and color flow Doppler echo was performed to assess for the presence of a patent foramen ovale. GAVIN Medication: Midazolam and Fentanyl was used to sedate the patient for this exam. GAVIN Procedure: The probe was passed without difficulty. Left Ventricle: There are no regional wall motion abnormalities. The left ventricular cavity size is normal. Spectral Doppler shows a Grade I (impaired relaxation pattern) of left ventricular diastolic filling with normal left atrial filling pressure. Left Atrium: The left atrial size is mild to moderately dilated. A bubble study using agitated saline was performed. Bubble study is negative. The left atrial appendage is enlarged. There is no definite left atrial mass present. Right Ventricle: The right ventricle is upper limits of normal in size. There is normal right ventricular global systolic function. Right Atrium: The right atrial size is normal. Aortic  Valve: The aortic valve appears structurally normal. There is trace aortic valve regurgitation. Mitral Valve: The mitral valve is normal in structure. There is moderate to severe mitral valve regurgitation. The mitral regurgitant orifice area is 4 mm2. The mitral regurgitant volume is 8.73 ml. Tricuspid Valve: The tricuspid valve is structurally normal. There is trace to mild tricuspid regurgitation. Pulmonic Valve: The pulmonic valve was not assessed. There is trace pulmonic valve regurgitation. Pericardium: No pericardial effusion noted. Aorta: The aortic root is normal.  CONCLUSIONS:  1. Spectral Doppler shows a Grade I (impaired relaxation pattern) of left ventricular diastolic filling with normal left atrial filling pressure.  2. There is normal right ventricular global systolic function.  3. The left atrial size is mild to moderately dilated.  4. Moderate to severe mitral valve regurgitation.  5. No left atrial mass.  6. No obvious cardiac source of embolization by this study. QUANTITATIVE DATA SUMMARY:  LV SYSTOLIC FUNCTION:                      Normal Ranges: EF-Visual:      60 % LV EF Reported: 60 %  MITRAL INSUFFICIENCY:             Normal Ranges: PISA Radius:          0.3 cm MR VTI:               208.00 cm MR Vmax:              519.00 cm/s MR Alias Elieser:         38.5 cm/s MR Volume:            8.73 ml MR Flow Rt:           21.77 ml/s MR EROA:              4 mm2  [id number] Clement Stevens MD Electronically signed on 2/5/2025 at 5:22:41 AM  ** Final **     Electrocardiogram, 12-lead PRN ACS symptoms    Result Date: 2/1/2025  Supraventricular tachycardia with occasional Premature ventricular complexes and Fusion complexes Right axis deviation Nonspecific ST abnormality Abnormal ECG When compared with ECG of 27-JAN-2025 20:14, (unconfirmed) Fusion complexes are now Present Premature ventricular complexes are now Present QRS axis Shifted right Borderline criteria for Inferior infarct are no longer Present Nonspecific  T wave abnormality now evident in Inferior leads Confirmed by LISBETH Agrawal ([id number]) on 2/1/2025 6:38:48 PM    Electrocardiogram, 12-lead PRN ACS symptoms    Result Date: 2/1/2025  Supraventricular tachycardia Low voltage QRS Possible Inferior infarct , age undetermined Abnormal ECG When compared with ECG of 27-JAN-2025 17:29, (unconfirmed) Vent. rate has increased BY  75 BPM QRS axis Shifted right ST now depressed in Anterior leads Confirmed by LISBETH Agrawal ([id number]) on 2/1/2025 6:38:39 PM    Electrocardiogram, 12-lead PRN ACS symptoms    Result Date: 2/1/2025  Normal sinus rhythm Left axis deviation Pulmonary disease pattern Abnormal ECG When compared with ECG of 27-JAN-2025 09:00, (unconfirmed) QRS axis Shifted left Confirmed by LISBETH Agrawal ([id number]) on 2/1/2025 6:38:23 PM    ECG 12 Lead    Result Date: 1/31/2025  Normal sinus rhythm Normal ECG When compared with ECG of 24-JAN-2025 16:10, Premature ventricular complexes are no longer Present Vent. rate has decreased BY  58 BPM QRS axis Shifted right Borderline criteria for Inferior infarct are no longer Present Nonspecific T wave abnormality now evident in Inferior leads T wave amplitude has decreased in Anterior leads Confirmed by Clement Stevens ([id number]) on 1/31/2025 6:21:28 PM    ECG 12 Lead    Result Date: 1/31/2025  Normal sinus rhythm Left axis deviation Pulmonary disease pattern T wave abnormality, consider anterior ischemia Abnormal ECG When compared with ECG of 24-JAN-2025 16:10, Premature ventricular complexes are no longer Present Vent. rate has decreased BY  67 BPM Nonspecific T wave abnormality now evident in Inferior leads T wave inversion now evident in Anterior leads Confirmed by Clement Stevens ([id number]) on 1/31/2025 6:21:11 PM    MR brain wo IV contrast    Result Date: 1/29/2025  Interpreted By:  Nicolas Sarmiento, STUDY: MR BRAIN WO IV CONTRAST;  1/29/2025 2:34 pm   INDICATION: Signs/Symptoms:aphasia, history of PAF with interruption in anticoagulation.     COMPARISON: CT  head and CTA head yesterday.   ACCESSION NUMBER(S): [id number]   ORDERING CLINICIAN: TIGIST KEYS   TECHNIQUE: Patient could only tolerate limited examination. Axial DWI, ADC, sagittal and axial T1 weighted sequences were obtained.   FINDINGS: Multiple small patchy foci of abnormally restricted diffusion are seen in the bilateral cerebral hemispheres predominantly each centrum semiovale and oriented in AP direction with some additional involvement of the anterior right middle frontal gyrus cortex, the posterior right corpus callosum, paramedian right occipital lobe. Questionable additional involvement of left-greater-than-right cerebellar hemispheres.   No definite intracranial hemorrhage.     No extra-axial fluid collection. No intracranial mass.   Mild to moderate white matter T2 signal increase, most commonly seen with chronic small vessel ischemic change.  Presence of older small infarctions in these areas not excluded.   Degree of ventriculomegaly is commensurate with overall degree of brain parenchymal volume loss, which is mild.       1. Multiple small patchy foci of abnormally restricted diffusion are seen in the bilateral cerebral hemispheres predominantly each centrum semiovale and oriented in AP direction with some additional involvement of the anterior right middle frontal gyrus cortex, the posterior right corpus callosum, paramedian right occipital lobe. Questionable additional involvement of left-greater-than-right cerebellar hemispheres. Findings consistent with acute to subacute infarctions. The AP orientation of the high bilateral cerebral hemispheres could indicate a recent hypotensive event. 2. Mild to moderate white matter T2 signal increase, most commonly seen with chronic small vessel ischemic change.  Presence of older small infarctions in these areas not excluded. 3. Degree of ventriculomegaly is commensurate with overall degree of brain parenchymal volume loss, which is mild.   Note the  patient was only able to tolerate limited examination as described above.   MACRO: None   Signed by: Nicolas Sarmiento 1/29/2025 3:12 PM Dictation workstation:   [id number]    CT abdomen pelvis w IV contrast    Result Date: 1/29/2025  Interpreted By:  Evonne Yi, STUDY: CT ABDOMEN PELVIS W IV CONTRAST;  1/29/2025 9:32 am   INDICATION: Signs/Symptoms:abdominal pain with acute encephalopathy.     COMPARISON: CT abdomen pelvis 01/24/2025 CT chest abdomen pelvis 01/28/2024   ACCESSION NUMBER(S): [id number]   ORDERING CLINICIAN: TIGIST KEYS   TECHNIQUE: CT of the abdomen and pelvis was performed.  Standard contiguous axial images were obtained at 3 mm slice thickness through the abdomen and pelvis. Coronal and sagittal reconstructions at 3 mm slice thickness were performed.  75 ML of Omnipaque 350 was administered intravenously without immediate complication.   FINDINGS: Please note that the evaluation of vessels, lymph nodes and organs is limited without intravenous contrast.   LOWER CHEST: The heart is normal in size without evidence of pericardial effusion. No consolidation or pleural effusion is present. No concerning lung nodule. Small hiatal hernia again noted.   ABDOMEN:   LIVER: The liver is normal in size without evidence of focal liver lesions.   BILE DUCTS: Stable mild intrahepatic and extrahepatic biliary duct dilatation, likely secondary to cholecystectomy.   GALLBLADDER: Cholecystectomy   PANCREAS: The pancreas appears unremarkable without evidence of ductal dilatation or masses.   SPLEEN: The spleen is normal in size without focal lesions.   ADRENAL GLANDS: No adrenal nodularity or thickening.   KIDNEYS AND URETERS: The kidneys are normal in size. Stable nonobstructive punctuate calculus in the right kidney. Stable sub 5 mm hypoattenuating lesions in the bilateral kidneys, which are too small to further characterize. No hydroureteronephrosis or left nephroureterolithiasis is identified.   PELVIS:    BLADDER: The bladder is completely opacified by contrast from prior imaging, which limits evaluation.   REPRODUCTIVE ORGANS: No pelvic masses. A stable 48 mm left adnexal cyst is seen on series 2, image 116. A 21 x 19 mm right adnexal lesion could be a right adnexal cyst or a perivesicular lymph node.   BOWEL: Small hiatal hernia. The small and large bowel are normal in caliber and demonstrate no wall thickening. The appendix is not definitely visualized. There is however no pericecal stranding or fluid.   VESSELS: There is no aneurysmal dilatation of the abdominal aorta. Mild atherosclerotic calcifications of the aortoiliac arteries. The IVC appears normal.   PERITONEUM/RETROPERITONEUM/LYMPH NODES: Retroperitoneal lymphadenopathy with adjacent stranding, stable when compared to prior CT from 01/24/2025, and new when compared to prior CT from 01/28/2024, for example as seen on series 2: *16 x 10 mm aortocaval lymph node, image 75 *12 x 11 mm retrocaval lymph node, image 91 *16 x 15 mm right common iliac lymph node, image 97 *20 x 16 mm right internal iliac lymph node, image 104 *29 x 20 mm right external iliac lymph node, image 126 *24 x 14 mm right external iliac lymph node, image 135 *21 x 19 mm perivascular lesion, which could be a lymph node or right adnexal cyst, image 131 *20 x 18 mm right pelvic sidewall lesion, image 117   No ascites or fluid collection. No peritoneal nodularity or implant.   ABDOMINAL WALL: The abdominal wall soft tissues appear normal. Sacral spinal stimulator noted.   BONES: No suspicious osseous lesions are identified. Degenerative discogenic disease is noted in the lower thoracic and lumbar spine. Grade 1 anterolisthesis of the L4 and L5 vertebral bodies.       1. Retroperitoneal lymphadenopathy, stable when compared to prior CT from 01/24/2025, and new when compared to prior CT from 01/28/2024. Though the adjacent stranding favors an infectious/inflammatory etiology, underlying  malignancy such as lymphoma or bladder carcinoma (given the bladder wall thickening seen on prior CT) can not be excluded. If clinical signs/symptoms favor infectious etiology, short-term follow-up CT abdomen pelvis in 4-6 weeks is recommended. Alternatively, correlation with serum LDH levels, cystoscopy, tissue diagnosis and/or PET-CT are other diagnostic considerations that could be obtained as per clinical need. 2. No significant interval change as compared to prior CT from 01/24/2025.   MACRO: Critical Finding:  There are multiple critical findings. See findings. notification was initiated on 1/29/2025 at 10:55 am by Evonne Yi.  (**-YCF-**)   Signed by: Evonne Yi 1/29/2025 10:56 AM Dictation workstation:   [id number]    CT angio head and neck w and wo IV contrast    Result Date: 1/28/2025  Interpreted By:  Nicolas Sarmiento, STUDY: CT ANGIO HEAD AND NECK W AND WO IV CONTRAST;  1/28/2025 6:46 pm   INDICATION: Signs/Symptoms:stroke rule out.     COMPARISON: CT head today.   ACCESSION NUMBER(S): [id number]   ORDERING CLINICIAN: TIGIST KEYS   TECHNIQUE: Unenhanced CT images of the head were obtained.  Subsequently,  60 ML of Omnipaque 350 was administered intravenously and axial images of the head and neck were acquired.  Coronal, sagittal, and 3-D reconstructions were provided for review.   FINDINGS:   CTA COW: No major vascular occlusion. Mild atherosclerotic changes through the carotid siphons. No aneurysms.  No vascular malformations.   CTA CAROTID: No aortic aneurysm or dissection. No significant stenoses at the origins of the great vessels from the aortic arch. No significant stenoses of the brachycephalic artery or bilateral subclavian arteries.   Mild atherosclerotic changes at the carotid bifurcations. No significant stenoses bilateral carotid arterial systems. No significant stenoses bilateral vertebral arterial systems.   NONVASCULAR HEAD: No intracranial mass. No intracranial hemorrhage. No  evidence of large evolving cortical infarction. Supratentorial white matter hypodensities most likely related to mild chronic small vessel ischemic change.     Bones intact. Mucous retention cyst left maxillary sinus.   NONVASCULAR NECK: No soft tissue mass. No adenopathy. Salivary glands are unremarkable. Thyroid gland unremarkable. Bones intact.       1. No intracranial major vascular occlusion. 2. No flow-limiting stenosis or significant plaque irregularity in the neck.     MACRO: None   Signed by: Nicolas Sarmiento 1/28/2025 7:30 PM Dictation workstation:   [id number]    CT brain attack head wo IV contrast    Result Date: 1/28/2025  Interpreted By:  Howard Millard, STUDY: TIGIST KEYS; 1/28/2025 5:08 pm   INDICATION: Signs/Symptoms:brain attack;   COMPARISON: 01/28/2024   ACCESSION NUMBER(S): [id number]   ORDERING CLINICIAN: TIGIST KEYS   TECHNIQUE: Contiguous axial images were acquired from the vertex through the posterior fossa without IV contrast. All CT examinations are performed with 1 or more of the following dose reduction techniques: Automated exposure control, adjustment of mA and/or kv according to patient's size, or use of iterative reconstruction techniques.   FINDINGS: No focal mass effect or midline shift is identified. The ventricles and sulci are symmetric and appropriate for the patient's age.   The gray white matter differentiation is preserved. Again seen is a moderate degree of nonspecific white matter hypodensity, most consistent with chronic small-vessel ischemic disease, not significantly changed from the prior study.   No acute intracranial hemorrhage is seen. No intra-axial or extra-axial fluid collection is seen.   The visualized paranasal sinuses and mastoid air cells are clear. There is a small-moderate sized mucous retention cysts in the visualized left maxillary sinus.       No CT evidence for acute intracranial pathology.   Moderate degree of nonspecific white matter  hypodensity, most consistent with chronic small-vessel ischemic disease, not significantly changed when compared to the prior study.         Findings discussed with the physician on the 2nd floor at 5:20 p.m. on 01/28/2025   Signed by: Howard Millard 1/28/2025 5:39 PM Dictation workstation:   [id number]      Assessment/Plan   Myrna Rodrigues is a 74 y.o. female with PMHx of recently diagnosed bladder cancer, prior stroke without residual deficits, afib s/p ablation on Eliquis, paroxysmal SVT, HTN, CAD, HLD, GERD, DM, and hypothyroidism who presented with worsening RLQ abdominal pain and intermittent palpitations. Given her leukocytosis, hematuria, and UA findings, there is concern for UTI vs. worsening malignancy-related complications. CT A/P showed progression of bladder mass and worsening lymphadenopathy. EKG showed SVT ( bpm). Managed with IV fluids, analgesia (Dilaudid, ketamine, morphine), and broad-spectrum antibiotics. Admitted for pain control, further infectious workup, and oncology evaluation.     #Intractable abdominal pain   #Newly diagnosed invasive bladder cancer  #?Urinary tract infection  #Leukocytosis   #Hyponatremia  She underwent inpatient cystoscopy with biopsy that showed Invasive carcinoma with extensive keratinizing squamous differentiation (greater than 90%), invading into the detrusor muscle (muscularis propria).  Additionally UA showed +2 hematuria, positive nitrites, positive leukocyte esterase, 6-10 WBCs, and +1 bacteria. Previous culture data from prior admissions showed multiple organisms and Klebsiella pneumoniae with resistant ampicillin and azithromycin and cefazolin.      Plan  -Pain control:  -Dilaudid decreased to 0.4 mg PRN for breakthrough pain  -Oxycodone 7.5mg PRN for severe pain, 5mg for moderate pain  -Morphine MS Contin increased to 30 mg twice daily  -Naloxone as needed  -Na: 124 from 130, possible dehydration vs. SIADH secondary to pain, will give 500cc NS then  reevaluate  -Urine culture demonstrated contamination with mixed bacterial mary ellen  -S/p Zosyn in the ED, transitioned to Rocephin yesterday. ABX discontinued as urine culture demonstrated contamination.  -Will repeat UA and urine cultures, will restart ABX pending results.  -Zofran as needed for nausea  -Oncology consulted, Myrna would like to pursue treatment, ultimately recommended going to her appointment with Dr. Reyes on Wednesday 2/26, will reach out again for their recommendations if she is not able to make it to her appointment given inadequate pain control  -Per oncology staging CT scan small bilateral pleural effusions with adjacent atelectasis with scattered subsegmental atelectasis. No evidence of thoracic metastatic disease.  -Tumor marker  ordered to screen for the primary origin.  -Palliative consulted, appreciate recommendation  -PT OT     #Paroxysmal SVT   #A-fib status s/p ablation on Eliquis   #Hx of Afib RVR   #Hx of multifocal stroke  [id number] score 6   -She was intermittently tachycardic in the ED, most likely due to worsening abdominal pain.  -Resume home medication with metoprolol and anticoagulation with Eliquis  -Will medically optimize pain control  -Daily Electrolytes and Mg. Maintain K > 4 and Mg > 2, replace as needed.  -On previous admissions, SVT is eradicated with vagal maneuver.     #Chronic microcytic anemia   -Hemoglobin stable at 9.4 (at baseline)  -Likely due to anemia of chronic disease  -No active bleeding, she denies hematuria, hematemesis or hemtochezia  -Monitor vital signs for fever  -Daily CBC      Chronic problem  #Hypothyroidism  #T2DM - SSI  #HTN  #HLD  #GERD  -Continue home meds as appropriate     Consultants: Oncology, Palliative  Diet: Regular diet  Code Status: DNR DNI with ICU transfer if needed  DVT Prophylaxis: Eliquis  Disposition: Anticipate 1-2 midnight pending oncology assessment and pain control    SYED BAUTISTA, MS3      Patient seen and  examined independent of resident.  My input was implemented above and agree with documentation    Nicolas Proctor, DO  PGY3 IM

## 2025-02-24 NOTE — PROGRESS NOTES
Occupational Therapy                 Therapy Communication Note    Patient Name: Myrna Rodrigues  MRN: [id number]  Department: [facility] 3 N  Room: [facility]3034-A  Today's Date: 2/24/2025     Discipline: Occupational Therapy          Missed Visit Reason: Missed Visit Reason: Patient refused, politely requesting to rest in bed at this time, family member present at bedside. Will follow up as time permits and pt is agreeable.     Missed Time: Attempt    Comment:

## 2025-02-24 NOTE — CARE PLAN
Per Palliative Care CNS referral placed to [facility], family requests to meet bedside tmrw. Await call back from [facility] on mtg time confirmation.

## 2025-02-24 NOTE — CONSULTS
Consults    Reason For Consult  Reason for Consult: symptom management     History Of Present Illness  Myrna Rodrigues is a 74 y.o. female with past medical history of Cancer is presenting with severe baldder pain post resection.      Symptoms (0 - 10, Best to Worst)  [place] Symptom Assessment System  0-10 (Numeric) Pain Score: 0 - No pain (nos/s of pain - patient sleepping at this time)    BM in last 48 hours? no- has not had a BM since admission    Emotional/Psychological/Spiritual Needs  Over the past two weeks, how often has the patient been bothered by having little interest or pleasure in doing things?  occurrence (last two weeks): nearly every day    Over the past two weeks, how often has the patient been bothered by feeling down, depressed, or hopeless?  occurrence (last two weeks): nearly every day         Personal/Social History   She reports that she has never smoked. She has never used smokeless tobacco. She reports that she does not currently use alcohol. She reports that she does not use drugs.    Functional Status        Caregiving/Caregiver Support  Does the patient require assistance in some or all components of his care, including coordination of medical care? Yes  If Yes, which person serves that role?  son   Caregiver emotional or practical needs:      Past Medical History  She has a past medical history of Anxiety and depression, Arrhythmia, Asthma, Cancer (Multi), Coronary artery disease, Diabetes mellitus (Multi), Disease of thyroid gland, GERD (gastroesophageal reflux disease), Heart disease, Hyperlipidemia, Hypertension, Irregular heart beat, Occipital neuralgia (06/10/2022), Personal history of malignant neoplasm, unspecified (10/04/2021), Personal history of other diseases of the circulatory system, Personal history of other diseases of the circulatory system (10/04/2021), Personal history of other diseases of the circulatory system, Personal history of other diseases of the digestive system  (03/11/2020), Personal history of other diseases of the musculoskeletal system and connective tissue, Personal history of other diseases of the nervous system and sense organs (10/04/2021), Personal history of other diseases of the respiratory system, Personal history of other specified conditions (10/04/2021), PONV (postoperative nausea and vomiting), Stool incontinence, Thyroid cancer (Multi), and Urinary incontinence.    Surgical History  She has a past surgical history that includes Hysterectomy (08/13/2015); Knee arthroscopy w/ meniscal repair (08/13/2015); Total knee arthroplasty (08/13/2015); Other surgical history (08/13/2015); Gallbladder surgery (08/13/2015); Knee surgery (08/13/2015); Appendectomy (08/13/2015); Other surgical history (08/13/2015); Carpal tunnel release (08/13/2015); Breast lumpectomy (08/13/2015); Other surgical history (09/20/2021); Other surgical history (09/20/2021); Other surgical history (09/20/2021); Other surgical history (09/20/2021); Other surgical history (09/20/2021); Other surgical history (09/20/2021); Other surgical history (10/11/2021); MR angio head wo IV contrast (09/05/2021); and Thyroidectomy.     Family History  Family History   Problem Relation Name Age of Onset    Lung cancer Mother      Lymphoma Mother      Bone cancer Mother      Heart attack Father      Liver cancer Brother      Lung cancer Brother       Allergies  Patient has no known allergies.    Review of Systems   Unable to perform ROS: Acuity of condition        Physical Exam  Constitutional:       Appearance: She is ill-appearing.   HENT:      Head: Normocephalic.      Right Ear: External ear normal.      Left Ear: External ear normal.      Nose: Nose normal.      Mouth/Throat:      Mouth: Mucous membranes are dry.   Eyes:      Conjunctiva/sclera: Conjunctivae normal.   Cardiovascular:      Rate and Rhythm: Normal rate.      Pulses: Normal pulses.   Pulmonary:      Effort: Pulmonary effort is normal.  "  Abdominal:      General: Abdomen is flat.      Tenderness: There is abdominal tenderness.      Comments: No BM since admission   Skin:     General: Skin is dry.      Capillary Refill: Capillary refill takes less than 2 seconds.      Coloration: Skin is pale.   Neurological:      Sensory: Sensory deficit present.      Motor: Weakness present.         Last Recorded Vitals  Blood pressure 137/64, pulse 84, temperature 36.4 °C (97.5 °F), temperature source Temporal, resp. rate 18, height 1.575 m (5' 2\"), weight 63.5 kg (139 lb 15.9 oz), SpO2 97%.    Relevant Results  apixaban, 5 mg, oral, BID  atorvastatin, 10 mg, oral, Daily  cholecalciferol, 1,000 Units, oral, Daily  folic acid, 1 mg, oral, Daily  insulin lispro, 0-5 Units, subcutaneous, TID AC  levothyroxine, 100 mcg, oral, Daily  losartan, 50 mg, oral, Daily  magnesium oxide, 400 mg, oral, Daily  metoprolol tartrate, 75 mg, oral, BID  mirtazapine, 7.5 mg, oral, Nightly  morphine CR, 30 mg, oral, q12h JANINE  traZODone, 100 mg, oral, Nightly       Results for orders placed or performed during the hospital encounter of 02/21/25 (from the past 24 hours)   POCT GLUCOSE   Result Value Ref Range    POCT Glucose 141 (H) 74 - 99 mg/dL   POCT GLUCOSE   Result Value Ref Range    POCT Glucose 114 (H) 74 - 99 mg/dL   POCT GLUCOSE   Result Value Ref Range    POCT Glucose 125 (H) 74 - 99 mg/dL   Magnesium   Result Value Ref Range    Magnesium 2.21 1.60 - 2.40 mg/dL   Renal Function Panel   Result Value Ref Range    Glucose 104 (H) 74 - 99 mg/dL    Sodium 124 (L) 136 - 145 mmol/L    Potassium 4.2 3.5 - 5.3 mmol/L    Chloride 93 (L) 98 - 107 mmol/L    Bicarbonate 26 21 - 32 mmol/L    Anion Gap 9 (L) 10 - 20 mmol/L    Urea Nitrogen 15 6 - 23 mg/dL    Creatinine 0.81 0.50 - 1.05 mg/dL    eGFR 75 >60 mL/min/1.73m*2    Calcium 9.6 8.6 - 10.3 mg/dL    Phosphorus 2.7 2.5 - 4.9 mg/dL    Albumin 2.5 (L) 3.4 - 5.0 g/dL   POCT GLUCOSE   Result Value Ref Range    POCT Glucose 74 74 - 99 mg/dL "   CBC   Result Value Ref Range    WBC 14.9 (H) 4.4 - 11.3 x10*3/uL    nRBC 0.0 0.0 - 0.0 /100 WBCs    RBC 4.39 4.00 - 5.20 x10*6/uL    Hemoglobin 9.4 (L) 12.0 - 16.0 g/dL    Hematocrit 32.8 (L) 36.0 - 46.0 %    MCV 75 (L) 80 - 100 fL    MCH 21.4 (L) 26.0 - 34.0 pg    MCHC 28.7 (L) 32.0 - 36.0 g/dL    RDW 20.2 (H) 11.5 - 14.5 %    Platelets 200 150 - 450 x10*3/uL     *Note: Due to a large number of results and/or encounters for the requested time period, some results have not been displayed. A complete set of results can be found in Results Review.    CT chest w IV contrast    Result Date: 2/22/2025  Interpreted By:  Main Philippe, STUDY: CT CHEST W IV CONTRAST;  2/22/2025 10:06 am   INDICATION: Signs/Symptoms:Squamous cell carcinoma of the bladder, for staging purposes..   COMPARISON: 01/28/2024   ACCESSION NUMBER(S): [id number]   ORDERING CLINICIAN: SHAKILA GLASS   TECHNIQUE: Helical data acquisition of the chest was obtained with IV contrast material. 50 mL of Omnipaque 350. Images were reformatted in axial, coronal, and sagittal planes.   FINDINGS: Lungs and Pleura: Scattered streaky atelectasis. Small bilateral pleural effusions with adjacent compressive atelectasis. No pleural effusion. No pneumothorax.   Mediastinum and axilla: No adenopathy by CT size criteria. No cardiomegaly or pericardial effusion. No thoracic aortic aneurysm. Small hiatal hernia. Atherosclerosis. Coronary artery calcifications.   Visualized Upper Abdomen: Upper abdominal lymphadenopathy, biliary ductal dilatation, and other findings detail to better extent on CT abdomen and pelvis dated 02/21/2025.   MSK/Chest Wall: No aggressive bony lesion identified. Scattered degenerative change in the spine.       Small bilateral pleural effusions with adjacent atelectasis. Scattered streaky subsegmental atelectasis.   No evidence of thoracic metastatic disease.   Signed by: Main Philippe 2/22/2025 11:56 AM Dictation workstation:    [id number]    Electrocardiogram, 12-lead PRN ACS symptoms    Result Date: 2/22/2025  Supraventricular tachycardia Low voltage QRS Possible Inferior infarct , age undetermined Abnormal ECG When compared with ECG of 12-FEB-2025 09:30, (unconfirmed) Vent. rate has increased BY  73 BPM Borderline criteria for Inferior infarct are now Present    CT abdomen pelvis w IV contrast    Result Date: 2/21/2025  Interpreted By:  Evonne Yi, STUDY: CT ABDOMEN PELVIS W IV CONTRAST;  2/21/2025 9:45 am   INDICATION: Signs/Symptoms:worsening lower abdominal pain in the setting of recently diagnosed bladder cancer.     COMPARISON: CT angio abdomen pelvis 02/10/2025   ACCESSION NUMBER(S): [id number]   ORDERING CLINICIAN: KRISHNA SALAZAR   TECHNIQUE: CT of the abdomen and pelvis was performed.  Standard contiguous axial images were obtained at 3 mm slice thickness through the abdomen and pelvis. Coronal and sagittal reconstructions at 3 mm slice thickness were performed.  75 ML of Omnipaque 350 was administered intravenously without immediate complication.   FINDINGS: LOWER CHEST: The heart is normal in size without evidence of pericardial effusion. No consolidation or pleural effusion. No concerning lung nodule. Small hiatal hernia.   ABDOMEN:   LIVER: The liver is normal in size. No suspicious liver lesion.   BILE DUCTS: No mild intrahepatic and extrahepatic biliary duct dilatation, likely secondary to cholecystectomy..   GALLBLADDER: Cholecystectomy   PANCREAS: Fatty infiltration of the pancreas, most predominant in the head and uncinate process. The pancreas appears otherwise unremarkable without evidence of ductal dilatation or masses.   SPLEEN: The spleen is normal in size.   ADRENAL GLANDS: No adrenal nodularity or thickening.   KIDNEYS AND URETERS: There is persistent urothelial thickening of the right distal ureter/ureterovesicular junction. The kidneys are normal in size. No left hydroureteronephrosis or  nephroureterolithiasis is identified.   PELVIS:   BLADDER: Locules of air are seen in the nondependent portion of the bladder, compatible with recent bladder procedure. Numerous bladder wall diverticuli are noted. There is decreased but persistent bladder wall thickening. The previously noted bladder stones are no longer evident.   REPRODUCTIVE ORGANS: Hysterectomy   BOWEL: Small hiatal hernia. The small and large bowel are normal in caliber and demonstrate no wall thickening. The appendix is not definitely visualized. There is however no pericecal stranding or fluid. Colonic diverticulosis without acute diverticulitis.   VESSELS: Atherosclerotic calcifications of the aortoiliac arteries. No aneurysmal dilatation of the abdominal aorta. The IVC appears normal.   PERITONEUM/RETROPERITONEUM/LYMPH NODES: Interval worsening of numerous enlarged abdominopelvic lymph nodes, for example as seen on series 2 measured in the short axis: *16 mm retrocaval lymph node, image 50, previously 14 mm *17 mm para-aortic lymph node, image 59, previously 9 mm *17 mm aortocaval lymph node, image 72, previously 14 mm *Stable 16 mm right common iliac lymph node, image 97 *30 mm right external iliac/right pelvic sidewall lymph node, image 132, previously 22 mm *13 mm right pelvic sidewall lymph node, image 144, previously 12 mm *16 mm right external iliac lymph node, image 127, previously 13 mm *Stable 16 mm right internal iliac lymph node, image 102   Equivocal peritoneal nodular thickening is again seen, for example in the pelvis adjacent to the bladder on series 2, images 132 and 131 Presacral and mesenteric edema without ascites or walled fluid collection. No pneumoperitoneum, as questioned by the AI software.   ABDOMINAL WALL: The abdominal wall soft tissues appear normal.   BONES: No acute fracture or malalignment. No suspicious osseous lesions. Discogenic degenerative changes in the lower thoracic and lumbar spine. Unchanged grade 1  anterolisthesis of the L3 on L4 and L4 on L5 vertebral bodies.       1. Abdominopelvic lymph nodes have worsened as compared to prior exam from 02/10/2025, concerning for rapid disease progression given the short interval time frame of imaging. 2. Decreased but persistent bladder wall thickening, consistent with history of partial resection of patient's known malignancy.There is persistent urothelial thickening of the right distal ureter/ureterovesicular junction, for which disease involvement is not excluded. 3. Equivocal peritoneal nodular thickening is seen, for which short-term follow-up CT abdomen pelvis is recommended in 8-12 weeks to evaluate for early peritoneal carcinomatosis. 4. Additional chronic and incidental findings as detailed above.     MACRO: None   Signed by: Evonne Yi 2/21/2025 10:14 AM Dictation workstation:   [id number]    ECG 12 lead    Result Date: 2/19/2025  Normal sinus rhythm Poor R-wave progression ; consider anterior infarct, lead placement, or normal variant Abnormal ECG When compared with ECG of 10-FEB-2025 17:21, (unconfirmed) Vent. rate has decreased BY  82 BPM Questionable change in QRS axis Nonspecific T wave abnormality now evident in Inferior leads T wave inversion now evident in Anterior leads Confirmed by Bertram Hernandez ([id number]) on 2/19/2025 1:37:40 PM    ECG 12 lead    Result Date: 2/19/2025  Supraventricular tachycardia Abnormal ECG When compared with ECG of 28-JAN-2025 16:33, Confirmed by Bertram Hernandez ([id number]) on 2/19/2025 1:37:31 PM    ECG 12 lead    Result Date: 2/12/2025  Normal sinus rhythm Normal ECG When compared with ECG of 12-FEB-2025 09:12, (unconfirmed) Premature ventricular complexes are no longer Present Vent. rate has decreased BY  70 BPM Borderline criteria for Inferior infarct are no longer Present ST no longer depressed in Anterior leads    ECG 12 lead    Result Date: 2/12/2025  Poor data quality, interpretation may be adversely affected Supraventricular  tachycardia with Fusion complexes Left axis deviation Pulmonary disease pattern Nonspecific ST and T wave abnormality Abnormal ECG When compared with ECG of 10-FEB-2025 19:04, (unconfirmed) Significant changes have occurred    ECG 12 lead    Result Date: 2/12/2025  Supraventricular tachycardia with occasional Premature ventricular complexes Left axis deviation Pulmonary disease pattern Possible Inferior infarct , age undetermined Abnormal ECG When compared with ECG of 12-FEB-2025 09:06, (unconfirmed) Fusion complexes are no longer Present Premature ventricular complexes are now Present    CT angio abdomen pelvis w and or wo IV IV contrast    Result Date: 2/10/2025  Interpreted By:  Felipe Driscoll, STUDY: CT ANGIO ABDOMEN PELVIS W AND/OR WO IV IV CONTRAST;  2/10/2025 9:41 pm   INDICATION: Signs/Symptoms:A-fib with RVR, severe abdominal pain not responding to morphine, assess for mesenteric ischemia.   COMPARISON: 01/29/2025   ACCESSION NUMBER(S): [id number]   ORDERING CLINICIAN: GEORGE LOPEZ   TECHNIQUE: Axial non-contrast images of the abdomen, and pelvis.   Axial CT images of the, abdomen and pelvis were obtained after the intravenous administration of iodinated contrast using angiographic technique with coronal and sagittal reformatted images. MIP images and 3D reconstructions were created on an independent workstation and reviewed.   FINDINGS: VASCULATURE:   Mild atherosclerotic calcification of the aorta without aneurysm or dissection.   Celiac artery, SMA, renal arteries are all widely patent. KATHRYN patent. Iliac arteries patent without stenosis.   CT ABDOMEN/PELVIS:   Arterial phase imaging of the liver, adrenals pancreas and spleen are grossly unremarkable. Status post cholecystectomy.   No new hydronephrosis.   Redemonstrated is retroperitoneal adenopathy surrounding the distal aorta/cava and extending into the right iliac chain which appears to be progressing compared to previous. Necrotic appearing lymph  node in the right pelvic sidewall currently measures 2.2 cm in short axis, previously 1.9 cm.   Stable appearance of the bladder which has trabeculated appearance with right-sided posterior diverticulum. There is some irregular right posterior bladder wall thickening. The possibility of underlying bladder malignancy again should be considered. There is some dependent bladder calculi seen.   No significant new free fluid.   No bowel obstruction or acute colitis.   Unchanged left ovarian cyst measuring 3.5 cm in diameter.   There is multilevel moderate to advanced degenerative disc disease seen in the lumbar spine worse at L4-L5 and L5-S1.       1. No abdominal aortic aneurysm or dissection. No evidence of mesenteric ischemia.   2. Compared to previous study there is progressing retroperitoneal adenopathy with some interval enlargement suspected.   3. Irregular masslike thickening right posterior bladder wall near suspected diverticulum. Underlying bladder malignancy may be present. Correlate with patient history/cystoscopy.   MACRO: None.   Signed by: Felipe Driscoll 2/10/2025 11:12 PM Dictation workstation:   [id number]    XR chest 1 view    Result Date: 2/10/2025  Interpreted By:  Wendy Lee, STUDY: XR CHEST 1 VIEW;  2/10/2025 6:19 pm   INDICATION: Signs/Symptoms:Short of breath, vomiting.   COMPARISON: 01/15/2025   ACCESSION NUMBER(S): [id number]   ORDERING CLINICIAN: GEORGE OLPEZ   FINDINGS:     CARDIOMEDIASTINAL SILHOUETTE: Cardiomediastinal silhouette is normal in size and configuration.   LUNGS: No pulmonary consolidation, pleural effusion or pneumothorax.   ABDOMEN: No remarkable upper abdominal findings.   BONES: No acute osseous abnormality.       No acute cardiopulmonary process.   MACRO: None   Signed by: Wendy Lee 2/10/2025 6:31 PM Dictation workstation:   [id number]    Transesophageal Echo (GAVIN)    Result Date: 2/5/2025            [facility] [place] [id number] [id number]     Tel [phone] Fax [phone] TRANSESOPHAGEAL ECHOCARDIOGRAM REPORT Patient Name:       LISSETTE ZAVALA           Reading Physician:    [id number] Jonathan Magana MD Study Date:         1/30/2025            Ordering Provider:    [id number] JONATHAN MAGANA MRN/PID:            [id number]             Fellow: Accession#:         [id number]         Nurse:                Zulma Ribeiro RN Date of Birth/Age:  1950 / 74      Sonographer:          Mely sutton Gender Assigned at                      Additional Staff:     Emmanuelle Jorge RN Birth: Height:             157.48 cm            Admit Date: Weight:             59.42 kg             Admission Status:     Inpatient -                                                                Routine BSA / BMI:          1.60 m2 / 23.96      Department Location:  [facility]                     kg/m2 Blood Pressure: 174 /72 mmHg Study Type:    TRANSESOPHAGEAL ECHO (GAVIN) Diagnosis/ICD: Shortness of breath-R06.02; Unspecified atrial                fibrillation-I48.91; Abnormal findings on diagnostic imaging of                other specified body structures-R93.89 Indication:    SOb, Afib CPT Codes:     GAVIN Complete-20118  Study Detail: Agitated saline used as a contrast agent for intraseptal flow               evaluation.  PHYSICIAN INTERPRETATION: GAVIN Details: Agitated saline contrast and color flow Doppler echo was performed to assess for the presence of a patent foramen ovale. GAVIN Medication: Midazolam and Fentanyl was used to sedate the patient for this exam. GAVIN Procedure: The probe was passed without difficulty. Left Ventricle: There are no regional wall motion abnormalities. The left ventricular cavity size is normal. Spectral Doppler shows a Grade I (impaired relaxation pattern) of left ventricular diastolic filling with normal left atrial  filling pressure. Left Atrium: The left atrial size is mild to moderately dilated. A bubble study using agitated saline was performed. Bubble study is negative. The left atrial appendage is enlarged. There is no definite left atrial mass present. Right Ventricle: The right ventricle is upper limits of normal in size. There is normal right ventricular global systolic function. Right Atrium: The right atrial size is normal. Aortic Valve: The aortic valve appears structurally normal. There is trace aortic valve regurgitation. Mitral Valve: The mitral valve is normal in structure. There is moderate to severe mitral valve regurgitation. The mitral regurgitant orifice area is 4 mm2. The mitral regurgitant volume is 8.73 ml. Tricuspid Valve: The tricuspid valve is structurally normal. There is trace to mild tricuspid regurgitation. Pulmonic Valve: The pulmonic valve was not assessed. There is trace pulmonic valve regurgitation. Pericardium: No pericardial effusion noted. Aorta: The aortic root is normal.  CONCLUSIONS:  1. Spectral Doppler shows a Grade I (impaired relaxation pattern) of left ventricular diastolic filling with normal left atrial filling pressure.  2. There is normal right ventricular global systolic function.  3. The left atrial size is mild to moderately dilated.  4. Moderate to severe mitral valve regurgitation.  5. No left atrial mass.  6. No obvious cardiac source of embolization by this study. QUANTITATIVE DATA SUMMARY:  LV SYSTOLIC FUNCTION:                      Normal Ranges: EF-Visual:      60 % LV EF Reported: 60 %  MITRAL INSUFFICIENCY:             Normal Ranges: PISA Radius:          0.3 cm MR VTI:               208.00 cm MR Vmax:              519.00 cm/s MR Alias Elieser:         38.5 cm/s MR Volume:            8.73 ml MR Flow Rt:           21.77 ml/s MR EROA:              4 mm2  [id number] Clement Stevens MD Electronically signed on 2/5/2025 at 5:22:41 AM  ** Final **     Electrocardiogram, 12-lead PRN ACS  symptoms    Result Date: 2/1/2025  Supraventricular tachycardia with occasional Premature ventricular complexes and Fusion complexes Right axis deviation Nonspecific ST abnormality Abnormal ECG When compared with ECG of 27-JAN-2025 20:14, (unconfirmed) Fusion complexes are now Present Premature ventricular complexes are now Present QRS axis Shifted right Borderline criteria for Inferior infarct are no longer Present Nonspecific T wave abnormality now evident in Inferior leads Confirmed by LISBETH Agrawal ([id number]) on 2/1/2025 6:38:48 PM    Electrocardiogram, 12-lead PRN ACS symptoms    Result Date: 2/1/2025  Supraventricular tachycardia Low voltage QRS Possible Inferior infarct , age undetermined Abnormal ECG When compared with ECG of 27-JAN-2025 17:29, (unconfirmed) Vent. rate has increased BY  75 BPM QRS axis Shifted right ST now depressed in Anterior leads Confirmed by LISBETH Agrawal ([id number]) on 2/1/2025 6:38:39 PM    Electrocardiogram, 12-lead PRN ACS symptoms    Result Date: 2/1/2025  Normal sinus rhythm Left axis deviation Pulmonary disease pattern Abnormal ECG When compared with ECG of 27-JAN-2025 09:00, (unconfirmed) QRS axis Shifted left Confirmed by LISBETH Agrawal ([id number]) on 2/1/2025 6:38:23 PM    ECG 12 Lead    Result Date: 1/31/2025  Normal sinus rhythm Normal ECG When compared with ECG of 24-JAN-2025 16:10, Premature ventricular complexes are no longer Present Vent. rate has decreased BY  58 BPM QRS axis Shifted right Borderline criteria for Inferior infarct are no longer Present Nonspecific T wave abnormality now evident in Inferior leads T wave amplitude has decreased in Anterior leads Confirmed by Clement Stevens ([id number]) on 1/31/2025 6:21:28 PM    ECG 12 Lead    Result Date: 1/31/2025  Normal sinus rhythm Left axis deviation Pulmonary disease pattern T wave abnormality, consider anterior ischemia Abnormal ECG When compared with ECG of 24-JAN-2025 16:10, Premature ventricular complexes are no longer Present Vent. rate has  decreased BY  67 BPM Nonspecific T wave abnormality now evident in Inferior leads T wave inversion now evident in Anterior leads Confirmed by Clement Stevens ([id number]) on 1/31/2025 6:21:11 PM    MR brain wo IV contrast    Result Date: 1/29/2025  Interpreted By:  Nicolas Sarmiento, STUDY: MR BRAIN WO IV CONTRAST;  1/29/2025 2:34 pm   INDICATION: Signs/Symptoms:aphasia, history of PAF with interruption in anticoagulation.     COMPARISON: CT head and CTA head yesterday.   ACCESSION NUMBER(S): [id number]   ORDERING CLINICIAN: TIGIST KEYS   TECHNIQUE: Patient could only tolerate limited examination. Axial DWI, ADC, sagittal and axial T1 weighted sequences were obtained.   FINDINGS: Multiple small patchy foci of abnormally restricted diffusion are seen in the bilateral cerebral hemispheres predominantly each centrum semiovale and oriented in AP direction with some additional involvement of the anterior right middle frontal gyrus cortex, the posterior right corpus callosum, paramedian right occipital lobe. Questionable additional involvement of left-greater-than-right cerebellar hemispheres.   No definite intracranial hemorrhage.     No extra-axial fluid collection. No intracranial mass.   Mild to moderate white matter T2 signal increase, most commonly seen with chronic small vessel ischemic change.  Presence of older small infarctions in these areas not excluded.   Degree of ventriculomegaly is commensurate with overall degree of brain parenchymal volume loss, which is mild.       1. Multiple small patchy foci of abnormally restricted diffusion are seen in the bilateral cerebral hemispheres predominantly each centrum semiovale and oriented in AP direction with some additional involvement of the anterior right middle frontal gyrus cortex, the posterior right corpus callosum, paramedian right occipital lobe. Questionable additional involvement of left-greater-than-right cerebellar hemispheres. Findings consistent with acute  to subacute infarctions. The AP orientation of the high bilateral cerebral hemispheres could indicate a recent hypotensive event. 2. Mild to moderate white matter T2 signal increase, most commonly seen with chronic small vessel ischemic change.  Presence of older small infarctions in these areas not excluded. 3. Degree of ventriculomegaly is commensurate with overall degree of brain parenchymal volume loss, which is mild.   Note the patient was only able to tolerate limited examination as described above.   MACRO: None   Signed by: Nicolas Sarmiento 1/29/2025 3:12 PM Dictation workstation:   [id number]    CT abdomen pelvis w IV contrast    Result Date: 1/29/2025  Interpreted By:  Evonne Yi, STUDY: CT ABDOMEN PELVIS W IV CONTRAST;  1/29/2025 9:32 am   INDICATION: Signs/Symptoms:abdominal pain with acute encephalopathy.     COMPARISON: CT abdomen pelvis 01/24/2025 CT chest abdomen pelvis 01/28/2024   ACCESSION NUMBER(S): [id number]   ORDERING CLINICIAN: TIGIST KEYS   TECHNIQUE: CT of the abdomen and pelvis was performed.  Standard contiguous axial images were obtained at 3 mm slice thickness through the abdomen and pelvis. Coronal and sagittal reconstructions at 3 mm slice thickness were performed.  75 ML of Omnipaque 350 was administered intravenously without immediate complication.   FINDINGS: Please note that the evaluation of vessels, lymph nodes and organs is limited without intravenous contrast.   LOWER CHEST: The heart is normal in size without evidence of pericardial effusion. No consolidation or pleural effusion is present. No concerning lung nodule. Small hiatal hernia again noted.   ABDOMEN:   LIVER: The liver is normal in size without evidence of focal liver lesions.   BILE DUCTS: Stable mild intrahepatic and extrahepatic biliary duct dilatation, likely secondary to cholecystectomy.   GALLBLADDER: Cholecystectomy   PANCREAS: The pancreas appears unremarkable without evidence of ductal dilatation  or masses.   SPLEEN: The spleen is normal in size without focal lesions.   ADRENAL GLANDS: No adrenal nodularity or thickening.   KIDNEYS AND URETERS: The kidneys are normal in size. Stable nonobstructive punctuate calculus in the right kidney. Stable sub 5 mm hypoattenuating lesions in the bilateral kidneys, which are too small to further characterize. No hydroureteronephrosis or left nephroureterolithiasis is identified.   PELVIS:   BLADDER: The bladder is completely opacified by contrast from prior imaging, which limits evaluation.   REPRODUCTIVE ORGANS: No pelvic masses. A stable 48 mm left adnexal cyst is seen on series 2, image 116. A 21 x 19 mm right adnexal lesion could be a right adnexal cyst or a perivesicular lymph node.   BOWEL: Small hiatal hernia. The small and large bowel are normal in caliber and demonstrate no wall thickening. The appendix is not definitely visualized. There is however no pericecal stranding or fluid.   VESSELS: There is no aneurysmal dilatation of the abdominal aorta. Mild atherosclerotic calcifications of the aortoiliac arteries. The IVC appears normal.   PERITONEUM/RETROPERITONEUM/LYMPH NODES: Retroperitoneal lymphadenopathy with adjacent stranding, stable when compared to prior CT from 01/24/2025, and new when compared to prior CT from 01/28/2024, for example as seen on series 2: *16 x 10 mm aortocaval lymph node, image 75 *12 x 11 mm retrocaval lymph node, image 91 *16 x 15 mm right common iliac lymph node, image 97 *20 x 16 mm right internal iliac lymph node, image 104 *29 x 20 mm right external iliac lymph node, image 126 *24 x 14 mm right external iliac lymph node, image 135 *21 x 19 mm perivascular lesion, which could be a lymph node or right adnexal cyst, image 131 *20 x 18 mm right pelvic sidewall lesion, image 117   No ascites or fluid collection. No peritoneal nodularity or implant.   ABDOMINAL WALL: The abdominal wall soft tissues appear normal. Sacral spinal  stimulator noted.   BONES: No suspicious osseous lesions are identified. Degenerative discogenic disease is noted in the lower thoracic and lumbar spine. Grade 1 anterolisthesis of the L4 and L5 vertebral bodies.       1. Retroperitoneal lymphadenopathy, stable when compared to prior CT from 01/24/2025, and new when compared to prior CT from 01/28/2024. Though the adjacent stranding favors an infectious/inflammatory etiology, underlying malignancy such as lymphoma or bladder carcinoma (given the bladder wall thickening seen on prior CT) can not be excluded. If clinical signs/symptoms favor infectious etiology, short-term follow-up CT abdomen pelvis in 4-6 weeks is recommended. Alternatively, correlation with serum LDH levels, cystoscopy, tissue diagnosis and/or PET-CT are other diagnostic considerations that could be obtained as per clinical need. 2. No significant interval change as compared to prior CT from 01/24/2025.   MACRO: Critical Finding:  There are multiple critical findings. See findings. notification was initiated on 1/29/2025 at 10:55 am by Evonne Yi.  (**-YCF-**)   Signed by: Evonne Yi 1/29/2025 10:56 AM Dictation workstation:   [id number]    CT angio head and neck w and wo IV contrast    Result Date: 1/28/2025  Interpreted By:  Nicolas Sarmiento, STUDY: CT ANGIO HEAD AND NECK W AND WO IV CONTRAST;  1/28/2025 6:46 pm   INDICATION: Signs/Symptoms:stroke rule out.     COMPARISON: CT head today.   ACCESSION NUMBER(S): [id number]   ORDERING CLINICIAN: TIGIST KEYS   TECHNIQUE: Unenhanced CT images of the head were obtained.  Subsequently,  60 ML of Omnipaque 350 was administered intravenously and axial images of the head and neck were acquired.  Coronal, sagittal, and 3-D reconstructions were provided for review.   FINDINGS:   CTA COW: No major vascular occlusion. Mild atherosclerotic changes through the carotid siphons. No aneurysms.  No vascular malformations.   CTA CAROTID: No aortic  aneurysm or dissection. No significant stenoses at the origins of the great vessels from the aortic arch. No significant stenoses of the brachycephalic artery or bilateral subclavian arteries.   Mild atherosclerotic changes at the carotid bifurcations. No significant stenoses bilateral carotid arterial systems. No significant stenoses bilateral vertebral arterial systems.   NONVASCULAR HEAD: No intracranial mass. No intracranial hemorrhage. No evidence of large evolving cortical infarction. Supratentorial white matter hypodensities most likely related to mild chronic small vessel ischemic change.     Bones intact. Mucous retention cyst left maxillary sinus.   NONVASCULAR NECK: No soft tissue mass. No adenopathy. Salivary glands are unremarkable. Thyroid gland unremarkable. Bones intact.       1. No intracranial major vascular occlusion. 2. No flow-limiting stenosis or significant plaque irregularity in the neck.     MACRO: None   Signed by: Nicolas Sarmiento 1/28/2025 7:30 PM Dictation workstation:   [id number]    CT brain attack head wo IV contrast    Result Date: 1/28/2025  Interpreted By:  Howard Millard, STUDY: TIGIST KEYS; 1/28/2025 5:08 pm   INDICATION: Signs/Symptoms:brain attack;   COMPARISON: 01/28/2024   ACCESSION NUMBER(S): [id number]   ORDERING CLINICIAN: TIGIST KEYS   TECHNIQUE: Contiguous axial images were acquired from the vertex through the posterior fossa without IV contrast. All CT examinations are performed with 1 or more of the following dose reduction techniques: Automated exposure control, adjustment of mA and/or kv according to patient's size, or use of iterative reconstruction techniques.   FINDINGS: No focal mass effect or midline shift is identified. The ventricles and sulci are symmetric and appropriate for the patient's age.   The gray white matter differentiation is preserved. Again seen is a moderate degree of nonspecific white matter hypodensity, most consistent with chronic  small-vessel ischemic disease, not significantly changed from the prior study.   No acute intracranial hemorrhage is seen. No intra-axial or extra-axial fluid collection is seen.   The visualized paranasal sinuses and mastoid air cells are clear. There is a small-moderate sized mucous retention cysts in the visualized left maxillary sinus.       No CT evidence for acute intracranial pathology.   Moderate degree of nonspecific white matter hypodensity, most consistent with chronic small-vessel ischemic disease, not significantly changed when compared to the prior study.         Findings discussed with the physician on the 2nd floor at 5:20 p.m. on 01/28/2025   Signed by: Howard Millard 1/28/2025 5:39 PM Dictation workstation:   [id number]       Assessment/Plan   IMP:  Patient was unable to engage in conversation. Met with son Eriberto to discuss patient current condition and prognosis should she continue to not eat or drink. Son is very aware as he has been with his mother daily since she has not been feeling well about 6 weeks. He is aware she is not eating or drinking. This process of FTT has been ongoing for at least the last 6 weeks with the last 2 weeks eating being almost non existent.  He is also aware that she met with Dr Cole and there were treatment options discussed such as chemo and immunotherapy however he is also aware that if she does not eat or drink- the door for treatment will close. Eriberto states she would not wish to be on machines or have feeding tubes. We discussed the Hospice philosophy of care and he expressed interest in meeting with Hospice tomorrow. He understands that if she improves- Hospice can be cancelled and she can again pursue aggressive cancer treatment. He does state however, that he is also painfully aware of the decline in health his mother has taken over the last 6 weeks and he feels it may be unrealistic to believe she will suddenly begin to eat and drink again. Quality of  life is very important to her and she prides herself on her independence. He states she did not want artifical feedings or to live in a NH- so if those were the options- he believes she would choose comfort focused care as home with family.   Referral placed to [facility] to meet with son tomorrow.     Ordered Ditropan XL for management of pain related to bladder spasms and Colace as patient has not had a BM since admission to the hospital.     Symptom Management  Pain: [facility] team ordered additional pain medications, Ditropan XL added for bladder spasms  Medications recommended for pain?  Unknown  Tiredness: yes  Nausea: no  Depression: no  Anxiety: no  Drowsiness: yes  Appetite: poor-non existent  Wellbeing: unable to establish  Dyspnea: no  Intervention recommended for constipation?  Yes, added colace    Provider estimate of survival: days to weeks  Patient Prognostic Awareness: Patient unable to respond    Patient/proxy preference for information  Prefers full information    Goals of Care  Comfort focused    Is the patient hospice-eligible?   Yes  Was a discussion held re hospice services?   yes  Was a decision made re hospice services?  Meeting with [facility] tomorrow    Other Palliative Support  Emotional support for son and other family    I spent 45 minutes in the review, planning and care of this patient.         Pati Solis, APRN-CNS

## 2025-02-25 LAB
ALBUMIN SERPL BCP-MCNC: 2.6 G/DL (ref 3.4–5)
ANION GAP SERPL CALC-SCNC: 9 MMOL/L (ref 10–20)
BACTERIA BLD CULT: NORMAL
BACTERIA BLD CULT: NORMAL
BUN SERPL-MCNC: 18 MG/DL (ref 6–23)
CALCIUM SERPL-MCNC: 9.8 MG/DL (ref 8.6–10.3)
CHLORIDE SERPL-SCNC: 95 MMOL/L (ref 98–107)
CO2 SERPL-SCNC: 27 MMOL/L (ref 21–32)
CREAT SERPL-MCNC: 1.05 MG/DL (ref 0.5–1.05)
EGFRCR SERPLBLD CKD-EPI 2021: 55 ML/MIN/1.73M*2
ERYTHROCYTE [DISTWIDTH] IN BLOOD BY AUTOMATED COUNT: 20 % (ref 11.5–14.5)
GLUCOSE BLD MANUAL STRIP-MCNC: 112 MG/DL (ref 74–99)
GLUCOSE BLD MANUAL STRIP-MCNC: 113 MG/DL (ref 74–99)
GLUCOSE BLD MANUAL STRIP-MCNC: 117 MG/DL (ref 74–99)
GLUCOSE BLD MANUAL STRIP-MCNC: 126 MG/DL (ref 74–99)
GLUCOSE SERPL-MCNC: 122 MG/DL (ref 74–99)
HCT VFR BLD AUTO: 28.3 % (ref 36–46)
HGB BLD-MCNC: 8.6 G/DL (ref 12–16)
MAGNESIUM SERPL-MCNC: 2.25 MG/DL (ref 1.6–2.4)
MCH RBC QN AUTO: 22.1 PG (ref 26–34)
MCHC RBC AUTO-ENTMCNC: 30.4 G/DL (ref 32–36)
MCV RBC AUTO: 73 FL (ref 80–100)
NRBC BLD-RTO: 0 /100 WBCS (ref 0–0)
PHOSPHATE SERPL-MCNC: 3.2 MG/DL (ref 2.5–4.9)
PLATELET # BLD AUTO: 204 X10*3/UL (ref 150–450)
POTASSIUM SERPL-SCNC: 4.9 MMOL/L (ref 3.5–5.3)
RBC # BLD AUTO: 3.9 X10*6/UL (ref 4–5.2)
SODIUM SERPL-SCNC: 126 MMOL/L (ref 136–145)
WBC # BLD AUTO: 18.4 X10*3/UL (ref 4.4–11.3)

## 2025-02-25 PROCEDURE — 85027 COMPLETE CBC AUTOMATED: CPT

## 2025-02-25 PROCEDURE — 36415 COLL VENOUS BLD VENIPUNCTURE: CPT

## 2025-02-25 PROCEDURE — 2500000001 HC RX 250 WO HCPCS SELF ADMINISTERED DRUGS (ALT 637 FOR MEDICARE OP)

## 2025-02-25 PROCEDURE — 2500000004 HC RX 250 GENERAL PHARMACY W/ HCPCS (ALT 636 FOR OP/ED)

## 2025-02-25 PROCEDURE — 83735 ASSAY OF MAGNESIUM: CPT

## 2025-02-25 PROCEDURE — 2500000002 HC RX 250 W HCPCS SELF ADMINISTERED DRUGS (ALT 637 FOR MEDICARE OP, ALT 636 FOR OP/ED)

## 2025-02-25 PROCEDURE — 97116 GAIT TRAINING THERAPY: CPT | Mod: GP,CQ

## 2025-02-25 PROCEDURE — 97535 SELF CARE MNGMENT TRAINING: CPT | Mod: GO,CO

## 2025-02-25 PROCEDURE — 99232 SBSQ HOSP IP/OBS MODERATE 35: CPT | Performed by: STUDENT IN AN ORGANIZED HEALTH CARE EDUCATION/TRAINING PROGRAM

## 2025-02-25 PROCEDURE — 2500000005 HC RX 250 GENERAL PHARMACY W/O HCPCS

## 2025-02-25 PROCEDURE — 82947 ASSAY GLUCOSE BLOOD QUANT: CPT

## 2025-02-25 PROCEDURE — 80069 RENAL FUNCTION PANEL: CPT

## 2025-02-25 PROCEDURE — 97530 THERAPEUTIC ACTIVITIES: CPT | Mod: GP,CQ

## 2025-02-25 PROCEDURE — 1100000001 HC PRIVATE ROOM DAILY

## 2025-02-25 RX ORDER — LIDOCAINE 560 MG/1
1 PATCH PERCUTANEOUS; TOPICAL; TRANSDERMAL DAILY
Status: DISCONTINUED | OUTPATIENT
Start: 2025-02-25 | End: 2025-02-27 | Stop reason: HOSPADM

## 2025-02-25 RX ORDER — OXYCODONE HCL 10 MG/1
10 TABLET, FILM COATED, EXTENDED RELEASE ORAL EVERY 12 HOURS SCHEDULED
Status: DISCONTINUED | OUTPATIENT
Start: 2025-02-25 | End: 2025-02-27 | Stop reason: HOSPADM

## 2025-02-25 RX ORDER — LIDOCAINE 560 MG/1
1 PATCH PERCUTANEOUS; TOPICAL; TRANSDERMAL DAILY
Status: DISCONTINUED | OUTPATIENT
Start: 2025-02-25 | End: 2025-02-25

## 2025-02-25 RX ADMIN — TRAZODONE HYDROCHLORIDE 100 MG: 100 TABLET ORAL at 20:44

## 2025-02-25 RX ADMIN — OXYCODONE HYDROCHLORIDE 10 MG: 10 TABLET, FILM COATED, EXTENDED RELEASE ORAL at 20:44

## 2025-02-25 RX ADMIN — DOCUSATE SODIUM 100 MG: 100 CAPSULE, LIQUID FILLED ORAL at 08:42

## 2025-02-25 RX ADMIN — ATORVASTATIN CALCIUM 10 MG: 10 TABLET, FILM COATED ORAL at 08:41

## 2025-02-25 RX ADMIN — FOLIC ACID 1 MG: 1 TABLET ORAL at 08:42

## 2025-02-25 RX ADMIN — OXYCODONE HYDROCHLORIDE 5 MG: 5 TABLET ORAL at 02:59

## 2025-02-25 RX ADMIN — METOPROLOL TARTRATE 75 MG: 50 TABLET, FILM COATED ORAL at 08:42

## 2025-02-25 RX ADMIN — ONDANSETRON 4 MG: 2 INJECTION INTRAMUSCULAR; INTRAVENOUS at 02:59

## 2025-02-25 RX ADMIN — LOSARTAN POTASSIUM 50 MG: 50 TABLET, FILM COATED ORAL at 08:42

## 2025-02-25 RX ADMIN — HYDROMORPHONE HYDROCHLORIDE 0.4 MG: 1 INJECTION, SOLUTION INTRAMUSCULAR; INTRAVENOUS; SUBCUTANEOUS at 05:21

## 2025-02-25 RX ADMIN — APIXABAN 5 MG: 5 TABLET, FILM COATED ORAL at 08:42

## 2025-02-25 RX ADMIN — Medication 1000 UNITS: at 08:42

## 2025-02-25 RX ADMIN — Medication 400 MG: at 08:42

## 2025-02-25 RX ADMIN — LEVOTHYROXINE SODIUM 100 MCG: 0.1 TABLET ORAL at 05:21

## 2025-02-25 RX ADMIN — MIRTAZAPINE 7.5 MG: 15 TABLET, FILM COATED ORAL at 20:43

## 2025-02-25 RX ADMIN — OXYCODONE HYDROCHLORIDE 10 MG: 10 TABLET, FILM COATED, EXTENDED RELEASE ORAL at 15:22

## 2025-02-25 RX ADMIN — OXYBUTYNIN CHLORIDE 5 MG: 5 TABLET ORAL at 08:41

## 2025-02-25 RX ADMIN — APIXABAN 5 MG: 5 TABLET, FILM COATED ORAL at 20:44

## 2025-02-25 RX ADMIN — DOCUSATE SODIUM 100 MG: 100 CAPSULE, LIQUID FILLED ORAL at 20:44

## 2025-02-25 RX ADMIN — OXYBUTYNIN CHLORIDE 5 MG: 5 TABLET ORAL at 20:44

## 2025-02-25 RX ADMIN — METOPROLOL TARTRATE 75 MG: 50 TABLET, FILM COATED ORAL at 20:44

## 2025-02-25 RX ADMIN — LIDOCAINE 4% 1 PATCH: 40 PATCH TOPICAL at 04:38

## 2025-02-25 RX ADMIN — OXYCODONE HYDROCHLORIDE 5 MG: 5 TABLET ORAL at 10:01

## 2025-02-25 ASSESSMENT — PAIN SCALES - GENERAL
PAINLEVEL_OUTOF10: 8
PAINLEVEL_OUTOF10: 6
PAINLEVEL_OUTOF10: 0 - NO PAIN
PAINLEVEL_OUTOF10: 6
PAINLEVEL_OUTOF10: 4
PAINLEVEL_OUTOF10: 4
PAINLEVEL_OUTOF10: 8
PAINLEVEL_OUTOF10: 6
PAINLEVEL_OUTOF10: 7

## 2025-02-25 ASSESSMENT — COGNITIVE AND FUNCTIONAL STATUS - GENERAL
MOVING TO AND FROM BED TO CHAIR: A LOT
EATING MEALS: A LITTLE
DRESSING REGULAR LOWER BODY CLOTHING: A LITTLE
HELP NEEDED FOR BATHING: A LITTLE
DRESSING REGULAR UPPER BODY CLOTHING: A LOT
TOILETING: A LITTLE
EATING MEALS: A LITTLE
PERSONAL GROOMING: A LITTLE
DRESSING REGULAR UPPER BODY CLOTHING: A LITTLE
CLIMB 3 TO 5 STEPS WITH RAILING: A LOT
TURNING FROM BACK TO SIDE WHILE IN FLAT BAD: A LOT
TURNING FROM BACK TO SIDE WHILE IN FLAT BAD: A LOT
WALKING IN HOSPITAL ROOM: A LOT
MOVING FROM LYING ON BACK TO SITTING ON SIDE OF FLAT BED WITH BEDRAILS: A LOT
MOBILITY SCORE: 13
PERSONAL GROOMING: A LITTLE
DAILY ACTIVITIY SCORE: 18
TOILETING: A LOT
STANDING UP FROM CHAIR USING ARMS: A LOT
MOVING TO AND FROM BED TO CHAIR: A LOT
MOVING TO AND FROM BED TO CHAIR: A LOT
MOVING FROM LYING ON BACK TO SITTING ON SIDE OF FLAT BED WITH BEDRAILS: A LITTLE
TURNING FROM BACK TO SIDE WHILE IN FLAT BAD: A LOT
STANDING UP FROM CHAIR USING ARMS: A LOT
WALKING IN HOSPITAL ROOM: A LOT
DAILY ACTIVITIY SCORE: 14
MOBILITY SCORE: 13
HELP NEEDED FOR BATHING: A LOT
STANDING UP FROM CHAIR USING ARMS: A LOT
WALKING IN HOSPITAL ROOM: A LOT
CLIMB 3 TO 5 STEPS WITH RAILING: A LOT
MOVING FROM LYING ON BACK TO SITTING ON SIDE OF FLAT BED WITH BEDRAILS: A LITTLE
MOBILITY SCORE: 12
DRESSING REGULAR LOWER BODY CLOTHING: A LOT
CLIMB 3 TO 5 STEPS WITH RAILING: A LOT

## 2025-02-25 ASSESSMENT — PAIN - FUNCTIONAL ASSESSMENT
PAIN_FUNCTIONAL_ASSESSMENT: 0-10

## 2025-02-25 ASSESSMENT — ACTIVITIES OF DAILY LIVING (ADL): HOME_MANAGEMENT_TIME_ENTRY: 28

## 2025-02-25 ASSESSMENT — PAIN DESCRIPTION - LOCATION
LOCATION: BACK
LOCATION: BACK

## 2025-02-25 ASSESSMENT — PAIN DESCRIPTION - DESCRIPTORS: DESCRIPTORS: ACHING

## 2025-02-25 ASSESSMENT — PAIN DESCRIPTION - ORIENTATION: ORIENTATION: MID

## 2025-02-25 NOTE — SIGNIFICANT EVENT
Met with pt and son at bedside. Pt is confused and unable to provide input regarding medical decision making. Pt's son is legal NOK (she does not have a living spouse and no other children). He states that they are very close, and live only about 1 mile apart. He states she is very functional and active at baseline, normally lives alone. He reports that they have discussed GOC and end of life wishes multiple occasions. He reports her focus has always been on quality of life. We discussed that her WBC is worsening and there could be an underlying infection contributing to some of the encephalopathy but overall the biggest issue at hand is the bladder cancer. We discussed two possible paths forward - infectious work up, ongoing lab and tele monitoring, and supportive care versus supportive/comfort care only approach. He states he would prefer to transition to comfort care only as he feels this is most in line with her wishes that she has expressed to him on many occasions. Will DC tele, lab monitoring, and avoid further work up. Continue comfort efforts. Hospice meeting scheduled for tomorrow.     Adrianna Castro, DO

## 2025-02-25 NOTE — PROGRESS NOTES
Occupational Therapy    OT Treatment    Patient Name: Myrna Rodrigues  MRN: [id number]  Today's Date: 2/25/2025  Time Calculation  Start Time: 1032  Stop Time: 1100  Time Calculation (min): 28 min       3034/3034-A    Assessment:  End of Session Communication: Bedside nurse  End of Session Patient Position: Up in chair, Alarm on       Plan:  OT Frequency: 4 times per week  OT Discharge Recommendations: Low intensity level of continued care     Subjective        02/25/25 1032   OT Last Visit   OT Received On 02/25/25   General   Prior to Session Communication Bedside nurse   Patient Position Received Bed, 3 rail up;Alarm on   Preferred Learning Style verbal;visual   General Comment Pt pleasant and cooperative with encouragement, however lethargic during session with max cues to follow commands and transfer. RN aware and cleared for therapy.    Cognition   Overall Cognitive Status Impaired; A&Ox2-3   Orientation Level   (Pt correctly answered orientation questions however confused at times during session and occasional impuslive sit<>stands.)    Toileting   Toileting Level of Assistance Maximum assistance   Where Assessed Bedside commode   Bed Mobility   Bed Mobility Yes   Bed Mobility 1   Bed Mobility 1 Supine to sitting;Scooting   Level of Assistance 1 Moderate assistance   Transfers   Transfer Yes   Transfer 1   Transfer From 1 Sit to   Transfer to 1 Stand   Technique 1 Sit to stand;Stand to sit   Transfer Device 1 Gait belt;Walker   Transfer Level of Assistance 1 Minimum assistance;Moderate assistance;+2;Maximum verbal cues;Maximum tactile cues   Trials/Comments 1 Pt completed multiple sit<>stands from various seated surfaces Min-Mod Ax2, stand pivot from bed>BSC and took few steps from BSC>chair, slow gait with max cues to advance forward.   Toilet Transfers   Toilet Transfer From Bed   Toilet Transfer Type To   Toilet Transfer to Standard bedside commode   Toilet Transfer Technique Stand pivot   Toilet Transfers  Minimal assistance;Moderate assistance   IP OT Assessment   End of Session Communication Bedside nurse   End of Session Patient Position Up in chair;Alarm on   Inpatient Plan   OT Frequency 4 times per week   OT Discharge Recommendations Low intensity level of continued care       Outcome Measures:[facility] Daily Activity  Putting on and taking off regular lower body clothing: A lot  Bathing (including washing, rinsing, drying): A lot  Putting on and taking off regular upper body clothing: A lot  Toileting, which includes using toilet, bedpan or urinal: A lot  Taking care of personal grooming such as brushing teeth: A little  Eating Meals: A little  Daily Activity - Total Score: 14  Education Documentation  Body Mechanics, taught by LEROY Harvey at 2/25/2025  1:30 PM.  Learner: Patient  Readiness: Acceptance  Method: Explanation, Demonstration  Response: Verbalizes Understanding, Demonstrated Understanding, Needs Reinforcement    Precautions, taught by LEROY Harvey at 2/25/2025  1:30 PM.  Learner: Patient  Readiness: Acceptance  Method: Explanation, Demonstration  Response: Verbalizes Understanding, Demonstrated Understanding, Needs Reinforcement    ADL Training, taught by LEROY Harvey at 2/25/2025  1:30 PM.  Learner: Patient  Readiness: Acceptance  Method: Explanation, Demonstration  Response: Verbalizes Understanding, Demonstrated Understanding, Needs Reinforcement    Education Comments  No comments found.            Goals:  Encounter Problems       Encounter Problems (Active)       Dressings Lower Extremities       STG - Patient to complete lower body dressing MOD I (Progressing)       Start:  02/23/25    Expected End:  03/10/25               Functional Balance       LTG - Patient will maintain standing and sitting balance to allow for completion of daily activities (Progressing)       Start:  02/23/25    Expected End:  03/10/25               Grooming       STG - Patient  completes grooming MOD I (Progressing)       Start:  02/23/25    Expected End:  03/10/25               OT Transfers       STG - Patient will perform toilet transfer MOD I (Progressing)       Start:  02/23/25    Expected End:  03/10/25

## 2025-02-25 NOTE — NURSING NOTE
1813 - Patient is noted to be more alert and awake this morning.  She continues to have a poor appetite and needs encouragement to drink the ensure supplements. Patient's son has been at the bedside today.  She was up to the chair this afternoon.  Oxycontin er 10mg was started this afternoon. Hospice meeting tomorrow.  Bed alarm in use for patient safety and call light in reach.

## 2025-02-25 NOTE — PROGRESS NOTES
Myrna Rodrigues is a 74 y.o. female on day 4 of admission presenting with Generalized abdominal pain.      Subjective   Patient seen and examined at bedside.  Overnight events reviewed. Resting comfortably in bed, discussed with son plan for hospice meeting tomorrow    Objective     Last Recorded Vitals  /58   Pulse 90   Temp 36.2 °C (97.2 °F)   Resp 16   Wt 63.5 kg (139 lb 15.9 oz)   SpO2 94% Comment: 2L  Intake/Output last 3 Shifts:    Intake/Output Summary (Last 24 hours) at 2/25/2025 0719  Last data filed at 2/25/2025 0400  Gross per 24 hour   Intake 650 ml   Output 0 ml   Net 650 ml       Admission Weight  Weight: 61.2 kg (135 lb) (02/21/25 0622)    Daily Weight  02/21/25 : 63.5 kg (139 lb 15.9 oz)    Image Results  CT chest w IV contrast  Narrative: Interpreted By:  Main Philippe,   STUDY:  CT CHEST W IV CONTRAST;  2/22/2025 10:06 am      INDICATION:  Signs/Symptoms:Squamous cell carcinoma of the bladder, for staging  purposes..      COMPARISON:  01/28/2024      ACCESSION NUMBER(S):  [id number]      ORDERING CLINICIAN:  SHAKILA GLASS      TECHNIQUE:  Helical data acquisition of the chest was obtained with IV contrast  material. 50 mL of Omnipaque 350. Images were reformatted in axial,  coronal, and sagittal planes.      FINDINGS:  Lungs and Pleura: Scattered streaky atelectasis. Small bilateral  pleural effusions with adjacent compressive atelectasis. No pleural  effusion. No pneumothorax.      Mediastinum and axilla: No adenopathy by CT size criteria. No  cardiomegaly or pericardial effusion. No thoracic aortic aneurysm.  Small hiatal hernia. Atherosclerosis. Coronary artery calcifications.      Visualized Upper Abdomen: Upper abdominal lymphadenopathy, biliary  ductal dilatation, and other findings detail to better extent on CT  abdomen and pelvis dated 02/21/2025.      MSK/Chest Wall: No aggressive bony lesion identified. Scattered  degenerative change in the spine.      Impression: Small  bilateral pleural effusions with adjacent atelectasis.  Scattered streaky subsegmental atelectasis.      No evidence of thoracic metastatic disease.      Signed by: Main Philippe 2/22/2025 11:56 AM  Dictation workstation:   [id number]  Electrocardiogram, 12-lead PRN ACS symptoms  Supraventricular tachycardia  Low voltage QRS  Possible Inferior infarct , age undetermined  Abnormal ECG  When compared with ECG of 12-FEB-2025 09:30, (unconfirmed)  Vent. rate has increased BY  73 BPM  Borderline criteria for Inferior infarct are now Present    Physical Exam  General: NAD  Skin: Warm and dry  ENT: NC; PERRL; EOMI; clear sclera; membranes pink and moist; Fair dentition  Head/Neck: NC; supple, non-rigid, no JVD  CV: regular rate and rhythm, no murmurs  RESP: CTA bilaterally, normal chest expansion, no resp distress  Abd: soft, some generalized tenderness, nondistended  Neuro: alert and oriented, speech appropriate, cranial nerves grossly intact, motor/sensation wnl  Extremities: no peripheral edema appreciated, cap refill <2s  Psych: Lethargic    Relevant Results  Labs this admission reviewed  Imagings this admission reviewed  Cultures: Reviewed    Assessment/Plan   Assessment & Plan  Generalized abdominal pain    Myrna Rodrigues is a 74 y.o. female with PMHx of recently diagnosed bladder cancer, prior stroke without residual deficits, afib s/p ablation on Eliquis, paroxysmal SVT, HTN, CAD, HLD, GERD, DM, and hypothyroidism who presented with worsening RLQ abdominal pain and intermittent palpitations. Given her leukocytosis, hematuria, and UA findings, there is concern for UTI vs. worsening malignancy-related complications. CT A/P showed progression of bladder mass and worsening lymphadenopathy. EKG showed SVT ( bpm). Managed with IV fluids, analgesia (Dilaudid, ketamine, morphine), and broad-spectrum antibiotics. Admitted for pain control, further infectious workup, and oncology evaluation.     #Intractable abdominal  pain   #Newly diagnosed invasive bladder cancer  #Leukocytosis   #Hyponatremia, resolving  She underwent inpatient cystoscopy with biopsy that showed Invasive carcinoma with extensive keratinizing squamous differentiation (greater than 90%), invading into the detrusor muscle (muscularis propria).  Additionally UA showed +2 hematuria, positive nitrites, positive leukocyte esterase, 6-10 WBCs, and +1 bacteria. Previous culture data from prior admissions showed multiple organisms and Klebsiella pneumoniae with resistant ampicillin and azithromycin and cefazolin.   Plan  -Pain control: Dilaudid decreased to 0.4 mg PRN for breakthrough pain, Oxycodone 7.5mg PRN for severe pain, 5mg for moderate pain, Oxycodone XR 10mg BID, Naloxone as needed  -Na: 126, possible dehydration vs. SIADH secondary to pain  -Urine culture demonstrated contamination with mixed bacterial mary ellen  -S/p Zosyn in the ED, transitioned to Rocephin yesterday. ABX discontinued as urine culture demonstrated contamination.  -Zofran as needed for nausea  -Oncology consulted, has appointment with Dr. eRyes on Wednesday 2/26  -Family electing for comfort care, hospice consult tomorrow     #Paroxysmal SVT   #A-fib status s/p ablation on Eliquis   #Hx of Afib RVR   #Hx of multifocal stroke  [id number] score 6   -She was intermittently tachycardic in the ED, most likely due to worsening abdominal pain.  -Resume home medication with metoprolol and anticoagulation with Eliquis  -Will medically optimize pain control  -Daily Electrolytes and Mg. Maintain K > 4 and Mg > 2, replace as needed.  -On previous admissions, SVT is eradicated with vagal maneuver.     #Chronic microcytic anemia   -Hemoglobin stable at 8.6 (at baseline)  -Likely due to anemia of chronic disease  -No active bleeding, she denies hematuria, hematemesis or hemtochezia  -Monitor vital signs for fever  -Daily CBC      Chronic problem  #Hypothyroidism  #T2DM - SSI  #HTN  #HLD  #GERD  -Continue home  meds as appropriate      Malnutrition Diagnosis Status: Active  Malnutrition Diagnosis: Moderate malnutrition related to acute disease or injury  Related to: acute on chronic nutritional stress in the setting of bladder cancer.  As Evidenced by: <75% of estimated energy requirements for > 7 days and NFPE showing signs of moderate muscle wasting/fat loss.  I agree with the dietitian's malnutrition diagnosis.    Consultants: Oncology, Palliative, hospice  Diet: Regular diet  Code Status: DNR-CC  DVT Prophylaxis: Eliquis  Disposition: Plan for hospice meeting tomorrow, will follow-up results    Staffed with attending physician,   Nicolas Proctor DO

## 2025-02-25 NOTE — PROGRESS NOTES
Physical Therapy    Physical Therapy Treatment    Patient Name: Myrna Rodrigues  MRN: [id number]  Today's Date: 2/25/2025  Time Calculation  Start Time: 1030  Stop Time: 1100  Time Calculation (min): 30 min     3034/3034-A       02/25/25 1030   PT  Visit   PT Received On 02/25/25   Response to Previous Treatment Patient with no complaints from previous session.   General   Reason for Referral impaired mobility, gait training   Referred By Dr. Fonseca   Past Medical History Relevant to Rehab PMH includes recent dx of bladder CA s/p cystoscopy and tumor resection (1.5 wks ago), anxiety, depression, arrhythmia, asthma, CAD, DM, HTN, CA of thyroid and bladder, CVA without residual deficits, a-fib s/p ablation, s/p lami.   Family/Caregiver Present Yes   Caregiver Feedback Children present and supportive   Prior to Session Communication Bedside nurse   Patient Position Received Bed, 3 rail up;Alarm on   Preferred Learning Style verbal;visual   General Comment Pt is very lethargic and groggy this session.   Precautions   Medical Precautions Fall precautions   Pain Assessment   Pain Assessment 0-10   0-10 (Numeric) Pain Score   (Pt did not rate)   Pain Type Acute pain   Pain Location Abdomen   Pain Interventions Repositioned   Response to Interventions No change in pain   Cognition   Overall Cognitive Status Impaired   Orientation Level Disoriented to time   Coordination   Movements are Fluid and Coordinated Yes   Coordination Comment slow to respond   Postural Control   Postural Control WFL   Static Sitting Balance   Static Sitting-Balance Support Bilateral upper extremity supported   Static Sitting-Level of Assistance Contact guard   Static Sitting-Comment/Number of Minutes x3 minutes   Static Standing Balance   Static Standing-Balance Support Bilateral upper extremity supported   Static Standing-Level of Assistance Contact guard  (+2)   Static Standing-Comment/Number of Minutes x1 minute   Therapeutic Exercise   Therapeutic  Exercise Performed Yes  (Pt only able to complete one exercise this session as she continued to doze off and unable to stay awake.)   Therapeutic Exercise Activity 1 Ankle pumps x15   Bed Mobility   Bed Mobility Yes   Bed Mobility 1   Bed Mobility 1 Supine to sitting;Scooting   Level of Assistance 1 Moderate assistance   Bed Mobility Comments 1 x1 Pt required moderate assistance to sit up from supine and scoot forward to edge of bed   Ambulation/Gait Training   Ambulation/Gait Training Performed Yes   Ambulation/Gait Training 1   Surface 1 Level tile   Device 1 Rolling walker   Gait Support Devices Gait belt   Assistance 1 Contact guard   Quality of Gait 1 WBOS;Decreased step length   Comments/Distance (ft) 1 5' BSC to chair with arms   Transfers   Transfer Yes   Transfer 1   Transfer From 1 Bed to   Transfer to 1 Commode-standard   Technique 1 Stand pivot;To right   Transfer Device 1 Gait belt;Walker   Transfer Level of Assistance 1 Minimum assistance;+2   Trials/Comments 1 x1   Transfers 2   Transfer From 2 Commode-standard to   Transfer to 2 Stand   Technique 2 Sit to stand   Transfer Device 2 Walker;Gait belt   Transfer Level of Assistance 2 Moderate assistance;+2   Trials/Comments 2 x1   Transfers 3   Transfer From 3 Stand to   Transfer to 3 Chair with arms   Technique 3 Stand to sit   Transfer Device 3 Walker;Gait belt   Transfer Level of Assistance 3 Moderate assistance;+2;Moderate verbal cues   Trials/Comments 3 x1  (Pt requires cueing to turn walker with her when sitting in chair)   Activity Tolerance   Endurance Tolerates 10 - 20 min exercise with multiple rests   Early Mobility/Exercise Safety Screen Proceed with mobilization - No exclusion criteria met   PT Assessment   PT Assessment Results Decreased strength;Decreased endurance;Impaired balance;Decreased mobility   Rehab Prognosis Good   Evaluation/Treatment Tolerance Patient limited by fatigue   Strengths Ability to acquire knowledge;Support of  Caregivers   End of Session Communication Bedside nurse   Assessment Comment Pt weakness and fatigue limited participation with activities this session. Patient needing signioficantly more assist to complete functional activity. Pt will continue to benefit from PT interventions to improve overall strength, endurance, and stability with functional activities.   End of Session Patient Position Up in chair;Alarm on   Outpatient Education   Individual(s) Educated Patient   Education Provided Body Mechanics;Fall Risk;POC   Risk and Benefits Discussed with Patient/Caregiver/Other yes   Patient/Caregiver Demonstrated Understanding yes   Plan of Care Discussed and Agreed Upon yes   PT Plan   Inpatient/Swing Bed or Outpatient Inpatient   PT Plan   Treatment/Interventions Bed mobility;Transfer training;Gait training;Therapeutic exercise   PT Plan Ongoing PT   PT Frequency 4 times per week   PT Discharge Recommendations Low intensity level of continued care   Equipment Recommended upon Discharge Wheeled walker   PT Recommended Transfer Status Assist x1         Outcome Measures:  [facility] Basic Mobility  Turning from your back to your side while in a flat bed without using bedrails: A lot  Moving from lying on your back to sitting on the side of a flat bed without using bedrails: A lot  Moving to and from bed to chair (including a wheelchair): A lot  Standing up from a chair using your arms (e.g. wheelchair or bedside chair): A lot  To walk in hospital room: A lot  Climbing 3-5 steps with railing: A lot  Basic Mobility - Total Score: 12     EDUCATION:  Outpatient Education  Individual(s) Educated: Patient  Education Provided: Body Mechanics, Fall Risk, POC  Risk and Benefits Discussed with Patient/Caregiver/Other: yes  Patient/Caregiver Demonstrated Understanding: yes  Plan of Care Discussed and Agreed Upon: yes      GOALS:  Encounter Problems       Encounter Problems (Active)       PT Problem       Bed mobility       Start:   02/23/25    Expected End:  03/09/25       Pt transfer sup<>sit with IND             Transfers       Start:  02/23/25    Expected End:  03/09/25       Pt will transfer sit<>stand with IND.           Gait       Start:  02/23/25    Expected End:  03/09/25       Pt will AMB 75 Feet with RW And mod I.           Balance       Start:  02/23/25    Expected End:  03/09/25       Pt will improve static/dynamic standing balance to Good in order to improve safety with functional tasks.           Stair negotation       Start:  02/23/25    Expected End:  03/09/25       Pt will ascend/descend 1 step without handrail and RW, SBA.              Pain - Adult              I personally have reviewed data entered by the student into the flowsheet and agree with this treatment session of this patient.    Angelo Carcamo, PTA

## 2025-02-25 NOTE — NURSING NOTE
Family at bedside with pt. Plan for hospice meeting tomorrow 2/26 between 130-2p.     Will continue to follow

## 2025-02-25 NOTE — CARE PLAN
Problem: Nutrition  Goal: Nutrient intake appropriate for maintaining nutritional needs  Outcome: Not Progressing     Problem: Pain - Adult  Goal: Verbalizes/displays adequate comfort level or baseline comfort level  Outcome: Progressing     Problem: Discharge Planning  Goal: Discharge to home or other facility with appropriate resources  Outcome: Progressing     Problem: Chronic Conditions and Co-morbidities  Goal: Patient's chronic conditions and co-morbidity symptoms are monitored and maintained or improved  Outcome: Progressing     Problem: Pain  Goal: Turns in bed with improved pain control throughout the shift  Outcome: Progressing  Goal: Walks with improved pain control throughout the shift  Outcome: Progressing  Goal: Free from opioid side effects throughout the shift  Outcome: Progressing     Problem: Diabetes  Goal: Increase self care and/or family involovement by end of shift  Outcome: Progressing     Problem: Fall/Injury  Goal: Verbalize understanding of risk factor reduction measures to prevent injury from fall in the home  Outcome: Progressing  Goal: Use assistive devices by end of the shift  Outcome: Progressing  Goal: Pace activities to prevent fatigue by end of the shift  Outcome: Progressing   The patient's goals for the shift include      The clinical goals for the shift include see care plan    Over the shift, the patient did not make progress toward the following goals. Barriers to progression include patient has no appetite and is refusing meals. Recommendations to address these barriers include try small feq feedings as patient is able to tolerate.    Problem: Nutrition  Goal: Nutrient intake appropriate for maintaining nutritional needs  Outcome: Not Progressing

## 2025-02-26 ENCOUNTER — TELEMEDICINE (OUTPATIENT)
Dept: HEMATOLOGY/ONCOLOGY | Facility: HOSPITAL | Age: 75
End: 2025-02-26
Payer: MEDICARE

## 2025-02-26 VITALS
RESPIRATION RATE: 16 BRPM | TEMPERATURE: 98.2 F | DIASTOLIC BLOOD PRESSURE: 62 MMHG | OXYGEN SATURATION: 97 % | BODY MASS INDEX: 25.76 KG/M2 | HEIGHT: 62 IN | WEIGHT: 139.99 LBS | SYSTOLIC BLOOD PRESSURE: 146 MMHG | HEART RATE: 83 BPM

## 2025-02-26 DIAGNOSIS — C67.9 BLADDER CARCINOMA (MULTI): Primary | ICD-10-CM

## 2025-02-26 DIAGNOSIS — Z71.89 GOALS OF CARE, COUNSELING/DISCUSSION: ICD-10-CM

## 2025-02-26 DIAGNOSIS — R59.0 RETROPERITONEAL LYMPHADENOPATHY: ICD-10-CM

## 2025-02-26 LAB
GLUCOSE BLD MANUAL STRIP-MCNC: 101 MG/DL (ref 74–99)
GLUCOSE BLD MANUAL STRIP-MCNC: 116 MG/DL (ref 74–99)
GLUCOSE BLD MANUAL STRIP-MCNC: 88 MG/DL (ref 74–99)
GLUCOSE BLD MANUAL STRIP-MCNC: 93 MG/DL (ref 74–99)

## 2025-02-26 PROCEDURE — 99238 HOSP IP/OBS DSCHRG MGMT 30/<: CPT | Performed by: STUDENT IN AN ORGANIZED HEALTH CARE EDUCATION/TRAINING PROGRAM

## 2025-02-26 PROCEDURE — 2500000004 HC RX 250 GENERAL PHARMACY W/ HCPCS (ALT 636 FOR OP/ED)

## 2025-02-26 PROCEDURE — 1111F DSCHRG MED/CURRENT MED MERGE: CPT | Performed by: STUDENT IN AN ORGANIZED HEALTH CARE EDUCATION/TRAINING PROGRAM

## 2025-02-26 PROCEDURE — 82947 ASSAY GLUCOSE BLOOD QUANT: CPT

## 2025-02-26 PROCEDURE — 2500000001 HC RX 250 WO HCPCS SELF ADMINISTERED DRUGS (ALT 637 FOR MEDICARE OP)

## 2025-02-26 PROCEDURE — 2500000002 HC RX 250 W HCPCS SELF ADMINISTERED DRUGS (ALT 637 FOR MEDICARE OP, ALT 636 FOR OP/ED)

## 2025-02-26 PROCEDURE — 1123F ACP DISCUSS/DSCN MKR DOCD: CPT | Performed by: STUDENT IN AN ORGANIZED HEALTH CARE EDUCATION/TRAINING PROGRAM

## 2025-02-26 RX ORDER — OXYBUTYNIN CHLORIDE 5 MG/1
5 TABLET ORAL 2 TIMES DAILY
Start: 2025-02-26

## 2025-02-26 RX ORDER — OXYCODONE HYDROCHLORIDE 15 MG/1
7.5 TABLET ORAL EVERY 4 HOURS PRN
Start: 2025-02-26

## 2025-02-26 RX ORDER — OXYCODONE HYDROCHLORIDE 5 MG/1
5 TABLET ORAL EVERY 4 HOURS PRN
Start: 2025-02-26

## 2025-02-26 RX ORDER — OXYCODONE HCL 10 MG/1
10 TABLET, FILM COATED, EXTENDED RELEASE ORAL EVERY 12 HOURS SCHEDULED
Start: 2025-02-26

## 2025-02-26 RX ADMIN — LEVOTHYROXINE SODIUM 100 MCG: 0.1 TABLET ORAL at 05:38

## 2025-02-26 RX ADMIN — OXYBUTYNIN CHLORIDE 5 MG: 5 TABLET ORAL at 08:43

## 2025-02-26 RX ADMIN — OXYCODONE HYDROCHLORIDE 10 MG: 10 TABLET, FILM COATED, EXTENDED RELEASE ORAL at 20:24

## 2025-02-26 RX ADMIN — FOLIC ACID 1 MG: 1 TABLET ORAL at 08:44

## 2025-02-26 RX ADMIN — OXYBUTYNIN CHLORIDE 5 MG: 5 TABLET ORAL at 20:24

## 2025-02-26 RX ADMIN — OXYCODONE HYDROCHLORIDE 10 MG: 10 TABLET, FILM COATED, EXTENDED RELEASE ORAL at 08:43

## 2025-02-26 RX ADMIN — MIRTAZAPINE 7.5 MG: 15 TABLET, FILM COATED ORAL at 20:23

## 2025-02-26 RX ADMIN — DOCUSATE SODIUM 100 MG: 100 CAPSULE, LIQUID FILLED ORAL at 20:24

## 2025-02-26 RX ADMIN — APIXABAN 5 MG: 5 TABLET, FILM COATED ORAL at 08:42

## 2025-02-26 RX ADMIN — TRAZODONE HYDROCHLORIDE 100 MG: 100 TABLET ORAL at 20:22

## 2025-02-26 RX ADMIN — METOPROLOL TARTRATE 75 MG: 50 TABLET, FILM COATED ORAL at 20:23

## 2025-02-26 RX ADMIN — APIXABAN 5 MG: 5 TABLET, FILM COATED ORAL at 20:22

## 2025-02-26 RX ADMIN — METOPROLOL TARTRATE 75 MG: 50 TABLET, FILM COATED ORAL at 08:42

## 2025-02-26 RX ADMIN — Medication 400 MG: at 08:43

## 2025-02-26 RX ADMIN — DOCUSATE SODIUM 100 MG: 100 CAPSULE, LIQUID FILLED ORAL at 08:43

## 2025-02-26 RX ADMIN — Medication 1000 UNITS: at 08:43

## 2025-02-26 RX ADMIN — ATORVASTATIN CALCIUM 10 MG: 10 TABLET, FILM COATED ORAL at 08:43

## 2025-02-26 RX ADMIN — LOSARTAN POTASSIUM 50 MG: 50 TABLET, FILM COATED ORAL at 08:43

## 2025-02-26 ASSESSMENT — PAIN SCALES - GENERAL
PAINLEVEL_OUTOF10: 6
PAINLEVEL_OUTOF10: 0 - NO PAIN
PAINLEVEL_OUTOF10: 10 - WORST POSSIBLE PAIN
PAINLEVEL_OUTOF10: 8

## 2025-02-26 ASSESSMENT — COGNITIVE AND FUNCTIONAL STATUS - GENERAL
EATING MEALS: A LITTLE
HELP NEEDED FOR BATHING: A LITTLE
DRESSING REGULAR LOWER BODY CLOTHING: A LITTLE
TOILETING: A LITTLE
MOVING TO AND FROM BED TO CHAIR: A LITTLE
MOBILITY SCORE: 16
TURNING FROM BACK TO SIDE WHILE IN FLAT BAD: A LITTLE
WALKING IN HOSPITAL ROOM: A LOT
DAILY ACTIVITIY SCORE: 18
CLIMB 3 TO 5 STEPS WITH RAILING: A LOT
PERSONAL GROOMING: A LITTLE
MOVING FROM LYING ON BACK TO SITTING ON SIDE OF FLAT BED WITH BEDRAILS: A LITTLE
STANDING UP FROM CHAIR USING ARMS: A LITTLE
DRESSING REGULAR UPPER BODY CLOTHING: A LITTLE

## 2025-02-26 ASSESSMENT — PAIN - FUNCTIONAL ASSESSMENT
PAIN_FUNCTIONAL_ASSESSMENT: 0-10
PAIN_FUNCTIONAL_ASSESSMENT: 0-10

## 2025-02-26 NOTE — HOSPITAL COURSE
74 y.o. female with PMHx of recently diagnosed bladder cancer, prior stroke without residual deficits, afib s/p ablation on Eliquis, paroxysmal SVT, HTN, CAD, HLD, GERD, DM, and hypothyroidism who presented with worsening abdominal pain. CT A/P showed progression of bladder mass and worsening lymphadenopathy and was admitted for symptom control. During hospital course, patient continued to not make improvement with worsening leukocytosis concerning for infection, alongside signs of poor p.o. intake and malnourishment with difficulty to control pain with scheduled morphine and as needed oxycodone. Given minimal improvement, discussed with family regarding goals of care, and ultimately family decided would like to pursue comfort care. Hospice per side.  Patient was discharged to inpatient hospice in stable condition.

## 2025-02-26 NOTE — CARE PLAN
Problem: Pain - Adult  Goal: Verbalizes/displays adequate comfort level or baseline comfort level  Outcome: Progressing     Problem: Discharge Planning  Goal: Discharge to home or other facility with appropriate resources  Outcome: Progressing     Problem: Pain  Goal: Free from opioid side effects throughout the shift  Outcome: Progressing     Problem: Fall/Injury  Goal: Use assistive devices by end of the shift  Outcome: Progressing

## 2025-02-26 NOTE — NURSING NOTE
Patient stable this shift. Patient resting quietly in bed. Routine pain meds effective in controlling pain this shift. Patient to discharge to [facility].

## 2025-02-26 NOTE — NURSING NOTE
Hospice meeting scheduled for today at 130/2p bedside with pts family and [facility] RN.    3P  Hospice consents signed, plan for discharge to [facility] today.   Team notified

## 2025-02-26 NOTE — ED PROVIDER NOTES
HPI   Chief Complaint   Patient presents with    Abdominal Pain     Pt states she was recently diagnosed with bladder cx. Recently hospitalized for pain when they found the cancer. Pt has been incontinent of urine. Pain is 10/10 nonradiating       HPI  Patient presenting with to the Emergency Department with  for continued pain despite use of outpatient narcotics in the setting of recent diagnosis of bladder cancer.  She has a past medical history of prior stroke without residual deficits, GERD, hyperlipidemia, diabetes, hypothyroidism, CAD.  She endorses mild palpitations.  Also endorsing that she has not yet established care plan with her outpatient oncology team.  Prior hospitalizations for similar complaints.  She denies any previous discussion with palliative care or pain management regarding how to manage her symptomatology in the setting of her new cancer diagnosis.  She denies any vomiting but does endorse decreased p.o. intake.  Denies any melena or hematochezia.  Denies any fevers chills or sweats.  Denies any chest pain or shortness of breath.      Patient History   Past Medical History:   Diagnosis Date    Anxiety and depression     Arrhythmia     Asthma     Cancer (Multi)     Coronary artery disease     Diabetes mellitus (Multi)     Disease of thyroid gland     GERD (gastroesophageal reflux disease)     Heart disease     Hyperlipidemia     Hypertension     Irregular heart beat     Occipital neuralgia 06/10/2022    Occipital neuralgia of right side    Personal history of malignant neoplasm, unspecified 10/04/2021    History of malignant neoplasm    Personal history of other diseases of the circulatory system     History of hypertension    Personal history of other diseases of the circulatory system 10/04/2021    History of essential hypertension    Personal history of other diseases of the circulatory system     History of coronary artery disease    Personal history of other diseases of the  digestive system 03/11/2020    History of chronic constipation    Personal history of other diseases of the musculoskeletal system and connective tissue     History of arthritis    Personal history of other diseases of the nervous system and sense organs 10/04/2021    History of eye problem    Personal history of other diseases of the respiratory system     History of asthma    Personal history of other specified conditions 10/04/2021    History of chest pain    PONV (postoperative nausea and vomiting)     Stool incontinence     Thyroid cancer (Multi)     Urinary incontinence      Past Surgical History:   Procedure Laterality Date    APPENDECTOMY  08/13/2015    Appendectomy    BREAST LUMPECTOMY  08/13/2015    Breast Surgery Lumpectomy    CARPAL TUNNEL RELEASE  08/13/2015    Neuroplasty Decompression Median Nerve At Carpal Tunnel    GALLBLADDER SURGERY  08/13/2015    Gallbladder Surgery    HYSTERECTOMY  08/13/2015    Hysterectomy    KNEE ARTHROSCOPY W/ MENISCAL REPAIR  08/13/2015    Knee Arthroscopy With Medial Meniscus Repair    KNEE SURGERY  08/13/2015    Knee Surgery    MR HEAD ANGIO WO IV CONTRAST  09/05/2021    MR HEAD ANGIO WO IV CONTRAST 9/5/2021 [facility]    OTHER SURGICAL HISTORY  08/13/2015    Shoulder Surgery Left    OTHER SURGICAL HISTORY  08/13/2015    Repair Of Bladder Reconstruction    OTHER SURGICAL HISTORY  09/20/2021    Breast surgery    OTHER SURGICAL HISTORY  09/20/2021    Bladder surgery    OTHER SURGICAL HISTORY  09/20/2021    Complete colonoscopy    OTHER SURGICAL HISTORY  09/20/2021    Shoulder surgery    OTHER SURGICAL HISTORY  09/20/2021    Foot surgery    OTHER SURGICAL HISTORY  09/20/2021    Percutaneous transluminal coronary angioplasty    OTHER SURGICAL HISTORY  10/11/2021    Thyroidectomy total    THYROIDECTOMY      TOTAL KNEE ARTHROPLASTY  08/13/2015    Knee Replacement     Family History   Problem Relation Name Age of Onset    Lung cancer Mother      Lymphoma Mother       Bone cancer Mother      Heart attack Father      Liver cancer Brother      Lung cancer Brother       Social History     Tobacco Use    Smoking status: Never    Smokeless tobacco: Never   Vaping Use    Vaping status: Never Used   Substance Use Topics    Alcohol use: Not Currently    Drug use: Never       Physical Exam   ED Triage Vitals   Temp Heart Rate Resp BP   02/21/25 0618 02/21/25 0618 02/21/25 0618 02/21/25 0618   36.3 °C (97.3 °F) 85 16 161/65      SpO2 Temp Source Heart Rate Source Patient Position   02/21/25 0618 02/21/25 0618 02/21/25 0618 02/21/25 0830   100 % Temporal Monitor Lying      BP Location FiO2 (%)     02/21/25 0830 --     Left arm        Physical Exam  Vitals and nursing note reviewed.   Constitutional:       General: She is not in acute distress.     Appearance: She is well-developed. She is ill-appearing. She is not toxic-appearing.   HENT:      Head: Normocephalic and atraumatic.      Nose: No congestion or rhinorrhea.      Mouth/Throat:      Mouth: Mucous membranes are moist.      Pharynx: No oropharyngeal exudate or posterior oropharyngeal erythema.   Eyes:      Conjunctiva/sclera: Conjunctivae normal.   Cardiovascular:      Rate and Rhythm: Normal rate and regular rhythm.      Pulses: Normal pulses.      Heart sounds: No murmur heard.     No gallop.   Pulmonary:      Effort: Pulmonary effort is normal. No respiratory distress.      Breath sounds: Normal breath sounds. No stridor. No wheezing, rhonchi or rales.   Abdominal:      General: Bowel sounds are normal. There is no distension.      Palpations: Abdomen is soft.      Tenderness: There is abdominal tenderness in the right lower quadrant, periumbilical area, suprapubic area and left lower quadrant. There is no guarding or rebound.   Musculoskeletal:         General: No swelling.      Cervical back: Neck supple.   Skin:     General: Skin is warm and dry.      Capillary Refill: Capillary refill takes less than 2 seconds.      Findings:  No rash.   Neurological:      General: No focal deficit present.      Mental Status: She is alert and oriented to person, place, and time.      Cranial Nerves: No cranial nerve deficit.      Sensory: No sensory deficit.      Gait: Gait normal.   Psychiatric:         Mood and Affect: Mood normal.         Behavior: Behavior normal.         Thought Content: Thought content normal.           ED Course & MDM   ED Course as of 02/26/25 1617   Fri Feb 21, 2025   0720 Patient signed out to Dr. Zapata at this time. [TL]   0751 I received signout from the outgoing ED provider.  Patient is a 74-year-old female presenting with lower abdominal pain.  She was recently diagnosed with bladder cancer, had cystoscopy and tumor resection performed about a week and a half ago with Dr. Brown.  She been having persistent pain, over the last 24 to 48 hours pain has intensified.  She denies any fevers or chills.  She has some nausea without vomiting.  No change in bowel movements.  On exam she is afebrile, initially was hemodynamically stable, heart rate now in the 140s.      An EKG was performed which does show supraventricular tachycardia with ventricular rate 147 bpm.  Normal axis.  No acute ischemic changes or injury pattern present.  I do think that this is atrial flutter looking at the underlying rhythm.    Patient was started on ketamine infusion by the outgoing ED provider.  Patient will have CT scan of the abdomen pelvis to evaluate for evidence of intra-abdominal process or complication from the recent procedure.  Will see how her heart rate responds to better pain control.  May need to be treated with AV zana blocker.  She remains normotensive, no chest pain or shortness of breath or symptoms of unstable dysrhythmia. [JJ]   0801 With the patient's heart rate and leukocytosis, she is meeting SIRS criteria, however, I do not have a source of infection at this time.  If there is evidence of infection on either urinalysis or CT  scan, will add lactate blood cultures and antibiotics.  But there is no evidence of infection currently therefore do not suspect sepsis. [JJ]   0814 After being started on ketamine infusion, heart rate improved to the 80s. Telemetry appears to be sinus. [JJ]   1115 Patient was reevaluated.  She is still having significant pain.  Additional dose of pain medication was ordered.  Given the patient's intractable pain and urinary infection decision made to admit the patient to the hospital for further evaluation management. [JJ]      ED Course User Index  [JJ] Angelo Zapata DO  [TL] Arturo Srivastava DO         Diagnoses as of 02/26/25 1617   Generalized abdominal pain   Urinary tract infection without hematuria, site unspecified                 No data recorded     [place] Coma Scale Score: 14 (02/25/25 0842 : Marcella Reeves, GLO)                           Medical Decision Making  Patient presenting with persistent abdominal pain despite use of narcotic pain medication as an outpatient the setting of recent cancer diagnosis.  I did inquire with the patient if she is having worsening pain and she does feel this is an acute worsening compared to her prior and based on this further workup to be obtained including laboratory studies such as CBC, metabolic panel, lipase, urinalysis.  CT imaging to be obtained.  Patient given IV analgesia, and given that her pain is not at this point being adequately controlled with narcotics Will try for multimodal pain strategy with pain dose ketamine to be initiated.  EKG to be obtained.  IV fluids ordered.  Signed out to the oncoming physician Dr. Zapata pending palliative care consult for assist with symptom control including analgesia plan, results of workup and likely hospitalization for pain control multidisciplinary care to determine neck step in the patient's care as I do not see a clear path home for this patient be discharged without a more comprehensive palliative care  plan.    Procedure  Procedures     Arturo Srivastava, DO  02/26/25 1625

## 2025-02-26 NOTE — CARE PLAN
The patient's goals for the shift include      The clinical goals for the shift include see care plan      Problem: Pain - Adult  Goal: Verbalizes/displays adequate comfort level or baseline comfort level  Outcome: Progressing     Problem: Discharge Planning  Goal: Discharge to home or other facility with appropriate resources  Outcome: Progressing     Problem: Chronic Conditions and Co-morbidities  Goal: Patient's chronic conditions and co-morbidity symptoms are monitored and maintained or improved  Outcome: Progressing     Problem: Nutrition  Goal: Nutrient intake appropriate for maintaining nutritional needs  Outcome: Progressing     Problem: Pain  Goal: Takes deep breaths with improved pain control throughout the shift  Outcome: Progressing  Goal: Turns in bed with improved pain control throughout the shift  Outcome: Progressing  Goal: Walks with improved pain control throughout the shift  Outcome: Progressing  Goal: Performs ADL's with improved pain control throughout shift  Outcome: Progressing  Goal: Participates in PT with improved pain control throughout the shift  Outcome: Progressing  Goal: Free from opioid side effects throughout the shift  Outcome: Progressing     Problem: Diabetes  Goal: Achieve decreasing blood glucose levels by end of shift  Outcome: Progressing  Goal: Increase stability of blood glucose readings by end of shift  Outcome: Progressing  Goal: Maintain electrolyte levels within acceptable range throughout shift  Outcome: Progressing  Goal: Learn about and adhere to nutrition recommendations by end of shift  Outcome: Progressing  Goal: Vital signs within normal range for age by end of shift  Outcome: Progressing  Goal: Increase self care and/or family involovement by end of shift  Outcome: Progressing  Goal: Receive DSME education by end of shift  Outcome: Progressing     Problem: Fall/Injury  Goal: Verbalize understanding of risk factor reduction measures to prevent injury from fall in  the home  Outcome: Progressing  Goal: Use assistive devices by end of the shift  Outcome: Progressing  Goal: Pace activities to prevent fatigue by end of the shift  Outcome: Progressing     Problem: Dressings Lower Extremities  Goal: STG - Patient to complete lower body dressing MOD I  Outcome: Progressing     Problem: Grooming  Goal: STG - Patient completes grooming MOD I  Outcome: Progressing     Problem: Skin  Goal: Decreased wound size/increased tissue granulation at next dressing change  Outcome: Progressing  Flowsheets (Taken 2/26/2025 0034)  Decreased wound size/increased tissue granulation at next dressing change: Promote sleep for wound healing  Goal: Participates in plan/prevention/treatment measures  Outcome: Progressing  Goal: Prevent/manage excess moisture  Outcome: Progressing  Goal: Prevent/minimize sheer/friction injuries  Outcome: Progressing  Goal: Promote/optimize nutrition  Outcome: Progressing  Goal: Promote skin healing  Outcome: Progressing

## 2025-02-26 NOTE — PROGRESS NOTES
I met with Ms. Rodrigues and her son on video call, while she was inpatient in the hospital. She was not able to participate in the conversation. Her son reports that she had a pleitez decline over the past 6 weeks, where she was previously independent. She is not able to get out of bed.    We discussed treatment of metastatic urothelial cancer. Based on the  study, enfortumab vedotin and pembrolizumab is preferred first-line treatment for metastatic urothelial cancer. We discussed potential toxicities of treatment including but not limited to rash, neuropathy, abnormal thyroid function, elevated liver enzymes, diarrhea and pneumonitis. Given her advanced disease and poor functional status, she would not be a candidate for treatment and not likely derive much benefit, especially with squamous histology.    She has met with hospice and will be transferred to inpatient hospice today. We discussed that it is appropriate to focus on her comfort rather than further cancer-directed therapy.    She will not require any follow up with this clinic, since she is going on hospice.

## 2025-02-26 NOTE — DISCHARGE SUMMARY
Discharge Diagnosis  Generalized abdominal pain    Issues Requiring Follow-Up  Discharged to [facility]    Discharge Meds     Medication List      START taking these medications     oxybutynin 5 mg tablet; Commonly known as: Ditropan; Take 1 tablet (5   mg) by mouth 2 times a day.   * oxyCODONE ER 10 mg 12 hr tablet; Commonly known as: OxyCONTIN; Take 1   tablet (10 mg) by mouth every 12 hours. Do not crush, chew, or split.;   Replaces: oxyCODONE 5 mg immediate release tablet   * oxyCODONE 15 mg immediate release tablet; Commonly known as:   Roxicodone; Take 0.5 tablets (7.5 mg) by mouth every 4 hours if needed for   severe pain (7 - 10).   * oxyCODONE 5 mg immediate release tablet; Commonly known as:   Roxicodone; Take 1 tablet (5 mg) by mouth every 4 hours if needed for   moderate pain (4 - 6).  * This list has 3 medication(s) that are the same as other medications   prescribed for you. Read the directions carefully, and ask your doctor or   other care provider to review them with you.     CONTINUE taking these medications     Eliquis 5 mg tablet; Generic drug: apixaban; TAKE 1 TABLET BY MOUTH   TWICE  DAILY   levothyroxine 100 mcg tablet; Commonly known as: Synthroid, Levoxyl   metoprolol tartrate 75 mg tablet; Commonly known as: Lopressor; Take 1   tablet (75 mg) by mouth 2 times a day.   montelukast 10 mg tablet; Commonly known as: Singulair; TAKE 1 TABLET BY   MOUTH AT  BEDTIME   omeprazole 40 mg DR capsule; Commonly known as: PriLOSEC; TAKE 1 CAPSULE   BY MOUTH TWICE  DAILY   traZODone 50 mg tablet; Commonly known as: Desyrel   Ventolin HFA 90 mcg/actuation inhaler; Generic drug: albuterol; USE 2   INHALATIONS BY MOUTH EVERY 4 HOURS AS NEEDED     STOP taking these medications     acetaminophen 325 mg tablet; Commonly known as: Tylenol   atorvastatin 10 mg tablet; Commonly known as: Lipitor   cholecalciferol 25 mcg (1000 units) tablet; Commonly known as: Vitamin   D-3   ciprofloxacin 500 mg tablet;  Commonly known as: Cipro   diphenoxylate-atropine 2.5-0.025 mg tablet; Commonly known as: Lomotil   estradiol 0.01 % (0.1 mg/gram) vaginal cream; Commonly known as: Estrace   folic acid 800 mcg tablet; Commonly known as: Folvite   losartan 50 mg tablet; Commonly known as: Cozaar   magnesium oxide 400 mg (241.3 mg magnesium) tablet; Commonly known as:   Mag-Ox   metFORMIN  mg 24 hr tablet; Commonly known as: Glucophage-XR   oxyCODONE 5 mg immediate release tablet; Commonly known as: Roxicodone;   Replaced by: oxyCODONE ER 10 mg 12 hr tablet   potassium gluconate 595 mg (99 mg) tablet     Test Results Pending At Discharge  Pending Labs       No current pending labs.          Hospital Course  74 y.o. female with PMHx of recently diagnosed bladder cancer, prior stroke without residual deficits, afib s/p ablation on Eliquis, paroxysmal SVT, HTN, CAD, HLD, GERD, DM, and hypothyroidism who presented with worsening abdominal pain. CT a/p showed progression of bladder mass and worsening lymphadenopathy and was admitted for symptom control. During hospital course, patient continued to not make improvement with worsening leukocytosis concerning for possible infection, alongside signs of poor p.o. intake and malnourishment with difficulty to control pain with scheduled morphine and as needed oxycodone. Given minimal improvement, discussed with family regarding goals of care, and ultimately family decided would like to pursue comfort care, hospice was consulted and papers were signed, with plan for patient to go to [facility] facility. Patient did have better control of generalized abdominal pain with oxycontin scheduled and oxycodone IR as needed, with oxybutynin scheduled. She was discharged to hospice facility in stable condition.    Pertinent Physical Exam At Time of Discharge  Physical Exam  General: NAD  Skin: Warm and dry  ENT: NC; PERRL; EOMI; clear sclera; membranes pink and moist; Fair dentition  Head/Neck: NC;  supple, non-rigid, no JVD  CV: regular rate and rhythm, no murmurs  RESP: CTA bilaterally, normal chest expansion, no resp distress  Abd: soft, some generalized tenderness, nondistended  Neuro: Lethargic, able to move extremities with appropriate range of motion  Extremities: no peripheral edema appreciated, cap refill <2s  Psych: Lethargic    Outpatient Follow-Up  Future Appointments   Date Time Provider Department Center   2/27/2025  1:15 PM Shashank Proctor MD [id number] [place]   3/3/2025  1:50 PM Andrea Carranza MD [id number] [place]   3/6/2025  1:30 PM [id number] [place] [id number] [place]   3/25/2025  2:00 PM Lesia Degroot MD [id number] [place]   4/9/2025 11:20 AM Major Jurado MD [id number] [place]   4/30/2025 11:20 AM Calvin Baker MD [id number] [place]   6/2/2025  1:30 PM Belinda Espinosa  [id number] [place]   7/16/2025  2:15 PM Shashank Proctor MD [id number] [place]   8/28/2025 11:10 AM Calvin Baker MD [id number] [place]   10/16/2025 11:00 AM Evin Thurman MD [id number] [place]         [facility],

## 2025-02-26 NOTE — PROGRESS NOTES
Myrna Rodrigues is a 74 y.o. female on day 5 of admission presenting with Generalized abdominal pain.      Subjective   Patient seen and examined at bedside. No acute events overnight. Somnolent/lethargic at bedside, minimal participation, denies any pain currently.  Plan for hospice meeting today.    Objective     Last Recorded Vitals  /53 (BP Location: Left arm, Patient Position: Lying)   Pulse 68   Temp 35.8 °C (96.4 °F) (Temporal)   Resp 16   Wt 63.5 kg (139 lb 15.9 oz)   SpO2 92%   Intake/Output last 3 Shifts:    Intake/Output Summary (Last 24 hours) at 2/26/2025 0643  Last data filed at 2/26/2025 0553  Gross per 24 hour   Intake 220 ml   Output 225 ml   Net -5 ml       Admission Weight  Weight: 61.2 kg (135 lb) (02/21/25 0622)    Daily Weight  02/21/25 : 63.5 kg (139 lb 15.9 oz)    Image Results  CT chest w IV contrast  Narrative: Interpreted By:  Main Philippe,   STUDY:  CT CHEST W IV CONTRAST;  2/22/2025 10:06 am      INDICATION:  Signs/Symptoms:Squamous cell carcinoma of the bladder, for staging  purposes..      COMPARISON:  01/28/2024      ACCESSION NUMBER(S):  [id number]      ORDERING CLINICIAN:  SHAKILA GLASS      TECHNIQUE:  Helical data acquisition of the chest was obtained with IV contrast  material. 50 mL of Omnipaque 350. Images were reformatted in axial,  coronal, and sagittal planes.      FINDINGS:  Lungs and Pleura: Scattered streaky atelectasis. Small bilateral  pleural effusions with adjacent compressive atelectasis. No pleural  effusion. No pneumothorax.      Mediastinum and axilla: No adenopathy by CT size criteria. No  cardiomegaly or pericardial effusion. No thoracic aortic aneurysm.  Small hiatal hernia. Atherosclerosis. Coronary artery calcifications.      Visualized Upper Abdomen: Upper abdominal lymphadenopathy, biliary  ductal dilatation, and other findings detail to better extent on CT  abdomen and pelvis dated 02/21/2025.      MSK/Chest Wall: No aggressive bony lesion  identified. Scattered  degenerative change in the spine.      Impression: Small bilateral pleural effusions with adjacent atelectasis.  Scattered streaky subsegmental atelectasis.      No evidence of thoracic metastatic disease.      Signed by: Main Philippe 2/22/2025 11:56 AM  Dictation workstation:   [id number]  Electrocardiogram, 12-lead PRN ACS symptoms  Supraventricular tachycardia  Low voltage QRS  Possible Inferior infarct , age undetermined  Abnormal ECG  When compared with ECG of 12-FEB-2025 09:30, (unconfirmed)  Vent. rate has increased BY  73 BPM  Borderline criteria for Inferior infarct are now Present    Physical Exam  General: NAD  Skin: Warm and dry  ENT: NC; PERRL; EOMI; clear sclera; membranes pink and moist; Fair dentition  Head/Neck: NC; supple, non-rigid, no JVD  CV: regular rate and rhythm, no murmurs  RESP: CTA bilaterally, normal chest expansion, no resp distress  Abd: soft, some generalized tenderness, nondistended  Neuro: Lethargic, minimally participating in exam  Extremities: no peripheral edema appreciated, cap refill <2s  Psych: Lethargic    Relevant Results  Labs this admission reviewed  Imagings this admission reviewed  Cultures: Reviewed    Assessment/Plan   Assessment & Plan  Generalized abdominal pain    Myrna Rodrigues is a 74 y.o. female with PMHx of recently diagnosed bladder cancer, prior stroke without residual deficits, afib s/p ablation on Eliquis, paroxysmal SVT, HTN, CAD, HLD, GERD, DM, and hypothyroidism who presented with worsening RLQ abdominal pain and intermittent palpitations. Given her leukocytosis, hematuria, and UA findings, there is concern for UTI vs. worsening malignancy-related complications. CT A/P showed progression of bladder mass and worsening lymphadenopathy. EKG showed SVT ( bpm). Managed with IV fluids, analgesia (Dilaudid, ketamine, morphine), and broad-spectrum antibiotics. Admitted for pain control, further infectious workup, and oncology  evaluation.  Plan for hospice meeting today.     #Intractable abdominal pain   #Newly diagnosed invasive bladder cancer  #Leukocytosis   #Hyponatremia, resolving  She underwent inpatient cystoscopy with biopsy that showed Invasive carcinoma with extensive keratinizing squamous differentiation (greater than 90%), invading into the detrusor muscle (muscularis propria).  Additionally UA showed +2 hematuria, positive nitrites, positive leukocyte esterase, 6-10 WBCs, and +1 bacteria. Previous culture data from prior admissions showed multiple organisms and Klebsiella pneumoniae with resistant ampicillin and azithromycin and cefazolin.   Plan  -Pain control: Dilaudid decreased to 0.4 mg PRN for breakthrough pain, Oxycodone 7.5mg PRN for severe pain, 5mg for moderate pain, Oxycodone XR 10mg BID, Naloxone as needed  -Na: 126, possible dehydration vs. SIADH secondary to pain  -Urine culture demonstrated contamination with mixed bacterial mary ellen  -S/p Zosyn in the ED, transitioned to Rocephin yesterday. ABX discontinued as urine culture demonstrated contamination.  -Zofran as needed for nausea  -Oncology consulted, has appointment with Dr. Reyes on Wednesday 2/26  -Family electing for comfort care, hospice meeting planned for today     #Paroxysmal SVT   #A-fib status s/p ablation on Eliquis   #Hx of Afib RVR   #Hx of multifocal stroke  [id number] score 6   -She was intermittently tachycardic in the ED, most likely due to worsening abdominal pain.  -Resume home medication with metoprolol and anticoagulation with Eliquis  -Will medically optimize pain control  -Daily Electrolytes and Mg. Maintain K > 4 and Mg > 2, replace as needed.  -On previous admissions, SVT is eradicated with vagal maneuver.     #Chronic microcytic anemia   -Hemoglobin stable at 8.6 (at baseline)  -Likely due to anemia of chronic disease  -No active bleeding, she denies hematuria, hematemesis or hemtochezia  -Monitor vital signs for fever  -Daily CBC       Chronic problem  #Hypothyroidism  #T2DM - SSI  #HTN  #HLD  #GERD  -Continue home meds as appropriate    Malnutrition Diagnosis Status: Active  Malnutrition Diagnosis: Moderate malnutrition related to acute disease or injury  Related to: acute on chronic nutritional stress in the setting of bladder cancer.  As Evidenced by: <75% of estimated energy requirements for > 7 days and NFPE showing signs of moderate muscle wasting/fat loss.  I agree with the dietitian's malnutrition diagnosis.     Consultants: Oncology, Palliative, hospice  Diet: Regular diet  Code Status: DNR-CC  DVT Prophylaxis: Eliquis  Disposition: Plan for hospice meeting today, will follow-up results     Staffed with attending physician,   Nicolas Proctor DO

## 2025-02-26 NOTE — PROGRESS NOTES
02/26/25 1500   Discharge Planning   Expected Discharge Disposition [facility]     Per SEJAL RN, patient to d/c to [facility].

## 2025-02-27 ENCOUNTER — APPOINTMENT (OUTPATIENT)
Dept: PRIMARY CARE | Facility: CLINIC | Age: 75
End: 2025-02-27
Payer: MEDICARE

## 2025-03-02 LAB
ATRIAL RATE: 131 BPM
ATRIAL RATE: 150 BPM
ATRIAL RATE: 74 BPM
P AXIS: 66 DEGREES
P OFFSET: 179 MS
P ONSET: 124 MS
PR INTERVAL: 176 MS
Q ONSET: 212 MS
Q ONSET: 214 MS
Q ONSET: 217 MS
QRS COUNT: 12 BEATS
QRS COUNT: 23 BEATS
QRS COUNT: 25 BEATS
QRS DURATION: 78 MS
QRS DURATION: 80 MS
QRS DURATION: 86 MS
QT INTERVAL: 310 MS
QT INTERVAL: 322 MS
QT INTERVAL: 352 MS
QTC CALCULATION(BAZETT): 390 MS
QTC CALCULATION(BAZETT): 479 MS
QTC CALCULATION(BAZETT): 508 MS
QTC FREDERICIA: 377 MS
QTC FREDERICIA: 414 MS
QTC FREDERICIA: 436 MS
R AXIS: -20 DEGREES
R AXIS: -47 DEGREES
R AXIS: -53 DEGREES
T AXIS: 31 DEGREES
T AXIS: 68 DEGREES
T AXIS: 88 DEGREES
T OFFSET: 372 MS
T OFFSET: 375 MS
T OFFSET: 388 MS
VENTRICULAR RATE: 144 BPM
VENTRICULAR RATE: 150 BPM
VENTRICULAR RATE: 74 BPM

## 2025-03-03 ENCOUNTER — APPOINTMENT (OUTPATIENT)
Dept: UROLOGY | Facility: CLINIC | Age: 75
End: 2025-03-03
Payer: MEDICARE

## 2025-03-06 ENCOUNTER — APPOINTMENT (OUTPATIENT)
Dept: RADIOLOGY | Facility: CLINIC | Age: 75
End: 2025-03-06
Payer: MEDICARE

## 2025-03-06 ENCOUNTER — APPOINTMENT (OUTPATIENT)
Dept: HEMATOLOGY/ONCOLOGY | Facility: CLINIC | Age: 75
End: 2025-03-06
Payer: MEDICARE

## 2025-03-06 LAB
Q ONSET: 220 MS
QRS COUNT: 25 BEATS
QRS DURATION: 76 MS
QT INTERVAL: 318 MS
QTC CALCULATION(BAZETT): 497 MS
QTC FREDERICIA: 428 MS
R AXIS: -27 DEGREES
T AXIS: 54 DEGREES
T OFFSET: 379 MS
VENTRICULAR RATE: 147 BPM

## 2025-03-07 ENCOUNTER — APPOINTMENT (OUTPATIENT)
Dept: HEMATOLOGY/ONCOLOGY | Facility: CLINIC | Age: 75
End: 2025-03-07
Payer: MEDICARE

## 2025-03-19 DIAGNOSIS — K21.9 GASTROESOPHAGEAL REFLUX DISEASE, UNSPECIFIED WHETHER ESOPHAGITIS PRESENT: ICD-10-CM

## 2025-03-20 RX ORDER — OMEPRAZOLE 40 MG/1
CAPSULE, DELAYED RELEASE ORAL
Qty: 180 CAPSULE | Refills: 3 | Status: SHIPPED | OUTPATIENT
Start: 2025-03-20

## 2025-03-25 ENCOUNTER — APPOINTMENT (OUTPATIENT)
Dept: CARDIOLOGY | Facility: CLINIC | Age: 75
End: 2025-03-25
Payer: MEDICARE

## 2025-04-09 ENCOUNTER — APPOINTMENT (OUTPATIENT)
Facility: CLINIC | Age: 75
End: 2025-04-09
Payer: MEDICARE

## 2025-04-21 DIAGNOSIS — J44.9 CHRONIC OBSTRUCTIVE PULMONARY DISEASE, UNSPECIFIED COPD TYPE (MULTI): ICD-10-CM

## 2025-04-22 ENCOUNTER — POST MORTEM DOCUMENTATION (OUTPATIENT)
Dept: PRIMARY CARE | Facility: CLINIC | Age: 75
End: 2025-04-22
Payer: MEDICARE

## 2025-04-22 ENCOUNTER — TELEPHONE (OUTPATIENT)
Dept: PRIMARY CARE | Facility: CLINIC | Age: 75
End: 2025-04-22
Payer: MEDICARE

## 2025-04-22 RX ORDER — ALBUTEROL SULFATE 90 UG/1
2 AEROSOL, METERED RESPIRATORY (INHALATION) EVERY 4 HOURS PRN
Qty: 108 G | Refills: 3 | Status: SHIPPED | OUTPATIENT
Start: 2025-04-22 | End: 2025-04-23

## 2025-04-22 NOTE — PROGRESS NOTES
I spoke with [facility] and was advised that Patient passed away at their facility on 2025.       Shani, can you please ashli patient ?  Thank you!

## 2025-04-22 NOTE — TELEPHONE ENCOUNTER
I spoke with [facility] and was advised that Patient passed away at their facility on 2025.      Shani, can you please ashli patient ?  Thank you!

## 2025-04-30 ENCOUNTER — APPOINTMENT (OUTPATIENT)
Dept: PRIMARY CARE | Facility: CLINIC | Age: 75
End: 2025-04-30
Payer: MEDICARE

## 2025-05-05 ENCOUNTER — APPOINTMENT (OUTPATIENT)
Dept: UROLOGY | Facility: CLINIC | Age: 75
End: 2025-05-05
Payer: MEDICARE

## 2025-06-02 ENCOUNTER — APPOINTMENT (OUTPATIENT)
Dept: NEUROLOGY | Facility: CLINIC | Age: 75
End: 2025-06-02
Payer: MEDICARE

## 2025-07-16 ENCOUNTER — APPOINTMENT (OUTPATIENT)
Dept: CARDIOLOGY | Facility: CLINIC | Age: 75
End: 2025-07-16
Payer: MEDICARE

## 2025-08-28 ENCOUNTER — APPOINTMENT (OUTPATIENT)
Dept: PRIMARY CARE | Facility: CLINIC | Age: 75
End: 2025-08-28
Payer: MEDICARE
